# Patient Record
Sex: FEMALE | Race: WHITE | Employment: OTHER | URBAN - METROPOLITAN AREA
[De-identification: names, ages, dates, MRNs, and addresses within clinical notes are randomized per-mention and may not be internally consistent; named-entity substitution may affect disease eponyms.]

---

## 2017-07-06 ENCOUNTER — TRANSFERRED RECORDS (OUTPATIENT)
Dept: HEALTH INFORMATION MANAGEMENT | Facility: CLINIC | Age: 37
End: 2017-07-06

## 2017-07-17 ENCOUNTER — TELEPHONE (OUTPATIENT)
Dept: DERMATOLOGY | Facility: CLINIC | Age: 37
End: 2017-07-17

## 2017-07-17 NOTE — LETTER
[facility]  [address]  [facility] [address]  Phone: [phone]    July 17, 2017    Remedios Watts  [address] [address]              Dear Ms. Watts,    You are scheduled for Mohs Surgery on Monday July 24 th at 7:30 am.    Please check in at Dermatology Clinic.   (2nd Floor, last  Clinic on right up staircase or elevator -past OB/GYN clinic)    You don't need to arrive more than 5-10 minutes prior to your appointment time.     Be sure to eat a good breakfast and bathe and wash your hair prior to Surgery.    If you are taking any anti-coagulants that are prescribed by your Doctor (such as Coumadin/warfarin, Plavix, Aspirin, Ibuprofen), please continue taking them.     However, If you are taking anti-coagulants over the counter without  a Doctor's order for a Medical condition, please discontinue them 10 days prior to Surgery.      Please wear loose comfortable clothing as it could possibly be 4-6 hours until your surgery is completed depending upon how many layers of tissue need to be removed.     Wi-fi access is available.     Sincerely,      Johnny Stevens MD/ Addie Goodrich RN

## 2017-07-17 NOTE — TELEPHONE ENCOUNTER
"Spoke to patient. \"The tricep on my upper arm came back as Melanoma and I am supposed to have more taken off, but I want to come to you guys.. I went to Dr. Cruz in [facility] I couldn't get in with you sooner and they biopsied it..\"     Advised to have her records faxed to us at: Fax: [phone].    I scheduled a Mohs appt for her for 7-24-17. Mohs brochure/Pre-op letter sent via Us mail and My chart.    Patient verbalized understanding. Addie Goodrich RN       "

## 2017-07-17 NOTE — TELEPHONE ENCOUNTER
Reason for Call:  Other     Detailed comments: Pt states she has an appt with Brittanie 08/17 but has since gone to [facility] in [place] where they did surgery and biopsy came back as Melanoma on her arm. Pt wants to continue her care here but not sure what is next step, may need more surgery, please call her to discuss    Phone Number Patient can be reached at: Cell number on file:    Telephone Information:   Mobile [phone]       Best Time: today    Can we leave a detailed message on this number? YES    Call taken on 7/17/2017 at 12:50 PM by Moriah Hanson

## 2017-07-24 ENCOUNTER — OFFICE VISIT (OUTPATIENT)
Dept: DERMATOLOGY | Facility: CLINIC | Age: 37
End: 2017-07-24

## 2017-07-24 ENCOUNTER — MEDICAL CORRESPONDENCE (OUTPATIENT)
Dept: HEALTH INFORMATION MANAGEMENT | Facility: CLINIC | Age: 37
End: 2017-07-24

## 2017-07-24 VITALS — OXYGEN SATURATION: 100 % | SYSTOLIC BLOOD PRESSURE: 118 MMHG | DIASTOLIC BLOOD PRESSURE: 72 MMHG | HEART RATE: 66 BPM

## 2017-07-24 DIAGNOSIS — C43.62 MELANOMA OF LEFT UPPER ARM (H): ICD-10-CM

## 2017-07-24 DIAGNOSIS — L81.4 LENTIGO: ICD-10-CM

## 2017-07-24 DIAGNOSIS — D48.5 NEOPLASM OF UNCERTAIN BEHAVIOR OF SKIN: Primary | ICD-10-CM

## 2017-07-24 DIAGNOSIS — L82.1 SK (SEBORRHEIC KERATOSIS): ICD-10-CM

## 2017-07-24 DIAGNOSIS — D22.9 NEVUS: ICD-10-CM

## 2017-07-24 DIAGNOSIS — D22.9 NEVUS SPILUS: ICD-10-CM

## 2017-07-24 PROCEDURE — 99204 OFFICE O/P NEW MOD 45 MIN: CPT | Mod: 25 | Performed by: DERMATOLOGY

## 2017-07-24 PROCEDURE — 17313 MOHS 1 STAGE T/A/L: CPT | Performed by: DERMATOLOGY

## 2017-07-24 PROCEDURE — 13122 CMPLX RPR S/A/L ADDL 5 CM/>: CPT | Performed by: DERMATOLOGY

## 2017-07-24 PROCEDURE — 17314 MOHS ADDL STAGE T/A/L: CPT | Performed by: DERMATOLOGY

## 2017-07-24 PROCEDURE — 88305 TISSUE EXAM BY PATHOLOGIST: CPT | Mod: 59 | Performed by: DERMATOLOGY

## 2017-07-24 PROCEDURE — 11100 ZZHC BIOPSY SKIN/SUBQ/MUC MEM, SINGLE LESION: CPT | Mod: 59 | Performed by: DERMATOLOGY

## 2017-07-24 PROCEDURE — 13121 CMPLX RPR S/A/L 2.6-7.5 CM: CPT | Performed by: DERMATOLOGY

## 2017-07-24 NOTE — NURSING NOTE
"Chief Complaint   Patient presents with     Derm Problem     MOHS       Initial /72  Pulse 66  SpO2 100% Estimated body mass index is 25.31 kg/(m^2) as calculated from the following:    Height as of 9/20/15: 1.676 m (5' 6\").    Weight as of 2/9/16: 71.1 kg (156 lb 12.8 oz).  Medication Reconciliation: complete    "

## 2017-07-24 NOTE — MR AVS SNAPSHOT
After Visit Summary   2017    Remedios Watts    MRN: [id number]           Patient Information     Date Of Birth          1980        Visit Information        Provider Department      2017 7:30 AM Johnny Stevens MD [facility]        Today's Diagnoses     Neoplasm of uncertain behavior of skin    -  1    Melanoma of left upper arm (H)        Nevus spilus        Nevus        Lentigo        SK (seborrheic keratosis)          Care Instructions      Sutured Wound Care     [facility]: [phone]    [facility]: [phone]    Left arm      ? No strenuous activity for 48 hours. Resume moderate activity in 48 hours. No heavy exercising until you are seen for follow up in one week.     ? Take Tylenol as needed for discomfort.                         ? Do not drink alcoholic beverages for 48 hours.     ? Keep the pressure bandage in place for 24 hours. If the bandage becomes blood tinged or loose, reinforce it with gauze and tape.        (Refer to the reverse side of this page for management of bleeding).    ? Remove pressure bandage in 24 hours     ? Leave the flat bandage in place until your follow up appointment.    ? Keep the bandage dry. Wash around it carefully.    ? If the tape becomes soiled or starts to come off, reinforce it with additional paper tape.    ? Do not smoke for 3 weeks; smoking is detrimental to wound healing.    ? It is normal to have swelling and bruising around the surgical site. The bruising will fade in approximately 10-14 days. Elevate the area to reduce swelling.    ? Numbness, itchiness and sensitivity to temperature changes can occur after surgery and may take up to 18 months to normalize.      POSSIBLE COMPLICATIONS    BLEEDIN. Leave the bandage in place.  2. Use tightly rolled up gauze or a cloth to apply direct pressure over the bandage for 20   minutes.  3. Reapply pressure for an additional 20 minutes if  necessary  4. Call the office or go to the nearest emergency room if pressure fails to stop the bleeding.  5. Use additional gauze and tape to maintain pressure once the bleeding has stopped.        PAIN:    1. Post operative pain should slowly get better, never worse.  2. A severe increase in pain may indicate a problem. Call the office if this occurs.    In case of emergency phone:Dr Stevens [phone]            Follow-ups after your visit        Who to contact     If you have questions or need follow up information about today's clinic visit or your schedule please contact [facility] directly at [phone].  Normal or non-critical lab and imaging results will be communicated to you by Ev[employer]art, letter or phone within 4 business days after the clinic has received the results. If you do not hear from us within 7 days, please contact the clinic through [employer] or phone. If you have a critical or abnormal lab result, we will notify you by phone as soon as possible.  Submit refill requests through [employer] or call your pharmacy and they will forward the refill request to us. Please allow 3 business days for your refill to be completed.          Additional Information About Your Visit        [email or web address] Information     [employer] gives you secure access to your electronic health record. If you see a primary care provider, you can also send messages to your care team and make appointments. If you have questions, please call your primary care clinic.  If you do not have a primary care provider, please call [phone] and they will assist you.        Care EveryWhere ID     This is your Care EveryWhere ID. This could be used by other organizations to access your [place] medical records  [id number]        Your Vitals Were     Pulse Pulse Oximetry                66 100%           Blood Pressure from Last 3 Encounters:   07/24/17 118/72   05/05/16 116/67   02/09/16 111/67    Weight from Last 3 Encounters:    02/09/16 71.1 kg (156 lb 12.8 oz)   09/20/15 68 kg (150 lb)   02/06/13 68 kg (150 lb)              Today, you had the following     No orders found for display       Primary Care Provider Office Phone # Fax #    Agustín Berger [phone]       [facility] [address] [facility] [address]        Equal Access to Services     FATIMAH DIAZ : Hadii aad ku hadasho Soomaali, waaxda luqadaha, qaybta kaalmada adeegyada, waxay idiin hayaan adeeg kharajodi la'christina . So [facility] [phone].    ATENCIÓN: Si farzana james, tiene a cui disposición servicios gratuitos de asistencia lingüística. Llame al [phone].    We comply with applicable federal civil rights laws and [place] laws. We do not discriminate on the basis of race, color, national origin, age, disability sex, sexual orientation or gender identity.            Thank you!     Thank you for choosing [facility]  for your care. Our goal is always to provide you with excellent care. Hearing back from our patients is one way we can continue to improve our services. Please take a few minutes to complete the written survey that you may receive in the mail after your visit with us. Thank you!             Your Updated Medication List - Protect others around you: Learn how to safely use, store and throw away your medicines at www.disposemymeds.org.          This list is accurate as of: 7/24/17  3:51 PM.  Always use your most recent med list.                   Brand Name Dispense Instructions for use Diagnosis    clindamycin 1 % solution    CLEOCIN T    60 mL    Apply  topically 2 times daily. To lip    Folliculitis       dexamethasone 4 MG tablet    DECADRON    2.5 tablet    Take 2.5 tablets (10 mg) by mouth once for 1 dose        ibuprofen 800 MG tablet    ADVIL/MOTRIN    30 tablet    Take 1 tablet by mouth every 6 hours as needed (cramping).    Supervision of other normal pregnancy       ORTHO-NOVUM 7/7/7 (28) 0.5/0.75/1-35  MG-MCG per tablet   Generic drug:  norethindrone-ethinyl estradiol     3 Package    Take 1 tablet by mouth daily.        sertraline 50 MG tablet    ZOLOFT    90 tablet    Take 1 tablet by mouth daily.    QING (generalised anxiety disorder)       triamcinolone 0.1 % paste    KENALOG    5 g    Take by mouth 2 times daily

## 2017-07-24 NOTE — PATIENT INSTRUCTIONS
Sutured Wound Care     [facility]: [phone]    [facility]: [phone]    Left arm      ? No strenuous activity for 48 hours. Resume moderate activity in 48 hours. No heavy exercising until you are seen for follow up in one week.     ? Take Tylenol as needed for discomfort.                         ? Do not drink alcoholic beverages for 48 hours.     ? Keep the pressure bandage in place for 24 hours. If the bandage becomes blood tinged or loose, reinforce it with gauze and tape.        (Refer to the reverse side of this page for management of bleeding).    ? Remove pressure bandage in 24 hours     ? Leave the flat bandage in place until your follow up appointment.    ? Keep the bandage dry. Wash around it carefully.    ? If the tape becomes soiled or starts to come off, reinforce it with additional paper tape.    ? Do not smoke for 3 weeks; smoking is detrimental to wound healing.    ? It is normal to have swelling and bruising around the surgical site. The bruising will fade in approximately 10-14 days. Elevate the area to reduce swelling.    ? Numbness, itchiness and sensitivity to temperature changes can occur after surgery and may take up to 18 months to normalize.      POSSIBLE COMPLICATIONS    BLEEDIN. Leave the bandage in place.  2. Use tightly rolled up gauze or a cloth to apply direct pressure over the bandage for 20   minutes.  3. Reapply pressure for an additional 20 minutes if necessary  4. Call the office or go to the nearest emergency room if pressure fails to stop the bleeding.  5. Use additional gauze and tape to maintain pressure once the bleeding has stopped.        PAIN:    1. Post operative pain should slowly get better, never worse.  2. A severe increase in pain may indicate a problem. Call the office if this occurs.    In case of emergency phone:Dr Stevens [phone]

## 2017-07-24 NOTE — PROGRESS NOTES
Remedios Watts is a 36 year old year old female patient here today for evaluation and managment of 0.45mm melanoma on left arm.   .  Patient states this has been present for ?.  Patient reports the following symptoms:  Changing color.  Bx by [facility].  She has a hxof sunburns, no fmhx of skin cancer.  .  Patient reports the following previous treatments none.  Patient reports the following modifying factors none.  Associated symptoms: none.  Patient has no other skin complaints today.  Remainder of the HPI, Meds, PMH, Allergies, FH, and SH was reviewed in chart.    Pertinent Hx:   melanoma  Past Medical History:   Diagnosis Date     Chickenpox      Diarrhea      Group B streptococcus urinary tract infection complicating pregnancy 4/20/2011    Plan PCN in labor      History of postpartum depression, currently pregnant 10/21/2010     Malignant melanoma (H)      Postpartum depression 12/17/2008     Tailbone injury     fx     Urinary tract infections        Past Surgical History:   Procedure Laterality Date     C ORAL SURGERY PROCEDURE          Family History   Problem Relation Age of Onset     Arthritis Mother      Neurologic Disorder Mother      had a seizure      Alcohol/Drug Mother      Arthritis Maternal Grandmother      Breast Cancer Maternal Grandmother      HEART DISEASE Paternal Grandmother      CANCER Father      skin       Social History     Social History     Marital status:      Spouse name: N/A     Number of children: 3     Years of education: N/A     Occupational History      [employer]     Social History Main Topics     Smoking status: Never Smoker     Smokeless tobacco: Never Used     Alcohol use Yes     Drug use: No     Sexual activity: Yes     Partners: Male     Birth control/ protection: Pill     Other Topics Concern     Parent/Sibling W/ Cabg, Mi Or Angioplasty Before 65f 55m? Yes     pt had MI @ age 58     Social History Narrative       Outpatient Encounter  Prescriptions as of 7/24/2017   Medication Sig Dispense Refill     triamcinolone (KENALOG) 0.1 % paste Take by mouth 2 times daily 5 g 0     clindamycin (CLEOCIN T) 1 % external solution Apply  topically 2 times daily. To lip 60 mL 11     norethindrone-ethinyl estradiol (ORTHO-NOVUM 7/7/7, 28,) 0.5/0.75/1-35 MG-MCG per tablet Take 1 tablet by mouth daily. 3 Package 3     sertraline (ZOLOFT) 50 MG tablet Take 1 tablet by mouth daily. 90 tablet 3     ibuprofen (ADVIL,MOTRIN) 800 MG tablet Take 1 tablet by mouth every 6 hours as needed (cramping). 30 tablet 0     dexamethasone (DECADRON) 4 MG tablet Take 2.5 tablets (10 mg) by mouth once for 1 dose 2.5 tablet 0     No facility-administered encounter medications on file as of 7/24/2017.              Review Of Systems  Skin: As above  Eyes: negative  Ears/Nose/Throat: negative  Respiratory: No shortness of breath, dyspnea on exertion, cough, or hemoptysis  Cardiovascular: negative  Gastrointestinal: negative  Genitourinary: negative  Musculoskeletal: negative  Neurologic: negative  Psychiatric: negative  Hematologic/Lymphatic/Immunologic: negative  Endocrine: negative      O:   NAD, WDWN, Alert & Oriented, Mood & Affect wnl, Vitals stable   Here today with     /72  Pulse 66  SpO2 100%   General appearance normal, aliza i   Vitals stable   Alert, oriented and in no acute distress      Following lymph nodes palpated: Occipital, Cervical, Supraclavicular , axillar, inguinal no lad   R mid back irregularly pigmented macule 7mm    Pigmented macules ranging in size from 1-3mm on trun kand ext with regular broders and pigment networks    L arm with 1cm crusted scaly papule in born patch with uniform pigmented 1mm macules throughout   Stuck on papules and brown macules on trunk and ext      The remainder of the full exam was unremarkable; the following areas were examined:  conjunctiva/lids, oral mucosa, neck, peripheral vascular system, abdomen, lymph nodes,  digits/nails, eccrine and apocrine glands, scalp/hair, face, neck, chest, abdomen, buttocks, back, RUE, LUE, RLE, LLE       Eyes: Conjunctivae/lids:Normal     ENT: Lips, buccal mucosa, tongue: normal    MSK:Normal    Cardiovascular: peripheral edema none    Pulm: Breathing Normal    Lymph Nodes: No Head and Neck Lymphadenopathy     Neuro/Psych: Orientation:Normal; Mood/Affect:Normal      A/P:  1. R mid back r/o melanoma  TANGENTIAL BIOPSY SENT OUT:  After consent, anesthesia with LEC and prep, tangential excision performed and specimen sent out for permanent section histology.  No complications and routine wound care. Patient told to call our office in 1-2 weeks for result.      2. Seborrheic keratosis, lentigo, nevi, nevus spilus  3. Melanoma L arm  MELANOMA DISCUSSED WITH PATIENT:  I discussed the specifics of tumor, prognosis, metachronous melanoma, self exam, and genetics with the patient. I explained the need for monthly skin exams including and taught the patient how to do this. Patient was asked about new or changing moles and a full skin exam was performed.   CASTLE testing discussed with patient   CASTLE TESTING SENT OUT TODAY    BENIGN LESIONS DISCUSSED WITH PATIENT:  I discussed the specifics of tumor, prognosis, and genetics of benign lesions.  I explained that treatment of these lesions would be purely cosmetic and not medically neccessary.  I discussed with patient different removal options including excision, cautery and /or laser.      Nature and genetics of benign skin lesions dicussed with patient.  Signs and Symptoms of skin cancer discussed with patient.  ABCDEs of melanoma reviewed with patient.  Patient encouraged to perform monthly skin exams.  UV precautions reviewed with patient.  Skin care regimen reviewed with patient: Eliminate harsh soaps, i.e. Dial, zest, irsih spring; Mild soaps such as Cetaphil or Dove sensitive skin, avoid hot or cold showers, aggressive use of emollients including  vanicream, cetaphil or cerave discussed with patient.    Risks of non-melanoma skin cancer discussed with patient   Return to clinic 4 months      PROCEDURE NOTE  L arm melanoma  MOHS MART-1:  I discussed the specifics of tumor, prognosis, metachronous melanoma, self exam, and genetics with the patient. I explained the need for monthly skin exams including palpating lymph nodes, and taught the patient how to do this. Patient was asked about new or changing moles and a full skin exam was performed. I reviewed treatment options, including a discussion of wide excision (the gold standard) versus Mohs surgery with MART-1 immunostains.     After PGACAC discussed with patient, decision for Mohs surgery was made. Indication for Mohs was aggressive histology. Patient confirmed the site with Dr. Stevens. After anesthesia with LEC, the tumor was excised using standard Mohs technique in 5 stages(s).  MART 1 stains were performed on 14 specimens. CLEAR MARGINS OBTAINED and Final defect size was 6.5 x 3.3 cm.   REPAIR COMPLEX: Because of the tightness of the surrounding skin and Because of the size and full thickness nature of the defect, a complex closure was planned. After LEC anesthesia and prep, Burow's triangles were excised in the relaxed skin tension lines. The wound edges were widely undermined by dissection in the subcutaneous plane until adequate tissue mobility was obtained. Hemostasis was obtained. The wound edges were closed in a layered fashion using Vicryl and Fast Absorbing Plain Gut sutures. Postoperative length was 10 cm.   EBL minimal; complications none; wound care routine.  The patient was discharged in good condition and will return in one week for wound evaluation.

## 2017-07-24 NOTE — NURSING NOTE
Surgical Office Location :   [facility]  [address] [place] [address]

## 2017-07-24 NOTE — LETTER
[facility]  [address]  [facility] [address]  Phone: [phone]    July 31, 2017    Remedios Watts                                                                                                                        [address] [place] [address]            Dear Ms. Watts,    You are scheduled for Surgical excision of an Atypical Mole on Monday August 14 th at 8:00 am. Please check in at Dermatology Clinic.   (2nd Floor, last  Clinic on right up staircase or elevator -past OB/GYN clinic)    You don't need to arrive more than 5-10 minutes prior to your appointment time.     Be sure to eat a good breakfast and bathe and wash your hair prior to Surgery.    If you are taking any anti-coagulants that are prescribed by your Doctor (such as Coumadin/warfarin, Plavix, Aspirin, Ibuprofen), please continue taking them.     However, If you are taking anti-coagulants over the counter without  a Doctor's order for a Medical condition, please discontinue them 10 days prior to Surgery.      Thank you,      Johnny Stevens MD/ Addie Goodrich RN

## 2017-07-25 ENCOUNTER — ALLIED HEALTH/NURSE VISIT (OUTPATIENT)
Dept: DERMATOLOGY | Facility: CLINIC | Age: 37
End: 2017-07-25

## 2017-07-25 DIAGNOSIS — C43.62 MELANOMA OF LEFT UPPER ARM (H): Primary | ICD-10-CM

## 2017-07-25 PROCEDURE — 99207 ZZC NO CHARGE NURSE ONLY: CPT

## 2017-07-25 RX ORDER — DOXYCYCLINE 100 MG/1
100 CAPSULE ORAL 2 TIMES DAILY
Qty: 14 CAPSULE | Refills: 0 | Status: SHIPPED | OUTPATIENT
Start: 2017-07-25 | End: 2018-02-17

## 2017-07-25 NOTE — PROGRESS NOTES
L arm tender warm  No purulence  Ho draining    Doxy for one week  Tylenol #3 for pain  Signs of infection and bleeding discussed with patient

## 2017-07-25 NOTE — MR AVS SNAPSHOT
After Visit Summary   7/25/2017    Remedios Watts    MRN: [id number]           Patient Information     Date Of Birth          1980        Visit Information        Provider Department      7/25/2017 9:30 AM Nurse, Venita Cast [facility]        Today's Diagnoses     Melanoma of left upper arm (H)    -  1       Follow-ups after your visit        Your next 10 appointments already scheduled     Jul 31, 2017  2:45 PM CDT   Nurse Only with Wy Derm Nurse   [facility] ([facility]    [address] [id number] [phone]              Who to contact     If you have questions or need follow up information about today's clinic visit or your schedule please contact [facility] directly at [phone].  Normal or non-critical lab and imaging results will be communicated to you by MyChart, letter or phone within 4 business days after the clinic has received the results. If you do not hear from us within 7 days, please contact the clinic through [employer] or phone. If you have a critical or abnormal lab result, we will notify you by phone as soon as possible.  Submit refill requests through [employer] or call your pharmacy and they will forward the refill request to us. Please allow 3 business days for your refill to be completed.          Additional Information About Your Visit        MyChart Information     [employer] gives you secure access to your electronic health record. If you see a primary care provider, you can also send messages to your care team and make appointments. If you have questions, please call your primary care clinic.  If you do not have a primary care provider, please call [phone] and they will assist you.        Care EveryWhere ID     This is your Care EveryWhere ID. This could be used by other organizations to access your [place] medical records  [id number]         Blood Pressure from Last 3 Encounters:   07/24/17  118/72   05/05/16 116/67   02/09/16 111/67    Weight from Last 3 Encounters:   02/09/16 71.1 kg (156 lb 12.8 oz)   09/20/15 68 kg (150 lb)   02/06/13 68 kg (150 lb)              Today, you had the following     No orders found for display         Today's Medication Changes          These changes are accurate as of: 7/25/17 10:15 AM.  If you have any questions, ask your nurse or doctor.               Start taking these medicines.        Dose/Directions    acetaminophen-codeine 300-30 MG per tablet   Commonly known as:  TYLENOL #3   Used for:  Melanoma of left upper arm (H)        Dose:  1 tablet   Take 1 tablet by mouth every 4 hours as needed for pain maximum 15 tablet(s) per day   Quantity:  20 tablet   Refills:  0       doxycycline 100 MG capsule   Commonly known as:  VIBRAMYCIN   Used for:  Melanoma of left upper arm (H)        Dose:  100 mg   Take 1 capsule (100 mg) by mouth 2 times daily   Quantity:  14 capsule   Refills:  0            Where to get your medicines      These medications were sent to [facility] [address] [facility]  [address] [place] [address]     Phone:  [phone]     doxycycline 100 MG capsule         Some of these will need a paper prescription and others can be bought over the counter.  Ask your nurse if you have questions.     Bring a paper prescription for each of these medications     acetaminophen-codeine 300-30 MG per tablet                Primary Care Provider Office Phone # Fax #    Agustín Berger [phone]       [address] [address]        Equal Access to Services     [facility]: Hadii nori lawrence hadasho Soomaali, waaxda luqadaha, qaybta kaalmada adeegyada, deya aawd haychristina jamison . So [facility] [phone].    ATENCIÓN: Si habla español, tiene a cui disposición servicios gratuitos de asistencia lingüística. Llame al [phone].    We comply with applicable [facility] civil  rights laws and [place] laws. We do not discriminate on the basis of race, color, national origin, age, disability sex, sexual orientation or gender identity.            Thank you!     Thank you for choosing [facility]  for your care. Our goal is always to provide you with excellent care. Hearing back from our patients is one way we can continue to improve our services. Please take a few minutes to complete the written survey that you may receive in the mail after your visit with us. Thank you!             Your Updated Medication List - Protect others around you: Learn how to safely use, store and throw away your medicines at www.disposemymeds.org.          This list is accurate as of: 7/25/17 10:15 AM.  Always use your most recent med list.                   Brand Name Dispense Instructions for use Diagnosis    acetaminophen-codeine 300-30 MG per tablet    TYLENOL #3    20 tablet    Take 1 tablet by mouth every 4 hours as needed for pain maximum 15 tablet(s) per day    Melanoma of left upper arm (H)       clindamycin 1 % solution    CLEOCIN T    60 mL    Apply  topically 2 times daily. To lip    Folliculitis       dexamethasone 4 MG tablet    DECADRON    2.5 tablet    Take 2.5 tablets (10 mg) by mouth once for 1 dose        doxycycline 100 MG capsule    VIBRAMYCIN    14 capsule    Take 1 capsule (100 mg) by mouth 2 times daily    Melanoma of left upper arm (H)       ibuprofen 800 MG tablet    ADVIL/MOTRIN    30 tablet    Take 1 tablet by mouth every 6 hours as needed (cramping).    Supervision of other normal pregnancy       ORTHO-NOVUM 7/7/7 (28) 0.5/0.75/1-35 MG-MCG per tablet   Generic drug:  norethindrone-ethinyl estradiol     3 Package    Take 1 tablet by mouth daily.        sertraline 50 MG tablet    ZOLOFT    90 tablet    Take 1 tablet by mouth daily.    QING (generalised anxiety disorder)       triamcinolone 0.1 % paste    KENALOG    5 g    Take by mouth 2 times daily

## 2017-07-26 ENCOUNTER — ALLIED HEALTH/NURSE VISIT (OUTPATIENT)
Dept: DERMATOLOGY | Facility: CLINIC | Age: 37
End: 2017-07-26

## 2017-07-26 DIAGNOSIS — C43.62: Primary | ICD-10-CM

## 2017-07-26 PROCEDURE — 10180 I&D COMPLEX PO WOUND INFCTJ: CPT | Mod: 52

## 2017-07-26 RX ORDER — OXYCODONE AND ACETAMINOPHEN 7.5; 325 MG/1; MG/1
1 TABLET ORAL EVERY 4 HOURS PRN
Qty: 45 TABLET | Refills: 0 | Status: SHIPPED | OUTPATIENT
Start: 2017-07-26 | End: 2018-02-18

## 2017-07-26 NOTE — MR AVS SNAPSHOT
After Visit Summary   7/26/2017    Remedios Watts    MRN: [id number]           Patient Information     Date Of Birth          1980        Visit Information        Provider Department      7/26/2017 2:45 PM Venita Narveaz [facility]        Care Instructions          Wound Care Instructions     FOR SUPERFICIAL WOUNDS     [facility] [phone]    [facility] [phone]  (Open part of incision)                       AFTER 24 HOURS YOU SHOULD REMOVE THE BANDAGE AND BEGIN DAILY DRESSING CHANGES AS FOLLOWS:     1) Remove Dressing.     2) Clean and dry the area with tap water using a Q-tip or sterile gauze pad.     3) Apply Polysporin ointment or Bacitracin ointment over entire wound.  Do NOT use Neosporin ointment.     4) Cover the wound with a band-aid, or a sterile non-stick gauze pad and micropore paper tape      REPEAT THESE INSTRUCTIONS AT LEAST ONCE A DAY UNTIL THE WOUND HAS COMPLETELY HEALED.    It is an old wives tale that a wound heals better when it is exposed to air and allowed to dry out. The wound will heal faster with a better cosmetic result if it is kept moist with ointment and covered with a bandage.    **Do not let the wound dry out.**      Supplies Needed:      *Cotton tipped applicators (Q-tips)    *Polysporin Ointment or Bacitracin Ointment (NOT NEOSPORIN)    *Band-aids or non-stick gauze pads and micropore paper tape.      PATIENT INFORMATION:    During the healing process you will notice a number of changes. All wounds develop a small halo of redness surrounding the wound.  This means healing is occurring. Severe itching with extensive redness usually indicates sensitivity to the ointment or bandage tape used to dress the wound.  You should call our office if this develops.      Swelling  and/or discoloration around your surgical site is common, particularly when performed around the eye.    All wounds normally drain.  The larger the  wound the more drainage there will be.  After 7-10 days, you will notice the wound beginning to shrink and new skin will begin to grow.  The wound is healed when you can see skin has formed over the entire area.  A healed wound has a healthy, shiny look to the surface and is red to dark pink in color to normalize.  Wounds may take approximately 4-6 weeks to heal.  Larger wounds may take 6-8 weeks.  After the wound is healed you may discontinue dressing changes.    You may experience a sensation of tightness as your wound heals. This is normal and will gradually subside.    Your healed wound may be sensitive to temperature changes. This sensitivity improves with time, but if you re having a lot of discomfort, try to avoid temperature extremes.    Patients frequently experience itching after their wound appears to have healed because of the continue healing under the skin.  Plain Vaseline will help relieve the itching.        POSSIBLE COMPLICATIONS    BLEEDIN. Leave the bandage in place.  2. Use tightly rolled up gauze or a cloth to apply direct pressure over the bandage for 30  minutes.  3. Reapply pressure for an additional 30 minutes if necessary  4. Use additional gauze and tape to maintain pressure once the bleeding has stopped.            Follow-ups after your visit        Your next 10 appointments already scheduled     2017  2:45 PM CDT   Nurse Only with Wy Derm Nurse   [facility]    [address] [id number] [phone]            2017  2:45 PM CDT   Nurse Only with Wy Derm Nurse   [facility]    [address] [id number] [phone]              Who to contact     If you have questions or need follow up information about today's clinic visit or your schedule please contact [facility] directly at [phone].  Normal or non-critical lab and  imaging results will be communicated to you by MyChart, letter or phone within 4 business days after the clinic has received the results. If you do not hear from us within 7 days, please contact the clinic through [employer] or phone. If you have a critical or abnormal lab result, we will notify you by phone as soon as possible.  Submit refill requests through [employer] or call your pharmacy and they will forward the refill request to us. Please allow 3 business days for your refill to be completed.          Additional Information About Your Visit        [employer] Information     [employer] gives you secure access to your electronic health record. If you see a primary care provider, you can also send messages to your care team and make appointments. If you have questions, please call your primary care clinic.  If you do not have a primary care provider, please call [phone] and they will assist you.        Care EveryWhere ID     This is your Care EveryWhere ID. This could be used by other organizations to access your [place] medical records  [id number]         Blood Pressure from Last 3 Encounters:   07/24/17 118/72   05/05/16 116/67   02/09/16 111/67    Weight from Last 3 Encounters:   02/09/16 71.1 kg (156 lb 12.8 oz)   09/20/15 68 kg (150 lb)   02/06/13 68 kg (150 lb)              Today, you had the following     No orders found for display       Primary Care Provider Office Phone # Fax #    Agustín Berger [phone]       [address] [address]        Equal Access to Services     [facility] : Hadii aad ku hadasho Soomaali, waaxda luqadaha, qaybta kaalmada adeegyada, deya jamison . So [facility] [phone].    ATENCIÓN: Si habla español, tiene a cui disposición servicios gratuitos de asistencia lingüística. Llame al [phone].    We comply with applicable federal civil rights laws and [place] laws. We do not discriminate on the  basis of race, color, national origin, age, disability sex, sexual orientation or gender identity.            Thank you!     Thank you for choosing [facility]  for your care. Our goal is always to provide you with excellent care. Hearing back from our patients is one way we can continue to improve our services. Please take a few minutes to complete the written survey that you may receive in the mail after your visit with us. Thank you!             Your Updated Medication List - Protect others around you: Learn how to safely use, store and throw away your medicines at www.disposemymeds.org.          This list is accurate as of: 7/26/17 12:24 PM.  Always use your most recent med list.                   Brand Name Dispense Instructions for use Diagnosis    acetaminophen-codeine 300-30 MG per tablet    TYLENOL #3    20 tablet    Take 1 tablet by mouth every 4 hours as needed for pain maximum 15 tablet(s) per day    Melanoma of left upper arm (H)       clindamycin 1 % solution    CLEOCIN T    60 mL    Apply  topically 2 times daily. To lip    Folliculitis       dexamethasone 4 MG tablet    DECADRON    2.5 tablet    Take 2.5 tablets (10 mg) by mouth once for 1 dose        doxycycline 100 MG capsule    VIBRAMYCIN    14 capsule    Take 1 capsule (100 mg) by mouth 2 times daily    Melanoma of left upper arm (H)       ibuprofen 800 MG tablet    ADVIL/MOTRIN    30 tablet    Take 1 tablet by mouth every 6 hours as needed (cramping).    Supervision of other normal pregnancy       ORTHO-NOVUM 7/7/7 (28) 0.5/0.75/1-35 MG-MCG per tablet   Generic drug:  norethindrone-ethinyl estradiol     3 Package    Take 1 tablet by mouth daily.        sertraline 50 MG tablet    ZOLOFT    90 tablet    Take 1 tablet by mouth daily.    QING (generalised anxiety disorder)       triamcinolone 0.1 % paste    KENALOG    5 g    Take by mouth 2 times daily

## 2017-07-26 NOTE — PATIENT INSTRUCTIONS
Wound Care Instructions     FOR SUPERFICIAL WOUNDS     [facility] [phone]    [facility] [phone]  (Open part of incision)                       AFTER 24 HOURS YOU SHOULD REMOVE THE BANDAGE AND BEGIN DAILY DRESSING CHANGES AS FOLLOWS:     1) Remove Dressing.     2) Clean and dry the area with tap water using a Q-tip or sterile gauze pad.     3) Apply Polysporin ointment or Bacitracin ointment over entire wound.  Do NOT use Neosporin ointment.     4) Cover the wound with a band-aid, or a sterile non-stick gauze pad and micropore paper tape      REPEAT THESE INSTRUCTIONS AT LEAST ONCE A DAY UNTIL THE WOUND HAS COMPLETELY HEALED.    It is an old wives tale that a wound heals better when it is exposed to air and allowed to dry out. The wound will heal faster with a better cosmetic result if it is kept moist with ointment and covered with a bandage.    **Do not let the wound dry out.**      Supplies Needed:      *Cotton tipped applicators (Q-tips)    *Polysporin Ointment or Bacitracin Ointment (NOT NEOSPORIN)    *Band-aids or non-stick gauze pads and micropore paper tape.      PATIENT INFORMATION:    During the healing process you will notice a number of changes. All wounds develop a small halo of redness surrounding the wound.  This means healing is occurring. Severe itching with extensive redness usually indicates sensitivity to the ointment or bandage tape used to dress the wound.  You should call our office if this develops.      Swelling  and/or discoloration around your surgical site is common, particularly when performed around the eye.    All wounds normally drain.  The larger the wound the more drainage there will be.  After 7-10 days, you will notice the wound beginning to shrink and new skin will begin to grow.  The wound is healed when you can see skin has formed over the entire area.  A healed wound has a healthy, shiny look to the surface and is red to dark pink in  color to normalize.  Wounds may take approximately 4-6 weeks to heal.  Larger wounds may take 6-8 weeks.  After the wound is healed you may discontinue dressing changes.    You may experience a sensation of tightness as your wound heals. This is normal and will gradually subside.    Your healed wound may be sensitive to temperature changes. This sensitivity improves with time, but if you re having a lot of discomfort, try to avoid temperature extremes.    Patients frequently experience itching after their wound appears to have healed because of the continue healing under the skin.  Plain Vaseline will help relieve the itching.        POSSIBLE COMPLICATIONS    BLEEDIN. Leave the bandage in place.  2. Use tightly rolled up gauze or a cloth to apply direct pressure over the bandage for 30  minutes.  3. Reapply pressure for an additional 30 minutes if necessary  4. Use additional gauze and tape to maintain pressure once the bleeding has stopped.

## 2017-07-26 NOTE — PROGRESS NOTES
Still having pain on left arm  She complains of bleeding    I+D AND COMPLEX: After thorough discussion of PGACAC, consent obtained, anesthesia and prep, The incisions were made through the skin and down to and including the subcutaneous tissue. NO HEMATOMA OR Active bleeding seen, no purlence or signs of infection.   The wound edges were then closed in a layered fashion, being careful not to leave any dead space. EBL minimal; complications none; wound care routine. The patient was discharged in good condition and will return in one week for wound evaluation.    Return to clinic tomorrow monring for f/u

## 2017-07-27 ENCOUNTER — HOSPITAL ENCOUNTER (OUTPATIENT)
Dept: ULTRASOUND IMAGING | Facility: CLINIC | Age: 37
Discharge: HOME | End: 2017-07-27
Attending: DERMATOLOGY | Admitting: DERMATOLOGY

## 2017-07-27 ENCOUNTER — ALLIED HEALTH/NURSE VISIT (OUTPATIENT)
Dept: DERMATOLOGY | Facility: CLINIC | Age: 37
End: 2017-07-27

## 2017-07-27 VITALS — TEMPERATURE: 98.2 F

## 2017-07-27 DIAGNOSIS — Z48.01 ENCOUNTER FOR CHANGE OR REMOVAL OF SURGICAL WOUND DRESSING: Primary | ICD-10-CM

## 2017-07-27 DIAGNOSIS — M79.89 LEFT ARM SWELLING: ICD-10-CM

## 2017-07-27 LAB — COPATH REPORT: NORMAL

## 2017-07-27 PROCEDURE — 76882 US LMTD JT/FCL EVL NVASC XTR: CPT | Mod: LT

## 2017-07-27 PROCEDURE — 99207 ZZC NO CHARGE NURSE ONLY: CPT

## 2017-07-27 NOTE — PROGRESS NOTES
Came in with pain and swelling    Ultrasound negative, no signs of infection or bleeding  Return to clinic Monday    Wound care discussed with patient

## 2017-07-27 NOTE — MR AVS SNAPSHOT
After Visit Summary   7/27/2017    Remedios Watts    MRN: [id number]           Patient Information     Date Of Birth          1980        Visit Information        Provider Department      7/27/2017 9:30 AM NurseVenita [facility]        Today's Diagnoses     Encounter for change or removal of surgical wound dressing    -  1       Follow-ups after your visit        Your next 10 appointments already scheduled     Jul 31, 2017  2:45 PM CDT   Nurse Only with Venita Cast Nurse   [facility] ([facility]    [address] [facility] [id number]   [phone]              Future tests that were ordered for you today     Open Future Orders        Priority Expected Expires Ordered    US Extremity Non Vascular Left Routine  7/27/2018 7/27/2017            Who to contact     If you have questions or need follow up information about today's clinic visit or your schedule please contact [facility] directly at [phone].  Normal or non-critical lab and imaging results will be communicated to you by [email or web address], letter or phone within 4 business days after the clinic has received the results. If you do not hear from us within 7 days, please contact the clinic through [email or web address] or phone. If you have a critical or abnormal lab result, we will notify you by phone as soon as possible.  Submit refill requests through [employer] or call your pharmacy and they will forward the refill request to us. Please allow 3 business days for your refill to be completed.          Additional Information About Your Visit        MyChart Information     [employer] gives you secure access to your electronic health record. If you see a primary care provider, you can also send messages to your care team and make appointments. If you have questions, please call your primary care clinic.  If you do not have a primary care provider, please call [phone] and they will assist you.         Care EveryWhere ID     This is your Care EveryWhere ID. This could be used by other organizations to access your [place] medical records  [id number]        Your Vitals Were     Temperature                   98.2  F (36.8  C) (Tympanic)            Blood Pressure from Last 3 Encounters:   07/24/17 118/72   05/05/16 116/67   02/09/16 111/67    Weight from Last 3 Encounters:   02/09/16 71.1 kg (156 lb 12.8 oz)   09/20/15 68 kg (150 lb)   02/06/13 68 kg (150 lb)              Today, you had the following     No orders found for display       Primary Care Provider Office Phone # Fax #    Agustín Berger [phone]       [facility] [address] [address] [facility] [address]        Equal Access to Services     FATIMAH DIAZ : Hadii aad ku hadasho Soomaali, waaxda luqadaha, qaybta kaalmada adeegyada, deya awad haychristina jamison . So [facility] [phone].    ATENCIÓN: Si habla español, tiene a cui disposición servicios gratuitos de asistencia lingüística. Loretta al [phone].    We comply with applicable federal civil rights laws and [place] laws. We do not discriminate on the basis of race, color, national origin, age, disability sex, sexual orientation or gender identity.            Thank you!     Thank you for choosing [facility]  for your care. Our goal is always to provide you with excellent care. Hearing back from our patients is one way we can continue to improve our services. Please take a few minutes to complete the written survey that you may receive in the mail after your visit with us. Thank you!             Your Updated Medication List - Protect others around you: Learn how to safely use, store and throw away your medicines at www.disposemymeds.org.          This list is accurate as of: 7/27/17  9:39 AM.  Always use your most recent med list.                   Brand Name Dispense Instructions for use Diagnosis    acetaminophen-codeine 300-30 MG per tablet     TYLENOL #3    20 tablet    Take 1 tablet by mouth every 4 hours as needed for pain maximum 15 tablet(s) per day    Melanoma of left upper arm (H)       clindamycin 1 % solution    CLEOCIN T    60 mL    Apply  topically 2 times daily. To lip    Folliculitis       dexamethasone 4 MG tablet    DECADRON    2.5 tablet    Take 2.5 tablets (10 mg) by mouth once for 1 dose        doxycycline 100 MG capsule    VIBRAMYCIN    14 capsule    Take 1 capsule (100 mg) by mouth 2 times daily    Melanoma of left upper arm (H)       ibuprofen 800 MG tablet    ADVIL/MOTRIN    30 tablet    Take 1 tablet by mouth every 6 hours as needed (cramping).    Supervision of other normal pregnancy       ORTHO-NOVUM 7/7/7 (28) 0.5/0.75/1-35 MG-MCG per tablet   Generic drug:  norethindrone-ethinyl estradiol     3 Package    Take 1 tablet by mouth daily.        oxyCODONE-acetaminophen 7.5-325 MG per tablet    PERCOCET    45 tablet    Take 1 tablet by mouth every 4 hours as needed for moderate to severe pain maximum 12 tablet(s) per day    Melanoma of upper arm, left (H)       sertraline 50 MG tablet    ZOLOFT    90 tablet    Take 1 tablet by mouth daily.    QING (generalised anxiety disorder)       triamcinolone 0.1 % paste    KENALOG    5 g    Take by mouth 2 times daily

## 2017-07-27 NOTE — NURSING NOTE
The following medication was given:     MEDICATION: Ibuprofen     ROUTE: Oral  TIME: 0925   DOSE: 800mg  AMOUNT: 4 tablets  LOT #: [id number]    :  [employer]  EXPIRATION DATE:  01/2019  NDC: [id number]    Khadra Bolanos LPN    Patient is going to have an ultrasound of left upper arm today per Dr. Stevens.

## 2017-07-31 ENCOUNTER — ALLIED HEALTH/NURSE VISIT (OUTPATIENT)
Dept: DERMATOLOGY | Facility: CLINIC | Age: 37
End: 2017-07-31

## 2017-07-31 DIAGNOSIS — Z48.01 ENCOUNTER FOR CHANGE OR REMOVAL OF SURGICAL WOUND DRESSING: Primary | ICD-10-CM

## 2017-07-31 PROCEDURE — 99207 ZZC NO CHARGE NURSE ONLY: CPT

## 2017-07-31 NOTE — PATIENT INSTRUCTIONS
WOUND CARE INSTRUCTIONS  for  ONE WEEK AFTER SURGERY          1) Leave flat bandage on your skin for one week after today s bandage change.  2) In one week when you remove the bandage, you may resume your regular skin care routine, including washing with mild soap and water, applying moisturizer, make-up and sunscreen.    3) If there are any open or bleeding areas at the incision/graft site you should begin to cover the area with a bandage daily as follows:    1) Clean and dry the area with plain tap water using a Q-tip or sterile gauze pad.  2) Apply Polysporin or Bacitracin ointment to the open area.  3) Cover the wound with a band-aid or a sterile non-stick gauze pad and micropore paper tape.             *Once the bandages are removed, the scar will be red and firm (especially in the lip/chin area). This is normal and will fade in time. It might take 6-12 months for this to happen.     *Massaging the area will help the scar soften and fade quicker. Begin to massage the area one month after the bandages have been removed. To massage apply pressure directly and firmly over the scar with the fingertips and move in a circular motion. Massage the area for a few minutes several times a day. Continue to massage the site for several months.    *Approximately 6-8 weeks after surgery it is not uncommon to see the formation of  tender pimple-like  bump along the scar. This is normal. As the scar continues to mature and the stitches underneath the skin begin to dissolve, this might occur. Do not pick or squeeze, this will resolve on it s own. Should one break open producing a small amount of drainage, apply Polysporin or Bacitracin ointment a few times a day until the wound is completely healed.    *Numbness in the surgical area is expected. It might take 12-18 months for the feeling to return to normal. During this time sensations of itchiness, tingling and occasional sharp pains might be noted. These feelings are normal  and will subside once the nerves have completely healed.         IN CASE OF EMERGENCY: Dr Stevens [phone]     If you were seen in [place] call: [phone]    If you were seen in [place] call: [phone]

## 2017-07-31 NOTE — PROGRESS NOTES
Pt returned to clinic for post surgery 1 week follow up bandage change. Pt has no complaints, denies pain. Bandage removed from left upper arm, area cleansed with normal saline. Site is healing and wound edges approximating well. The middle part of the incisoin was bandaged as open wound. Reapplied new steri strips and paper tape to both ends of the incision.    Advised to watch for signs/sx of infection; spreading redness, drainage, odor, fever. Call or report promptly to clinic. Pt given written instructions and informed to rtc as needed. Patient verbalized understanding.     Khadra Bolanos LPN

## 2017-07-31 NOTE — MR AVS SNAPSHOT
After Visit Summary   7/31/2017    Remedios Watts    MRN: [id number]           Patient Information     Date Of Birth          1980        Visit Information        Provider Department      7/31/2017 2:45 PM [facility]        Today's Diagnoses     Encounter for change or removal of surgical wound dressing    -  1      Care Instructions    WOUND CARE INSTRUCTIONS  for  ONE WEEK AFTER SURGERY          1) Leave flat bandage on your skin for one week after today s bandage change.  2) In one week when you remove the bandage, you may resume your regular skin care routine, including washing with mild soap and water, applying moisturizer, make-up and sunscreen.    3) If there are any open or bleeding areas at the incision/graft site you should begin to cover the area with a bandage daily as follows:    1) Clean and dry the area with plain tap water using a Q-tip or sterile gauze pad.  2) Apply Polysporin or Bacitracin ointment to the open area.  3) Cover the wound with a band-aid or a sterile non-stick gauze pad and micropore paper tape.             *Once the bandages are removed, the scar will be red and firm (especially in the lip/chin area). This is normal and will fade in time. It might take 6-12 months for this to happen.     *Massaging the area will help the scar soften and fade quicker. Begin to massage the area one month after the bandages have been removed. To massage apply pressure directly and firmly over the scar with the fingertips and move in a circular motion. Massage the area for a few minutes several times a day. Continue to massage the site for several months.    *Approximately 6-8 weeks after surgery it is not uncommon to see the formation of  tender pimple-like  bump along the scar. This is normal. As the scar continues to mature and the stitches underneath the skin begin to dissolve, this might occur. Do not pick or squeeze, this will resolve on it s own.  Should one break open producing a small amount of drainage, apply Polysporin or Bacitracin ointment a few times a day until the wound is completely healed.    *Numbness in the surgical area is expected. It might take 12-18 months for the feeling to return to normal. During this time sensations of itchiness, tingling and occasional sharp pains might be noted. These feelings are normal and will subside once the nerves have completely healed.         IN CASE OF EMERGENCY: Dr Stevens [phone]     If you were seen in [place] call: [phone]    If you were seen in [place] call: [phone]            Follow-ups after your visit        Your next 10 appointments already scheduled     Aug 14, 2017  8:00 AM CDT   [facility] MD   [facility]    [address] [address] [phone]              Who to contact     If you have questions or need follow up information about today's clinic visit or your schedule please contact [facility] directly at [phone].  Normal or non-critical lab and imaging results will be communicated to you by LuckyCalhart, letter or phone within 4 business days after the clinic has received the results. If you do not hear from us within 7 days, please contact the clinic through LuckyCalhart or phone. If you have a critical or abnormal lab result, we will notify you by phone as soon as possible.  Submit refill requests through depict or call your pharmacy and they will forward the refill request to us. Please allow 3 business days for your refill to be completed.          Additional Information About Your Visit        LuckyCalhart Information     depict gives you secure access to your electronic health record. If you see a primary care provider, you can also send messages to your care team and make appointments. If you have questions, please call your primary care clinic.  If you do not have a primary  care provider, please call [phone] and they will assist you.        Care EveryWhere ID     This is your Care EveryWhere ID. This could be used by other organizations to access your [place] medical records  [id number]         Blood Pressure from Last 3 Encounters:   07/24/17 118/72   05/05/16 116/67   02/09/16 111/67    Weight from Last 3 Encounters:   02/09/16 71.1 kg (156 lb 12.8 oz)   09/20/15 68 kg (150 lb)   02/06/13 68 kg (150 lb)              Today, you had the following     No orders found for display       Primary Care Provider Office Phone # Fax #    Agustín Berger [phone]       [facility] [address] [address] [address]        Equal Access to Services     [facility] EMILY : Hadii aad ku hadasho Soomaali, waaxda luqadaha, qaybta kaalmada adeegyada, deya vanessain haychristina jamison . So [facility] [phone].    ATENCIÓN: Si habla español, tiene a cui disposición servicios gratuitos de asistencia lingüística. Loretta al [phone].    We comply with applicable federal civil rights laws and [place] laws. We do not discriminate on the basis of race, color, national origin, age, disability sex, sexual orientation or gender identity.            Thank you!     Thank you for choosing [facility]  for your care. Our goal is always to provide you with excellent care. Hearing back from our patients is one way we can continue to improve our services. Please take a few minutes to complete the written survey that you may receive in the mail after your visit with us. Thank you!             Your Updated Medication List - Protect others around you: Learn how to safely use, store and throw away your medicines at www.disposemymeds.org.          This list is accurate as of: 7/31/17  3:06 PM.  Always use your most recent med list.                   Brand Name Dispense Instructions for use Diagnosis    acetaminophen-codeine 300-30 MG per tablet    TYLENOL #3    20  tablet    Take 1 tablet by mouth every 4 hours as needed for pain maximum 15 tablet(s) per day    Melanoma of left upper arm (H)       clindamycin 1 % solution    CLEOCIN T    60 mL    Apply  topically 2 times daily. To lip    Folliculitis       dexamethasone 4 MG tablet    DECADRON    2.5 tablet    Take 2.5 tablets (10 mg) by mouth once for 1 dose        doxycycline 100 MG capsule    VIBRAMYCIN    14 capsule    Take 1 capsule (100 mg) by mouth 2 times daily    Melanoma of left upper arm (H)       ibuprofen 800 MG tablet    ADVIL/MOTRIN    30 tablet    Take 1 tablet by mouth every 6 hours as needed (cramping).    Supervision of other normal pregnancy       ORTHO-NOVUM 7/7/7 (28) 0.5/0.75/1-35 MG-MCG per tablet   Generic drug:  norethindrone-ethinyl estradiol     3 Package    Take 1 tablet by mouth daily.        oxyCODONE-acetaminophen 7.5-325 MG per tablet    PERCOCET    45 tablet    Take 1 tablet by mouth every 4 hours as needed for moderate to severe pain maximum 12 tablet(s) per day    Melanoma of upper arm, left (H)       sertraline 50 MG tablet    ZOLOFT    90 tablet    Take 1 tablet by mouth daily.    QING (generalised anxiety disorder)       triamcinolone 0.1 % paste    KENALOG    5 g    Take by mouth 2 times daily

## 2017-08-14 ENCOUNTER — OFFICE VISIT (OUTPATIENT)
Dept: DERMATOLOGY | Facility: CLINIC | Age: 37
End: 2017-08-14

## 2017-08-14 VITALS — DIASTOLIC BLOOD PRESSURE: 71 MMHG | HEART RATE: 60 BPM | OXYGEN SATURATION: 100 % | SYSTOLIC BLOOD PRESSURE: 118 MMHG

## 2017-08-14 DIAGNOSIS — D23.9 DERMAL NEVUS: ICD-10-CM

## 2017-08-14 DIAGNOSIS — D48.5 NEOPLASM OF UNCERTAIN BEHAVIOR OF SKIN: Primary | ICD-10-CM

## 2017-08-14 DIAGNOSIS — Z85.820 HISTORY OF MELANOMA: ICD-10-CM

## 2017-08-14 DIAGNOSIS — D23.9 DERMATOFIBROMA: ICD-10-CM

## 2017-08-14 DIAGNOSIS — L92.9 GRANULATION TISSUE: ICD-10-CM

## 2017-08-14 PROCEDURE — 13101 CMPLX RPR TRUNK 2.6-7.5 CM: CPT | Performed by: DERMATOLOGY

## 2017-08-14 PROCEDURE — 99213 OFFICE O/P EST LOW 20 MIN: CPT | Mod: 25 | Performed by: DERMATOLOGY

## 2017-08-14 PROCEDURE — 88331 PATH CONSLTJ SURG 1 BLK 1SPC: CPT | Performed by: DERMATOLOGY

## 2017-08-14 PROCEDURE — 11602 EXC TR-EXT MAL+MARG 1.1-2 CM: CPT | Performed by: DERMATOLOGY

## 2017-08-14 NOTE — PROGRESS NOTES
Remedios Watts is a 36 year old year old female patient here today for evaluation and managment of severely atypical nevus on right back, f/ollow up wound check for melanoma on left arm, she alsonotes spot on right thigh.   .  Patient states this has been present for ?.  Patient reports the following symptoms:  none .  Patient reports the following previous treatments none.  Patient reports the following modifying factors none.  Associated symptoms: none.  Patient has no other skin complaints today.  Remainder of the HPI, Meds, PMH, Allergies, FH, and SH was reviewed in chart.    Pertinent Hx:   Melanoma   Past Medical History:   Diagnosis Date     Chickenpox      Diarrhea      Group B streptococcus urinary tract infection complicating pregnancy 4/20/2011    Plan PCN in labor      History of postpartum depression, currently pregnant 10/21/2010     Malignant melanoma (H)      Postpartum depression 12/17/2008     Tailbone injury     fx     Urinary tract infections        Past Surgical History:   Procedure Laterality Date     C ORAL SURGERY PROCEDURE          Family History   Problem Relation Age of Onset     Arthritis Mother      Neurologic Disorder Mother      had a seizure      Alcohol/Drug Mother      Arthritis Maternal Grandmother      Breast Cancer Maternal Grandmother      HEART DISEASE Paternal Grandmother      CANCER Father      skin       Social History     Social History     Marital status:      Spouse name: N/A     Number of children: 3     Years of education: N/A     Occupational History      [employer]     Social History Main Topics     Smoking status: Never Smoker     Smokeless tobacco: Never Used     Alcohol use Yes     Drug use: No     Sexual activity: Yes     Partners: Male     Birth control/ protection: Pill     Other Topics Concern     Parent/Sibling W/ Cabg, Mi Or Angioplasty Before 65f 55m? Yes     pt had MI @ age 59     Social History Narrative        Outpatient Encounter Prescriptions as of 8/14/2017   Medication Sig Dispense Refill     oxyCODONE-acetaminophen (PERCOCET) 7.5-325 MG per tablet Take 1 tablet by mouth every 4 hours as needed for moderate to severe pain maximum 12 tablet(s) per day 45 tablet 0     doxycycline (VIBRAMYCIN) 100 MG capsule Take 1 capsule (100 mg) by mouth 2 times daily 14 capsule 0     acetaminophen-codeine (TYLENOL #3) 300-30 MG per tablet Take 1 tablet by mouth every 4 hours as needed for pain maximum 15 tablet(s) per day 20 tablet 0     triamcinolone (KENALOG) 0.1 % paste Take by mouth 2 times daily 5 g 0     clindamycin (CLEOCIN T) 1 % external solution Apply  topically 2 times daily. To lip 60 mL 11     norethindrone-ethinyl estradiol (ORTHO-NOVUM 7/7/7, 28,) 0.5/0.75/1-35 MG-MCG per tablet Take 1 tablet by mouth daily. 3 Package 3     sertraline (ZOLOFT) 50 MG tablet Take 1 tablet by mouth daily. 90 tablet 3     ibuprofen (ADVIL,MOTRIN) 800 MG tablet Take 1 tablet by mouth every 6 hours as needed (cramping). 30 tablet 0     dexamethasone (DECADRON) 4 MG tablet Take 2.5 tablets (10 mg) by mouth once for 1 dose 2.5 tablet 0     No facility-administered encounter medications on file as of 8/14/2017.              Review Of Systems  Skin: As above  Eyes: negative  Ears/Nose/Throat: negative  Respiratory: No shortness of breath, dyspnea on exertion, cough, or hemoptysis  Cardiovascular: negative  Gastrointestinal: negative  Genitourinary: negative  Musculoskeletal: negative  Neurologic: negative  Psychiatric: negative  Hematologic/Lymphatic/Immunologic: negative  Endocrine: negative      O:   NAD, WDWN, Alert & Oriented, Mood & Affect wnl, Vitals stable   Here today alone   /71  Pulse 60  SpO2 100%   General appearance normal   Vitals stable   Alert, oriented and in no acute distress      Following lymph nodes palpated: axilla no lad   L arm granlating wound healing well, no signs of infection   Firm papule right  thigh   Brown papule right thigh   r mid back 1cm red plaque            The remainder of expanded problem focused exam was unremarkable; the following areas were examined:  scalp/hair, conjunctiva/lids, face, neck, lips/teeth, chest, digits/nails, RUE, LUE.      Eyes: Conjunctivae/lids:Normal     ENT: Lips, buccal mucosa, tongue: normal    MSK:Normal    Cardiovascular: peripheral edema none    Pulm: Breathing Normal    Lymph Nodes: No Head and Neck Lymphadenopathy     Neuro/Psych: Orientation:Normal; Mood/Affect:Normal      MICRO:   R back:Unremarkable epidermis with parallel bundles of cellular collagen within the superficial dermis.  No concerning areas for malignancy.     A/P:  1. R back severely atypical nevus  2. Melanoma with healing wound  Doing great  Cont wound care   Return to clinic 1 month  3. Dermatofibroma, dermal nevus    BENIGN LESIONS DISCUSSED WITH PATIENT:  I discussed the specifics of tumor, prognosis, and genetics of benign lesions.  I explained that treatment of these lesions would be purely cosmetic and not medically neccessary.  I discussed with patient different removal options including excision, cautery and /or laser.      Nature and genetics of benign skin lesions dicussed with patient.  Signs and Symptoms of skin cancer discussed with patient.  ABCDEs of melanoma reviewed with patient.  Patient encouraged to perform monthly skin exams.  UV precautions reviewed with patient.  Skin care regimen reviewed with patient: Eliminate harsh soaps, i.e. Dial, zest, irsih spring; Mild soaps such as Cetaphil or Dove sensitive skin, avoid hot or cold showers, aggressive use of emollients including vanicream, cetaphil or cerave discussed with patient.    Risks of non-melanoma skin cancer discussed with patient   Return to clinic 1 month      PROCEDURE NOTE  R back severely atypical nevus  EXCISION OF Atypical nevus, Margins confirmed with FROZEN SECTIONS AND MART1, AND COMPLEX: [facility]  [facility] obtained, anesthesia and prep, the margins of the lesion were identified and an elliptical incision was made encompassing the lesion with 4mm margin. The incisions were made through the skin and down to and including the subcutaneous tissue. The lesion was removed en bloc and submitted for frozen section pathologic review. Clear margins obtained (1.6cm).  MART1 stain perfomred on one section. The wound edges were widely undermined until adequate tissue mobility was obtained. hemostasis was achieved. The wound edges were then closed in a layered fashion, being careful not to leave any dead space. Postoperative length was 4 cm.   EBL minimal; complications none; wound care routine. The patient was from the clinic in good condition and will return in one week for wound check.

## 2017-08-14 NOTE — NURSING NOTE
"Initial /71  Pulse 60  SpO2 100% Estimated body mass index is 25.31 kg/(m^2) as calculated from the following:    Height as of 9/20/15: 1.676 m (5' 6\").    Weight as of 2/9/16: 71.1 kg (156 lb 12.8 oz). .      "

## 2017-08-14 NOTE — MR AVS SNAPSHOT
After Visit Summary   2017    Remedios Watts    MRN: [id number]           Patient Information     Date Of Birth          1980        Visit Information        Provider Department      2017 8:00 AM Johnny Stevens MD [facility]        Care Instructions      Sutured Wound Care     [facility]: [phone]    [facility]: [phone]          ? No strenuous activity for 48 hours. Resume moderate activity in 48 hours. No heavy exercising until you are seen for follow up in one week.     ? Take Tylenol as needed for discomfort.                         ? Do not drink alcoholic beverages for 48 hours.     ? Keep the pressure bandage in place for 24 hours. If the bandage becomes blood tinged or loose, reinforce it with gauze and tape.        (Refer to the reverse side of this page for management of bleeding).    ? Remove pressure bandage in 24 hours     ? Leave the flat bandage in place until your follow up appointment.    ? Keep the bandage dry. Wash around it carefully.    ? If the tape becomes soiled or starts to come off, reinforce it with additional paper tape.    ? Do not smoke for 3 weeks; smoking is detrimental to wound healing.    ? It is normal to have swelling and bruising around the surgical site. The bruising will fade in approximately 10-14 days. Elevate the area to reduce swelling.    ? Numbness, itchiness and sensitivity to temperature changes can occur after surgery and may take up to 18 months to normalize.      POSSIBLE COMPLICATIONS    BLEEDIN. Leave the bandage in place.  2. Use tightly rolled up gauze or a cloth to apply direct pressure over the bandage for 20   minutes.  3. Reapply pressure for an additional 20 minutes if necessary  4. Call the office or go to the nearest emergency room if pressure fails to stop the bleeding.  5. Use additional gauze and tape to maintain pressure once the bleeding has  stopped.        PAIN:    1. Post operative pain should slowly get better, never worse.  2. A severe increase in pain may indicate a problem. Call the office if this occurs.    In case of emergency phone:Dr Stevens [phone]              Follow-ups after your visit        Who to contact     If you have questions or need follow up information about today's clinic visit or your schedule please contact [facility] directly at [phone].  Normal or non-critical lab and imaging results will be communicated to you by Customcellshart, letter or phone within 4 business days after the clinic has received the results. If you do not hear from us within 7 days, please contact the clinic through Sticher or phone. If you have a critical or abnormal lab result, we will notify you by phone as soon as possible.  Submit refill requests through Sticher or call your pharmacy and they will forward the refill request to us. Please allow 3 business days for your refill to be completed.          Additional Information About Your Visit        Sticher Information     Sticher gives you secure access to your electronic health record. If you see a primary care provider, you can also send messages to your care team and make appointments. If you have questions, please call your primary care clinic.  If you do not have a primary care provider, please call [phone] and they will assist you.        Care EveryWhere ID     This is your Care EveryWhere ID. This could be used by other organizations to access your [place] medical records  [id number]        Your Vitals Were     Pulse Pulse Oximetry                60 100%           Blood Pressure from Last 3 Encounters:   08/14/17 118/71   07/24/17 118/72   05/05/16 116/67    Weight from Last 3 Encounters:   02/09/16 71.1 kg (156 lb 12.8 oz)   09/20/15 68 kg (150 lb)   02/06/13 68 kg (150 lb)              Today, you had the following     No orders found for display       Primary Care  Provider Office Phone # Fax #    Agustín Berger [phone] [address] [address] [facility] [address]        Equal Access to Services     FATIMAH EMILY : Ashwin nori lawrence uzielo Sosavita, waaxda luqadaha, qaybta kaalmada bonnie, deya floyd laMayrachristina richard. So [facility] [phone].    ATENCIÓN: Si habla español, tiene a cui disposición servicios gratuitos de asistencia lingüística. Llame al [phone].    We comply with applicable federal civil rights laws and [place] laws. We do not discriminate on the basis of race, color, national origin, age, disability sex, sexual orientation or gender identity.            Thank you!     Thank you for choosing [facility]  for your care. Our goal is always to provide you with excellent care. Hearing back from our patients is one way we can continue to improve our services. Please take a few minutes to complete the written survey that you may receive in the mail after your visit with us. Thank you!             Your Updated Medication List - Protect others around you: Learn how to safely use, store and throw away your medicines at www.disposemymeds.org.          This list is accurate as of: 8/14/17 11:07 AM.  Always use your most recent med list.                   Brand Name Dispense Instructions for use Diagnosis    acetaminophen-codeine 300-30 MG per tablet    TYLENOL #3    20 tablet    Take 1 tablet by mouth every 4 hours as needed for pain maximum 15 tablet(s) per day    Melanoma of left upper arm (H)       clindamycin 1 % solution    CLEOCIN T    60 mL    Apply  topically 2 times daily. To lip    Folliculitis       dexamethasone 4 MG tablet    DECADRON    2.5 tablet    Take 2.5 tablets (10 mg) by mouth once for 1 dose        doxycycline 100 MG capsule    VIBRAMYCIN    14 capsule    Take 1 capsule (100 mg) by mouth 2 times daily    Melanoma of left upper arm (H)       ibuprofen 800 MG tablet    ADVIL/MOTRIN    30 tablet    Take  1 tablet by mouth every 6 hours as needed (cramping).    Supervision of other normal pregnancy       ORTHO-NOVUM 7/7/7 (28) 0.5/0.75/1-35 MG-MCG per tablet   Generic drug:  norethindrone-ethinyl estradiol     3 Package    Take 1 tablet by mouth daily.        oxyCODONE-acetaminophen 7.5-325 MG per tablet    PERCOCET    45 tablet    Take 1 tablet by mouth every 4 hours as needed for moderate to severe pain maximum 12 tablet(s) per day    Melanoma of upper arm, left (H)       sertraline 50 MG tablet    ZOLOFT    90 tablet    Take 1 tablet by mouth daily.    QING (generalised anxiety disorder)       triamcinolone 0.1 % paste    KENALOG    5 g    Take by mouth 2 times daily

## 2017-08-14 NOTE — PATIENT INSTRUCTIONS
Sutured Wound Care     [facility]: [phone]    [facility]: [phone]          ? No strenuous activity for 48 hours. Resume moderate activity in 48 hours. No heavy exercising until you are seen for follow up in one week.     ? Take Tylenol as needed for discomfort.                         ? Do not drink alcoholic beverages for 48 hours.     ? Keep the pressure bandage in place for 24 hours. If the bandage becomes blood tinged or loose, reinforce it with gauze and tape.        (Refer to the reverse side of this page for management of bleeding).    ? Remove pressure bandage in 24 hours     ? Leave the flat bandage in place until your follow up appointment.    ? Keep the bandage dry. Wash around it carefully.    ? If the tape becomes soiled or starts to come off, reinforce it with additional paper tape.    ? Do not smoke for 3 weeks; smoking is detrimental to wound healing.    ? It is normal to have swelling and bruising around the surgical site. The bruising will fade in approximately 10-14 days. Elevate the area to reduce swelling.    ? Numbness, itchiness and sensitivity to temperature changes can occur after surgery and may take up to 18 months to normalize.      POSSIBLE COMPLICATIONS    BLEEDIN. Leave the bandage in place.  2. Use tightly rolled up gauze or a cloth to apply direct pressure over the bandage for 20   minutes.  3. Reapply pressure for an additional 20 minutes if necessary  4. Call the office or go to the nearest emergency room if pressure fails to stop the bleeding.  5. Use additional gauze and tape to maintain pressure once the bleeding has stopped.        PAIN:    1. Post operative pain should slowly get better, never worse.  2. A severe increase in pain may indicate a problem. Call the office if this occurs.    In case of emergency phone:Dr Stevens [phone]

## 2018-01-21 ENCOUNTER — HOSPITAL ENCOUNTER (EMERGENCY)
Facility: CLINIC | Age: 38
Discharge: HOME | End: 2018-01-22
Attending: FAMILY MEDICINE | Admitting: FAMILY MEDICINE
Payer: MEDICAID

## 2018-01-21 ENCOUNTER — APPOINTMENT (OUTPATIENT)
Dept: CT IMAGING | Facility: CLINIC | Age: 38
End: 2018-01-21
Attending: FAMILY MEDICINE
Payer: MEDICAID

## 2018-01-21 DIAGNOSIS — R42 VERTIGO: ICD-10-CM

## 2018-01-21 DIAGNOSIS — R51.9 ACUTE NONINTRACTABLE HEADACHE, UNSPECIFIED HEADACHE TYPE: ICD-10-CM

## 2018-01-21 DIAGNOSIS — G56.03 BILATERAL CARPAL TUNNEL SYNDROME: ICD-10-CM

## 2018-01-21 LAB
ALBUMIN SERPL-MCNC: 3.7 G/DL (ref 3.4–5)
ALP SERPL-CCNC: 69 U/L (ref 40–150)
ALT SERPL W P-5'-P-CCNC: 17 U/L (ref 0–50)
ANION GAP SERPL CALCULATED.3IONS-SCNC: 8 MMOL/L (ref 3–14)
AST SERPL W P-5'-P-CCNC: 14 U/L (ref 0–45)
BASOPHILS # BLD AUTO: 0 10E9/L (ref 0–0.2)
BASOPHILS NFR BLD AUTO: 0.3 %
BILIRUB SERPL-MCNC: 0.2 MG/DL (ref 0.2–1.3)
BUN SERPL-MCNC: 14 MG/DL (ref 7–30)
CALCIUM SERPL-MCNC: 8.6 MG/DL (ref 8.5–10.1)
CHLORIDE SERPL-SCNC: 106 MMOL/L (ref 94–109)
CO2 SERPL-SCNC: 26 MMOL/L (ref 20–32)
CREAT SERPL-MCNC: 0.68 MG/DL (ref 0.52–1.04)
DIFFERENTIAL METHOD BLD: NORMAL
EOSINOPHIL # BLD AUTO: 0.1 10E9/L (ref 0–0.7)
EOSINOPHIL NFR BLD AUTO: 1.8 %
ERYTHROCYTE [DISTWIDTH] IN BLOOD BY AUTOMATED COUNT: 12.3 % (ref 10–15)
GFR SERPL CREATININE-BSD FRML MDRD: >90 ML/MIN/1.7M2
GLUCOSE SERPL-MCNC: 95 MG/DL (ref 70–99)
HCG SERPL QL: NEGATIVE
HCT VFR BLD AUTO: 43.1 % (ref 35–47)
HGB BLD-MCNC: 14.3 G/DL (ref 11.7–15.7)
IMM GRANULOCYTES # BLD: 0 10E9/L (ref 0–0.4)
IMM GRANULOCYTES NFR BLD: 0.1 %
LYMPHOCYTES # BLD AUTO: 2.5 10E9/L (ref 0.8–5.3)
LYMPHOCYTES NFR BLD AUTO: 33.2 %
MCH RBC QN AUTO: 29.9 PG (ref 26.5–33)
MCHC RBC AUTO-ENTMCNC: 33.2 G/DL (ref 31.5–36.5)
MCV RBC AUTO: 90 FL (ref 78–100)
MONOCYTES # BLD AUTO: 0.6 10E9/L (ref 0–1.3)
MONOCYTES NFR BLD AUTO: 8.1 %
NEUTROPHILS # BLD AUTO: 4.2 10E9/L (ref 1.6–8.3)
NEUTROPHILS NFR BLD AUTO: 56.5 %
PLATELET # BLD AUTO: 285 10E9/L (ref 150–450)
POTASSIUM SERPL-SCNC: 3.8 MMOL/L (ref 3.4–5.3)
PROT SERPL-MCNC: 8 G/DL (ref 6.8–8.8)
RBC # BLD AUTO: 4.79 10E12/L (ref 3.8–5.2)
SODIUM SERPL-SCNC: 140 MMOL/L (ref 133–144)
T4 FREE SERPL-MCNC: 0.81 NG/DL (ref 0.76–1.46)
TSH SERPL DL<=0.005 MIU/L-ACNC: 5.01 MU/L (ref 0.4–4)
WBC # BLD AUTO: 7.4 10E9/L (ref 4–11)

## 2018-01-21 PROCEDURE — 84703 CHORIONIC GONADOTROPIN ASSAY: CPT | Performed by: FAMILY MEDICINE

## 2018-01-21 PROCEDURE — 84443 ASSAY THYROID STIM HORMONE: CPT | Performed by: FAMILY MEDICINE

## 2018-01-21 PROCEDURE — 70450 CT HEAD/BRAIN W/O DYE: CPT

## 2018-01-21 PROCEDURE — 80053 COMPREHEN METABOLIC PANEL: CPT | Performed by: FAMILY MEDICINE

## 2018-01-21 PROCEDURE — 85025 COMPLETE CBC W/AUTO DIFF WBC: CPT | Performed by: FAMILY MEDICINE

## 2018-01-21 PROCEDURE — 99284 EMERGENCY DEPT VISIT MOD MDM: CPT | Mod: 25 | Performed by: FAMILY MEDICINE

## 2018-01-21 PROCEDURE — 84439 ASSAY OF FREE THYROXINE: CPT | Performed by: FAMILY MEDICINE

## 2018-01-21 PROCEDURE — 99284 EMERGENCY DEPT VISIT MOD MDM: CPT | Mod: Z6 | Performed by: FAMILY MEDICINE

## 2018-01-21 RX ORDER — ONDANSETRON 4 MG/1
4-8 TABLET, ORALLY DISINTEGRATING ORAL EVERY 6 HOURS PRN
Qty: 10 TABLET | Refills: 0 | Status: SHIPPED | OUTPATIENT
Start: 2018-01-21 | End: 2018-02-18

## 2018-01-21 RX ORDER — MECLIZINE HYDROCHLORIDE 25 MG/1
25 TABLET ORAL 3 TIMES DAILY PRN
Qty: 30 TABLET | Refills: 0 | Status: SHIPPED | OUTPATIENT
Start: 2018-01-21 | End: 2018-07-03

## 2018-01-21 NOTE — ED AVS SNAPSHOT
[facility] Emergency Department    [address] [address]    Phone:  [phone]    Fax:  [phone]                                       Remedios Watts   MRN: [id number]    Department:  [facility] Emergency Department   Date of Visit:  1/21/2018           After Visit Summary Signature Page     I have received my discharge instructions, and my questions have been answered. I have discussed any challenges I see with this plan with the nurse or doctor.    ..........................................................................................................................................  Patient/Patient Representative Signature      ..........................................................................................................................................  Patient Representative Print Name and Relationship to Patient    ..................................................               ................................................  Date                                            Time    ..........................................................................................................................................  Reviewed by Signature/Title    ...................................................              ..............................................  Date                                                            Time

## 2018-01-21 NOTE — ED AVS SNAPSHOT
[facility] Emergency Department    [address] [address]    Phone:  [phone]    Fax:  [phone]                                       Remedios Watts   MRN: [id number]    Department:  [facility] Emergency Department   Date of Visit:  1/21/2018           Patient Information     Date Of Birth          1980        Your diagnoses for this visit were:     Vertigo     Bilateral carpal tunnel syndrome     Acute nonintractable headache, unspecified headache type        You were seen by Kalpesh Trejo MD.        Discharge Instructions       Return to the Emergency Room if the following occurs:     Worsened pain, fever >101, vomiting/dehydration, or for any concern at anytime.    Or, follow-up with the following provider as we discussed:     Return to ENT if not improving over the next week.  See contact information for details.  Consider follow up with your primary doctor in 1-2 weeks to discuss her arm numbness.    Medications discussed:    Meclizine for vertigo, as needed.  Zofran for nausea, as needed.    Consider over-the-counter wrist splints at night while sleeping for carpal tunnel.    If you received pain-relieving or sedating medication during your time in the ER, avoid alcohol, driving automobiles, or working with machinery.  Also, a responsible adult must stay with you.        Call the Nurse Advice Line at [phone] or [phone] for any concern at anytime.      24 Hour Appointment Hotline       To make an appointment at any [place] clinic, call [phone] ([phone]). If you don't have a family doctor or clinic, we will help you find one. [place] clinics are conveniently located to serve the needs of you and your family.             Review of your medicines      START taking        Dose / Directions Last dose taken    meclizine 25 MG tablet   Commonly known as:  ANTIVERT   Dose:  25 mg   Quantity:  30 tablet        Take 1 tablet (25 mg) by mouth 3  times daily as needed for dizziness   Refills:  0        ondansetron 4 MG ODT tab   Commonly known as:  ZOFRAN ODT   Dose:  4-8 mg   Quantity:  10 tablet        Take 1-2 tablets (4-8 mg) by mouth every 6 hours as needed for nausea   Refills:  0          Our records show that you are taking the medicines listed below. If these are incorrect, please call your family doctor or clinic.        Dose / Directions Last dose taken    acetaminophen-codeine 300-30 MG per tablet   Commonly known as:  TYLENOL #3   Dose:  1 tablet   Quantity:  20 tablet        Take 1 tablet by mouth every 4 hours as needed for pain maximum 15 tablet(s) per day   Refills:  0        clindamycin 1 % solution   Commonly known as:  CLEOCIN T   Quantity:  60 mL        Apply  topically 2 times daily. To lip   Refills:  11        dexamethasone 4 MG tablet   Commonly known as:  DECADRON   Dose:  10 mg   Quantity:  2.5 tablet        Take 2.5 tablets (10 mg) by mouth once for 1 dose   Refills:  0        doxycycline 100 MG capsule   Commonly known as:  VIBRAMYCIN   Dose:  100 mg   Quantity:  14 capsule        Take 1 capsule (100 mg) by mouth 2 times daily   Refills:  0        ibuprofen 800 MG tablet   Commonly known as:  ADVIL/MOTRIN   Dose:  800 mg   Quantity:  30 tablet        Take 1 tablet by mouth every 6 hours as needed (cramping).   Refills:  0        ORTHO-NOVUM 7/7/7 (28) 0.5/0.75/1-35 MG-MCG per tablet   Dose:  1 tablet   Quantity:  3 Package   Generic drug:  norethindrone-ethinyl estradiol        Take 1 tablet by mouth daily.   Refills:  3        oxyCODONE-acetaminophen 7.5-325 MG per tablet   Commonly known as:  PERCOCET   Dose:  1 tablet   Quantity:  45 tablet        Take 1 tablet by mouth every 4 hours as needed for moderate to severe pain maximum 12 tablet(s) per day   Refills:  0        sertraline 50 MG tablet   Commonly known as:  ZOLOFT   Dose:  50 mg   Quantity:  90 tablet        Take 1 tablet by mouth daily.   Refills:  3         triamcinolone 0.1 % paste   Commonly known as:  KENALOG   Quantity:  5 g        Take by mouth 2 times daily   Refills:  0                Prescriptions were sent or printed at these locations (2 Prescriptions)                   Other Prescriptions                Printed at Department/Unit printer (2 of 2)         meclizine (ANTIVERT) 25 MG tablet               ondansetron (ZOFRAN ODT) 4 MG ODT tab                Procedures and tests performed during your visit     CBC with platelets, differential    CT Head w/o Contrast    Comprehensive metabolic panel    HCG qualitative pregnancy (blood)    T4 free    TSH with free T4 reflex      Orders Needing Specimen Collection     Ordered          01/21/18 2139  HCG qualitative urine - STAT, [place]: STAT, Needs to be Collected     Scheduled Task Status   01/21/18 2140 Collect HCG qualitative urine Open   Order Class:  PCU Collect                  Pending Results     No orders found from 1/19/2018 to 1/22/2018.            Pending Culture Results     No orders found from 1/19/2018 to 1/22/2018.            Pending Results Instructions     If you had any lab results that were not finalized at the time of your Discharge, you can call the ED Lab Result RN at [phone]. You will be contacted by this team for any positive Lab results or changes in treatment. The nurses are available 7 days a week from 10A to 6:30P.  You can leave a message 24 hours per day and they will return your call.        Test Results From Your Hospital Stay        1/21/2018 10:11 PM      Component Results     Component Value Ref Range & Units Status    WBC 7.4 4.0 - 11.0 10e9/L Final    RBC Count 4.79 3.8 - 5.2 10e12/L Final    Hemoglobin 14.3 11.7 - 15.7 g/dL Final    Hematocrit 43.1 35.0 - 47.0 % Final    MCV 90 78 - 100 fl Final    MCH 29.9 26.5 - 33.0 pg Final    MCHC 33.2 31.5 - 36.5 g/dL Final    RDW 12.3 10.0 - 15.0 % Final    Platelet Count 285 150 - 450 10e9/L Final    Diff Method Automated Method   Final    % Neutrophils 56.5 % Final    % Lymphocytes 33.2 % Final    % Monocytes 8.1 % Final    % Eosinophils 1.8 % Final    % Basophils 0.3 % Final    % Immature Granulocytes 0.1 % Final    Absolute Neutrophil 4.2 1.6 - 8.3 10e9/L Final    Absolute Lymphocytes 2.5 0.8 - 5.3 10e9/L Final    Absolute Monocytes 0.6 0.0 - 1.3 10e9/L Final    Absolute Eosinophils 0.1 0.0 - 0.7 10e9/L Final    Absolute Basophils 0.0 0.0 - 0.2 10e9/L Final    Abs Immature Granulocytes 0.0 0 - 0.4 10e9/L Final         1/21/2018 10:31 PM      Component Results     Component Value Ref Range & Units Status    TSH 5.01 (H) 0.40 - 4.00 mU/L Final         1/21/2018 10:26 PM      Component Results     Component Value Ref Range & Units Status    Sodium 140 133 - 144 mmol/L Final    Potassium 3.8 3.4 - 5.3 mmol/L Final    Chloride 106 94 - 109 mmol/L Final    Carbon Dioxide 26 20 - 32 mmol/L Final    Anion Gap 8 3 - 14 mmol/L Final    Glucose 95 70 - 99 mg/dL Final    Urea Nitrogen 14 7 - 30 mg/dL Final    Creatinine 0.68 0.52 - 1.04 mg/dL Final    GFR Estimate >90 >60 mL/min/1.7m2 Final    Non  GFR Calc    GFR Estimate If Black >90 >60 mL/min/1.7m2 Final    African American GFR Calc    Calcium 8.6 8.5 - 10.1 mg/dL Final    Bilirubin Total 0.2 0.2 - 1.3 mg/dL Final    Albumin 3.7 3.4 - 5.0 g/dL Final    Protein Total 8.0 6.8 - 8.8 g/dL Final    Alkaline Phosphatase 69 40 - 150 U/L Final    ALT 17 0 - 50 U/L Final    AST 14 0 - 45 U/L Final         1/21/2018 11:50 PM      Narrative     CT HEAD W/O CONTRAST  1/21/2018 11:31 PM     HISTORY: Vertigo, headache X7 days, constant.     TECHNIQUE: Axial images of the head and coronal reformations without  IV contrast material. Radiation dose for this scan was reduced using  automated exposure control, adjustment of the mA and/or kV according  to patient size, or iterative reconstruction technique.    COMPARISON: None.    FINDINGS: No intracranial hemorrhage, mass or mass effect. No  acute  infarct identified. No shift of midline structures. The ventricles are  symmetric. Visualized paranasal sinuses are clear.        Impression     IMPRESSION: No acute intracranial findings.    JUAN FRANCISCO SUTTON MD         1/21/2018 10:44 PM      Component Results     Component Value Ref Range & Units Status    T4 Free 0.81 0.76 - 1.46 ng/dL Final         1/21/2018 11:15 PM      Component Results     Component Value Ref Range & Units Status    HCG Qualitative Serum Negative NEG^Negative Final    This test is for screening purposes.  Results should be interpreted along with   the clinical picture.  Confirmation testing is available if warranted by   ordering [id number], HCG Quantitative Pregnancy.                  Thank you for choosing [facility]       Thank you for choosing [facility] for your care. Our goal is always to provide you with excellent care. Hearing back from our patients is one way we can continue to improve our services. Please take a few minutes to complete the written survey that you may receive in the mail after you visit with us. Thank you!        [employer] Information     [employer] gives you secure access to your electronic health record. If you see a primary care provider, you can also send messages to your care team and make appointments. If you have questions, please call your primary care clinic.  If you do not have a primary care provider, please call [phone] and they will assist you.        Care EveryWhere ID     This is your Care EveryWhere ID. This could be used by other organizations to access your [facility] medical records  [id number]        Equal Access to Services     FATIMAH DIAZ : Ashwin Slater, waaxda luqadaha, qaybta kaalmada adechristiano, deya richard. So [facility] [phone].    ATENCIÓN: Si habla español, tiene a cui disposición servicios gratuitos de asistencia lingüística. Llame al [phone].    We comply with applicable federal civil  rights laws and [place] laws. We do not discriminate on the basis of race, color, national origin, age, disability, sex, sexual orientation, or gender identity.            After Visit Summary       This is your record. Keep this with you and show to your community pharmacist(s) and doctor(s) at your next visit.

## 2018-01-22 VITALS
SYSTOLIC BLOOD PRESSURE: 110 MMHG | DIASTOLIC BLOOD PRESSURE: 81 MMHG | TEMPERATURE: 98 F | HEART RATE: 68 BPM | OXYGEN SATURATION: 99 %

## 2018-01-22 NOTE — DISCHARGE INSTRUCTIONS
Return to the Emergency Room if the following occurs:     Worsened pain, fever >101, vomiting/dehydration, or for any concern at anytime.    Or, follow-up with the following provider as we discussed:     Return to ENT if not improving over the next week.  See contact information for details.  Consider follow up with your primary doctor in 1-2 weeks to discuss her arm numbness.    Medications discussed:    Meclizine for vertigo, as needed.  Zofran for nausea, as needed.    Consider over-the-counter wrist splints at night while sleeping for carpal tunnel.    If you received pain-relieving or sedating medication during your time in the ER, avoid alcohol, driving automobiles, or working with machinery.  Also, a responsible adult must stay with you.        Call the Nurse Advice Line at [phone] or [phone] for any concern at anytime.

## 2018-01-22 NOTE — ED NOTES
Pt presents to ED complaint of dizziness and bilateral numbness in arms. Dizziness is made worse while standing and walking and admits to some nausea but no vomiting associated with the dizziness. Numbness in hands is worse at night with no weakness. Pt also complains of generalized fatigue over the past few days, but denies any flu-like sx.

## 2018-01-22 NOTE — ED PROVIDER NOTES
"HPI  Patient is a 37-year-old female presenting with vertigo and headache.  She has a known history of malignant melanoma, urinary tract infections, postpartum depression.  No medications on a regular basis.  No recent over-the-counter medications.  No recent travel.  No other known sick contacts.    The patient describes constant vertigo over the past week.  She feels \"constantly off.\"  When she gets up and she moves around her dizziness worsens.  She becomes nauseous.  There is been no vomiting.  She has had a headache in the front of her head that has been almost constant over the past week as well.  She has had tension headaches in the left side of her head previously but this current headache is unusual for her.  She feels extremely fatigued over the past week as well.  She has been taking naps during the day which is unusual for her.  She has had numbness and tingling in her arms at night.  It wakes her and has been causing her to sleep poorly.  During the daytime her arms are not severe but \"do not feel quite right either.\"  At times they feel weak.  No trauma or injury.  No fever.  No nasal congestion or sinus symptoms.  No dental pain.  No ear pain.  No changes in hearing.  No tinnitus.  No neck pain.  No chest pain.  No coughing or shortness of breath.  No back pain.  No dysuria, urgency, or frequency.  No hematuria.  She is sexually active and could potentially be pregnant.  No vaginal discharge or irritation.  No vaginal bleeding.  No skin rash.    ROS: All other review of systems are negative other than that noted above.     Past Medical History:   Diagnosis Date     Chickenpox      Diarrhea      Group B streptococcus urinary tract infection complicating pregnancy 4/20/2011    Plan PCN in labor      History of postpartum depression, currently pregnant 10/21/2010     Malignant melanoma (H)      Postpartum depression 12/17/2008     Tailbone injury     fx     Urinary tract infections      Past Surgical " History:   Procedure Laterality Date     C ORAL SURGERY PROCEDURE       Family History   Problem Relation Age of Onset     Arthritis Mother      Neurologic Disorder Mother      had a seizure      Alcohol/Drug Mother      Arthritis Maternal Grandmother      Breast Cancer Maternal Grandmother      HEART DISEASE Paternal Grandmother      CANCER Father      skin     Social History   Substance Use Topics     Smoking status: Never Smoker     Smokeless tobacco: Never Used     Alcohol use Yes         PHYSICAL  BP (!) 140/95  Pulse 68  Temp 98  F (36.7  C) (Oral)  SpO2 100%  General: Patient is alert and in mild to moderate distress.  Tired appearing.  Neurological: Alert.  No dysarthria or dysphasia.  CN II-VIII intact grossly.  Moving U/L extremities B.  Strength 5/5 U/L extremities B.  Sensation intact grossly to touch (equal).  Rapid alternating movements intact.  Negative Rhomberg.  Normal finger-to-nose movments.  Head / Neck: Atraumatic.  No evidence for meningismus.  Ears: Panic membranes are normal appearing.  External canals are unremarkable.  Eyes: Pupils are equal, round, and reactive.  Normal conjunctiva.  Nose: Midline.  No epistaxis.  Mouth / Throat: No ulcerations or lesions.  Upper pharynx is not erythematous.  Moist.  Respiratory: No respiratory distress. CTA B.  Cardiovascular: Regular rhythm.  Peripheral extremities are warm.  No edema.  No calf tenderness.  Abdomen / Pelvis: Not tender.  No distention.  Soft throughout.  Genitalia: Not done.  Musculoskeletal: No tenderness over major muscles and joints.  Skin: No evidence of rash or trauma.        ED COURSE  2144.  The patient has multiple symptoms without an obvious cause.  She has a headache and vertigo that have been nearly constant over the past week.  She has been extremely fatigued.  She has been nauseous but without vomiting.  She has been experiencing numbness and tingling of her arms at night.  Carpal tunnel with sleep deprivation?   Central source of vertigo?  Infectious process?  Low concern for meningitis as there is no fever no meningismus.  CT of her head pending.  Lab values pending.  Urine analysis and UPT pending.    Labs Ordered and Resulted from Time of ED Arrival Up to the Time of Departure from the ED   TSH WITH FREE T4 REFLEX - Abnormal; Notable for the following:        Result Value    TSH 5.01 (*)     All other components within normal limits   CBC WITH PLATELETS DIFFERENTIAL   COMPREHENSIVE METABOLIC PANEL   T4 FREE   HCG QUALITATIVE       IMAGING  Images reviewed by me.  Radiology report also reviewed.  CT Head w/o Contrast   Final Result   IMPRESSION: No acute intracranial findings.      JUAN FRANCISCO SUTTON MD        1164.  The work appears unremarkable.  Not pregnant.  No evidence for urinary tract infection.  Blood work is within normal limits.  CT scan shows no evidence of acute change.  Source of vertigo?  Most likely a peripheral cause but not obviously vestibular neuritis or labyrinthitis.  Not obviously benign positional vertigo.  Viral process?  Sleep deprivation?  Numbness and tingling of the arms which occurs almost primarily at night most likely carpal tunnel but why did this come on at about the same time have his or other symptoms?  Consider wrist splints at night for sleep.  Meclizine and Zofran prescribed.  Follow up with ENT if not improved over the next 7 days.  Follow-up with your primary doctor for further discussion regarding the arm symptoms.  Return here for worsening as discussed.  The patient agrees with plan.      IMPRESSION    ICD-10-CM    1. Vertigo R42    2. Bilateral carpal tunnel syndrome G56.03    3. Acute nonintractable headache, unspecified headache type R51            Critical Care time:  none                    Kalpesh Trejo MD  01/22/18 0000

## 2018-02-17 ENCOUNTER — OFFICE VISIT (OUTPATIENT)
Dept: URGENT CARE | Facility: URGENT CARE | Age: 38
End: 2018-02-17
Payer: MEDICAID

## 2018-02-17 VITALS
WEIGHT: 181 LBS | BODY MASS INDEX: 29.21 KG/M2 | HEART RATE: 74 BPM | OXYGEN SATURATION: 99 % | TEMPERATURE: 97.7 F | DIASTOLIC BLOOD PRESSURE: 75 MMHG | RESPIRATION RATE: 14 BRPM | SYSTOLIC BLOOD PRESSURE: 109 MMHG

## 2018-02-17 DIAGNOSIS — R07.0 THROAT PAIN: ICD-10-CM

## 2018-02-17 DIAGNOSIS — B34.9 VIRAL SYNDROME: Primary | ICD-10-CM

## 2018-02-17 LAB
DEPRECATED S PYO AG THROAT QL EIA: NORMAL
SPECIMEN SOURCE: NORMAL

## 2018-02-17 PROCEDURE — 87880 STREP A ASSAY W/OPTIC: CPT | Performed by: NURSE PRACTITIONER

## 2018-02-17 PROCEDURE — 87081 CULTURE SCREEN ONLY: CPT | Performed by: NURSE PRACTITIONER

## 2018-02-17 PROCEDURE — 99213 OFFICE O/P EST LOW 20 MIN: CPT | Performed by: NURSE PRACTITIONER

## 2018-02-17 NOTE — PATIENT INSTRUCTIONS
"Increase rest and fluids. Tylenol and/or Ibuprofen for comfort. Cool mist vaporizer. If your symptoms worsen or do not resolve follow up with your primary care provider in 1 week and sooner if needed.     Strep culture is pending will result in 24-48 hours.  If it is positive and change in treatment plan will contact you.         Mucinex 600 mg 12 hour formula for ear, head and chest congestion.  It can also thin post nasal drip which can cause a cough and sore throat.    Indications for emergent return to emergency department discussed with patient, who verbalized good understanding and agreement.  Patient understands the limitations of today's evaluation.           Viral Syndrome (Adult)  A viral illness may cause a number of symptoms. The symptoms depend on the part of the body that the virus affects. If it settles in your nose, throat, and lungs, it may cause cough, sore throat, congestion, and sometimes headache. If it settles in your stomach and intestinal tract, it may cause vomiting and diarrhea. Sometimes it causes vague symptoms like \"aching all over,\" feeling tired, loss of appetite, or fever.  A viral illness usually lasts 1 to 2 weeks, but sometimes it lasts longer. In some cases, a more serious infection can look like a viral syndrome in the first few days of the illness. You may need another exam and additional tests to know the difference. Watch for the warning signs listed below.  Home care  Follow these guidelines for taking care of yourself at home:    If symptoms are severe, rest at home for the first 2 to 3 days.    Stay away from cigarette smoke - both your smoke and the smoke from others.    You may use over-the-counter acetaminophen or ibuprofen for fever, muscle aching, and headache, unless another medicine was prescribed for this. If you have chronic liver or kidney disease or ever had a stomach ulcer or GI bleeding, talk with your doctor before using these medicines. No one who is younger " than 18 and ill with a fever should take aspirin. It may cause severe disease or death.    Your appetite may be poor, so a light diet is fine. Avoid dehydration by drinking 8 to 12 8-ounce glasses of fluids each day. This may include water; orange juice; lemonade; apple, grape, and cranberry juice; clear fruit drinks; electrolyte replacement and sports drinks; and decaffeinated teas and coffee. If you have been diagnosed with a kidney disease, ask your doctor how much and what types of fluids you should drink to prevent dehydration. If you have kidney disease, drinking too much fluid can cause it build up in the your body and be dangerous to your health.    Over-the-counter remedies won't shorten the length of the illness but may be helpful for cough, sore throat; and nasal and sinus congestion. Don't use decongestants if you have high blood pressure.  Follow-up care  Follow up with your healthcare provider if you do not improve over the next week.  Call 911  Get emergency medical care if any of the following occur:    Convulsion    Feeling weak, dizzy, or like you are going to faint    Chest pain, shortness of breath, wheezing, or difficulty breathing  When to seek medical advice  Call your healthcare provider right away if any of these occur:    Cough with lots of colored sputum (mucus) or blood in your sputum    Chest pain, shortness of breath, wheezing, or difficulty breathing    Severe headache; face, neck, or ear pain    Severe, constant pain in the lower right side of your belly (abdominal)    Continued vomiting (can t keep liquids down)    Frequent diarrhea (more than 5 times a day); blood (red or black color) or mucus in diarrhea    Feeling weak, dizzy, or like you are going to faint    Extreme thirst    Fever of 100.4 F (38 C) or higher, or as directed by your healthcare provider  Date Last Reviewed: 9/25/2015 2000-2017 [employer]. [address] [place] [address], [place] [address]. All rights  reserved. This information is not intended as a substitute for professional medical care. Always follow your healthcare professional's instructions.        Self-Care for Sore Throats    Sore throats happen for many reasons, such as colds, allergies, and infections caused by viruses or bacteria. In any case, your throat becomes red and sore. Your goal for self-care is to reduce your discomfort while giving your throat a chance to heal.  Moisten and soothe your throat  Tips include the following:    Try a sip of water first thing after waking up.    Keep your throat moist by drinking 6 or more glasses of clear liquids every day.    Run a cool-air humidifier in your room overnight.    Avoid cigarette smoke.     Suck on throat lozenges, cough drops, hard candy, ice chips, or frozen fruit-juice bars. Use the sugar-free versions if your diet or medical condition requires them.  Gargle to ease irritation  Gargling every hour or 2 can ease irritation. Try gargling with 1 of these solutions:    1/4 teaspoon of salt in 1/2 cup of warm water    An over-the-counter anesthetic gargle  Use medicine for more relief  Over-the-counter medicine can reduce sore throat symptoms. Ask your pharmacist if you have questions about which medicine to use:    Ease pain with anesthetic sprays. Aspirin or an aspirin substitute also helps. Remember, never give aspirin to anyone 18 or younger, or if you are already taking blood thinners.     For sore throats caused by allergies, try antihistamines to block the allergic reaction.    Remember: unless a sore throat is caused by a bacterial infection, antibiotics won t help you.  Prevent future sore throats  Prevention tips include the following:    Stop smoking or reduce contact with secondhand smoke. Smoke irritates the tender throat lining.    Limit contact with pets and with allergy-causing substances, such as pollen and mold.    When you re around someone with a sore throat or cold, wash your  hands often to keep viruses or bacteria from spreading.    Don t strain your vocal cords.  Call your healthcare provider  Contact your healthcare provider if you have:    A temperature over 101 F (38.3 C)    White spots on the throat    Great difficulty swallowing    Trouble breathing    A skin rash    Recent exposure to someone else with strep bacteria    Severe hoarseness and swollen glands in the neck or jaw   Date Last Reviewed: 8/1/2016 2000-2017 The [employer]. [address] [place] [address], [place] [address]. All rights reserved. This information is not intended as a substitute for professional medical care. Always follow your healthcare professional's instructions.

## 2018-02-17 NOTE — NURSING NOTE
"Chief Complaint   Patient presents with     Pharyngitis     started couple days ago.  Worse last night.  More on left side into ear.  Ibuprofen and then Tylenol this morning        Initial /75 (BP Location: Right arm, Cuff Size: Adult Large)  Pulse 74  Temp 97.7  F (36.5  C) (Tympanic)  Resp 14  Wt 181 lb (82.1 kg)  SpO2 99%  BMI 29.21 kg/m2 Estimated body mass index is 29.21 kg/(m^2) as calculated from the following:    Height as of 9/20/15: 5' 6\" (1.676 m).    Weight as of this encounter: 181 lb (82.1 kg).      Health Maintenance that is potentially due pending provider review:  NONE    n/a    Is there anyone who you would like to be able to receive your results? Not Applicable  If yes have patient fill out DA Marcum M.A.        "

## 2018-02-17 NOTE — MR AVS SNAPSHOT
"              After Visit Summary   2/17/2018    Remedios Watts    MRN: [id number]           Patient Information     Date Of Birth          1980        Visit Information        Provider Department      2/17/2018 9:00 AM Ellie Chavez APRN [facility]        Today's Diagnoses     Viral syndrome    -  1    Throat pain          Care Instructions    Increase rest and fluids. Tylenol and/or Ibuprofen for comfort. Cool mist vaporizer. If your symptoms worsen or do not resolve follow up with your primary care provider in 1 week and sooner if needed.     Strep culture is pending will result in 24-48 hours.  If it is positive and change in treatment plan will contact you.         Mucinex 600 mg 12 hour formula for ear, head and chest congestion.  It can also thin post nasal drip which can cause a cough and sore throat.    Indications for emergent return to emergency department discussed with patient, who verbalized good understanding and agreement.  Patient understands the limitations of today's evaluation.           Viral Syndrome (Adult)  A viral illness may cause a number of symptoms. The symptoms depend on the part of the body that the virus affects. If it settles in your nose, throat, and lungs, it may cause cough, sore throat, congestion, and sometimes headache. If it settles in your stomach and intestinal tract, it may cause vomiting and diarrhea. Sometimes it causes vague symptoms like \"aching all over,\" feeling tired, loss of appetite, or fever.  A viral illness usually lasts 1 to 2 weeks, but sometimes it lasts longer. In some cases, a more serious infection can look like a viral syndrome in the first few days of the illness. You may need another exam and additional tests to know the difference. Watch for the warning signs listed below.  Home care  Follow these guidelines for taking care of yourself at home:    If symptoms are severe, rest at home for the first 2 to " 3 days.    Stay away from cigarette smoke - both your smoke and the smoke from others.    You may use over-the-counter acetaminophen or ibuprofen for fever, muscle aching, and headache, unless another medicine was prescribed for this. If you have chronic liver or kidney disease or ever had a stomach ulcer or GI bleeding, talk with your doctor before using these medicines. No one who is younger than 18 and ill with a fever should take aspirin. It may cause severe disease or death.    Your appetite may be poor, so a light diet is fine. Avoid dehydration by drinking 8 to 12 8-ounce glasses of fluids each day. This may include water; orange juice; lemonade; apple, grape, and cranberry juice; clear fruit drinks; electrolyte replacement and sports drinks; and decaffeinated teas and coffee. If you have been diagnosed with a kidney disease, ask your doctor how much and what types of fluids you should drink to prevent dehydration. If you have kidney disease, drinking too much fluid can cause it build up in the your body and be dangerous to your health.    Over-the-counter remedies won't shorten the length of the illness but may be helpful for cough, sore throat; and nasal and sinus congestion. Don't use decongestants if you have high blood pressure.  Follow-up care  Follow up with your healthcare provider if you do not improve over the next week.  Call 911  Get emergency medical care if any of the following occur:    Convulsion    Feeling weak, dizzy, or like you are going to faint    Chest pain, shortness of breath, wheezing, or difficulty breathing  When to seek medical advice  Call your healthcare provider right away if any of these occur:    Cough with lots of colored sputum (mucus) or blood in your sputum    Chest pain, shortness of breath, wheezing, or difficulty breathing    Severe headache; face, neck, or ear pain    Severe, constant pain in the lower right side of your belly (abdominal)    Continued vomiting (can t  keep liquids down)    Frequent diarrhea (more than 5 times a day); blood (red or black color) or mucus in diarrhea    Feeling weak, dizzy, or like you are going to faint    Extreme thirst    Fever of 100.4 F (38 C) or higher, or as directed by your healthcare provider  Date Last Reviewed: 9/25/2015 2000-2017 [employer]. [address] [place] [address], [place] [address]. All rights reserved. This information is not intended as a substitute for professional medical care. Always follow your healthcare professional's instructions.        Self-Care for Sore Throats    Sore throats happen for many reasons, such as colds, allergies, and infections caused by viruses or bacteria. In any case, your throat becomes red and sore. Your goal for self-care is to reduce your discomfort while giving your throat a chance to heal.  Moisten and soothe your throat  Tips include the following:    Try a sip of water first thing after waking up.    Keep your throat moist by drinking 6 or more glasses of clear liquids every day.    Run a cool-air humidifier in your room overnight.    Avoid cigarette smoke.     Suck on throat lozenges, cough drops, hard candy, ice chips, or frozen fruit-juice bars. Use the sugar-free versions if your diet or medical condition requires them.  Gargle to ease irritation  Gargling every hour or 2 can ease irritation. Try gargling with 1 of these solutions:    1/4 teaspoon of salt in 1/2 cup of warm water    An over-the-counter anesthetic gargle  Use medicine for more relief  Over-the-counter medicine can reduce sore throat symptoms. Ask your pharmacist if you have questions about which medicine to use:    Ease pain with anesthetic sprays. Aspirin or an aspirin substitute also helps. Remember, never give aspirin to anyone 18 or younger, or if you are already taking blood thinners.     For sore throats caused by allergies, try antihistamines to block the allergic reaction.    Remember: unless a sore  throat is caused by a bacterial infection, antibiotics won t help you.  Prevent future sore throats  Prevention tips include the following:    Stop smoking or reduce contact with secondhand smoke. Smoke irritates the tender throat lining.    Limit contact with pets and with allergy-causing substances, such as pollen and mold.    When you re around someone with a sore throat or cold, wash your hands often to keep viruses or bacteria from spreading.    Don t strain your vocal cords.  Call your healthcare provider  Contact your healthcare provider if you have:    A temperature over 101 F (38.3 C)    White spots on the throat    Great difficulty swallowing    Trouble breathing    A skin rash    Recent exposure to someone else with strep bacteria    Severe hoarseness and swollen glands in the neck or jaw   Date Last Reviewed: 8/1/2016 2000-2017 [employer] [address] [place] [address]. All rights reserved. This information is not intended as a substitute for professional medical care. Always follow your healthcare professional's instructions.                Follow-ups after your visit        Follow-up notes from your care team     See patient instructions section of the AVS Return if symptoms worsen or fail to improve.      Your next 10 appointments already scheduled     Feb 20, 2018  9:00 AM CST   Return Visit with Johnny Stevens MD   [facility] ([facility]    [address] [place] [id number] [phone]              Who to contact     If you have questions or need follow up information about today's clinic visit or your schedule please contact [facility] directly at [phone].  Normal or non-critical lab and imaging results will be communicated to you by MyChart, letter or phone within 4 business days after the clinic has received the results. If you do not hear from us within 7 days, please contact  the clinic through [employer] or phone. If you have a critical or abnormal lab result, we will notify you by phone as soon as possible.  Submit refill requests through [employer] or call your pharmacy and they will forward the refill request to us. Please allow 3 business days for your refill to be completed.          Additional Information About Your Visit        [facility] Information     [employer] gives you secure access to your electronic health record. If you see a primary care provider, you can also send messages to your care team and make appointments. If you have questions, please call your primary care clinic.  If you do not have a primary care provider, please call [phone] and they will assist you.        Care EveryWhere ID     This is your Care EveryWhere ID. This could be used by other organizations to access your [facility] medical records  [id number]        Your Vitals Were     Pulse Temperature Respirations Pulse Oximetry BMI (Body Mass Index)       74 97.7  F (36.5  C) (Tympanic) 14 99% 29.21 kg/m2        Blood Pressure from Last 3 Encounters:   02/17/18 109/75   01/22/18 110/81   08/14/17 118/71    Weight from Last 3 Encounters:   02/17/18 181 lb (82.1 kg)   02/09/16 156 lb 12.8 oz (71.1 kg)   09/20/15 150 lb (68 kg)              We Performed the Following     Beta strep group A culture     Strep, Rapid Screen        Primary Care Provider Office Phone # Fax #    Agustín Berger [phone] [address] [facility] [address]        Equal Access to Services     [facility] : Hadii aad ku hadasho Soomaali, waaxda luqadaha, qaybta kaalmada adeegyada, deya jamison . So [facility] [phone].    ATENCIÓN: Si habla español, tiene a cui disposición servicios gratuitos de asistencia lingüística. Llame al [phone].    We comply with applicable federal civil rights laws and [place] laws. We do not discriminate on the basis of race, color, national origin,  age, disability, sex, sexual orientation, or gender identity.            Thank you!     Thank you for choosing [facility]  for your care. Our goal is always to provide you with excellent care. Hearing back from our patients is one way we can continue to improve our services. Please take a few minutes to complete the written survey that you may receive in the mail after your visit with us. Thank you!             Your Updated Medication List - Protect others around you: Learn how to safely use, store and throw away your medicines at www.disposemymeds.org.          This list is accurate as of 2/17/18  9:39 AM.  Always use your most recent med list.                   Brand Name Dispense Instructions for use Diagnosis    clindamycin 1 % solution    CLEOCIN T    60 mL    Apply  topically 2 times daily. To lip    Folliculitis       ibuprofen 800 MG tablet    ADVIL/MOTRIN    30 tablet    Take 1 tablet by mouth every 6 hours as needed (cramping).    Supervision of other normal pregnancy       meclizine 25 MG tablet    ANTIVERT    30 tablet    Take 1 tablet (25 mg) by mouth 3 times daily as needed for dizziness        ondansetron 4 MG ODT tab    ZOFRAN ODT    10 tablet    Take 1-2 tablets (4-8 mg) by mouth every 6 hours as needed for nausea        ORTHO-NOVUM 7/7/7 (28) 0.5/0.75/1-35 MG-MCG per tablet   Generic drug:  norethindrone-ethinyl estradiol     3 Package    Take 1 tablet by mouth daily.        oxyCODONE-acetaminophen 7.5-325 MG per tablet    PERCOCET    45 tablet    Take 1 tablet by mouth every 4 hours as needed for moderate to severe pain maximum 12 tablet(s) per day    Melanoma of upper arm, left (H)       sertraline 50 MG tablet    ZOLOFT    90 tablet    Take 1 tablet by mouth daily.    QING (generalised anxiety disorder)       triamcinolone 0.1 % paste    KENALOG    5 g    Take by mouth 2 times daily

## 2018-02-17 NOTE — PROGRESS NOTES
SUBJECTIVE:   Remedios Watts is a 37 year old female who presents to clinic today for the following health issues:  Chief Complaint   Patient presents with     Pharyngitis     started couple days ago.  Worse last night.  More on left side into ear.  Ibuprofen and then Tylenol this morning                  Problem list and histories reviewed & adjusted, as indicated.  Additional history: as documented    Patient Active Problem List   Diagnosis     Generalized anxiety disorder     Pelvic pain     CARDIOVASCULAR SCREENING; LDL GOAL LESS THAN 160     Folliculitis     Lentigo     Congenital nevus     Angioma     Multiple nevi     Dermal nevus     Past Surgical History:   Procedure Laterality Date     C ORAL SURGERY PROCEDURE         Social History   Substance Use Topics     Smoking status: Never Smoker     Smokeless tobacco: Never Used     Alcohol use Yes     Family History   Problem Relation Age of Onset     Arthritis Mother      Neurologic Disorder Mother      had a seizure      Alcohol/Drug Mother      Arthritis Maternal Grandmother      Breast Cancer Maternal Grandmother      HEART DISEASE Paternal Grandmother      CANCER Father      skin         Current Outpatient Prescriptions   Medication Sig Dispense Refill     norethindrone-ethinyl estradiol (ORTHO-NOVUM 7/7/7, 28,) 0.5/0.75/1-35 MG-MCG per tablet Take 1 tablet by mouth daily. 3 Package 3     meclizine (ANTIVERT) 25 MG tablet Take 1 tablet (25 mg) by mouth 3 times daily as needed for dizziness (Patient not taking: Reported on 2/17/2018) 30 tablet 0     ondansetron (ZOFRAN ODT) 4 MG ODT tab Take 1-2 tablets (4-8 mg) by mouth every 6 hours as needed for nausea (Patient not taking: Reported on 2/17/2018) 10 tablet 0     oxyCODONE-acetaminophen (PERCOCET) 7.5-325 MG per tablet Take 1 tablet by mouth every 4 hours as needed for moderate to severe pain maximum 12 tablet(s) per day (Patient not taking: Reported on 2/17/2018) 45 tablet 0     triamcinolone  (KENALOG) 0.1 % paste Take by mouth 2 times daily (Patient not taking: Reported on 2/17/2018) 5 g 0     clindamycin (CLEOCIN T) 1 % external solution Apply  topically 2 times daily. To lip (Patient not taking: Reported on 2/17/2018) 60 mL 11     sertraline (ZOLOFT) 50 MG tablet Take 1 tablet by mouth daily. (Patient not taking: Reported on 2/17/2018) 90 tablet 3     ibuprofen (ADVIL,MOTRIN) 800 MG tablet Take 1 tablet by mouth every 6 hours as needed (cramping). (Patient not taking: Reported on 2/17/2018) 30 tablet 0     Allergies   Allergen Reactions     Nka [No Known Allergies]      Labs reviewed in EPIC    Reviewed and updated as needed this visit by clinical staff  Tobacco  Allergies  Meds  Problems  Med Hx  Surg Hx  Fam Hx  Soc Hx        Reviewed and updated as needed this visit by Provider  Allergies  Meds  Problems         ROS:  Constitutional, HEENT, cardiovascular, pulmonary, GI, , musculoskeletal, neuro, skin, endocrine and psych systems are negative, except as otherwise noted.    OBJECTIVE:     /75 (BP Location: Right arm, Cuff Size: Adult Large)  Pulse 74  Temp 97.7  F (36.5  C) (Tympanic)  Resp 14  Wt 181 lb (82.1 kg)  SpO2 99%  BMI 29.21 kg/m2  Body mass index is 29.21 kg/(m^2).   GENERAL: healthy, alert and no distress, nontoxic in appearance  EYES: Eyes grossly normal to inspection, PERRL and conjunctivae and sclerae normal  HENT: ear canals and TM's normal, nose and mouth without ulcers or lesions  NECK: no adenopathy,supple with full ROM  RESP: lungs clear to auscultation - no rales, rhonchi or wheezes  CV: regular rate and rhythm, normal S1 S2, no S3 or S4, no murmur, click or rub, no peripheral edema   ABDOMEN: soft, nontender, no hepatosplenomegaly, no masses and bowel sounds normal  MS: no gross musculoskeletal defects noted, no edema  No rash    Diagnostic Test Results:  Results for orders placed or performed in visit on 02/17/18 (from the past 24 hour(s))   Strep,  "Rapid Screen   Result Value Ref Range    Specimen Description Throat     Rapid Strep A Screen       NEGATIVE: No Group A streptococcal antigen detected by immunoassay, await culture report.       ASSESSMENT/PLAN:     Problem List Items Addressed This Visit     None      Visit Diagnoses     Viral syndrome    -  Primary    Throat pain        Relevant Orders    Strep, Rapid Screen (Completed)    Beta strep group A culture (Completed)               Patient Instructions     Increase rest and fluids. Tylenol and/or Ibuprofen for comfort. Cool mist vaporizer. If your symptoms worsen or do not resolve follow up with your primary care provider in 1 week and sooner if needed.     Strep culture is pending will result in 24-48 hours.  If it is positive and change in treatment plan will contact you.         Mucinex 600 mg 12 hour formula for ear, head and chest congestion.  It can also thin post nasal drip which can cause a cough and sore throat.    Indications for emergent return to emergency department discussed with patient, who verbalized good understanding and agreement.  Patient understands the limitations of today's evaluation.           Viral Syndrome (Adult)  A viral illness may cause a number of symptoms. The symptoms depend on the part of the body that the virus affects. If it settles in your nose, throat, and lungs, it may cause cough, sore throat, congestion, and sometimes headache. If it settles in your stomach and intestinal tract, it may cause vomiting and diarrhea. Sometimes it causes vague symptoms like \"aching all over,\" feeling tired, loss of appetite, or fever.  A viral illness usually lasts 1 to 2 weeks, but sometimes it lasts longer. In some cases, a more serious infection can look like a viral syndrome in the first few days of the illness. You may need another exam and additional tests to know the difference. Watch for the warning signs listed below.  Home care  Follow these guidelines for taking care of " yourself at home:    If symptoms are severe, rest at home for the first 2 to 3 days.    Stay away from cigarette smoke - both your smoke and the smoke from others.    You may use over-the-counter acetaminophen or ibuprofen for fever, muscle aching, and headache, unless another medicine was prescribed for this. If you have chronic liver or kidney disease or ever had a stomach ulcer or GI bleeding, talk with your doctor before using these medicines. No one who is younger than 18 and ill with a fever should take aspirin. It may cause severe disease or death.    Your appetite may be poor, so a light diet is fine. Avoid dehydration by drinking 8 to 12 8-ounce glasses of fluids each day. This may include water; orange juice; lemonade; apple, grape, and cranberry juice; clear fruit drinks; electrolyte replacement and sports drinks; and decaffeinated teas and coffee. If you have been diagnosed with a kidney disease, ask your doctor how much and what types of fluids you should drink to prevent dehydration. If you have kidney disease, drinking too much fluid can cause it build up in the your body and be dangerous to your health.    Over-the-counter remedies won't shorten the length of the illness but may be helpful for cough, sore throat; and nasal and sinus congestion. Don't use decongestants if you have high blood pressure.  Follow-up care  Follow up with your healthcare provider if you do not improve over the next week.  Call 911  Get emergency medical care if any of the following occur:    Convulsion    Feeling weak, dizzy, or like you are going to faint    Chest pain, shortness of breath, wheezing, or difficulty breathing  When to seek medical advice  Call your healthcare provider right away if any of these occur:    Cough with lots of colored sputum (mucus) or blood in your sputum    Chest pain, shortness of breath, wheezing, or difficulty breathing    Severe headache; face, neck, or ear pain    Severe, constant pain in  the lower right side of your belly (abdominal)    Continued vomiting (can t keep liquids down)    Frequent diarrhea (more than 5 times a day); blood (red or black color) or mucus in diarrhea    Feeling weak, dizzy, or like you are going to faint    Extreme thirst    Fever of 100.4 F (38 C) or higher, or as directed by your healthcare provider  Date Last Reviewed: 9/25/2015 2000-2017 [employer]. [address] [place] [address], [place] [address]. All rights reserved. This information is not intended as a substitute for professional medical care. Always follow your healthcare professional's instructions.        Self-Care for Sore Throats    Sore throats happen for many reasons, such as colds, allergies, and infections caused by viruses or bacteria. In any case, your throat becomes red and sore. Your goal for self-care is to reduce your discomfort while giving your throat a chance to heal.  Moisten and soothe your throat  Tips include the following:    Try a sip of water first thing after waking up.    Keep your throat moist by drinking 6 or more glasses of clear liquids every day.    Run a cool-air humidifier in your room overnight.    Avoid cigarette smoke.     Suck on throat lozenges, cough drops, hard candy, ice chips, or frozen fruit-juice bars. Use the sugar-free versions if your diet or medical condition requires them.  Gargle to ease irritation  Gargling every hour or 2 can ease irritation. Try gargling with 1 of these solutions:    1/4 teaspoon of salt in 1/2 cup of warm water    An over-the-counter anesthetic gargle  Use medicine for more relief  Over-the-counter medicine can reduce sore throat symptoms. Ask your pharmacist if you have questions about which medicine to use:    Ease pain with anesthetic sprays. Aspirin or an aspirin substitute also helps. Remember, never give aspirin to anyone 18 or younger, or if you are already taking blood thinners.     For sore throats caused by allergies, try  antihistamines to block the allergic reaction.    Remember: unless a sore throat is caused by a bacterial infection, antibiotics won t help you.  Prevent future sore throats  Prevention tips include the following:    Stop smoking or reduce contact with secondhand smoke. Smoke irritates the tender throat lining.    Limit contact with pets and with allergy-causing substances, such as pollen and mold.    When you re around someone with a sore throat or cold, wash your hands often to keep viruses or bacteria from spreading.    Don t strain your vocal cords.  Call your healthcare provider  Contact your healthcare provider if you have:    A temperature over 101 F (38.3 C)    White spots on the throat    Great difficulty swallowing    Trouble breathing    A skin rash    Recent exposure to someone else with strep bacteria    Severe hoarseness and swollen glands in the neck or jaw   Date Last Reviewed: 8/1/2016 2000-2017 [employer]. [address] [place] [address]. All rights reserved. This information is not intended as a substitute for professional medical care. Always follow your healthcare professional's instructions.            JEFERSON Yost [facility] URGENT CARE

## 2018-02-18 ENCOUNTER — HOSPITAL ENCOUNTER (EMERGENCY)
Facility: CLINIC | Age: 38
Discharge: HOME | End: 2018-02-18
Attending: PHYSICIAN ASSISTANT | Admitting: PHYSICIAN ASSISTANT
Payer: MEDICAID

## 2018-02-18 VITALS
TEMPERATURE: 98.5 F | OXYGEN SATURATION: 100 % | SYSTOLIC BLOOD PRESSURE: 112 MMHG | RESPIRATION RATE: 16 BRPM | DIASTOLIC BLOOD PRESSURE: 77 MMHG

## 2018-02-18 DIAGNOSIS — J02.9 ACUTE PHARYNGITIS, UNSPECIFIED ETIOLOGY: ICD-10-CM

## 2018-02-18 LAB
BACTERIA SPEC CULT: NORMAL
INTERNAL QC OK POCT: YES
S PYO AG THROAT QL IA.RAPID: NEGATIVE
SPECIMEN SOURCE: NORMAL

## 2018-02-18 PROCEDURE — 99213 OFFICE O/P EST LOW 20 MIN: CPT | Performed by: PHYSICIAN ASSISTANT

## 2018-02-18 PROCEDURE — G0463 HOSPITAL OUTPT CLINIC VISIT: HCPCS

## 2018-02-18 PROCEDURE — 87077 CULTURE AEROBIC IDENTIFY: CPT | Performed by: PHYSICIAN ASSISTANT

## 2018-02-18 PROCEDURE — 87081 CULTURE SCREEN ONLY: CPT | Performed by: PHYSICIAN ASSISTANT

## 2018-02-18 PROCEDURE — 87880 STREP A ASSAY W/OPTIC: CPT | Performed by: PHYSICIAN ASSISTANT

## 2018-02-18 RX ORDER — DEXAMETHASONE 4 MG/1
10 TABLET ORAL ONCE
Qty: 3 TABLET | Refills: 0 | Status: SHIPPED | OUTPATIENT
Start: 2018-02-18 | End: 2018-03-27

## 2018-02-18 NOTE — ED AVS SNAPSHOT
[facility] Emergency Department    [address]    [facility] [address]    Phone:  [phone]    Fax:  [phone]                                       Remedios Watts   MRN: [id number]    Department:  [facility] Emergency Department   Date of Visit:  2/18/2018           Patient Information     Date Of Birth          1980        Your diagnoses for this visit were:     Acute pharyngitis, unspecified etiology        You were seen by Yudy Sotomayor PA-C.      Follow-up Information     Follow up with Agustín Berger MD In 3 days.    Specialty:  Family Practice    Why:  if no improvement or sooner if new or worsening symptoms     Contact information:    [address]  [facility] [address]  [phone]          Discharge Instructions          * PHARYNGITIS (Sore Throat),REPORT PENDING    Pharyngitis (sore throat) is often due to a virus, but can also be caused by the  strep  bacteria. This is called  strep throat . Both viral and strep infection can cause throat pain that is worse when swallowing, aching all over with headache and fever. Both types of infections are contagious. They may be spread by coughing, kissing or touching others after touching your mouth or nose, so wash your hands often.  A test has been done to determine whether or not you have strep throat. If it is positive for strep infection you will usually need to take antibiotics. If the test is negative, you probably have a viral pharyngitis, and antibiotic treatment will not help you recover.  HOME CARE:    If your symptoms are severe, rest at home for the first 2-3 days. If you are told that your test is positive for strep, you should be off work and school for the first two days of antibiotic treatment. After that, you will no longer be as contagious.    Children: Use acetaminophen (Tylenol) for fever, fussiness or discomfort. In infants over six months of age, you may use ibuprofen (Children's Motrin) instead of Tylenol.  [NOTE: If your child has chronic liver or kidney disease or ever had a stomach ulcer or GI bleeding, talk with your doctor before using these medicines.]   (Aspirin should never be used in anyone under 18 years of age who is ill with a fever. It may cause severe liver damage.)  Adults: You may use acetaminophen (Tylenol) 650-1000 mg every 6 hours or ibuprofen (Motrin, Advil) 600 mg every 6-8 hours with food to control pain, if you are able to take these medicines. [NOTE: If you have chronic liver or kidney disease or ever had a stomach ulcer or GI bleeding, talk with your doctor before using these medicines.]    Throat lozenges or sprays (Chloraseptic and others), or gargling with warm salt water will reduce throat pain. Dissolve 1/2 teaspoon of salt in 1 glass of warm water. This is especially useful just before meals.     FOLLOW UP with your doctor as advised by our staff if you are not improving over the next week.  GET PROMPT MEDICAL ATTENTION  if any of the following occur:    Fever over 101 F (38.3 C) for more than three days    New or worsening ear pain, sinus pain or headache    Unable to swallow liquids or open your mouth wide due to throat pain    Trouble breathing    Muffled voice    New rash       4447-0982 [employer]. [address] [place] [address], [place] [address]. All rights reserved. This information is not intended as a substitute for professional medical care. Always follow your healthcare professional's instructions.  This information has been modified by your health care provider with permission from the publisher.      Future Appointments        Provider Department Dept Phone Center    2/20/2018 9:00 AM Johnny Stevens MD [facility] [phone] [facility]      24 Hour Appointment Hotline       To make an appointment at any [facility], call [phone] ([phone]). If you don't have a family doctor or clinic, we will help you find one. [facility] (formerly [facility]3] are  conveniently located to serve the needs of you and your family.             Review of your medicines      START taking        Dose / Directions Last dose taken    dexamethasone 4 MG tablet   Commonly known as:  DECADRON   Dose:  10 mg   Quantity:  3 tablet        Take 2.5 tablets (10 mg) by mouth once for 1 dose   Refills:  0          Our records show that you are taking the medicines listed below. If these are incorrect, please call your family doctor or clinic.        Dose / Directions Last dose taken    ibuprofen 800 MG tablet   Commonly known as:  ADVIL/MOTRIN   Dose:  800 mg   Quantity:  30 tablet        Take 1 tablet by mouth every 6 hours as needed (cramping).   Refills:  0        meclizine 25 MG tablet   Commonly known as:  ANTIVERT   Dose:  25 mg   Quantity:  30 tablet        Take 1 tablet (25 mg) by mouth 3 times daily as needed for dizziness   Refills:  0        ORTHO-NOVUM 7/7/7 (28) 0.5/0.75/1-35 MG-MCG per tablet   Dose:  1 tablet   Quantity:  3 Package   Generic drug:  norethindrone-ethinyl estradiol        Take 1 tablet by mouth daily.   Refills:  3        sertraline 50 MG tablet   Commonly known as:  ZOLOFT   Dose:  50 mg   Quantity:  90 tablet        Take 1 tablet by mouth daily.   Refills:  3        triamcinolone 0.1 % paste   Commonly known as:  KENALOG   Quantity:  5 g        Take by mouth 2 times daily   Refills:  0                Prescriptions were sent or printed at these locations (1 Prescription)                   [facility] [address] [address]    Telephone:  [phone]   Fax:  [phone]   Hours:                  E-Prescribed (1 of 1)         dexamethasone (DECADRON) 4 MG tablet                Procedures and tests performed during your visit     Beta strep group A r/o culture    Rapid strep group A screen POCT      Orders Needing Specimen Collection     None      Pending Results     No orders found from 2/16/2018 to  2/19/2018.            Pending Culture Results     No orders found from 2/16/2018 to 2/19/2018.            Pending Results Instructions     If you had any lab results that were not finalized at the time of your Discharge, you can call the ED Lab Result RN at [phone]. You will be contacted by this team for any positive Lab results or changes in treatment. The nurses are available 7 days a week from 10A to 6:30P.  You can leave a message 24 hours per day and they will return your call.        Test Results From Your Hospital Stay        2/18/2018  3:12 PM      Component Results     Component Value Ref Range & Units Status    Rapid Strep A Screen negative neg Final    Internal QC OK Yes  Final                Thank you for choosing [facility]       Thank you for choosing [facility] for your care. Our goal is always to provide you with excellent care. Hearing back from our patients is one way we can continue to improve our services. Please take a few minutes to complete the written survey that you may receive in the mail after you visit with us. Thank you!        [employer] Information     [employer] gives you secure access to your electronic health record. If you see a primary care provider, you can also send messages to your care team and make appointments. If you have questions, please call your primary care clinic.  If you do not have a primary care provider, please call [phone] and they will assist you.        Care EveryWhere ID     This is your Care EveryWhere ID. This could be used by other organizations to access your [facility] medical records  [id number]        Equal Access to Services     FATIMAH DIAZ AH: Hadii nori Slater, wastarda rosey, qaybta kaalmada deya nicole. So [facility] [phone].    ATENCIÓN: Si habla español, tiene a cui disposición servicios gratuitos de asistencia lingüística. Llame al [phone].    We comply with applicable federal civil rights  laws and [place] laws. We do not discriminate on the basis of race, color, national origin, age, disability, sex, sexual orientation, or gender identity.            After Visit Summary       This is your record. Keep this with you and show to your community pharmacist(s) and doctor(s) at your next visit.

## 2018-02-18 NOTE — ED PROVIDER NOTES
History     Chief Complaint   Patient presents with     Pharyngitis     HPI  Remedios Watts is a 37 year old female who resents to the urgent care with concern over 3 day history of sore throats.  She just complains of subjective fever, chills, myalgias, or congestion.  She has not had any significant cough, dyspnea, wheezing or abdominal complaints.  She has attempted to treat with ibuprofen, last dose was 1 hour prior to arrival without significant improvement.  She denies any close ill contacts with strep throat.    Problem List:    Patient Active Problem List    Diagnosis Date Noted     Folliculitis 02/06/2013     Priority: Medium     Lentigo 02/06/2013     Priority: Medium     Congenital nevus 02/06/2013     Priority: Medium     Angioma 02/06/2013     Priority: Medium     Multiple nevi 02/06/2013     Priority: Medium     Dermal nevus 02/06/2013     Priority: Medium     CARDIOVASCULAR SCREENING; LDL GOAL LESS THAN 160 01/21/2013     Priority: Medium     Pelvic pain 01/18/2013     Priority: Medium     Generalized anxiety disorder 06/29/2011     Priority: Medium     Diagnosis updated by automated process. Provider to review and confirm.        Past Medical History:    Past Medical History:   Diagnosis Date     Chickenpox      Diarrhea      Group B streptococcus urinary tract infection complicating pregnancy 4/20/2011     History of postpartum depression, currently pregnant 10/21/2010     Malignant melanoma (H)      Postpartum depression 12/17/2008     Tailbone injury      Urinary tract infections      Past Surgical History:    Past Surgical History:   Procedure Laterality Date     C ORAL SURGERY PROCEDURE       Family History:    Family History   Problem Relation Age of Onset     Arthritis Mother      Neurologic Disorder Mother      had a seizure      Alcohol/Drug Mother      Arthritis Maternal Grandmother      Breast Cancer Maternal Grandmother      HEART DISEASE Paternal Grandmother      CANCER Father       skin     Social History:  Marital Status:   [2]  Social History   Substance Use Topics     Smoking status: Never Smoker     Smokeless tobacco: Never Used     Alcohol use Yes      Medications:      meclizine (ANTIVERT) 25 MG tablet   ondansetron (ZOFRAN ODT) 4 MG ODT tab   oxyCODONE-acetaminophen (PERCOCET) 7.5-325 MG per tablet   triamcinolone (KENALOG) 0.1 % paste   clindamycin (CLEOCIN T) 1 % external solution   norethindrone-ethinyl estradiol (ORTHO-NOVUM 7/7/7, 28,) 0.5/0.75/1-35 MG-MCG per tablet   sertraline (ZOLOFT) 50 MG tablet   ibuprofen (ADVIL,MOTRIN) 800 MG tablet     Review of Systems  CONSTITUTIONAL:POSITIVE for subjective fever, chills, myalgias   INTEGUMENTARY/SKIN: NEGATIVE for worrisome rashes, moles or lesions  EYES: NEGATIVE for vision changes or irritation  ENT/MOUTH: POSITIVE for sore throat, nasal congestion and NEGATIVE for ear pain   RESP:NEGATIVE for cough, SOB/dyspnea and wheezing  GI: NEGATIVE for abdominal pain, diarrhea, nausea and vomiting  Physical Exam   BP: 112/77  Heart Rate: 78  Temp: 98.5  F (36.9  C)  Resp: 16  SpO2: 100 %  Physical Exam  GENERAL APPEARANCE: healthy, alert and no distress  EYES: EOMI,  PERRL, conjunctiva clear  HENT: ear canals and TM's normal.  Posterior pharynx is erythematous, edematous.  Tonsillar exudate present.  NECK: supple, nontender, bilateral anterior and posterior cervical lymphadenopathy  RESP: lungs clear to auscultation - no rales, rhonchi or wheezes  CV: regular rates and rhythm, normal S1 S2, no murmur noted  SKIN: no suspicious lesions or rashes  ED Course     ED Course     Procedures        Critical Care time:  none            Labs Ordered and Resulted from Time of ED Arrival Up to the Time of Departure from the ED   RAPID STREP GROUP A SCREEN POCT   BETA STREP GROUP A CULTURE     Assessments & Plan (with Medical Decision Making)     I have reviewed the nursing notes.    I have reviewed the findings, diagnosis, plan and need for  follow up with the patient.       New Prescriptions    DEXAMETHASONE (DECADRON) 4 MG TABLET    Take 2.5 tablets (10 mg) by mouth once for 1 dose     Final diagnoses:   Acute pharyngitis, unspecified etiology     RST negative with culture pending.  No evidence of peritonsillar cellulitis or abscess.   I would consider possibility of mono and did discuss risk/benefits of testing which patient agreed to defer at this time,  given limited sensitivity of testing early in course of illness.  She was instructed to continue OTC symptomatic treatment.  Follow up with PCP if no improvement in 5-7 days.  Worrisome reasons to seek care sooner discussed.      2/18/2018   [facility] EMERGENCY DEPARTMENT     Yudy Sotomayor, JOSEPHINE  02/18/18 1523

## 2018-02-18 NOTE — ED AVS SNAPSHOT
[facility] Emergency Department    [address] [address]    Phone:  [phone]    Fax:  [phone]                                       Remedios Watts   MRN: [id number]    Department:  [facility] Emergency Department   Date of Visit:  2/18/2018           After Visit Summary Signature Page     I have received my discharge instructions, and my questions have been answered. I have discussed any challenges I see with this plan with the nurse or doctor.    ..........................................................................................................................................  Patient/Patient Representative Signature      ..........................................................................................................................................  Patient Representative Print Name and Relationship to Patient    ..................................................               ................................................  Date                                            Time    ..........................................................................................................................................  Reviewed by Signature/Title    ...................................................              ..............................................  Date                                                            Time

## 2018-02-18 NOTE — DISCHARGE INSTRUCTIONS

## 2018-02-20 ENCOUNTER — OFFICE VISIT (OUTPATIENT)
Dept: DERMATOLOGY | Facility: CLINIC | Age: 38
End: 2018-02-20
Payer: MEDICAID

## 2018-02-20 VITALS — OXYGEN SATURATION: 100 % | DIASTOLIC BLOOD PRESSURE: 70 MMHG | SYSTOLIC BLOOD PRESSURE: 105 MMHG | HEART RATE: 60 BPM

## 2018-02-20 DIAGNOSIS — Z87.898 HISTORY OF ATYPICAL NEVUS: ICD-10-CM

## 2018-02-20 DIAGNOSIS — D22.9 NEVUS: ICD-10-CM

## 2018-02-20 DIAGNOSIS — L81.4 LENTIGO: ICD-10-CM

## 2018-02-20 DIAGNOSIS — L82.1 SK (SEBORRHEIC KERATOSIS): ICD-10-CM

## 2018-02-20 DIAGNOSIS — Z85.820 HISTORY OF MELANOMA: Primary | ICD-10-CM

## 2018-02-20 LAB
BACTERIA SPEC CULT: ABNORMAL
SPECIMEN SOURCE: ABNORMAL

## 2018-02-20 PROCEDURE — 99213 OFFICE O/P EST LOW 20 MIN: CPT | Performed by: DERMATOLOGY

## 2018-02-20 NOTE — NURSING NOTE
Chief Complaint   Patient presents with     Skin Check       Vitals:    02/20/18 0927   BP: 105/70   Pulse: 60   SpO2: 100%     Wt Readings from Last 1 Encounters:   02/17/18 82.1 kg (181 lb)       Joceline Schneider LPN.................2/20/2018

## 2018-02-20 NOTE — PROGRESS NOTES
Remedios Watts is a 37 year old year old female patient here today for f/u hx of 0.45mm melanoma on left arm, and hx of atypical nevi.  She notes brown spot on labia, breast and arm.   .  Patient states this has been present for ?.  Patient reports the following symptoms:  none .  Patient reports the following previous treatments none.  Patient reports the following modifying factors none.  Associated symptoms: none.  Patient has no other skin complaints today.  Remainder of the HPI, Meds, PMH, Allergies, FH, and SH was reviewed in chart.    Pertinent Hx:   Melanoma, atypical nevi     Past Medical History:   Diagnosis Date     Chickenpox      Diarrhea      Group B streptococcus urinary tract infection complicating pregnancy 4/20/2011    Plan PCN in labor      History of postpartum depression, currently pregnant 10/21/2010     Malignant melanoma (H)      Postpartum depression 12/17/2008     Tailbone injury     fx     Urinary tract infections        Past Surgical History:   Procedure Laterality Date     C ORAL SURGERY PROCEDURE          Family History   Problem Relation Age of Onset     Arthritis Mother      Neurologic Disorder Mother      had a seizure      Alcohol/Drug Mother      Arthritis Maternal Grandmother      Breast Cancer Maternal Grandmother      HEART DISEASE Paternal Grandmother      CANCER Father      skin       Social History     Social History     Marital status:      Spouse name: N/A     Number of children: 3     Years of education: N/A     Occupational History      [employer]     Social History Main Topics     Smoking status: Never Smoker     Smokeless tobacco: Never Used     Alcohol use Yes     Drug use: No     Sexual activity: Yes     Partners: Male     Birth control/ protection: Pill     Other Topics Concern     Parent/Sibling W/ Cabg, Mi Or Angioplasty Before 65f 55m? Yes     pt had MI @ age 58     Social History Narrative       Outpatient Encounter  Prescriptions as of 2/20/2018   Medication Sig Dispense Refill     norethindrone-ethinyl estradiol (ORTHO-NOVUM 7/7/7, 28,) 0.5/0.75/1-35 MG-MCG per tablet Take 1 tablet by mouth daily. 3 Package 3     dexamethasone (DECADRON) 4 MG tablet Take 2.5 tablets (10 mg) by mouth once for 1 dose 3 tablet 0     meclizine (ANTIVERT) 25 MG tablet Take 1 tablet (25 mg) by mouth 3 times daily as needed for dizziness (Patient not taking: Reported on 2/17/2018) 30 tablet 0     triamcinolone (KENALOG) 0.1 % paste Take by mouth 2 times daily (Patient not taking: Reported on 2/17/2018) 5 g 0     sertraline (ZOLOFT) 50 MG tablet Take 1 tablet by mouth daily. (Patient not taking: Reported on 2/17/2018) 90 tablet 3     ibuprofen (ADVIL,MOTRIN) 800 MG tablet Take 1 tablet by mouth every 6 hours as needed (cramping). (Patient not taking: Reported on 2/17/2018) 30 tablet 0     No facility-administered encounter medications on file as of 2/20/2018.              Review Of Systems  Skin: As above  Eyes: negative  Ears/Nose/Throat: negative  Respiratory: No shortness of breath, dyspnea on exertion, cough, or hemoptysis  Cardiovascular: negative  Gastrointestinal: negative  Genitourinary: negative  Musculoskeletal: negative  Neurologic: negative  Psychiatric: negative  Hematologic/Lymphatic/Immunologic: negative  Endocrine: negative      O:   NAD, WDWN, Alert & Oriented, Mood & Affect wnl, Vitals stable   Here today alone   /70  Pulse 60  SpO2 100%   General appearance normal   Vitals stable   Alert, oriented and in no acute distress      Following lymph nodes palpated: Occipital, Cervical, Supraclavicular , axilla no lad       Pigmented macules ranging in size from 1-3mm on trun kand ext with regular broders and pigment networks   L arm well healed scar   Stuck on papules and brown macules on trunk and ext   RL breast, R labia, and light red brown papules       The remainder of the full exam was unremarkable; the following areas were  examined:  conjunctiva/lids, oral mucosa, neck, peripheral vascular system, abdomen, lymph nodes, digits/nails, eccrine and apocrine glands, scalp/hair, face, neck, chest, abdomen, buttocks, back, RUE, LUE, RLE, LLE         Eyes: Conjunctivae/lids:Normal     ENT: Lips, buccal mucosa, tongue: normal    MSK:Normal    Cardiovascular: peripheral edema none    Pulm: Breathing Normal    Lymph Nodes: No Head and Neck Lymphadenopathy     Neuro/Psych: Orientation:Normal; Mood/Affect:Normal      A/P:  1. Hx of melanoma, hx of atypical nevi, nevi, seborrheic keratosis, lentigo  MELANOMA DISCUSSED WITH PATIENT:  I discussed the specifics of tumor, prognosis, metachronous melanoma, self exam, and genetics with the patient. I explained the need for monthly skin exams including and taught the patient how to do this. Patient was asked about new or changing moles and a full skin exam was performed.   BENIGN LESIONS DISCUSSED WITH PATIENT:  I discussed the specifics of tumor, prognosis, and genetics of benign lesions.  I explained that treatment of these lesions would be purely cosmetic and not medically neccessary.  I discussed with patient different removal options including excision, cautery and /or laser.      Nature and genetics of benign skin lesions dicussed with patient.  Signs and Symptoms of skin cancer discussed with patient.  ABCDEs of melanoma reviewed with patient.  Patient encouraged to perform monthly skin exams.  UV precautions reviewed with patient.  Skin care regimen reviewed with patient: Eliminate harsh soaps, i.e. Dial, zest, irsih spring; Mild soaps such as Cetaphil or Dove sensitive skin, avoid hot or cold showers, aggressive use of emollients including vanicream, cetaphil or cerave discussed with patient.    Risks of non-melanoma skin cancer discussed with patient   Return to clinic 3 months

## 2018-02-20 NOTE — MR AVS SNAPSHOT
After Visit Summary   2/20/2018    Remedios Watts    MRN: [id number]           Patient Information     Date Of Birth          1980        Visit Information        Provider Department      2/20/2018 9:00 AM Johnny Stevens MD [facility]        Today's Diagnoses     History of melanoma    -  1    History of atypical nevus        Lentigo        SK (seborrheic keratosis)        Nevus           Follow-ups after your visit        Who to contact     If you have questions or need follow up information about today's clinic visit or your schedule please contact [facility] directly at [phone].  Normal or non-critical lab and imaging results will be communicated to you by MyChart, letter or phone within 4 business days after the clinic has received the results. If you do not hear from us within 7 days, please contact the clinic through [employer] or phone. If you have a critical or abnormal lab result, we will notify you by phone as soon as possible.  Submit refill requests through [employer] or call your pharmacy and they will forward the refill request to us. Please allow 3 business days for your refill to be completed.          Additional Information About Your Visit        MyChart Information     [employer] gives you secure access to your electronic health record. If you see a primary care provider, you can also send messages to your care team and make appointments. If you have questions, please call your primary care clinic.  If you do not have a primary care provider, please call [phone] and they will assist you.        Care EveryWhere ID     This is your Care EveryWhere ID. This could be used by other organizations to access your [place] medical records  [id number]        Your Vitals Were     Pulse Pulse Oximetry                60 100%           Blood Pressure from Last 3 Encounters:   02/20/18 105/70   02/18/18 112/77   02/17/18 109/75    Weight from Last 3  Encounters:   02/17/18 82.1 kg (181 lb)   02/09/16 71.1 kg (156 lb 12.8 oz)   09/20/15 68 kg (150 lb)              Today, you had the following     No orders found for display       Primary Care Provider Office Phone # Fax #    Agustín Berger [phone] [address] [facility] [address] [address]        Equal Access to Services     FATIMAH DIAZ : Hadii aad ku hadasho Soomaali, waaxda luqadaha, qaybta kaalmada adeegyada, waxay idiin hayaan adeeg kharash lailya . So [facility] [phone].    ATENCIÓN: Si farzana james, tiene a cui disposición servicios gratuitos de asistencia lingüística. Llame al [phone].    We comply with applicable federal civil rights laws and [place] laws. We do not discriminate on the basis of race, color, national origin, age, disability, sex, sexual orientation, or gender identity.            Thank you!     Thank you for choosing [facility]  for your care. Our goal is always to provide you with excellent care. Hearing back from our patients is one way we can continue to improve our services. Please take a few minutes to complete the written survey that you may receive in the mail after your visit with us. Thank you!             Your Updated Medication List - Protect others around you: Learn how to safely use, store and throw away your medicines at www.disposemymeds.org.          This list is accurate as of 2/20/18 12:42 PM.  Always use your most recent med list.                   Brand Name Dispense Instructions for use Diagnosis    dexamethasone 4 MG tablet    DECADRON    3 tablet    Take 2.5 tablets (10 mg) by mouth once for 1 dose        ibuprofen 800 MG tablet    ADVIL/MOTRIN    30 tablet    Take 1 tablet by mouth every 6 hours as needed (cramping).    Supervision of other normal pregnancy       meclizine 25 MG tablet    ANTIVERT    30 tablet    Take 1 tablet (25 mg) by mouth 3 times daily as needed for dizziness        ORTHO-NOVUM 7/7/7 (28)  0.5/0.75/1-35 MG-MCG per tablet   Generic drug:  norethindrone-ethinyl estradiol     3 Package    Take 1 tablet by mouth daily.        sertraline 50 MG tablet    ZOLOFT    90 tablet    Take 1 tablet by mouth daily.    QING (generalised anxiety disorder)       triamcinolone 0.1 % paste    KENALOG    5 g    Take by mouth 2 times daily

## 2018-02-20 NOTE — LETTER
2/20/2018         RE: Remedios Watts  [address] [place] [address]        Dear Colleague,    Thank you for referring your patient, Remedios Watts, to [facility]. Please see a copy of my visit note below.    Remedios Watts is a 37 year old year old female patient here today for f/u hx of 0.45mm melanoma on left arm, and hx of atypical nevi.  She notes brown spot on labia, breast and arm.   .  Patient states this has been present for ?.  Patient reports the following symptoms:  none .  Patient reports the following previous treatments none.  Patient reports the following modifying factors none.  Associated symptoms: none.  Patient has no other skin complaints today.  Remainder of the HPI, Meds, PMH, Allergies, FH, and SH was reviewed in chart.    Pertinent Hx:   Melanoma, atypical nevi     Past Medical History:   Diagnosis Date     Chickenpox      Diarrhea      Group B streptococcus urinary tract infection complicating pregnancy 4/20/2011    Plan PCN in labor      History of postpartum depression, currently pregnant 10/21/2010     Malignant melanoma (H)      Postpartum depression 12/17/2008     Tailbone injury     fx     Urinary tract infections        Past Surgical History:   Procedure Laterality Date     C ORAL SURGERY PROCEDURE          Family History   Problem Relation Age of Onset     Arthritis Mother      Neurologic Disorder Mother      had a seizure      Alcohol/Drug Mother      Arthritis Maternal Grandmother      Breast Cancer Maternal Grandmother      HEART DISEASE Paternal Grandmother      CANCER Father      skin       Social History     Social History     Marital status:      Spouse name: N/A     Number of children: 3     Years of education: N/A     Occupational History      [employer]     Social History Main Topics     Smoking status: Never Smoker     Smokeless tobacco: Never Used     Alcohol use Yes     Drug use: No     Sexual  activity: Yes     Partners: Male     Birth control/ protection: Pill     Other Topics Concern     Parent/Sibling W/ Cabg, Mi Or Angioplasty Before 65f 55m? Yes     pt had MI @ age 58     Social History Narrative       Outpatient Encounter Prescriptions as of 2/20/2018   Medication Sig Dispense Refill     norethindrone-ethinyl estradiol (ORTHO-NOVUM 7/7/7, 28,) 0.5/0.75/1-35 MG-MCG per tablet Take 1 tablet by mouth daily. 3 Package 3     dexamethasone (DECADRON) 4 MG tablet Take 2.5 tablets (10 mg) by mouth once for 1 dose 3 tablet 0     meclizine (ANTIVERT) 25 MG tablet Take 1 tablet (25 mg) by mouth 3 times daily as needed for dizziness (Patient not taking: Reported on 2/17/2018) 30 tablet 0     triamcinolone (KENALOG) 0.1 % paste Take by mouth 2 times daily (Patient not taking: Reported on 2/17/2018) 5 g 0     sertraline (ZOLOFT) 50 MG tablet Take 1 tablet by mouth daily. (Patient not taking: Reported on 2/17/2018) 90 tablet 3     ibuprofen (ADVIL,MOTRIN) 800 MG tablet Take 1 tablet by mouth every 6 hours as needed (cramping). (Patient not taking: Reported on 2/17/2018) 30 tablet 0     No facility-administered encounter medications on file as of 2/20/2018.              Review Of Systems  Skin: As above  Eyes: negative  Ears/Nose/Throat: negative  Respiratory: No shortness of breath, dyspnea on exertion, cough, or hemoptysis  Cardiovascular: negative  Gastrointestinal: negative  Genitourinary: negative  Musculoskeletal: negative  Neurologic: negative  Psychiatric: negative  Hematologic/Lymphatic/Immunologic: negative  Endocrine: negative      O:   NAD, WDWN, Alert & Oriented, Mood & Affect wnl, Vitals stable   Here today alone   /70  Pulse 60  SpO2 100%   General appearance normal   Vitals stable   Alert, oriented and in no acute distress      Following lymph nodes palpated: Occipital, Cervical, Supraclavicular , axilla no lad       Pigmented macules ranging in size from 1-3mm on trun kand ext with regular  broders and pigment networks   L arm well healed scar   Stuck on papules and brown macules on trunk and ext   RL breast, R labia, and light red brown papules       The remainder of the full exam was unremarkable; the following areas were examined:  conjunctiva/lids, oral mucosa, neck, peripheral vascular system, abdomen, lymph nodes, digits/nails, eccrine and apocrine glands, scalp/hair, face, neck, chest, abdomen, buttocks, back, RUE, LUE, RLE, LLE         Eyes: Conjunctivae/lids:Normal     ENT: Lips, buccal mucosa, tongue: normal    MSK:Normal    Cardiovascular: peripheral edema none    Pulm: Breathing Normal    Lymph Nodes: No Head and Neck Lymphadenopathy     Neuro/Psych: Orientation:Normal; Mood/Affect:Normal      A/P:  1. Hx of melanoma, hx of atypical nevi, nevi, seborrheic keratosis, lentigo  MELANOMA DISCUSSED WITH PATIENT:  I discussed the specifics of tumor, prognosis, metachronous melanoma, self exam, and genetics with the patient. I explained the need for monthly skin exams including and taught the patient how to do this. Patient was asked about new or changing moles and a full skin exam was performed.   BENIGN LESIONS DISCUSSED WITH PATIENT:  I discussed the specifics of tumor, prognosis, and genetics of benign lesions.  I explained that treatment of these lesions would be purely cosmetic and not medically neccessary.  I discussed with patient different removal options including excision, cautery and /or laser.      Nature and genetics of benign skin lesions dicussed with patient.  Signs and Symptoms of skin cancer discussed with patient.  ABCDEs of melanoma reviewed with patient.  Patient encouraged to perform monthly skin exams.  UV precautions reviewed with patient.  Skin care regimen reviewed with patient: Eliminate harsh soaps, i.e. Dial, zest, irsih spring; Mild soaps such as Cetaphil or Dove sensitive skin, avoid hot or cold showers, aggressive use of emollients including vanicream, cetaphil or  cerave discussed with patient.    Risks of non-melanoma skin cancer discussed with patient   Return to clinic 3 months      Again, thank you for allowing me to participate in the care of your patient.        Sincerely,        Johnny Stevens MD

## 2018-03-27 ENCOUNTER — OFFICE VISIT (OUTPATIENT)
Dept: FAMILY MEDICINE | Facility: CLINIC | Age: 38
End: 2018-03-27
Payer: MEDICAID

## 2018-03-27 VITALS
DIASTOLIC BLOOD PRESSURE: 71 MMHG | WEIGHT: 182 LBS | TEMPERATURE: 98.3 F | RESPIRATION RATE: 18 BRPM | HEIGHT: 66 IN | BODY MASS INDEX: 29.25 KG/M2 | SYSTOLIC BLOOD PRESSURE: 107 MMHG

## 2018-03-27 DIAGNOSIS — F32.A DEPRESSION, UNSPECIFIED DEPRESSION TYPE: ICD-10-CM

## 2018-03-27 DIAGNOSIS — N63.0 LUMP OR MASS IN BREAST: Primary | ICD-10-CM

## 2018-03-27 PROCEDURE — 99214 OFFICE O/P EST MOD 30 MIN: CPT | Performed by: FAMILY MEDICINE

## 2018-03-27 RX ORDER — CITALOPRAM HYDROBROMIDE 20 MG/1
20 TABLET ORAL DAILY
Qty: 90 TABLET | Refills: 3 | Status: SHIPPED | OUTPATIENT
Start: 2018-03-27 | End: 2018-06-04

## 2018-03-27 NOTE — PROGRESS NOTES
"  SUBJECTIVE:   Remedios Watts is a 37 year old female who presents to clinic today for the following health issues:    Chief Complaint   Patient presents with     Breast Exam     patient noticed a lump left breast about 2 weeks ago .     Depression       Abnormal Mood Symptoms  Onset: about 3 mo     Description:   Depression: YES  Anxiety: YES    Accompanying Signs & Symptoms:  Still participating in activities that you used to enjoy: YES  Fatigue: YES  Irritability: YES  Difficulty concentrating: YES  Changes in appetite: no  Problems with sleep: YES  Heart racing/beating fast : YES  Thoughts of hurting yourself or others: none    History:   Recent stress: no  Prior depression hospitalization: None  Family history of depression: YES  Family history of anxiety: no    Precipitating factors:   Alcohol/drug use: no    Alleviating factors:  none    Therapies Tried and outcome: Celexa (Citalopram) - help         Problem list and histories reviewed & adjusted, as indicated.  Additional history:         Reviewed and updated as needed this visit by clinical staff  Allergies  Meds       Reviewed and updated as needed this visit by Provider      Further history obtained, clarified or corrected by physician:  For several weeks she is noted a small lump just above the nipple on the left breast.  It is not painful.  Her last year is remarkable for melanoma surgery on the left arm.  In addition for the last 2 months she has been getting more depressed though not suicidal.  She feels like she has in the past when she took citalopram with good results.  Her  is here with her today and he agrees she is more edgy and agitated.    OBJECTIVE:  /71  Temp 98.3  F (36.8  C)  Resp 18  Ht 5' 6\" (1.676 m)  Wt 182 lb (82.6 kg)  LMP 03/27/2018  BMI 29.38 kg/m2   BREAST: Small nodular mass just above the left nipple  LUNGS: clear to auscultation, normal breath sounds  CV: RRR without murmur  ABD: BS+, soft, nontender, no " masses, no hepatosplenomegaly  EXTREMITIES: without joint tenderness, swelling or erythema.  No muscle tenderness or abnormality.  SKIN: No rashes or abnormalities  NEURO:non focal exam    ASSESSMENT:     Lump or mass in breast  Depression, unspecified depression type    PLAN:  Orders Placed This Encounter     GENERAL SURG ADULT REFERRAL     citalopram (CELEXA) 20 MG tablet

## 2018-03-27 NOTE — NURSING NOTE
"Chief Complaint   Patient presents with     Breast Exam     patient noticed a lump left breast about 2 weeks ago .     Depression       Initial /71  Temp 98.3  F (36.8  C)  Resp 18  Ht 5' 6\" (1.676 m)  Wt 182 lb (82.6 kg)  LMP 03/27/2018  BMI 29.38 kg/m2 Estimated body mass index is 29.38 kg/(m^2) as calculated from the following:    Height as of this encounter: 5' 6\" (1.676 m).    Weight as of this encounter: 182 lb (82.6 kg).  Medication Reconciliation: complete   Ana Bower CMA      "

## 2018-03-27 NOTE — PATIENT INSTRUCTIONS
Thank you for choosing [facility].  You may be receiving a survey in the mail from [facility] regarding your visit today.  Please take a few minutes to complete and return the survey to let us know how we are doing.      Our Clinic hours are:  Mondays    7:20 am - 7 pm  Tues -  Fri  7:20 am - 5 pm    Clinic Phone: [phone]    The clinic lab opens at 7:30 am Mon - Fri and appointments are required.    [facility]. [phone]  Monday-Thursday 8 am - 7pm  Tues/Wed/Fri 8 am - 5:30 pm

## 2018-03-27 NOTE — MR AVS SNAPSHOT
After Visit Summary   3/27/2018    Remedios Watts    MRN: [id number]           Patient Information     Date Of Birth          1980        Visit Information        Provider Department      3/27/2018 2:40 PM Agustín Berger MD [facility] [place]        Today's Diagnoses     Lump or mass in breast    -  1    Depression, unspecified depression type          Care Instructions          Thank you for choosing [facility].  You may be receiving a survey in the mail from [facility] regarding your visit today.  Please take a few minutes to complete and return the survey to let us know how we are doing.      Our Clinic hours are:  Mondays    7:20 am - 7 pm  Tues -  Fri  7:20 am - 5 pm    Clinic Phone: [phone]    The clinic lab opens at 7:30 am Mon - Fri and appointments are required.    [facility]. [phone]  Monday-Thursday 8 am - 7pm  Tues/Wed/Fri 8 am - 5:30 pm                 Follow-ups after your visit        Additional Services     GENERAL SURG ADULT REFERRAL       Your provider has referred you to: [facility]: [facility] [phone]   [email or web address]    Please be aware that coverage of these services is subject to the terms and limitations of your health insurance plan.  Call member services at your health plan with any benefit or coverage questions.      Please bring the following with you to your appointment:    (1) Any X-Rays, CTs or MRIs which have been performed.  Contact the facility where they were done to arrange for  prior to your scheduled appointment.   (2) List of current medications   (3) This referral request   (4) Any documents/labs given to you for this referral                  Your next 10 appointments already scheduled     Apr 13, 2018  9:00 AM CDT   [facility] Loi Real [facility]    [address]  "[id number] [phone]              Who to contact     If you have questions or need follow up information about today's clinic visit or your schedule please contact [facility] directly at [phone].  Normal or non-critical lab and imaging results will be communicated to you by [employer], letter or phone within 4 business days after the clinic has received the results. If you do not hear from us within 7 days, please contact the clinic through [employer] or phone. If you have a critical or abnormal lab result, we will notify you by phone as soon as possible.  Submit refill requests through [employer] or call your pharmacy and they will forward the refill request to us. Please allow 3 business days for your refill to be completed.          Additional Information About Your Visit        [employer]ul[employer]o Information     [employer] gives you secure access to your electronic health record. If you see a primary care provider, you can also send messages to your care team and make appointments. If you have questions, please call your primary care clinic.  If you do not have a primary care provider, please call [phone] and they will assist you.        Care EveryWhere ID     This is your Care EveryWhere ID. This could be used by other organizations to access your [facility] medical records  [id number]        Your Vitals Were     Temperature Respirations Height Last Period BMI (Body Mass Index)       98.3  F (36.8  C) 18 5' 6\" (1.676 m) 03/27/2018 29.38 kg/m2        Blood Pressure from Last 3 Encounters:   03/27/18 107/71   02/20/18 105/70   02/18/18 112/77    Weight from Last 3 Encounters:   03/27/18 182 lb (82.6 kg)   02/17/18 181 lb (82.1 kg)   02/09/16 156 lb 12.8 oz (71.1 kg)              We Performed the Following     GENERAL SURG ADULT REFERRAL          Today's Medication Changes          These changes are accurate as of 3/27/18  3:19 PM.  If you have any questions, ask your nurse or doctor.             "   Start taking these medicines.        Dose/Directions    citalopram 20 MG tablet   Commonly known as:  celeXA   Used for:  Depression, unspecified depression type        Dose:  20 mg   Take 1 tablet (20 mg) by mouth daily   Quantity:  90 tablet   Refills:  3         Stop taking these medicines if you haven't already. Please contact your care team if you have questions.     dexamethasone 4 MG tablet   Commonly known as:  DECADRON                Where to get your medicines      [facility] [place] [facility] [address]  [address], [place] [address]    Hours:  [facility] Phone:  [phone]     citalopram 20 MG tablet                Primary Care Provider Office Phone # Fax #    Agustín Berger [phone] [address] [facility] [address]        Equal Access to Services     [facility]: Hadii aad ku hadasho Soomaali, waaxda luqadaha, qaybta kaalmada adeegyada, waxay idiin hayaan adecarlee jamison . So [facility] [phone].    ATENCIÓN: Si habla español, tiene a cui disposición servicios gratuitos de asistencia lingüística. Loretta al [phone].    We comply with applicable federal civil rights laws and [place] laws. We do not discriminate on the basis of race, color, national origin, age, disability, sex, sexual orientation, or gender identity.            Thank you!     Thank you for choosing [facility]  for your care. Our goal is always to provide you with excellent care. Hearing back from our patients is one way we can continue to improve our services. Please take a few minutes to complete the written survey that you may receive in the mail after your visit with us. Thank you!             Your Updated Medication List - Protect others around you: Learn how to safely use, store and throw away your medicines at www.disposemymeds.org.          This list is accurate as of 3/27/18  3:19  PM.  Always use your most recent med list.                   Brand Name Dispense Instructions for use Diagnosis    citalopram 20 MG tablet    celeXA    90 tablet    Take 1 tablet (20 mg) by mouth daily    Depression, unspecified depression type       ibuprofen 800 MG tablet    ADVIL/MOTRIN    30 tablet    Take 1 tablet by mouth every 6 hours as needed (cramping).    Supervision of other normal pregnancy       meclizine 25 MG tablet    ANTIVERT    30 tablet    Take 1 tablet (25 mg) by mouth 3 times daily as needed for dizziness        ORTHO-NOVUM 7/7/7 (28) 0.5/0.75/1-35 MG-MCG per tablet   Generic drug:  norethindrone-ethinyl estradiol     3 Package    Take 1 tablet by mouth daily.        sertraline 50 MG tablet    ZOLOFT    90 tablet    Take 1 tablet by mouth daily.    QING (generalised anxiety disorder)       triamcinolone 0.1 % paste    KENALOG    5 g    Take by mouth 2 times daily

## 2018-03-28 ASSESSMENT — PATIENT HEALTH QUESTIONNAIRE - PHQ9: SUM OF ALL RESPONSES TO PHQ QUESTIONS 1-9: 5

## 2018-04-13 ENCOUNTER — OFFICE VISIT (OUTPATIENT)
Dept: SURGERY | Facility: CLINIC | Age: 38
End: 2018-04-13
Payer: MEDICAID

## 2018-04-13 VITALS
HEART RATE: 58 BPM | WEIGHT: 182 LBS | BODY MASS INDEX: 29.25 KG/M2 | HEIGHT: 66 IN | TEMPERATURE: 98.1 F | SYSTOLIC BLOOD PRESSURE: 113 MMHG | DIASTOLIC BLOOD PRESSURE: 70 MMHG

## 2018-04-13 DIAGNOSIS — N63.0 BREAST MASS: Primary | ICD-10-CM

## 2018-04-13 PROCEDURE — 99243 OFF/OP CNSLTJ NEW/EST LOW 30: CPT | Performed by: SURGERY

## 2018-04-13 NOTE — NURSING NOTE
"Initial /70 (BP Location: Right arm, Patient Position: Sitting, Cuff Size: Adult Regular)  Pulse 58  Temp 98.1  F (36.7  C) (Oral)  Ht 1.676 m (5' 6\")  Wt 82.6 kg (182 lb)  LMP 03/27/2018  BMI 29.38 kg/m2 Estimated body mass index is 29.38 kg/(m^2) as calculated from the following:    Height as of this encounter: 1.676 m (5' 6\").    Weight as of this encounter: 82.6 kg (182 lb). .    Cynthia Conner MA    "

## 2018-04-13 NOTE — MR AVS SNAPSHOT
After Visit Summary   4/13/2018    Remedios Watts    MRN: [id number]           Patient Information     Date Of Birth          1980        Visit Information        Provider Department      4/13/2018 9:00 AM Loi Real MD [facility]        Today's Diagnoses     Breast mass    -  1      Care Instructions    Per Dr. Real's instructions          Follow-ups after your visit        Future tests that were ordered for you today     Open Future Orders        Priority Expected Expires Ordered    US Breast Left Limited 1-3 Quadrants Routine  4/13/2019 4/13/2018            Who to contact     If you have questions or need follow up information about today's clinic visit or your schedule please contact [facility] directly at [phone].  Normal or non-critical lab and imaging results will be communicated to you by [employer], letter or phone within 4 business days after the clinic has received the results. If you do not hear from us within 7 days, please contact the clinic through [employer] or phone. If you have a critical or abnormal lab result, we will notify you by phone as soon as possible.  Submit refill requests through [employer] or call your pharmacy and they will forward the refill request to us. Please allow 3 business days for your refill to be completed.          Additional Information About Your Visit        MyChart Information     [employer] gives you secure access to your electronic health record. If you see a primary care provider, you can also send messages to your care team and make appointments. If you have questions, please call your primary care clinic.  If you do not have a primary care provider, please call [phone] and they will assist you.        Care EveryWhere ID     This is your Care EveryWhere ID. This could be used by other organizations to access your [facility] medical records  [id number]        Your Vitals Were     Pulse Temperature Height Last Period  "BMI (Body Mass Index)       58 98.1  F (36.7  C) (Oral) 1.676 m (5' 6\") 03/27/2018 29.38 kg/m2        Blood Pressure from Last 3 Encounters:   04/13/18 113/70   03/27/18 107/71   02/20/18 105/70    Weight from Last 3 Encounters:   04/13/18 82.6 kg (182 lb)   03/27/18 82.6 kg (182 lb)   02/17/18 82.1 kg (181 lb)               Primary Care Provider Office Phone # Fax #    Agutsín Berger [phone] [address] [address]        Equal Access to Services     FATIMAH DIAZ : Ashwin Slater, waaxda luqadaha, qaybta kaalmada adecarleeyada, deya jamison . So [facility] [phone].    ATENCIÓN: Si habla español, tiene a cui disposición servicios gratuitos de asistencia lingüística. [facility] [phone].    We comply with applicable federal civil rights laws and [place] laws. We do not discriminate on the basis of race, color, national origin, age, disability, sex, sexual orientation, or gender identity.            Thank you!     Thank you for choosing [facility]  for your care. Our goal is always to provide you with excellent care. Hearing back from our patients is one way we can continue to improve our services. Please take a few minutes to complete the written survey that you may receive in the mail after your visit with us. Thank you!             Your Updated Medication List - Protect others around you: Learn how to safely use, store and throw away your medicines at www.disposemymeds.org.          This list is accurate as of 4/13/18 10:00 AM.  Always use your most recent med list.                   Brand Name Dispense Instructions for use Diagnosis    citalopram 20 MG tablet    celeXA    90 tablet    Take 1 tablet (20 mg) by mouth daily    Depression, unspecified depression type       ibuprofen 800 MG tablet    ADVIL/MOTRIN    30 tablet    Take 1 tablet by mouth every 6 hours as needed (cramping).    Supervision of other normal " pregnancy       meclizine 25 MG tablet    ANTIVERT    30 tablet    Take 1 tablet (25 mg) by mouth 3 times daily as needed for dizziness        ORTHO-NOVUM 7/7/7 (28) 0.5/0.75/1-35 MG-MCG per tablet   Generic drug:  norethindrone-ethinyl estradiol     3 Package    Take 1 tablet by mouth daily.        sertraline 50 MG tablet    ZOLOFT    90 tablet    Take 1 tablet by mouth daily.    QING (generalised anxiety disorder)       triamcinolone 0.1 % paste    KENALOG    5 g    Take by mouth 2 times daily

## 2018-04-13 NOTE — PROGRESS NOTES
37-year-old female complaining of left breast lump just above the nipple as well as another one in the left axilla. Last summer, patient had a malignant melanoma excised from her left upper arm is concerned that this could be an extension of that. At that time, lymph nodes were not removed. The breast lump has shown up just in the past month and is slightly tender and feels like a hard pea under the skin. The axillary lump feels about the same. Patient's maternal grandmother was diagnosed with breast cancer at an unknown age. The rest of her family are without breast cancer.    Patient Active Problem List   Diagnosis     Generalized anxiety disorder     Pelvic pain     CARDIOVASCULAR SCREENING; LDL GOAL LESS THAN 160     Folliculitis     Lentigo     Congenital nevus     Angioma     Multiple nevi     Dermal nevus       Past Medical History:   Diagnosis Date     Chickenpox      Diarrhea      Group B streptococcus urinary tract infection complicating pregnancy 4/20/2011    Plan PCN in labor      History of postpartum depression, currently pregnant 10/21/2010     Malignant melanoma (H)      Postpartum depression 12/17/2008     Tailbone injury     fx     Urinary tract infections        Past Surgical History:   Procedure Laterality Date     C ORAL SURGERY PROCEDURE         Family History   Problem Relation Age of Onset     Arthritis Mother      Neurologic Disorder Mother      had a seizure      Alcohol/Drug Mother      Arthritis Maternal Grandmother      Breast Cancer Maternal Grandmother      HEART DISEASE Paternal Grandmother      CANCER Father      skin       Social History   Substance Use Topics     Smoking status: Never Smoker     Smokeless tobacco: Never Used     Alcohol use Yes        History   Drug Use No       Current Outpatient Prescriptions   Medication Sig Dispense Refill     citalopram (CELEXA) 20 MG tablet Take 1 tablet (20 mg) by mouth daily 90 tablet 3     meclizine (ANTIVERT) 25 MG tablet Take 1  "tablet (25 mg) by mouth 3 times daily as needed for dizziness (Patient not taking: Reported on 2/17/2018) 30 tablet 0     triamcinolone (KENALOG) 0.1 % paste Take by mouth 2 times daily (Patient not taking: Reported on 2/17/2018) 5 g 0     norethindrone-ethinyl estradiol (ORTHO-NOVUM 7/7/7, 28,) 0.5/0.75/1-35 MG-MCG per tablet Take 1 tablet by mouth daily. 3 Package 3     sertraline (ZOLOFT) 50 MG tablet Take 1 tablet by mouth daily. (Patient not taking: Reported on 2/17/2018) 90 tablet 3     ibuprofen (ADVIL,MOTRIN) 800 MG tablet Take 1 tablet by mouth every 6 hours as needed (cramping). (Patient not taking: Reported on 2/17/2018) 30 tablet 0       Allergies   Allergen Reactions     Nka [No Known Allergies]        ROS  Constitutional - Denies fevers, weight loss, malaise, lethargy  Neuro - Denies tremors or seizures  Pulmon - Denies SOB, dyspnea, hemoptysis, chronic cough or use of an inhaler  CV - Denies CP, SOB, lower extremity edema, difficulty w/ stairs, has never used NTG  GI - Denies hematemesis, BRBPR, melena, chronic diarrhea or epigastric pain   - Denies hematuria, difficulty voiding, h/o STDs  Hematology - Denies blood clotting disorders, chronic anemias  Dermatology - No melanomas or skin cancers  Rheumatology - No h/o RA  Pysch - Denies depression, bipolar d/o or schizophrenia    Exam:   Patient Vitals for the past 24 hrs:   BP Temp Temp src Pulse Height Weight   04/13/18 0909 113/70 98.1  F (36.7  C) Oral 58 1.676 m (5' 6\") 82.6 kg (182 lb)     Left breast superior margin was small firm rubbery nodule at the 12:00 position. This is almost indistinguishable from the remaining breast tissue which is also slightly lumpy. Left axilla again was small rubbery nodule, nontender    Assessment and plan: 37-year-old female with left small breast lump and left axillary lump. Advised patient that her melanoma was well less than 1 mm thick and there were no indications to go after lymph nodes. It is highly " unlikely that these breast masses are related to that. As I cannot really distinguish this from surrounding healthy breast tissue, I have ordered ultrasound of both areas to be better evaluated and possibly a whole breast mammogram if anything shows up abnormal. Patient will get the test and return to clinic when complete.    Loi Real MD

## 2018-04-13 NOTE — LETTER
4/13/2018         RE: Remedios Watts  [address] [place] [address]        Dear Colleague,    Thank you for referring your patient, Remedios Watts, to the [facility]. Please see a copy of my visit note below.    37-year-old female complaining of left breast lump just above the nipple as well as another one in the left axilla. Last summer, patient had a malignant melanoma excised from her left upper arm is concerned that this could be an extension of that. At that time, lymph nodes were not removed. The breast lump has shown up just in the past month and is slightly tender and feels like a hard pea under the skin. The axillary lump feels about the same. Patient's maternal grandmother was diagnosed with breast cancer at an unknown age. The rest of her family are without breast cancer.    Patient Active Problem List   Diagnosis     Generalized anxiety disorder     Pelvic pain     CARDIOVASCULAR SCREENING; LDL GOAL LESS THAN 160     Folliculitis     Lentigo     Congenital nevus     Angioma     Multiple nevi     Dermal nevus       Past Medical History:   Diagnosis Date     Chickenpox      Diarrhea      Group B streptococcus urinary tract infection complicating pregnancy 4/20/2011    Plan PCN in labor      History of postpartum depression, currently pregnant 10/21/2010     Malignant melanoma (H)      Postpartum depression 12/17/2008     Tailbone injury     fx     Urinary tract infections        Past Surgical History:   Procedure Laterality Date     C ORAL SURGERY PROCEDURE         Family History   Problem Relation Age of Onset     Arthritis Mother      Neurologic Disorder Mother      had a seizure      Alcohol/Drug Mother      Arthritis Maternal Grandmother      Breast Cancer Maternal Grandmother      HEART DISEASE Paternal Grandmother      CANCER Father      skin       Social History   Substance Use Topics     Smoking status: Never Smoker     Smokeless tobacco: Never Used     Alcohol  "use Yes        History   Drug Use No       Current Outpatient Prescriptions   Medication Sig Dispense Refill     citalopram (CELEXA) 20 MG tablet Take 1 tablet (20 mg) by mouth daily 90 tablet 3     meclizine (ANTIVERT) 25 MG tablet Take 1 tablet (25 mg) by mouth 3 times daily as needed for dizziness (Patient not taking: Reported on 2/17/2018) 30 tablet 0     triamcinolone (KENALOG) 0.1 % paste Take by mouth 2 times daily (Patient not taking: Reported on 2/17/2018) 5 g 0     norethindrone-ethinyl estradiol (ORTHO-NOVUM 7/7/7, 28,) 0.5/0.75/1-35 MG-MCG per tablet Take 1 tablet by mouth daily. 3 Package 3     sertraline (ZOLOFT) 50 MG tablet Take 1 tablet by mouth daily. (Patient not taking: Reported on 2/17/2018) 90 tablet 3     ibuprofen (ADVIL,MOTRIN) 800 MG tablet Take 1 tablet by mouth every 6 hours as needed (cramping). (Patient not taking: Reported on 2/17/2018) 30 tablet 0       Allergies   Allergen Reactions     Nka [No Known Allergies]        ROS  Constitutional - Denies fevers, weight loss, malaise, lethargy  Neuro - Denies tremors or seizures  Pulmon - Denies SOB, dyspnea, hemoptysis, chronic cough or use of an inhaler  CV - Denies CP, SOB, lower extremity edema, difficulty w/ stairs, has never used NTG  GI - Denies hematemesis, BRBPR, melena, chronic diarrhea or epigastric pain   - Denies hematuria, difficulty voiding, h/o STDs  Hematology - Denies blood clotting disorders, chronic anemias  Dermatology - No melanomas or skin cancers  Rheumatology - No h/o RA  Pysch - Denies depression, bipolar d/o or schizophrenia    Exam:   Patient Vitals for the past 24 hrs:   BP Temp Temp src Pulse Height Weight   04/13/18 0909 113/70 98.1  F (36.7  C) Oral 58 1.676 m (5' 6\") 82.6 kg (182 lb)     Left breast superior margin was small firm rubbery nodule at the 12:00 position. This is almost indistinguishable from the remaining breast tissue which is also slightly lumpy. Left axilla again was small rubbery nodule, " nontender    Assessment and plan: 37-year-old female with left small breast lump and left axillary lump. Advised patient that her melanoma was well less than 1 mm thick and there were no indications to go after lymph nodes. It is highly unlikely that these breast masses are related to that. As I cannot really distinguish this from surrounding healthy breast tissue, I have ordered ultrasound of both areas to be better evaluated and possibly a whole breast mammogram if anything shows up abnormal. Patient will get the test and return to clinic when complete.    Loi Real MD       Again, thank you for allowing me to participate in the care of your patient.        Sincerely,        Loi Real MD

## 2018-06-01 ENCOUNTER — TELEPHONE (OUTPATIENT)
Dept: FAMILY MEDICINE | Facility: CLINIC | Age: 38
End: 2018-06-01

## 2018-06-01 DIAGNOSIS — F32.A DEPRESSION, UNSPECIFIED DEPRESSION TYPE: ICD-10-CM

## 2018-06-01 NOTE — TELEPHONE ENCOUNTER
Reason for Call:  medication    Detailed comments: patient is calling  And stating that she was not feeling any different with the dose she was given, so she double the dose, and now she is trying to fill it and is too soon, due to doubling the dose. Can she get a new Rx to reflect this. She uses [facility] in [place].    Phone Number Patient can be reached at: Home number on file [phone] (home)    Best Time: any    Can we leave a detailed message on this number? YES   Paola Mendoza  [facility]  [facility]      Call taken on 6/1/2018 at 11:28 AM by Paola Mendoza

## 2018-06-04 RX ORDER — CITALOPRAM HYDROBROMIDE 20 MG/1
40 TABLET ORAL DAILY
Qty: 90 TABLET | Refills: 0 | Status: SHIPPED | OUTPATIENT
Start: 2018-06-04 | End: 2018-06-19

## 2018-06-04 NOTE — TELEPHONE ENCOUNTER
Patient called back when RN was unavailable. Please call patient back when free.    Michelle Torres   [facility] [place]

## 2018-06-04 NOTE — TELEPHONE ENCOUNTER
Patient would like a refill of the celexa. Patient states with 20 mg she did not feel better.  Patient reports she has been taking 2 tablets of the celexa for about one month. Patient states she does fell better now. Updated the PHq-9 for the patient.      PHQ-9 SCORE 1/18/2013 3/27/2018 6/4/2018   Total Score 7 - -   Total Score - 5 0       Thank you  Beronica WHITT RN

## 2018-06-05 ASSESSMENT — PATIENT HEALTH QUESTIONNAIRE - PHQ9: SUM OF ALL RESPONSES TO PHQ QUESTIONS 1-9: 0

## 2018-06-19 ENCOUNTER — OFFICE VISIT (OUTPATIENT)
Dept: FAMILY MEDICINE | Facility: CLINIC | Age: 38
End: 2018-06-19
Payer: COMMERCIAL

## 2018-06-19 VITALS
BODY MASS INDEX: 26.97 KG/M2 | WEIGHT: 167.8 LBS | RESPIRATION RATE: 16 BRPM | HEART RATE: 53 BPM | SYSTOLIC BLOOD PRESSURE: 107 MMHG | DIASTOLIC BLOOD PRESSURE: 68 MMHG | HEIGHT: 66 IN | TEMPERATURE: 98.1 F

## 2018-06-19 DIAGNOSIS — Z30.9 ENCOUNTER FOR CONTRACEPTIVE MANAGEMENT, UNSPECIFIED TYPE: Primary | ICD-10-CM

## 2018-06-19 DIAGNOSIS — F32.A DEPRESSION, UNSPECIFIED DEPRESSION TYPE: ICD-10-CM

## 2018-06-19 DIAGNOSIS — N92.4 EXCESSIVE BLEEDING IN PREMENOPAUSAL PERIOD: ICD-10-CM

## 2018-06-19 PROCEDURE — 99214 OFFICE O/P EST MOD 30 MIN: CPT | Performed by: FAMILY MEDICINE

## 2018-06-19 RX ORDER — CITALOPRAM HYDROBROMIDE 40 MG/1
40 TABLET ORAL DAILY
Qty: 90 TABLET | Refills: 3 | Status: SHIPPED | OUTPATIENT
Start: 2018-06-19 | End: 2019-04-15

## 2018-06-19 ASSESSMENT — ANXIETY QUESTIONNAIRES
GAD7 TOTAL SCORE: 0
6. BECOMING EASILY ANNOYED OR IRRITABLE: NOT AT ALL
3. WORRYING TOO MUCH ABOUT DIFFERENT THINGS: NOT AT ALL
7. FEELING AFRAID AS IF SOMETHING AWFUL MIGHT HAPPEN: NOT AT ALL
5. BEING SO RESTLESS THAT IT IS HARD TO SIT STILL: NOT AT ALL
1. FEELING NERVOUS, ANXIOUS, OR ON EDGE: NOT AT ALL
2. NOT BEING ABLE TO STOP OR CONTROL WORRYING: NOT AT ALL

## 2018-06-19 ASSESSMENT — PATIENT HEALTH QUESTIONNAIRE - PHQ9: 5. POOR APPETITE OR OVEREATING: NOT AT ALL

## 2018-06-19 ASSESSMENT — PAIN SCALES - GENERAL: PAINLEVEL: NO PAIN (0)

## 2018-06-19 NOTE — MR AVS SNAPSHOT
After Visit Summary   6/19/2018    Remedios Watts    MRN: [id number]           Patient Information     Date Of Birth          1980        Visit Information        Provider Department      6/19/2018 8:00 AM Agustín Berger MD [facility] [place]        Today's Diagnoses     Encounter for contraceptive management, unspecified type    -  1    Depression, unspecified depression type        Excessive bleeding in premenopausal period          Care Instructions          Thank you for choosing [facility].  You may be receiving a survey in the mail from Nydia Walden regarding your visit today.  Please take a few minutes to complete and return the survey to let us know how we are doing.      Our Clinic hours are:  Mondays    7:20 am - 7 pm  Tues - Fri  7:20 am - 5 pm    Clinic Phone: [phone]    The clinic lab opens at 7:30 am Mon - Fri and appointments are required.    [facility]. [phone]  Monday  8 am - 7pm  Tues - Fri 8 am - 5:30 pm                 Follow-ups after your visit        Additional Services     OB/GYN REFERRAL       Your provider has referred you to:  FMG: [facility] [phone]   [email or web address]    Please be aware that coverage of these services is subject to the terms and limitations of your health insurance plan.  Call member services at your health plan with any benefit or coverage questions.      Please bring the following with you to your appointment:    (1) Any X-Rays, CTs or MRIs which have been performed.  Contact the facility where they were done to arrange for  prior to your scheduled appointment.   (2) List of current medications   (3) This referral request   (4) Any documents/labs given to you for this referral                  Who to contact     If you have questions or need follow up information about today's clinic visit or your schedule please contact [facility]  "[place] directly at [phone].  Normal or non-critical lab and imaging results will be communicated to you by MyChart, letter or phone within 4 business days after the clinic has received the results. If you do not hear from us within 7 days, please contact the clinic through Oxsensist or phone. If you have a critical or abnormal lab result, we will notify you by phone as soon as possible.  Submit refill requests through FilmCrave or call your pharmacy and they will forward the refill request to us. Please allow 3 business days for your refill to be completed.          Additional Information About Your Visit        [facility] Information     FilmCrave gives you secure access to your electronic health record. If you see a primary care provider, you can also send messages to your care team and make appointments. If you have questions, please call your primary care clinic.  If you do not have a primary care provider, please call [phone] and they will assist you.        Care EveryWhere ID     This is your Care EveryWhere ID. This could be used by other organizations to access your [facility] medical records  [id number]        Your Vitals Were     Pulse Temperature Respirations Height Last Period Breastfeeding?    53 98.1  F (36.7  C) (Tympanic) 16 5' 6\" (1.676 m) 06/13/2018 No    BMI (Body Mass Index)                   27.08 kg/m2            Blood Pressure from Last 3 Encounters:   06/19/18 107/68   04/13/18 113/70   03/27/18 107/71    Weight from Last 3 Encounters:   06/19/18 167 lb 12.8 oz (76.1 kg)   04/13/18 182 lb (82.6 kg)   03/27/18 182 lb (82.6 kg)              We Performed the Following     DEPRESSION ACTION PLAN (DAP)     OB/GYN REFERRAL          Today's Medication Changes          These changes are accurate as of 6/19/18  8:15 AM.  If you have any questions, ask your nurse or doctor.               These medicines have changed or have updated prescriptions.        Dose/Directions    citalopram 40 MG tablet "   Commonly known as:  celeXA   This may have changed:  medication strength   Used for:  Depression, unspecified depression type   Changed by:  Agustín Berger MD        Dose:  40 mg   Take 1 tablet (40 mg) by mouth daily   Quantity:  90 tablet   Refills:  3            Where to get your medicines      These medications were sent to [facility] - - [facility]  [id number] [address]    Hours:  [facility] Phone:  [phone]     citalopram 40 MG tablet    norethindrone-ethinyl estradiol 0.5/0.75/1-35 MG-MCG per tablet                Primary Care Provider Office Phone # Fax #    Agustín Berger [phone] [address] [facility] [address]        Equal Access to Services     [facility]: Hadii nori lawrence hadasho Soomaali, waaxda luqadaha, qaybta kaalmada adeegyada, waxay otfin hayaan adecarlee jamison . So [facility] [phone].    ATENCIÓN: Si habla español, tiene a cui disposición servicios gratuitos de asistencia lingüística. [facility] [phone].    We comply with applicable federal civil rights laws and [place] laws. We do not discriminate on the basis of race, color, national origin, age, disability, sex, sexual orientation, or gender identity.            Thank you!     Thank you for choosing [facility]  for your care. Our goal is always to provide you with excellent care. Hearing back from our patients is one way we can continue to improve our services. Please take a few minutes to complete the written survey that you may receive in the mail after your visit with us. Thank you!             Your Updated Medication List - Protect others around you: Learn how to safely use, store and throw away your medicines at www.disposemymeds.org.          This list is accurate as of 6/19/18  8:15 AM.  Always use your most recent med list.                   Brand Name Dispense Instructions for use  Diagnosis    citalopram 40 MG tablet    celeXA    90 tablet    Take 1 tablet (40 mg) by mouth daily    Depression, unspecified depression type       ibuprofen 800 MG tablet    ADVIL/MOTRIN    30 tablet    Take 1 tablet by mouth every 6 hours as needed (cramping).    Supervision of other normal pregnancy       meclizine 25 MG tablet    ANTIVERT    30 tablet    Take 1 tablet (25 mg) by mouth 3 times daily as needed for dizziness        norethindrone-ethinyl estradiol 0.5/0.75/1-35 MG-MCG per tablet    ORTHO-NOVUM 7/7/7 (28)    28 tablet    Take 1 tablet by mouth daily    Encounter for contraceptive management, unspecified type       sertraline 50 MG tablet    ZOLOFT    90 tablet    Take 1 tablet by mouth daily.    QING (generalised anxiety disorder)       triamcinolone 0.1 % paste    KENALOG    5 g    Take by mouth 2 times daily

## 2018-06-19 NOTE — PATIENT INSTRUCTIONS
Thank you for choosing [facility].  You may be receiving a survey in the mail from [facility] regarding your visit today.  Please take a few minutes to complete and return the survey to let us know how we are doing.      Our Clinic hours are:  Mondays    7:20 am - 7 pm  Tues - Fri  7:20 am - 5 pm    Clinic Phone: [phone]    The clinic lab opens at 7:30 am Mon - Fri and appointments are required.    [facility]. [phone]  Monday  8 am - 7pm  Tues - Fri 8 am - 5:30 pm

## 2018-06-19 NOTE — PROGRESS NOTES
"  SUBJECTIVE:   Remedios Watts is a 37 year old female who presents to clinic today for the following health issues:      Concern -   Chief Complaint   Patient presents with     Contraception     med check / refill     Depression     med check/ refill               Problem list and histories reviewed & adjusted, as indicated.  Additional history:         Reviewed and updated as needed this visit by clinical staff  Tobacco  Allergies  Meds  Med Hx  Surg Hx  Fam Hx  Soc Hx      Reviewed and updated as needed this visit by Provider      Further history obtained, clarified or corrected by physician:  She increase her Celexa to 40 mg and it is working very well over the last months.  She complains of fatigue but she also has a significant complaint about very heavy menstrual periods and some irregularity despite being on contraceptives.  This is been going on for some time.  Her mother went through menopause apparently at a young age, late 30s or early 40s.    OBJECTIVE:  /68 (BP Location: Right arm, Cuff Size: Adult Regular)  Pulse 53  Temp 98.1  F (36.7  C) (Tympanic)  Resp 16  Ht 5' 6\" (1.676 m)  Wt 167 lb 12.8 oz (76.1 kg)  LMP 06/13/2018  Breastfeeding? No  BMI 27.08 kg/m2  LUNGS: clear to auscultation, normal breath sounds  CV: RRR without murmur  ABD: BS+, soft, nontender, no masses, no hepatosplenomegaly  EXTREMITIES: without joint tenderness, swelling or erythema.  No muscle tenderness or abnormality.  SKIN: No rashes or abnormalities  NEURO:non focal exam    ASSESSMENT:     Depression, unspecified depression type  Encounter for contraceptive management, unspecified type  Excessive bleeding in premenopausal period    PLAN:  GYN referral  Following the GYN workup if there is still question about causes for fatigue and I would consider sleep evaluation which we discussed to some degree.    Orders Placed This Encounter     DEPRESSION ACTION PLAN (DAP)     OB/GYN REFERRAL     citalopram (CELEXA) " 40 MG tablet     norethindrone-ethinyl estradiol (ORTHO-NOVUM 7/7/7, 28,) 0.5/0.75/1-35 MG-MCG per tablet

## 2018-06-20 ASSESSMENT — PATIENT HEALTH QUESTIONNAIRE - PHQ9: SUM OF ALL RESPONSES TO PHQ QUESTIONS 1-9: 3

## 2018-06-20 ASSESSMENT — ANXIETY QUESTIONNAIRES: GAD7 TOTAL SCORE: 0

## 2018-07-03 ENCOUNTER — OFFICE VISIT (OUTPATIENT)
Dept: FAMILY MEDICINE | Facility: CLINIC | Age: 38
End: 2018-07-03
Payer: COMMERCIAL

## 2018-07-03 VITALS
SYSTOLIC BLOOD PRESSURE: 118 MMHG | DIASTOLIC BLOOD PRESSURE: 80 MMHG | TEMPERATURE: 98.1 F | HEART RATE: 52 BPM | BODY MASS INDEX: 26.9 KG/M2 | OXYGEN SATURATION: 99 % | HEIGHT: 66 IN | WEIGHT: 167.4 LBS

## 2018-07-03 DIAGNOSIS — R07.0 THROAT PAIN: Primary | ICD-10-CM

## 2018-07-03 DIAGNOSIS — J02.0 STREPTOCOCCAL PHARYNGITIS: ICD-10-CM

## 2018-07-03 LAB
DEPRECATED S PYO AG THROAT QL EIA: NORMAL
SPECIMEN SOURCE: NORMAL

## 2018-07-03 PROCEDURE — 87081 CULTURE SCREEN ONLY: CPT | Performed by: NURSE PRACTITIONER

## 2018-07-03 PROCEDURE — 99213 OFFICE O/P EST LOW 20 MIN: CPT | Performed by: NURSE PRACTITIONER

## 2018-07-03 PROCEDURE — 87880 STREP A ASSAY W/OPTIC: CPT | Performed by: NURSE PRACTITIONER

## 2018-07-03 RX ORDER — PENICILLIN V POTASSIUM 500 MG/1
500 TABLET, FILM COATED ORAL 2 TIMES DAILY
Qty: 20 TABLET | Refills: 0 | Status: SHIPPED | OUTPATIENT
Start: 2018-07-03 | End: 2018-09-24

## 2018-07-03 NOTE — PROGRESS NOTES
SUBJECTIVE:   Remedios Watts is a 37 year old female who presents to clinic today for the following health issues:      ENT Symptoms             Symptoms: cc Present Absent Comment   Fever/Chills   x    Fatigue  x     Muscle Aches   x Neck area   Eye Irritation   x    Sneezing   x    Nasal Pritesh/Drg  x  Little bit   Sinus Pressure/Pain   x    Loss of smell   x    Dental pain   x    Sore Throat  x     Swollen Glands  x     Ear Pain/Fullness  x  Left ear   Cough   x    Wheeze   x    Chest Pain   x    Shortness of breath   x    Rash   x    Other  x  Headache, lightheadedness     Symptom duration:  2 days   Symptom severity:  moderate   Treatments tried:  tylenol   Contacts:  family - son had strep 3 weeks ago     Last couple of weeks has felt dizzy- left ear is painful at times. Right lymphadenopathy at times. No fevers.   Patient with history of of several strep infections in the past.     -------------------------------------    Problem list and histories reviewed & adjusted, as indicated.  Additional history: as documented    Patient Active Problem List   Diagnosis     Generalized anxiety disorder     Pelvic pain     CARDIOVASCULAR SCREENING; LDL GOAL LESS THAN 160     Folliculitis     Lentigo     Congenital nevus     Angioma     Multiple nevi     Dermal nevus     Past Surgical History:   Procedure Laterality Date     C ORAL SURGERY PROCEDURE         Social History   Substance Use Topics     Smoking status: Never Smoker     Smokeless tobacco: Never Used     Alcohol use Yes     Family History   Problem Relation Age of Onset     Arthritis Mother      Neurologic Disorder Mother      had a seizure      Alcohol/Drug Mother      Arthritis Maternal Grandmother      Breast Cancer Maternal Grandmother      HEART DISEASE Paternal Grandmother      Cancer Father      skin           Reviewed and updated as needed this visit by clinical staff  Tobacco  Allergies  Meds  Med Hx  Surg Hx  Fam Hx  Soc Hx      Reviewed  "and updated as needed this visit by Provider         ROS:  Constitutional, HEENT, cardiovascular, pulmonary, GI, , musculoskeletal, neuro, skin, endocrine and psych systems are negative, except as otherwise noted.    OBJECTIVE:     BP 98/64  Pulse 52  Temp 98.1  F (36.7  C) (Tympanic)  Ht 5' 6\" (1.676 m)  Wt 167 lb 6.4 oz (75.9 kg)  LMP 06/13/2018  SpO2 99%  BMI 27.02 kg/m2  Body mass index is 27.02 kg/(m^2).  GENERAL: healthy, alert and no distress  HENT: normal cephalic/atraumatic, ear canals and TM's normal, nose and mouth without ulcers or lesions, oropharynx clear, oral mucous membranes moist, tonsillar hypertrophy and tonsillar erythema  NECK: right cervical adenopathy, no asymmetry, masses, or scars and thyroid normal to palpation  RESP: lungs clear to auscultation - no rales, rhonchi or wheezes  CV: regular rate and rhythm, normal S1 S2, no S3 or S4, no murmur, click or rub, no peripheral edema and peripheral pulses strong  MS: no gross musculoskeletal defects noted, no edema    Diagnostic Test Results:  none     ASSESSMENT/PLAN:       1. Throat pain    - Strep, Rapid Screen  - Beta strep group A culture    2. Streptococcal pharyngitis  Will treat patient based on physical exam, history of frequent strep infections and recent infection with her son   - penicillin V potassium (VEETID) 500 MG tablet; Take 1 tablet (500 mg) by mouth 2 times daily  Dispense: 20 tablet; Refill: 0  - OTOLARYNGOLOGY REFERRAL        JEFERSON [facility]  "

## 2018-07-03 NOTE — MR AVS SNAPSHOT
After Visit Summary   7/3/2018    Remedios Watts    MRN: [id number]           Patient Information     Date Of Birth          1980        Visit Information        Provider Department      7/3/2018 11:40 AM Sophie Nixon APRN [facility]        Today's Diagnoses     Throat pain    -  1    Streptococcal pharyngitis          Care Instructions          Thank you for choosing [facility].  You may be receiving a survey in the mail from [facility] regarding your visit today.  Please take a few minutes to complete and return the survey to let us know how we are doing.      If you have questions or concerns, please contact us via [employer] or you can contact your care team at [phone].    Our Clinic hours are:  Monday 6:40 am  to 7:00 pm  Tuesday -Friday 6:40 am to 5:00 pm    The [place] outpatient lab hours are:  Monday - Friday 6:10 am to 4:45 pm  Saturdays 7:00 am to 11:00 am  Appointments are required, call [phone]    If you have clinical questions after hours or would like to schedule an appointment,  call the clinic at [phone].          Follow-ups after your visit        Additional Services     OTOLARYNGOLOGY REFERRAL       Your provider has referred you to: FMG: [facility] [phone]   [email or web address]    Please be aware that coverage of these services is subject to the terms and limitations of your health insurance plan.  Call member services at your health plan with any benefit or coverage questions.      Please bring the following with you to your appointment:    (1) Any X-Rays, CTs or MRIs which have been performed.  Contact the facility where they were done to arrange for  prior to your scheduled appointment.   (2) List of current medications  (3) This referral request   (4) Any documents/labs given to you for this referral                  Who to contact     If you have questions or need follow up  "information about today's clinic visit or your schedule please contact [facility] directly at [phone].  Normal or non-critical lab and imaging results will be communicated to you by VideoSurfhart, letter or phone within 4 business days after the clinic has received the results. If you do not hear from us within 7 days, please contact the clinic through VideoSurfhart or phone. If you have a critical or abnormal lab result, we will notify you by phone as soon as possible.  Submit refill requests through Modti or call your pharmacy and they will forward the refill request to us. Please allow 3 business days for your refill to be completed.          Additional Information About Your Visit        VideoSurfharTidemark Information     Modti gives you secure access to your electronic health record. If you see a primary care provider, you can also send messages to your care team and make appointments. If you have questions, please call your primary care clinic.  If you do not have a primary care provider, please call [phone] and they will assist you.        Care EveryWhere ID     This is your Care EveryWhere ID. This could be used by other organizations to access your [place] medical records  [id number]        Your Vitals Were     Pulse Temperature Height Last Period Pulse Oximetry BMI (Body Mass Index)    52 98.1  F (36.7  C) (Tympanic) 5' 6\" (1.676 m) 06/13/2018 99% 27.02 kg/m2       Blood Pressure from Last 3 Encounters:   07/03/18 118/80   06/19/18 107/68   04/13/18 113/70    Weight from Last 3 Encounters:   07/03/18 167 lb 6.4 oz (75.9 kg)   06/19/18 167 lb 12.8 oz (76.1 kg)   04/13/18 182 lb (82.6 kg)              We Performed the Following     Beta strep group A culture     OTOLARYNGOLOGY REFERRAL     Strep, Rapid Screen          Today's Medication Changes          These changes are accurate as of 7/3/18 12:10 PM.  If you have any questions, ask your nurse or doctor.               Start taking these medicines.  "       Dose/Directions    penicillin V potassium 500 MG tablet   Commonly known as:  VEETID   Used for:  Streptococcal pharyngitis   Started by:  Sophie Nixon APRN CNP        Dose:  500 mg   Take 1 tablet (500 mg) by mouth 2 times daily   Quantity:  20 tablet   Refills:  0            Where to get your medicines      These medications were sent to [facility] - - [place], [place] - [address] [facility]  [id number] [place] [address], [facility] [address]    Hours:  J[facility] Phone:  [phone]     penicillin V potassium 500 MG tablet                Primary Care Provider Office Phone # Fax #    Agustín Berger [phone] [address] [address] [facility] [address]        Equal Access to Services     FATIMAH DIAZ : Hadii aad ku hadasho Soomaali, waaxda luqadaha, qaybta kaalmada adeegyada, deya jamison . So [facility] [phone].    ATENCIÓN: Si habla español, tiene a cui disposición servicios gratuitos de asistencia lingüística. [facility] [phone].    We comply with applicable federal civil rights laws and [place] laws. We do not discriminate on the basis of race, color, national origin, age, disability, sex, sexual orientation, or gender identity.            Thank you!     Thank you for choosing [facility]  for your care. Our goal is always to provide you with excellent care. Hearing back from our patients is one way we can continue to improve our services. Please take a few minutes to complete the written survey that you may receive in the mail after your visit with us. Thank you!             Your Updated Medication List - Protect others around you: Learn how to safely use, store and throw away your medicines at www.disposemymeds.org.          This list is accurate as of 7/3/18 12:10 PM.  Always use your most recent med list.                   Brand Name Dispense Instructions for use Diagnosis    citalopram 40 MG tablet     celeXA    90 tablet    Take 1 tablet (40 mg) by mouth daily    Depression, unspecified depression type       ibuprofen 800 MG tablet    ADVIL/MOTRIN    30 tablet    Take 1 tablet by mouth every 6 hours as needed (cramping).    Supervision of other normal pregnancy       norethindrone-ethinyl estradiol 0.5/0.75/1-35 MG-MCG per tablet    ORTHO-NOVUM 7/7/7 (28)    28 tablet    Take 1 tablet by mouth daily    Encounter for contraceptive management, unspecified type       penicillin V potassium 500 MG tablet    VEETID    20 tablet    Take 1 tablet (500 mg) by mouth 2 times daily    Streptococcal pharyngitis

## 2018-07-03 NOTE — PATIENT INSTRUCTIONS
Thank you for choosing [facility].  You may be receiving a survey in the mail from Nydia Walden regarding your visit today.  Please take a few minutes to complete and return the survey to let us know how we are doing.      If you have questions or concerns, please contact us via ColdLight Solutions or you can contact your care team at [phone].    Our Clinic hours are:  Monday 6:40 am  to 7:00 pm  Tuesday -Friday 6:40 am to 5:00 pm    The [place] outpatient lab hours are:  Monday - Friday 6:10 am to 4:45 pm  Saturdays 7:00 am to 11:00 am  Appointments are required, call [phone]    If you have clinical questions after hours or would like to schedule an appointment,  call the clinic at [phone].

## 2018-07-04 LAB
BACTERIA SPEC CULT: NORMAL
SPECIMEN SOURCE: NORMAL

## 2018-08-30 ENCOUNTER — OFFICE VISIT (OUTPATIENT)
Dept: DERMATOLOGY | Facility: CLINIC | Age: 38
End: 2018-08-30
Payer: COMMERCIAL

## 2018-08-30 VITALS — OXYGEN SATURATION: 100 % | DIASTOLIC BLOOD PRESSURE: 79 MMHG | SYSTOLIC BLOOD PRESSURE: 113 MMHG | HEART RATE: 65 BPM

## 2018-08-30 DIAGNOSIS — L81.4 LENTIGO: ICD-10-CM

## 2018-08-30 DIAGNOSIS — Z85.820 HISTORY OF MELANOMA: ICD-10-CM

## 2018-08-30 DIAGNOSIS — L82.1 SEBORRHEIC KERATOSIS: ICD-10-CM

## 2018-08-30 DIAGNOSIS — D22.9 BENIGN NEVUS: ICD-10-CM

## 2018-08-30 DIAGNOSIS — Z87.898 HX OF ATYPICAL NEVUS: ICD-10-CM

## 2018-08-30 DIAGNOSIS — D48.5 NEOPLASM OF UNCERTAIN BEHAVIOR OF SKIN: Primary | ICD-10-CM

## 2018-08-30 DIAGNOSIS — D18.01 CHERRY ANGIOMA: ICD-10-CM

## 2018-08-30 DIAGNOSIS — L56.8 ACTINIC CHEILITIS: ICD-10-CM

## 2018-08-30 PROCEDURE — 17000 DESTRUCT PREMALG LESION: CPT | Mod: 51 | Performed by: PHYSICIAN ASSISTANT

## 2018-08-30 PROCEDURE — 88342 IMHCHEM/IMCYTCHM 1ST ANTB: CPT | Mod: TC | Performed by: PHYSICIAN ASSISTANT

## 2018-08-30 PROCEDURE — 11100 HC BIOPSY SKIN/SUBQ/MUC MEM, SINGLE LESION: CPT | Mod: 59 | Performed by: PHYSICIAN ASSISTANT

## 2018-08-30 PROCEDURE — 88341 IMHCHEM/IMCYTCHM EA ADD ANTB: CPT | Mod: TC | Performed by: PHYSICIAN ASSISTANT

## 2018-08-30 PROCEDURE — 88305 TISSUE EXAM BY PATHOLOGIST: CPT | Mod: TC | Performed by: PHYSICIAN ASSISTANT

## 2018-08-30 PROCEDURE — 11101 HC DESTRUCT PREMALIGNANT LESION, FIRST: CPT | Performed by: PHYSICIAN ASSISTANT

## 2018-08-30 PROCEDURE — 17003 DESTRUCT PREMALG LES 2-14: CPT | Performed by: PHYSICIAN ASSISTANT

## 2018-08-30 PROCEDURE — 99213 OFFICE O/P EST LOW 20 MIN: CPT | Mod: 25 | Performed by: PHYSICIAN ASSISTANT

## 2018-08-30 NOTE — NURSING NOTE
"Initial /79  Pulse 65  SpO2 100% Estimated body mass index is 27.02 kg/(m^2) as calculated from the following:    Height as of 7/3/18: 1.676 m (5' 6\").    Weight as of 7/3/18: 75.9 kg (167 lb 6.4 oz). .      "

## 2018-08-30 NOTE — LETTER
8/30/2018         RE: Remedios Watts  [address] [place] [address]        Dear Colleague,    Thank you for referring your patient, Remedios Watts, to the [facility]. Please see a copy of my visit note below.    Remedios Watts is a 37 year old year old female patient here today for f/u hx of 0.45mm melanoma on left arm, and hx of atypical nevi.  She notes brown spot on labia, arm and leg. Patient believes spot on mons pubis, arm and leg are changing. She noticed new brown spot with melanoma scar. Associated symptoms: none. She notes a scaly area on lower lip that has been present for years. She was given topical steroids without improvement. Patient has no other skin complaints today.  Remainder of the HPI, Meds, PMH, Allergies, FH, and SH was reviewed in chart.    Pertinent Hx:                Melanoma, atypical nevi   Past Medical History:   Diagnosis Date     Chickenpox      Diarrhea      Group B streptococcus urinary tract infection complicating pregnancy 4/20/2011    Plan PCN in labor      History of postpartum depression, currently pregnant 10/21/2010     Malignant melanoma (H)      Postpartum depression 12/17/2008     Tailbone injury     fx     Urinary tract infections        Past Surgical History:   Procedure Laterality Date     C ORAL SURGERY PROCEDURE          Family History   Problem Relation Age of Onset     Arthritis Mother      Neurologic Disorder Mother      had a seizure      Alcohol/Drug Mother      Arthritis Maternal Grandmother      Breast Cancer Maternal Grandmother      Melanoma Maternal Grandmother      HEART DISEASE Paternal Grandmother      Cancer Father      skin       Social History     Social History     Marital status:      Spouse name: N/A     Number of children: 3     Years of education: N/A     Occupational History      [employer]     Social History Main Topics     Smoking status: Never Smoker     Smokeless tobacco:  Never Used     Alcohol use Yes     Drug use: No     Sexual activity: Yes     Partners: Male     Birth control/ protection: Pill     Other Topics Concern     Parent/Sibling W/ Cabg, Mi Or Angioplasty Before 65f 55m? Yes     pt had MI @ age 58     Social History Narrative       Outpatient Encounter Prescriptions as of 8/30/2018   Medication Sig Dispense Refill     citalopram (CELEXA) 40 MG tablet Take 1 tablet (40 mg) by mouth daily 90 tablet 3     ibuprofen (ADVIL,MOTRIN) 800 MG tablet Take 1 tablet by mouth every 6 hours as needed (cramping). 30 tablet 0     norethindrone-ethinyl estradiol (ORTHO-NOVUM 7/7/7, 28,) 0.5/0.75/1-35 MG-MCG per tablet Take 1 tablet by mouth daily 28 tablet 11     penicillin V potassium (VEETID) 500 MG tablet Take 1 tablet (500 mg) by mouth 2 times daily 20 tablet 0     No facility-administered encounter medications on file as of 8/30/2018.              Review Of Systems  Skin: As above  Eyes: negative  Ears/Nose/Throat: negative  Respiratory: No shortness of breath, dyspnea on exertion, cough, or hemoptysis  Cardiovascular: negative  Gastrointestinal: negative  Genitourinary: negative  Musculoskeletal: negative  Neurologic: negative  Psychiatric: negative  Hematologic/Lymphatic/Immunologic: negative  Endocrine: negative      O:   NAD, WDWN, Alert & Oriented, Mood & Affect wnl, Vitals stable   Here today alone   /79  Pulse 65  SpO2 100%   General appearance normal   Vitals stable   Alert, oriented and in no acute distress      Brown macules on shoulders    Brown macules on torso and extremities   Brown stuck on papule on torso and extremities   Red papules on torso   Brown papules and macules with regular pigment network and borders on torso and extremities   0.7 cm brown macule within superior edge of scar (new per patient)   0.6 cm light brown macule on left ventral upper arm   0.5 cm light brown macule on right medial thigh   1.0 cm brown macule on right mons pubis   Pink gritty  papules on lower lips x 2      The remainder of the detailed exam was unremarkable; the following areas were examined:  scalp/hair, conjunctiva/lids, face, neck, lips/teeth, oral mucosa/gingiva, chest, back, abdomen, buttocks, digits/nails, RUE, LUE, RLE, LLE.      Eyes: Conjunctivae/lids:Normal     ENT: Lips: normal    MSK:Normal    Cardiovascular: peripheral edema none    Pulm: Breathing Normal    Lymph Nodes: No Head, Neck, and axillary Lymphadenopathy     Neuro/Psych: Orientation:Normal; Mood/Affect:Normal  A/P:  1. R/O keratosis ? on scar (new) on melanoma scar  TANGENTIAL BIOPSY SENT OUT:  After consent, anesthesia with LEC and prep, tangential excision performed and specimen sent out for permanent section histology.  No complications and routine wound care. Patient told to call our office in 1-2 weeks for result.      2. R/O atypical nevus on left ventral upper arm, right mons pubis and right medial thigh  TANGENTIAL BIOPSY SENT OUT:  After consent, anesthesia with LEC and prep, tangential excision performed and specimen sent out for permanent section histology.  No complications and routine wound care. Patient told to call our office in 1-2 weeks for result.      3. Actinic Cheilitis on lips x 2 lower lip  LN2:  Treated with LN2 for 5s for 1-2 cycles. Warned risks of blistering, pain, pigment change, scarring, and incomplete resolution.  Advised patient to return if lesions do not completely resolve.  Wound care sheet given.  4. History of Melanoma    I explained the need for monthly skin exams including and taught the patient how to do this. Patient was asked about new or changing moles and a full skin exam was performed.   5. Seborrheic keratosis, lentigo, cherry angioma, benign nevi   BENIGN LESIONS DISCUSSED WITH PATIENT:  I discussed the specifics of tumor, prognosis, and genetics of benign lesions.  I explained that treatment of these lesions would be purely cosmetic and not medically neccessary.  I  discussed with patient different removal options including excision, cautery and /or laser.      Nature and genetics of benign skin lesions dicussed with patient.  Signs and Symptoms of skin cancer discussed with patient.  ABCDEs of melanoma reviewed with patient.  Patient encouraged to perform monthly skin exams.  UV precautions reviewed with patient.  Risks of non-melanoma skin cancer discussed with patient   Return to clinic in 3 months.       Again, thank you for allowing me to participate in the care of your patient.        Sincerely,        Nicole Mckeon PA-C

## 2018-08-30 NOTE — PROGRESS NOTES
Remedios Watts is a 37 year old year old female patient here today for f/u hx of 0.45mm melanoma on left arm, and hx of atypical nevi.  She notes brown spot on labia, arm and leg. Patient believes spot on mons pubis, arm and leg are changing. She noticed new brown spot with melanoma scar. Associated symptoms: none. She notes a scaly area on lower lip that has been present for years. She was given topical steroids without improvement. Patient has no other skin complaints today.  Remainder of the HPI, Meds, PMH, Allergies, FH, and SH was reviewed in chart.    Pertinent Hx:                Melanoma, atypical nevi   Past Medical History:   Diagnosis Date     Chickenpox      Diarrhea      Group B streptococcus urinary tract infection complicating pregnancy 4/20/2011    Plan PCN in labor      History of postpartum depression, currently pregnant 10/21/2010     Malignant melanoma (H)      Postpartum depression 12/17/2008     Tailbone injury     fx     Urinary tract infections        Past Surgical History:   Procedure Laterality Date     C ORAL SURGERY PROCEDURE          Family History   Problem Relation Age of Onset     Arthritis Mother      Neurologic Disorder Mother      had a seizure      Alcohol/Drug Mother      Arthritis Maternal Grandmother      Breast Cancer Maternal Grandmother      Melanoma Maternal Grandmother      HEART DISEASE Paternal Grandmother      Cancer Father      skin       Social History     Social History     Marital status:      Spouse name: N/A     Number of children: 3     Years of education: N/A     Occupational History      [employer]     Social History Main Topics     Smoking status: Never Smoker     Smokeless tobacco: Never Used     Alcohol use Yes     Drug use: No     Sexual activity: Yes     Partners: Male     Birth control/ protection: Pill     Other Topics Concern     Parent/Sibling W/ Cabg, Mi Or Angioplasty Before 65f 55m? Yes     pt had MI @ age 58      Social History Narrative       Outpatient Encounter Prescriptions as of 8/30/2018   Medication Sig Dispense Refill     citalopram (CELEXA) 40 MG tablet Take 1 tablet (40 mg) by mouth daily 90 tablet 3     ibuprofen (ADVIL,MOTRIN) 800 MG tablet Take 1 tablet by mouth every 6 hours as needed (cramping). 30 tablet 0     norethindrone-ethinyl estradiol (ORTHO-NOVUM 7/7/7, 28,) 0.5/0.75/1-35 MG-MCG per tablet Take 1 tablet by mouth daily 28 tablet 11     penicillin V potassium (VEETID) 500 MG tablet Take 1 tablet (500 mg) by mouth 2 times daily 20 tablet 0     No facility-administered encounter medications on file as of 8/30/2018.              Review Of Systems  Skin: As above  Eyes: negative  Ears/Nose/Throat: negative  Respiratory: No shortness of breath, dyspnea on exertion, cough, or hemoptysis  Cardiovascular: negative  Gastrointestinal: negative  Genitourinary: negative  Musculoskeletal: negative  Neurologic: negative  Psychiatric: negative  Hematologic/Lymphatic/Immunologic: negative  Endocrine: negative      O:   NAD, WDWN, Alert & Oriented, Mood & Affect wnl, Vitals stable   Here today alone   /79  Pulse 65  SpO2 100%   General appearance normal   Vitals stable   Alert, oriented and in no acute distress      Brown macules on shoulders    Brown macules on torso and extremities   Brown stuck on papule on torso and extremities   Red papules on torso   Brown papules and macules with regular pigment network and borders on torso and extremities   0.7 cm brown macule within superior edge of scar (new per patient)   0.6 cm light brown macule on left ventral upper arm   0.5 cm light brown macule on right medial thigh   1.0 cm brown macule on right mons pubis   Pink gritty papules on lower lips x 2      The remainder of the detailed exam was unremarkable; the following areas were examined:  scalp/hair, conjunctiva/lids, face, neck, lips/teeth, oral mucosa/gingiva, chest, back, abdomen, buttocks, digits/nails,  RUE, LUE, RLE, LLE.      Eyes: Conjunctivae/lids:Normal     ENT: Lips: normal    MSK:Normal    Cardiovascular: peripheral edema none    Pulm: Breathing Normal    Lymph Nodes: No Head, Neck, and axillary Lymphadenopathy     Neuro/Psych: Orientation:Normal; Mood/Affect:Normal  A/P:  1. R/O keratosis ? on scar (new) on melanoma scar  TANGENTIAL BIOPSY SENT OUT:  After consent, anesthesia with LEC and prep, tangential excision performed and specimen sent out for permanent section histology.  No complications and routine wound care. Patient told to call our office in 1-2 weeks for result.      2. R/O atypical nevus on left ventral upper arm, right mons pubis and right medial thigh  TANGENTIAL BIOPSY SENT OUT:  After consent, anesthesia with LEC and prep, tangential excision performed and specimen sent out for permanent section histology.  No complications and routine wound care. Patient told to call our office in 1-2 weeks for result.      3. Actinic Cheilitis on lips x 2 lower lip  LN2:  Treated with LN2 for 5s for 1-2 cycles. Warned risks of blistering, pain, pigment change, scarring, and incomplete resolution.  Advised patient to return if lesions do not completely resolve.  Wound care sheet given.  4. History of Melanoma    I explained the need for monthly skin exams including and taught the patient how to do this. Patient was asked about new or changing moles and a full skin exam was performed.   5. Seborrheic keratosis, lentigo, cherry angioma, benign nevi   BENIGN LESIONS DISCUSSED WITH PATIENT:  I discussed the specifics of tumor, prognosis, and genetics of benign lesions.  I explained that treatment of these lesions would be purely cosmetic and not medically neccessary.  I discussed with patient different removal options including excision, cautery and /or laser.      Nature and genetics of benign skin lesions dicussed with patient.  Signs and Symptoms of skin cancer discussed with patient.  ABCDEs of melanoma  reviewed with patient.  Patient encouraged to perform monthly skin exams.  UV precautions reviewed with patient.  Risks of non-melanoma skin cancer discussed with patient   Return to clinic in 3 months.

## 2018-08-30 NOTE — PATIENT INSTRUCTIONS
Wound Care Instructions     FOR SUPERFICIAL WOUNDS     [facility] [phone]    [facility] [phone]                       AFTER 24 HOURS YOU SHOULD REMOVE THE BANDAGE AND BEGIN DAILY DRESSING CHANGES AS FOLLOWS:     1) Remove Dressing.     2) Clean and dry the area with tap water using a Q-tip or sterile gauze pad.     3) Apply Vaseline, Aquaphor, Polysporin ointment or Bacitracin ointment over entire wound.  Do NOT use Neosporin ointment.     4) Cover the wound with a band-aid, or a sterile non-stick gauze pad and micropore paper tape      REPEAT THESE INSTRUCTIONS AT LEAST ONCE A DAY UNTIL THE WOUND HAS COMPLETELY HEALED.    It is an old wives tale that a wound heals better when it is exposed to air and allowed to dry out. The wound will heal faster with a better cosmetic result if it is kept moist with ointment and covered with a bandage.    **Do not let the wound dry out.**      Supplies Needed:      *Cotton tipped applicators (Q-tips)    *Polysporin Ointment or Bacitracin Ointment (NOT NEOSPORIN)    *Band-aids or non-stick gauze pads and micropore paper tape.      PATIENT INFORMATION:    During the healing process you will notice a number of changes. All wounds develop a small halo of redness surrounding the wound.  This means healing is occurring. Severe itching with extensive redness usually indicates sensitivity to the ointment or bandage tape used to dress the wound.  You should call our office if this develops.      Swelling  and/or discoloration around your surgical site is common, particularly when performed around the eye.    All wounds normally drain.  The larger the wound the more drainage there will be.  After 7-10 days, you will notice the wound beginning to shrink and new skin will begin to grow.  The wound is healed when you can see skin has formed over the entire area.  A healed wound has a healthy, shiny look to the surface and is red to dark pink in color  to normalize.  Wounds may take approximately 4-6 weeks to heal.  Larger wounds may take 6-8 weeks.  After the wound is healed you may discontinue dressing changes.    You may experience a sensation of tightness as your wound heals. This is normal and will gradually subside.    Your healed wound may be sensitive to temperature changes. This sensitivity improves with time, but if you re having a lot of discomfort, try to avoid temperature extremes.    Patients frequently experience itching after their wound appears to have healed because of the continue healing under the skin.  Plain Vaseline will help relieve the itching.        POSSIBLE COMPLICATIONS    BLEEDIN. Leave the bandage in place.  2. Use tightly rolled up gauze or a cloth to apply direct pressure over the bandage for 30  minutes.  3. Reapply pressure for an additional 30 minutes if necessary  4. Use additional gauze and tape to maintain pressure once the bleeding has stopped.   We will notify you in 7-10 business days of your biopsy results    WOUND CARE INSTRUCTIONS   FOR CRYOSURGERY   LIP  This area treated with liquid nitrogen should form a blister (areas treated may or may not blister-skin may just turn dark and slough off). You do not need to bandage the area unless a blister forms and breaks (which may be a few days). When the blister breaks, begin daily dressing changes as follows:  1) Clean and dry the area with tap water using clean Q-tip or sterile gauze pad.   2) Apply Polysporin ointment or Bacitracin ointment over entire wound. Do NOT use Neosporin ointment.   3) Cover the wound with a band-aid or sterile non-stick gauze pad and micropore paper tape.   REPEAT THESE INSTRUCTIONS AT LEAST ONCE A DAY UNTIL THE WOUND HAS COMPLETELY HEALED.   It is an old wives tale that a wound heals better when it is exposed to air and allowed to dry out. The wound will heal faster with a better cosmetic result if it is kept moist with ointment and covered  with a bandage.   Do not let the wound dry out.   IMPORTANT INFORMATION ON REVERSE SIDE   Supplies Needed:   *Cotton tipped applicators (Q-tips)   *Polysporin ointment or Bacitracin ointment (NOT NEOSPORIN)   *Band-aids, or non stick gauze pads and micropore paper tape   PATIENT INFORMATION   During the healing process you will notice a number of changes. All wounds develop a small halo of redness surrounding the wound. This means healing is occurring. Severe itching with extensive redness usually indicates sensitivity to the ointment or bandage tape used to dress the wound. You should call our office if this develops.   Swelling and/or discoloration around your surgical site is common, particularly when performed around the eye.   All wounds normally drain. The larger the wound the more drainage there will be. After 7-10 days, you will notice the wound beginning to shrink and new skin will begin to grow. The wound is healed when you can see skin has formed over the entire area. A healed wound has a healthy, shiny look to the surface and is red to dark pink in color to normalize. Wounds may take approximately 4-6 weeks to heal. Larger wounds may take 6-8 weeks. After the wound is healed you may discontinue dressing changes.   You may experience a sensation of tightness as your wound heals. This is normal and will gradually subside.   Your healed wound may be sensitive to temperature changes. This sensitivity improves with time, but if you re having a lot of discomfort, try to avoid temperature extremes.   Patients frequently experience itching after their wound appears to have healed because of the continue healing under the skin. Plain Vaseline will help relieve the itching.

## 2018-08-30 NOTE — MR AVS SNAPSHOT
After Visit Summary   8/30/2018    Remedios Watts    MRN: [id number]           Patient Information     Date Of Birth          1980        Visit Information        Provider Department      8/30/2018 9:00 AM Nicole Mckeon PA-C [facility]        Care Instructions          Wound Care Instructions     FOR SUPERFICIAL WOUNDS     [facility] [phone]    [facility] [phone]                       AFTER 24 HOURS YOU SHOULD REMOVE THE BANDAGE AND BEGIN DAILY DRESSING CHANGES AS FOLLOWS:     1) Remove Dressing.     2) Clean and dry the area with tap water using a Q-tip or sterile gauze pad.     3) Apply Vaseline, Aquaphor, Polysporin ointment or Bacitracin ointment over entire wound.  Do NOT use Neosporin ointment.     4) Cover the wound with a band-aid, or a sterile non-stick gauze pad and micropore paper tape      REPEAT THESE INSTRUCTIONS AT LEAST ONCE A DAY UNTIL THE WOUND HAS COMPLETELY HEALED.    It is an old wives tale that a wound heals better when it is exposed to air and allowed to dry out. The wound will heal faster with a better cosmetic result if it is kept moist with ointment and covered with a bandage.    **Do not let the wound dry out.**      Supplies Needed:      *Cotton tipped applicators (Q-tips)    *Polysporin Ointment or Bacitracin Ointment (NOT NEOSPORIN)    *Band-aids or non-stick gauze pads and micropore paper tape.      PATIENT INFORMATION:    During the healing process you will notice a number of changes. All wounds develop a small halo of redness surrounding the wound.  This means healing is occurring. Severe itching with extensive redness usually indicates sensitivity to the ointment or bandage tape used to dress the wound.  You should call our office if this develops.      Swelling  and/or discoloration around your surgical site is common, particularly when performed around the eye.    All wounds normally drain.  The larger  the wound the more drainage there will be.  After 7-10 days, you will notice the wound beginning to shrink and new skin will begin to grow.  The wound is healed when you can see skin has formed over the entire area.  A healed wound has a healthy, shiny look to the surface and is red to dark pink in color to normalize.  Wounds may take approximately 4-6 weeks to heal.  Larger wounds may take 6-8 weeks.  After the wound is healed you may discontinue dressing changes.    You may experience a sensation of tightness as your wound heals. This is normal and will gradually subside.    Your healed wound may be sensitive to temperature changes. This sensitivity improves with time, but if you re having a lot of discomfort, try to avoid temperature extremes.    Patients frequently experience itching after their wound appears to have healed because of the continue healing under the skin.  Plain Vaseline will help relieve the itching.        POSSIBLE COMPLICATIONS    BLEEDIN. Leave the bandage in place.  2. Use tightly rolled up gauze or a cloth to apply direct pressure over the bandage for 30  minutes.  3. Reapply pressure for an additional 30 minutes if necessary  4. Use additional gauze and tape to maintain pressure once the bleeding has stopped.   We will notify you in 7-10 business days of your biopsy results    WOUND CARE INSTRUCTIONS   FOR CRYOSURGERY   LIP  This area treated with liquid nitrogen should form a blister (areas treated may or may not blister-skin may just turn dark and slough off). You do not need to bandage the area unless a blister forms and breaks (which may be a few days). When the blister breaks, begin daily dressing changes as follows:  1) Clean and dry the area with tap water using clean Q-tip or sterile gauze pad.   2) Apply Polysporin ointment or Bacitracin ointment over entire wound. Do NOT use Neosporin ointment.   3) Cover the wound with a band-aid or sterile non-stick gauze pad and  micropore paper tape.   REPEAT THESE INSTRUCTIONS AT LEAST ONCE A DAY UNTIL THE WOUND HAS COMPLETELY HEALED.   It is an old wives tale that a wound heals better when it is exposed to air and allowed to dry out. The wound will heal faster with a better cosmetic result if it is kept moist with ointment and covered with a bandage.   Do not let the wound dry out.   IMPORTANT INFORMATION ON REVERSE SIDE   Supplies Needed:   *Cotton tipped applicators (Q-tips)   *Polysporin ointment or Bacitracin ointment (NOT NEOSPORIN)   *Band-aids, or non stick gauze pads and micropore paper tape   PATIENT INFORMATION   During the healing process you will notice a number of changes. All wounds develop a small halo of redness surrounding the wound. This means healing is occurring. Severe itching with extensive redness usually indicates sensitivity to the ointment or bandage tape used to dress the wound. You should call our office if this develops.   Swelling and/or discoloration around your surgical site is common, particularly when performed around the eye.   All wounds normally drain. The larger the wound the more drainage there will be. After 7-10 days, you will notice the wound beginning to shrink and new skin will begin to grow. The wound is healed when you can see skin has formed over the entire area. A healed wound has a healthy, shiny look to the surface and is red to dark pink in color to normalize. Wounds may take approximately 4-6 weeks to heal. Larger wounds may take 6-8 weeks. After the wound is healed you may discontinue dressing changes.   You may experience a sensation of tightness as your wound heals. This is normal and will gradually subside.   Your healed wound may be sensitive to temperature changes. This sensitivity improves with time, but if you re having a lot of discomfort, try to avoid temperature extremes.   Patients frequently experience itching after their wound appears to have healed because of the continue  healing under the skin. Plain Vaseline will help relieve the itching.                 Follow-ups after your visit        Who to contact     If you have questions or need follow up information about today's clinic visit or your schedule please contact [facility] directly at [phone].  Normal or non-critical lab and imaging results will be communicated to you by MyChart, letter or phone within 4 business days after the clinic has received the results. If you do not hear from us within 7 days, please contact the clinic through MyChart or phone. If you have a critical or abnormal lab result, we will notify you by phone as soon as possible.  Submit refill requests through [employer] or call your pharmacy and they will forward the refill request to us. Please allow 3 business days for your refill to be completed.          Additional Information About Your Visit        MyChart Information     [employer] gives you secure access to your electronic health record. If you see a primary care provider, you can also send messages to your care team and make appointments. If you have questions, please call your primary care clinic.  If you do not have a primary care provider, please call [phone] and they will assist you.        Care EveryWhere ID     This is your Care EveryWhere ID. This could be used by other organizations to access your [facility] medical records  [id number]        Your Vitals Were     Pulse Pulse Oximetry                65 100%           Blood Pressure from Last 3 Encounters:   08/30/18 113/79   07/03/18 118/80   06/19/18 107/68    Weight from Last 3 Encounters:   07/03/18 75.9 kg (167 lb 6.4 oz)   06/19/18 76.1 kg (167 lb 12.8 oz)   04/13/18 82.6 kg (182 lb)              Today, you had the following     No orders found for display       Primary Care Provider Office Phone # Fax #    Agustín Berger [phone] [address] [facility] [address]        Equal Access to  Services     [facility]: Hadii aad ku hadkeiraperlita Mariiasavita, wastarda luqadaha, qaybta kaalmamike nicole, deya jamison . So [facility] [phone].    ATENCIÓN: Si miguella erika, tiene a cui disposición servicios gratuitos de asistencia lingüística. Llame al [phone].    We comply with applicable federal civil rights laws and [place] laws. We do not discriminate on the basis of race, color, national origin, age, disability, sex, sexual orientation, or gender identity.            Thank you!     Thank you for choosing [facility]  for your care. Our goal is always to provide you with excellent care. Hearing back from our patients is one way we can continue to improve our services. Please take a few minutes to complete the written survey that you may receive in the mail after your visit with us. Thank you!             Your Updated Medication List - Protect others around you: Learn how to safely use, store and throw away your medicines at www.disposemymeds.org.          This list is accurate as of 8/30/18  9:57 AM.  Always use your most recent med list.                   Brand Name Dispense Instructions for use Diagnosis    citalopram 40 MG tablet    celeXA    90 tablet    Take 1 tablet (40 mg) by mouth daily    Depression, unspecified depression type       ibuprofen 800 MG tablet    ADVIL/MOTRIN    30 tablet    Take 1 tablet by mouth every 6 hours as needed (cramping).    Supervision of other normal pregnancy       norethindrone-ethinyl estradiol 0.5/0.75/1-35 MG-MCG per tablet    ORTHO-NOVUM 7/7/7 (28)    28 tablet    Take 1 tablet by mouth daily    Encounter for contraceptive management, unspecified type       penicillin V potassium 500 MG tablet    VEETID    20 tablet    Take 1 tablet (500 mg) by mouth 2 times daily    Streptococcal pharyngitis

## 2018-09-06 ENCOUNTER — TELEPHONE (OUTPATIENT)
Dept: DERMATOLOGY | Facility: CLINIC | Age: 38
End: 2018-09-06

## 2018-09-06 NOTE — TELEPHONE ENCOUNTER
Reason for Call:  Request for results:    Name of test or procedure: Path results    Date of test of procedure: 08/30/2018    Location of the test or procedure: [facility]    OK to leave the result message on voice mail or with a family member? YES    Phone number Patient can be reached at:  Home number on file [phone] (home)    Additional comments: Pt informed test still in process - will be notified when final results are received    Call taken on 9/6/2018 at 3:04 PM by Denise Behrendt

## 2018-09-06 NOTE — TELEPHONE ENCOUNTER
Patient notified specimen is in process and is sent by  [facility] so with Labor day holiday, today is 4 th business day since specimen was sent, so expect results sometime in the next week as it is usually 7 to 10 business days to process. Patient verbalized understanding. Addie Goodrich RN

## 2018-09-07 LAB — COPATH REPORT: NORMAL

## 2018-09-17 ENCOUNTER — TELEPHONE (OUTPATIENT)
Dept: FAMILY MEDICINE | Facility: CLINIC | Age: 38
End: 2018-09-17

## 2018-09-17 DIAGNOSIS — J04.0 LARYNGITIS: Primary | ICD-10-CM

## 2018-09-17 NOTE — TELEPHONE ENCOUNTER
Reason for Call:  Sore throat, swollen gland, horseness    Detailed comments: patient is calling and stating that approx every 2 weeks this all happens to her and when she is seen, she never has anything wrong. Wondering if she can see ENT for this issue. Has not seen Dr. Berger for this as far as I can see.    Phone Number Patient can be reached at: Home number on file [phone] (home)    Best Time: any    Can we leave a detailed message on this number? YES   Paola Mendoza  Clinic Station  [facility]      Call taken on 9/17/2018 at 11:19 AM by Paola Mendoza

## 2018-09-17 NOTE — TELEPHONE ENCOUNTER
Pt was seen for sore throat,swollen gland and hoarseness on 7/3/18 which was negative for strep.  Since then, pt has had many bouts of hoarseness with no other symptoms.  Pt asking if she should be seen by ENT?  States the symptoms aren't always there when she is seen.  Appt with PCP?   Advise.  Yunier

## 2018-09-24 ENCOUNTER — OFFICE VISIT (OUTPATIENT)
Dept: OTOLARYNGOLOGY | Facility: CLINIC | Age: 38
End: 2018-09-24
Payer: COMMERCIAL

## 2018-09-24 VITALS
WEIGHT: 170 LBS | TEMPERATURE: 98.3 F | SYSTOLIC BLOOD PRESSURE: 118 MMHG | BODY MASS INDEX: 27.44 KG/M2 | HEART RATE: 70 BPM | DIASTOLIC BLOOD PRESSURE: 72 MMHG

## 2018-09-24 DIAGNOSIS — R49.0 HOARSENESS: Primary | ICD-10-CM

## 2018-09-24 DIAGNOSIS — J38.2 VOCAL FOLD NODULE: ICD-10-CM

## 2018-09-24 PROCEDURE — 99243 OFF/OP CNSLTJ NEW/EST LOW 30: CPT | Mod: 25 | Performed by: OTOLARYNGOLOGY

## 2018-09-24 PROCEDURE — 31575 DIAGNOSTIC LARYNGOSCOPY: CPT | Performed by: OTOLARYNGOLOGY

## 2018-09-24 ASSESSMENT — PAIN SCALES - GENERAL: PAINLEVEL: NO PAIN (0)

## 2018-09-24 NOTE — NURSING NOTE
"Initial /72 (BP Location: Right arm, Patient Position: Chair, Cuff Size: Adult Large)  Pulse 70  Temp 98.3  F (36.8  C) (Oral)  Wt 77.1 kg (170 lb)  BMI 27.44 kg/m2 Estimated body mass index is 27.44 kg/(m^2) as calculated from the following:    Height as of 7/3/18: 1.676 m (5' 6\").    Weight as of this encounter: 77.1 kg (170 lb). .    Myrna Camacho LPN    "

## 2018-09-24 NOTE — MR AVS SNAPSHOT
After Visit Summary   9/24/2018    Remedios Watts    MRN: [id number]           Patient Information     Date Of Birth          1980        Visit Information        Provider Department      9/24/2018 12:15 PM Adela Carrillo MD [facility]        Today's Diagnoses     Hoarseness    -  1    Vocal fold nodule          Care Instructions    Per physician's instructions            Follow-ups after your visit        Additional Services     SPEECH THERAPY REFERRAL       If you have not heard from the scheduling office within 2 business days, please call [phone] for all locations, with the exception of [place], please call [phone] and [facility] [place], please call [phone].    Please be aware that coverage of these services is subject to the terms and limitations of your health insurance plan.  Call member services at your health plan with any benefit or coverage questions.                  Your next 10 appointments already scheduled     Nov 27, 2018  3:20 PM CST   Return Visit with Nicole Mckeon PA-C   [facility]    [address] [facility] [address] [phone]              Future tests that were ordered for you today     Open Future Orders        Priority Expected Expires Ordered    SPEECH THERAPY REFERRAL Routine  9/24/2019 9/24/2018            Who to contact     If you have questions or need follow up information about today's clinic visit or your schedule please contact [facility] directly at [phone].  Normal or non-critical lab and imaging results will be communicated to you by MyChart, letter or phone within 4 business days after the clinic has received the results. If you do not hear from us within 7 days, please contact the clinic through MyChart or phone. If you have a critical or abnormal lab result, we will notify you by phone as soon as possible.  Submit refill requests through [employer] or  call your pharmacy and they will forward the refill request to us. Please allow 3 business days for your refill to be completed.          Additional Information About Your Visit        MyChart Information     [employer] gives you secure access to your electronic health record. If you see a primary care provider, you can also send messages to your care team and make appointments. If you have questions, please call your primary care clinic.  If you do not have a primary care provider, please call [phone] and they will assist you.        Care EveryWhere ID     This is your Care EveryWhere ID. This could be used by other organizations to access your [place] medical records  [id number]        Your Vitals Were     Pulse Temperature BMI (Body Mass Index)             70 98.3  F (36.8  C) (Oral) 27.44 kg/m2          Blood Pressure from Last 3 Encounters:   09/24/18 118/72   08/30/18 113/79   07/03/18 118/80    Weight from Last 3 Encounters:   09/24/18 77.1 kg (170 lb)   07/03/18 75.9 kg (167 lb 6.4 oz)   06/19/18 76.1 kg (167 lb 12.8 oz)              We Performed the Following     LARYNGOSCOPY FLEX FIBEROPTIC, DIAGNOSTIC        Primary Care Provider Office Phone # Fax #    Agustín Berger [phone] [address] [facility] [address]        Equal Access to Services     HERNAN DIAZ : Hadii aad ku hadasho Sokeonali, waaxda luqadaha, qaybta kaalmada adechristiano, deya richard. So [facility] [phone].    ATENCIÓN: Si habla español, tiene a cui disposición servicios gratuitos de asistencia lingüística. Llklaudia al [phone].    We comply with applicable federal civil rights laws and [place] laws. We do not discriminate on the basis of race, color, national origin, age, disability, sex, sexual orientation, or gender identity.            Thank you!     Thank you for choosing [facility]  for your care. Our goal is always to provide you with excellent care.  Hearing back from our patients is one way we can continue to improve our services. Please take a few minutes to complete the written survey that you may receive in the mail after your visit with us. Thank you!             Your Updated Medication List - Protect others around you: Learn how to safely use, store and throw away your medicines at www.disposemymeds.org.          This list is accurate as of 9/24/18 12:45 PM.  Always use your most recent med list.                   Brand Name Dispense Instructions for use Diagnosis    citalopram 40 MG tablet    celeXA    90 tablet    Take 1 tablet (40 mg) by mouth daily    Depression, unspecified depression type       ibuprofen 800 MG tablet    ADVIL/MOTRIN    30 tablet    Take 1 tablet by mouth every 6 hours as needed (cramping).    Supervision of other normal pregnancy       norethindrone-ethinyl estradiol 0.5/0.75/1-35 MG-MCG per tablet    ORTHO-NOVUM 7/7/7 (28)    28 tablet    Take 1 tablet by mouth daily    Encounter for contraceptive management, unspecified type

## 2018-09-24 NOTE — LETTER
9/24/2018         RE: Remedios Watts  [address] [place] [address]        Dear Colleague,    Thank you for referring your patient, Remedios Watts, to the [facility]. Please see a copy of my visit note below.        History of Present Illness - Remedios Watts is a 37 year old female who presents in consultation at the request of Dr. Berger for recurrent hoarseness. She describes that her voice became hoarse in June 2018. She denies any associated URI or voice overuse. She denies prior trouble with this issue. She does not sing much, and she denies yelling or screaming. She denies any trouble breathing or swallowing. She denies any smoke exposure.    Past Medical History -   Patient Active Problem List   Diagnosis     Generalized anxiety disorder     Pelvic pain     CARDIOVASCULAR SCREENING; LDL GOAL LESS THAN 160     Folliculitis     Lentigo     Congenital nevus     Angioma     Multiple nevi     Dermal nevus       Current Medications -   Current Outpatient Prescriptions:      norethindrone-ethinyl estradiol (ORTHO-NOVUM 7/7/7, 28,) 0.5/0.75/1-35 MG-MCG per tablet, Take 1 tablet by mouth daily, Disp: 28 tablet, Rfl: 11     citalopram (CELEXA) 40 MG tablet, Take 1 tablet (40 mg) by mouth daily, Disp: 90 tablet, Rfl: 3     ibuprofen (ADVIL,MOTRIN) 800 MG tablet, Take 1 tablet by mouth every 6 hours as needed (cramping). (Patient not taking: Reported on 9/24/2018), Disp: 30 tablet, Rfl: 0    Allergies -   Allergies   Allergen Reactions     Nka [No Known Allergies]        Social History -   Social History     Social History     Marital status:      Spouse name: N/A     Number of children: 3     Years of education: N/A     Occupational History      [employer]     Social History Main Topics     Smoking status: Never Smoker     Smokeless tobacco: Never Used     Alcohol use Yes     Drug use: No     Sexual activity: Yes     Partners: Male     Birth control/  protection: Pill     Other Topics Concern     Parent/Sibling W/ Cabg, Mi Or Angioplasty Before 65f 55m? Yes     pt had MI @ age 58     Social History Narrative       Family History -   Family History   Problem Relation Age of Onset     Arthritis Mother      Neurologic Disorder Mother      had a seizure      Alcohol/Drug Mother      Arthritis Maternal Grandmother      Breast Cancer Maternal Grandmother      Melanoma Maternal Grandmother      HEART DISEASE Paternal Grandmother      Cancer Father      skin       Review of Systems - As per HPI and PMHx, otherwise 7 system review of the head and neck negative. 10+ system review negative.    Physical Exam  /72 (BP Location: Right arm, Patient Position: Chair, Cuff Size: Adult Large)  Pulse 70  Temp 98.3  F (36.8  C) (Oral)  Wt 77.1 kg (170 lb)  BMI 27.44 kg/m2  General - The patient is well nourished and well developed, and appears to have good nutritional status.  Alert and oriented to person and place, answers questions and cooperates with examination appropriately.   Head and Face - Normocephalic and atraumatic, with no gross asymmetry noted of the contour of the facial features.  The facial nerve is intact, with strong symmetric movements.  Voice and Breathing - The patient was breathing comfortably without the use of accessory muscles. There was no wheezing, stridor, or stertor.  The patients voice was coarse, with pitch breaks when attempting high register. Singing voice is similarly affected.  Ears - Bilateral pinna and EACs with normal appearing overlying skin. Tympanic membrane intact with good mobility on pneumatic otoscopy bilaterally. Bony landmarks of the ossicular chain are normal. The tympanic membranes are normal in appearance. No retraction, perforation, or masses.  No fluid or purulence was seen in the external canal or the middle ear.   Eyes - Extraocular movements intact.  Sclera were not icteric or injected, conjunctiva were pink and  moist.  Mouth - Examination of the oral cavity showed pink, healthy oral mucosa. No lesions or ulcerations noted.  The tongue was mobile and midline, and the dentition were in good condition.    Throat - The walls of the oropharynx were smooth, pink, moist, symmetric, and had no lesions or ulcerations.  The tonsillar pillars and soft palate were symmetric.  The uvula was midline on elevation.  Neck - Normal midline excursion of the laryngotracheal complex during swallowing.  Full range of motion on passive movement.  Palpation of the occipital, submental, submandibular, internal jugular chain, and supraclavicular nodes did not demonstrate any abnormal lymph nodes or masses.  The carotid pulse was palpable bilaterally.  Palpation of the thyroid was soft and smooth, with no nodules or goiter appreciated.  The trachea was mobile and midline.  Nose - External contour is symmetric, no gross deflection or scars.  Nasal mucosa is pink and moist with no abnormal mucus.  The septum was midline and non-obstructive, turbinates of normal size and position.  No polyps, masses, or purulence noted on examination.    Procedure: Flexible Endoscopy  Indication: hoarseness    Attempts at mirror laryngoscopy were not possible due to gag reflex.  Therefore I proceeded with a fiberoptic examination.  First I sprayed both sides of the nose with a mixture of lidocaine and neosynephrine.  I then passed the scope through the nasal cavity.  The nasal cavity was unremarkable.  The nasopharynx was mucosally covered and symmetric.  The Eustachian tube openings were unobstructed.  Going further down I had a clear view of the base of tongue which had normal appearing lingual tonsillar tissue.  The base of tongue was free of lesions, and the vallecula was open.  The epiglottis was smooth and mucosally covered.  The supraglottic larynx was then clearly visualized.  The vocal cords moved smoothly and symmetrically,but they were mildly erythematous  and seem to have rather mild vocal nodules developing at the junction of anterior and middle 1/3.  The pyriform sinuses were open, and the limited view of the postcricoid region did not show any lesions.          Assessment - Remedios Watts is a 37 year old female with hoarseness likley secondary to vocal nodules. I have placed request for voice therapy. Return after therapy if there continues to be concerns.       Dr. Adela Carrillo MD  Otolaryngology  [facility]        Again, thank you for allowing me to participate in the care of your patient.        Sincerely,        Adela Carrillo MD

## 2018-09-24 NOTE — NURSING NOTE
This laryngoscopy scope was  used on this patient.    Flexible    Scope Number: Olympus Flexible #[id number] Adult

## 2018-09-30 ENCOUNTER — OFFICE VISIT (OUTPATIENT)
Dept: URGENT CARE | Facility: URGENT CARE | Age: 38
End: 2018-09-30
Payer: COMMERCIAL

## 2018-09-30 VITALS
WEIGHT: 170 LBS | RESPIRATION RATE: 18 BRPM | HEART RATE: 72 BPM | TEMPERATURE: 98.8 F | DIASTOLIC BLOOD PRESSURE: 58 MMHG | BODY MASS INDEX: 27.44 KG/M2 | SYSTOLIC BLOOD PRESSURE: 112 MMHG

## 2018-09-30 DIAGNOSIS — Z11.3 SCREEN FOR STD (SEXUALLY TRANSMITTED DISEASE): ICD-10-CM

## 2018-09-30 DIAGNOSIS — R30.0 DYSURIA: Primary | ICD-10-CM

## 2018-09-30 LAB
ALBUMIN UR-MCNC: NEGATIVE MG/DL
APPEARANCE UR: CLEAR
BILIRUB UR QL STRIP: NEGATIVE
COLOR UR AUTO: YELLOW
GLUCOSE UR STRIP-MCNC: NEGATIVE MG/DL
HGB UR QL STRIP: ABNORMAL
KETONES UR STRIP-MCNC: NEGATIVE MG/DL
LEUKOCYTE ESTERASE UR QL STRIP: NEGATIVE
NITRATE UR QL: NEGATIVE
PH UR STRIP: 7 PH (ref 5–7)
RBC #/AREA URNS AUTO: NORMAL /HPF
SOURCE: ABNORMAL
SP GR UR STRIP: 1.01 (ref 1–1.03)
UROBILINOGEN UR STRIP-ACNC: 0.2 EU/DL (ref 0.2–1)
WBC #/AREA URNS AUTO: NORMAL /HPF

## 2018-09-30 PROCEDURE — 87491 CHLMYD TRACH DNA AMP PROBE: CPT | Performed by: PHYSICIAN ASSISTANT

## 2018-09-30 PROCEDURE — 81001 URINALYSIS AUTO W/SCOPE: CPT | Performed by: PHYSICIAN ASSISTANT

## 2018-09-30 PROCEDURE — 87591 N.GONORRHOEAE DNA AMP PROB: CPT | Performed by: PHYSICIAN ASSISTANT

## 2018-09-30 PROCEDURE — 99213 OFFICE O/P EST LOW 20 MIN: CPT | Performed by: PHYSICIAN ASSISTANT

## 2018-09-30 PROCEDURE — 87086 URINE CULTURE/COLONY COUNT: CPT | Performed by: PHYSICIAN ASSISTANT

## 2018-09-30 ASSESSMENT — ENCOUNTER SYMPTOMS
CHILLS: 0
VOMITING: 0
HEMATURIA: 0
FEVER: 0
COUGH: 0
DYSURIA: 0
WHEEZING: 0
SORE THROAT: 0
SHORTNESS OF BREATH: 0
BACK PAIN: 0
CHEST TIGHTNESS: 0
RHINORRHEA: 0
TROUBLE SWALLOWING: 0
FATIGUE: 0
PALPITATIONS: 0
DIARRHEA: 0
EYE PAIN: 0
ABDOMINAL PAIN: 0
MYALGIAS: 0
FLANK PAIN: 0
FREQUENCY: 1
UNEXPECTED WEIGHT CHANGE: 0
EYE REDNESS: 0
SINUS PRESSURE: 0
ARTHRALGIAS: 0
NAUSEA: 0

## 2018-09-30 NOTE — PROGRESS NOTES
SUBJECTIVE:   Remedios Watts is a 37 year old female presenting with a chief complaint of   Chief Complaint   Patient presents with     UTI     Constant urination.  some cramps, though menstruations are irregular.  No burning, itching or discharge.        She is an established patient of [facility].    UTI    Onset of symptoms was 1week(s).  Course of illness is same  Severity mild  Current and associated symptoms frequency  Treatment and measures tried None  Predisposing factors include none  Patient denies rigors, flank pain, temperature > 101 degrees F. and vomiting    Requesting screening STD for chlamydia and gonorrhea     Review of Systems   Constitutional: Negative for chills, fatigue, fever and unexpected weight change.   HENT: Negative for congestion, ear pain, postnasal drip, rhinorrhea, sinus pressure, sore throat and trouble swallowing.    Eyes: Negative for pain, redness and visual disturbance.   Respiratory: Negative for cough, chest tightness, shortness of breath and wheezing.    Cardiovascular: Negative for chest pain and palpitations.   Gastrointestinal: Negative for abdominal pain, diarrhea, nausea and vomiting.   Genitourinary: Positive for frequency. Negative for dysuria, flank pain, hematuria, vaginal discharge and vaginal pain.   Musculoskeletal: Negative for arthralgias, back pain and myalgias.   Skin: Negative for rash.       Past Medical History:   Diagnosis Date     Chickenpox      Diarrhea      Group B streptococcus urinary tract infection complicating pregnancy 4/20/2011    Plan PCN in labor      History of postpartum depression, currently pregnant 10/21/2010     Malignant melanoma (H)      Postpartum depression 12/17/2008     Tailbone injury     fx     Urinary tract infections      Family History   Problem Relation Age of Onset     Arthritis Mother      Neurologic Disorder Mother      had a seizure      Alcohol/Drug Mother      Arthritis Maternal Grandmother      Breast Cancer  Maternal Grandmother      Melanoma Maternal Grandmother      HEART DISEASE Paternal Grandmother      Cancer Father      skin     Current Outpatient Prescriptions   Medication Sig Dispense Refill     citalopram (CELEXA) 40 MG tablet Take 1 tablet (40 mg) by mouth daily 90 tablet 3     norethindrone-ethinyl estradiol (ORTHO-NOVUM 7/7/7, 28,) 0.5/0.75/1-35 MG-MCG per tablet Take 1 tablet by mouth daily 28 tablet 11     ibuprofen (ADVIL,MOTRIN) 800 MG tablet Take 1 tablet by mouth every 6 hours as needed (cramping). (Patient not taking: Reported on 9/24/2018) 30 tablet 0     Social History   Substance Use Topics     Smoking status: Never Smoker     Smokeless tobacco: Never Used     Alcohol use Yes       OBJECTIVE  /58 (BP Location: Right arm, Cuff Size: Adult Regular)  Pulse 72  Temp 98.8  F (37.1  C) (Tympanic)  Resp 18  Wt 170 lb (77.1 kg)  BMI 27.44 kg/m2    Physical Exam   Constitutional: She appears well-developed and well-nourished. No distress.   Abdominal: There is no CVA tenderness.   Psychiatric: She has a normal mood and affect. Her behavior is normal.       Labs:  Results for orders placed or performed in visit on 09/30/18 (from the past 24 hour(s))   *UA reflex to Microscopic and Culture ([place] and [facility] (except [place] and [place])   Result Value Ref Range    Color Urine Yellow     Appearance Urine Clear     Glucose Urine Negative NEG^Negative mg/dL    Bilirubin Urine Negative NEG^Negative    Ketones Urine Negative NEG^Negative mg/dL    Specific Gravity Urine 1.010 1.003 - 1.035    Blood Urine Trace (A) NEG^Negative    pH Urine 7.0 5.0 - 7.0 pH    Protein Albumin Urine Negative NEG^Negative mg/dL    Urobilinogen Urine 0.2 0.2 - 1.0 EU/dL    Nitrite Urine Negative NEG^Negative    Leukocyte Esterase Urine Negative NEG^Negative    Source Midstream Urine    Urine Microscopic   Result Value Ref Range    WBC Urine 0 - 5 OTO5^0 - 5 /HPF    RBC Urine O - 2 OTO2^O - 2 /HPF   Urine Culture  Aerobic Bacterial   Result Value Ref Range    Specimen Description Midstream Urine     Special Requests Specimen received in preservative     Culture Micro PENDING        X-Ray was not done.    ASSESSMENT:      ICD-10-CM    1. Dysuria R30.0 *UA reflex to Microscopic and Culture ([place] and [facility] (except [place] and [place])     Urine Microscopic     Urine Culture Aerobic Bacterial   2. Screen for STD (sexually transmitted disease) Z11.3 Chlamydia trachomatis PCR     Neisseria gonorrhoeae PCR        Medical Decision Making:    Differential Diagnosis:  UTI: UTI, Dysuria and Vaginitis    Serious Comorbid Conditions:  Adult:  None    PLAN:    UTI Adult:  Will culture urine and contact her with results. Continue to push fluids.     STD screening: will contact her with results. Follow up as needed.     Followup:    If not improving or if condition worsens, follow up with your Primary Care Provider    There are no Patient Instructions on file for this visit.

## 2018-09-30 NOTE — MR AVS SNAPSHOT
After Visit Summary   9/30/2018    Remedios Watts    MRN: [id number]           Patient Information     Date Of Birth          1980        Visit Information        Provider Department      9/30/2018 11:45 AM Bridget Araujo PA-C [facility]        Today's Diagnoses     Dysuria    -  1    Screen for STD (sexually transmitted disease)           Follow-ups after your visit        Follow-up notes from your care team     Return if symptoms worsen or fail to improve.      Your next 10 appointments already scheduled     Oct 01, 2018  4:30 PM CDT   Evaluation with Brittanie Beaver, AVELINA   [facility] ([facility])    [address] [address] [phone]            Nov 27, 2018  3:20 PM CST   Return Visit with Nicole Mckeon PA-C   [facility] ([facility])    [address] [address] [phone]              Who to contact     If you have questions or need follow up information about today's clinic visit or your schedule please contact [facility] directly at [phone].  Normal or non-critical lab and imaging results will be communicated to you by MyChart, letter or phone within 4 business days after the clinic has received the results. If you do not hear from us within 7 days, please contact the clinic through MyChart or phone. If you have a critical or abnormal lab result, we will notify you by phone as soon as possible.  Submit refill requests through [employer] or call your pharmacy and they will forward the refill request to us. Please allow 3 business days for your refill to be completed.          Additional Information About Your Visit        MyChart Information     [employer] gives you secure access to your electronic health record. If you see a primary care provider, you can also send messages to your care team and make  appointments. If you have questions, please call your primary care clinic.  If you do not have a primary care provider, please call [phone] and they will assist you.        Care EveryWhere ID     This is your Care EveryWhere ID. This could be used by other organizations to access your [place] medical records  [id number]        Your Vitals Were     Pulse Temperature Respirations BMI (Body Mass Index)          72 98.8  F (37.1  C) (Tympanic) 18 27.44 kg/m2         Blood Pressure from Last 3 Encounters:   09/30/18 112/58   09/24/18 118/72   08/30/18 113/79    Weight from Last 3 Encounters:   09/30/18 170 lb (77.1 kg)   09/24/18 170 lb (77.1 kg)   07/03/18 167 lb 6.4 oz (75.9 kg)              We Performed the Following     *UA reflex to Microscopic and Culture ([place] and [facility] (except [place] and [place])     Chlamydia trachomatis PCR     Neisseria gonorrhoeae PCR     Urine Culture Aerobic Bacterial     Urine Microscopic        Primary Care Provider Office Phone # Fax #    Agustín Berger [phone] [address] [address]        Equal Access to Services     FATIMAH DIAZ AH: Hadii nori tripletto Sokeonali, waaxda luqadaha, qaybta kaalmada adeegyada, deya richard. So [facility] [phone].    ATENCIÓN: Si habla español, tiene a cui disposición servicios gratuitos de asistencia lingüística. Llame al [phone].    We comply with applicable federal civil rights laws and [place] laws. We do not discriminate on the basis of race, color, national origin, age, disability, sex, sexual orientation, or gender identity.            Thank you!     Thank you for choosing [facility]  for your care. Our goal is always to provide you with excellent care. Hearing back from our patients is one way we can continue to improve our services. Please take a few minutes to complete the written survey that you may receive in  the mail after your visit with us. Thank you!             Your Updated Medication List - Protect others around you: Learn how to safely use, store and throw away your medicines at www.disposemymeds.org.          This list is accurate as of 9/30/18 11:59 PM.  Always use your most recent med list.                   Brand Name Dispense Instructions for use Diagnosis    citalopram 40 MG tablet    celeXA    90 tablet    Take 1 tablet (40 mg) by mouth daily    Depression, unspecified depression type       ibuprofen 800 MG tablet    ADVIL/MOTRIN    30 tablet    Take 1 tablet by mouth every 6 hours as needed (cramping).    Supervision of other normal pregnancy       norethindrone-ethinyl estradiol 0.5/0.75/1-35 MG-MCG per tablet    ORTHO-NOVUM 7/7/7 (28)    28 tablet    Take 1 tablet by mouth daily    Encounter for contraceptive management, unspecified type

## 2018-09-30 NOTE — NURSING NOTE
"Chief Complaint   Patient presents with     UTI     Constant urination.  some cramps, though menstruations are irregular.  No burning, itching or discharge.        Initial /58 (BP Location: Right arm, Cuff Size: Adult Regular)  Pulse 72  Temp 98.8  F (37.1  C) (Tympanic)  Resp 18  Wt 170 lb (77.1 kg)  BMI 27.44 kg/m2 Estimated body mass index is 27.44 kg/(m^2) as calculated from the following:    Height as of 7/3/18: 5' 6\" (1.676 m).    Weight as of this encounter: 170 lb (77.1 kg).    Patient presents to the clinic using No DME    Health Maintenance that is potentially due pending provider review:  NONE    n/a    Is there anyone who you would like to be able to receive your results? Not Applicable  If yes have patient fill out DA    Margarita Marcum M.A.      "

## 2018-10-01 LAB
BACTERIA SPEC CULT: NORMAL
C TRACH DNA SPEC QL NAA+PROBE: NEGATIVE
Lab: NORMAL
N GONORRHOEA DNA SPEC QL NAA+PROBE: NEGATIVE
SPECIMEN SOURCE: NORMAL

## 2018-10-03 ENCOUNTER — OFFICE VISIT (OUTPATIENT)
Dept: FAMILY MEDICINE | Facility: CLINIC | Age: 38
End: 2018-10-03
Payer: COMMERCIAL

## 2018-10-03 VITALS
HEART RATE: 68 BPM | HEIGHT: 66 IN | WEIGHT: 170.8 LBS | BODY MASS INDEX: 27.45 KG/M2 | OXYGEN SATURATION: 98 % | SYSTOLIC BLOOD PRESSURE: 112 MMHG | DIASTOLIC BLOOD PRESSURE: 68 MMHG | TEMPERATURE: 98.9 F

## 2018-10-03 DIAGNOSIS — M54.2 CERVICALGIA: Primary | ICD-10-CM

## 2018-10-03 DIAGNOSIS — S13.4XXA WHIPLASH INJURY TO NECK, INITIAL ENCOUNTER: ICD-10-CM

## 2018-10-03 PROCEDURE — 99214 OFFICE O/P EST MOD 30 MIN: CPT | Performed by: FAMILY MEDICINE

## 2018-10-03 RX ORDER — TIZANIDINE 2 MG/1
2 TABLET ORAL 3 TIMES DAILY
Qty: 60 TABLET | Refills: 0 | Status: SHIPPED | OUTPATIENT
Start: 2018-10-03 | End: 2019-01-17

## 2018-10-03 NOTE — PATIENT INSTRUCTIONS
Tizanidine 2 mg orally 8 hours apart as needed only for muscle spasms.    Schedule physical therapy consult and treatment by calling the number in the referral below.    Warm compress to the tight muscles as needed.    May apply topical muscle pain relievers as needed for pain.    If not better after 4-5 weeks of therapy, see provider again.  Neck Sprain or Strain  A sudden force that causes turning or bending of the neck can cause sprain or strain. An example would be the force from a car accident. This can stretch or tear muscles called a strain. It can also stretch or tear ligaments called a sprain. Either of these can cause neck pain. Sometimes neck pain occurs after a simple awkward movement. In either case, muscle spasm is commonly present and contributes to the pain.   Unless you had a forceful physical injury (for example, a car accident or fall), X-rays are usually not ordered for the initial evaluation of neck pain. If pain continues and dose not respond to medical treatment, X-rays and other tests may be performed at a later time.  Home care    You may feel more soreness and spasm the first few days after the injury. Rest until symptoms begin to improve.    When lying down, use a comfortable pillow or a rolled towel that supports the head and keeps the spine in a neutral position. The position of the head should not be tilted forward or backward.    Apply an ice pack over the injured area for 15 to 20 minutes every 3 to 6 hours. You should do this for the first 24 to 48 hours. You can make an ice pack by filling a plastic bag that seals at the top with ice cubes and then wrapping it with a thin towel. After 48 hours, apply heat (warm shower or warm bath) for 15 to 20 minutes several times a day, or alternate ice and heat.    You may use over-the-counter pain medicine to control pain, unless another pain medicine was prescribed. If you have chronic liver or kidney disease or ever had a stomach ulcer or GI  bleeding, talk with your healthcare provider before using these medicines.    If a soft cervical collar was prescribed, it should be worn only for periods of increased pain. It should not be worn for more than 3 hours a day, or for a period longer than 1 to 2 weeks.  Follow-up care  Follow up with your healthcare provider as directed. Physical therapy may be needed.  Sometimes fractures don t show up on the first X-ray. Bruises and sprains can sometimes hurt as much as a fracture. These injuries can take time to heal completely. If your symptoms don t improve or they get worse, talk with your healthcare provider. You may need a repeat X-ray or other tests. If X-rays were taken, you will be told of any new findings that may affect your care.  Call 911  Call 911 if you have:    Neck swelling, difficulty or painful swallowing    Difficulty breathing    Chest pain  When to seek medical advice  Call your healthcare provider right away if any of these occur:    Pain becomes worse or spreads into your arms    Weakness or numbness in one or both arms  Date Last Reviewed: 11/19/2015 2000-2017 [employer]. [address] [place] [address], [place] [address]. All rights reserved. This information is not intended as a substitute for professional medical care. Always follow your healthcare professional's instructions.
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
## 2018-10-03 NOTE — MR AVS SNAPSHOT
After Visit Summary   10/3/2018    Remedios Watts    MRN: [id number]           Patient Information     Date Of Birth          1980        Visit Information        Provider Department      10/3/2018 3:00 PM Ke Corral MD [facility]        Today's Diagnoses     Cervicalgia    -  1    Whiplash injury to neck, initial encounter          Care Instructions    Tizanidine 2 mg orally 8 hours apart as needed only for muscle spasms.    Schedule physical therapy consult and treatment by calling the number in the referral below.    Warm compress to the tight muscles as needed.    May apply topical muscle pain relievers as needed for pain.    If not better after 4-5 weeks of therapy, see provider again.  Neck Sprain or Strain  A sudden force that causes turning or bending of the neck can cause sprain or strain. An example would be the force from a car accident. This can stretch or tear muscles called a strain. It can also stretch or tear ligaments called a sprain. Either of these can cause neck pain. Sometimes neck pain occurs after a simple awkward movement. In either case, muscle spasm is commonly present and contributes to the pain.   Unless you had a forceful physical injury (for example, a car accident or fall), X-rays are usually not ordered for the initial evaluation of neck pain. If pain continues and dose not respond to medical treatment, X-rays and other tests may be performed at a later time.  Home care    You may feel more soreness and spasm the first few days after the injury. Rest until symptoms begin to improve.    When lying down, use a comfortable pillow or a rolled towel that supports the head and keeps the spine in a neutral position. The position of the head should not be tilted forward or backward.    Apply an ice pack over the injured area for 15 to 20 minutes every 3 to 6 hours. You should do this for the first 24 to 48 hours. You can make an ice pack by  filling a plastic bag that seals at the top with ice cubes and then wrapping it with a thin towel. After 48 hours, apply heat (warm shower or warm bath) for 15 to 20 minutes several times a day, or alternate ice and heat.    You may use over-the-counter pain medicine to control pain, unless another pain medicine was prescribed. If you have chronic liver or kidney disease or ever had a stomach ulcer or GI bleeding, talk with your healthcare provider before using these medicines.    If a soft cervical collar was prescribed, it should be worn only for periods of increased pain. It should not be worn for more than 3 hours a day, or for a period longer than 1 to 2 weeks.  Follow-up care  Follow up with your healthcare provider as directed. Physical therapy may be needed.  Sometimes fractures don t show up on the first X-ray. Bruises and sprains can sometimes hurt as much as a fracture. These injuries can take time to heal completely. If your symptoms don t improve or they get worse, talk with your healthcare provider. You may need a repeat X-ray or other tests. If X-rays were taken, you will be told of any new findings that may affect your care.  Call 911  Call 911 if you have:    Neck swelling, difficulty or painful swallowing    Difficulty breathing    Chest pain  When to seek medical advice  Call your healthcare provider right away if any of these occur:    Pain becomes worse or spreads into your arms    Weakness or numbness in one or both arms  Date Last Reviewed: 11/19/2015 2000-2017 [employer]. [address] [place] [address]. All rights reserved. This information is not intended as a substitute for professional medical care. Always follow your healthcare professional's instructions.                Follow-ups after your visit        Additional Services     PHYSICAL THERAPY REFERRAL       If you have not heard from the scheduling office within 2 business days, please call [phone] for  all locations, with the exception of [place], please call [phone] and [place], please call [phone].    Please be aware that coverage of these services is subject to the terms and limitations of your health insurance plan.  Call member services at your health plan with any benefit or coverage questions.                  Follow-up notes from your care team     Return if symptoms worsen or fail to improve.      Your next 10 appointments already scheduled     Nov 27, 2018  3:20 PM CST   Return Visit [facility] Nicole Mckeon PA-C   [facility]    [address] [facility] [address]   [phone]              Future tests that were ordered for you today     Open Future Orders        Priority Expected Expires Ordered    PHYSICAL THERAPY REFERRAL Routine  10/3/2019 10/3/2018            Who to contact     If you have questions or need follow up information about today's clinic visit or your schedule please contact [facility] directly at [phone].  Normal or non-critical lab and imaging results will be communicated to you by [employer], letter or phone within 4 business days after the clinic has received the results. If you do not hear from us within 7 days, please contact the clinic through [employer] or phone. If you have a critical or abnormal lab result, we will notify you by phone as soon as possible.  Submit refill requests through [employer] or call your pharmacy and they will forward the refill request to us. Please allow 3 business days for your refill to be completed.          Additional Information About Your Visit        [employer] Information     [employer] gives you secure access to your electronic health record. If you see a primary care provider, you can also send messages to your care team and make appointments. If you have questions, please call your primary care clinic.  If you do not have a primary care provider, please call  "[phone] and they will assist you.        Care EveryWhere ID     This is your Care EveryWhere ID. This could be used by other organizations to access your [facility] medical records  [id number]        Your Vitals Were     Pulse Temperature Height Pulse Oximetry BMI (Body Mass Index)       68 98.9  F (37.2  C) (Tympanic) 5' 6\" (1.676 m) 98% 27.57 kg/m2        Blood Pressure from Last 3 Encounters:   10/03/18 112/68   09/30/18 112/58   09/24/18 118/72    Weight from Last 3 Encounters:   10/03/18 170 lb 12.8 oz (77.5 kg)   09/30/18 170 lb (77.1 kg)   09/24/18 170 lb (77.1 kg)                 Today's Medication Changes          These changes are accurate as of 10/3/18  3:43 PM.  If you have any questions, ask your nurse or doctor.               Start taking these medicines.        Dose/Directions    tiZANidine 2 MG tablet   Commonly known as:  ZANAFLEX   Used for:  Cervicalgia, Whiplash injury to neck, initial encounter   Started by:  Ke Corral MD        Dose:  2 mg   Take 1 tablet (2 mg) by mouth 3 times daily   Quantity:  60 tablet   Refills:  0            Where to get your medicines      These medications were sent to [facility] - - [place] - [id number] [place] [id number]    Hours:  [facility] Phone:  [phone]     tiZANidine 2 MG tablet                Primary Care Provider Office Phone # Fax #    Agustín Berger [phone] [address] [facility] [address]        Equal Access to Services     [facility]: Ashwin Slater, warichard lumicheal, maria m kaalmada bonnie, deya richard. So [facility] [phone].    ATENCIÓN: Si habla español, tiene a cui disposición servicios gratuitos de asistencia lingüística. Llame al [phone].    We comply with applicable federal civil rights laws and [place] laws. We do not discriminate on the basis of race, " color, national origin, age, disability, sex, sexual orientation, or gender identity.            Thank you!     Thank you for choosing [facility]  for your care. Our goal is always to provide you with excellent care. Hearing back from our patients is one way we can continue to improve our services. Please take a few minutes to complete the written survey that you may receive in the mail after your visit with us. Thank you!             Your Updated Medication List - Protect others around you: Learn how to safely use, store and throw away your medicines at www.disposemymeds.org.          This list is accurate as of 10/3/18  3:43 PM.  Always use your most recent med list.                   Brand Name Dispense Instructions for use Diagnosis    citalopram 40 MG tablet    celeXA    90 tablet    Take 1 tablet (40 mg) by mouth daily    Depression, unspecified depression type       norethindrone-ethinyl estradiol 0.5/0.75/1-35 MG-MCG per tablet    ORTHO-NOVUM 7/7/7 (28)    28 tablet    Take 1 tablet by mouth daily    Encounter for contraceptive management, unspecified type       tiZANidine 2 MG tablet    ZANAFLEX    60 tablet    Take 1 tablet (2 mg) by mouth 3 times daily    Cervicalgia, Whiplash injury to neck, initial encounter

## 2019-01-17 ENCOUNTER — VIRTUAL VISIT (OUTPATIENT)
Dept: URGENT CARE | Facility: URGENT CARE | Age: 39
End: 2019-01-17
Payer: COMMERCIAL

## 2019-01-17 VITALS
OXYGEN SATURATION: 95 % | HEART RATE: 73 BPM | SYSTOLIC BLOOD PRESSURE: 102 MMHG | HEIGHT: 66 IN | BODY MASS INDEX: 28.61 KG/M2 | WEIGHT: 178 LBS | TEMPERATURE: 98.6 F | DIASTOLIC BLOOD PRESSURE: 60 MMHG

## 2019-01-17 DIAGNOSIS — R07.0 THROAT PAIN: ICD-10-CM

## 2019-01-17 DIAGNOSIS — R05.8 PRODUCTIVE COUGH: ICD-10-CM

## 2019-01-17 DIAGNOSIS — R07.0 THROAT PAIN: Primary | ICD-10-CM

## 2019-01-17 LAB
DEPRECATED S PYO AG THROAT QL EIA: NORMAL
SPECIMEN SOURCE: NORMAL

## 2019-01-17 PROCEDURE — 99213 OFFICE O/P EST LOW 20 MIN: CPT | Mod: GT | Performed by: FAMILY MEDICINE

## 2019-01-17 PROCEDURE — 87081 CULTURE SCREEN ONLY: CPT | Performed by: FAMILY MEDICINE

## 2019-01-17 PROCEDURE — 87880 STREP A ASSAY W/OPTIC: CPT | Performed by: FAMILY MEDICINE

## 2019-01-17 RX ORDER — CEFDINIR 300 MG/1
300 CAPSULE ORAL 2 TIMES DAILY
Qty: 20 CAPSULE | Refills: 0 | Status: SHIPPED | OUTPATIENT
Start: 2019-01-17 | End: 2019-02-24

## 2019-01-17 ASSESSMENT — MIFFLIN-ST. JEOR: SCORE: 1499.15

## 2019-01-18 LAB
BACTERIA SPEC CULT: NORMAL
SPECIMEN SOURCE: NORMAL

## 2019-01-18 NOTE — PROGRESS NOTES
This Urgent Care visit is taking place through a synchronous audio and visual encounter for convenience and efficient care of the patient. The Patient's acute non-critical condition can be safely assessed via telemedicine.    The patient is located in [facility] and the provider is located in [facility].    Time/date of encounter start: 01/17/19 6:31 PM  Time /date of encounter end: 01/17/19 6:55    SUBJECTIVE:   Remedios Watts is a 38 year old female presenting with a chief complaint of   Chief Complaint   Patient presents with     Ear Problem     left ear pain today - has had a cold x 3 weeks   .    URI Adult    Onset of symptoms was 3 week(s) ago.  Course of illness is same.    Severity moderate  Current and Associated symptoms: runny nose, stuffy nose, cough - productive, ear pain left and sore throat  Treatment measures tried include OTC.  Predisposing factors include None.      Review of Systems   All other systems reviewed and are negative.        Past Medical History:   Diagnosis Date     Chickenpox      Diarrhea      Group B streptococcus urinary tract infection complicating pregnancy 4/20/2011    Plan PCN in labor      History of postpartum depression, currently pregnant 10/21/2010     Malignant melanoma (H)      Postpartum depression 12/17/2008     Tailbone injury     fx     Urinary tract infections      Current Outpatient Medications   Medication Sig Dispense Refill     citalopram (CELEXA) 40 MG tablet Take 1 tablet (40 mg) by mouth daily 90 tablet 3     norethindrone-ethinyl estradiol (ORTHO-NOVUM 7/7/7, 28,) 0.5/0.75/1-35 MG-MCG per tablet Take 1 tablet by mouth daily 28 tablet 11     Social History     Tobacco Use     Smoking status: Never Smoker     Smokeless tobacco: Never Used   Substance Use Topics     Alcohol use: Yes       OBJECTIVE  There were no vitals taken for this visit.    Physical Exam   Constitutional: No distress.   HENT:   Right Ear: Tympanic membrane  normal.   Left Ear: Tympanic membrane normal.   Mouth/Throat: Posterior oropharyngeal erythema present. No oropharyngeal exudate or tonsillar abscesses.   Pulmonary/Chest: Breath sounds normal.   Vitals reviewed.      Labs:  No results found for this or any previous visit (from the past 24 hour(s)).    X-Ray was not done.    ASSESSMENT:    ICD-10-CM    1. Throat pain R07.0 Rapid strep screen   2. Productive cough R05 cefdinir (OMNICEF) 300 MG capsule       Medical Decision Making:    Differential Diagnosis:  URI Adult/Peds:  Sinusitis and Strep pharyngitis    Serious Comorbid Conditions:  Adult:  None    PLAN:    URI Adult:  Tylenol and Ibuprofen    Followup:    If not improving or if condition worsens, follow up with your Primary Care Provider    There are no Patient Instructions on file for this visit.

## 2019-02-04 ENCOUNTER — OFFICE VISIT (OUTPATIENT)
Dept: DERMATOLOGY | Facility: CLINIC | Age: 39
End: 2019-02-04
Payer: COMMERCIAL

## 2019-02-04 VITALS — HEART RATE: 78 BPM | DIASTOLIC BLOOD PRESSURE: 82 MMHG | OXYGEN SATURATION: 100 % | SYSTOLIC BLOOD PRESSURE: 119 MMHG

## 2019-02-04 DIAGNOSIS — L81.4 LENTIGO: ICD-10-CM

## 2019-02-04 DIAGNOSIS — D18.00 ANGIOMA: ICD-10-CM

## 2019-02-04 DIAGNOSIS — D22.9 NEVUS: ICD-10-CM

## 2019-02-04 DIAGNOSIS — L57.0 AK (ACTINIC KERATOSIS): ICD-10-CM

## 2019-02-04 DIAGNOSIS — Z85.820 HISTORY OF MELANOMA: Primary | ICD-10-CM

## 2019-02-04 DIAGNOSIS — L70.0 COMEDONE: ICD-10-CM

## 2019-02-04 DIAGNOSIS — L82.1 SEBORRHEIC KERATOSIS: ICD-10-CM

## 2019-02-04 PROCEDURE — 99213 OFFICE O/P EST LOW 20 MIN: CPT | Performed by: DERMATOLOGY

## 2019-02-04 RX ORDER — TRETINOIN 0.5 MG/G
CREAM TOPICAL AT BEDTIME
Qty: 45 G | Refills: 3 | Status: SHIPPED | OUTPATIENT
Start: 2019-02-04 | End: 2020-01-07

## 2019-02-04 NOTE — PROGRESS NOTES
Remedios Watts is a 38 year old year old female patient here today for hx of 0.45mm L arm.  She notes spot on lip.   .  Patient states this has been present for a while.  Patient reports the following symptoms:  scale.  Patient reports the following previous treatments cryo.  Patient reports the following modifying factors none.  Associated symptoms: none.  Patient has no other skin complaints today.  Remainder of the HPI, Meds, PMH, Allergies, FH, and SH was reviewed in chart.      Past Medical History:   Diagnosis Date     Chickenpox      Diarrhea      Group B streptococcus urinary tract infection complicating pregnancy 4/20/2011    Plan PCN in labor      History of postpartum depression, currently pregnant 10/21/2010     Malignant melanoma (H)      Postpartum depression 12/17/2008     Tailbone injury     fx     Urinary tract infections        Past Surgical History:   Procedure Laterality Date     C ORAL SURGERY PROCEDURE          Family History   Problem Relation Age of Onset     Arthritis Mother      Neurologic Disorder Mother         had a seizure      Alcohol/Drug Mother      Arthritis Maternal Grandmother      Breast Cancer Maternal Grandmother      Melanoma Maternal Grandmother      Heart Disease Paternal Grandmother      Cancer Father         skin       Social History     Socioeconomic History     Marital status:      Spouse name: Not on file     Number of children: 3     Years of education: Not on file     Highest education level: Not on file   Social Needs     Financial resource strain: Not on file     Food insecurity - worry: Not on file     Food insecurity - inability: Not on file     Transportation needs - medical: Not on file     Transportation needs - non-medical: Not on file   Occupational History     Employer: [employer]   Tobacco Use     Smoking status: Never Smoker     Smokeless tobacco: Never Used   Substance and Sexual Activity     Alcohol use: Yes      Drug use: No     Sexual activity: Yes     Partners: Male     Birth control/protection: Pill   Other Topics Concern     Parent/sibling w/ CABG, MI or angioplasty before 65F 55M? Yes     Comment: pt had MI @ age 58   Social History Narrative     Not on file       Outpatient Encounter Medications as of 2019   Medication Sig Dispense Refill     citalopram (CELEXA) 40 MG tablet Take 1 tablet (40 mg) by mouth daily 90 tablet 3     norethindrone-ethinyl estradiol (ORTHO-NOVUM , 28,) 0.5/0.75/1-35 MG-MCG per tablet Take 1 tablet by mouth daily 28 tablet 11     [] cefdinir (OMNICEF) 300 MG capsule Take 1 capsule (300 mg) by mouth 2 times daily for 10 days 20 capsule 0     No facility-administered encounter medications on file as of 2019.              Review Of Systems  Skin: As above  Eyes: negative  Ears/Nose/Throat: negative  Respiratory: No shortness of breath, dyspnea on exertion, cough, or hemoptysis  Cardiovascular: negative  Gastrointestinal: negative  Genitourinary: negative  Musculoskeletal: negative  Neurologic: negative  Psychiatric: negative  Hematologic/Lymphatic/Immunologic: negative  Endocrine: negative      O:   NAD, WDWN, Alert & Oriented, Mood & Affect wnl, Vitals stable   Here today alone   /82   Pulse 78   SpO2 100%    General appearance normal   Vitals stable   Alert, oriented and in no acute distress      Following lymph nodes palpated: Occipital, Cervical, Supraclavicular , axilla no lad     L arm well healed   Stuck on papules and brown macules on trunk and ext   Red papules on trunk  Scaly papule on lip   Comedones on face  Pigmented macules with regular borders and pigment networks on trunka nd ext        The remainder of the full exam was unremarkable; the following areas were examined:  conjunctiva/lids, oral mucosa, neck, peripheral vascular system, abdomen, lymph nodes, digits/nails, eccrine and apocrine glands, scalp/hair, face, neck, chest, abdomen, buttocks, back,  RUE, LUE, RLE, LLE       Eyes: Conjunctivae/lids:Normal     ENT: Lips, buccal mucosa, tongue: normal    MSK:Normal    Cardiovascular: peripheral edema none    Pulm: Breathing Normal    Lymph Nodes: No Head and Neck Lymphadenopathy     Neuro/Psych: Orientation:Normal; Mood/Affect:Normal      A/P:  1. Seborrheic keratosis, lentigo, angioma, nevi , hx of melanoma   MELANOMA DISCUSSED WITH PATIENT:  I discussed the specifics of tumor, prognosis, metachronous melanoma, self exam, and genetics with the patient. I explained the need for monthly skin exams including and taught the patient how to do this. Patient was asked about new or changing moles and a full skin exam was performed. We reviewed that surveillance involves a history and physical exam every 4 months for the first 5 years, then annually, with imaging studies only indicated if symptoms arise. We reviewed the signs and symptoms that could arise in the setting of recurrent locoregional or metastatic disease. In addition, we reviewed that they will need to undergo 4 dermatologic full skin survey and evaluation given that patients with a diagnosis of melanoma are at risk of recurrence (local and distant) and of subsequent de casepr melanoma.  I also reviewed the importance of protection from sun exposure, including wearing long sleeves, hats, etc and also the use of sunscreen with SPF of at least 35, with frequent re-applications.   2. Actinic keratosis on lip and comedones  treitnoin nightly    BENIGN LESIONS DISCUSSED WITH PATIENT:  I discussed the specifics of tumor, prognosis, and genetics of benign lesions.  I explained that treatment of these lesions would be purely cosmetic and not medically neccessary.  I discussed with patient different removal options including excision, cautery and /or laser.      Nature and genetics of benign skin lesions dicussed with patient.  Signs and Symptoms of skin cancer discussed with patient.  ABCDEs of melanoma reviewed with  patient.  Patient encouraged to perform monthly skin exams.  UV precautions reviewed with patient.  Patient to follow up with Primary Care provider regarding elevated blood pressure.  Skin care regimen reviewed with patient: Eliminate harsh soaps, i.e. Dial, zest, irsih spring; Mild soaps such as Cetaphil or Dove sensitive skin, avoid hot or cold showers, aggressive use of emollients including vanicream, cetaphil or cerave discussed with patient.    Risks of non-melanoma skin cancer discussed with patient   Return to clinic 4 months

## 2019-02-04 NOTE — LETTER
2/4/2019         RE: Remedios Watts  [address] [place] [address]        Dear Colleague,    Thank you for referring your patient, Remedios Watts, to the [facility]. Please see a copy of my visit note below.    .    Remedios Watts is a 38 year old year old female patient here today for hx of 0.45mm L arm.  She notes spot on lip.   .  Patient states this has been present for a while.  Patient reports the following symptoms:  scale.  Patient reports the following previous treatments cryo.  Patient reports the following modifying factors none.  Associated symptoms: none.  Patient has no other skin complaints today.  Remainder of the HPI, Meds, PMH, Allergies, FH, and SH was reviewed in chart.      Past Medical History:   Diagnosis Date     Chickenpox      Diarrhea      Group B streptococcus urinary tract infection complicating pregnancy 4/20/2011    Plan PCN in labor      History of postpartum depression, currently pregnant 10/21/2010     Malignant melanoma (H)      Postpartum depression 12/17/2008     Tailbone injury     fx     Urinary tract infections        Past Surgical History:   Procedure Laterality Date     C ORAL SURGERY PROCEDURE          Family History   Problem Relation Age of Onset     Arthritis Mother      Neurologic Disorder Mother         had a seizure      Alcohol/Drug Mother      Arthritis Maternal Grandmother      Breast Cancer Maternal Grandmother      Melanoma Maternal Grandmother      Heart Disease Paternal Grandmother      Cancer Father         skin       Social History     Socioeconomic History     Marital status:      Spouse name: Not on file     Number of children: 3     Years of education: Not on file     Highest education level: Not on file   Social Needs     Financial resource strain: Not on file     Food insecurity - worry: Not on file     Food insecurity - inability: Not on file     Transportation needs - medical: Not on file     Transportation  needs - non-medical: Not on file   Occupational History     Employer: [employer]   Tobacco Use     Smoking status: Never Smoker     Smokeless tobacco: Never Used   Substance and Sexual Activity     Alcohol use: Yes     Drug use: No     Sexual activity: Yes     Partners: Male     Birth control/protection: Pill   Other Topics Concern     Parent/sibling w/ CABG, MI or angioplasty before 65F 55M? Yes     Comment: pt had MI @ age 58   Social History Narrative     Not on file       Outpatient Encounter Medications as of 2019   Medication Sig Dispense Refill     citalopram (CELEXA) 40 MG tablet Take 1 tablet (40 mg) by mouth daily 90 tablet 3     norethindrone-ethinyl estradiol (ORTHO-NOVUM 7/, 28,) 0.5/0.75/1-35 MG-MCG per tablet Take 1 tablet by mouth daily 28 tablet 11     [] cefdinir (OMNICEF) 300 MG capsule Take 1 capsule (300 mg) by mouth 2 times daily for 10 days 20 capsule 0     No facility-administered encounter medications on file as of 2019.              Review Of Systems  Skin: As above  Eyes: negative  Ears/Nose/Throat: negative  Respiratory: No shortness of breath, dyspnea on exertion, cough, or hemoptysis  Cardiovascular: negative  Gastrointestinal: negative  Genitourinary: negative  Musculoskeletal: negative  Neurologic: negative  Psychiatric: negative  Hematologic/Lymphatic/Immunologic: negative  Endocrine: negative      O:   NAD, WDWN, Alert & Oriented, Mood & Affect wnl, Vitals stable   Here today alone   /82   Pulse 78   SpO2 100%    General appearance normal   Vitals stable   Alert, oriented and in no acute distress      Following lymph nodes palpated: Occipital, Cervical, Supraclavicular , axilla no lad     L arm well healed   Stuck on papules and brown macules on trunk and ext   Red papules on trunk  Scaly papule on lip   Comedones on face  Pigmented macules with regular borders and pigment networks on trunka nd ext        The remainder of the full  exam was unremarkable; the following areas were examined:  conjunctiva/lids, oral mucosa, neck, peripheral vascular system, abdomen, lymph nodes, digits/nails, eccrine and apocrine glands, scalp/hair, face, neck, chest, abdomen, buttocks, back, RUE, LUE, RLE, LLE       Eyes: Conjunctivae/lids:Normal     ENT: Lips, buccal mucosa, tongue: normal    MSK:Normal    Cardiovascular: peripheral edema none    Pulm: Breathing Normal    Lymph Nodes: No Head and Neck Lymphadenopathy     Neuro/Psych: Orientation:Normal; Mood/Affect:Normal      A/P:  1. Seborrheic keratosis, lentigo, angioma, nevi , hx of melanoma   MELANOMA DISCUSSED WITH PATIENT:  I discussed the specifics of tumor, prognosis, metachronous melanoma, self exam, and genetics with the patient. I explained the need for monthly skin exams including and taught the patient how to do this. Patient was asked about new or changing moles and a full skin exam was performed. We reviewed that surveillance involves a history and physical exam every 4 months for the first 5 years, then annually, with imaging studies only indicated if symptoms arise. We reviewed the signs and symptoms that could arise in the setting of recurrent locoregional or metastatic disease. In addition, we reviewed that they will need to undergo 4 dermatologic full skin survey and evaluation given that patients with a diagnosis of melanoma are at risk of recurrence (local and distant) and of subsequent de casper melanoma.  I also reviewed the importance of protection from sun exposure, including wearing long sleeves, hats, etc and also the use of sunscreen with SPF of at least 35, with frequent re-applications.   2. Actinic keratosis on lip and comedones  treitnoin nightly    BENIGN LESIONS DISCUSSED WITH PATIENT:  I discussed the specifics of tumor, prognosis, and genetics of benign lesions.  I explained that treatment of these lesions would be purely cosmetic and not medically neccessary.  I discussed  with patient different removal options including excision, cautery and /or laser.      Nature and genetics of benign skin lesions dicussed with patient.  Signs and Symptoms of skin cancer discussed with patient.  ABCDEs of melanoma reviewed with patient.  Patient encouraged to perform monthly skin exams.  UV precautions reviewed with patient.  Patient to follow up with Primary Care provider regarding elevated blood pressure.  Skin care regimen reviewed with patient: Eliminate harsh soaps, i.e. Dial, zest, irsih spring; Mild soaps such as Cetaphil or Dove sensitive skin, avoid hot or cold showers, aggressive use of emollients including vanicream, cetaphil or cerave discussed with patient.    Risks of non-melanoma skin cancer discussed with patient   Return to clinic 4 months      Again, thank you for allowing me to participate in the care of your patient.        Sincerely,        Johnny Stevens MD

## 2019-02-04 NOTE — NURSING NOTE
"Initial /82   Pulse 78   SpO2 100%  Estimated body mass index is 28.73 kg/m  as calculated from the following:    Height as of 1/17/19: 1.676 m (5' 6\").    Weight as of 1/17/19: 80.7 kg (178 lb). .      "

## 2019-02-04 NOTE — PATIENT INSTRUCTIONS
Use the cream at bed time. Pea size amount.  You can use it on your whole face if you wish. Will help with sun damage and fine lines. This will make you sensitive to the sun, so use sunscreen.

## 2019-02-24 ENCOUNTER — APPOINTMENT (OUTPATIENT)
Dept: GENERAL RADIOLOGY | Facility: CLINIC | Age: 39
End: 2019-02-24
Attending: FAMILY MEDICINE
Payer: COMMERCIAL

## 2019-02-24 ENCOUNTER — HOSPITAL ENCOUNTER (EMERGENCY)
Facility: CLINIC | Age: 39
Discharge: HOME | End: 2019-02-24
Attending: FAMILY MEDICINE | Admitting: FAMILY MEDICINE
Payer: COMMERCIAL

## 2019-02-24 VITALS
OXYGEN SATURATION: 98 % | DIASTOLIC BLOOD PRESSURE: 86 MMHG | SYSTOLIC BLOOD PRESSURE: 125 MMHG | HEIGHT: 66 IN | BODY MASS INDEX: 26.52 KG/M2 | WEIGHT: 165 LBS | TEMPERATURE: 98.1 F | RESPIRATION RATE: 16 BRPM

## 2019-02-24 DIAGNOSIS — R10.84 ABDOMINAL PAIN, GENERALIZED: ICD-10-CM

## 2019-02-24 DIAGNOSIS — K59.01 SLOW TRANSIT CONSTIPATION: ICD-10-CM

## 2019-02-24 LAB
ALBUMIN SERPL-MCNC: 3.4 G/DL (ref 3.4–5)
ALBUMIN UR-MCNC: NEGATIVE MG/DL
ALP SERPL-CCNC: 59 U/L (ref 40–150)
ALT SERPL W P-5'-P-CCNC: 16 U/L (ref 0–50)
ANION GAP SERPL CALCULATED.3IONS-SCNC: 4 MMOL/L (ref 3–14)
APPEARANCE UR: CLEAR
AST SERPL W P-5'-P-CCNC: 12 U/L (ref 0–45)
BASOPHILS # BLD AUTO: 0 10E9/L (ref 0–0.2)
BASOPHILS NFR BLD AUTO: 0.3 %
BILIRUB SERPL-MCNC: 0.3 MG/DL (ref 0.2–1.3)
BILIRUB UR QL STRIP: NEGATIVE
BUN SERPL-MCNC: 7 MG/DL (ref 7–30)
CALCIUM SERPL-MCNC: 8.6 MG/DL (ref 8.5–10.1)
CHLORIDE SERPL-SCNC: 109 MMOL/L (ref 94–109)
CO2 SERPL-SCNC: 26 MMOL/L (ref 20–32)
COLOR UR AUTO: YELLOW
CREAT SERPL-MCNC: 0.74 MG/DL (ref 0.52–1.04)
DIFFERENTIAL METHOD BLD: ABNORMAL
EOSINOPHIL # BLD AUTO: 0.1 10E9/L (ref 0–0.7)
EOSINOPHIL NFR BLD AUTO: 1.2 %
ERYTHROCYTE [DISTWIDTH] IN BLOOD BY AUTOMATED COUNT: 12 % (ref 10–15)
GFR SERPL CREATININE-BSD FRML MDRD: >90 ML/MIN/{1.73_M2}
GLUCOSE SERPL-MCNC: 87 MG/DL (ref 70–99)
GLUCOSE UR STRIP-MCNC: NEGATIVE MG/DL
HCG UR QL: NEGATIVE
HCT VFR BLD AUTO: 35 % (ref 35–47)
HGB BLD-MCNC: 11.1 G/DL (ref 11.7–15.7)
HGB UR QL STRIP: ABNORMAL
IMM GRANULOCYTES # BLD: 0 10E9/L (ref 0–0.4)
IMM GRANULOCYTES NFR BLD: 0.2 %
KETONES UR STRIP-MCNC: NEGATIVE MG/DL
LEUKOCYTE ESTERASE UR QL STRIP: NEGATIVE
LIPASE SERPL-CCNC: 133 U/L (ref 73–393)
LYMPHOCYTES # BLD AUTO: 1.5 10E9/L (ref 0.8–5.3)
LYMPHOCYTES NFR BLD AUTO: 25.2 %
MCH RBC QN AUTO: 28.1 PG (ref 26.5–33)
MCHC RBC AUTO-ENTMCNC: 31.7 G/DL (ref 31.5–36.5)
MCV RBC AUTO: 89 FL (ref 78–100)
MONOCYTES # BLD AUTO: 0.6 10E9/L (ref 0–1.3)
MONOCYTES NFR BLD AUTO: 9.4 %
NEUTROPHILS # BLD AUTO: 3.9 10E9/L (ref 1.6–8.3)
NEUTROPHILS NFR BLD AUTO: 63.7 %
NITRATE UR QL: NEGATIVE
NRBC # BLD AUTO: 0 10*3/UL
NRBC BLD AUTO-RTO: 0 /100
PH UR STRIP: 8 PH (ref 5–7)
PLATELET # BLD AUTO: 270 10E9/L (ref 150–450)
POTASSIUM SERPL-SCNC: 3.8 MMOL/L (ref 3.4–5.3)
PROT SERPL-MCNC: 6.8 G/DL (ref 6.8–8.8)
RBC # BLD AUTO: 3.95 10E12/L (ref 3.8–5.2)
RBC #/AREA URNS AUTO: 9 /HPF (ref 0–2)
SODIUM SERPL-SCNC: 139 MMOL/L (ref 133–144)
SOURCE: ABNORMAL
SP GR UR STRIP: 1.01 (ref 1–1.03)
SQUAMOUS #/AREA URNS AUTO: <1 /HPF (ref 0–1)
UROBILINOGEN UR STRIP-MCNC: 0 MG/DL (ref 0–2)
WBC # BLD AUTO: 6 10E9/L (ref 4–11)
WBC #/AREA URNS AUTO: 1 /HPF (ref 0–5)

## 2019-02-24 PROCEDURE — 99284 EMERGENCY DEPT VISIT MOD MDM: CPT | Performed by: FAMILY MEDICINE

## 2019-02-24 PROCEDURE — 74019 RADEX ABDOMEN 2 VIEWS: CPT

## 2019-02-24 PROCEDURE — 80053 COMPREHEN METABOLIC PANEL: CPT | Performed by: FAMILY MEDICINE

## 2019-02-24 PROCEDURE — 81001 URINALYSIS AUTO W/SCOPE: CPT | Performed by: FAMILY MEDICINE

## 2019-02-24 PROCEDURE — 85025 COMPLETE CBC W/AUTO DIFF WBC: CPT | Performed by: FAMILY MEDICINE

## 2019-02-24 PROCEDURE — 99284 EMERGENCY DEPT VISIT MOD MDM: CPT | Mod: Z6 | Performed by: FAMILY MEDICINE

## 2019-02-24 PROCEDURE — 81025 URINE PREGNANCY TEST: CPT | Performed by: FAMILY MEDICINE

## 2019-02-24 PROCEDURE — 83690 ASSAY OF LIPASE: CPT | Performed by: FAMILY MEDICINE

## 2019-02-24 ASSESSMENT — ENCOUNTER SYMPTOMS
EYES NEGATIVE: 1
MUSCULOSKELETAL NEGATIVE: 1
CONSTIPATION: 0
FREQUENCY: 0
ABDOMINAL PAIN: 1
NAUSEA: 0
SHORTNESS OF BREATH: 0
COUGH: 0
VOMITING: 0
DIARRHEA: 0
CHILLS: 0
FEVER: 0
NEUROLOGICAL NEGATIVE: 1
PSYCHIATRIC NEGATIVE: 1
DYSURIA: 0
HEMATOLOGIC/LYMPHATIC NEGATIVE: 1

## 2019-02-24 ASSESSMENT — MIFFLIN-ST. JEOR: SCORE: 1440.19

## 2019-02-24 NOTE — ED NOTES
abd pain started at 0300-hurts all over when pain comes-feels like a contraction pt states-has menses-no n/v/d/constipation-last bm yesterday

## 2019-02-24 NOTE — DISCHARGE INSTRUCTIONS
Take prescribed medication as directed.    Clear liquids for now, advance if improving.    For persistent or worsening pain, return to E R for re evaluation.    You could also try a Fleet enema in addition to the above.

## 2019-02-24 NOTE — ED PROVIDER NOTES
"  History     Chief Complaint   Patient presents with     Abdominal Pain     entire abd -has menses     HPI  Remedios Watts is a 38 year old female who presents to the ED for evaluation of generalized abdominal pain. The patient states that she woke up at 3:00 this morning with sharp intermittent pains \"like contractions\" localized in her generalized abdomen. She describes that she experiences an episode about every 10 minutes and that they usually last for about 30 seconds before resolving. She rates her pain during these episodes as a 10/10. The patient states that she began her menstrual period about three days ago, and that she typically has heavy periods. The patient denies any recent fever, chills, dysuria or frequency. She also denies any nausea, vomiting, diarrhea, or constipation. The patient's last bowel movement was yesterday. She notes that her stool was somewhat harder than usual, but was not painful. She has no past history of constipation or abdominal surgeries. The patient regularly takes citalopram and oral contraception. She has four children.       Allergies:  Allergies   Allergen Reactions     Nka [No Known Allergies]        Problem List:    Patient Active Problem List    Diagnosis Date Noted     Folliculitis 02/06/2013     Priority: Medium     Lentigo 02/06/2013     Priority: Medium     Congenital nevus 02/06/2013     Priority: Medium     Angioma 02/06/2013     Priority: Medium     Multiple nevi 02/06/2013     Priority: Medium     Dermal nevus 02/06/2013     Priority: Medium     CARDIOVASCULAR SCREENING; LDL GOAL LESS THAN 160 01/21/2013     Priority: Medium     Pelvic pain 01/18/2013     Priority: Medium     Generalized anxiety disorder 06/29/2011     Priority: Medium     Diagnosis updated by automated process. Provider to review and confirm.          Past Medical History:    Past Medical History:   Diagnosis Date     Chickenpox      Diarrhea      Group B streptococcus urinary tract infection " "complicating pregnancy 4/20/2011     History of postpartum depression, currently pregnant 10/21/2010     Malignant melanoma (H)      Postpartum depression 12/17/2008     Tailbone injury      Urinary tract infections        Past Surgical History:    Past Surgical History:   Procedure Laterality Date     C ORAL SURGERY PROCEDURE         Family History:    Family History   Problem Relation Age of Onset     Arthritis Mother      Neurologic Disorder Mother         had a seizure      Alcohol/Drug Mother      Arthritis Maternal Grandmother      Breast Cancer Maternal Grandmother      Melanoma Maternal Grandmother      Heart Disease Paternal Grandmother      Cancer Father         skin       Social History:  Marital Status:   [2]  Social History     Tobacco Use     Smoking status: Never Smoker     Smokeless tobacco: Never Used   Substance Use Topics     Alcohol use: Yes     Drug use: No        Medications:      citalopram (CELEXA) 40 MG tablet   magnesium citrate solution   norethindrone-ethinyl estradiol (ORTHO-NOVUM 7/7/7, 28,) 0.5/0.75/1-35 MG-MCG per tablet   tretinoin (RETIN-A) 0.05 % external cream         Review of Systems   Constitutional: Negative for chills and fever.   HENT: Negative.    Eyes: Negative.    Respiratory: Negative for cough and shortness of breath.    Cardiovascular: Negative for chest pain.   Gastrointestinal: Positive for abdominal pain. Negative for constipation, diarrhea, nausea and vomiting.   Genitourinary: Negative for dysuria and frequency.   Musculoskeletal: Negative.    Skin: Negative.    Neurological: Negative.    Hematological: Negative.    Psychiatric/Behavioral: Negative.      Unremarkable except as noted above.       Physical Exam   BP: 125/86  Heart Rate: 69  Temp: 98.1  F (36.7  C)  Resp: 16  Height: 167.6 cm (5' 6\")  Weight: 74.8 kg (165 lb)  SpO2: 98 %      Physical Exam   Constitutional: She appears well-nourished. No distress.   HENT:   Head: Normocephalic. "   Mouth/Throat: Oropharynx is clear and moist.   Eyes: Pupils are equal, round, and reactive to light. No scleral icterus.   Neck: No thyromegaly present.   Cardiovascular: Regular rhythm and normal heart sounds.   No murmur heard.  Pulmonary/Chest: Breath sounds normal.   Abdominal: Bowel sounds are normal. There is tenderness in the suprapubic area and left lower quadrant.   Musculoskeletal: She exhibits no edema.   Neurological: Coordination normal.   Skin: Skin is warm and dry.   Psychiatric: She has a normal mood and affect.       ED Course        Procedures               Results for orders placed or performed during the hospital encounter of 02/24/19   XR Abdomen 2 Views    Narrative    ABDOMEN TWO VIEWS   2/24/2019 12:27 PM     HISTORY: Intermittent, generalized abdominal pain.    COMPARISON: None.    FINDINGS: No air-filled dilated loops of large or small bowel are  identified. No evidence of pneumatosis, free air, or portal venous  gas. No evidence of obstruction. Soft tissues and osseous structures  are unremarkable. Moderate stool burden.      Impression    IMPRESSION: Unremarkable.    MELODIE LOYD MD   CBC with platelets differential   Result Value Ref Range    WBC 6.0 4.0 - 11.0 10e9/L    RBC Count 3.95 3.8 - 5.2 10e12/L    Hemoglobin 11.1 (L) 11.7 - 15.7 g/dL    Hematocrit 35.0 35.0 - 47.0 %    MCV 89 78 - 100 fl    MCH 28.1 26.5 - 33.0 pg    MCHC 31.7 31.5 - 36.5 g/dL    RDW 12.0 10.0 - 15.0 %    Platelet Count 270 150 - 450 10e9/L    Diff Method Automated Method     % Neutrophils 63.7 %    % Lymphocytes 25.2 %    % Monocytes 9.4 %    % Eosinophils 1.2 %    % Basophils 0.3 %    % Immature Granulocytes 0.2 %    Nucleated RBCs 0 0 /100    Absolute Neutrophil 3.9 1.6 - 8.3 10e9/L    Absolute Lymphocytes 1.5 0.8 - 5.3 10e9/L    Absolute Monocytes 0.6 0.0 - 1.3 10e9/L    Absolute Eosinophils 0.1 0.0 - 0.7 10e9/L    Absolute Basophils 0.0 0.0 - 0.2 10e9/L    Abs Immature Granulocytes 0.0 0 - 0.4 10e9/L     Absolute Nucleated RBC 0.0    Comprehensive metabolic panel   Result Value Ref Range    Sodium 139 133 - 144 mmol/L    Potassium 3.8 3.4 - 5.3 mmol/L    Chloride 109 94 - 109 mmol/L    Carbon Dioxide 26 20 - 32 mmol/L    Anion Gap 4 3 - 14 mmol/L    Glucose 87 70 - 99 mg/dL    Urea Nitrogen 7 7 - 30 mg/dL    Creatinine 0.74 0.52 - 1.04 mg/dL    GFR Estimate >90 >60 mL/min/[1.73_m2]    GFR Estimate If Black >90 >60 mL/min/[1.73_m2]    Calcium 8.6 8.5 - 10.1 mg/dL    Bilirubin Total 0.3 0.2 - 1.3 mg/dL    Albumin 3.4 3.4 - 5.0 g/dL    Protein Total 6.8 6.8 - 8.8 g/dL    Alkaline Phosphatase 59 40 - 150 U/L    ALT 16 0 - 50 U/L    AST 12 0 - 45 U/L   Lipase   Result Value Ref Range    Lipase 133 73 - 393 U/L   HCG qualitative urine (UPT)   Result Value Ref Range    HCG Qual Urine Negative NEG^Negative   UA with Microscopic   Result Value Ref Range    Color Urine Yellow     Appearance Urine Clear     Glucose Urine Negative NEG^Negative mg/dL    Bilirubin Urine Negative NEG^Negative    Ketones Urine Negative NEG^Negative mg/dL    Specific Gravity Urine 1.009 1.003 - 1.035    Blood Urine Large (A) NEG^Negative    pH Urine 8.0 (H) 5.0 - 7.0 pH    Protein Albumin Urine Negative NEG^Negative mg/dL    Urobilinogen mg/dL 0.0 0.0 - 2.0 mg/dL    Nitrite Urine Negative NEG^Negative    Leukocyte Esterase Urine Negative NEG^Negative    Source Midstream Urine     WBC Urine 1 0 - 5 /HPF    RBC Urine 9 (H) 0 - 2 /HPF    Squamous Epithelial /HPF Urine <1 0 - 1 /HPF           Critical Care time:  none               Results for orders placed or performed during the hospital encounter of 02/24/19 (from the past 24 hour(s))   CBC with platelets differential   Result Value Ref Range    WBC 6.0 4.0 - 11.0 10e9/L    RBC Count 3.95 3.8 - 5.2 10e12/L    Hemoglobin 11.1 (L) 11.7 - 15.7 g/dL    Hematocrit 35.0 35.0 - 47.0 %    MCV 89 78 - 100 fl    MCH 28.1 26.5 - 33.0 pg    MCHC 31.7 31.5 - 36.5 g/dL    RDW 12.0 10.0 - 15.0 %    Platelet  Count 270 150 - 450 10e9/L    Diff Method Automated Method     % Neutrophils 63.7 %    % Lymphocytes 25.2 %    % Monocytes 9.4 %    % Eosinophils 1.2 %    % Basophils 0.3 %    % Immature Granulocytes 0.2 %    Nucleated RBCs 0 0 /100    Absolute Neutrophil 3.9 1.6 - 8.3 10e9/L    Absolute Lymphocytes 1.5 0.8 - 5.3 10e9/L    Absolute Monocytes 0.6 0.0 - 1.3 10e9/L    Absolute Eosinophils 0.1 0.0 - 0.7 10e9/L    Absolute Basophils 0.0 0.0 - 0.2 10e9/L    Abs Immature Granulocytes 0.0 0 - 0.4 10e9/L    Absolute Nucleated RBC 0.0    Comprehensive metabolic panel   Result Value Ref Range    Sodium 139 133 - 144 mmol/L    Potassium 3.8 3.4 - 5.3 mmol/L    Chloride 109 94 - 109 mmol/L    Carbon Dioxide 26 20 - 32 mmol/L    Anion Gap 4 3 - 14 mmol/L    Glucose 87 70 - 99 mg/dL    Urea Nitrogen 7 7 - 30 mg/dL    Creatinine 0.74 0.52 - 1.04 mg/dL    GFR Estimate >90 >60 mL/min/[1.73_m2]    GFR Estimate If Black >90 >60 mL/min/[1.73_m2]    Calcium 8.6 8.5 - 10.1 mg/dL    Bilirubin Total 0.3 0.2 - 1.3 mg/dL    Albumin 3.4 3.4 - 5.0 g/dL    Protein Total 6.8 6.8 - 8.8 g/dL    Alkaline Phosphatase 59 40 - 150 U/L    ALT 16 0 - 50 U/L    AST 12 0 - 45 U/L   Lipase   Result Value Ref Range    Lipase 133 73 - 393 U/L   XR Abdomen 2 Views    Narrative    ABDOMEN TWO VIEWS   2/24/2019 12:27 PM     HISTORY: Intermittent, generalized abdominal pain.    COMPARISON: None.    FINDINGS: No air-filled dilated loops of large or small bowel are  identified. No evidence of pneumatosis, free air, or portal venous  gas. No evidence of obstruction. Soft tissues and osseous structures  are unremarkable. Moderate stool burden.      Impression    IMPRESSION: Unremarkable.    MELODIE LOYD MD   HCG qualitative urine (UPT)   Result Value Ref Range    HCG Qual Urine Negative NEG^Negative   UA with Microscopic   Result Value Ref Range    Color Urine Yellow     Appearance Urine Clear     Glucose Urine Negative NEG^Negative mg/dL    Bilirubin Urine  Negative NEG^Negative    Ketones Urine Negative NEG^Negative mg/dL    Specific Gravity Urine 1.009 1.003 - 1.035    Blood Urine Large (A) NEG^Negative    pH Urine 8.0 (H) 5.0 - 7.0 pH    Protein Albumin Urine Negative NEG^Negative mg/dL    Urobilinogen mg/dL 0.0 0.0 - 2.0 mg/dL    Nitrite Urine Negative NEG^Negative    Leukocyte Esterase Urine Negative NEG^Negative    Source Midstream Urine     WBC Urine 1 0 - 5 /HPF    RBC Urine 9 (H) 0 - 2 /HPF    Squamous Epithelial /HPF Urine <1 0 - 1 /HPF       Medications - No data to display       11:43 Patient assessed.      Assessments & Plan (with Medical Decision Making)       This patient presented with recent onset of intermittent generalized abdominal pain.  Pain was of a colicky nature.  It was not well localized by the patient although she seemed a bit more tender in left lower abdomen although she was somewhat generally tender also.  Lab workup was unrevealing.  She had a few red cells in her urine but she was on her menstrual period.  Flat abdominal film does show a significant amount of stool burden in the ascending transverse and descending colon.  I felt that the clinical picture was quite suspicious for pain due to constipation and that efforts to relieve this were appropriate at this point rather than additional imaging.  Instructions were discussed and provided in writing.  She was advised to return to the emergency room for persistent or worsening symptoms.  Patient and  voiced understanding.      I have reviewed the nursing notes.    I have reviewed the findings, diagnosis, plan and need for follow up with the patient.          Medication List      Started    magnesium citrate solution  296 mLs, Oral, ONCE, . You may repeat 1 bottle in 6 hours if necessary.            Final diagnoses:   Abdominal pain, generalized   Slow transit constipation       This document serves as a record of the services and decisions personally performed and made by Price  Solomon Crawford MD. It was created on HIS/HER behalf by Cinthia Li, a trained medical scribe. The creation of this document is based the provider's statements to the medical scribe.  Cinthia Li 12:14 PM 2/24/2019    Provider:   The information in this document, created by the medical scribe for me, accurately reflects the services I personally performed and the decisions made by me. I have reviewed and approved this document for accuracy prior to leaving the patient care area.  Solomon Thrasher MD 12:14 PM 2/24/2019 2/24/2019   [facility] EMERGENCY DEPARTMENT     Solomon Thrasher MD  02/24/19 1348

## 2019-02-24 NOTE — ED AVS SNAPSHOT
[facility] Emergency Department  [address] [address]  Phone:  [phone]  Fax:  [phone]                                    Remedios Watts   MRN: [id number]    Department:  [facility] Emergency Department   Date of Visit:  2/24/2019           After Visit Summary Signature Page    I have received my discharge instructions, and my questions have been answered. I have discussed any challenges I see with this plan with the nurse or doctor.    ..........................................................................................................................................  Patient/Patient Representative Signature      ..........................................................................................................................................  Patient Representative Print Name and Relationship to Patient    ..................................................               ................................................  Date                                   Time    ..........................................................................................................................................  Reviewed by Signature/Title    ...................................................              ..............................................  Date                                               Time          22EPIC Rev 08/18

## 2019-03-07 ENCOUNTER — NURSE TRIAGE (OUTPATIENT)
Dept: NURSING | Facility: CLINIC | Age: 39
End: 2019-03-07

## 2019-03-07 ENCOUNTER — TELEPHONE (OUTPATIENT)
Dept: FAMILY MEDICINE | Facility: CLINIC | Age: 39
End: 2019-03-07

## 2019-03-07 ENCOUNTER — TELEPHONE (OUTPATIENT)
Dept: DERMATOLOGY | Facility: CLINIC | Age: 39
End: 2019-03-07

## 2019-03-07 NOTE — TELEPHONE ENCOUNTER
Reason for Disposition    [1] Caller requesting NON-URGENT health information AND [2] PCP's office is the best resource    Additional Information    Negative: [1] Caller is not with the adult (patient) AND [2] reporting urgent symptoms    Negative: Lab result questions    Negative: Medication questions    Negative: Caller cannot be reached by phone    Negative: Caller has already spoken to PCP or another triager    Negative: RN needs further essential information from caller in order to complete triage    Negative: Requesting regular office appointment    Protocols used: INFORMATION ONLY CALL-ADULT-    Patient was recently seen in the ED with complaint of abdominal pain which was as a result of constipation.  The patient reports that over the past couple of days, she has continued to have sharp abdominal pains to the upper abdomen and believes that it may be related to her gallbladder.  Patient is requesting to have an Ultrasound.  Writer explained that the patient's primary care provider may be able to set up an outpatient scan for her after speaking with him.  Writer also asked if patient would like to have her symptoms triaged, which the patient declined.  Patient with plans to call her provider's office.    Desiree Muñiz RN  [facility]

## 2019-03-07 NOTE — TELEPHONE ENCOUNTER
Patient called stating that she was seen in the ED on 02/24/2019-- was told she is constipated and to do a bowel clean out. Patient states that she did this and feels cleaned out. She states that she gets intermittent sharp pain in the upper abdominal pain.  Reports vomiting 2 days and pain woke her from sleep. Patient is having diarrhea.     Patient is requesting a US to evaluate gallbladder.       [facility]

## 2019-03-07 NOTE — TELEPHONE ENCOUNTER
Prior Authorization Retail Medication Request    Medication/Dose: Tretinoin  ICD code (if different than what is on RX):    Previously Tried and Failed:    Rationale:      Insurance Name:    Insurance ID:        Pharmacy Information (if different than what is on RX)  Name:  [facility]  Phone:  [phone]

## 2019-03-10 ENCOUNTER — HOSPITAL ENCOUNTER (INPATIENT)
Facility: CLINIC | Age: 39
LOS: 2 days | Discharge: HOME | End: 2019-03-12
Attending: INTERNAL MEDICINE | Admitting: INTERNAL MEDICINE
Payer: COMMERCIAL

## 2019-03-10 ENCOUNTER — APPOINTMENT (OUTPATIENT)
Dept: GENERAL RADIOLOGY | Facility: CLINIC | Age: 39
End: 2019-03-10
Attending: HOSPITALIST
Payer: COMMERCIAL

## 2019-03-10 ENCOUNTER — HOSPITAL ENCOUNTER (EMERGENCY)
Facility: CLINIC | Age: 39
Discharge: ANOTHER HOSPITAL | End: 2019-03-10
Attending: FAMILY MEDICINE | Admitting: FAMILY MEDICINE
Payer: COMMERCIAL

## 2019-03-10 ENCOUNTER — APPOINTMENT (OUTPATIENT)
Dept: ULTRASOUND IMAGING | Facility: CLINIC | Age: 39
End: 2019-03-10
Attending: FAMILY MEDICINE
Payer: COMMERCIAL

## 2019-03-10 ENCOUNTER — APPOINTMENT (OUTPATIENT)
Dept: CT IMAGING | Facility: CLINIC | Age: 39
End: 2019-03-10
Attending: FAMILY MEDICINE
Payer: COMMERCIAL

## 2019-03-10 VITALS
BODY MASS INDEX: 25.82 KG/M2 | HEART RATE: 75 BPM | TEMPERATURE: 98.6 F | DIASTOLIC BLOOD PRESSURE: 81 MMHG | OXYGEN SATURATION: 100 % | RESPIRATION RATE: 18 BRPM | SYSTOLIC BLOOD PRESSURE: 111 MMHG | WEIGHT: 160 LBS

## 2019-03-10 DIAGNOSIS — K63.89 MASS OF HEPATIC FLEXURE OF COLON: ICD-10-CM

## 2019-03-10 DIAGNOSIS — K63.89 COLONIC MASS: Primary | ICD-10-CM

## 2019-03-10 PROBLEM — K56.600 PARTIAL SMALL BOWEL OBSTRUCTION (H): Status: ACTIVE | Noted: 2019-03-10

## 2019-03-10 LAB
ALBUMIN SERPL-MCNC: 3.7 G/DL (ref 3.4–5)
ALP SERPL-CCNC: 92 U/L (ref 40–150)
ALT SERPL W P-5'-P-CCNC: 28 U/L (ref 0–50)
AMYLASE SERPL-CCNC: 37 U/L (ref 30–110)
ANION GAP SERPL CALCULATED.3IONS-SCNC: 9 MMOL/L (ref 3–14)
AST SERPL W P-5'-P-CCNC: 49 U/L (ref 0–45)
BASOPHILS # BLD AUTO: 0 10E9/L (ref 0–0.2)
BASOPHILS NFR BLD AUTO: 0.2 %
BILIRUB SERPL-MCNC: 0.2 MG/DL (ref 0.2–1.3)
BUN SERPL-MCNC: 7 MG/DL (ref 7–30)
CALCIUM SERPL-MCNC: 8.5 MG/DL (ref 8.5–10.1)
CHLORIDE SERPL-SCNC: 108 MMOL/L (ref 94–109)
CO2 SERPL-SCNC: 21 MMOL/L (ref 20–32)
CREAT SERPL-MCNC: 0.66 MG/DL (ref 0.52–1.04)
DIFFERENTIAL METHOD BLD: ABNORMAL
EOSINOPHIL # BLD AUTO: 0 10E9/L (ref 0–0.7)
EOSINOPHIL NFR BLD AUTO: 0.8 %
ERYTHROCYTE [DISTWIDTH] IN BLOOD BY AUTOMATED COUNT: 12.3 % (ref 10–15)
GFR SERPL CREATININE-BSD FRML MDRD: >90 ML/MIN/{1.73_M2}
GLUCOSE SERPL-MCNC: 94 MG/DL (ref 70–99)
HCT VFR BLD AUTO: 37.9 % (ref 35–47)
HGB BLD-MCNC: 11.6 G/DL (ref 11.7–15.7)
IMM GRANULOCYTES # BLD: 0 10E9/L (ref 0–0.4)
IMM GRANULOCYTES NFR BLD: 0.2 %
LIPASE SERPL-CCNC: 133 U/L (ref 73–393)
LYMPHOCYTES # BLD AUTO: 1.3 10E9/L (ref 0.8–5.3)
LYMPHOCYTES NFR BLD AUTO: 25.8 %
MCH RBC QN AUTO: 27 PG (ref 26.5–33)
MCHC RBC AUTO-ENTMCNC: 30.6 G/DL (ref 31.5–36.5)
MCV RBC AUTO: 88 FL (ref 78–100)
MONOCYTES # BLD AUTO: 0.6 10E9/L (ref 0–1.3)
MONOCYTES NFR BLD AUTO: 12.5 %
NEUTROPHILS # BLD AUTO: 3 10E9/L (ref 1.6–8.3)
NEUTROPHILS NFR BLD AUTO: 60.5 %
NRBC # BLD AUTO: 0 10*3/UL
NRBC BLD AUTO-RTO: 0 /100
PLATELET # BLD AUTO: 313 10E9/L (ref 150–450)
POTASSIUM SERPL-SCNC: 4.4 MMOL/L (ref 3.4–5.3)
PROT SERPL-MCNC: 7.4 G/DL (ref 6.8–8.8)
RBC # BLD AUTO: 4.29 10E12/L (ref 3.8–5.2)
SODIUM SERPL-SCNC: 138 MMOL/L (ref 133–144)
WBC # BLD AUTO: 5 10E9/L (ref 4–11)

## 2019-03-10 PROCEDURE — 76705 ECHO EXAM OF ABDOMEN: CPT

## 2019-03-10 PROCEDURE — 83690 ASSAY OF LIPASE: CPT | Performed by: FAMILY MEDICINE

## 2019-03-10 PROCEDURE — 25000128 H RX IP 250 OP 636: Performed by: PHYSICIAN ASSISTANT

## 2019-03-10 PROCEDURE — 85025 COMPLETE CBC W/AUTO DIFF WBC: CPT | Performed by: FAMILY MEDICINE

## 2019-03-10 PROCEDURE — 99223 1ST HOSP IP/OBS HIGH 75: CPT | Mod: AI | Performed by: INTERNAL MEDICINE

## 2019-03-10 PROCEDURE — 12000001 ZZH R&B MED SURG/OB UMMC

## 2019-03-10 PROCEDURE — 74177 CT ABD & PELVIS W/CONTRAST: CPT

## 2019-03-10 PROCEDURE — 99207 ZZC APP CREDIT; MD BILLING SHARED VISIT: CPT | Performed by: PHYSICIAN ASSISTANT

## 2019-03-10 PROCEDURE — 25000125 ZZHC RX 250: Performed by: FAMILY MEDICINE

## 2019-03-10 PROCEDURE — 25800030 ZZH RX IP 258 OP 636: Performed by: PHYSICIAN ASSISTANT

## 2019-03-10 PROCEDURE — 99285 EMERGENCY DEPT VISIT HI MDM: CPT | Mod: 25

## 2019-03-10 PROCEDURE — 25000128 H RX IP 250 OP 636: Performed by: FAMILY MEDICINE

## 2019-03-10 PROCEDURE — 25000125 ZZHC RX 250: Performed by: HOSPITALIST

## 2019-03-10 PROCEDURE — 99285 EMERGENCY DEPT VISIT HI MDM: CPT | Mod: Z6 | Performed by: FAMILY MEDICINE

## 2019-03-10 PROCEDURE — 80053 COMPREHEN METABOLIC PANEL: CPT | Performed by: FAMILY MEDICINE

## 2019-03-10 PROCEDURE — 40000986 XR ABDOMEN PORT 1 VW

## 2019-03-10 PROCEDURE — 82150 ASSAY OF AMYLASE: CPT | Performed by: FAMILY MEDICINE

## 2019-03-10 RX ORDER — ONDANSETRON 2 MG/ML
4 INJECTION INTRAMUSCULAR; INTRAVENOUS EVERY 6 HOURS PRN
Status: DISCONTINUED | OUTPATIENT
Start: 2019-03-10 | End: 2019-03-12 | Stop reason: HOSPADM

## 2019-03-10 RX ORDER — ACETAMINOPHEN 325 MG/1
650 TABLET ORAL EVERY 4 HOURS PRN
Status: CANCELLED | OUTPATIENT
Start: 2019-03-10

## 2019-03-10 RX ORDER — NALOXONE HYDROCHLORIDE 0.4 MG/ML
.1-.4 INJECTION, SOLUTION INTRAMUSCULAR; INTRAVENOUS; SUBCUTANEOUS
Status: DISCONTINUED | OUTPATIENT
Start: 2019-03-10 | End: 2019-03-10

## 2019-03-10 RX ORDER — ONDANSETRON 2 MG/ML
4 INJECTION INTRAMUSCULAR; INTRAVENOUS EVERY 6 HOURS PRN
Status: CANCELLED | OUTPATIENT
Start: 2019-03-10

## 2019-03-10 RX ORDER — PROCHLORPERAZINE 25 MG
25 SUPPOSITORY, RECTAL RECTAL EVERY 12 HOURS PRN
Status: DISCONTINUED | OUTPATIENT
Start: 2019-03-10 | End: 2019-03-12 | Stop reason: HOSPADM

## 2019-03-10 RX ORDER — ONDANSETRON 4 MG/1
4 TABLET, ORALLY DISINTEGRATING ORAL EVERY 6 HOURS PRN
Status: DISCONTINUED | OUTPATIENT
Start: 2019-03-10 | End: 2019-03-12 | Stop reason: HOSPADM

## 2019-03-10 RX ORDER — NALOXONE HYDROCHLORIDE 0.4 MG/ML
.1-.4 INJECTION, SOLUTION INTRAMUSCULAR; INTRAVENOUS; SUBCUTANEOUS
Status: DISCONTINUED | OUTPATIENT
Start: 2019-03-10 | End: 2019-03-12 | Stop reason: HOSPADM

## 2019-03-10 RX ORDER — SODIUM CHLORIDE, SODIUM LACTATE, POTASSIUM CHLORIDE, CALCIUM CHLORIDE 600; 310; 30; 20 MG/100ML; MG/100ML; MG/100ML; MG/100ML
INJECTION, SOLUTION INTRAVENOUS CONTINUOUS
Status: CANCELLED | OUTPATIENT
Start: 2019-03-10

## 2019-03-10 RX ORDER — IOPAMIDOL 755 MG/ML
79 INJECTION, SOLUTION INTRAVASCULAR ONCE
Status: COMPLETED | OUTPATIENT
Start: 2019-03-10 | End: 2019-03-10

## 2019-03-10 RX ORDER — HYDROMORPHONE HCL/0.9% NACL/PF 0.2MG/0.2
0.2 SYRINGE (ML) INTRAVENOUS
Status: DISCONTINUED | OUTPATIENT
Start: 2019-03-10 | End: 2019-03-12 | Stop reason: HOSPADM

## 2019-03-10 RX ORDER — PROCHLORPERAZINE MALEATE 5 MG
10 TABLET ORAL EVERY 6 HOURS PRN
Status: DISCONTINUED | OUTPATIENT
Start: 2019-03-10 | End: 2019-03-12 | Stop reason: HOSPADM

## 2019-03-10 RX ORDER — CITALOPRAM HYDROBROMIDE 40 MG/1
40 TABLET ORAL DAILY
Status: CANCELLED | OUTPATIENT
Start: 2019-03-11

## 2019-03-10 RX ORDER — ACETAMINOPHEN 325 MG/1
650 TABLET ORAL EVERY 4 HOURS PRN
Status: DISCONTINUED | OUTPATIENT
Start: 2019-03-10 | End: 2019-03-12 | Stop reason: HOSPADM

## 2019-03-10 RX ORDER — AMOXICILLIN 250 MG
1 CAPSULE ORAL 2 TIMES DAILY PRN
Status: CANCELLED | OUTPATIENT
Start: 2019-03-10

## 2019-03-10 RX ORDER — NALOXONE HYDROCHLORIDE 0.4 MG/ML
.1-.4 INJECTION, SOLUTION INTRAMUSCULAR; INTRAVENOUS; SUBCUTANEOUS
Status: CANCELLED | OUTPATIENT
Start: 2019-03-10

## 2019-03-10 RX ORDER — PROCHLORPERAZINE MALEATE 5 MG
10 TABLET ORAL EVERY 6 HOURS PRN
Status: CANCELLED | OUTPATIENT
Start: 2019-03-10

## 2019-03-10 RX ORDER — CITALOPRAM HYDROBROMIDE 20 MG/1
40 TABLET ORAL DAILY
Status: DISCONTINUED | OUTPATIENT
Start: 2019-03-10 | End: 2019-03-12 | Stop reason: HOSPADM

## 2019-03-10 RX ORDER — AMOXICILLIN 250 MG
2 CAPSULE ORAL 2 TIMES DAILY PRN
Status: CANCELLED | OUTPATIENT
Start: 2019-03-10

## 2019-03-10 RX ORDER — ONDANSETRON 4 MG/1
4 TABLET, ORALLY DISINTEGRATING ORAL EVERY 6 HOURS PRN
Status: CANCELLED | OUTPATIENT
Start: 2019-03-10

## 2019-03-10 RX ORDER — SODIUM CHLORIDE, SODIUM LACTATE, POTASSIUM CHLORIDE, CALCIUM CHLORIDE 600; 310; 30; 20 MG/100ML; MG/100ML; MG/100ML; MG/100ML
INJECTION, SOLUTION INTRAVENOUS CONTINUOUS
Status: DISCONTINUED | OUTPATIENT
Start: 2019-03-10 | End: 2019-03-12 | Stop reason: HOSPADM

## 2019-03-10 RX ORDER — POLYETHYLENE GLYCOL 3350 17 G/17G
17 POWDER, FOR SOLUTION ORAL DAILY PRN
Status: DISCONTINUED | OUTPATIENT
Start: 2019-03-10 | End: 2019-03-12 | Stop reason: HOSPADM

## 2019-03-10 RX ORDER — PROCHLORPERAZINE 25 MG
25 SUPPOSITORY, RECTAL RECTAL EVERY 12 HOURS PRN
Status: CANCELLED | OUTPATIENT
Start: 2019-03-10

## 2019-03-10 RX ADMIN — SODIUM CHLORIDE 60 ML: 9 INJECTION, SOLUTION INTRAVENOUS at 16:09

## 2019-03-10 RX ADMIN — SODIUM CHLORIDE, POTASSIUM CHLORIDE, SODIUM LACTATE AND CALCIUM CHLORIDE: 600; 310; 30; 20 INJECTION, SOLUTION INTRAVENOUS at 23:02

## 2019-03-10 RX ADMIN — Medication 0.2 MG: at 21:13

## 2019-03-10 RX ADMIN — BENZOCAINE 1 ML: 220 SPRAY, METERED PERIODONTAL at 23:15

## 2019-03-10 RX ADMIN — IOPAMIDOL 79 ML: 755 INJECTION, SOLUTION INTRAVENOUS at 16:09

## 2019-03-10 ASSESSMENT — ENCOUNTER SYMPTOMS
DYSURIA: 0
DIARRHEA: 0
FEVER: 0
VOMITING: 1
CONSTIPATION: 0
NAUSEA: 0
DIAPHORESIS: 0
CHILLS: 0
FLANK PAIN: 0
ABDOMINAL PAIN: 1
FREQUENCY: 0

## 2019-03-10 NOTE — ED PROVIDER NOTES
History     Chief Complaint   Patient presents with     Abdominal Pain     seen last week for same complaints.  mid abd pain       HPI  Remedios Watts is a 38 year old female who presents with abdominal pain.  She notes that she was here for the same problem last week and at that time was diagnosed with constipation.  She ended up taking a laxative and after 3 days of consecutive large bowel movements was pretty well cleaned out but unfortunately her per symptoms persisted.  She describes the pains as a sharp intermittent epigastric and right upper quadrant pain.  It is provoked by eating or drinking anything.  She is tried antacids without any relief.  She had one episode of vomiting associated with it.  She has had no fever chills or sweats.  No urinary symptoms.  She has no history of abdominal surgeries.    Allergies:  Allergies   Allergen Reactions     Nka [No Known Allergies]        Problem List:    Patient Active Problem List    Diagnosis Date Noted     Folliculitis 02/06/2013     Priority: Medium     Lentigo 02/06/2013     Priority: Medium     Congenital nevus 02/06/2013     Priority: Medium     Angioma 02/06/2013     Priority: Medium     Multiple nevi 02/06/2013     Priority: Medium     Dermal nevus 02/06/2013     Priority: Medium     CARDIOVASCULAR SCREENING; LDL GOAL LESS THAN 160 01/21/2013     Priority: Medium     Pelvic pain 01/18/2013     Priority: Medium     Generalized anxiety disorder 06/29/2011     Priority: Medium     Diagnosis updated by automated process. Provider to review and confirm.          Past Medical History:    Past Medical History:   Diagnosis Date     Chickenpox      Diarrhea      Group B streptococcus urinary tract infection complicating pregnancy 4/20/2011     History of postpartum depression, currently pregnant 10/21/2010     Malignant melanoma (H)      Postpartum depression 12/17/2008     Tailbone injury      Urinary tract infections        Past Surgical History:    Past  Surgical History:   Procedure Laterality Date     C ORAL SURGERY PROCEDURE         Family History:    Family History   Problem Relation Age of Onset     Arthritis Mother      Neurologic Disorder Mother         had a seizure      Alcohol/Drug Mother      Arthritis Maternal Grandmother      Breast Cancer Maternal Grandmother      Melanoma Maternal Grandmother      Heart Disease Paternal Grandmother      Cancer Father         skin       Social History:  Marital Status:   [2]  Social History     Tobacco Use     Smoking status: Never Smoker     Smokeless tobacco: Never Used   Substance Use Topics     Alcohol use: Yes     Drug use: No        Medications:      citalopram (CELEXA) 40 MG tablet   norethindrone-ethinyl estradiol (ORTHO-NOVUM 7/7/7, 28,) 0.5/0.75/1-35 MG-MCG per tablet   tretinoin (RETIN-A) 0.05 % external cream         Review of Systems   Constitutional: Negative for chills, diaphoresis and fever.   Gastrointestinal: Positive for abdominal pain and vomiting. Negative for constipation, diarrhea and nausea.   Genitourinary: Negative for dysuria, flank pain and frequency.   Skin: Negative for rash.       Physical Exam   BP: 128/82  Pulse: 75  Temp: 98.6  F (37  C)  Resp: 18  Weight: 72.6 kg (160 lb)  SpO2: 100 %      Physical Exam   Constitutional: She appears well-developed and well-nourished. No distress.   HENT:   Head: Normocephalic and atraumatic.   Eyes: EOM are normal.   Neck: Normal range of motion. Neck supple.   Pulmonary/Chest: Effort normal.   Abdominal: Soft. Bowel sounds are normal. She exhibits no distension and no mass. There is tenderness (RUQ tenderness). There is no guarding.   Neurological: She is alert.   Skin: Skin is warm and dry. She is not diaphoretic.   Psychiatric: She has a normal mood and affect.       ED Course        Procedures               Critical Care time:  none               Results for orders placed or performed during the hospital encounter of 03/10/19 (from  the past 24 hour(s))   Comprehensive metabolic panel   Result Value Ref Range    Sodium 138 133 - 144 mmol/L    Potassium 4.4 3.4 - 5.3 mmol/L    Chloride 108 94 - 109 mmol/L    Carbon Dioxide 21 20 - 32 mmol/L    Anion Gap 9 3 - 14 mmol/L    Glucose 94 70 - 99 mg/dL    Urea Nitrogen 7 7 - 30 mg/dL    Creatinine 0.66 0.52 - 1.04 mg/dL    GFR Estimate >90 >60 mL/min/[1.73_m2]    GFR Estimate If Black >90 >60 mL/min/[1.73_m2]    Calcium 8.5 8.5 - 10.1 mg/dL    Bilirubin Total 0.2 0.2 - 1.3 mg/dL    Albumin 3.7 3.4 - 5.0 g/dL    Protein Total 7.4 6.8 - 8.8 g/dL    Alkaline Phosphatase 92 40 - 150 U/L    ALT 28 0 - 50 U/L    AST 49 (H) 0 - 45 U/L   Amylase   Result Value Ref Range    Amylase 37 30 - 110 U/L   Lipase   Result Value Ref Range    Lipase 133 73 - 393 U/L   CBC with platelets, differential   Result Value Ref Range    WBC 5.0 4.0 - 11.0 10e9/L    RBC Count 4.29 3.8 - 5.2 10e12/L    Hemoglobin 11.6 (L) 11.7 - 15.7 g/dL    Hematocrit 37.9 35.0 - 47.0 %    MCV 88 78 - 100 fl    MCH 27.0 26.5 - 33.0 pg    MCHC 30.6 (L) 31.5 - 36.5 g/dL    RDW 12.3 10.0 - 15.0 %    Platelet Count 313 150 - 450 10e9/L    Diff Method Automated Method     % Neutrophils 60.5 %    % Lymphocytes 25.8 %    % Monocytes 12.5 %    % Eosinophils 0.8 %    % Basophils 0.2 %    % Immature Granulocytes 0.2 %    Nucleated RBCs 0 0 /100    Absolute Neutrophil 3.0 1.6 - 8.3 10e9/L    Absolute Lymphocytes 1.3 0.8 - 5.3 10e9/L    Absolute Monocytes 0.6 0.0 - 1.3 10e9/L    Absolute Eosinophils 0.0 0.0 - 0.7 10e9/L    Absolute Basophils 0.0 0.0 - 0.2 10e9/L    Abs Immature Granulocytes 0.0 0 - 0.4 10e9/L    Absolute Nucleated RBC 0.0    US Abdomen Limited (RUQ)    Narrative    US ABDOMEN LIMITED 3/10/2019 2:19 PM    HISTORY: Right upper quadrant and epigastric pain.    COMPARISON: CT abdomen and pelvis 12/19/2007.    FINDINGS:  Gallbladder: Normal.    Common bile duct: Normal.    Liver: Normal.    Pancreas: Normal.    Right kidney: Normal size and  morphology. Normal cortical thickness  and no hydronephrosis. There is a 0.8 cm hyperechoic focus at the  upper pole of the right kidney, likely a benign angiomyolipoma. No  mass lesion was seen in this region on the prior CT exam. Since this  lesion does not meet criteria for a simple benign cyst, a follow-up  ultrasound is recommended in 3-5 months to assure that this lesion is  stable.      Impression    IMPRESSION:   1. 0.8 cm hyperechoic lesion upper pole right kidney without  abnormality on CT exam done 12 years ago. 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  2. Otherwise unremarkable abdominal ultrasound.    PETER ALVARENGA MD   CT Abdomen Pelvis w Contrast    Narrative    CT ABDOMEN AND PELVIS WITH CONTRAST 3/10/2019 4:32 PM     HISTORY: Abdominal pain, unspecified.    COMPARISON: Abdominal ultrasound 3/10/2019. CT abdomen and pelvis  12/19/2007.    TECHNIQUE: Axial images from the lung bases to the symphysis are  performed with additional coronal reformatted images. 79 mL of Isovue  370 are given intravenously.  Radiation dose for this scan was reduced  using automated exposure control, adjustment of the mA and/or kV  according to patient size, or iterative reconstruction technique.    FINDINGS:     The lung bases are clear.    Abdomen: The liver, spleen, gallbladder, pancreas, adrenal glands and  kidneys are unremarkable. No hydronephrosis or renal calculi. No  enlarged lymph nodes. Colonic wall thickening is noted involving the  ascending colon possibly reflecting an infectious or inflammatory  colitis. Stool distends the right colon and there is a probable apple  core lesion in the region of the hepatic flexure on series 2, image 29  and series 3, image 29. This is better visualized on the coronal  images. Malignancy is suspected.    Pelvis: The bladder, uterus, ovaries and rectum are unremarkable.  Transverse colon, descending colon, sigmoid colon and rectum  are  relatively decompressed. Bone window examination is within normal  limits.      Impression    IMPRESSION:  1. Probable apple core lesion near the hepatic flexure of the colon  concerning for an obstructing colonic neoplasm. Follow-up colonoscopy  recommended for further assessment.  2. Probable infectious or inflammatory colitis involving the cecum and  ascending colon possibly related to the obstruction. Small bowel is  normal in caliber.  3. No other acute changes are evident in the abdomen or pelvis to  account for patient's symptoms. Abdominal and pelvic organs are within  normal limits. No enlarged lymph nodes.    NEIL OROSCO MD       Medications   iopamidol (ISOVUE-370) solution 79 mL (79 mLs Intravenous Given 3/10/19 1609)   sodium chloride 0.9 % bag 500mL for CT scan flush use (60 mLs Intravenous Given 3/10/19 1609)       Assessments & Plan (with Medical Decision Making)     I have reviewed the nursing notes.    I have reviewed the findings, diagnosis, plan and need for follow up with the patient.   The findings were discussed extensively with the patient her  and her daughter.  I talked the case over with the colorectal surgeon Dr. Mccullough at the [facility] as well as the hospitalist Dr. Aamir Gutierrez they are going to take her an admission at the [facility].  She will transfer via private car and is stable to do so.  The staff gastroenterologist also was consulted over the phone and they will start prep tonight for colonoscopy and then take care of her appropriately as needed after that.       Medication List      ASK your doctor about these medications    magnesium citrate solution  296 mLs, Oral, ONCE, . You may repeat 1 bottle in 6 hours if necessary.  Ask about: Should I take this medication?            Final diagnoses:   Mass of hepatic flexure of colon       3/10/2019   [facility] EMERGENCY DEPARTMENT     Adithya Ceballos MD  03/10/19 [id number]

## 2019-03-11 LAB
ALBUMIN SERPL-MCNC: 3.6 G/DL (ref 3.4–5)
ALP SERPL-CCNC: 92 U/L (ref 40–150)
ALT SERPL W P-5'-P-CCNC: 26 U/L (ref 0–50)
ANION GAP SERPL CALCULATED.3IONS-SCNC: 6 MMOL/L (ref 3–14)
AST SERPL W P-5'-P-CCNC: 27 U/L (ref 0–45)
BILIRUB SERPL-MCNC: 0.3 MG/DL (ref 0.2–1.3)
BUN SERPL-MCNC: 7 MG/DL (ref 7–30)
CALCIUM SERPL-MCNC: 8.7 MG/DL (ref 8.5–10.1)
CEA SERPL-MCNC: 9.2 UG/L (ref 0–2.5)
CHLORIDE SERPL-SCNC: 104 MMOL/L (ref 94–109)
CO2 SERPL-SCNC: 29 MMOL/L (ref 20–32)
COLONOSCOPY: NORMAL
CREAT SERPL-MCNC: 0.72 MG/DL (ref 0.52–1.04)
ERYTHROCYTE [DISTWIDTH] IN BLOOD BY AUTOMATED COUNT: 12.7 % (ref 10–15)
FERRITIN SERPL-MCNC: 6 NG/ML (ref 12–150)
GFR SERPL CREATININE-BSD FRML MDRD: >90 ML/MIN/{1.73_M2}
GLUCOSE SERPL-MCNC: 100 MG/DL (ref 70–99)
HCT VFR BLD AUTO: 41.1 % (ref 35–47)
HGB BLD-MCNC: 12.4 G/DL (ref 11.7–15.7)
INR PPP: 1.07 (ref 0.86–1.14)
IRON SATN MFR SERPL: 8 % (ref 15–46)
IRON SERPL-MCNC: 39 UG/DL (ref 35–180)
MCH RBC QN AUTO: 26.7 PG (ref 26.5–33)
MCHC RBC AUTO-ENTMCNC: 30.2 G/DL (ref 31.5–36.5)
MCV RBC AUTO: 89 FL (ref 78–100)
PLATELET # BLD AUTO: 310 10E9/L (ref 150–450)
POTASSIUM SERPL-SCNC: 3.5 MMOL/L (ref 3.4–5.3)
PROT SERPL-MCNC: 7.4 G/DL (ref 6.8–8.8)
RBC # BLD AUTO: 4.64 10E12/L (ref 3.8–5.2)
SODIUM SERPL-SCNC: 138 MMOL/L (ref 133–144)
TIBC SERPL-MCNC: 460 UG/DL (ref 240–430)
WBC # BLD AUTO: 5.6 10E9/L (ref 4–11)

## 2019-03-11 PROCEDURE — 36415 COLL VENOUS BLD VENIPUNCTURE: CPT | Performed by: PHYSICIAN ASSISTANT

## 2019-03-11 PROCEDURE — 83550 IRON BINDING TEST: CPT | Performed by: PHYSICIAN ASSISTANT

## 2019-03-11 PROCEDURE — G0500 MOD SEDAT ENDO SERVICE >5YRS: HCPCS | Performed by: INTERNAL MEDICINE

## 2019-03-11 PROCEDURE — 25000132 ZZH RX MED GY IP 250 OP 250 PS 637: Performed by: INTERNAL MEDICINE

## 2019-03-11 PROCEDURE — 25000128 H RX IP 250 OP 636: Performed by: INTERNAL MEDICINE

## 2019-03-11 PROCEDURE — 99233 SBSQ HOSP IP/OBS HIGH 50: CPT | Mod: GC | Performed by: INTERNAL MEDICINE

## 2019-03-11 PROCEDURE — 83540 ASSAY OF IRON: CPT | Performed by: PHYSICIAN ASSISTANT

## 2019-03-11 PROCEDURE — 85610 PROTHROMBIN TIME: CPT | Performed by: PHYSICIAN ASSISTANT

## 2019-03-11 PROCEDURE — 88342 IMHCHEM/IMCYTCHM 1ST ANTB: CPT | Performed by: INTERNAL MEDICINE

## 2019-03-11 PROCEDURE — 88305 TISSUE EXAM BY PATHOLOGIST: CPT | Performed by: INTERNAL MEDICINE

## 2019-03-11 PROCEDURE — 88341 IMHCHEM/IMCYTCHM EA ADD ANTB: CPT | Performed by: INTERNAL MEDICINE

## 2019-03-11 PROCEDURE — 12000001 ZZH R&B MED SURG/OB UMMC

## 2019-03-11 PROCEDURE — 82728 ASSAY OF FERRITIN: CPT | Performed by: PHYSICIAN ASSISTANT

## 2019-03-11 PROCEDURE — 25000128 H RX IP 250 OP 636: Performed by: PHYSICIAN ASSISTANT

## 2019-03-11 PROCEDURE — 25800030 ZZH RX IP 258 OP 636: Performed by: PHYSICIAN ASSISTANT

## 2019-03-11 PROCEDURE — 25000132 ZZH RX MED GY IP 250 OP 250 PS 637: Performed by: PHYSICIAN ASSISTANT

## 2019-03-11 PROCEDURE — 82378 CARCINOEMBRYONIC ANTIGEN: CPT | Performed by: PHYSICIAN ASSISTANT

## 2019-03-11 PROCEDURE — 80053 COMPREHEN METABOLIC PANEL: CPT | Performed by: PHYSICIAN ASSISTANT

## 2019-03-11 PROCEDURE — 40000556 ZZH STATISTIC PERIPHERAL IV START W US GUIDANCE

## 2019-03-11 PROCEDURE — 0DBL8ZX EXCISION OF TRANSVERSE COLON, VIA NATURAL OR ARTIFICIAL OPENING ENDOSCOPIC, DIAGNOSTIC: ICD-10-PCS | Performed by: INTERNAL MEDICINE

## 2019-03-11 PROCEDURE — 99153 MOD SED SAME PHYS/QHP EA: CPT | Performed by: INTERNAL MEDICINE

## 2019-03-11 PROCEDURE — 45380 COLONOSCOPY AND BIOPSY: CPT | Performed by: INTERNAL MEDICINE

## 2019-03-11 PROCEDURE — 85027 COMPLETE CBC AUTOMATED: CPT | Performed by: PHYSICIAN ASSISTANT

## 2019-03-11 RX ORDER — FENTANYL CITRATE 50 UG/ML
INJECTION, SOLUTION INTRAMUSCULAR; INTRAVENOUS PRN
Status: DISCONTINUED | OUTPATIENT
Start: 2019-03-11 | End: 2019-03-11 | Stop reason: HOSPADM

## 2019-03-11 RX ORDER — SIMETHICONE
LIQUID (ML) MISCELLANEOUS PRN
Status: DISCONTINUED | OUTPATIENT
Start: 2019-03-11 | End: 2019-03-11 | Stop reason: HOSPADM

## 2019-03-11 RX ORDER — IBUPROFEN 200 MG
800 TABLET ORAL DAILY PRN
Status: ON HOLD | COMMUNITY
End: 2019-03-12

## 2019-03-11 RX ORDER — DIPHENHYDRAMINE HYDROCHLORIDE 50 MG/ML
INJECTION INTRAMUSCULAR; INTRAVENOUS PRN
Status: DISCONTINUED | OUTPATIENT
Start: 2019-03-11 | End: 2019-03-11 | Stop reason: HOSPADM

## 2019-03-11 RX ADMIN — Medication 0.2 MG: at 22:54

## 2019-03-11 RX ADMIN — POLYETHYLENE GLYCOL 3350, SODIUM SULFATE ANHYDROUS, SODIUM BICARBONATE, SODIUM CHLORIDE, POTASSIUM CHLORIDE 4000 ML: 236; 22.74; 6.74; 5.86; 2.97 POWDER, FOR SOLUTION ORAL at 00:42

## 2019-03-11 RX ADMIN — Medication 0.2 MG: at 06:50

## 2019-03-11 RX ADMIN — Medication 0.2 MG: at 03:50

## 2019-03-11 RX ADMIN — SODIUM CHLORIDE, POTASSIUM CHLORIDE, SODIUM LACTATE AND CALCIUM CHLORIDE: 600; 310; 30; 20 INJECTION, SOLUTION INTRAVENOUS at 08:59

## 2019-03-11 RX ADMIN — CITALOPRAM HYDROBROMIDE 40 MG: 20 TABLET ORAL at 21:40

## 2019-03-11 RX ADMIN — Medication 0.2 MG: at 00:41

## 2019-03-11 RX ADMIN — BENZOCAINE 1 ML: 220 SPRAY, METERED PERIODONTAL at 06:50

## 2019-03-11 ASSESSMENT — ACTIVITIES OF DAILY LIVING (ADL)
ADLS_ACUITY_SCORE: 10
ADLS_ACUITY_SCORE: 12
ADLS_ACUITY_SCORE: 10

## 2019-03-11 NOTE — PLAN OF CARE
Pt admitted with SBO. VSS on RA. A&Ox4. Independent. Advanced to full liquid diet, only tried clears and currently tolerating. PIV infiltrated and removed. Placed Vascular consult. On IVMF infusing w/ LR @ 100 mL/hr. Currently stopped. NG removed, if not passing gas with nausea may need to place again. BM x2 this shift. Some blood noted in BM post colonoscopy, should be minimal if persists MDs should be notified. Voiding. Showered. Colonoscopy completed. Probably malignancy noted. Biopsy sent and pending. No complaints of nausea and vomiting. Possible discharge tomorrow pending tolerating diet and able to pass gas. Continue to monitor and follow the POC.

## 2019-03-11 NOTE — CONSULTS
[facility], [place]  Colorectal Surgery Consultation    Date of Admission:  3/10/2019    Assessment & Plan   Remedios Watts is a 38 year old female with a history of malignant melanoma of LUE (s/p Mohs excision in 2017) who was admitted 3/10 as a transfer from [facility] for expedited work up of partial colonic obstruction related to a hepatic flexure mass. Colonoscopy today shows fungating ulcerated mass at hepatic flexure, concerning for malignancy.    - No indications for emergent surgical intervention given antegrade bowel function.  - Recommend full liquid diet  - OK to discharge home on full liquid diet if continuing to have antegrade bowel function.   - Outpatient CT chest for staging workup  - Clinic referral with Dr. Quesada of Colorectal Surgery to discuss surgical options.    Discussed with staff, Dr. Quesada.    Jc Boyce MD  PGY-3 General Surgery    HPI compiled with scribe help of Cee Campoverde MS4.     Code Status    Full Code    Reason for Consult   Right colon hepatic flexure mass concerning for neoplasm    Primary Care Physician   Agustín Berger    Chief Complaint   Abdominal pain, nausea, vomiting    History of Present Illness   Remedios Watts is a 38 year old female with a history of malignant melanoma of LUE (s/p Mohs excision with dermatology in 2017) who presented to the [facility] ED yesterday 3/10 with a 10 day history of worsening crampy RUQ abdominal pain with associated nausea and vomiting. She had been seen for similar symptoms at the end of February and was given laxatives following an xray that was consistent with constipation. The patient reports that this helped with her constipation but did not alleviate her other symptoms. Her pain continued to progress throughout the week until it became unbearable last night, prompting her visit to [facility]. A CT abdomen pelvis was obtained that showed a apple core lesion near the hepatic flexure of the  colon  concerning for an obstructing colonic neoplasm. She was subsequently transferred here for further work up.    With regards to cancer history, the patient has both a personal and familial history of malignant melanoma and had a 0.45 mm lesion excised from her left arm in 2017. She has been following with Dr. Johnny Stevens of Dermatology every 3 months since the procedure and was last seen in his clinic on 2/4/19. She has a history of sunburns, atypical nevi, and previous tanning bed use. She is a never smoker and has no personal or familial history of colorectal cancer or IBD. She has never had a colonoscopy.     Her  notes increased fatigue and need for naps over the last 3 months, which she endorses as well upon reflection. The patient's appetite is unchanged and she denies any recent unintentional weight loss or bloody or tarry stools.    She has had 4 bowel movements since admission.    Past Medical History   Malignant melanoma of LUE, 0.45mm Breslow depth, s/p Mohs.  Anxiety    Past Surgical History   Mohs left upper arm excision of malignant melanoma    Prior to Admission Medications   Prior to Admission Medications   Prescriptions Last Dose Informant Patient Reported? Taking?   citalopram (CELEXA) 40 MG tablet 3/9/2019 at pm Self No Yes   Sig: Take 1 tablet (40 mg) by mouth daily   ibuprofen (ADVIL/MOTRIN) 200 MG tablet 3/10/2019  Yes Yes   Sig: Take 800 mg by mouth daily as needed for mild pain   norethindrone-ethinyl estradiol (ORTHO-NOVUM 7/7/7, 28,) 0.5/0.75/1-35 MG-MCG per tablet 3/9/2019 at pm Self No Yes   Sig: Take 1 tablet by mouth daily   tretinoin (RETIN-A) 0.05 % external cream 3/9/2019 at pm Self No Yes   Sig: Apply topically At Bedtime      Facility-Administered Medications: None     Allergies   Allergies   Allergen Reactions     Nka [No Known Allergies]        Social History   Patient lives at home with her  and their 4 children. She is a never smoker and has approximately  2 alcoholic beverages per month. No recreational or illicit drug use.    Family History   Patient's father has a history of multiple cutaneous malignant melanomas. She is not in regular contact with him but is unaware of any internal metastases. He was recently diagnosed with prostate cancer as well.    Review of Systems   The 10 point Review of Systems is negative other than noted in the HPI or here.     Physical Exam   Temp: 96.2  F (35.7  C) Temp src: Oral BP: 127/89 Pulse: 66   Resp: 16 SpO2: 100 % O2 Device: None (Room air)    Vital Signs with Ranges  Temp:  [96.2  F (35.7  C)-98.6  F (37  C)] 96.2  F (35.7  C)  Pulse:  [63-75] 66  Resp:  [16-18] 16  BP: (106-134)/(78-92) 127/89  SpO2:  [98 %-100 %] 100 %  0 lbs 0 oz    Constitutional: Awake, alert, cooperative, no apparent distress  Respiratory: No increased work of breathing, good air exchange, clear to auscultation bilaterally, no crackles or wheezing.  Cardiovascular: RRR, no murmurs, rubs, or gallops. Peripheral pulses are 2+ and symmetric  GI: No scars,  soft, non-distended, mildly tender to deep palpation in the RUQ  Musculoskeletal: Patient spontaneously moves all extremities equally and without limitation.  Neurologic: No focal deficits appreciated.   Neuropsychiatric: Patient is very friendly with full range of affect. Asks appropriate questions.    Data   Results for orders placed or performed during the hospital encounter of 03/10/19 (from the past 24 hour(s))   XR Abdomen Port 1 View    Narrative    Exam: XR ABDOMEN PORT 1 VW, 3/10/2019 10:43 PM    Indication: Feeding tube placement verification    Comparison: Correlations made with CT 3/10/2019.    Findings:   Single portable view the abdomen. Enteric tube projecting over the  esophagus and stomach with sidehole distal to the GE junction.  Nonobstructive bowel gas pattern. No portal venous gas. Lung bases are  unremarkable.      Impression    Impression: Enteric tube with sidehole distal to the GE  junction.    I have personally reviewed the examination and initial interpretation  and I agree with the findings.    ERICKSON YOUSSEF MD   CEA   Result Value Ref Range    CEA 9.2 (H) 0 - 2.5 ug/L   Iron and iron binding capacity   Result Value Ref Range    Iron 39 35 - 180 ug/dL    Iron Binding Cap 460 (H) 240 - 430 ug/dL    Iron Saturation Index 8 (L) 15 - 46 %   Ferritin   Result Value Ref Range    Ferritin 6 (L) 12 - 150 ng/mL   INR   Result Value Ref Range    INR 1.07 0.86 - 1.14   Comprehensive metabolic panel   Result Value Ref Range    Sodium 138 133 - 144 mmol/L    Potassium 3.5 3.4 - 5.3 mmol/L    Chloride 104 94 - 109 mmol/L    Carbon Dioxide 29 20 - 32 mmol/L    Anion Gap 6 3 - 14 mmol/L    Glucose 100 (H) 70 - 99 mg/dL    Urea Nitrogen 7 7 - 30 mg/dL    Creatinine 0.72 0.52 - 1.04 mg/dL    GFR Estimate >90 >60 mL/min/[1.73_m2]    GFR Estimate If Black >90 >60 mL/min/[1.73_m2]    Calcium 8.7 8.5 - 10.1 mg/dL    Bilirubin Total 0.3 0.2 - 1.3 mg/dL    Albumin 3.6 3.4 - 5.0 g/dL    Protein Total 7.4 6.8 - 8.8 g/dL    Alkaline Phosphatase 92 40 - 150 U/L    ALT 26 0 - 50 U/L    AST 27 0 - 45 U/L   CBC with platelets   Result Value Ref Range    WBC 5.6 4.0 - 11.0 10e9/L    RBC Count 4.64 3.8 - 5.2 10e12/L    Hemoglobin 12.4 11.7 - 15.7 g/dL    Hematocrit 41.1 35.0 - 47.0 %    MCV 89 78 - 100 fl    MCH 26.7 26.5 - 33.0 pg    MCHC 30.2 (L) 31.5 - 36.5 g/dL    RDW 12.7 10.0 - 15.0 %    Platelet Count 310 150 - 450 10e9/L         Staff Addendum:  Agree with the consultation H&P as documented by the housestaff. I was personally involved with the recommendations made by our service for this patient.  Linda Quesada MD  Colon and Rectal Surgery Staff  [facility]

## 2019-03-11 NOTE — CONSULTS
GASTROENTEROLOGY CONSULTATION      Date of Admission: 3/10/2019       Date of Consult: 3/11/19          Chief Complaint:   We were asked by Sophia Goel PA-C to evaluate this patient with colonoscopy for apple core lesion in the hepatic flexure noted on CT.          ASSESSMENT AND RECOMMENDATIONS:   Assessment:  Remedios Watts is a 38 year old female with a history of malignant melanoma in 2017, postpartum depression, and anxiety who is admitted for partial small bowel obstruction, and apple core lesion in the hepatic flexure noted on CT.    #Abdominal pain  #Nausea and vomiting   #Partial small bowel obstruction   Worsening abdominal pain >1 week with nausea/constipation, and x1 episode of vomiting. Abd/pelvic CT from 3/10/19 showed apple core lesion in the hepatic flexure and colitis involving the cecum/ascending colon. Colorectal surgery has seen the patient and given her history of melanoma, the main concern is malignancy. Less likely, but infection, inflammatory or ischemia cannot be ruled out without endoscopic evaluation and pathology exam.    CBC and CMP are unremarkable. Iron deficiency noted (ferritin 6, iron 39, binding capacity of 460 and saturation of 8). Patient's stools are clear and plan is to proceed with colonoscopy and possible biopsies.     Recommendations  --NPO but please continue with the bowel prep  --Continue with antiemetics  --If patient develops nausea and vomiting, will have to place an NG tube again    Gastroenterology follow up recommendations: TBD    Patient care plan discussed with Dr. Maldonado, GI staff physician. Thank you for involving us in this patient's care. Please do not hesitate to contact the GI service with any questions or concerns.     Dedra Mcginnis CNP  Department of Gastroenterology   -------------------------------------------------------------------------------------------------------------------   History is obtained from the patient and the medical  "record.          History of Present Illness:   Remedios Watts is a 38 year old female with a history of malignant melanoma in 2017, postpartum depression, and anxiety who is admitted for partial small bowel obstruction, and apple core lesion in the hepatic flexure noted on CT.      Patient presented to [facility] ED 3/10 with >1 week worsening abdominal pain with N/V and constipation. She was initially seen in [facility] ED with similar symptoms 2/2019 with etiology thought to be due to constipation. CT today noted apple core lesion in the right colon with possible obstruction, concerning for malignancy. She was brought to [facility] for expedited workup. Currently, patient reports epigastric pain with radiation throughout the abdomen. She described pain as \"contraction like\" as pain seems to come in waves. Some associated nausea without recent emesis. Denies weight loss, night sweats or fevers. No dyspnea or chest pain. No family h/o colon cancer. Father recently diagnosed with prostate cancer. Non-smoker and maybe 2 drinks/month.     Patient is refusing NG tube in as it is painful to have it in her nose and she is aware that she may need to have it placed if nausea and vomiting becomes a problem.         Past Medical History:   Reviewed and edited as appropriate  Past Medical History:   Diagnosis Date     Chickenpox      Diarrhea      Group B streptococcus urinary tract infection complicating pregnancy 4/20/2011    Plan PCN in labor      History of postpartum depression, currently pregnant 10/21/2010     Malignant melanoma (H)      Postpartum depression 12/17/2008     Tailbone injury     fx     Urinary tract infections             Past Surgical History:   Reviewed and edited as appropriate   Past Surgical History:   Procedure Laterality Date     C ORAL SURGERY PROCEDURE              Previous Endoscopy:   No results found for this or any previous visit.         Social History:   Reviewed and edited as appropriate  Social " History     Socioeconomic History     Marital status:      Spouse name: Not on file     Number of children: 3     Years of education: Not on file     Highest education level: Not on file   Occupational History     Employer: [employer]   Social Needs     Financial resource strain: Not on file     Food insecurity:     Worry: Not on file     Inability: Not on file     Transportation needs:     Medical: Not on file     Non-medical: Not on file   Tobacco Use     Smoking status: Never Smoker     Smokeless tobacco: Never Used   Substance and Sexual Activity     Alcohol use: Yes     Drug use: No     Sexual activity: Yes     Partners: Male     Birth control/protection: Pill   Lifestyle     Physical activity:     Days per week: Not on file     Minutes per session: Not on file     Stress: Not on file   Relationships     Social connections:     Talks on phone: Not on file     Gets together: Not on file     Attends [employer] service: Not on file     Active member of club or organization: Not on file     Attends meetings of clubs or organizations: Not on file     Relationship status: Not on file     Intimate partner violence:     Fear of current or ex partner: Not on file     Emotionally abused: Not on file     Physically abused: Not on file     Forced sexual activity: Not on file   Other Topics Concern     Parent/sibling w/ CABG, MI or angioplasty before 65F 55M? Yes     Comment: pt had MI @ age 58   Social History Narrative     Not on file            Family History:   Reviewed and edited as appropriate  Family History   Problem Relation Age of Onset     Arthritis Mother      Neurologic Disorder Mother         had a seizure      Alcohol/Drug Mother      Arthritis Maternal Grandmother      Breast Cancer Maternal Grandmother      Melanoma Maternal Grandmother      Heart Disease Paternal Grandmother      Cancer Father         skin           Allergies:   Reviewed and edited as appropriate      Allergies   Allergen Reactions     Nka [No Known Allergies]             Medications:     Medications Prior to Admission   Medication Sig Dispense Refill Last Dose     citalopram (CELEXA) 40 MG tablet Take 1 tablet (40 mg) by mouth daily 90 tablet 3 3/9/2019 at pm     ibuprofen (ADVIL/MOTRIN) 200 MG tablet Take 800 mg by mouth daily as needed for mild pain   3/10/2019     norethindrone-ethinyl estradiol (ORTHO-NOVUM 7/7/7, 28,) 0.5/0.75/1-35 MG-MCG per tablet Take 1 tablet by mouth daily 28 tablet 11 3/9/2019 at pm     tretinoin (RETIN-A) 0.05 % external cream Apply topically At Bedtime 45 g 3 3/9/2019 at pm             Review of Systems:   A complete review of systems was performed and is negative except as noted in the HPI           Physical Exam:   /89 (BP Location: Right arm)   Pulse 66   Temp 96.2  F (35.7  C) (Oral)   Resp 16   SpO2 100%   Wt:   Wt Readings from Last 2 Encounters:   03/10/19 72.6 kg (160 lb)   02/24/19 74.8 kg (165 lb)      Constitutional: cooperative, pleasant, not dyspneic/diaphoretic, no acute distress  Eyes: Sclera anicteric/injected  Ears/nose/mouth/throat: Normal oropharynx without ulcers or exudate, mucus membranes moist, hearing intact  Neck: supple, thyroid normal size  CV: RRR. No edema in LE bilaterally   Respiratory: Unlabored breathing. CTA bilaterally   Lymph: No axillary, submandibular, supraclavicular or inguinal lymphadenopathy  Abd: Nondistended, +bs, no hepatosplenomegaly, no peritoneal signs. Generalized tenderness.  Skin: warm, perfused, no jaundice  Neuro: AAO x 3, No asterixis  Psych: Normal affect  MSK: Normal gait         Data:   Labs and imaging below were independently reviewed and interpreted    BMP  Recent Labs   Lab 03/11/19  0600 03/10/19  1333    138   POTASSIUM 3.5 4.4   CHLORIDE 104 108   MANYD 8.7 8.5   CO2 29 21   BUN 7 7   CR 0.72 0.66   * 94     CBC  Recent Labs   Lab 03/11/19  0600 03/10/19  1417   WBC 5.6 5.0   RBC 4.64 4.29   HGB  12.4 11.6*   HCT 41.1 37.9   MCV 89 88   MCH 26.7 27.0   MCHC 30.2* 30.6*   RDW 12.7 12.3    313     INR  Recent Labs   Lab 03/11/19  0600   INR 1.07     LFTs  Recent Labs   Lab 03/11/19  0600 03/10/19  1333   ALKPHOS 92 92   AST 27 49*   ALT 26 28   BILITOTAL 0.3 0.2   PROTTOTAL 7.4 7.4   ALBUMIN 3.6 3.7      PANC  Recent Labs   Lab 03/10/19  1333   LIPASE 133   AMYLASE 37       Reviewed imaging:  Abdominal ultrasound 3/10/2019. CT abdomen and pelvis  12/19/2007.     TECHNIQUE: Axial images from the lung bases to the symphysis are  performed with additional coronal reformatted images. 79 mL of Isovue  370 are given intravenously.  Radiation dose for this scan was reduced  using automated exposure control, adjustment of the mA and/or kV  according to patient size, or iterative reconstruction technique.     FINDINGS:    The lung bases are clear.  Abdomen: The liver, spleen, gallbladder, pancreas, adrenal glands and  kidneys are unremarkable. No hydronephrosis or renal calculi. No  enlarged lymph nodes. Colonic wall thickening is noted involving the  ascending colon possibly reflecting an infectious or inflammatory  colitis. Stool distends the right colon and there is a probable apple  core lesion in the region of the hepatic flexure on series 2, image 29  and series 3, image 29. This is better visualized on the coronal  images. Malignancy is suspected.     Pelvis: The bladder, uterus, ovaries and rectum are unremarkable.  Transverse colon, descending colon, sigmoid colon and rectum are  relatively decompressed. Bone window examination is within normal  limits.                                                                  IMPRESSION:  1. Probable apple core lesion near the hepatic flexure of the colon  concerning for an obstructing colonic neoplasm. Follow-up colonoscopy  recommended for further assessment.  2. Probable infectious or inflammatory colitis involving the cecum and  ascending colon possibly  related to the obstruction. Small bowel is  normal in caliber.  3. No other acute changes are evident in the abdomen or pelvis to  account for patient's symptoms. Abdominal and pelvic organs are within  normal limits. No enlarged lymph nodes.

## 2019-03-11 NOTE — PROGRESS NOTES
[facility]    Medicine Progress Note - Hospitalist Service, Gold 1       Date of Admission:  3/10/2019  Assessment & Plan   Remedios Watts is a 38 year old female with history of malignant melanoma (2017) and anxiety who was transferred from [facility] ED to [facility] 3/10/2019 with partial SBO, apple coring of the right colon on CT     Partial SBO, apple core lesion of ascending colon: CT AP 3/10 probable apple core lesion near hepatic flexure concerning for obstructing neoplasm, probable infectious/inflammatory colitis of cecum and ascending colon related to obstruction.   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 Repeat renal US in 3-5 mo     Anxiety: Continue PTA Celexa     Normocytic anemia: Hgb 11.6 on presentation. No e/o acute bleed  - Trend, consider nutrition studies      H/o malignant melanoma: 0.45 mm L arm melanoma dx 7/2017 s/p excision per dermatology. Also with h/o atypical nevi. Last seen by Dr. Stevens of dermatology 2/4/2019  - Follow up with OP derm 6/2019 as planned      Diet: NPO until after procedure, then reassess  DVT Prophylaxis: Pneumatic Compression Devices  White Catheter: not present  Code Status: Full      Disposition Plan     Expected discharge: 2-4 days, recommended to prior living arrangement once workup complete, seen by all       Entered: Kristofer Akers MD 03/11/2019, 8:12 AM       The patient's care was discussed with the Bedside Nurse, Patient and Patient's Family.    Kristofer Akers MD  Hospitalist Service, Gold 1  [facility]  Pager:  [phone]  Please see sticky note for cross cover information  ______________________________________________________________________    Interval History   NO acute events.  NG tube VERY uncomfortable, and she prefers it removed as soon as possible. No fevers,     4 point ROS otherwise negative    Data reviewed today: I reviewed all medications, new labs and imaging results over the last 24 hours.    Physical Exam   Vital Signs: Temp: 96.2  F (35.7  C) Temp src: Oral BP: 127/89 Pulse: 66   Resp: 16 SpO2: 100 % O2 Device: None (Room air)    Weight: 0 lbs 0 oz  Gen: NAD  HEENT: EOMI, PERRL, MMM, NG in place  CV: extremities warm and well perfused  Resp: breathing comfortably on RA  Abd: soft  : deferred  Msk: no LE edema  Skin: no rashes  Neuro: nonfocal exam

## 2019-03-11 NOTE — H&P
[facility], [place]    History and Physical - Hospitalist Service, Gold Night        Date of Admission:  3/10/2019    Assessment & Plan   Remedios Watts is a 38 year old female with history of malignant melanoma (2017) and anxiety who was transferred from [facility] ED to [facility] 3/10/2019 with partial SBO, apple coring of the right colon on CT     Partial SBO, apple core lesion of ascending colon: PTA 10 days worsening N/V, abdominal pain, constipation. CT AP 3/10 probable apple core lesion near hepatic flexure concerning for obstructing neoplasm, probable infectious/inflammatory colitis of cecum and ascending colon related to obstruction. Case d/w CRS who suggested medicine admit for workup, then possible surgery pending workup. Mild AST elevation to 49, CMP otherwise unremarkable, WBC normal and lipase normal. 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 No lesion discussed on CT AP 3/10. May need OP re-imaging of R kidney pending workup/imaging as above     Anxiety: Continue PTA Celexa     Normocytic anemia: Hgb 11.6 on presentation. No e/o acute bleed  - Trend, consider nutrition studies      H/o malignant melanoma: 0.45 mm L arm melanoma dx 7/2017 s/p excision per dermatology. Also with h/o atypical nevi. Last seen by Dr. Stevens of dermatology 2/4/2019  - Follow up with OP derm 6/2019 as planned      Diet: CLD, NPO MN  DVT Prophylaxis: Pneumatic Compression Devices  White Catheter: not present  Code Status: Full        Disposition Plan     Expected discharge: 2-4 days, recommended to prior living arrangement once workup complete, seen  "by all medical teams, plan determined.  Entered: Sophia Goel PA-C 03/10/2019, 7:32 PM       The patient's care was discussed with the patient, , GI, and attending physician, Dr. Jeanna Goel PA-C  Hospitalist Service, [facility]  Pager: [phone]  See sticky note for cross cover information     ________________________    Chief Complaint   Abdominal Pain    History is obtained from the patient and medical record    History of Present Illness   Remedios Watts is a 38 year old female with history of malignant melanoma (2017) and anxiety who was transferred from [facility] ED to [facility] 3/10/2019 with partial SBO, apple coring of the right colon on CT     Patient presented to [facility] ED 3/10 with >1 week worsening abdominal pain with N/V and constipation. She was initially seen in [facility] ED with similar symptoms 2/2019 with etiology thought to be due to constipation. CT today noted apple core lesion in the right colon with possible obstruction, concerning for malignancy. She was brought to [facility] for expedited workup. Currently, patient reports epigastric pain with radiation throughout the abdomen. She described pain as \"contraction like\" as pain seems to come in waves. Some associated nausea without recent emesis. Denies weight loss, night sweats. No dyspnea or chest pain. Denies confusion. No family h/o colon cancer. Father recently diagnosed with prostate cancer. Non-smoker       Review of Systems    The 10 point Review of Systems is negative other than noted in the HPI or here.     Past Medical History    I have reviewed this patient's medical history and updated it with pertinent information if needed.   Past Medical History:   Diagnosis Date     Chickenpox      Diarrhea      Group B streptococcus urinary tract infection complicating pregnancy 4/20/2011    Plan PCN in labor      History of postpartum depression, currently pregnant " 10/21/2010     Malignant melanoma (H)      Postpartum depression 12/17/2008     Tailbone injury     fx     Urinary tract infections        Past Surgical History   I have reviewed this patient's surgical history and updated it with pertinent information if needed.  Past Surgical History:   Procedure Laterality Date     C ORAL SURGERY PROCEDURE         Social History   I have reviewed this patient's social history and updated it with pertinent information if needed.  Social History     Tobacco Use     Smoking status: Never Smoker     Smokeless tobacco: Never Used   Substance Use Topics     Alcohol use: Yes     Drug use: No       Family History   I have reviewed this patient's family history and updated it with pertinent information if needed.   Family History   Problem Relation Age of Onset     Arthritis Mother      Neurologic Disorder Mother         had a seizure      Alcohol/Drug Mother      Arthritis Maternal Grandmother      Breast Cancer Maternal Grandmother      Melanoma Maternal Grandmother      Heart Disease Paternal Grandmother      Cancer Father         skin       Prior to Admission Medications   Prior to Admission Medications   Prescriptions Last Dose Informant Patient Reported? Taking?   citalopram (CELEXA) 40 MG tablet  Self No No   Sig: Take 1 tablet (40 mg) by mouth daily   magnesium citrate solution   No No   Sig: Take 296 mLs by mouth once for 1 dose . You may repeat 1 bottle in 6 hours if necessary.   norethindrone-ethinyl estradiol (ORTHO-NOVUM 7/7/7, 28,) 0.5/0.75/1-35 MG-MCG per tablet  Self No No   Sig: Take 1 tablet by mouth daily   tretinoin (RETIN-A) 0.05 % external cream  Self No No   Sig: Apply topically At Bedtime      Facility-Administered Medications: None     Allergies   Allergies   Allergen Reactions     Nka [No Known Allergies]        Physical Exam   Vital Signs:                    Weight: 0 lbs 0 oz    General Appearance: Alert and oriented x3, very pleasant woman who appears  somewhat uncomfortable  HEENT: PERRLA, MMM, anicteric sclera  Respiratory: CTAB without wheezing or rales  Cardiovascular: RRR, S1, S2. No murmur noted  GI: Abdomen soft with mild diffuse tenderness with increased tenderness in the epigastrium. No guarding or rebound  Lymph/Hematologic: No peripheral edema, distal pulses palpable   Skin: No rash or jaundice   Musculoskeletal: Moves all extremities   Neurologic: No focal deficits     Data   Data reviewed today: I reviewed all medications, new labs and imaging results over the last 24 hours. I personally reviewed  ED workup, labs, CT AP, US, prior notes, etc    Recent Labs   Lab 03/10/19  1417 03/10/19  1333   WBC 5.0  --    HGB 11.6*  --    MCV 88  --      --    NA  --  138   POTASSIUM  --  4.4   CHLORIDE  --  108   CO2  --  21   BUN  --  7   CR  --  0.66   ANIONGAP  --  9   MANDY  --  8.5   GLC  --  94   ALBUMIN  --  3.7   PROTTOTAL  --  7.4   BILITOTAL  --  0.2   ALKPHOS  --  92   ALT  --  28   AST  --  49*   LIPASE  --  133     Recent Results (from the past 24 hour(s))   US Abdomen Limited (RUQ)    Narrative    US ABDOMEN LIMITED 3/10/2019 2:19 PM    HISTORY: Right upper quadrant and epigastric pain.    COMPARISON: CT abdomen and pelvis 12/19/2007.    FINDINGS:  Gallbladder: Normal.    Common bile duct: Normal.    Liver: Normal.    Pancreas: Normal.    Right kidney: Normal size and morphology. Normal cortical thickness  and no hydronephrosis. There is a 0.8 cm hyperechoic focus at the  upper pole of the right kidney, likely a benign angiomyolipoma. No  mass lesion was seen in this region on the prior CT exam. Since this  lesion does not meet criteria for a simple benign cyst, a follow-up  ultrasound is recommended in 3-5 months to assure that this lesion is  stable.      Impression    IMPRESSION:   1. 0.8 cm hyperechoic lesion upper pole right kidney without  abnormality on CT exam done 12 years ago. This is most likely a benign  angiomyolipoma, but follow-up  is recommended.  2. Otherwise unremarkable abdominal ultrasound.    PETER ALVARENGA MD   CT Abdomen Pelvis w Contrast    Narrative    CT ABDOMEN AND PELVIS WITH CONTRAST 3/10/2019 4:32 PM     HISTORY: Abdominal pain, unspecified.    COMPARISON: Abdominal ultrasound 3/10/2019. CT abdomen and pelvis  12/19/2007.    TECHNIQUE: Axial images from the lung bases to the symphysis are  performed with additional coronal reformatted images. 79 mL of Isovue  370 are given intravenously.  Radiation dose for this scan was reduced  using automated exposure control, adjustment of the mA and/or kV  according to patient size, or iterative reconstruction technique.    FINDINGS:     The lung bases are clear.    Abdomen: The liver, spleen, gallbladder, pancreas, adrenal glands and  kidneys are unremarkable. No hydronephrosis or renal calculi. No  enlarged lymph nodes. Colonic wall thickening is noted involving the  ascending colon possibly reflecting an infectious or inflammatory  colitis. Stool distends the right colon and there is a probable apple  core lesion in the region of the hepatic flexure on series 2, image 29  and series 3, image 29. This is better visualized on the coronal  images. Malignancy is suspected.    Pelvis: The bladder, uterus, ovaries and rectum are unremarkable.  Transverse colon, descending colon, sigmoid colon and rectum are  relatively decompressed. Bone window examination is within normal  limits.      Impression    IMPRESSION:  1. Probable apple core lesion near the hepatic flexure of the colon  concerning for an obstructing colonic neoplasm. Follow-up colonoscopy  recommended for further assessment.  2. Probable infectious or inflammatory colitis involving the cecum and  ascending colon possibly related to the obstruction. Small bowel is  normal in caliber.  3. No other acute changes are evident in the abdomen or pelvis to  account for patient's symptoms. Abdominal and pelvic organs are within  normal  limits. No enlarged lymph nodes.    NEIL OROSCO MD

## 2019-03-11 NOTE — PLAN OF CARE
Pt admitted from [facility] ED. Report taken from ED nurse. Pt arrived on unit via stretcher @ 2045. Pt settled into bed and oriented to care environment. MD paged regarding pt's arrival. PA at pt's bedside. Signed and held orders released. Will continue to monitor.    VSS on RA. A&Ox4. Pt complaining of sharp upper abdominal pain. PRN IV dilaudid given. Pt reporting minimal relief. Independent. PIV infusing LR @ 100 mL/hour. NG tube placed at bedside by nursing. Pt vomiting during and post-insertion. Hurricane spray given. Pt reporting relief. Plan for colonoscopy today. Golytely bowel prep administered through NG tube. Pt tolerating well when given slowly. Numerous loose stools. Will continue until stools clear. Pt NPO @ midnight. GI consulting. Will continue to monitor.

## 2019-03-11 NOTE — PLAN OF CARE
Time: 0360-7286    Reason for admission: partial small bowel obstruction   Vitals: VSS on RA  Activity: up to BR indepentently  Pain: pain in upper abdomen, Dilaudid 0.2 mg available PRN , pt declined administration  Neuro: A&O x4  Cardiac: WDL  Respiratory: WDL, Room air  GI/: Voiding normally, multiple liquid stools due to go lightly prep, BS hypoactive,   Diet: clear liquid diet  Lines: L PIV, infusing LR at 100ml/ hr  Skin: intact    New this shift: colonoscopy this am, given fentanyl, versed, IV benadryl. Biopsy removed, RN reported some small bleeding per rectum following procedure.  Will continue to monitor stools for blood.  NGT removed, no complaints of nausea or vomiting following removal.  Colorectal following.      Plan: expected discharge 2-4 days to home.  Will continue to monitor and follow POC.

## 2019-03-11 NOTE — TELEPHONE ENCOUNTER
PA Initiation    Medication: Tretinoin- INITIATED  Insurance Company: [employer] [place] - Phone [phone] Fax [phone]  Pharmacy Filling the Rx: [facility] - - [place] - [id number] [facility]  Filling Pharmacy Phone: [phone]  Filling Pharmacy Fax:    Start Date: 3/11/2019

## 2019-03-11 NOTE — PHARMACY-ADMISSION MEDICATION HISTORY
Admission medication history interview status for the 3/10/2019 admission is complete. See Epic admission navigator for allergy information, pharmacy, prior to admission medications and immunization status.     Medication history interview sources:  Patient    Changes made to PTA medication list (reason)  Added: Ibuprofen 200 mg tablets  Deleted: Magnesium citrate solution, 296 mL   Changed: none     Additional medication history information (including reliability of information, actions taken by pharmacist): Patient is reliable.     Patient reports she took magnesium citrate solution 296 mL for constipation for a one dose for constipation approximately 10-14 days ago.     Patient reports she takes ibuprofen 200 mg tablets as needed; typically she takes 4 tablets (800 mg) for each dose.     Prior to Admission medications    Medication Sig Last Dose Taking? Auth Provider   citalopram (CELEXA) 40 MG tablet Take 1 tablet (40 mg) by mouth daily 3/9/2019 at pm Yes Agustín Berger MD   ibuprofen (ADVIL/MOTRIN) 200 MG tablet Take 800 mg by mouth daily as needed for mild pain 3/10/2019 Yes Unknown, Entered By History   norethindrone-ethinyl estradiol (ORTHO-NOVUM 7/7/7, 28,) 0.5/0.75/1-35 MG-MCG per tablet Take 1 tablet by mouth daily 3/9/2019 at pm Yes Agustín Berger MD   tretinoin (RETIN-A) 0.05 % external cream Apply topically At Bedtime 3/9/2019 at pm Yes Johnny Stevens MD       Medication history completed by: Kelli Bailey, PharmD IV Student

## 2019-03-11 NOTE — OR NURSING
Pt tolerated colonoscopy with biopsies relatively well with some pain and cramping at intervals during procedure.  Pt received conscious sedation with fentanyl, versed and benadryl IV.  BP low of 91 systolic.  Oxygen at 3L during procedure.  Oxygen saturation at 98% on RA post procedure.  Spot endoscopic marker 0.6cc placed sub mucosal to 2 areas near colon mass.  VSS post procedure.  Minimal abdominal pain with palpation that was present prior to procedure.  Report called to 5A RN.

## 2019-03-12 ENCOUNTER — TELEPHONE (OUTPATIENT)
Dept: SURGERY | Facility: CLINIC | Age: 39
End: 2019-03-12

## 2019-03-12 ENCOUNTER — PATIENT OUTREACH (OUTPATIENT)
Dept: CARE COORDINATION | Facility: CLINIC | Age: 39
End: 2019-03-12

## 2019-03-12 VITALS
TEMPERATURE: 98.6 F | HEART RATE: 112 BPM | OXYGEN SATURATION: 97 % | SYSTOLIC BLOOD PRESSURE: 103 MMHG | RESPIRATION RATE: 16 BRPM | DIASTOLIC BLOOD PRESSURE: 63 MMHG

## 2019-03-12 PROBLEM — K63.89 COLONIC MASS: Status: ACTIVE | Noted: 2019-03-12

## 2019-03-12 LAB
ABO + RH BLD: NORMAL
ABO + RH BLD: NORMAL
COPATH REPORT: NORMAL
ERYTHROCYTE [DISTWIDTH] IN BLOOD BY AUTOMATED COUNT: 12.5 % (ref 10–15)
HCT VFR BLD AUTO: 34.8 % (ref 35–47)
HGB BLD-MCNC: 10.7 G/DL (ref 11.7–15.7)
MCH RBC QN AUTO: 27.2 PG (ref 26.5–33)
MCHC RBC AUTO-ENTMCNC: 30.7 G/DL (ref 31.5–36.5)
MCV RBC AUTO: 88 FL (ref 78–100)
PLATELET # BLD AUTO: 216 10E9/L (ref 150–450)
RBC # BLD AUTO: 3.94 10E12/L (ref 3.8–5.2)
SPECIMEN EXP DATE BLD: NORMAL
WBC # BLD AUTO: 4 10E9/L (ref 4–11)

## 2019-03-12 PROCEDURE — 36415 COLL VENOUS BLD VENIPUNCTURE: CPT | Performed by: INTERNAL MEDICINE

## 2019-03-12 PROCEDURE — 25800030 ZZH RX IP 258 OP 636: Performed by: PHYSICIAN ASSISTANT

## 2019-03-12 PROCEDURE — 86900 BLOOD TYPING SEROLOGIC ABO: CPT | Performed by: INTERNAL MEDICINE

## 2019-03-12 PROCEDURE — 86901 BLOOD TYPING SEROLOGIC RH(D): CPT | Performed by: INTERNAL MEDICINE

## 2019-03-12 PROCEDURE — 99239 HOSP IP/OBS DSCHRG MGMT >30: CPT | Performed by: INTERNAL MEDICINE

## 2019-03-12 PROCEDURE — 85027 COMPLETE CBC AUTOMATED: CPT | Performed by: INTERNAL MEDICINE

## 2019-03-12 PROCEDURE — 25000132 ZZH RX MED GY IP 250 OP 250 PS 637: Performed by: PHYSICIAN ASSISTANT

## 2019-03-12 RX ADMIN — SODIUM CHLORIDE, POTASSIUM CHLORIDE, SODIUM LACTATE AND CALCIUM CHLORIDE: 600; 310; 30; 20 INJECTION, SOLUTION INTRAVENOUS at 01:04

## 2019-03-12 RX ADMIN — ACETAMINOPHEN 650 MG: 325 TABLET, FILM COATED ORAL at 07:26

## 2019-03-12 ASSESSMENT — ACTIVITIES OF DAILY LIVING (ADL)
ADLS_ACUITY_SCORE: 10

## 2019-03-12 NOTE — PROGRESS NOTES
Patient has clinic visit within 24-48 hours of discharge so no post DC follow up call is needed.    Name: Remedios Watts MRN: [id number]   Date: 3/13/2019 Status: [facility]   Time: 12:30 PM Length: 15   Visit Type: UMP RETURN [[id number]] LING:     Provider: Linda Quesada MD Department:  COLON AND RECT SURG

## 2019-03-12 NOTE — PROGRESS NOTES
COLON & RECTAL SURGERY PROGRESS NOTE    S:  Afebrile. Tolerating full liquids without nausea/vomiting. Continues to have liquid stool output. Pain is overall well controlled, though she does have occasional cramping.     Vitals:  Vitals:    03/11/19 1315 03/11/19 2010 03/11/19 2213 03/12/19 0602   BP: 119/78 100/60 105/76 103/63   BP Location: Right arm Right arm Left arm Right arm   Pulse:  70 112    Resp: 16 16 16 16   Temp: 97.8  F (36.6  C) 98.4  F (36.9  C) 98.1  F (36.7  C) 98.6  F (37  C)   TempSrc: Oral Oral Oral Oral   SpO2: 99% 100% 100% 97%     I/O:  I/O last 3 completed shifts:  In: 240 [P.O.:240]  Out: -     PE:  GEN: NAD  CV: RRR, no murmurs  RESP: Non-labored breathing   ABD: Soft, minimally-tender, without guarding or rebound tenderness.  PSYCH: cooperative     Labs:  Recent Labs   Lab 03/11/19  0600 03/10/19  1417 03/10/19  1333   WBC 5.6 5.0  --    HGB 12.4 11.6*  --     313  --      --  138   POTASSIUM 3.5  --  4.4   CHLORIDE 104  --  108   CO2 29  --  21   BUN 7  --  7   CR 0.72  --  0.66   *  --  94   INR 1.07  --   --    AST 27  --  49*   ALT 26  --  28     A/P: 38 year old female with a history of malignant melanoma of LUE (s/p Mohs excision in 2017) who was admitted 3/10 as a transfer from [facility] for expedited work up of partial colonic obstruction related to a hepatic flexure mass. Colonoscopy shows fungating ulcerated mass at hepatic flexure, concerning for malignancy.    Okay to discharge to home on full liquids. Plan for Chest CT as an outpatient and close follow up with colorectal surgery clinic to discuss resection.     Discussed with staff, Dr. Quesada.     Jasmina Bennett MD   Surgery PGY-1         Attestation: (LATE ENTRY)  This patient has been evaluated by me, Linda Quesada.  Discussed with the house staff team or resident(s) and agree with the findings and plan in this note.  Linda Quesada MD  Colon and Rectal Surgery  Attending  [phone]

## 2019-03-12 NOTE — PLAN OF CARE
Pt A&O. VSS on RA. Up independently. Denies nausea. Tolerating full liquid diet. Pain in lower abd- PRN dilaudid given. LR at 100 mL/hr through R PIV. Voiding. 1 bm with red streaks per pt. Plan for possible discharge today.

## 2019-03-12 NOTE — TELEPHONE ENCOUNTER
Prior Authorization Approval    Authorization Effective Date: 3/7/2019  Authorization Expiration Date: 3/7/2020  Medication: Tretinoin- APPROVED  Approved Dose/Quantity: 45G PER 30 DAYS  Reference #: [id number]   Insurance Company: [employer] [place] - Phone [phone] Fax [phone]  Expected CoPay:       CoPay Card Available:      Foundation Assistance Needed:    Which Pharmacy is filling the prescription (Not needed for infusion/clinic administered): [facility] - - [place] - [id number] [facility]  Pharmacy Notified: Yes  Patient Notified: Yes

## 2019-03-12 NOTE — TELEPHONE ENCOUNTER
Called patient to verify appointment date, time, and location. Patient did not answer phone call. A message was left regarding details of the future appointment. A callback number was left to follow-up.   Maggi Diaz, EMT

## 2019-03-12 NOTE — PROGRESS NOTES
GASTROENTEROLOGY PROGRESS NOTE       Patient Summary   Remedios Watts is a 38 year old female with a past medical history of malignant melanoma in 2017, postpartum depression, and anxiety who is admitted for partial small bowel obstruction, and apple core lesion in the hepatic flexure noted on CT.       ASSESSMENT AND RECOMMENDATIONS:   #Abdominal pain  #Nausea and vomiting   #Partial small bowel obstruction   Worsening abdominal pain >1 week with nausea/constipation, and x1 episode of vomiting. Abd/pelvic CT from 3/10/19 showed apple core lesion in the hepatic flexure and colitis involving the cecum/ascending colon.     Colonoscopy has been completed and near-completely obstructing fungating, infiltrative, highly-friable and ulcerated large mass was found at the hepatic flexure/proximal transverse colon. Preliminary biopsy result showed primary adenocarcinoma, confirmatory stains pending. Updated primary team about the preliminary result and patient would benefit oncology for counseling/follow up in a young patient.     Recommendations  --Diet per colorectal surgery   --Follow up with colorectal and oncology   --Genetics consultation and early colorectal cancer screening for first degree relatives   --High risk for recurrent bowel obstruction, may need NG tube placement   --GI team is signing off patient to primary team    Pt care plan discussed with Dr. Maldonado, GI staff physician. Thank you for involving us in this patient's care. Please do not hesitate to contact the GI service with any questions or concerns.    JEFERSON [facility]  Department of Gastroenterology   P: [phone]  Subjective\events within the 24 hours:   Patient is sleepy but reports feeling better w/o nausea and vomiting.     4 point ROS performed and negative unless noted above.           Medications:     Current Facility-Administered Medications   Medication     acetaminophen (TYLENOL) tablet 650 mg     benzocaine 20%  (HURRICAINE/TOPEX) 20 % spray 0.5-1 mL     citalopram (celeXA) tablet 40 mg     HYDROmorphone (DILAUDID) injection 0.2 mg     lactated ringers infusion     melatonin tablet 1 mg     naloxone (NARCAN) injection 0.1-0.4 mg     ondansetron (ZOFRAN-ODT) ODT tab 4 mg    Or     ondansetron (ZOFRAN) injection 4 mg     polyethylene glycol (GoLYTELY/NuLYTELY) suspension 4,000 mL     polyethylene glycol (GoLYTELY/NuLYTELY) suspension 4,000 mL     polyethylene glycol (MIRALAX/GLYCOLAX) Packet 17 g     prochlorperazine (COMPAZINE) injection 10 mg    Or     prochlorperazine (COMPAZINE) tablet 10 mg    Or     prochlorperazine (COMPAZINE) Suppository 25 mg        Physical Exam   Blood pressure 103/63, pulse 112, temperature 98.6  F (37  C), temperature source Oral, resp. rate 16, SpO2 97 %, not currently breastfeeding.  Constitutional: cooperative, pleasant, not dyspneic/diaphoretic, no acute distress  Eyes: Sclera anicteric/injected  Ears/nose/mouth/throat: Normal oropharynx without ulcers or exudate, mucus membranes moist, hearing intact  Neck: supple, thyroid normal size  CV: RRR. No edema in LE bilaterally   Respiratory: Unlabored breathing. CTA bilaterally   Lymph: No axillary, submandibular, supraclavicular or inguinal lymphadenopathy  Abd: Nondistended, +bs, no hepatosplenomegaly, no peritoneal signs. Generalized tenderness.  Skin: warm, perfused, no jaundice  Neuro: AAO x 3, No asterixis  Psych: Normal affect  MSK: Normal gait    Data   Current Labs  CBC  Recent Labs   Lab 03/12/19  0710 03/11/19  0600 03/10/19  1417   WBC 4.0 5.6 5.0   RBC 3.94 4.64 4.29   HGB 10.7* 12.4 11.6*   HCT 34.8* 41.1 37.9   MCV 88 89 88   MCH 27.2 26.7 27.0   MCHC 30.7* 30.2* 30.6*   RDW 12.5 12.7 12.3    310 313     BMP  Recent Labs   Lab 03/11/19  0600 03/10/19  1333    138   POTASSIUM 3.5 4.4   CHLORIDE 104 108   CO2 29 21   ANIONGAP 6 9   * 94   BUN 7 7   CR 0.72 0.66   GFRESTIMATED >90 >90   GFRESTBLACK >90 >90   MANDY 8.7  "8.5      INR  Recent Labs   Lab 03/11/19  0600   INR 1.07     Liver panel  Recent Labs   Lab 03/11/19  0600 03/10/19  1333   PROTTOTAL 7.4 7.4   ALBUMIN 3.6 3.7   BILITOTAL 0.3 0.2   ALKPHOS 92 92   AST 27 49*   ALT 26 28     History of Present Illness:   Remedios Watts is a 38 year old female with a history of malignant melanoma in 2017, postpartum depression, and anxiety who is admitted for partial small bowel obstruction, and apple core lesion in the hepatic flexure noted on CT.        Patient presented to [facility] ED 3/10 with >1 week worsening abdominal pain with N/V and constipation. She was initially seen in [facility] ED with similar symptoms 2/2019 with etiology thought to be due to constipation. CT today noted apple core lesion in the right colon with possible obstruction, concerning for malignancy. She was brought to [facility] for expedited workup. Currently, patient reports epigastric pain with radiation throughout the abdomen. She described pain as \"contraction like\" as pain seems to come in waves. Some associated nausea without recent emesis. Denies weight loss, night sweats or fevers. No dyspnea or chest pain. No family h/o colon cancer. Father recently diagnosed with prostate cancer. Non-smoker and maybe 2 drinks/month.      Patient is refusing NG tube in as it is painful to have it in her nose             "

## 2019-03-12 NOTE — DISCHARGE SUMMARY
[facility] [place]  Hospitalist Discharge Summary       Date of Admission:  3/10/2019  Date of Discharge:  3/12/2019  Discharging Provider: Curtis Erazo MD  Discharge Team: Hospitalist Service, Gold 1    Discharge Diagnoses   Colonic Mass, concerning for malignancy  Small Bowel Obstruction due to colonic mass.   Right Kidney Lesion  Anxiety  Normocytic Anemia  History of malignant melanoma    Follow-ups Needed After Discharge   Follow up with Dr. Quesada of Colorectal Surgery within 2 weeks for hospital follow up and surgical planning.  Obtain Chest CT for staging.   Will need repeat Renal US in 3-5 months.     Unresulted Labs Ordered in the Past 30 Days of this Admission     Date and Time Order Name Status Description    3/11/2019 1225 Surgical pathology exam In process       These results will be followed up by Colorectal Surgery    Discharge Disposition   Discharged to home  Condition at discharge: Good    Hospital Course      Remedios Watts is a 38 year old female with history of malignant melanoma (2017) and anxiety who was transferred from [facility] to [facility] 3/10/2019 with partial SBO, apple coring of the right colon on CT scan.     Partial SBO, apple core lesion of ascending colon:  Colonic Mass, concerning for malignancy:  Ms. Watts presented with 10 day history of colicky abdominal pain and constipation. A CT scan of the abdomen and pelvis on 3/10 revealed probable apple core lesion near hepatic flexure concerning for obstructing neoplasm. GI and colorectal surgery were consulted and patient underwent a colonscopy revealing a fungating mass in the hepatic flexure, biopsies taken and pending. She was started on a liquid diet and was able to pass gas and stool. Colorectal Surgery assessed patient and deemed her stable for discharge with follow up in clinic after a staging Chest CT to determine surgical planning. Colorectal Surgery to determine need for Oncology  consultation as outpatient and referral to Oncology placed based on GI recommendations.      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 Ms. Watts will need repeat renal US in 3-5 months to ensure stability.     Anxiety: Continue PTA Celexa     Normocytic anemia: Hgb 11.6 on presentation. No evidence of acute bleed, likely secondary to fungating mass in hepatic flexure. Continue to monitor.      H/o malignant melanoma: 0.45 mm L arm melanoma dx 7/2017 s/p excision per dermatology. Also with h/o atypical nevi. Last seen by Dr. Stevens of dermatology 2/4/2019. Follow up with OP derm 6/2019 as planned     Consultations This Hospital Stay   GI LUMINAL ADULT IP CONSULT  VASCULAR ACCESS CARE ADULT IP CONSULT    Code Status   Full Code    Time Spent on this Encounter   I, Curtis Erazo, personally saw the patient today and spent greater than 30 minutes discharging this patient.       Curtis Erazo MD  [facility], [place]  ______________________________________________________________________    Physical Exam   Vital Signs: Temp: 98.6  F (37  C) Temp src: Oral BP: 103/63 Pulse: 112 Heart Rate: 67 Resp: 16 SpO2: 97 % O2 Device: None (Room air)    Weight: 0 lbs 0 oz  General Appearance: Well appearing in no acute distress  Respiratory: breathing comfortably on room air, no wheeze or rhonchi on auscultation  Cardiovascular: RRR, no murmurs  GI: soft, nondistended, minimally tender in RUQ to deep palpation  Skin: no rashes, no jaundice  Other: Extremities warm and well perfused        Primary Care Physician   Agustín Berger    Immunizations       Discharge Orders      CT Chest w contrast*     Colorectal Surgery Referral      Onc/Heme Adult Referral      Reason for your hospital stay    Dear Remedios,    Rupinder were hospitalized at [facility] with a small bowel obstruction and found  to have a mass suspicious for cancer in your colon.  Over your hospitalization you were monitored for continued bowel function and today you are ready to be discharged home.  If you develop fever, shortness of breath, light headedness, chest pain, abdominal pain, distention, or severe constipation please seek medical attention.    We are suggesting the following medication changes:  Please refrain from NSAIDs (ibuprofen, naproxen, aspirin)    Please get the following tests done:  CT Chest     Please set up an appointment with:  Dr. Quesada of Colorectal Surgery    It was a pleasure meeting with you today. Thank you for allowing me and my team the privilege of caring for you today. You are the reason we are here, and I truly hope we provided you with the excellent service you deserve. Please let us know if there is anything else we can do for you so that we can be sure you are leaving completely satisfied with your care experience.    Your hospital unit at the time of discharge is 5B so if you have any questions please call the hospital at [phone] and ask to talk to a nurse on 5B.     Sincerely,    Curtis Erazo  Internal Medicine Hospitalist  [facility]     Adult [facility] Follow-up and recommended labs and tests    Follow up with Dr. Quesada, within 2 weeks  for hospital follow- up and regarding new diagnosis. The following labs/tests are recommended: CT Chest prior to Clinic.    Appointments on [facility] and/or [facility] (with [facility] or [facility] provider or service). Call [phone] if you haven't heard regarding these appointments within 7 days of discharge.     Activity    Your activity upon discharge: activity as tolerated     When to contact your care team    Call your primary doctor if you have any of the following: temperature greater than 100.4, increased shortness of breath, increased swelling, increased pain or abdominal distention and severe constipation.     Full Code      Diet    Follow this diet upon discharge: Orders Placed This Encounter      Full Liquid Diet       Significant Results and Procedures   Most Recent 3 CBC's:  Recent Labs   Lab Test 03/12/19  0710 03/11/19  0600 03/10/19  1417   WBC 4.0 5.6 5.0   HGB 10.7* 12.4 11.6*   MCV 88 89 88    310 313   ,   Results for orders placed or performed during the hospital encounter of 03/10/19   XR Abdomen Port 1 View    Narrative    Exam: XR ABDOMEN PORT 1 VW, 3/10/2019 10:43 PM    Indication: Feeding tube placement verification    Comparison: Correlations made with CT 3/10/2019.    Findings:   Single portable view the abdomen. Enteric tube projecting over the  esophagus and stomach with sidehole distal to the GE junction.  Nonobstructive bowel gas pattern. No portal venous gas. Lung bases are  unremarkable.      Impression    Impression: Enteric tube with sidehole distal to the GE junction.    I have personally reviewed the examination and initial interpretation  and I agree with the findings.    ERICKSON YOUSSEF MD     Colonoscopy 3/11:  Impression:          - A fungating, infiltrative, highly-friable and                        ulcerated large mass was found at the hepatic                        flexure/proximal transverse colon, unable to traverse                        this lesions as this appears to be near-completely                        obstructive in nature (though allows liquid                        stool/effluent to pass under visualization). The mass                        was partially circumferential. Biopsies were taken with                        a cold forceps for histology. Two areas just immediately                        distal to the lesion were tattooed with a total                        injection of 0.6 mL of Caprice ink to aid localization.                        - The exam was otherwise normal throughout the examined                        colon, unable to traverse proximally beyond the                         identified lesion due to obstruction/angulation.                        - Non-bleeding small internal hemorrhoids.                        - Today's exam corroborates recent CT scan findings,                        concerning for primary colonic malignancy though                        histology is as yet pending (particularly in light of                        known personal history of prior LUE malignant melanoma).                        Discussed concerns for possible malignancy as well as                        next steps in care with patient and family (,                        sister) immediately following procedure today.   Recommendation:      - [facility] to [facility] for [facility] care.                        - Await pathology results (sent STAT today).                        - Recommend further consultation with Colorectal Surgery                        and Oncology as already planned.                        - Recommend CEA level now.                        - High-risk for recurrent obstruction in light of                        findings today and initial clinical presentation,                        recommend low threshold to replace NGT for acute                        decompression if obstructive symptoms recur.                        - Recommend NPO for now, can readdress ice chips for                        comfort pending Colorectal Surgery's assessment.                        - Discussed potential role for Genetics Consultation in                        ongoing care as well (could be addressed pending                        confirmation from biopsy/mass staining findings), as                        well as role for earlier CRC screening for 1st-degree                        relatives/children if applicable. This recommendation                        was specifically discussed with patient, sister, and                         today following procedure.                         - The findings and recommendations were discussed with                        the patient.                        - The findings and recommendations were discussed with                        the patient's family.     Discharge Medications   Current Discharge Medication List      CONTINUE these medications which have NOT CHANGED    Details   citalopram (CELEXA) 40 MG tablet Take 1 tablet (40 mg) by mouth daily  Qty: 90 tablet, Refills: 3    Associated Diagnoses: Depression, unspecified depression type      norethindrone-ethinyl estradiol (ORTHO-NOVUM 7/7/7, 28,) 0.5/0.75/1-35 MG-MCG per tablet Take 1 tablet by mouth daily  Qty: 28 tablet, Refills: 11    Associated Diagnoses: Encounter for contraceptive management, unspecified type      tretinoin (RETIN-A) 0.05 % external cream Apply topically At Bedtime  Qty: 45 g, Refills: 3    Associated Diagnoses: History of melanoma; Nevus; Seborrheic keratosis; Lentigo; Angioma; AK (actinic keratosis); Comedone         STOP taking these medications       ibuprofen (ADVIL/MOTRIN) 200 MG tablet Comments:   Reason for Stopping:             Allergies   Allergies   Allergen Reactions     Nka [No Known Allergies]

## 2019-03-12 NOTE — PLAN OF CARE
Pt AOX4, VSS on RA. Up ad alexander. No c/o pain or nausea. Will discharge home today via  as transport. No meds to  at pharmacy. AVS reviewed, PIV removed.

## 2019-03-13 ENCOUNTER — TELEPHONE (OUTPATIENT)
Dept: SURGERY | Facility: CLINIC | Age: 39
End: 2019-03-13

## 2019-03-13 ENCOUNTER — OFFICE VISIT (OUTPATIENT)
Dept: SURGERY | Facility: CLINIC | Age: 39
End: 2019-03-13
Payer: COMMERCIAL

## 2019-03-13 ENCOUNTER — TELEPHONE (OUTPATIENT)
Dept: GASTROENTEROLOGY | Facility: CLINIC | Age: 39
End: 2019-03-13

## 2019-03-13 ENCOUNTER — HOSPITAL ENCOUNTER (OUTPATIENT)
Dept: CT IMAGING | Facility: CLINIC | Age: 39
Discharge: HOME | End: 2019-03-13
Attending: INTERNAL MEDICINE | Admitting: INTERNAL MEDICINE
Payer: COMMERCIAL

## 2019-03-13 VITALS
HEIGHT: 66 IN | HEART RATE: 70 BPM | OXYGEN SATURATION: 100 % | WEIGHT: 170 LBS | SYSTOLIC BLOOD PRESSURE: 123 MMHG | DIASTOLIC BLOOD PRESSURE: 70 MMHG | BODY MASS INDEX: 27.32 KG/M2

## 2019-03-13 DIAGNOSIS — C18.3 MALIGNANT NEOPLASM OF HEPATIC FLEXURE (H): Primary | ICD-10-CM

## 2019-03-13 DIAGNOSIS — K63.89 COLONIC MASS: ICD-10-CM

## 2019-03-13 PROCEDURE — 25000125 ZZHC RX 250: Performed by: RADIOLOGY

## 2019-03-13 PROCEDURE — 25000128 H RX IP 250 OP 636: Performed by: RADIOLOGY

## 2019-03-13 PROCEDURE — 71260 CT THORAX DX C+: CPT

## 2019-03-13 RX ORDER — METRONIDAZOLE 500 MG/1
500 TABLET ORAL EVERY 6 HOURS
Qty: 3 TABLET | Refills: 0 | Status: ON HOLD | OUTPATIENT
Start: 2019-03-13 | End: 2019-03-25

## 2019-03-13 RX ORDER — ONDANSETRON 4 MG/1
4 TABLET, FILM COATED ORAL EVERY 6 HOURS
Qty: 3 TABLET | Refills: 0 | Status: SHIPPED | OUTPATIENT
Start: 2019-03-13 | End: 2019-04-02

## 2019-03-13 RX ORDER — IOPAMIDOL 755 MG/ML
80 INJECTION, SOLUTION INTRAVASCULAR ONCE
Status: COMPLETED | OUTPATIENT
Start: 2019-03-13 | End: 2019-03-13

## 2019-03-13 RX ORDER — POLYETHYLENE GLYCOL 3350 17 G/17G
238 POWDER, FOR SOLUTION ORAL SEE ADMIN INSTRUCTIONS
Qty: 238 G | Refills: 0 | Status: ON HOLD | OUTPATIENT
Start: 2019-03-13 | End: 2019-03-25

## 2019-03-13 RX ORDER — NEOMYCIN SULFATE 500 MG/1
1000 TABLET ORAL EVERY 6 HOURS
Qty: 6 TABLET | Refills: 0 | Status: ON HOLD | OUTPATIENT
Start: 2019-03-13 | End: 2019-03-25

## 2019-03-13 RX ADMIN — SODIUM CHLORIDE 80 ML: 9 INJECTION, SOLUTION INTRAVENOUS at 09:54

## 2019-03-13 RX ADMIN — IOPAMIDOL 80 ML: 755 INJECTION, SOLUTION INTRAVENOUS at 09:54

## 2019-03-13 ASSESSMENT — PAIN SCALES - GENERAL: PAINLEVEL: NO PAIN (0)

## 2019-03-13 ASSESSMENT — MIFFLIN-ST. JEOR: SCORE: 1462.86

## 2019-03-13 NOTE — LETTER
3/13/2019       RE: Remedios Watts  [id number] [place] [address]     Dear Colleague,    Thank you for referring your patient, Remedios Watts, to the [facility] COLON AND RECTAL SURGERY at [facility]. Please see a copy of my visit note below.    Colon and Rectal Surgery Consult Clinic Note    Referring provider:  Referred Self, MD  No address on file     RE: Remedios Watts  : 1980  DOMINGO: 3/13/2019    Remedios Watts is a very pleasant 38 year old female with history of malignant melanoma in 2017 who presents today for colon mass.    History of Present Ilness:     Kate was hospitalized 3/10-3/12/2019 for bowel obstruction due to colonic mass which resolved.    CT AP 3/10/19:   1. Probable apple core lesion near the hepatic flexure of the colon   concerning for an obstructing colonic neoplasm. Follow-up colonoscopy   recommended for further assessment.   2. Probable infectious or inflammatory colitis involving the cecum and   ascending colon possibly related to the obstruction. Small bowel is   normal in caliber.   3. No other acute changes are evident in the abdomen or pelvis to   account for patient's symptoms. Abdominal and pelvic organs are within   normal limits. No enlarged lymph nodes.    CT Chest 3/13/19:   1. No evidence of metastatic disease in the chest.   2. There is a tiny subcentimeter left lobe liver lesion which is too   small to characterize. Follow-up abdominal CT in 3 months is suggested   in reassessment.    Colonoscopy 3/11/19 by Dr. Loi Maldonado with fungating, infiltrative, highly-friable and ulcerated large mass was found at the hepatic flexure/proximal transverse colon, unable to traverse this lesions as this appears to be near-completely obstructive in nature (though allows liquid stool/effluent to pass under visualization). The mass was partially circumferential. Biopsies were taken with a cold forceps for histology. Two areas just  immediately distal to the lesion were tattooed with a total injection of 0.6 mL of Caprice ink to aid localization.    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 Plan for repeat renal US in 3-5 months to ensure stability.    Interval History: Remedios has been doing well now that she is on clear liquids. She was having pain with advanced diet previously. She is having watery stool only. She denies any nausea or vomiting. She is anxious to move forward with treatment.     FH: Father with prostate cancer and melanoma, maternal grandmother with breast cancer and stomach cancer (in her 90s).  PLEASE SEE NOTE BELOW FOR PHYSICAL EXAMINATION, REVIEW OF SYSTEMS, AND OTHER HISTORY.    Assessment/Plan: 38 year old female with history of malignant melanoma in 2017 who presents today for colon mass.  1) We spoke about operative options. The patient is a good candidate for a laparoscopic extended right hemicolectomy with primary anastomosis. We spoke about the components of an adequate oncologic resection. It is possible that we could need to convert to an open approach and the patient understands that we would do this if there was an issue with anatomy or need to do this to complete the surgery safely.  2) We discussed post-operative expectations and possible post-operative complications, including pneumonia, cardiac events, urinary tract infections, as well as surgery-specific complications such as wound infection and anastomotic leak, as well as possible need for reoperation in the postoperative setting with diversion. I explained that this is unlikely but something to be aware of.  3) We discussed discharge  requirements in the hospital including return of bowel function, pain control, and ability to take adequate oral intake.   4) I answered multiple questions of the patient and her . We also discussed the need to see oncology following surgery after her pathology is back (about 1 week after surgery).   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  Plan for repeat renal US in 3-5 months to ensure stability.    Chest CT: There is a tiny subcentimeter left lobe liver lesion which is to small to characterize. Follow-up abdominal CT in 3 months is suggested for reassessment.    Total time was 30 minutes, over 50% was spent counseling the patient.     PLEASE SEE NOTE BELOW FOR PHYSICAL EXAMINATION, REVIEW OF SYSTEMS, AND OTHER HISTORY.    Linda Quesada MD  Colon and Rectal Surgery Staff  [facility]    -------------------------------------------------------------------------------------------------------------------    Medical history:  Past Medical History:   Diagnosis Date     Chickenpox      Diarrhea      Group B streptococcus urinary tract infection complicating pregnancy 4/20/2011    Plan PCN in labor      History of postpartum depression, currently pregnant 10/21/2010     Malignant melanoma (H)      Postpartum depression 12/17/2008     Tailbone injury     fx     Urinary tract infections        Surgical history:  Past Surgical History:   Procedure Laterality Date     C ORAL SURGERY PROCEDURE       COLONOSCOPY N/A 3/11/2019    Procedure: COMBINED COLONOSCOPY, SINGLE OR MULTIPLE BIOPSY/POLYPECTOMY BY BIOPSY;  Surgeon: Loi Maldonado MD;  Location: [facility] GI     LAPAROSCOPIC ASSISTED COLECTOMY Right 3/22/2019    Procedure: Laparoscopic Extended Right Hemicolectomy and appendectomy;  Surgeon: Linda Quesada MD;  Location: [facility] OR       Problem  list:    Patient Active Problem List    Diagnosis Date Noted     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     Priority: Medium     Colonic mass 03/12/2019     Priority: Medium     Partial small bowel obstruction (H) 03/10/2019     Priority: Medium     Folliculitis 02/06/2013     Priority: Medium     Lentigo 02/06/2013     Priority: Medium     Congenital nevus 02/06/2013     Priority: Medium     Angioma 02/06/2013     Priority: Medium     Multiple nevi 02/06/2013     Priority: Medium     Dermal nevus 02/06/2013     Priority: Medium     CARDIOVASCULAR SCREENING; LDL GOAL LESS THAN 160 01/21/2013     Priority: Medium     Pelvic pain 01/18/2013     Priority: Medium     Generalized anxiety disorder 06/29/2011     Priority: Medium     Diagnosis updated by automated process. Provider to review and confirm.         Medications:  Current Outpatient Medications   Medication Sig Dispense Refill     citalopram (CELEXA) 40 MG tablet Take 1 tablet (40 mg) by mouth daily 90 tablet 3     norethindrone-ethinyl estradiol (ORTHO-NOVUM 7/7/7, 28,) 0.5/0.75/1-35 MG-MCG per tablet Take 1 tablet by mouth daily 28 tablet 11     ondansetron (ZOFRAN) 4 MG tablet Take 1 tablet (4 mg) by mouth every 6 hours At 8:00 am, 2:00 pm, 8:00 pm the day prior to your surgery with neomycin and flagyl. 3 tablet 0     tretinoin (RETIN-A) 0.05 % external cream Apply topically At Bedtime 45 g 3     acetaminophen (TYLENOL) 325 MG tablet Take 3 tablets (975 mg) by mouth every 8 hours as needed for mild pain 100 tablet 0     enoxaparin (LOVENOX) 40 MG/0.4ML syringe Inject 0.4 mLs (40 mg) Subcutaneous every 24 hours 12 mL 0     methocarbamol (ROBAXIN) 500 MG tablet Take 1 tablet (500 mg) by mouth 3 times daily for 7 days 21 tablet 0     oxyCODONE (ROXICODONE) 5 MG tablet Take 1-2 tablets (5-10 mg) by mouth every 4 hours as needed for moderate to severe pain 20 tablet 0       Allergies:  Allergies    Allergen Reactions     Nka [No Known Allergies]        Family history:  Family History   Problem Relation Age of Onset     Arthritis Mother      Neurologic Disorder Mother         had a seizure      Alcohol/Drug Mother      Arthritis Maternal Grandmother      Breast Cancer Maternal Grandmother      Melanoma Maternal Grandmother      Heart Disease Paternal Grandmother      Cancer Father         skin       Social history:  Social History     Socioeconomic History     Marital status:      Spouse name: Not on file     Number of children: 3     Years of education: Not on file     Highest education level: Not on file   Occupational History     Employer: [employer]   Social Needs     Financial resource strain: Not on file     Food insecurity:     Worry: Not on file     Inability: Not on file     Transportation needs:     Medical: Not on file     Non-medical: Not on file   Tobacco Use     Smoking status: Never Smoker     Smokeless tobacco: Never Used   Substance and Sexual Activity     Alcohol use: Yes     Drug use: No     Sexual activity: Yes     Partners: Male     Birth control/protection: Pill   Lifestyle     Physical activity:     Days per week: Not on file     Minutes per session: Not on file     Stress: Not on file   Relationships     Social connections:     Talks on phone: Not on file     Gets together: Not on file     Attends Buddhist service: Not on file     Active member of club or organization: Not on file     Attends meetings of clubs or organizations: Not on file     Relationship status: Not on file     Intimate partner violence:     Fear of current or ex partner: Not on file     Emotionally abused: Not on file     Physically abused: Not on file     Forced sexual activity: Not on file   Other Topics Concern     Parent/sibling w/ CABG, MI or angioplasty before 65F 55M? Yes     Comment: pt had MI @ age 58   Social History Narrative     Not on file         Nursing Notes:  "  Maggi Diaz, EMT  3/13/2019 12:11 PM  Signed  Chief Complaint   Patient presents with     Consult     Mass at hepatic flexure, consult for resection.       Vitals:    03/13/19 1205   BP: 123/70   BP Location: Left arm   Patient Position: Sitting   Cuff Size: Adult Regular   Pulse: 70   SpO2: 100%   Weight: 170 lb   Height: 5' 6\"       Body mass index is 27.44 kg/m .    Maggi Diaz, EMT    Physical Examination:  /70 (BP Location: Left arm, Patient Position: Sitting, Cuff Size: Adult Regular)   Pulse 70   Ht 5' 6\"   Wt 170 lb   SpO2 100%   BMI 27.44 kg/m     General: Pleasant, no acute distress   HEENT: Anicteric sclerae, mucous membranes moist, neck supple.   Abdomen: Soft, nontender, nondistended. No hepatosplenomegaly, no masses.     Again, thank you for allowing me to participate in the care of your patient.      Sincerely,    Linda Quesada MD      "

## 2019-03-13 NOTE — PROGRESS NOTES
Colon and Rectal Surgery Consult Clinic Note    Referring provider:  Referred Self, MD  No address on file     RE: Remedios Watts  : 1980  DOMINGO: 3/13/2019    Remedios Watts is a very pleasant 38 year old female with history of malignant melanoma in 2017 who presents today for colon mass.    History of Present Ilness:     Kate was hospitalized 3/10-3/12/2019 for bowel obstruction due to colonic mass which resolved.    CT AP 3/10/19:   1. Probable apple core lesion near the hepatic flexure of the colon   concerning for an obstructing colonic neoplasm. Follow-up colonoscopy   recommended for further assessment.   2. Probable infectious or inflammatory colitis involving the cecum and   ascending colon possibly related to the obstruction. Small bowel is   normal in caliber.   3. No other acute changes are evident in the abdomen or pelvis to   account for patient's symptoms. Abdominal and pelvic organs are within   normal limits. No enlarged lymph nodes.    CT Chest 3/13/19:   1. No evidence of metastatic disease in the chest.   2. There is a tiny subcentimeter left lobe liver lesion which is too   small to characterize. Follow-up abdominal CT in 3 months is suggested   in reassessment.    Colonoscopy 3/11/19 by Dr. Loi Maldonado with fungating, infiltrative, highly-friable and ulcerated large mass was found at the hepatic flexure/proximal transverse colon, unable to traverse this lesions as this appears to be near-completely obstructive in nature (though allows liquid stool/effluent to pass under visualization). The mass was partially circumferential. Biopsies were taken with a cold forceps for histology. Two areas just immediately distal to the lesion were tattooed with a total injection of 0.6 mL of Caprice ink to aid localization.    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 Plan for repeat renal US in 3-5 months to ensure stability.    Interval History: Remedios has been doing well now that she is on clear liquids. She was having pain with advanced diet previously. She is having watery stool only. She denies any nausea or vomiting. She is anxious to move forward with treatment.     FH: Father with prostate cancer and melanoma, maternal grandmother with breast cancer and stomach cancer (in her 90s).  PLEASE SEE NOTE BELOW FOR PHYSICAL EXAMINATION, REVIEW OF SYSTEMS, AND OTHER HISTORY.    Assessment/Plan: 38 year old female with history of malignant melanoma in 2017 who presents today for colon mass.  1) We spoke about operative options. The patient is a good candidate for a laparoscopic extended right hemicolectomy with primary anastomosis. We spoke about the components of an adequate oncologic resection. It is possible that we could need to convert to an open approach and the patient understands that we would do this if there was an issue with anatomy or need to do this to complete the surgery safely.  2) We discussed post-operative expectations and possible post-operative complications, including pneumonia, cardiac events, urinary tract infections, as well as surgery-specific complications such as wound infection and anastomotic leak, as well as possible need for reoperation in the postoperative setting with diversion. I explained that this is unlikely but something to be aware of.  3) We discussed discharge requirements in the hospital including return of bowel function, pain control, and ability to take adequate oral intake.   4) I answered multiple questions of the patient and her . We also discussed the need to see oncology following surgery after her pathology is back (about 1 week  after surgery).   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 Plan for repeat renal US in 3-5 months to ensure stability.    Chest CT: There is a tiny subcentimeter left lobe liver lesion which is to small to characterize. Follow-up abdominal CT in 3 months is suggested for reassessment.    Total time was 30 minutes, over 50% was spent counseling the patient.     PLEASE SEE NOTE BELOW FOR PHYSICAL EXAMINATION, REVIEW OF SYSTEMS, AND OTHER HISTORY.    Linda Quesada MD  Colon and Rectal Surgery Staff  [facility]      -------------------------------------------------------------------------------------------------------------------      Medical history:  Past Medical History:   Diagnosis Date     Chickenpox      Diarrhea      Group B streptococcus urinary tract infection complicating pregnancy 4/20/2011    Plan PCN in labor      History of postpartum depression, currently pregnant 10/21/2010     Malignant melanoma (H)      Postpartum depression 12/17/2008     Tailbone injury     fx     Urinary tract infections        Surgical history:  Past Surgical History:   Procedure Laterality Date     C ORAL SURGERY PROCEDURE       COLONOSCOPY N/A 3/11/2019    Procedure: COMBINED COLONOSCOPY, SINGLE OR MULTIPLE BIOPSY/POLYPECTOMY BY BIOPSY;  Surgeon: Loi Maldonado MD;  Location:  GI     LAPAROSCOPIC ASSISTED COLECTOMY Right 3/22/2019    Procedure: Laparoscopic Extended Right Hemicolectomy and appendectomy;  Surgeon: Linda Quesada MD;  Location:  OR       Problem list:    Patient Active Problem List    Diagnosis Date Noted     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     Priority: Medium     Colonic mass 03/12/2019     Priority: Medium     Partial small bowel obstruction (H) 03/10/2019      Priority: Medium     Folliculitis 02/06/2013     Priority: Medium     Lentigo 02/06/2013     Priority: Medium     Congenital nevus 02/06/2013     Priority: Medium     Angioma 02/06/2013     Priority: Medium     Multiple nevi 02/06/2013     Priority: Medium     Dermal nevus 02/06/2013     Priority: Medium     CARDIOVASCULAR SCREENING; LDL GOAL LESS THAN 160 01/21/2013     Priority: Medium     Pelvic pain 01/18/2013     Priority: Medium     Generalized anxiety disorder 06/29/2011     Priority: Medium     Diagnosis updated by automated process. Provider to review and confirm.         Medications:  Current Outpatient Medications   Medication Sig Dispense Refill     citalopram (CELEXA) 40 MG tablet Take 1 tablet (40 mg) by mouth daily 90 tablet 3     norethindrone-ethinyl estradiol (ORTHO-NOVUM 7/7/7, 28,) 0.5/0.75/1-35 MG-MCG per tablet Take 1 tablet by mouth daily 28 tablet 11     ondansetron (ZOFRAN) 4 MG tablet Take 1 tablet (4 mg) by mouth every 6 hours At 8:00 am, 2:00 pm, 8:00 pm the day prior to your surgery with neomycin and flagyl. 3 tablet 0     tretinoin (RETIN-A) 0.05 % external cream Apply topically At Bedtime 45 g 3     acetaminophen (TYLENOL) 325 MG tablet Take 3 tablets (975 mg) by mouth every 8 hours as needed for mild pain 100 tablet 0     enoxaparin (LOVENOX) 40 MG/0.4ML syringe Inject 0.4 mLs (40 mg) Subcutaneous every 24 hours 12 mL 0     methocarbamol (ROBAXIN) 500 MG tablet Take 1 tablet (500 mg) by mouth 3 times daily for 7 days 21 tablet 0     oxyCODONE (ROXICODONE) 5 MG tablet Take 1-2 tablets (5-10 mg) by mouth every 4 hours as needed for moderate to severe pain 20 tablet 0       Allergies:  Allergies   Allergen Reactions     Nka [No Known Allergies]        Family history:  Family History   Problem Relation Age of Onset     Arthritis Mother      Neurologic Disorder Mother         had a seizure      Alcohol/Drug Mother      Arthritis Maternal Grandmother      Breast Cancer Maternal  Grandmother      Melanoma Maternal Grandmother      Heart Disease Paternal Grandmother      Cancer Father         skin       Social history:  Social History     Socioeconomic History     Marital status:      Spouse name: Not on file     Number of children: 3     Years of education: Not on file     Highest education level: Not on file   Occupational History     Employer: [employer]   Social Needs     Financial resource strain: Not on file     Food insecurity:     Worry: Not on file     Inability: Not on file     Transportation needs:     Medical: Not on file     Non-medical: Not on file   Tobacco Use     Smoking status: Never Smoker     Smokeless tobacco: Never Used   Substance and Sexual Activity     Alcohol use: Yes     Drug use: No     Sexual activity: Yes     Partners: Male     Birth control/protection: Pill   Lifestyle     Physical activity:     Days per week: Not on file     Minutes per session: Not on file     Stress: Not on file   Relationships     Social connections:     Talks on phone: Not on file     Gets together: Not on file     Attends Cheondoism service: Not on file     Active member of club or organization: Not on file     Attends meetings of clubs or organizations: Not on file     Relationship status: Not on file     Intimate partner violence:     Fear of current or ex partner: Not on file     Emotionally abused: Not on file     Physically abused: Not on file     Forced sexual activity: Not on file   Other Topics Concern     Parent/sibling w/ CABG, MI or angioplasty before 65F 55M? Yes     Comment: pt had MI @ age 58   Social History Narrative     Not on file         Nursing Notes:   Maggi Diaz, EMT  3/13/2019 12:11 PM  Signed  Chief Complaint   Patient presents with     Consult     Mass at hepatic flexure, consult for resection.       Vitals:    03/13/19 1205   BP: 123/70   BP Location: Left arm   Patient Position: Sitting   Cuff Size: Adult Regular   Pulse: 70  "  SpO2: 100%   Weight: 170 lb   Height: 5' 6\"       Body mass index is 27.44 kg/m .      Maggi Diaz EMT                         Physical Examination:  /70 (BP Location: Left arm, Patient Position: Sitting, Cuff Size: Adult Regular)   Pulse 70   Ht 5' 6\"   Wt 170 lb   SpO2 100%   BMI 27.44 kg/m    General: Pleasant, no acute distress   HEENT: Anicteric sclerae, mucous membranes moist, neck supple.   Abdomen: Soft, nontender, nondistended. No hepatosplenomegaly, no masses.             "

## 2019-03-13 NOTE — TELEPHONE ENCOUNTER
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 All questions answered to her satisfaction at this time, has f/u appt w/ [facility] + Chest CT this afternoon.    Further recommendations/mgmt per [facility], would still advise consideration for Oncology + Genetics referrals if indicated from their standpoint.    Loi Maldonado MD   of Medicine  [facility] - Department of Medicine  Division of Gastroenterology

## 2019-03-13 NOTE — NURSING NOTE
"Chief Complaint   Patient presents with     Consult     Mass at hepatic flexure, consult for resection.       Vitals:    03/13/19 1205   BP: 123/70   BP Location: Left arm   Patient Position: Sitting   Cuff Size: Adult Regular   Pulse: 70   SpO2: 100%   Weight: 170 lb   Height: 5' 6\"       Body mass index is 27.44 kg/m .      Maggi Diaz, EMT                      "

## 2019-03-13 NOTE — TELEPHONE ENCOUNTER
Called patient to verify appointment date, time, and location. Patient did not answer phone call. A message was left regarding details of the future appointment. A callback number was left if cancellation is needed.   Maggi Diaz, EMT

## 2019-03-13 NOTE — PATIENT INSTRUCTIONS
Follow up:  1. Schedule Surgery    Please call with any questions or concerns regarding your clinic visit today.    It is a pleasure being involved in your health care.    Contacts post-consultation depending on your need:    Radiology Appointments [phone]    Schedule Clinic Appointments [phone] # 1   M-F 7:30 - 5 pm    [facility] [phone]    Clinic Fax Number [phone]    Surgery Scheduling [phone]    My Chart is available 24 hours a day and is a secure way to access your records and communicate with your care team.  I strongly recommend signing up if you haven't already done so, if you are comfortable with computers.  If you would like to inquire about this or are having problems with My Chart access, you may call [phone] or go online at [email or web address].  Please allow at least 24 hours for a response and extra time on weekends and Holidays.    BOWEL PREP: MIRALAX/GATORADE    Please go to your local pharmacy or store and purchase:    - 8.3 oz bottle of  Miralax (Powder)  - 10 oz bottle of Magnesium Citrate  - 64 oz of Gatorade (no red or purple)    FIVE DAYS BEFORE YOUR SURGERY  - Stop taking any of the following over-the-counter medications: Ibuprofen, Asprin,Iron supplements.    - If you are taking a blood thinner medication such as Coumadin, Plavix, or Warfarin, you MUST contact the prescribing physician regarding clearance for this procedure, or instructions for stopping/changing this medication before your procedure.     -Please contact the nurse line at [phone] option #3 for any questions regarding other medications.     THREE DAYS BEFORE YOUR SURGERY  - Begin restricted low-residue diet.  Suggested foods include: white bread or rolls, plain crackers, white rice, skinless potatoes, low fiber cereals, chicken, turkey, fish or seafood, and eggs.  You may also have applesauce, pear sauce, soft honeydew, cantaloupe, ripe banana, canned fruit without seeds or skins.       - Do not eat: Corn (any form), raw vegetables, foods with seeds, whole wheat or grain breads and cereals, brown or wild rice, granola, raisins and dried fruit, berries, popcorn, all varieties of nuts, peanuts, and seeds.     THE DAY BEFORE YOUR SURGERY    - Begin allowed clear liquid diet at breakfast time.    - Mix Miralax and 64 oz. of Gatorade in a pitcher, and place in the fridge.      - Begin the allowed clear liquid diet at breakfast time.  Drink at least 1 (one) gallon of water or allowed clear liquids throughout the day.      4:00 P.M.  Start drinking one 8-ounce glass of the solution every 15-30 minutes. If you start to feel nauseous, you may space out your drinking intervals.     8:00 P.M. Drink the bottle of Magnesium Citrate.    You may have clear liquids until 2 hours prior to your procedure.    ALLOWED CLEAR LIQUIDS  - Water  - Regular or decaf coffee (no cream)  - Jell-O or popsicles (no red or purple)  - Plain hard candy (no red or purple)  - Clear juices or Gatorade (no red or purple)  - Chicken broth (no vegetables or noodles)    DO NOT DRINK  - Milk or mild products such as ice cream, malts, or shakes  - Red or purple juices of any kind  - Gallo-aid, cranberry, cherry, or grape juice  - Juice with pulp (orange, grapefruit, pineapple, or tomato)  - Cream soups of any kind  - Alcoholic beverages    ANTIBIOTICS    8:00 A.M.- Take one tab of Metronidazole (flagyle) and two tabs of Neomycin with one tab of ondansetron (zofran). Please take the ondansetron with the antibiotics as they can cause nausea.    2:00 P.M.- Take one tab of Metronidazole (flagyle) and two tabs of Neomycin with one tab of ondansetron (zofran). Please take the ondansetron with the antibiotics as they can cause nausea.    8:00 P.M.- Take one tab of Metronidazole (flagyle) and two tabs of Neomycin with one tab of ondansetron (zofran). Please take the ondansetron with the antibiotics as they can cause nausea.

## 2019-03-18 NOTE — TELEPHONE ENCOUNTER
Patient is scheduled for surgery with Dr. Steven Massey      Spoke or left message with: Kate    Date of Surgery: 3/22/2019    Location: [facility]    H&P: Per Dr. Quesada, this is not needed.     Additional imaging/appointments: Post ops are scheduled with Krystin Gómez and Dr. Quesada    Surgery packet: NA

## 2019-03-21 ENCOUNTER — ANESTHESIA EVENT (OUTPATIENT)
Dept: SURGERY | Facility: CLINIC | Age: 39
End: 2019-03-21
Payer: COMMERCIAL

## 2019-03-22 ENCOUNTER — ANESTHESIA (OUTPATIENT)
Dept: SURGERY | Facility: CLINIC | Age: 39
End: 2019-03-22
Payer: COMMERCIAL

## 2019-03-22 ENCOUNTER — HOSPITAL ENCOUNTER (INPATIENT)
Facility: CLINIC | Age: 39
LOS: 4 days | Discharge: HOME | End: 2019-03-26
Attending: COLON & RECTAL SURGERY | Admitting: COLON & RECTAL SURGERY
Payer: COMMERCIAL

## 2019-03-22 DIAGNOSIS — C18.3 MALIGNANT NEOPLASM OF HEPATIC FLEXURE (H): Primary | ICD-10-CM

## 2019-03-22 LAB
ABO + RH BLD: NORMAL
ABO + RH BLD: NORMAL
BLD GP AB SCN SERPL QL: NORMAL
BLOOD BANK CMNT PATIENT-IMP: NORMAL
GLUCOSE BLDC GLUCOMTR-MCNC: 86 MG/DL (ref 70–99)
HCG UR QL: NEGATIVE
SPECIMEN EXP DATE BLD: NORMAL

## 2019-03-22 PROCEDURE — 25000128 H RX IP 250 OP 636: Performed by: NURSE ANESTHETIST, CERTIFIED REGISTERED

## 2019-03-22 PROCEDURE — C9290 INJ, BUPIVACAINE LIPOSOME: HCPCS | Performed by: STUDENT IN AN ORGANIZED HEALTH CARE EDUCATION/TRAINING PROGRAM

## 2019-03-22 PROCEDURE — 88341 IMHCHEM/IMCYTCHM EA ADD ANTB: CPT | Performed by: COLON & RECTAL SURGERY

## 2019-03-22 PROCEDURE — 86900 BLOOD TYPING SEROLOGIC ABO: CPT | Performed by: ANESTHESIOLOGY

## 2019-03-22 PROCEDURE — 37000008 ZZH ANESTHESIA TECHNICAL FEE, 1ST 30 MIN: Performed by: COLON & RECTAL SURGERY

## 2019-03-22 PROCEDURE — 25000125 ZZHC RX 250: Performed by: NURSE ANESTHETIST, CERTIFIED REGISTERED

## 2019-03-22 PROCEDURE — 12000001 ZZH R&B MED SURG/OB UMMC

## 2019-03-22 PROCEDURE — 71000014 ZZH RECOVERY PHASE 1 LEVEL 2 FIRST HR: Performed by: COLON & RECTAL SURGERY

## 2019-03-22 PROCEDURE — 25000128 H RX IP 250 OP 636: Performed by: STUDENT IN AN ORGANIZED HEALTH CARE EDUCATION/TRAINING PROGRAM

## 2019-03-22 PROCEDURE — 25000128 H RX IP 250 OP 636: Performed by: COLON & RECTAL SURGERY

## 2019-03-22 PROCEDURE — 27210794 ZZH OR GENERAL SUPPLY STERILE: Performed by: COLON & RECTAL SURGERY

## 2019-03-22 PROCEDURE — 36000064 ZZH SURGERY LEVEL 4 EA 15 ADDTL MIN - UMMC: Performed by: COLON & RECTAL SURGERY

## 2019-03-22 PROCEDURE — 25000128 H RX IP 250 OP 636: Performed by: ANESTHESIOLOGY

## 2019-03-22 PROCEDURE — 25000132 ZZH RX MED GY IP 250 OP 250 PS 637: Performed by: COLON & RECTAL SURGERY

## 2019-03-22 PROCEDURE — 88342 IMHCHEM/IMCYTCHM 1ST ANTB: CPT | Performed by: COLON & RECTAL SURGERY

## 2019-03-22 PROCEDURE — 88309 TISSUE EXAM BY PATHOLOGIST: CPT | Performed by: COLON & RECTAL SURGERY

## 2019-03-22 PROCEDURE — 25000566 ZZH SEVOFLURANE, EA 15 MIN: Performed by: COLON & RECTAL SURGERY

## 2019-03-22 PROCEDURE — 40000171 ZZH STATISTIC PRE-PROCEDURE ASSESSMENT III: Performed by: COLON & RECTAL SURGERY

## 2019-03-22 PROCEDURE — 0DTF0ZZ RESECTION OF RIGHT LARGE INTESTINE, OPEN APPROACH: ICD-10-PCS | Performed by: COLON & RECTAL SURGERY

## 2019-03-22 PROCEDURE — 86901 BLOOD TYPING SEROLOGIC RH(D): CPT | Performed by: ANESTHESIOLOGY

## 2019-03-22 PROCEDURE — 25000132 ZZH RX MED GY IP 250 OP 250 PS 637: Performed by: STUDENT IN AN ORGANIZED HEALTH CARE EDUCATION/TRAINING PROGRAM

## 2019-03-22 PROCEDURE — 25800030 ZZH RX IP 258 OP 636: Performed by: ANESTHESIOLOGY

## 2019-03-22 PROCEDURE — 37000009 ZZH ANESTHESIA TECHNICAL FEE, EACH ADDTL 15 MIN: Performed by: COLON & RECTAL SURGERY

## 2019-03-22 PROCEDURE — 00000159 ZZHCL STATISTIC H-SEND OUTS PREP: Performed by: COLON & RECTAL SURGERY

## 2019-03-22 PROCEDURE — 00000146 ZZHCL STATISTIC GLUCOSE BY METER IP

## 2019-03-22 PROCEDURE — 36000062 ZZH SURGERY LEVEL 4 1ST 30 MIN - UMMC: Performed by: COLON & RECTAL SURGERY

## 2019-03-22 PROCEDURE — 81025 URINE PREGNANCY TEST: CPT | Performed by: ANESTHESIOLOGY

## 2019-03-22 PROCEDURE — 71000015 ZZH RECOVERY PHASE 1 LEVEL 2 EA ADDTL HR: Performed by: COLON & RECTAL SURGERY

## 2019-03-22 PROCEDURE — 36415 COLL VENOUS BLD VENIPUNCTURE: CPT | Performed by: ANESTHESIOLOGY

## 2019-03-22 PROCEDURE — 86850 RBC ANTIBODY SCREEN: CPT | Performed by: ANESTHESIOLOGY

## 2019-03-22 RX ORDER — PROPOFOL 10 MG/ML
INJECTION, EMULSION INTRAVENOUS PRN
Status: DISCONTINUED | OUTPATIENT
Start: 2019-03-22 | End: 2019-03-22

## 2019-03-22 RX ORDER — FENTANYL CITRATE 50 UG/ML
25-50 INJECTION, SOLUTION INTRAMUSCULAR; INTRAVENOUS
Status: DISCONTINUED | OUTPATIENT
Start: 2019-03-22 | End: 2019-03-22 | Stop reason: HOSPADM

## 2019-03-22 RX ORDER — NALOXONE HYDROCHLORIDE 0.4 MG/ML
.1-.4 INJECTION, SOLUTION INTRAMUSCULAR; INTRAVENOUS; SUBCUTANEOUS
Status: DISCONTINUED | OUTPATIENT
Start: 2019-03-22 | End: 2019-03-22

## 2019-03-22 RX ORDER — GRANISETRON HYDROCHLORIDE 1 MG/ML
1 INJECTION INTRAVENOUS ONCE
Status: COMPLETED | OUTPATIENT
Start: 2019-03-22 | End: 2019-03-22

## 2019-03-22 RX ORDER — ERTAPENEM 1 G/1
1 INJECTION, POWDER, LYOPHILIZED, FOR SOLUTION INTRAMUSCULAR; INTRAVENOUS SEE ADMIN INSTRUCTIONS
Status: DISCONTINUED | OUTPATIENT
Start: 2019-03-22 | End: 2019-03-22 | Stop reason: HOSPADM

## 2019-03-22 RX ORDER — NALOXONE HYDROCHLORIDE 0.4 MG/ML
.1-.4 INJECTION, SOLUTION INTRAMUSCULAR; INTRAVENOUS; SUBCUTANEOUS
Status: DISCONTINUED | OUTPATIENT
Start: 2019-03-22 | End: 2019-03-22 | Stop reason: HOSPADM

## 2019-03-22 RX ORDER — HYDROXYZINE HYDROCHLORIDE 25 MG/1
50 TABLET, FILM COATED ORAL EVERY 6 HOURS PRN
Status: DISCONTINUED | OUTPATIENT
Start: 2019-03-22 | End: 2019-03-26 | Stop reason: HOSPADM

## 2019-03-22 RX ORDER — CITALOPRAM HYDROBROMIDE 20 MG/1
40 TABLET ORAL DAILY
Status: DISCONTINUED | OUTPATIENT
Start: 2019-03-23 | End: 2019-03-26 | Stop reason: HOSPADM

## 2019-03-22 RX ORDER — DEXAMETHASONE SODIUM PHOSPHATE 4 MG/ML
INJECTION, SOLUTION INTRA-ARTICULAR; INTRALESIONAL; INTRAMUSCULAR; INTRAVENOUS; SOFT TISSUE PRN
Status: DISCONTINUED | OUTPATIENT
Start: 2019-03-22 | End: 2019-03-22

## 2019-03-22 RX ORDER — SODIUM CHLORIDE, SODIUM LACTATE, POTASSIUM CHLORIDE, CALCIUM CHLORIDE 600; 310; 30; 20 MG/100ML; MG/100ML; MG/100ML; MG/100ML
INJECTION, SOLUTION INTRAVENOUS CONTINUOUS
Status: DISCONTINUED | OUTPATIENT
Start: 2019-03-22 | End: 2019-03-22 | Stop reason: HOSPADM

## 2019-03-22 RX ORDER — CELECOXIB 200 MG/1
200 CAPSULE ORAL ONCE
Status: COMPLETED | OUTPATIENT
Start: 2019-03-22 | End: 2019-03-22

## 2019-03-22 RX ORDER — FLUMAZENIL 0.1 MG/ML
0.2 INJECTION, SOLUTION INTRAVENOUS
Status: DISCONTINUED | OUTPATIENT
Start: 2019-03-22 | End: 2019-03-22 | Stop reason: HOSPADM

## 2019-03-22 RX ORDER — ERTAPENEM 1 G/1
1 INJECTION, POWDER, LYOPHILIZED, FOR SOLUTION INTRAMUSCULAR; INTRAVENOUS EVERY 24 HOURS
Status: COMPLETED | OUTPATIENT
Start: 2019-03-23 | End: 2019-03-23

## 2019-03-22 RX ORDER — LIDOCAINE 40 MG/G
CREAM TOPICAL
Status: DISCONTINUED | OUTPATIENT
Start: 2019-03-22 | End: 2019-03-26 | Stop reason: HOSPADM

## 2019-03-22 RX ORDER — BUPIVACAINE HYDROCHLORIDE 2.5 MG/ML
INJECTION, SOLUTION EPIDURAL; INFILTRATION; INTRACAUDAL PRN
Status: DISCONTINUED | OUTPATIENT
Start: 2019-03-22 | End: 2019-03-22

## 2019-03-22 RX ORDER — LIDOCAINE 40 MG/G
CREAM TOPICAL
Status: DISCONTINUED | OUTPATIENT
Start: 2019-03-22 | End: 2019-03-22 | Stop reason: HOSPADM

## 2019-03-22 RX ORDER — ONDANSETRON 2 MG/ML
INJECTION INTRAMUSCULAR; INTRAVENOUS PRN
Status: DISCONTINUED | OUTPATIENT
Start: 2019-03-22 | End: 2019-03-22

## 2019-03-22 RX ORDER — HYDROMORPHONE HYDROCHLORIDE 1 MG/ML
.3-.5 INJECTION, SOLUTION INTRAMUSCULAR; INTRAVENOUS; SUBCUTANEOUS
Status: DISCONTINUED | OUTPATIENT
Start: 2019-03-22 | End: 2019-03-22

## 2019-03-22 RX ORDER — LIDOCAINE HYDROCHLORIDE 20 MG/ML
INJECTION, SOLUTION INFILTRATION; PERINEURAL PRN
Status: DISCONTINUED | OUTPATIENT
Start: 2019-03-22 | End: 2019-03-22

## 2019-03-22 RX ORDER — HYDROXYZINE HYDROCHLORIDE 25 MG/1
25 TABLET, FILM COATED ORAL EVERY 6 HOURS PRN
Status: DISCONTINUED | OUTPATIENT
Start: 2019-03-22 | End: 2019-03-26 | Stop reason: HOSPADM

## 2019-03-22 RX ORDER — ACETAMINOPHEN 325 MG/1
975 TABLET ORAL EVERY 8 HOURS
Status: COMPLETED | OUTPATIENT
Start: 2019-03-22 | End: 2019-03-25

## 2019-03-22 RX ORDER — NALOXONE HYDROCHLORIDE 0.4 MG/ML
.1-.4 INJECTION, SOLUTION INTRAMUSCULAR; INTRAVENOUS; SUBCUTANEOUS
Status: DISCONTINUED | OUTPATIENT
Start: 2019-03-22 | End: 2019-03-26 | Stop reason: HOSPADM

## 2019-03-22 RX ORDER — ONDANSETRON 4 MG/1
4 TABLET, ORALLY DISINTEGRATING ORAL EVERY 30 MIN PRN
Status: DISCONTINUED | OUTPATIENT
Start: 2019-03-22 | End: 2019-03-22 | Stop reason: HOSPADM

## 2019-03-22 RX ORDER — ONDANSETRON 2 MG/ML
4 INJECTION INTRAMUSCULAR; INTRAVENOUS EVERY 30 MIN PRN
Status: DISCONTINUED | OUTPATIENT
Start: 2019-03-22 | End: 2019-03-22 | Stop reason: HOSPADM

## 2019-03-22 RX ORDER — ACETAMINOPHEN 325 MG/1
975 TABLET ORAL ONCE
Status: COMPLETED | OUTPATIENT
Start: 2019-03-22 | End: 2019-03-22

## 2019-03-22 RX ORDER — ERTAPENEM 1 G/1
1 INJECTION, POWDER, LYOPHILIZED, FOR SOLUTION INTRAMUSCULAR; INTRAVENOUS
Status: COMPLETED | OUTPATIENT
Start: 2019-03-22 | End: 2019-03-22

## 2019-03-22 RX ORDER — SODIUM CHLORIDE, SODIUM LACTATE, POTASSIUM CHLORIDE, CALCIUM CHLORIDE 600; 310; 30; 20 MG/100ML; MG/100ML; MG/100ML; MG/100ML
INJECTION, SOLUTION INTRAVENOUS CONTINUOUS
Status: DISCONTINUED | OUTPATIENT
Start: 2019-03-22 | End: 2019-03-24

## 2019-03-22 RX ORDER — HYDROMORPHONE HYDROCHLORIDE 1 MG/ML
.3-.5 INJECTION, SOLUTION INTRAMUSCULAR; INTRAVENOUS; SUBCUTANEOUS EVERY 5 MIN PRN
Status: DISCONTINUED | OUTPATIENT
Start: 2019-03-22 | End: 2019-03-22 | Stop reason: HOSPADM

## 2019-03-22 RX ORDER — FENTANYL CITRATE 50 UG/ML
INJECTION, SOLUTION INTRAMUSCULAR; INTRAVENOUS PRN
Status: DISCONTINUED | OUTPATIENT
Start: 2019-03-22 | End: 2019-03-22

## 2019-03-22 RX ADMIN — GRANISETRON HYDROCHLORIDE 1 MG: 1 INJECTION INTRAVENOUS at 15:06

## 2019-03-22 RX ADMIN — Medication 0.5 MG: at 20:18

## 2019-03-22 RX ADMIN — ROCURONIUM BROMIDE 50 MG: 10 INJECTION INTRAVENOUS at 16:16

## 2019-03-22 RX ADMIN — FENTANYL CITRATE 25 MCG: 50 INJECTION INTRAMUSCULAR; INTRAVENOUS at 21:42

## 2019-03-22 RX ADMIN — SODIUM CHLORIDE, POTASSIUM CHLORIDE, SODIUM LACTATE AND CALCIUM CHLORIDE: 600; 310; 30; 20 INJECTION, SOLUTION INTRAVENOUS at 14:28

## 2019-03-22 RX ADMIN — ROCURONIUM BROMIDE 20 MG: 10 INJECTION INTRAVENOUS at 17:16

## 2019-03-22 RX ADMIN — MIDAZOLAM 2 MG: 1 INJECTION INTRAMUSCULAR; INTRAVENOUS at 13:39

## 2019-03-22 RX ADMIN — Medication 0.2 MG: at 18:25

## 2019-03-22 RX ADMIN — BUPIVACAINE HYDROCHLORIDE 20 ML: 2.5 INJECTION, SOLUTION EPIDURAL; INFILTRATION; INTRACAUDAL; PERINEURAL at 15:05

## 2019-03-22 RX ADMIN — DEXAMETHASONE SODIUM PHOSPHATE 8 MG: 4 INJECTION, SOLUTION INTRA-ARTICULAR; INTRALESIONAL; INTRAMUSCULAR; INTRAVENOUS; SOFT TISSUE at 16:25

## 2019-03-22 RX ADMIN — FENTANYL CITRATE 50 MCG: 50 INJECTION, SOLUTION INTRAMUSCULAR; INTRAVENOUS at 16:42

## 2019-03-22 RX ADMIN — SUGAMMADEX 200 MG: 100 INJECTION, SOLUTION INTRAVENOUS at 17:41

## 2019-03-22 RX ADMIN — ACETAMINOPHEN 975 MG: 325 TABLET, FILM COATED ORAL at 14:03

## 2019-03-22 RX ADMIN — FENTANYL CITRATE 50 MCG: 50 INJECTION, SOLUTION INTRAMUSCULAR; INTRAVENOUS at 17:16

## 2019-03-22 RX ADMIN — FENTANYL CITRATE 100 MCG: 50 INJECTION, SOLUTION INTRAMUSCULAR; INTRAVENOUS at 15:55

## 2019-03-22 RX ADMIN — Medication 80 MG: at 16:07

## 2019-03-22 RX ADMIN — CELECOXIB 200 MG: 200 CAPSULE ORAL at 14:03

## 2019-03-22 RX ADMIN — FENTANYL CITRATE 50 MCG: 50 INJECTION INTRAMUSCULAR; INTRAVENOUS at 22:34

## 2019-03-22 RX ADMIN — ERTAPENEM SODIUM 1 G: 1 INJECTION, POWDER, LYOPHILIZED, FOR SOLUTION INTRAMUSCULAR; INTRAVENOUS at 16:15

## 2019-03-22 RX ADMIN — ONDANSETRON 4 MG: 2 INJECTION INTRAMUSCULAR; INTRAVENOUS at 17:30

## 2019-03-22 RX ADMIN — FENTANYL CITRATE 25 MCG: 50 INJECTION INTRAMUSCULAR; INTRAVENOUS at 18:13

## 2019-03-22 RX ADMIN — ACETAMINOPHEN 975 MG: 325 TABLET, FILM COATED ORAL at 21:34

## 2019-03-22 RX ADMIN — FENTANYL CITRATE 25 MCG: 50 INJECTION INTRAMUSCULAR; INTRAVENOUS at 21:34

## 2019-03-22 RX ADMIN — MIDAZOLAM 2 MG: 1 INJECTION INTRAMUSCULAR; INTRAVENOUS at 15:47

## 2019-03-22 RX ADMIN — Medication 0.3 MG: at 18:16

## 2019-03-22 RX ADMIN — Medication 0.5 MG: at 18:41

## 2019-03-22 RX ADMIN — FENTANYL CITRATE 50 MCG: 50 INJECTION INTRAMUSCULAR; INTRAVENOUS at 18:16

## 2019-03-22 RX ADMIN — FENTANYL CITRATE 25 MCG: 50 INJECTION INTRAMUSCULAR; INTRAVENOUS at 18:11

## 2019-03-22 RX ADMIN — FENTANYL CITRATE 25 MCG: 50 INJECTION INTRAMUSCULAR; INTRAVENOUS at 13:40

## 2019-03-22 RX ADMIN — FENTANYL CITRATE 50 MCG: 50 INJECTION, SOLUTION INTRAMUSCULAR; INTRAVENOUS at 18:01

## 2019-03-22 RX ADMIN — FENTANYL CITRATE 50 MCG: 50 INJECTION, SOLUTION INTRAMUSCULAR; INTRAVENOUS at 17:45

## 2019-03-22 RX ADMIN — LIDOCAINE HYDROCHLORIDE 60 MG: 20 INJECTION, SOLUTION INFILTRATION; PERINEURAL at 16:06

## 2019-03-22 RX ADMIN — PROPOFOL 200 MG: 10 INJECTION, EMULSION INTRAVENOUS at 16:06

## 2019-03-22 RX ADMIN — BUPIVACAINE 20 ML: 13.3 INJECTION, SUSPENSION, LIPOSOMAL INFILTRATION at 15:05

## 2019-03-22 RX ADMIN — Medication 0.5 MG: at 19:26

## 2019-03-22 ASSESSMENT — PAIN DESCRIPTION - DESCRIPTORS: DESCRIPTORS: ACHING;DULL

## 2019-03-22 ASSESSMENT — MIFFLIN-ST. JEOR: SCORE: 1450.75

## 2019-03-22 NOTE — ANESTHESIA PREPROCEDURE EVALUATION
Anesthesia Pre-Procedure Evaluation    Patient: Remedios Watts   MRN:     [id number] Gender:   female   Age:    38 year old :      1980        Preoperative Diagnosis: Colon Cancer   Procedure(s):  Laparoscopic Extended Right Hemicolectomy     Past Medical History:   Diagnosis Date     Chickenpox      Diarrhea      Group B streptococcus urinary tract infection complicating pregnancy 2011    Plan PCN in labor      History of postpartum depression, currently pregnant 10/21/2010     Malignant melanoma (H)      Postpartum depression 2008     Tailbone injury     fx     Urinary tract infections       Past Surgical History:   Procedure Laterality Date     C ORAL SURGERY PROCEDURE       COLONOSCOPY N/A 3/11/2019    Procedure: COMBINED COLONOSCOPY, SINGLE OR MULTIPLE BIOPSY/POLYPECTOMY BY BIOPSY;  Surgeon: Loi Maldonado MD;  Location:  GI                   PHYSICAL EXAM:   Mental Status/Neuro:    Airway: Facies: Feasible  Mallampati: I  Mouth/Opening: Full  TM distance: > 6 cm  Neck ROM: Full   Respiratory: Auscultation: CTAB     Resp. Rate: Normal     Resp. Effort: Normal      CV: Rhythm: Regular   Comments:      Dental: Normal                  Lab Results   Component Value Date    WBC 4.0 2019    HGB 10.7 (L) 2019    HCT 34.8 (L) 2019     2019     2019    POTASSIUM 3.5 2019    CHLORIDE 104 2019    CO2 29 2019    BUN 7 2019    CR 0.72 2019     (H) 2019    MANDY 8.7 2019    ALBUMIN 3.6 2019    PROTTOTAL 7.4 2019    ALT 26 2019    AST 27 2019    ALKPHOS 92 2019    BILITOTAL 0.3 2019    LIPASE 133 03/10/2019    AMYLASE 37 03/10/2019    INR 1.07 2019    TSH 5.01 (H) 2018    T4 0.81 2018    T3 149 2007    HCG Negative 2019    HCGS Negative 2018       Preop Vitals  BP Readings from Last 3 Encounters:   19 114/72   19 123/70   19  "103/63    Pulse Readings from Last 3 Encounters:   03/22/19 69   03/13/19 70   03/11/19 112      Resp Readings from Last 3 Encounters:   03/22/19 11   03/12/19 16   03/10/19 18    SpO2 Readings from Last 3 Encounters:   03/22/19 100%   03/13/19 100%   03/12/19 97%      Temp Readings from Last 1 Encounters:   03/22/19 36.5  C (97.7  F) (Oral)    Ht Readings from Last 1 Encounters:   03/22/19 1.676 m (5' 6\")      Wt Readings from Last 1 Encounters:   03/22/19 75.9 kg (167 lb 5.3 oz)    Estimated body mass index is 27.01 kg/m  as calculated from the following:    Height as of this encounter: 1.676 m (5' 6\").    Weight as of this encounter: 75.9 kg (167 lb 5.3 oz).     LDA:            Assessment:   ASA SCORE: 2       Documentation: H&P complete; Preop Testing complete; Consents complete   Proceeding: Proceed without further delay  Tobacco Use:  NO Active use of Tobacco/UNKNOWN Tobacco use status     Plan:   Anes. Type:  General   Pre-Induction: Midazolam IV; Acetaminophen PO   Induction:  IV (Standard)   Airway: Oral ETT   Access/Monitoring: PIV; 2nd PIV   Maintenance: Balanced   Emergence: Procedure Site   Logistics: Same Day Surgery     Postop Pain/Sedation Strategy:  Standard-Options: Opioids PRN     PONV Management:  Adult Risk Factors: Female, Non-Smoker, Postop Opioids  Prevention: Dexamethasone; Ondansetron     CONSENT: Direct conversation   Plan and risks discussed with: Patient   Blood Products: Consented (ALL Blood Products)                         Anesthesia plan was discussed in detail with patient. She understood and agreed to proceed as planned. All questions answered    Bandar Robbins MD  "

## 2019-03-22 NOTE — BRIEF OP NOTE
[facility], [place]    Brief Operative Note    Pre-operative diagnosis: Colon Cancer  Post-operative diagnosis Same  Procedure: Procedure(s):  Laparoscopic Extended Right Hemicolectomy and appendectomy  Surgeon: Surgeon(s) and Role:     * Linda Quesada MD - Primary     * Jc Boyce MD - Resident - Assisting     * Cinthia Jennings MD - Resident - Assisting  Anesthesia: Combined General with Block   Estimated blood loss: 20 mL  Drains: None  Specimens:   ID Type Source Tests Collected by Time Destination   A : Right and transverse colon, terminal illeum, appendix Tissue Large Intestine, Right/Ascending SURGICAL PATHOLOGY EXAM Linda Quesada MD 3/22/2019  5:18 PM      Findings:   Extended right hemicolectomy performed. Stapled side-to-side functional end-to-end ileocolic anastomosis..  Complications: None.  Implants: None.

## 2019-03-22 NOTE — ANESTHESIA CARE TRANSFER NOTE
Patient: Remedios Watts    Procedure(s):  Laparoscopic Extended Right Hemicolectomy and appendectomy    Diagnosis: Colon Cancer  Diagnosis Additional Information: No value filed.    Anesthesia Type:   No value filed.     Note:  Airway :Nasal Cannula  Patient transferred to:PACU  Handoff Report: Identifed the Patient, Identified the Reponsible Provider, Reviewed the pertinent medical history, Discussed the surgical course, Reviewed Intra-OP anesthesia mangement and issues during anesthesia, Set expectations for post-procedure period and Allowed opportunity for questions and acknowledgement of understanding      Vitals: (Last set prior to Anesthesia Care Transfer)    CRNA VITALS  3/22/2019 1726 - 3/22/2019 1801      3/22/2019             Resp Rate (observed):  6  (Abnormal)                 Electronically Signed By: Charles Patrick Schlatter, APRN CRNA  March 22, 2019  6:01 PM

## 2019-03-22 NOTE — ANESTHESIA PROCEDURE NOTES
Peripheral Nerve Block Procedure Note    Staff:     Anesthesiologist:  Loi Valentin MD    Resident/CRNA:  Vesta Dexter MD    Block performed by resident/CRNA in the presence of a teaching physician    Location: Pre-op  Procedure Start/Stop TImes:      3/22/2019 3:00 PM     3/22/2019 3:08 PM    patient identified, IV checked, site marked, risks and benefits discussed, informed consent, monitors and equipment checked, pre-op evaluation, at physician/surgeon's request and post-op pain management      Correct Patient: Yes      Correct Position: Yes      Correct Site: Yes      Correct Procedure: Yes      Correct Laterality:  Yes    Site Marked:  Yes  Procedure details:     Procedure:  TAP    ASA:  2    Laterality:  Bilateral    Position:  Supine    Sterile Prep: chloraprep, mask and sterile gloves      Needle:  Touhy needle    Ultrasound: Yes      Ultrasound used to identify targeted nerve, plexus, or vascular structure and placed a needle adjacent to it      Permanent Image entered into patiient's record      Abnormal pain on injection: No      Blood Aspirated: No      Paresthesias:  No    Bleeding at site: No      Bolus via:  Needle    Infusion Method:  Single Shot    Blood aspirated via catheter: No

## 2019-03-22 NOTE — ANESTHESIA POSTPROCEDURE EVALUATION
Anesthesia POST Procedure Evaluation    Patient: Remedios Watts   MRN:     [id number] Gender:   female   Age:    38 year old :      1980        Preoperative Diagnosis: Colon Cancer   Procedure(s):  Laparoscopic Extended Right Hemicolectomy and appendectomy   Postop Comments: No value filed.       Anesthesia Type:  General    Reportable Event: NO     PAIN: Uncomplicated   Sign Out status: Comfortable, Well controlled pain     PONV: No PONV   Sign Out status:  No Nausea or Vomiting     Neuro/Psych: Uneventful perioperative course   Sign Out Status: Preoperative baseline; Age appropriate mentation     Airway/Resp.: Uneventful perioperative course   Sign Out Status: Non labored breathing, age appropriate RR; Resp. Status within EXPECTED Parameters     CV: Uneventful perioperative course   Sign Out status: Appropriate BP and perfusion indices; Appropriate HR/Rhythm     Disposition:   Sign Out in:  PACU  Disposition:  Floor  Recovery Course: Uneventful  Follow-Up: Not required           Last Anesthesia Record Vitals:  CRNA VITALS  3/22/2019 1726 - 3/22/2019 1826      3/22/2019             Resp Rate (observed):  6  (Abnormal)           Last PACU/Preop Vitals:  Vitals:    19 1505 19 1510 19 1515   BP: 107/66 112/70 114/72   Pulse: 65 70 69   Resp: 13 12 11   Temp:      SpO2: 100% 100%          Electronically Signed By: Brittanie Kirk MD, 2019, 6:26 PM

## 2019-03-22 NOTE — DISCHARGE SUMMARY
Discharge Summary    Remedios Watts MRN# [id number]   YOB: 1980 Age: 38 year old     Date of Admission:  3/22/2019  Date of Discharge::  3/26/2019 12:20 PM  Admitting Physician:  Linda Quesada MD  Discharge Physician:  Linda Quesada MD  Primary Care Physician:        Agustín Berger MD          Admission Diagnoses:   Invasive adenocarcinoma of the colon          Discharge Diagnosis:   Same status post laparoscopic colon resection         Procedures:   3/22/19 Dr. Quesada: Laparoscopic Extended Right Hemicolectomy        Non-operative procedures:   None performed          Consultations:   WOUND OSTOMY CONTINENCE NURSE  IP CONSULT         Imaging Studies:     Results for orders placed or performed during the hospital encounter of 03/13/19   CT Chest w contrast*    Narrative    CT CHEST W CONTRAST 3/13/2019 10:06 AM    HISTORY: Colon cancer. Staging.    CONTRAST:  80 mL Isovue 370.    TECHNIQUE: Routine CT of the chest is performed with IV contrast.    Routine assessed structures include the lungs, mediastinal structures,  pleura, chest wall, and upper visualized abdomen.    Radiation dose for this scan is reduced using automated exposure  control, adjustment of the mA and/or kV according to patient size, or  iterative reconstructive technique.    COMPARISON: Abdomen CT dated 3/10/2019.    FINDINGS: The lungs show no significant focal finding. No enlarged  lymph nodes are demonstrated. The upper visualized portions of the  abdomen show 0.6 cm lesion in the lateral segment of the left lobe of  the liver on axial image 45. This is poorly characterized due to its  small size. It wasn't discretely seen in 2007.       Impression    IMPRESSION:  1. No evidence of metastatic disease in the chest.  2. There is a tiny subcentimeter left lobe liver lesion which is too  small to characterize. Follow-up abdominal CT in 3 months is suggested  in reassessment.    EVAN NGUYEN MD              " Medications Prior to Admission:     No medications prior to admission.            Discharge Medications:     Current Discharge Medication List      CONTINUE these medications which have NOT CHANGED    Details   citalopram (CELEXA) 40 MG tablet Take 1 tablet (40 mg) by mouth daily  Qty: 90 tablet, Refills: 3    Associated Diagnoses: Depression, unspecified depression type      metroNIDAZOLE (FLAGYL) 500 MG tablet Take 1 tablet (500 mg) by mouth every 6 hours At 8:00 am, 2:00 pm, 8:00 pm the day prior to your surgery with neomycin and zofran.  Qty: 3 tablet, Refills: 0    Associated Diagnoses: Colon cancer (H)      neomycin (MYCIFRADIN) 500 MG tablet Take 2 tablets (1,000 mg) by mouth every 6 hours At 8:00 am, 2:00 pm, 8:00 pm the day prior to your surgery with flagyl and zofran.  Qty: 6 tablet, Refills: 0    Associated Diagnoses: Colon cancer (H)      norethindrone-ethinyl estradiol (ORTHO-NOVUM 7/7/7, 28,) 0.5/0.75/1-35 MG-MCG per tablet Take 1 tablet by mouth daily  Qty: 28 tablet, Refills: 11    Associated Diagnoses: Encounter for contraceptive management, unspecified type      ondansetron (ZOFRAN) 4 MG tablet Take 1 tablet (4 mg) by mouth every 6 hours At 8:00 am, 2:00 pm, 8:00 pm the day prior to your surgery with neomycin and flagyl.  Qty: 3 tablet, Refills: 0    Associated Diagnoses: Colon cancer (H)      polyethylene glycol (MIRALAX) packet Take 238 g by mouth See Admin Instructions Starting at 4 pm night prior to surgery. Refer to \"Getting Ready for Surgery\" instructions.  Qty: 238 g, Refills: 0    Associated Diagnoses: Colon cancer (H)      tretinoin (RETIN-A) 0.05 % external cream Apply topically At Bedtime  Qty: 45 g, Refills: 3    Associated Diagnoses: History of melanoma; Nevus; Seborrheic keratosis; Lentigo; Angioma; AK (actinic keratosis); Comedone         STOP taking these medications       magnesium citrate solution Comments:   Reason for Stopping:                     Medications Discontinued or " Adjusted During This Hospitalization:   Lovenox for 30 days         Antibiotics Prescribed at Discharge:   None prescribed          Brief History of Illness:   Remedios Watts is a 38 year old female with a history of malignant melanoma of LUE (s/p Mohs excision with dermatology in 2017) who presents to the hospital for colon cancer resection.  She was recently admitted from 3/10-3/12 for a 10 day history of worsening crampy RUQ abdominal pain with associated nausea and vomiting, consistent with a partial bowel obstruction. A CT abdomen pelvis was obtained that showed an apple core lesion near the hepatic flexure of the colon concerning for an obstructing colonic neoplasm. A colonoscopy done during that hospitalization that showed a partially circumferential, fungating, infiltrative, highly-friable and ulcerated large mass at the hepatic flexure/proximal transverse colon, nearly completely obstructive in nature. Biopsies taken during the procedure were consistent with invasive moderately differentiated adenocarcinoma (consistent with colonic origin) with intact mismatch repair enzymes by immunohistochemistry. CEA on 3/11 was 9.2. The patient's partial obstruction resolved and she was discharged home with close clinic follow up.    Jami was seen in colorectal clinic by Dr. Quesada on 3/13/19. A staging CT chest was obtained prior to this visit that showed no evidence of metastatic disease in the chest. A small subcentimeter lesion was noted in the left liver that was too small to characterize and follow up CT in 3 months was recommended. In light of all these findings the patient was subsequently scheduled for laparoscopic extended right hemicolectomy on 3/22/19.            Hospital Course:   Jami underwent the above procedure without immediate complication. She was admitted to the floor under the cares of the colorectal surgery service and was hemodynamically stable and afebrile throughout her stay. She had  "return of bowel function on POD#3 with flatus. She was advanced to a low fiber diet on POD1.  By POD4 she was tolerating a diet, on oral pain medications, voiding and ambulating appropriately, meeting criteria for discharge. She will continue on a low residue diet for 6 weeks, tylenol and a prescription for PRN oxycodone, a total 30 day lovenox regimen, and will follow up in colorectal clinic in one week.         Day of Discharge Physical Exam:   BP 95/58 (BP Location: Right arm)   Pulse 60   Temp 98.4  F (36.9  C) (Oral)   Resp 16   Ht 1.676 m (5' 6\")   Wt 76.7 kg (169 lb)   SpO2 98%   BMI 27.28 kg/m      Gen: AAOx3, NAD  Pulm: Non-labored breathing  Abd: Soft, appropriately tender, no guarding   Incision C/D/I with dermabond in place  Ext:  Warm and well-perfused         Final Pathology Result:     FINAL DIAGNOSIS:   RIGHT COLON, MASS, BIOPSY:   - Invasive moderately differentiated adenocarcinoma compatible with   colorectal origin   - Mismatch repair enzymes intact by immunohistochemistry   - See comment and microscopic description          Discharge Instructions and Follow-Up:   Discharge diet: Low residue   Discharge activity: Lifting restricted to 10 pounds for 4 weeks   Discharge follow-up: Follow up with Dr. Steven Massey in 2-3 weeks   Tubes/Lines/Drains: None   Wound care: Keep wound clean and dry          Home Health Care:   Not needed           Discharge Disposition:   Discharged to home      Condition at discharge: Stable    Discussed with staff on day of discharge    Didier Michele  PGY-1, General Surgery  [phone]    Staff Addendum:  Agree with the discharge summary as documented. I was personally involved with the discharge planning and hospital decision-making for this patient.  Linda Quesada MD  Colon and Rectal Surgery Staff  [facility]      "

## 2019-03-23 LAB
ANION GAP SERPL CALCULATED.3IONS-SCNC: 6 MMOL/L (ref 3–14)
BUN SERPL-MCNC: 4 MG/DL (ref 7–30)
CALCIUM SERPL-MCNC: 8.3 MG/DL (ref 8.5–10.1)
CHLORIDE SERPL-SCNC: 105 MMOL/L (ref 94–109)
CO2 SERPL-SCNC: 25 MMOL/L (ref 20–32)
CREAT SERPL-MCNC: 0.61 MG/DL (ref 0.52–1.04)
GFR SERPL CREATININE-BSD FRML MDRD: >90 ML/MIN/{1.73_M2}
GLUCOSE SERPL-MCNC: 95 MG/DL (ref 70–99)
HGB BLD-MCNC: 10.3 G/DL (ref 11.7–15.7)
PLATELET # BLD AUTO: 271 10E9/L (ref 150–450)
POTASSIUM SERPL-SCNC: 4.1 MMOL/L (ref 3.4–5.3)
SODIUM SERPL-SCNC: 136 MMOL/L (ref 133–144)

## 2019-03-23 PROCEDURE — 85018 HEMOGLOBIN: CPT | Performed by: STUDENT IN AN ORGANIZED HEALTH CARE EDUCATION/TRAINING PROGRAM

## 2019-03-23 PROCEDURE — 25000132 ZZH RX MED GY IP 250 OP 250 PS 637: Performed by: STUDENT IN AN ORGANIZED HEALTH CARE EDUCATION/TRAINING PROGRAM

## 2019-03-23 PROCEDURE — 25800030 ZZH RX IP 258 OP 636: Performed by: STUDENT IN AN ORGANIZED HEALTH CARE EDUCATION/TRAINING PROGRAM

## 2019-03-23 PROCEDURE — 12000001 ZZH R&B MED SURG/OB UMMC

## 2019-03-23 PROCEDURE — 85049 AUTOMATED PLATELET COUNT: CPT | Performed by: STUDENT IN AN ORGANIZED HEALTH CARE EDUCATION/TRAINING PROGRAM

## 2019-03-23 PROCEDURE — C9113 INJ PANTOPRAZOLE SODIUM, VIA: HCPCS | Performed by: STUDENT IN AN ORGANIZED HEALTH CARE EDUCATION/TRAINING PROGRAM

## 2019-03-23 PROCEDURE — 36415 COLL VENOUS BLD VENIPUNCTURE: CPT | Performed by: STUDENT IN AN ORGANIZED HEALTH CARE EDUCATION/TRAINING PROGRAM

## 2019-03-23 PROCEDURE — 80048 BASIC METABOLIC PNL TOTAL CA: CPT | Performed by: STUDENT IN AN ORGANIZED HEALTH CARE EDUCATION/TRAINING PROGRAM

## 2019-03-23 PROCEDURE — 82565 ASSAY OF CREATININE: CPT | Performed by: STUDENT IN AN ORGANIZED HEALTH CARE EDUCATION/TRAINING PROGRAM

## 2019-03-23 PROCEDURE — 25000128 H RX IP 250 OP 636: Performed by: STUDENT IN AN ORGANIZED HEALTH CARE EDUCATION/TRAINING PROGRAM

## 2019-03-23 RX ADMIN — ACETAMINOPHEN 975 MG: 325 TABLET, FILM COATED ORAL at 21:23

## 2019-03-23 RX ADMIN — ACETAMINOPHEN 975 MG: 325 TABLET, FILM COATED ORAL at 05:57

## 2019-03-23 RX ADMIN — HYDROXYZINE HYDROCHLORIDE 25 MG: 25 TABLET ORAL at 00:30

## 2019-03-23 RX ADMIN — PANTOPRAZOLE SODIUM 40 MG: 40 INJECTION, POWDER, FOR SOLUTION INTRAVENOUS at 00:18

## 2019-03-23 RX ADMIN — ERTAPENEM SODIUM 1 G: 1 INJECTION, POWDER, LYOPHILIZED, FOR SOLUTION INTRAMUSCULAR; INTRAVENOUS at 15:48

## 2019-03-23 RX ADMIN — SODIUM CHLORIDE, POTASSIUM CHLORIDE, SODIUM LACTATE AND CALCIUM CHLORIDE: 600; 310; 30; 20 INJECTION, SOLUTION INTRAVENOUS at 08:14

## 2019-03-23 RX ADMIN — CITALOPRAM HYDROBROMIDE 40 MG: 20 TABLET ORAL at 08:12

## 2019-03-23 RX ADMIN — Medication: at 00:18

## 2019-03-23 RX ADMIN — ACETAMINOPHEN 975 MG: 325 TABLET, FILM COATED ORAL at 13:18

## 2019-03-23 RX ADMIN — SODIUM CHLORIDE, POTASSIUM CHLORIDE, SODIUM LACTATE AND CALCIUM CHLORIDE: 600; 310; 30; 20 INJECTION, SOLUTION INTRAVENOUS at 23:51

## 2019-03-23 RX ADMIN — ENOXAPARIN SODIUM 40 MG: 40 INJECTION SUBCUTANEOUS at 13:18

## 2019-03-23 ASSESSMENT — ACTIVITIES OF DAILY LIVING (ADL)
ADLS_ACUITY_SCORE: 10
ADLS_ACUITY_SCORE: 12
ADLS_ACUITY_SCORE: 10
ADLS_ACUITY_SCORE: 12

## 2019-03-23 ASSESSMENT — PAIN DESCRIPTION - DESCRIPTORS
DESCRIPTORS: ACHING;SORE
DESCRIPTORS: ACHING;DULL
DESCRIPTORS: ACHING;SORE
DESCRIPTORS: CRAMPING
DESCRIPTORS: DULL;ACHING

## 2019-03-23 ASSESSMENT — MIFFLIN-ST. JEOR: SCORE: 1458.33

## 2019-03-23 NOTE — PLAN OF CARE
A&Ox4, AVSS on 2L O2 via nasal cannula w/ sats %, on capnography, IPI 8-10. Pain managed w/ PCA dilaudid  0.2mg q10min. Pt denies nausea or dyspnea. Pt reports pain is well-managed w/ PCA, appears calm and comfortable. Lap sites and midline side w/ dermabond AYLIN, CDI. LPIV SL, RPIV infusing LR @ 100mL/hr. White in place w/ adequate UOP. BS+, no BM or flatus yet. Abdominal binder in place. Dangled, then stood at bedside/marched in place.  at bedside, supportive. Will continue to monitor closely and follow POC.  .

## 2019-03-23 NOTE — PLAN OF CARE
"/55 (BP Location: Left arm)   Pulse 72   Temp 98.7  F (37.1  C) (Oral)   Resp 12   Ht 1.676 m (5' 6\")   Wt 76.7 kg (169 lb)   SpO2 100%   BMI 27.28 kg/m      POD 1 s/p laparoscopic extended right hemicolectomy. Patient is A&Ox3, VSS on Capno. Pain is managed with PCA dilaudid 0.2mg q10min. Patient reports pain in abdomen and right shoulder. Heat applied to right shoulder. White removed at 1300, no void yet. 3 lap sites on left side and midline, covered with derma bond and AYLIN. LR infusing at 50mL/hr in right PIV, left is SL. No BM, no flatus, bowel sounds present. ABD binder in place. Tolerating small amount of low fiber diet. Patient ambulated in halls with assistance. Family present. Continue with current plan of care.  "

## 2019-03-23 NOTE — PLAN OF CARE
Pt arrived on 7C at approx 2310. Pt oriented to room, assessed, settled into bed. A&Ox4, AVSS on 2L O2 via nasal cannula. On capnography, IPI 8-10. Pt reports poor pain control, tearful and anxious. MD arrived to assess pt, pain plan discussed w/ MD and pt. PCA dilaudid started w/ loading dose of 0.2mg, followed by 0.2mg q10min. Atarax given for anxiety. Pt denies nausea or dyspnea. Incentive spirometer demonstrated, pt self-administered, poorly tolerated r/t pain, will reinforce when pain is better managed. LPIV SL, RPIV infusing LR @ 100mL/hr. Lap sites and midline side w/ dermabond AYLIN, CDI. White in place w/ adequate UOP. No BM or flatus yet.  at bedside, supportive. Will continue to monitor closely and follow POC.  .

## 2019-03-23 NOTE — PLAN OF CARE
"/55 (BP Location: Left arm)   Pulse 72   Temp 98.7  F (37.1  C) (Oral)   Resp 12   Ht 1.676 m (5' 6\")   Wt 76.7 kg (169 lb)   SpO2 100%   BMI 27.28 kg/m      POD 1 s/p laparoscopic extended right hemicolectomy. Patient is A&Ox3, VSS on Capno. Pain is managed with PCA dilaudid 0.2mg q10min. Patient reports pain in abdomen and right should. Heat applied to right shoulder. White removed at 1300. 3 lap sites on left side and midline, covered with derma bond and AYLIN. LR infusing at 50mL/hr in right PIV, left is SL. No BM, no flatus, bowel sounds present. ABD binder in place. Patient ambulated in halls with assistance. Family present. Continue with current plan of care.  "

## 2019-03-23 NOTE — PROGRESS NOTES
"POST OP CHECK     S: Pain poorly controlled with current pain regimen. Patient in tears due to pain. Denies CP/SOB. Tolerating clears, no N/V. White in place draining clear yellow urine. HDS.     O:  /77   Pulse 66   Temp 98.4  F (36.9  C)   Resp 14   Ht 1.676 m (5' 6\")   Wt 75.9 kg (167 lb 5.3 oz)   SpO2 100%   BMI 27.01 kg/m      I/O last 3 completed shifts:  In: 696.67 [P.O.:90; I.V.:606.67]  Out: 295 [Urine:275; Blood:20]      Alert and oriented  Non labored breathing on 2L NC  Soft abdomen, clean laparoscopic incision sites with overlying dermabond, appropriately tender  Warm BLE, no edema, SCDs on    A/P: 37 YO F w/ hx of colon cancer now POD#0 s/p laparoscopic extended right hemicolectomy. She is recovering appropriately.     -Exparel blocks, will start dilaudid PCA and add atarax prn for better pain control  -CLD  -LR @ 100  -PTA meds  -White in  -Ppx: Lovenox, SCDs, IS, ambulation  -Continue plan of cares per primary team    Josette Rojas, PGY-1   "

## 2019-03-23 NOTE — PROGRESS NOTES
"Colorectal Surgery Progress Note  03/23/2019    Subjective:  Pain control issues overnight, much improved after switch to dPCA. Feels well this morning though still sore. No nausea or emesis. Tolerated clears. Stood at bedside but hasn't ambulated yet.    Objective:  /60 (BP Location: Left arm)   Pulse 68   Temp 98.1  F (36.7  C) (Oral)   Resp 16   Ht 1.676 m (5' 6\")   Wt 75.9 kg (167 lb 5.3 oz)   SpO2 100%   BMI 27.01 kg/m      I/O last 3 completed shifts:  In: 1461.67 [P.O.:90; I.V.:1371.67]  Out: 1945 [Urine:1925; Blood:20]    NAD, alert, pleasant  RRR, WWP  NLB on RA  Abd soft, nondistended. Appropriately tender. No rebound or guarding. Incisions C/D/I.    Labs: Reviewed.    Assessment/Plan:  38F with colon CA of hepatic flexure now POD 1 (DOS 3/22) s/p lap extended right hemicolectomy. Recovering well postop.    - Continue pain control: dPCA. Consider switch to orals if tolerating LRD later.  - Encourage OOB, IS use  - Low residue diet  - Decrease IVF  - Remove White if able to ambulate this morning  - 24 hours postop ertapenem, finishing today  - Lovenox ppx    Seen and discussed with CRS fellow Dr. Jennings who will discuss with staff, Dr. Ribeiro.    Attestation:  This patient has been seen and evaluated by me.  Discussed with the house staff team or resident(s) and agree with the findings and plan in this note.  Antonio Ribeiro MD  Professor of Surgery  Chief, Division of Colon and Rectal Surgery  [phone]        Jc Boyce MD  PGY-3 General Surgery      "

## 2019-03-23 NOTE — PROGRESS NOTES
CLINICAL NUTRITION SERVICES  Reason for Assessment:  Nutrition education regarding low fiber/low residue diet education  Diet History:  Patient has no history of low fiber diet education.  Pt's  also present during education  Nutrition Diagnosis:  Food- and nutrition-related knowledge deficit r/t no previous knowledge of low fiber diet as evidenced by patient report  Interventions:  Provided instruction on low fiber diet. Discussed each food group and foods to eat and avoid. Answered patient's questions regarding diet.   Provided handouts : Fiber Restricted Nutrition Therapy and Low Fiber Diet Hospital Menu  Goals:   Patient will verbalize at least 5 low fiber foods acceptable on diet and 5 high fiber foods to avoid.  Follow-up:    Patient to ask any further nutrition-related questions before discharge.  In addition, pt may request outpatient RD appointment.    Ree Bonner RD, LD  Weekend/Holiday RD Pager: [phone]     Weekday  RD pager: [phone]

## 2019-03-24 PROCEDURE — 25000132 ZZH RX MED GY IP 250 OP 250 PS 637: Performed by: STUDENT IN AN ORGANIZED HEALTH CARE EDUCATION/TRAINING PROGRAM

## 2019-03-24 PROCEDURE — 25000128 H RX IP 250 OP 636: Performed by: STUDENT IN AN ORGANIZED HEALTH CARE EDUCATION/TRAINING PROGRAM

## 2019-03-24 PROCEDURE — 12000001 ZZH R&B MED SURG/OB UMMC

## 2019-03-24 PROCEDURE — C9113 INJ PANTOPRAZOLE SODIUM, VIA: HCPCS | Performed by: STUDENT IN AN ORGANIZED HEALTH CARE EDUCATION/TRAINING PROGRAM

## 2019-03-24 RX ORDER — HYDROMORPHONE HYDROCHLORIDE 1 MG/ML
.3-.5 INJECTION, SOLUTION INTRAMUSCULAR; INTRAVENOUS; SUBCUTANEOUS EVERY 4 HOURS PRN
Status: DISCONTINUED | OUTPATIENT
Start: 2019-03-24 | End: 2019-03-26 | Stop reason: HOSPADM

## 2019-03-24 RX ORDER — METHOCARBAMOL 500 MG/1
500 TABLET, FILM COATED ORAL 3 TIMES DAILY
Status: DISCONTINUED | OUTPATIENT
Start: 2019-03-24 | End: 2019-03-26 | Stop reason: HOSPADM

## 2019-03-24 RX ORDER — OXYCODONE HYDROCHLORIDE 5 MG/1
5-10 TABLET ORAL
Status: DISCONTINUED | OUTPATIENT
Start: 2019-03-24 | End: 2019-03-26 | Stop reason: HOSPADM

## 2019-03-24 RX ADMIN — ACETAMINOPHEN 975 MG: 325 TABLET, FILM COATED ORAL at 05:32

## 2019-03-24 RX ADMIN — PROCHLORPERAZINE EDISYLATE 10 MG: 5 INJECTION INTRAMUSCULAR; INTRAVENOUS at 12:43

## 2019-03-24 RX ADMIN — OXYCODONE HYDROCHLORIDE 10 MG: 5 TABLET ORAL at 20:04

## 2019-03-24 RX ADMIN — OXYCODONE HYDROCHLORIDE 10 MG: 5 TABLET ORAL at 16:22

## 2019-03-24 RX ADMIN — ACETAMINOPHEN 975 MG: 325 TABLET, FILM COATED ORAL at 13:11

## 2019-03-24 RX ADMIN — OXYCODONE HYDROCHLORIDE 10 MG: 5 TABLET ORAL at 13:11

## 2019-03-24 RX ADMIN — CITALOPRAM HYDROBROMIDE 40 MG: 20 TABLET ORAL at 08:05

## 2019-03-24 RX ADMIN — OXYCODONE HYDROCHLORIDE 10 MG: 5 TABLET ORAL at 10:02

## 2019-03-24 RX ADMIN — OXYCODONE HYDROCHLORIDE 10 MG: 5 TABLET ORAL at 23:52

## 2019-03-24 RX ADMIN — ACETAMINOPHEN 975 MG: 325 TABLET, FILM COATED ORAL at 21:33

## 2019-03-24 RX ADMIN — ENOXAPARIN SODIUM 40 MG: 40 INJECTION SUBCUTANEOUS at 12:12

## 2019-03-24 RX ADMIN — METHOCARBAMOL 500 MG: 500 TABLET, FILM COATED ORAL at 21:33

## 2019-03-24 RX ADMIN — PANTOPRAZOLE SODIUM 40 MG: 40 INJECTION, POWDER, FOR SOLUTION INTRAVENOUS at 08:07

## 2019-03-24 RX ADMIN — METHOCARBAMOL 500 MG: 500 TABLET, FILM COATED ORAL at 14:35

## 2019-03-24 RX ADMIN — METHOCARBAMOL 500 MG: 500 TABLET, FILM COATED ORAL at 08:04

## 2019-03-24 ASSESSMENT — PAIN DESCRIPTION - DESCRIPTORS
DESCRIPTORS: ACHING;SORE
DESCRIPTORS: CONSTANT
DESCRIPTORS: SORE;TENDER;ACHING
DESCRIPTORS: ACHING;SORE;CONSTANT
DESCRIPTORS: TENDER;SORE

## 2019-03-24 ASSESSMENT — ACTIVITIES OF DAILY LIVING (ADL)
ADLS_ACUITY_SCORE: 12

## 2019-03-24 NOTE — PLAN OF CARE
POD1 lap extended right hemicolectomy (per MD note) for colon Ca of hepatic flexure  VS: stable, afebrile. Capnography off at 2200H.  Neuro: Alert and oriented x 4.  Cardiac: Stable                  Respiratory: Denies SOB, O2 above 92% at RA. Using IS ind.   GI/: Not passing gas, no BM. Bowel sound audible. Voiding good amount after saez removed.   Diet/appetite: On low fiber diet, pt is eating small bites. Denies nausea.  Activity: Up with SBA of 1. Ambulating in the hallway with the family.   Pain: c/o pain in right upper abdomen. Treated with PCA dilaudid 0.2 mg every 10 minutes, scheduled po tylenol and head pad. Per pt, pain is well managed.   Skin/drains: 3 lap site derma bonded-CDI/AYLIN.   Lines: PIV with MIVF at 50 ml/hr, left hand PIV-SL.  PLAN: post op monitoring, pain management, return of bowel function.

## 2019-03-24 NOTE — PROGRESS NOTES
"Colorectal Surgery Progress Note  03/24/2019    Subjective:  No acute events overnight. Pain well controlled. Complains of back pain, feels like spasm. Feels well this morning though still sore. No nausea or emesis. Low appetite, has not eaten much LRD.    Objective:  /67 (BP Location: Left arm)   Pulse 65   Temp 98.5  F (36.9  C) (Oral)   Resp 16   Ht 1.676 m (5' 6\")   Wt 76.7 kg (169 lb)   SpO2 97%   BMI 27.28 kg/m      I/O last 3 completed shifts:  In: 2322.5 [P.O.:1000; I.V.:1322.5]  Out: 2300 [Urine:2300]    NAD, alert, pleasant  RRR, WWP  NLB on RA  Abd soft, nondistended. Appropriately tender. No rebound or guarding. Incisions C/D/I.    Labs: Reviewed.    Assessment/Plan:  38F with colon CA of hepatic flexure now POD 2 (DOS 3/22) s/p lap extended right hemicolectomy. Recovering well postop.    - DC PCA. Switch to PO oxy/IV Dilaudid  - Added Robaxin  - Encourage OOB, IS use  - Low residue diet  - 24 hours postop ertapenem, finished  - Lovenox ppx    Seen and discussed with CRS fellow Dr. Jennings who will discuss with staff, Dr. Ribeiro.      Jc Boyce MD  PGY-3 General Surgery      "

## 2019-03-24 NOTE — PLAN OF CARE
Pt A/O x4, VSS. Pain managed with oxy q3h and scheduled Tylenol. Abd lap sites, c/d/I, AYLIN. Midline c/d/I, AYLIN. Tolerating low fiber diet. Compazine given once for nausea w/ relief. Voiding adequately, saez DC'd this AM. PIV SL'd. Ambulated. Up SBA. Family at bedside, cont POC.

## 2019-03-24 NOTE — PLAN OF CARE
Pt AVSS. Pt sleeping between cares. Pt rated abd pain an 8 and cramping. Dilaudid pca at 0.2mg q 10 min working well. She stated her pain decreased to a 5 following.  Pt had 14 doses tonite. Tylenol scheduled and heat packs applied to abd used for pain control as well. Abd round, soft, hypo bs, lap sites x3 dermabond. Denied gas. Denied nausea. Abd binder off in bed per pt request. Pt UAL SBA to BR to void good amts. IVF at 50/hr via piv. Cont to maintain pain control. Enc increased activity today to promote return of bowel fxn.

## 2019-03-25 PROCEDURE — C9113 INJ PANTOPRAZOLE SODIUM, VIA: HCPCS | Performed by: STUDENT IN AN ORGANIZED HEALTH CARE EDUCATION/TRAINING PROGRAM

## 2019-03-25 PROCEDURE — 25000132 ZZH RX MED GY IP 250 OP 250 PS 637: Performed by: STUDENT IN AN ORGANIZED HEALTH CARE EDUCATION/TRAINING PROGRAM

## 2019-03-25 PROCEDURE — 25000128 H RX IP 250 OP 636: Performed by: STUDENT IN AN ORGANIZED HEALTH CARE EDUCATION/TRAINING PROGRAM

## 2019-03-25 PROCEDURE — 12000001 ZZH R&B MED SURG/OB UMMC

## 2019-03-25 RX ORDER — PANTOPRAZOLE SODIUM 40 MG/1
40 TABLET, DELAYED RELEASE ORAL
Status: DISCONTINUED | OUTPATIENT
Start: 2019-03-26 | End: 2019-03-26 | Stop reason: HOSPADM

## 2019-03-25 RX ORDER — METHOCARBAMOL 500 MG/1
500 TABLET, FILM COATED ORAL 3 TIMES DAILY
Qty: 21 TABLET | Refills: 0 | Status: SHIPPED | OUTPATIENT
Start: 2019-03-25 | End: 2019-05-22

## 2019-03-25 RX ORDER — ACETAMINOPHEN 325 MG/1
975 TABLET ORAL EVERY 8 HOURS PRN
Qty: 100 TABLET | Refills: 0 | Status: SHIPPED | OUTPATIENT
Start: 2019-03-25 | End: 2019-07-15

## 2019-03-25 RX ORDER — OXYCODONE HYDROCHLORIDE 5 MG/1
5-10 TABLET ORAL EVERY 4 HOURS PRN
Qty: 20 TABLET | Refills: 0 | Status: SHIPPED | OUTPATIENT
Start: 2019-03-25 | End: 2019-04-17

## 2019-03-25 RX ADMIN — OXYCODONE HYDROCHLORIDE 10 MG: 5 TABLET ORAL at 03:11

## 2019-03-25 RX ADMIN — OXYCODONE HYDROCHLORIDE 10 MG: 5 TABLET ORAL at 05:51

## 2019-03-25 RX ADMIN — PANTOPRAZOLE SODIUM 40 MG: 40 INJECTION, POWDER, FOR SOLUTION INTRAVENOUS at 08:28

## 2019-03-25 RX ADMIN — METHOCARBAMOL 500 MG: 500 TABLET, FILM COATED ORAL at 19:46

## 2019-03-25 RX ADMIN — CITALOPRAM HYDROBROMIDE 40 MG: 20 TABLET ORAL at 08:29

## 2019-03-25 RX ADMIN — OXYCODONE HYDROCHLORIDE 10 MG: 5 TABLET ORAL at 11:30

## 2019-03-25 RX ADMIN — METHOCARBAMOL 500 MG: 500 TABLET, FILM COATED ORAL at 08:29

## 2019-03-25 RX ADMIN — METHOCARBAMOL 500 MG: 500 TABLET, FILM COATED ORAL at 14:15

## 2019-03-25 RX ADMIN — ACETAMINOPHEN 975 MG: 325 TABLET, FILM COATED ORAL at 12:59

## 2019-03-25 RX ADMIN — ENOXAPARIN SODIUM 40 MG: 40 INJECTION SUBCUTANEOUS at 12:59

## 2019-03-25 RX ADMIN — OXYCODONE HYDROCHLORIDE 10 MG: 5 TABLET ORAL at 16:03

## 2019-03-25 RX ADMIN — ACETAMINOPHEN 975 MG: 325 TABLET, FILM COATED ORAL at 05:51

## 2019-03-25 RX ADMIN — OXYCODONE HYDROCHLORIDE 10 MG: 5 TABLET ORAL at 20:59

## 2019-03-25 ASSESSMENT — ACTIVITIES OF DAILY LIVING (ADL)
ADLS_ACUITY_SCORE: 12

## 2019-03-25 ASSESSMENT — PAIN DESCRIPTION - DESCRIPTORS: DESCRIPTORS: ACHING

## 2019-03-25 NOTE — PLAN OF CARE
Afebrile, VSS on room air.  Oxycodone given for pain x1.  Denies nausea.  Midline and 2 lap sites derma bonded. Tolerating low fat diet.  Adequate urine output.  PIV SL.  Up independently in hallway.  Continue plan of care.

## 2019-03-25 NOTE — PLAN OF CARE
Pt AVSS. Pt abd round, soft ,+bs. Transverse inc lower abd and lap sites dermabond D/I. Pt rated abd pain a 5 that decreased to a 2-3 after oxycodone. Pt med q 3hr with oxy as well as sched tylenol. Pt able to sleep in between cares. Up with SBA to BR gait steady, uv's adeq. Pt with menses. No nausea noted. Cont to maintain pain control and enc increased activity to promote return of bowel fxn.

## 2019-03-25 NOTE — PROGRESS NOTES
"Colorectal Surgery Progress Note  03/25/2019    Subjective:  No acute events overnight. Pain well controlled. Tolerating diet though hasn't taken in much PO. Ambulating with some \"soreness\".  Objective:  /71 (BP Location: Right arm)   Pulse 60   Temp 96.8  F (36  C) (Oral)   Resp 16   Ht 1.676 m (5' 6\")   Wt 76.7 kg (169 lb)   SpO2 99%   BMI 27.28 kg/m      I/O last 3 completed shifts:  In: 240 [P.O.:240]  Out: 1350 [Urine:1350]    NAD, alert, pleasant  RRR, WWP  NLB on RA  Abd soft, nondistended. Appropriately tender. No rebound or guarding. Incisions C/D/I.    Labs: Reviewed.    Assessment/Plan:  38F with colon CA of hepatic flexure now POD 3 (DOS 3/22) s/p lap extended right hemicolectomy. Recovering well postop.    - PO oxy/IV Dilaudid, Robaxin  - Encourage OOB, IS use  - Low residue diet  - 24 hours postop ertapenem, finished  - Lovenox ppx  - Anticipate discharge home tomorrow    Seen and discussed with CRS fellow Dr. Jennings who will discuss with staff, Dr. Ribeiro.    Didier Bautista  PGY-1, General Surgery  [phone]        "

## 2019-03-25 NOTE — PLAN OF CARE
1930-2330:  Patient resting in bed this shift.  Up with stand-by assist.  Reports pain well controlled with PRN oxycodone, scheduled Tylenol, and Robaxin.  Up in room with stand-by assist.  Voiding adequate amounts.  Has her menses.  Had a loose brown stool, but denies passing flatus yet.  Tolerating low fiber diet.  Lap sites intact.  PIV saline-locked.

## 2019-03-25 NOTE — PROGRESS NOTES
CLINICAL NUTRITION SERVICES - BRIEF NOTE    REASON FOR ASSESSMENT  Remedios Watts is a/an 38 year old female assessed by the dietitian for Admission Nutrition Risk Screen for unintentional loss of 10# or more in the past two months    Per chart review, weight loss does not meet the criteria for unintentional weight loss of 10 lbs or more in the last two months.      Wt Readings from Last 10 Encounters:   03/23/19 76.7 kg (169 lb)   03/13/19 77.1 kg (170 lb)   03/10/19 72.6 kg (160 lb)   02/24/19 74.8 kg (165 lb)   01/17/19 80.7 kg (178 lb)   10/03/18 77.5 kg (170 lb 12.8 oz)   09/30/18 77.1 kg (170 lb)   09/24/18 77.1 kg (170 lb)   07/03/18 75.9 kg (167 lb 6.4 oz)   06/19/18 76.1 kg (167 lb 12.8 oz)         Monitoring/Evaluation  RD will monitor and evaluate per protocol.    Krystin Rosado  Dietetic Intern    I have read and agree with the above nutrition note  Ree Bonner, RD, LD  7C RD pager: [phone]

## 2019-03-26 VITALS
RESPIRATION RATE: 16 BRPM | DIASTOLIC BLOOD PRESSURE: 58 MMHG | SYSTOLIC BLOOD PRESSURE: 95 MMHG | HEIGHT: 66 IN | TEMPERATURE: 98.4 F | BODY MASS INDEX: 27.16 KG/M2 | WEIGHT: 169 LBS | HEART RATE: 60 BPM | OXYGEN SATURATION: 98 %

## 2019-03-26 PROCEDURE — 25000132 ZZH RX MED GY IP 250 OP 250 PS 637: Performed by: COLON & RECTAL SURGERY

## 2019-03-26 PROCEDURE — 25000128 H RX IP 250 OP 636: Performed by: STUDENT IN AN ORGANIZED HEALTH CARE EDUCATION/TRAINING PROGRAM

## 2019-03-26 PROCEDURE — 25000132 ZZH RX MED GY IP 250 OP 250 PS 637: Performed by: STUDENT IN AN ORGANIZED HEALTH CARE EDUCATION/TRAINING PROGRAM

## 2019-03-26 RX ADMIN — CITALOPRAM HYDROBROMIDE 40 MG: 20 TABLET ORAL at 09:08

## 2019-03-26 RX ADMIN — METHOCARBAMOL 500 MG: 500 TABLET, FILM COATED ORAL at 09:08

## 2019-03-26 RX ADMIN — OXYCODONE HYDROCHLORIDE 10 MG: 5 TABLET ORAL at 00:08

## 2019-03-26 RX ADMIN — PANTOPRAZOLE SODIUM 40 MG: 40 TABLET, DELAYED RELEASE ORAL at 09:08

## 2019-03-26 RX ADMIN — OXYCODONE HYDROCHLORIDE 10 MG: 5 TABLET ORAL at 05:03

## 2019-03-26 RX ADMIN — ENOXAPARIN SODIUM 40 MG: 40 INJECTION SUBCUTANEOUS at 11:38

## 2019-03-26 RX ADMIN — OXYCODONE HYDROCHLORIDE 10 MG: 5 TABLET ORAL at 09:12

## 2019-03-26 ASSESSMENT — ACTIVITIES OF DAILY LIVING (ADL)
ADLS_ACUITY_SCORE: 12

## 2019-03-26 ASSESSMENT — PAIN DESCRIPTION - DESCRIPTORS: DESCRIPTORS: ACHING;SORE

## 2019-03-26 NOTE — PROGRESS NOTES
COLON & RECTAL SURGERY PROGRESS NOTE    POD# 4 lap extended right hemicolectomy    Events: none overnight    Subjective:  Passing gas, feeling better, pain in abdomen well controlled, having right shoulder pain especially when moving around.    Vitals:  Vitals:    03/25/19 0300 03/25/19 0723 03/25/19 1458 03/25/19 2354   BP: 100/63 112/71 104/58 99/66   BP Location:  Right arm Right arm Right arm   Pulse: 61 60     Resp: 16 16 16 16   Temp: 97.4  F (36.3  C) 96.8  F (36  C) 98.3  F (36.8  C) 97.6  F (36.4  C)   TempSrc: Oral Oral Oral Oral   SpO2: 96% 99% 100% 98%   Weight:       Height:         I/O:  I/O last 3 completed shifts:  In: 580 [P.O.:580]  Out: 350 [Urine:350]    Physical exam:  Awake alert no distress  Breathing comfortably on room air  Abdomen soft, non tender, non distended  Incisions clean dry intact with glue  Extremities warm well perfused    Labs:  Recent Labs   Lab 03/23/19  0810   HGB 10.3*         POTASSIUM 4.1   CHLORIDE 105   CO2 25   BUN 4*   CR 0.61   GLC 95       Assessment: Remedios Watts is a 38 year old woman with colon cancer at the hepatic flexure postoperative day 4 status post laparoscopic assisted right hemicolectomy, she has recovered well.    Plan:  - Discharge today with 30 days of lovenox  - Pathology pending    Discussed with Dr. Sangita Jennings MD  Colon and Rectal Surgery Fellow  [facility]  [facility]  Pager: [phone]

## 2019-03-26 NOTE — PLAN OF CARE
Patient alert and oriented x 4. Complained of pain in the abdomen and was given oxycodone 10 mg x 2. Up independently , voiding and not saving. Incision and lap sites intact.

## 2019-03-26 NOTE — PLAN OF CARE
Afebrile,vitals stable,abdominal incision clean and dry,open to air,no redness.Incisional pain comfortably managed with prn oxycodone x 1.Patient tolerating oral intake.Ambulating independently.Patient seen by provider,discharge order written,discharge order and instructions explained to patient,follow up appointment explained,discharge medications explained,pt  's picked up discharge medications from pharmacy.Patient verbalized understanding all information given,denied questions,teach back done by patient,copy of discharge order given to patient.Patient was discharged home at [id number] accompanied by her family and her belongings.

## 2019-03-26 NOTE — PLAN OF CARE
VSS on RA. Independent. Voiding, not saving. Abdominal ML incision and lap sites with dermabond, CDI. Tolerating low fat diet. Passing gas. Showered today. PIV SL'd. Pain in upper right shoulder since surgery, heat packs helping. Plan for likely discharge tomorrow.

## 2019-03-28 ENCOUNTER — PATIENT OUTREACH (OUTPATIENT)
Dept: SURGERY | Facility: CLINIC | Age: 39
End: 2019-03-28

## 2019-03-28 NOTE — PROGRESS NOTES
Post Op Note     Called to check on patient postoperatively after hospital discharge.     Patient is s/p Laparoscopic Extended Right Hemicolectomy and appendectomy with Dr. Linda Quesada for Colon Cancer.  Admitted 3/22 and discharged on 3/26.      Pain is well controlled with tylenol, ibuprofen, and oxycodone.     Patient is eating and drinking normally. Patient is on a low fiber diet.  Encouraged patient to drink 8-10 glasses of water a day.     Patient is passing flats, is having soft brown bowel movements.    Patient is voiding normally and urine is light in color.    Patient is not set up with home care.      Patient does not require supplies.    Patient's pathology was not reviewed with them.     Patient Denies nausea and vomiting.    Patient Denies any fevers or chills.    Patient's incision is clean, dry, and intact. Patient reminded NOT to remove any dressings over their incisions.     Patient is on a activity restriction. Lifting 10 pounds for 6 weeks.     Patient Denies needing any forms completed.     Follow up is set up with Krystin Ashton NP on 4/8 and with Dr. Linda Quesada on 5/15.   Encouraged the patient to contact the clinic in the meantime with any fevers, chills, nausea, vomiting, increased colostomy output, no colostomy output, dizziness, lightheadedness, uncontrolled pain, changes to the incisions, or with any questions or concerns.    Patient's questions were answered to their stated satisfaction and they are in agreement with this plan.    COBY Roman [phone]  [facility]  [facility]

## 2019-03-29 ENCOUNTER — TELEPHONE (OUTPATIENT)
Dept: FAMILY MEDICINE | Facility: CLINIC | Age: 39
End: 2019-03-29

## 2019-03-29 DIAGNOSIS — N63.0 LUMP OR MASS IN BREAST: Primary | ICD-10-CM

## 2019-03-29 LAB — COPATH REPORT: NORMAL

## 2019-03-29 NOTE — TELEPHONE ENCOUNTER
"  ED for acute condition Discharge Protocol    \"Hi, my name is Harika Benavides, a registered nurse, and I am calling from [facility].  I am calling to follow up and see how things are going for you after your recent emergency visit.\"    Tell me how you are doing now that you are home?\"  I am sore, doing ok      Discharge Instructions    \"Let's review your discharge instructions.  What is/are the follow-up recommendations?  Pt. Response: waiting to her pathology results and waiting for oncology    \"Has an appointment with your primary care provider been scheduled?\"  No (needed - schedule appointment and remind to bring meds)    Medications    \"Tell me what changed about your medicines when you discharged?\"    no    \"What questions do you have about your medications?\"   None        Call Summary    \"What questions or concerns do you have about your recent visit and your follow-up care?\"     none    \"If you have questions or things don't continue to improve, we encourage you contact us through the main clinic number (give number).  Even if the clinic is not open, triage nurses are available 24/7 to help you.     We would like you to know that our clinic has extended hours (provide information).  We also have urgent care (provide details on closest location and hours/contact info)\"    \"Thank you for your time and take care!\"                "

## 2019-03-29 NOTE — TELEPHONE ENCOUNTER
Dr. Berger,    Patient states she can feel a bump in her right breast that is painful near her nipple that is slightly bigger than a pea.  She recently was diagnosed with colon cancer.  She did have a chest CT done and would like to know if that would of showed a cancer in her right breast if there was one? If not she wants a diagnostic mammo done.  Diagnostic mammo order pended in case you need it. Harika CAPUTO RN

## 2019-03-29 NOTE — TELEPHONE ENCOUNTER
ED/UC/IP follow up phone call:[facility] Malignant Neoplasm of Hepatic Flexure 3/26    RN please call to follow up.    Number of ED visits in past 12 months = 2/2  Paola Mendoza  [facility] [place] [facility]

## 2019-04-01 ENCOUNTER — TELEPHONE (OUTPATIENT)
Dept: ONCOLOGY | Facility: CLINIC | Age: 39
End: 2019-04-01

## 2019-04-01 ENCOUNTER — TELEPHONE (OUTPATIENT)
Dept: SURGERY | Facility: CLINIC | Age: 39
End: 2019-04-01

## 2019-04-01 DIAGNOSIS — G47.9 SLEEP DISORDER: Primary | ICD-10-CM

## 2019-04-01 NOTE — TELEPHONE ENCOUNTER
[facility] Call Center    Phone Message    May a detailed message be left on voicemail: yes    Reason for Call: Other: Patient stated Sangita nurse was going to check with her for the patient about a medication for anxiety and sleep she has not heard back yet. Please follow up with the patient asap. Thank you.     Action Taken: Message routed to:  [facility] ([facility]): colon and rectal

## 2019-04-01 NOTE — TELEPHONE ENCOUNTER
ONCOLOGY INTAKE: Records Information      APPT INFORMATION:  Referring provider:  Tim  Referring provider s clinic:  see order  Reason for visit/diagnosis:  Colonic mass [K63.9]    RECORDS INFORMATION:  Were the records received with the referral (via [employer])? No, internal referral.  All care with us.      Has patient been seen for any external appt for this diagnosis?     If yes, where?     Has patient had any imaging or procedures outside of [place] for this condition?       If Yes, where?     ADDITIONAL INFORMATION:

## 2019-04-02 ENCOUNTER — HOSPITAL ENCOUNTER (EMERGENCY)
Facility: CLINIC | Age: 39
Discharge: HOME | End: 2019-04-02
Attending: EMERGENCY MEDICINE | Admitting: EMERGENCY MEDICINE
Payer: COMMERCIAL

## 2019-04-02 ENCOUNTER — APPOINTMENT (OUTPATIENT)
Dept: CT IMAGING | Facility: CLINIC | Age: 39
End: 2019-04-02
Attending: EMERGENCY MEDICINE
Payer: COMMERCIAL

## 2019-04-02 ENCOUNTER — APPOINTMENT (OUTPATIENT)
Dept: ULTRASOUND IMAGING | Facility: CLINIC | Age: 39
End: 2019-04-02
Attending: EMERGENCY MEDICINE
Payer: COMMERCIAL

## 2019-04-02 VITALS
OXYGEN SATURATION: 100 % | RESPIRATION RATE: 16 BRPM | SYSTOLIC BLOOD PRESSURE: 99 MMHG | TEMPERATURE: 98.5 F | DIASTOLIC BLOOD PRESSURE: 74 MMHG | HEIGHT: 66 IN | HEART RATE: 79 BPM | WEIGHT: 150 LBS | BODY MASS INDEX: 24.11 KG/M2

## 2019-04-02 DIAGNOSIS — G89.18 ACUTE POST-OPERATIVE PAIN: ICD-10-CM

## 2019-04-02 DIAGNOSIS — R10.9 RIGHT FLANK PAIN: ICD-10-CM

## 2019-04-02 DIAGNOSIS — R10.11 RUQ ABDOMINAL PAIN: ICD-10-CM

## 2019-04-02 LAB
ALBUMIN SERPL-MCNC: 3.7 G/DL (ref 3.4–5)
ALBUMIN UR-MCNC: NEGATIVE MG/DL
ALP SERPL-CCNC: 92 U/L (ref 40–150)
ALT SERPL W P-5'-P-CCNC: 36 U/L (ref 0–50)
ANION GAP SERPL CALCULATED.3IONS-SCNC: 6 MMOL/L (ref 3–14)
APPEARANCE UR: CLEAR
AST SERPL W P-5'-P-CCNC: 19 U/L (ref 0–45)
BASOPHILS # BLD AUTO: 0 10E9/L (ref 0–0.2)
BASOPHILS NFR BLD AUTO: 0.3 %
BILIRUB SERPL-MCNC: 0.1 MG/DL (ref 0.2–1.3)
BILIRUB UR QL STRIP: NEGATIVE
BUN SERPL-MCNC: 7 MG/DL (ref 7–30)
CALCIUM SERPL-MCNC: 9 MG/DL (ref 8.5–10.1)
CHLORIDE SERPL-SCNC: 106 MMOL/L (ref 94–109)
CO2 SERPL-SCNC: 29 MMOL/L (ref 20–32)
COLOR UR AUTO: YELLOW
CREAT SERPL-MCNC: 0.68 MG/DL (ref 0.52–1.04)
DIFFERENTIAL METHOD BLD: ABNORMAL
EOSINOPHIL # BLD AUTO: 0.1 10E9/L (ref 0–0.7)
EOSINOPHIL NFR BLD AUTO: 1.7 %
ERYTHROCYTE [DISTWIDTH] IN BLOOD BY AUTOMATED COUNT: 12.7 % (ref 10–15)
GFR SERPL CREATININE-BSD FRML MDRD: >90 ML/MIN/{1.73_M2}
GLUCOSE SERPL-MCNC: 98 MG/DL (ref 70–99)
GLUCOSE UR STRIP-MCNC: NEGATIVE MG/DL
HCG UR QL: NEGATIVE
HCT VFR BLD AUTO: 36.9 % (ref 35–47)
HGB BLD-MCNC: 11.3 G/DL (ref 11.7–15.7)
HGB UR QL STRIP: NEGATIVE
IMM GRANULOCYTES # BLD: 0 10E9/L (ref 0–0.4)
IMM GRANULOCYTES NFR BLD: 0.1 %
KETONES UR STRIP-MCNC: NEGATIVE MG/DL
LEUKOCYTE ESTERASE UR QL STRIP: ABNORMAL
LIPASE SERPL-CCNC: 241 U/L (ref 73–393)
LYMPHOCYTES # BLD AUTO: 1.9 10E9/L (ref 0.8–5.3)
LYMPHOCYTES NFR BLD AUTO: 27.3 %
MCH RBC QN AUTO: 26 PG (ref 26.5–33)
MCHC RBC AUTO-ENTMCNC: 30.6 G/DL (ref 31.5–36.5)
MCV RBC AUTO: 85 FL (ref 78–100)
MONOCYTES # BLD AUTO: 0.5 10E9/L (ref 0–1.3)
MONOCYTES NFR BLD AUTO: 7.8 %
MUCOUS THREADS #/AREA URNS LPF: PRESENT /LPF
NEUTROPHILS # BLD AUTO: 4.4 10E9/L (ref 1.6–8.3)
NEUTROPHILS NFR BLD AUTO: 62.8 %
NITRATE UR QL: NEGATIVE
NRBC # BLD AUTO: 0 10*3/UL
NRBC BLD AUTO-RTO: 0 /100
PH UR STRIP: 8 PH (ref 5–7)
PLATELET # BLD AUTO: 365 10E9/L (ref 150–450)
POTASSIUM SERPL-SCNC: 3.7 MMOL/L (ref 3.4–5.3)
PROT SERPL-MCNC: 7.6 G/DL (ref 6.8–8.8)
RBC # BLD AUTO: 4.34 10E12/L (ref 3.8–5.2)
RBC #/AREA URNS AUTO: <1 /HPF (ref 0–2)
SODIUM SERPL-SCNC: 141 MMOL/L (ref 133–144)
SOURCE: ABNORMAL
SP GR UR STRIP: 1.01 (ref 1–1.03)
SQUAMOUS #/AREA URNS AUTO: 1 /HPF (ref 0–1)
TRANS CELLS #/AREA URNS HPF: 1 /HPF (ref 0–1)
UROBILINOGEN UR STRIP-MCNC: 0 MG/DL (ref 0–2)
WBC # BLD AUTO: 7 10E9/L (ref 4–11)
WBC #/AREA URNS AUTO: 3 /HPF (ref 0–5)

## 2019-04-02 PROCEDURE — 80053 COMPREHEN METABOLIC PANEL: CPT | Performed by: EMERGENCY MEDICINE

## 2019-04-02 PROCEDURE — 96361 HYDRATE IV INFUSION ADD-ON: CPT | Performed by: EMERGENCY MEDICINE

## 2019-04-02 PROCEDURE — 71250 CT THORAX DX C-: CPT

## 2019-04-02 PROCEDURE — 25000125 ZZHC RX 250: Performed by: EMERGENCY MEDICINE

## 2019-04-02 PROCEDURE — 25000128 H RX IP 250 OP 636: Performed by: EMERGENCY MEDICINE

## 2019-04-02 PROCEDURE — 25000128 H RX IP 250 OP 636

## 2019-04-02 PROCEDURE — 96375 TX/PRO/DX INJ NEW DRUG ADDON: CPT | Performed by: EMERGENCY MEDICINE

## 2019-04-02 PROCEDURE — 81025 URINE PREGNANCY TEST: CPT | Performed by: EMERGENCY MEDICINE

## 2019-04-02 PROCEDURE — 93976 VASCULAR STUDY: CPT

## 2019-04-02 PROCEDURE — 76775 US EXAM ABDO BACK WALL LIM: CPT | Performed by: EMERGENCY MEDICINE

## 2019-04-02 PROCEDURE — 96376 TX/PRO/DX INJ SAME DRUG ADON: CPT | Performed by: EMERGENCY MEDICINE

## 2019-04-02 PROCEDURE — 99285 EMERGENCY DEPT VISIT HI MDM: CPT | Mod: 25 | Performed by: EMERGENCY MEDICINE

## 2019-04-02 PROCEDURE — 83690 ASSAY OF LIPASE: CPT | Performed by: EMERGENCY MEDICINE

## 2019-04-02 PROCEDURE — 81001 URINALYSIS AUTO W/SCOPE: CPT | Performed by: EMERGENCY MEDICINE

## 2019-04-02 PROCEDURE — 74177 CT ABD & PELVIS W/CONTRAST: CPT

## 2019-04-02 PROCEDURE — 76775 US EXAM ABDO BACK WALL LIM: CPT | Mod: 26 | Performed by: EMERGENCY MEDICINE

## 2019-04-02 PROCEDURE — 85025 COMPLETE CBC W/AUTO DIFF WBC: CPT | Performed by: EMERGENCY MEDICINE

## 2019-04-02 PROCEDURE — 96374 THER/PROPH/DIAG INJ IV PUSH: CPT | Mod: 59 | Performed by: EMERGENCY MEDICINE

## 2019-04-02 PROCEDURE — 25800030 ZZH RX IP 258 OP 636: Performed by: EMERGENCY MEDICINE

## 2019-04-02 RX ORDER — KETAMINE HCL IN 0.9 % NACL 50 MG/5 ML
0.3 SYRINGE (ML) INTRAVENOUS ONCE
Status: DISCONTINUED | OUTPATIENT
Start: 2019-04-02 | End: 2019-04-02 | Stop reason: CLARIF

## 2019-04-02 RX ORDER — SODIUM CHLORIDE 9 MG/ML
1000 INJECTION, SOLUTION INTRAVENOUS CONTINUOUS
Status: DISCONTINUED | OUTPATIENT
Start: 2019-04-02 | End: 2019-04-02 | Stop reason: HOSPADM

## 2019-04-02 RX ORDER — MORPHINE SULFATE 4 MG/ML
4 INJECTION, SOLUTION INTRAMUSCULAR; INTRAVENOUS ONCE
Status: COMPLETED | OUTPATIENT
Start: 2019-04-02 | End: 2019-04-02

## 2019-04-02 RX ORDER — HYDROMORPHONE HYDROCHLORIDE 1 MG/ML
0.5 INJECTION, SOLUTION INTRAMUSCULAR; INTRAVENOUS; SUBCUTANEOUS
Status: DISCONTINUED | OUTPATIENT
Start: 2019-04-02 | End: 2019-04-02 | Stop reason: HOSPADM

## 2019-04-02 RX ORDER — IOPAMIDOL 755 MG/ML
74 INJECTION, SOLUTION INTRAVASCULAR ONCE
Status: COMPLETED | OUTPATIENT
Start: 2019-04-02 | End: 2019-04-02

## 2019-04-02 RX ORDER — OXYCODONE AND ACETAMINOPHEN 5; 325 MG/1; MG/1
1-2 TABLET ORAL EVERY 4 HOURS PRN
Qty: 6 TABLET | Refills: 0 | Status: SHIPPED | OUTPATIENT
Start: 2019-04-02 | End: 2019-04-17

## 2019-04-02 RX ORDER — HYDROMORPHONE HYDROCHLORIDE 1 MG/ML
0.5 INJECTION, SOLUTION INTRAMUSCULAR; INTRAVENOUS; SUBCUTANEOUS
Status: COMPLETED | OUTPATIENT
Start: 2019-04-02 | End: 2019-04-02

## 2019-04-02 RX ORDER — LORAZEPAM 0.5 MG/1
0.5 TABLET ORAL
Qty: 10 TABLET | Refills: 0
Start: 2019-04-02 | End: 2020-01-07

## 2019-04-02 RX ORDER — MORPHINE SULFATE 2 MG/ML
INJECTION, SOLUTION INTRAMUSCULAR; INTRAVENOUS
Status: COMPLETED
Start: 2019-04-02 | End: 2019-04-02

## 2019-04-02 RX ORDER — KETOROLAC TROMETHAMINE 15 MG/ML
15 INJECTION, SOLUTION INTRAMUSCULAR; INTRAVENOUS ONCE
Status: COMPLETED | OUTPATIENT
Start: 2019-04-02 | End: 2019-04-02

## 2019-04-02 RX ADMIN — IOPAMIDOL 74 ML: 755 INJECTION, SOLUTION INTRAVENOUS at 13:06

## 2019-04-02 RX ADMIN — Medication 0.5 MG: at 11:52

## 2019-04-02 RX ADMIN — Medication 0.5 MG: at 15:55

## 2019-04-02 RX ADMIN — KETAMINE HYDROCHLORIDE 20 MG: 10 INJECTION INTRAMUSCULAR; INTRAVENOUS at 16:16

## 2019-04-02 RX ADMIN — SODIUM CHLORIDE 1000 ML: 9 INJECTION, SOLUTION INTRAVENOUS at 14:33

## 2019-04-02 RX ADMIN — KETOROLAC TROMETHAMINE 15 MG: 15 INJECTION, SOLUTION INTRAMUSCULAR; INTRAVENOUS at 18:13

## 2019-04-02 RX ADMIN — MORPHINE SULFATE 4 MG: 2 INJECTION, SOLUTION INTRAMUSCULAR; INTRAVENOUS at 16:49

## 2019-04-02 RX ADMIN — MORPHINE SULFATE 4 MG: 4 INJECTION, SOLUTION INTRAMUSCULAR; INTRAVENOUS at 16:49

## 2019-04-02 RX ADMIN — SODIUM CHLORIDE, POTASSIUM CHLORIDE, SODIUM LACTATE AND CALCIUM CHLORIDE 1000 ML: 600; 310; 30; 20 INJECTION, SOLUTION INTRAVENOUS at 11:52

## 2019-04-02 RX ADMIN — SODIUM CHLORIDE 58 ML: 9 INJECTION, SOLUTION INTRAVENOUS at 13:06

## 2019-04-02 RX ADMIN — Medication 0.5 MG: at 14:33

## 2019-04-02 RX ADMIN — SODIUM CHLORIDE 1000 ML: 9 INJECTION, SOLUTION INTRAVENOUS at 11:34

## 2019-04-02 RX ADMIN — Medication 0.5 MG: at 13:33

## 2019-04-02 ASSESSMENT — MIFFLIN-ST. JEOR: SCORE: 1372.15

## 2019-04-02 NOTE — TELEPHONE ENCOUNTER
Patient was called to infomr her that we were able to prescribe her 0.5 mg Ativan 10 (ten) tabs to help with sleep. When patient was called she got extremely teary stating she was in the ED at [facility] because she had such bad increased right sided abdominal/side pain. Patient states her CT showed inflammation by her right colon. Patient was advised this can represent a normal post-operative finding. Patient will  her prescription when she is out of the hospital. Patient is in agreement with this plan of care. Patient's questions and concerns were addressed to her stated satisfaction. Patient will callback directly with further questions or concerns.

## 2019-04-02 NOTE — ED AVS SNAPSHOT
[facility] Emergency Department  [address] [address]  Phone:  [phone]  Fax:  [phone]                                    Remedios Watts   MRN: [id number]    Department:  [facility] Emergency Department   Date of Visit:  4/2/2019           After Visit Summary Signature Page    I have received my discharge instructions, and my questions have been answered. I have discussed any challenges I see with this plan with the nurse or doctor.    ..........................................................................................................................................  Patient/Patient Representative Signature      ..........................................................................................................................................  Patient Representative Print Name and Relationship to Patient    ..................................................               ................................................  Date                                   Time    ..........................................................................................................................................  Reviewed by Signature/Title    ...................................................              ..............................................  Date                                               Time          22EPIC Rev 08/18

## 2019-04-02 NOTE — TELEPHONE ENCOUNTER
Patient was called to assess her symptoms in regard to the below message. The direct phone number was left in a voicemail with a request for the patient to call back at her earliest convenience.     COBY Roman Care Coordinator  [facility]  Phone: [phone]

## 2019-04-02 NOTE — ED NOTES
Has surgery a week ago at the [facility]  now started having rt sided flank pain yesterday than has been getting worse through the noc and now this am the pain has increased. Took tylenol 1000mg at 1024 with no help. Has been having BM following eating which are loose. She was worried she was getting constipated yesterday but has had BM today. Denies fever/chills, SOB, CP, N/V. Drove self here.

## 2019-04-02 NOTE — ED PROVIDER NOTES
History     Chief Complaint   Patient presents with     Flank Pain     right sided flank pain started last night; removed mass from colon last friday     HPI  Remedios Watts is a 38 year old female who presents for right flank pain.  Symptoms started yesterday have been getting steadily worse since then.  Pain is severe, radiates into the right abdomen.  Pain is described as aching and throbbing, hurts to move, hurts to take a deep breath.  She denies any shortness of breath, hemoptysis, or cough.  No chest pain.  She is on Lovenox currently for prophylaxis of blood clots.  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 Quesada.  She is taken acetaminophen without improvement.  She reports loose stools since the surgery, nonbloody.  No nausea or vomiting.  She denies headache, chest pain, sore throat, runny nose, cough, dysuria, urinary frequency, vaginal bleeding, or vaginal discharge.  No rash.    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     Priority: Medium     Colonic mass 03/12/2019     Priority: Medium     Partial small bowel obstruction (H) 03/10/2019     Priority: Medium     Folliculitis 02/06/2013     Priority: Medium     Lentigo 02/06/2013     Priority: Medium     Congenital nevus 02/06/2013     Priority: Medium     Angioma 02/06/2013     Priority: Medium     Multiple nevi 02/06/2013     Priority: Medium     Dermal nevus 02/06/2013     Priority: Medium     CARDIOVASCULAR SCREENING; LDL GOAL LESS THAN 160 01/21/2013     Priority: Medium     Pelvic pain 01/18/2013     Priority: Medium     Generalized anxiety disorder 06/29/2011     Priority: Medium     Diagnosis updated by automated process. Provider to review  and confirm.          Past Medical History:    Past Medical History:   Diagnosis Date     Chickenpox      Diarrhea      Group B streptococcus urinary tract infection complicating pregnancy 4/20/2011     History of postpartum depression, currently pregnant 10/21/2010     Malignant melanoma (H)      Postpartum depression 12/17/2008     Tailbone injury      Urinary tract infections        Past Surgical History:    Past Surgical History:   Procedure Laterality Date     C ORAL SURGERY PROCEDURE       COLONOSCOPY N/A 3/11/2019    Procedure: COMBINED COLONOSCOPY, SINGLE OR MULTIPLE BIOPSY/POLYPECTOMY BY BIOPSY;  Surgeon: Loi Maldonado MD;  Location: [facility]     LAPAROSCOPIC ASSISTED COLECTOMY Right 3/22/2019    Procedure: Laparoscopic Extended Right Hemicolectomy and appendectomy;  Surgeon: Linda Quesada MD;  Location: [facility] OR       Family History:    Family History   Problem Relation Age of Onset     Arthritis Mother      Neurologic Disorder Mother         had a seizure      Alcohol/Drug Mother      Arthritis Maternal Grandmother      Breast Cancer Maternal Grandmother      Melanoma Maternal Grandmother      Heart Disease Paternal Grandmother      Cancer Father         skin       Social History:  Marital Status:   [2]  Social History     Tobacco Use     Smoking status: Never Smoker     Smokeless tobacco: Never Used   Substance Use Topics     Alcohol use: Yes     Drug use: No        Medications:      acetaminophen (TYLENOL) 325 MG tablet   enoxaparin (LOVENOX) 40 MG/0.4ML syringe   methocarbamol (ROBAXIN) 500 MG tablet   norethindrone-ethinyl estradiol (ORTHO-NOVUM 7/7/7, 28,) 0.5/0.75/1-35 MG-MCG per tablet   oxyCODONE (ROXICODONE) 5 MG tablet   tretinoin (RETIN-A) 0.05 % external cream   citalopram (CELEXA) 40 MG tablet   LORazepam (ATIVAN) 0.5 MG tablet         Review of Systems  Pertinent positives and negatives listed in the HPI, all other systems reviewed and are negative.    Physical Exam  "  BP: 130/76  Pulse: 60  Heart Rate: 75  Temp: 97.9  F (36.6  C)  Resp: 16  Height: 167.6 cm (5' 6\")  Weight: 68 kg (150 lb)  SpO2: 94 %      Physical Exam   Constitutional: She is oriented to person, place, and time. She appears well-developed and well-nourished. She appears distressed.   HENT:   Head: Normocephalic and atraumatic.   Right Ear: External ear normal.   Left Ear: External ear normal.   Nose: Nose normal.   Eyes: Conjunctivae are normal. No scleral icterus.   Neck: Normal range of motion.   Cardiovascular: Normal rate and regular rhythm.   Pulmonary/Chest: Effort normal. No stridor. No respiratory distress.   Abdominal: Soft. She exhibits no distension. There is tenderness in the right upper quadrant, right lower quadrant and periumbilical area. There is guarding.   Neurological: She is alert and oriented to person, place, and time.   Skin: Skin is warm and dry. She is not diaphoretic.   Psychiatric: She has a normal mood and affect. Her behavior is normal.   Nursing note and vitals reviewed.      ED Course        Procedures    Results for orders placed during the hospital encounter of 04/02/19   POC US RETROPERITONEAL LIMITED    Impression [facility] Procedure Note      Limited Bedside ED Gallbladder  Ultrasound:    PROCEDURE: PERFORMED BY: Dr. Garland Harding  INDICATIONS:  Abdominal Pain  PROBE:  Low frequency convex probe  BODY LOCATION: Abdomen  FINDINGS:   An ultrasound of the gallbladder was performed using longitudinal and transverse views.  Gallstone(s):  Absent  Gallbladder sludge:  Absent  Sonographic Sapp's sign:  Absent  Gallbladder wall thickening (greater than 4 mm):  Absent  Pericholecystic fluid: Absent  Common bile duct (dilated if internal diameter greater than 6 mm): 3 mm   INTERPRETATION:  The gallbladder evaluation is normal with no gallstones/sludge, no sonographic Sapp's sign, no GB wall thickening, no pericholecystic fluid, and without evidence of cholelithiasis " or cholecystitis.  IMAGE DOCUMENTATION: Images were archived to PACs system.     [facility] Procedure Note    Limited Bedside ED Renal Ultrasound:    PERFORMED BY: Dr. Garland Harding  INDICATIONS:  Abdominal Pain  PROBE: Low frequency convex probe  BODY LOCATION:  Abdomen  FINDINGS:  The ultrasound was performed with longitudinal and transverse views.   Right Kidney:   Hydronephrosis:  None   Renal cyst:  None  Left Kidney:   Hydronephrosis:  None   Renal cyst:  None  INTERPRETATION:  The evaluation of the kidneys was normal without evidence of hydronephrosis or cysts.  IMAGE DOCUMENTATION: Images were archived to PACs system.              Critical Care time:  none               Results for orders placed or performed during the hospital encounter of 04/02/19 (from the past 24 hour(s))   CBC with platelets differential   Result Value Ref Range    WBC 7.0 4.0 - 11.0 10e9/L    RBC Count 4.34 3.8 - 5.2 10e12/L    Hemoglobin 11.3 (L) 11.7 - 15.7 g/dL    Hematocrit 36.9 35.0 - 47.0 %    MCV 85 78 - 100 fl    MCH 26.0 (L) 26.5 - 33.0 pg    MCHC 30.6 (L) 31.5 - 36.5 g/dL    RDW 12.7 10.0 - 15.0 %    Platelet Count 365 150 - 450 10e9/L    Diff Method Automated Method     % Neutrophils 62.8 %    % Lymphocytes 27.3 %    % Monocytes 7.8 %    % Eosinophils 1.7 %    % Basophils 0.3 %    % Immature Granulocytes 0.1 %    Nucleated RBCs 0 0 /100    Absolute Neutrophil 4.4 1.6 - 8.3 10e9/L    Absolute Lymphocytes 1.9 0.8 - 5.3 10e9/L    Absolute Monocytes 0.5 0.0 - 1.3 10e9/L    Absolute Eosinophils 0.1 0.0 - 0.7 10e9/L    Absolute Basophils 0.0 0.0 - 0.2 10e9/L    Abs Immature Granulocytes 0.0 0 - 0.4 10e9/L    Absolute Nucleated RBC 0.0    Comprehensive metabolic panel   Result Value Ref Range    Sodium 141 133 - 144 mmol/L    Potassium 3.7 3.4 - 5.3 mmol/L    Chloride 106 94 - 109 mmol/L    Carbon Dioxide 29 20 - 32 mmol/L    Anion Gap 6 3 - 14 mmol/L    Glucose 98 70 - 99 mg/dL    Urea Nitrogen 7 7 - 30 mg/dL     Creatinine 0.68 0.52 - 1.04 mg/dL    GFR Estimate >90 >60 mL/min/[1.73_m2]    GFR Estimate If Black >90 >60 mL/min/[1.73_m2]    Calcium 9.0 8.5 - 10.1 mg/dL    Bilirubin Total 0.1 (L) 0.2 - 1.3 mg/dL    Albumin 3.7 3.4 - 5.0 g/dL    Protein Total 7.6 6.8 - 8.8 g/dL    Alkaline Phosphatase 92 40 - 150 U/L    ALT 36 0 - 50 U/L    AST 19 0 - 45 U/L   POC US RETROPERITONEAL LIMITED    Impression    [facility] Procedure Note      Limited Bedside ED Gallbladder  Ultrasound:    PROCEDURE: PERFORMED BY: Dr. Garland Harding  INDICATIONS:  Abdominal Pain  PROBE:  Low frequency convex probe  BODY LOCATION: Abdomen  FINDINGS:   An ultrasound of the gallbladder was performed using longitudinal and transverse views.  Gallstone(s):  Absent  Gallbladder sludge:  Absent  Sonographic Sapp's sign:  Absent  Gallbladder wall thickening (greater than 4 mm):  Absent  Pericholecystic fluid: Absent  Common bile duct (dilated if internal diameter greater than 6 mm): 3 mm   INTERPRETATION:  The gallbladder evaluation is normal with no gallstones/sludge, no sonographic Sapp's sign, no GB wall thickening, no pericholecystic fluid, and without evidence of cholelithiasis or cholecystitis.  IMAGE DOCUMENTATION: Images were archived to PACs system.     [facility] Procedure Note    Limited Bedside ED Renal Ultrasound:    PERFORMED BY: Dr. Garland Harding  INDICATIONS:  Abdominal Pain  PROBE: Low frequency convex probe  BODY LOCATION:  Abdomen  FINDINGS:  The ultrasound was performed with longitudinal and transverse views.   Right Kidney:   Hydronephrosis:  None   Renal cyst:  None  Left Kidney:   Hydronephrosis:  None   Renal cyst:  None  INTERPRETATION:  The evaluation of the kidneys was normal without evidence of hydronephrosis or cysts.  IMAGE DOCUMENTATION: Images were archived to PACs system.    UA reflex to Microscopic   Result Value Ref Range    Color Urine Yellow     Appearance Urine Clear     Glucose Urine  Negative NEG^Negative mg/dL    Bilirubin Urine Negative NEG^Negative    Ketones Urine Negative NEG^Negative mg/dL    Specific Gravity Urine 1.009 1.003 - 1.035    Blood Urine Negative NEG^Negative    pH Urine 8.0 (H) 5.0 - 7.0 pH    Protein Albumin Urine Negative NEG^Negative mg/dL    Urobilinogen mg/dL 0.0 0.0 - 2.0 mg/dL    Nitrite Urine Negative NEG^Negative    Leukocyte Esterase Urine Trace (A) NEG^Negative    Source Midstream Urine     RBC Urine <1 0 - 2 /HPF    WBC Urine 3 0 - 5 /HPF    Squamous Epithelial /HPF Urine 1 0 - 1 /HPF    Transitional Epi 1 0 - 1 /HPF    Mucous Urine Present (A) NEG^Negative /LPF   HCG qualitative urine (UPT)   Result Value Ref Range    HCG Qual Urine Negative NEG^Negative   CT Abdomen Pelvis w Contrast    Narrative    CT ABDOMEN/PELVIS WITH CONTRAST April 2, 2019 1:18 PM    HISTORY: Two weeks status post partial colon resection for colon  cancer. Now with right-sided abdominal pain.    TECHNIQUE: Helical axial scans from dome of liver through pubic  symphysis with 74 mL Isovue 370 IV contrast. Radiation dose for this  scan was reduced using automated exposure control, adjustment of the  mA and/or kV according to patient size, or iterative reconstruction  technique.    COMPARISON: 3/10/2019.    FINDINGS: The liver is normal. Borderline size spleen without change.  The pancreas, bilateral adrenal glands and kidneys bilaterally are  unremarkable and unchanged.    Interval right colectomy with ileocolic anastomosis. There is some  nonspecific induration of the fat in the right abdomen in the area of  surgery consistent with expected postoperative changes. The remainder  of the bowel and mesentery appear normal. There is no evidence for  fluid collection to indicate abscess. No free intraperitoneal air. No  free fluid in the pelvis or elsewhere. No adenopathy. No aggressive  appearing bony lesions.      Impression    IMPRESSION:  1. Interval right colonic resection with ileocolic  anastomosis. No  evidence for abscess or other complication.  2. Stable borderline size spleen.   Pelvic Ultrasound (US) with doppler imaging (r/o ovarian torsion)    Narrative    ULTRASOUND PELVIS COMPLETE WITH TRANSVAGINAL IMAGING AND DOPPLER  LIMITED  4/2/2019 3:11 PM    HISTORY: Severe right-sided abdominal pain.     TECHNIQUE: Transabdominal images of the pelvis only.    COMPARISON: 12/19/2007.    FINDINGS: The uterus measures 9.9 x 4.5 x 6.1 cm. No uterine mass  lesions are seen, and the endometrial stripe thickness is 1.1 cm. The  right ovary is identified and appears normal. The left ovary  demonstrates two simple cysts measuring 1.5 and 1.4 cm, likely  dominant follicles. There is no free fluid. Color flow imaging and  Doppler waveform analysis show no evidence for ovarian torsion. No  free fluid.      Impression    IMPRESSION:  1. Two small left ovarian cysts.  2. Otherwise unremarkable transabdominal pelvic ultrasound.       Medications   0.9% sodium chloride BOLUS (0 mLs Intravenous Stopped 4/2/19 1433)     Followed by   sodium chloride 0.9% infusion (1,000 mLs Intravenous New Bag 4/2/19 1433)   HYDROmorphone (PF) (DILAUDID) injection 0.5 mg (0.5 mg Intravenous Given 4/2/19 1555)   ketamine (KETALAR) 20 mg in sodium chloride 0.9 % 60 mL intermittent infusion (20 mg Intravenous Given 4/2/19 1616)   HYDROmorphone (PF) (DILAUDID) injection 0.5 mg (0.5 mg Intravenous Given 4/2/19 1433)   lactated ringers BOLUS 1,000 mL (1,000 mLs Intravenous New Bag 4/2/19 1152)   iopamidol (ISOVUE-370) solution 74 mL (74 mLs Intravenous Given 4/2/19 1306)   sodium chloride 0.9 % bag 500mL for CT scan flush use (58 mLs Intravenous Given 4/2/19 1306)       Assessments & Plan (with Medical Decision Making)   38-year-old female who is 2 weeks postop from partial ileostomy who presents for right flank pain.  Temperature is 97.9 F, heart 70, SPO2 is 100% on room air.  She is given IV fluids and IV hydromorphone for the symptoms.   Urinalysis negative for signs of infection.  White blood cell count is normal.  Hemoglobin is normal.  Electrolytes are within normal limits.  Urine pregnancy test is negative, no signs of ectopic pregnancy.  LFTs normal without signs of hepatitis.  Bedside ultrasound of the gallbladder is negative for signs of Gala lithiasis or cholecystitis.  Ultrasound of the kidneys is negative for signs of hydronephrosis or cyst.  CT of the abdomen/pelvis and ultrasound of the pelvis obtained, images reviewed independently as well as radiology read reviewed, no signs of bowel obstruction, abscess, tubo-ovarian abscess, or ovarian torsion.  On recheck she is still very uncomfortable. Currently awaiting call back from colorectal surgery. Patient signed out to Dr. Fragoso at change of shift    I have reviewed the nursing notes.    I have reviewed the findings, diagnosis, plan and need for follow up with the patient.          Medication List      There are no discharge medications for this visit.         Final diagnoses:   Right flank pain       4/2/2019   [facility] EMERGENCY DEPARTMENT     Garland Harding MD  04/02/19 2713

## 2019-04-03 NOTE — TELEPHONE ENCOUNTER
RECORDS STATUS - ALL OTHER DIAGNOSIS      RECORDS RECEIVED FROM: [facility]   DATE RECEIVED: 4/3/19   NOTES STATUS DETAILS   OFFICE NOTE from referring provider  [facility]   OFFICE NOTE from medical oncologist NA    DISCHARGE SUMMARY from hospital  Epic   DISCHARGE REPORT from the ER  [facility]   OPERATIVE REPORT     MEDICATION LIST  [facility]   CLINICAL TRIAL TREATMENTS TO DATE     LABS     PATHOLOGY REPORTS 3/22/19, 3/11/19    ANYTHING RELATED TO DIAGNOSIS  Epic   GENONOMIC TESTING     TYPE:     IMAGING (NEED IMAGES & REPORT)     CT SCANS 4/2/19, 3/13/19, 3/10/19 PACS   MRI NA    MAMMO  PACS   ULTRASOUND 4/2/19, 3/22/19, 3/10/19    PET NA    X Ray: 3/10/19, 2/24/19     PACS

## 2019-04-03 NOTE — DISCHARGE INSTRUCTIONS
Return if symptoms worsen or new symptoms develop.  Follow-up with primary care physician next available.  Follow-up with your surgeon tomorrow.  Drink plenty of fluids.  Take medications as directed.  If symptoms worsen or new symptoms develop please follow-up for further evaluation and care.

## 2019-04-03 NOTE — ED PROVIDER NOTES
Emergency Department Patient Sign-out       Brief HPI:  This is a 38 year old female signed out to me by Dr. Xie.  See initial ED Provider note for details of the presentation.     Patient presents the emergency department complaining of right sided abdominal pain.  Patient recently had a laparoscopic bowel resection done for colon cancer 2 weeks ago by Dr. Quesada.  Patient began having pain yesterday and it severely worsened today.  Patient has been taking acetaminophen without improvement.  She was seen by Dr. Xie who did an ultrasound right upper quadrant which showed no obvious abnormality.  A CT scan of the abdomen pelvis revealed no significant abnormality with postoperative changes.  Patient received several doses of Dilaudid along with ketamine and still had significant abdominal pain.  Labs showed no significant acute abnormality.  Dr. Xie had contacted the colorectal surgeons at the  but had not received a call back.  He signed the patient out to me to follow-up with the colorectal surgeon and address the patient's pain.          Exam:   Patient Vitals for the past 24 hrs:   BP Temp Temp src Pulse Heart Rate Resp SpO2 Height Weight   04/02/19 2000 99/74 -- -- 79 -- -- 100 % -- --   04/02/19 1930 107/72 -- -- 79 -- -- -- -- --   04/02/19 1830 -- -- -- -- -- -- 100 % -- --   04/02/19 1800 116/76 -- -- 75 -- -- 100 % -- --   04/02/19 1730 (!) 115/102 -- -- 82 -- -- 100 % -- --   04/02/19 1715 109/77 -- -- 72 -- -- 100 % -- --   04/02/19 1700 114/78 -- -- 81 -- -- 100 % -- --   04/02/19 1650 -- -- -- -- -- -- 100 % -- --   04/02/19 1645 -- -- -- -- -- -- 100 % -- --   04/02/19 1640 -- 98.5  F (36.9  C) Oral -- -- -- -- -- --   04/02/19 1630 110/78 -- -- 68 -- -- 100 % -- --   04/02/19 1620 -- -- -- -- -- -- 100 % -- --   04/02/19 1615 -- -- -- -- -- -- 100 % -- --   04/02/19 1600 111/79 -- -- 70 -- -- 100 % -- --   04/02/19 1430 -- -- -- -- -- -- 100 % -- --   04/02/19 1345 -- -- -- --  "-- -- 98 % -- --   04/02/19 1335 122/88 -- -- 60 -- -- 100 % -- --   04/02/19 1300 -- -- -- -- -- -- 100 % -- --   04/02/19 1134 -- 97.9  F (36.6  C) Oral -- -- -- -- -- --   04/02/19 1119 130/76 -- -- -- 75 16 94 % 1.676 m (5' 6\") 68 kg (150 lb)           ED RESULTS:   Results for orders placed or performed during the hospital encounter of 04/02/19 (from the past 24 hour(s))   Pelvic Ultrasound (US) with doppler imaging (r/o ovarian torsion)     Status: None    Collection Time: 04/02/19  3:11 PM    Narrative    ULTRASOUND PELVIS COMPLETE WITH TRANSVAGINAL IMAGING AND DOPPLER  LIMITED  4/2/2019 3:11 PM    HISTORY: Severe right-sided abdominal pain.     TECHNIQUE: Transabdominal images of the pelvis only.    COMPARISON: 12/19/2007.    FINDINGS: The uterus measures 9.9 x 4.5 x 6.1 cm. No uterine mass  lesions are seen, and the endometrial stripe thickness is 1.1 cm. The  right ovary is identified and appears normal. The left ovary  demonstrates two simple cysts measuring 1.5 and 1.4 cm, likely  dominant follicles. There is no free fluid. Color flow imaging and  Doppler waveform analysis show no evidence for ovarian torsion. No  free fluid.      Impression    IMPRESSION:  1. Two small left ovarian cysts.  2. Otherwise unremarkable transabdominal pelvic ultrasound.    PETER ALVARENGA MD   Chest CT w/o contrast     Status: None    Collection Time: 04/02/19  6:42 PM    Narrative    CT CHEST WITHOUT CONTRAST   4/2/2019 6:42 PM     HISTORY: Right upper quadrant abdominal pain and rib pain.    COMPARISON: 3/13/2019.    TECHNIQUE: Without intravenous contrast, helical sections were  acquired through the lungs. Coronal reconstructions were generated.  Radiation dose for this scan was reduced using automated exposure  control, adjustment of the mA and/or kV according to the patient's  size, or iterative reconstruction technique.    FINDINGS: The lungs are clear. No pleural or pericardial effusion. No  enlarged lymph nodes in " the chest. No acute fracture or other acute  abnormality of the visualized portions of the ribs.    Scan through the upper abdomen is unremarkable.      Impression    IMPRESSION:   1. No evidence of active pulmonary disease.  2. No acute fracture or other acute abnormality of the visualized  portions of the ribs.    BRYN GIBSON MD       ED MEDICATIONS:   Medications   0.9% sodium chloride BOLUS (0 mLs Intravenous Stopped 4/2/19 1433)   HYDROmorphone (PF) (DILAUDID) injection 0.5 mg (0.5 mg Intravenous Given 4/2/19 1433)   lactated ringers BOLUS 1,000 mL (0 mLs Intravenous Stopped 4/2/19 2022)   iopamidol (ISOVUE-370) solution 74 mL (74 mLs Intravenous Given 4/2/19 1306)   sodium chloride 0.9 % bag 500mL for CT scan flush use (58 mLs Intravenous Given 4/2/19 1306)   ketamine (KETALAR) 20 mg in sodium chloride 0.9 % 60 mL intermittent infusion (20 mg Intravenous Given 4/2/19 1616)   morphine (PF) injection 4 mg (4 mg Intravenous Given 4/2/19 1649)   ketorolac (TORADOL) injection 15 mg (15 mg Intravenous Given 4/2/19 1813)   Patient was given morphine without improvement of her pain.  Dr. Patel with colorectal surgery eventually called back and I discussed case with him.  He did not feel that emergent care was necessary if normal CT with normal white count.  He recommended admission for observation if pain was not controlled.  He did not feel patient needed to be transferred at this time.  Patient's pain did not improve with the morphine and I did give her a dose of Toradol.  On my reexamination she is tender to palpation along the ribs as well as the right upper quadrant.  Due to this I decided to do a CT scan of the chest to rule out abnormality in the chest.  No obvious acute abnormality was noted in the chest.  Patient had improvement of her symptoms significantly with the Toradol.  I discussed possible admission with her versus discharge and she felt comfortable going home at this time.  Due to being on  Lovenox I do not think a course of NSAIDs will be warranted but will give her a few pain pills to go home with.  She is to follow-up with colorectal surgery tomorrow.  They are planning to contact to her and talk with her about her case.  If she has return of pain or if symptoms worsen she will return for further evaluation and care.      Impression:    ICD-10-CM    1. Right flank pain R10.9 Lipase   2. RUQ abdominal pain R10.11    3. Acute post-operative pain G89.18        Plan:    Patient will be discharged to home in stable condition..        Adair Alcala MD  04/03/19 5000

## 2019-04-04 ENCOUNTER — PRE VISIT (OUTPATIENT)
Dept: ONCOLOGY | Facility: CLINIC | Age: 39
End: 2019-04-04

## 2019-04-04 ENCOUNTER — CARE COORDINATION (OUTPATIENT)
Dept: ONCOLOGY | Facility: CLINIC | Age: 39
End: 2019-04-04

## 2019-04-04 ENCOUNTER — ONCOLOGY VISIT (OUTPATIENT)
Dept: ONCOLOGY | Facility: CLINIC | Age: 39
End: 2019-04-04
Attending: INTERNAL MEDICINE
Payer: COMMERCIAL

## 2019-04-04 ENCOUNTER — TELEPHONE (OUTPATIENT)
Dept: SURGERY | Facility: CLINIC | Age: 39
End: 2019-04-04

## 2019-04-04 VITALS
TEMPERATURE: 97.1 F | WEIGHT: 169.8 LBS | HEIGHT: 66 IN | OXYGEN SATURATION: 100 % | HEART RATE: 76 BPM | RESPIRATION RATE: 16 BRPM | SYSTOLIC BLOOD PRESSURE: 108 MMHG | BODY MASS INDEX: 27.29 KG/M2 | DIASTOLIC BLOOD PRESSURE: 74 MMHG

## 2019-04-04 DIAGNOSIS — D50.0 IRON DEFICIENCY ANEMIA DUE TO CHRONIC BLOOD LOSS: ICD-10-CM

## 2019-04-04 DIAGNOSIS — N92.4 EXCESSIVE BLEEDING IN PREMENOPAUSAL PERIOD: Primary | ICD-10-CM

## 2019-04-04 DIAGNOSIS — C18.3 MALIGNANT NEOPLASM OF HEPATIC FLEXURE (H): ICD-10-CM

## 2019-04-04 PROCEDURE — G0463 HOSPITAL OUTPT CLINIC VISIT: HCPCS | Mod: ZF

## 2019-04-04 PROCEDURE — 99205 OFFICE O/P NEW HI 60 MIN: CPT | Mod: ZP | Performed by: INTERNAL MEDICINE

## 2019-04-04 ASSESSMENT — PAIN SCALES - GENERAL: PAINLEVEL: SEVERE PAIN (6)

## 2019-04-04 ASSESSMENT — MIFFLIN-ST. JEOR: SCORE: 1461.71

## 2019-04-04 NOTE — NURSING NOTE
"Oncology Rooming Note    April 4, 2019 4:25 PM   Remedios Watts is a 38 year old female who presents for:    Chief Complaint   Patient presents with     Oncology Clinic Visit     new pt complete colonic mass      Initial Vitals: /74 (BP Location: Right arm, Patient Position: Sitting, Cuff Size: Adult Regular)   Pulse 76   Temp 97.1  F (36.2  C) (Oral)   Resp 16   Ht 1.676 m (5' 5.98\")   Wt 77 kg (169 lb 12.8 oz)   SpO2 100%   BMI 27.42 kg/m   Estimated body mass index is 27.42 kg/m  as calculated from the following:    Height as of this encounter: 1.676 m (5' 5.98\").    Weight as of this encounter: 77 kg (169 lb 12.8 oz). Body surface area is 1.89 meters squared.  Severe Pain (6) Comment: Data Unavailable   No LMP recorded. Patient is not currently having periods (Reason: Birth Control).  Allergies reviewed: Yes  Medications reviewed: Yes    Medications: Medication refills not needed today.  Pharmacy name entered into [facility]: [facility] - - [address] - [id number] [facility]    Clinical concerns: none        Rachell Krishan, CMA              "

## 2019-04-04 NOTE — PATIENT INSTRUCTIONS
Start oral iron once daily and try to increase it as tolerated to twice/thrice daily    Refer to Genetic Counselor    Refer to GYNECOLOGY    Schedule port placement    See me 4/25 with labs  Start FOLFOX 4/29

## 2019-04-04 NOTE — PROGRESS NOTES
Oncology initial visit:  Date on this visit: 4/4/2019    Remedios Watts  is referred by Dr.Benjamin Herbert Erazo for an oncology consultation. She requires evaluation for new diagnosis of colon cancer.    Primary Physician: Agustín Berger     History Of Present Illness:  Ms. Watts is a 38 year old female who was admitted to the hospital on 3/10/2019 for bowel obstruction due to hepatic flexure colon mass.  She had a CT scan done on 3/10/2019 which showed apple core lesion near the hepatic flexure of the colon concerning for an obstructing colonic neoplasm.  No enlarged lymph nodes were seen.  There is a tiny subcentimeter left lobe liver lesion which is too small to characterize.  A CT of the chest on 3/13/2019 did not show evidence of metastatic disease in the chest.    Colonoscopy on 3/11/2019 showed fungating, infiltrative, highly-friable and ulcerated large mass at the hepatic flexure/proximal transverse colon, unable to traverse this lesions as this appears to be near-completely obstructive in nature (though allows liquid stool/effluent to pass under visualization). The mass was partially circumferential. Biopsies was consistent with invasive moderately differentiated adenocarcinoma compatible with   colorectal primary and Mismatch repair enzymes were intact by immunohistochemistry.     On 3/22/2019 she had laparoscopic extended right hemicolectomy performed by Dr. Quesada.  Final pathology was consistent with a 3.7 cm hepatic flexure poorly differentiated adenocarcinoma with focal micropapillary growth extending through the muscularis propria and involving serosa (pT4).  There was lymphovascular invasion but no perineural invasion seen.  All margins were negative.  4 out of 38 lymph nodes were positive for metastatic carcinoma including 2 lymph nodes with micrometastasis.  Final staging was [id number].    She recently went to emergency room for worsening right-sided abdominal pain and had CT chest abdomen  and pelvis did not show any acute pathology or evidence of recurrence/metastasis. There were expected post operative changes. Labs including CBC/diff and CMP/lipase were unremarkable except for mild anemia. UA was negative. Pelvic US was also negative apart from 2 small left ovarian cysts.    She comes in today accompanied by her  and daughter.  She tells me that initially she noticed that she had a couple of weeks of abdominal cramping and constipation and also noticed some blood on the toilet paper upon wiping.  When she was diagnosed with the colon cancer then looking back she realized that her issues with altered bowel movements were going on for a few weeks before that.  She was also feeling tired more than usual for a few months before the diagnosis of the cancer.  Overall now she feels better.  The abdominal pain which she developed around the right side of the abdomen which prompted the recent ER visit has improved and now she is taking on average Tylenol 500 mg 6 times a day.  She only took one oxycodone yesterday.  She has not noticed any nausea or vomiting.  Over all her bowel movements now are better although she still has on average 3 loose bowel movements daily.  She has not noticed any more blood in it.  She is been able to eat and drink well and overall her energy is improving although she feels a little tired.  Denies any fevers or infections or any trouble urinating.  She denies any shortness of breath.  She tells me that over the last couple of years or so she has been having heavy menstrual bleeding.  She was on birth control pills but has not taken it for the last month or so.  She has not seen GYN doctor for this.  She denies any other significant bleeding.  She denies any neuropathy.  No new swellings or no new skin problems.      ECOG 0    ROS:  A comprehensive ROS was otherwise neg    Past Medical/Surgical History:  Past Medical History:   Diagnosis Date     Chickenpox      Diarrhea       Group B streptococcus urinary tract infection complicating pregnancy 4/20/2011    Plan PCN in labor      History of postpartum depression, currently pregnant 10/21/2010     Malignant melanoma (H)      Postpartum depression 12/17/2008     Tailbone injury     fx     Urinary tract infections    History of melanoma to the left arm and back treated with excision.  She follows closely with dermatology.    Past Surgical History:   Procedure Laterality Date     C ORAL SURGERY PROCEDURE       COLONOSCOPY N/A 3/11/2019    Procedure: COMBINED COLONOSCOPY, SINGLE OR MULTIPLE BIOPSY/POLYPECTOMY BY BIOPSY;  Surgeon: Loi Maldonado MD;  Location: [facility]     LAPAROSCOPIC ASSISTED COLECTOMY Right 3/22/2019    Procedure: Laparoscopic Extended Right Hemicolectomy and appendectomy;  Surgeon: Linda Quesada MD;  Location: [facility]     Cancer History:   As above    Allergies:  Allergies as of 04/04/2019 - Reviewed 04/04/2019   Allergen Reaction Noted     Nka [no known allergies]  03/15/2005     Current Medications:  Current Outpatient Medications   Medication Sig Dispense Refill     acetaminophen (TYLENOL) 325 MG tablet Take 3 tablets (975 mg) by mouth every 8 hours as needed for mild pain 100 tablet 0     citalopram (CELEXA) 40 MG tablet Take 1 tablet (40 mg) by mouth daily 90 tablet 3     enoxaparin (LOVENOX) 40 MG/0.4ML syringe Inject 0.4 mLs (40 mg) Subcutaneous every 24 hours 12 mL 0     LORazepam (ATIVAN) 0.5 MG tablet Take 1 tablet (0.5 mg) by mouth nightly as needed for sleep 10 tablet 0     norethindrone-ethinyl estradiol (ORTHO-NOVUM 7/7/7, 28,) 0.5/0.75/1-35 MG-MCG per tablet Take 1 tablet by mouth daily 28 tablet 11     oxyCODONE (ROXICODONE) 5 MG tablet Take 1-2 tablets (5-10 mg) by mouth every 4 hours as needed for moderate to severe pain 20 tablet 0     oxyCODONE-acetaminophen (PERCOCET) 5-325 MG tablet Take 1-2 tablets by mouth every 4 hours as needed for pain 6 tablet 0     tretinoin (RETIN-A) 0.05 %  external cream Apply topically At Bedtime 45 g 3      Family History:  Family History   Problem Relation Age of Onset     Arthritis Mother      Neurologic Disorder Mother         had a seizure      Alcohol/Drug Mother      Arthritis Maternal Grandmother      Breast Cancer Maternal Grandmother      Melanoma Maternal Grandmother      Heart Disease Paternal Grandmother      Cancer Father         skin   Maternal grandmother had breast cancer in her late 50s.  She also had a skin melanoma in her 70s.  Father had a skin melanoma in his 50s.  He had prostate cancer in his 60s.  She has 4 children and the youngest is 7 years old and all are healthy.  Social History:  Social History     Socioeconomic History     Marital status:      Spouse name: Not on file     Number of children: 3     Years of education: Not on file     Highest education level: Not on file   Occupational History     Employer: [employer]   Social Needs     Financial resource strain: Not on file     Food insecurity:     Worry: Not on file     Inability: Not on file     Transportation needs:     Medical: Not on file     Non-medical: Not on file   Tobacco Use     Smoking status: Never Smoker     Smokeless tobacco: Never Used   Substance and Sexual Activity     Alcohol use: Yes     Drug use: No     Sexual activity: Yes     Partners: Male     Birth control/protection: Pill   Lifestyle     Physical activity:     Days per week: Not on file     Minutes per session: Not on file     Stress: Not on file   Relationships     Social connections:     Talks on phone: Not on file     Gets together: Not on file     Attends Christianity service: Not on file     Active member of club or organization: Not on file     Attends meetings of clubs or organizations: Not on file     Relationship status: Not on file     Intimate partner violence:     Fear of current or ex partner: Not on file     Emotionally abused: Not on file     Physically  "abused: Not on file     Forced sexual activity: Not on file   Other Topics Concern     Parent/sibling w/ CABG, MI or angioplasty before 65F 55M? Yes     Comment: pt had MI @ age 58   Social History Narrative     Not on file   She denies any smoking.  She drinks alcohol occasionally.  She lives with her family.  She is a / by profession.  Physical Exam:  /74 (BP Location: Right arm, Patient Position: Sitting, Cuff Size: Adult Regular)   Pulse 76   Temp 97.1  F (36.2  C) (Oral)   Resp 16   Ht 1.676 m (5' 5.98\")   Wt 77 kg (169 lb 12.8 oz)   SpO2 100%   BMI 27.42 kg/m    CONSTITUTIONAL: no acute distress  EYES: PERRLA, mild pallor but no icterus.    ENT/MOUTH: no mouth lesions. Ears normal  CVS: s1s2 no m r g .   RESPIRATORY: clear to auscultation b/l  GI: soft.  She has mild tenderness in the right flank on deep palpation.  No guarding rigidity or rebound.  No hepatosplenomegaly. Surgical scar healing well  NEURO: AAOX3  Grossly non focal neuro exam  INTEGUMENT: no obvious rashes  LYMPHATIC: no palpable cervical, supraclavicular, axillary or inguinal LAD  MUSCULOSKELETAL: Unremarkable. No bony tenderness.   EXTREMITIES: no edema  PSYCH: Mentation, mood and affect are normal. Decision making capacity is intact    Laboratory/Imaging Studies    Results for MARK VÁZQUEZ (MRN [id number]) as of 4/4/2019 14:22   Ref. Range 4/2/2019 11:30   WBC Latest Ref Range: 4.0 - 11.0 10e9/L 7.0   Hemoglobin Latest Ref Range: 11.7 - 15.7 g/dL 11.3 (L)   Hematocrit Latest Ref Range: 35.0 - 47.0 % 36.9   Platelet Count Latest Ref Range: 150 - 450 10e9/L 365   RBC Count Latest Ref Range: 3.8 - 5.2 10e12/L 4.34   MCV Latest Ref Range: 78 - 100 fl 85   MCH Latest Ref Range: 26.5 - 33.0 pg 26.0 (L)   MCHC Latest Ref Range: 31.5 - 36.5 g/dL 30.6 (L)   RDW Latest Ref Range: 10.0 - 15.0 % 12.7   Diff Method Unknown Automated Method   % Neutrophils Latest Units: % 62.8   % Lymphocytes Latest Units: % 27.3   % " Monocytes Latest Units: % 7.8   % Eosinophils Latest Units: % 1.7   % Basophils Latest Units: % 0.3   % Immature Granulocytes Latest Units: % 0.1   Nucleated RBCs Latest Ref Range: 0 /100 0   Absolute Neutrophil Latest Ref Range: 1.6 - 8.3 10e9/L 4.4   Absolute Lymphocytes Latest Ref Range: 0.8 - 5.3 10e9/L 1.9   Absolute Monocytes Latest Ref Range: 0.0 - 1.3 10e9/L 0.5   Absolute Eosinophils Latest Ref Range: 0.0 - 0.7 10e9/L 0.1   Absolute Basophils Latest Ref Range: 0.0 - 0.2 10e9/L 0.0   Abs Immature Granulocytes Latest Ref Range: 0 - 0.4 10e9/L 0.0   Absolute Nucleated RBC Unknown 0.0     Results for MARK VÁZQUEZ (MRN [id number]) as of 4/4/2019 14:22   Ref. Range 4/2/2019 11:30   Sodium Latest Ref Range: 133 - 144 mmol/L 141   Potassium Latest Ref Range: 3.4 - 5.3 mmol/L 3.7   Chloride Latest Ref Range: 94 - 109 mmol/L 106   Carbon Dioxide Latest Ref Range: 20 - 32 mmol/L 29   Urea Nitrogen Latest Ref Range: 7 - 30 mg/dL 7   Creatinine Latest Ref Range: 0.52 - 1.04 mg/dL 0.68   GFR Estimate Latest Ref Range: >60 mL/min/1.73_m2 >90   GFR Estimate If Black Latest Ref Range: >60 mL/min/1.73_m2 >90   Calcium Latest Ref Range: 8.5 - 10.1 mg/dL 9.0   Anion Gap Latest Ref Range: 3 - 14 mmol/L 6   Albumin Latest Ref Range: 3.4 - 5.0 g/dL 3.7   Protein Total Latest Ref Range: 6.8 - 8.8 g/dL 7.6   Bilirubin Total Latest Ref Range: 0.2 - 1.3 mg/dL 0.1 (L)   Alkaline Phosphatase Latest Ref Range: 40 - 150 U/L 92   ALT Latest Ref Range: 0 - 50 U/L 36   AST Latest Ref Range: 0 - 45 U/L 19   Lipase Latest Ref Range: 73 - 393 U/L 241       Results for orders placed or performed during the hospital encounter of 04/02/19   CT Abdomen Pelvis w Contrast    Narrative    CT ABDOMEN/PELVIS WITH CONTRAST April 2, 2019 1:18 PM    HISTORY: Two weeks status post partial colon resection for colon  cancer. Now with right-sided abdominal pain.    TECHNIQUE: Helical axial scans from dome of liver through pubic  symphysis with 74 mL Isovue  370 IV contrast. Radiation dose for this  scan was reduced using automated exposure control, adjustment of the  mA and/or kV according to patient size, or iterative reconstruction  technique.    COMPARISON: 3/10/2019.    FINDINGS: The liver is normal. Borderline size spleen without change.  The pancreas, bilateral adrenal glands and kidneys bilaterally are  unremarkable and unchanged.    Interval right colectomy with ileocolic anastomosis. There is some  nonspecific induration of the fat in the right abdomen in the area of  surgery consistent with expected postoperative changes. The remainder  of the bowel and mesentery appear normal. There is no evidence for  fluid collection to indicate abscess. No free intraperitoneal air. No  free fluid in the pelvis or elsewhere. No adenopathy. No aggressive  appearing bony lesions.      Impression    IMPRESSION:  1. Interval right colonic resection with ileocolic anastomosis. No  evidence for abscess or other complication.  2. Stable borderline size spleen.    PETER ALVARENGA MD   POC US RETROPERITONEAL LIMITED    Impression    [facility] Procedure Note      Limited Bedside ED Gallbladder  Ultrasound:    PROCEDURE: PERFORMED BY: Dr. Garland Harding  INDICATIONS:  Abdominal Pain  PROBE:  Low frequency convex probe  BODY LOCATION: Abdomen  FINDINGS:   An ultrasound of the gallbladder was performed using longitudinal and transverse views.  Gallstone(s):  Absent  Gallbladder sludge:  Absent  Sonographic Sapp's sign:  Absent  Gallbladder wall thickening (greater than 4 mm):  Absent  Pericholecystic fluid: Absent  Common bile duct (dilated if internal diameter greater than 6 mm): 3 mm   INTERPRETATION:  The gallbladder evaluation is normal with no gallstones/sludge, no sonographic Sapp's sign, no GB wall thickening, no pericholecystic fluid, and without evidence of cholelithiasis or cholecystitis.  IMAGE DOCUMENTATION: Images were archived to PACs system.      [facility] Procedure Note    Limited Bedside ED Renal Ultrasound:    PERFORMED BY: Dr. Garland Harding  INDICATIONS:  Abdominal Pain  PROBE: Low frequency convex probe  BODY LOCATION:  Abdomen  FINDINGS:  The ultrasound was performed with longitudinal and transverse views.   Right Kidney:   Hydronephrosis:  None   Renal cyst:  None  Left Kidney:   Hydronephrosis:  None   Renal cyst:  None  INTERPRETATION:  The evaluation of the kidneys was normal without evidence of hydronephrosis or cysts.  IMAGE DOCUMENTATION: Images were archived to PACs system.    Pelvic Ultrasound (US) with doppler imaging (r/o ovarian torsion)    Narrative    ULTRASOUND PELVIS COMPLETE WITH TRANSVAGINAL IMAGING AND DOPPLER  LIMITED  4/2/2019 3:11 PM    HISTORY: Severe right-sided abdominal pain.     TECHNIQUE: Transabdominal images of the pelvis only.    COMPARISON: 12/19/2007.    FINDINGS: The uterus measures 9.9 x 4.5 x 6.1 cm. No uterine mass  lesions are seen, and the endometrial stripe thickness is 1.1 cm. The  right ovary is identified and appears normal. The left ovary  demonstrates two simple cysts measuring 1.5 and 1.4 cm, likely  dominant follicles. There is no free fluid. Color flow imaging and  Doppler waveform analysis show no evidence for ovarian torsion. No  free fluid.      Impression    IMPRESSION:  1. Two small left ovarian cysts.  2. Otherwise unremarkable transabdominal pelvic ultrasound.    PETER ALVARENGA MD   Chest CT w/o contrast    Narrative    CT CHEST WITHOUT CONTRAST   4/2/2019 6:42 PM     HISTORY: Right upper quadrant abdominal pain and rib pain.    COMPARISON: 3/13/2019.    TECHNIQUE: Without intravenous contrast, helical sections were  acquired through the lungs. Coronal reconstructions were generated.  Radiation dose for this scan was reduced using automated exposure  control, adjustment of the mA and/or kV according to the patient's  size, or iterative reconstruction technique.    FINDINGS: The  lungs are clear. No pleural or pericardial effusion. No  enlarged lymph nodes in the chest. No acute fracture or other acute  abnormality of the visualized portions of the ribs.    Scan through the upper abdomen is unremarkable.      Impression    IMPRESSION:   1. No evidence of active pulmonary disease.  2. No acute fracture or other acute abnormality of the visualized  portions of the ribs.    BRYN GIBSON MD   CBC with platelets differential   Result Value Ref Range    WBC 7.0 4.0 - 11.0 10e9/L    RBC Count 4.34 3.8 - 5.2 10e12/L    Hemoglobin 11.3 (L) 11.7 - 15.7 g/dL    Hematocrit 36.9 35.0 - 47.0 %    MCV 85 78 - 100 fl    MCH 26.0 (L) 26.5 - 33.0 pg    MCHC 30.6 (L) 31.5 - 36.5 g/dL    RDW 12.7 10.0 - 15.0 %    Platelet Count 365 150 - 450 10e9/L    Diff Method Automated Method     % Neutrophils 62.8 %    % Lymphocytes 27.3 %    % Monocytes 7.8 %    % Eosinophils 1.7 %    % Basophils 0.3 %    % Immature Granulocytes 0.1 %    Nucleated RBCs 0 0 /100    Absolute Neutrophil 4.4 1.6 - 8.3 10e9/L    Absolute Lymphocytes 1.9 0.8 - 5.3 10e9/L    Absolute Monocytes 0.5 0.0 - 1.3 10e9/L    Absolute Eosinophils 0.1 0.0 - 0.7 10e9/L    Absolute Basophils 0.0 0.0 - 0.2 10e9/L    Abs Immature Granulocytes 0.0 0 - 0.4 10e9/L    Absolute Nucleated RBC 0.0    Comprehensive metabolic panel   Result Value Ref Range    Sodium 141 133 - 144 mmol/L    Potassium 3.7 3.4 - 5.3 mmol/L    Chloride 106 94 - 109 mmol/L    Carbon Dioxide 29 20 - 32 mmol/L    Anion Gap 6 3 - 14 mmol/L    Glucose 98 70 - 99 mg/dL    Urea Nitrogen 7 7 - 30 mg/dL    Creatinine 0.68 0.52 - 1.04 mg/dL    GFR Estimate >90 >60 mL/min/[1.73_m2]    GFR Estimate If Black >90 >60 mL/min/[1.73_m2]    Calcium 9.0 8.5 - 10.1 mg/dL    Bilirubin Total 0.1 (L) 0.2 - 1.3 mg/dL    Albumin 3.7 3.4 - 5.0 g/dL    Protein Total 7.6 6.8 - 8.8 g/dL    Alkaline Phosphatase 92 40 - 150 U/L    ALT 36 0 - 50 U/L    AST 19 0 - 45 U/L   UA reflex to Microscopic   Result Value Ref  Range    Color Urine Yellow     Appearance Urine Clear     Glucose Urine Negative NEG^Negative mg/dL    Bilirubin Urine Negative NEG^Negative    Ketones Urine Negative NEG^Negative mg/dL    Specific Gravity Urine 1.009 1.003 - 1.035    Blood Urine Negative NEG^Negative    pH Urine 8.0 (H) 5.0 - 7.0 pH    Protein Albumin Urine Negative NEG^Negative mg/dL    Urobilinogen mg/dL 0.0 0.0 - 2.0 mg/dL    Nitrite Urine Negative NEG^Negative    Leukocyte Esterase Urine Trace (A) NEG^Negative    Source Midstream Urine     RBC Urine <1 0 - 2 /HPF    WBC Urine 3 0 - 5 /HPF    Squamous Epithelial /HPF Urine 1 0 - 1 /HPF    Transitional Epi 1 0 - 1 /HPF    Mucous Urine Present (A) NEG^Negative /LPF   HCG qualitative urine (UPT)   Result Value Ref Range    HCG Qual Urine Negative NEG^Negative   Lipase   Result Value Ref Range    Lipase 241 73 - 393 U/L     ASSESSMENT/PLAN:    Stage IIIC poorly differentiated [id number] adenocarcinoma of the hepatic flexure -s/p laparoscopic right hemicolectomy on 3/22/19. All margins negative. 4/38 LNs positive. MSI-Intact.  Baseline CEA 9.2 on 3/11/2019.    We discussed the situation in detail.  I recommend that chemotherapy with FOLFOX with curative we also discussed possibility of trying Xeloda with oxaliplatin but decided on giving her FOLFOX once he has recovered well from the surgery.  We discussed the rational schedule and potential side effects of it in detail.  We will begin around the end of April which would be about 5-6 weeks out from her surgery.  Tentative plan to start on 4/29/2019.  We also discussed need for port placement and we will schedule that prior to start of chemotherapy.    Discussion regarding surveillance.  She would need a colonoscopy 1 year from the last one which would be in March 2020.  We will also CT scans every 6 months after completing the chemotherapy for the first couple of years.  She will also need labs including CEA.    Discussion regarding fertility  preservation.  We discussed that there is a chance that chemotherapy can make her infertile.  I told her that I can give her resources regarding fertility preservation but she is not interested in fertility preservation.      Abdominal pain.  Recent CT scan as well as pelvic ultrasound did not show any obvious reasons for the abdominal pain.  I wonder if this is related to the recent surgery.  Overall this has improved and now she is controlling it with Tylenol.  She will continue to use Tylenol as she is doing.  She also has oxycodone at home but she is not using it regularly and she last took it yesterday.  She will have follow-up with colorectal surgery on Monday.      She had a tiny lesion in the left lobe of the liver which was seen on the CT scan done in March but it was too small to characterize.  It is not readily visible on the most recent CT scan.  We will keep a watch on this as at this time it is indeterminate.      Iron deficiency anemia.  Most likely the cause of iron deficiency bleeding as well as some contribution from the blood loss from the colon cancer.  She tells me that in 2011 she took iron pills for some time and she tolerated it well.  I told her to start taking oral iron once a day and if she tolerates it well then increase it to twice a day and if she is able to tolerate it well then increase it to 3 times a day.  If she is unable to tolerate a higher dose and go back on the dose which she is able to tolerate well.  We will repeat iron studies in the next several weeks.  I also recommended that she be evaluated by gynecology for heavy menstrual bleeding and I gave her a referral for that.    Genetic Testing. Because of young age of presentation of colon cancer and past hx of skin melanoma, I would recommend evaluation by Genetic Counselor.    Discussion regarding testing family members.  Since she developed colon cancer at the age of 38, I recommend that her immediate family members should  start screening for colorectal cancer 10 years prior to her age of development which would be at the age of 28.        She will return to clinic on 4/25/2019 to see me and she will get chemotherapy on 4/29/2019.    I answered all of her and her family's questions to their satisfaction.  They are agreeable and comfortable with the plan.    Agata Capps

## 2019-04-04 NOTE — LETTER
4/4/2019       RE: Remedios Watts  [address] [place] [address]     Dear Colleague,    Thank you for referring your patient, Remedios Watts, to the [facility]. Please see a copy of my visit note below.    Oncology initial visit:  Date on this visit: 4/4/2019    Remedios Watts  is referred by Dr.Benjamin Herbert Erazo for an oncology consultation. She requires evaluation for new diagnosis of colon cancer.    Primary Physician: Agustín Berger     History Of Present Illness:  Ms. Watts is a 38 year old female who was admitted to the hospital on 3/10/2019 for bowel obstruction due to hepatic flexure colon mass.  She had a CT scan done on 3/10/2019 which showed apple core lesion near the hepatic flexure of the colon concerning for an obstructing colonic neoplasm.  No enlarged lymph nodes were seen.  There is a tiny subcentimeter left lobe liver lesion which is too small to characterize.  A CT of the chest on 3/13/2019 did not show evidence of metastatic disease in the chest.    Colonoscopy on 3/11/2019 showed fungating, infiltrative, highly-friable and ulcerated large mass at the hepatic flexure/proximal transverse colon, unable to traverse this lesions as this appears to be near-completely obstructive in nature (though allows liquid stool/effluent to pass under visualization). The mass was partially circumferential. Biopsies was consistent with invasive moderately differentiated adenocarcinoma compatible with   colorectal primary and Mismatch repair enzymes were intact by immunohistochemistry.     On 3/22/2019 she had laparoscopic extended right hemicolectomy performed by Dr. Quesada.  Final pathology was consistent with a 3.7 cm hepatic flexure poorly differentiated adenocarcinoma with focal micropapillary growth extending through the muscularis propria and involving serosa (pT4).  There was lymphovascular invasion but no perineural invasion seen.  All margins were negative.  4 out  of 38 lymph nodes were positive for metastatic carcinoma including 2 lymph nodes with micrometastasis.  Final staging was [id number].    She recently went to emergency room for worsening right-sided abdominal pain and had CT chest abdomen and pelvis did not show any acute pathology or evidence of recurrence/metastasis. There were expected post operative changes. Labs including CBC/diff and CMP/lipase were unremarkable except for mild anemia. UA was negative. Pelvic US was also negative apart from 2 small left ovarian cysts.    She comes in today accompanied by her  and daughter.  She tells me that initially she noticed that she had a couple of weeks of abdominal cramping and constipation and also noticed some blood on the toilet paper upon wiping.  When she was diagnosed with the colon cancer then looking back she realized that her issues with altered bowel movements were going on for a few weeks before that.  She was also feeling tired more than usual for a few months before the diagnosis of the cancer.  Overall now she feels better.  The abdominal pain which she developed around the right side of the abdomen which prompted the recent ER visit has improved and now she is taking on average Tylenol 500 mg 6 times a day.  She only took one oxycodone yesterday.  She has not noticed any nausea or vomiting.  Over all her bowel movements now are better although she still has on average 3 loose bowel movements daily.  She has not noticed any more blood in it.  She is been able to eat and drink well and overall her energy is improving although she feels a little tired.  Denies any fevers or infections or any trouble urinating.  She denies any shortness of breath.  She tells me that over the last couple of years or so she has been having heavy menstrual bleeding.  She was on birth control pills but has not taken it for the last month or so.  She has not seen GYN doctor for this.  She denies any other significant  bleeding.  She denies any neuropathy.  No new swellings or no new skin problems.      ECOG 0    ROS:  A comprehensive ROS was otherwise neg    Past Medical/Surgical History:  Past Medical History:   Diagnosis Date     Chickenpox      Diarrhea      Group B streptococcus urinary tract infection complicating pregnancy 4/20/2011    Plan PCN in labor      History of postpartum depression, currently pregnant 10/21/2010     Malignant melanoma (H)      Postpartum depression 12/17/2008     Tailbone injury     fx     Urinary tract infections    History of melanoma to the left arm and back treated with excision.  She follows closely with dermatology.    Past Surgical History:   Procedure Laterality Date     C ORAL SURGERY PROCEDURE       COLONOSCOPY N/A 3/11/2019    Procedure: COMBINED COLONOSCOPY, SINGLE OR MULTIPLE BIOPSY/POLYPECTOMY BY BIOPSY;  Surgeon: Loi Maldonado MD;  Location: [facility]     LAPAROSCOPIC ASSISTED COLECTOMY Right 3/22/2019    Procedure: Laparoscopic Extended Right Hemicolectomy and appendectomy;  Surgeon: Linda Quesada MD;  Location: [facility] OR     Cancer History:   As above    Allergies:  Allergies as of 04/04/2019 - Reviewed 04/04/2019   Allergen Reaction Noted     Nka [no known allergies]  03/15/2005     Current Medications:  Current Outpatient Medications   Medication Sig Dispense Refill     acetaminophen (TYLENOL) 325 MG tablet Take 3 tablets (975 mg) by mouth every 8 hours as needed for mild pain 100 tablet 0     citalopram (CELEXA) 40 MG tablet Take 1 tablet (40 mg) by mouth daily 90 tablet 3     enoxaparin (LOVENOX) 40 MG/0.4ML syringe Inject 0.4 mLs (40 mg) Subcutaneous every 24 hours 12 mL 0     LORazepam (ATIVAN) 0.5 MG tablet Take 1 tablet (0.5 mg) by mouth nightly as needed for sleep 10 tablet 0     norethindrone-ethinyl estradiol (ORTHO-NOVUM 7/7/7, 28,) 0.5/0.75/1-35 MG-MCG per tablet Take 1 tablet by mouth daily 28 tablet 11     oxyCODONE (ROXICODONE) 5 MG tablet Take 1-2 tablets  (5-10 mg) by mouth every 4 hours as needed for moderate to severe pain 20 tablet 0     oxyCODONE-acetaminophen (PERCOCET) 5-325 MG tablet Take 1-2 tablets by mouth every 4 hours as needed for pain 6 tablet 0     tretinoin (RETIN-A) 0.05 % external cream Apply topically At Bedtime 45 g 3      Family History:  Family History   Problem Relation Age of Onset     Arthritis Mother      Neurologic Disorder Mother         had a seizure      Alcohol/Drug Mother      Arthritis Maternal Grandmother      Breast Cancer Maternal Grandmother      Melanoma Maternal Grandmother      Heart Disease Paternal Grandmother      Cancer Father         skin   Maternal grandmother had breast cancer in her late 50s.  She also had a skin melanoma in her 70s.  Father had a skin melanoma in his 50s.  He had prostate cancer in his 60s.  She has 4 children and the youngest is 7 years old and all are healthy.  Social History:  Social History     Socioeconomic History     Marital status:      Spouse name: Not on file     Number of children: 3     Years of education: Not on file     Highest education level: Not on file   Occupational History     Employer: [employer]   Social Needs     Financial resource strain: Not on file     Food insecurity:     Worry: Not on file     Inability: Not on file     Transportation needs:     Medical: Not on file     Non-medical: Not on file   Tobacco Use     Smoking status: Never Smoker     Smokeless tobacco: Never Used   Substance and Sexual Activity     Alcohol use: Yes     Drug use: No     Sexual activity: Yes     Partners: Male     Birth control/protection: Pill   Lifestyle     Physical activity:     Days per week: Not on file     Minutes per session: Not on file     Stress: Not on file   Relationships     Social connections:     Talks on phone: Not on file     Gets together: Not on file     Attends Anglican service: Not on file     Active member of club or organization: Not  "on file     Attends meetings of clubs or organizations: Not on file     Relationship status: Not on file     Intimate partner violence:     Fear of current or ex partner: Not on file     Emotionally abused: Not on file     Physically abused: Not on file     Forced sexual activity: Not on file   Other Topics Concern     Parent/sibling w/ CABG, MI or angioplasty before 65F 55M? Yes     Comment: pt had MI @ age 58   Social History Narrative     Not on file   She denies any smoking.  She drinks alcohol occasionally.  She lives with her family.  She is a / by profession.  Physical Exam:  /74 (BP Location: Right arm, Patient Position: Sitting, Cuff Size: Adult Regular)   Pulse 76   Temp 97.1  F (36.2  C) (Oral)   Resp 16   Ht 1.676 m (5' 5.98\")   Wt 77 kg (169 lb 12.8 oz)   SpO2 100%   BMI 27.42 kg/m     CONSTITUTIONAL: no acute distress  EYES: PERRLA, mild pallor but no icterus.    ENT/MOUTH: no mouth lesions. Ears normal  CVS: s1s2 no m r g .   RESPIRATORY: clear to auscultation b/l  GI: soft.  She has mild tenderness in the right flank on deep palpation.  No guarding rigidity or rebound.  No hepatosplenomegaly. Surgical scar healing well  NEURO: AAOX3  Grossly non focal neuro exam  INTEGUMENT: no obvious rashes  LYMPHATIC: no palpable cervical, supraclavicular, axillary or inguinal LAD  MUSCULOSKELETAL: Unremarkable. No bony tenderness.   EXTREMITIES: no edema  PSYCH: Mentation, mood and affect are normal. Decision making capacity is intact    Laboratory/Imaging Studies    Results for MARK VÁZQUEZ (MRN [id number]) as of 4/4/2019 14:22   Ref. Range 4/2/2019 11:30   WBC Latest Ref Range: 4.0 - 11.0 10e9/L 7.0   Hemoglobin Latest Ref Range: 11.7 - 15.7 g/dL 11.3 (L)   Hematocrit Latest Ref Range: 35.0 - 47.0 % 36.9   Platelet Count Latest Ref Range: 150 - 450 10e9/L 365   RBC Count Latest Ref Range: 3.8 - 5.2 10e12/L 4.34   MCV Latest Ref Range: 78 - 100 fl 85   MCH Latest Ref Range: 26.5 - " 33.0 pg 26.0 (L)   MCHC Latest Ref Range: 31.5 - 36.5 g/dL 30.6 (L)   RDW Latest Ref Range: 10.0 - 15.0 % 12.7   Diff Method Unknown Automated Method   % Neutrophils Latest Units: % 62.8   % Lymphocytes Latest Units: % 27.3   % Monocytes Latest Units: % 7.8   % Eosinophils Latest Units: % 1.7   % Basophils Latest Units: % 0.3   % Immature Granulocytes Latest Units: % 0.1   Nucleated RBCs Latest Ref Range: 0 /100 0   Absolute Neutrophil Latest Ref Range: 1.6 - 8.3 10e9/L 4.4   Absolute Lymphocytes Latest Ref Range: 0.8 - 5.3 10e9/L 1.9   Absolute Monocytes Latest Ref Range: 0.0 - 1.3 10e9/L 0.5   Absolute Eosinophils Latest Ref Range: 0.0 - 0.7 10e9/L 0.1   Absolute Basophils Latest Ref Range: 0.0 - 0.2 10e9/L 0.0   Abs Immature Granulocytes Latest Ref Range: 0 - 0.4 10e9/L 0.0   Absolute Nucleated RBC Unknown 0.0     Results for MARK VÁZQUEZ (MRN [id number]) as of 4/4/2019 14:22   Ref. Range 4/2/2019 11:30   Sodium Latest Ref Range: 133 - 144 mmol/L 141   Potassium Latest Ref Range: 3.4 - 5.3 mmol/L 3.7   Chloride Latest Ref Range: 94 - 109 mmol/L 106   Carbon Dioxide Latest Ref Range: 20 - 32 mmol/L 29   Urea Nitrogen Latest Ref Range: 7 - 30 mg/dL 7   Creatinine Latest Ref Range: 0.52 - 1.04 mg/dL 0.68   GFR Estimate Latest Ref Range: >60 mL/min/1.73_m2 >90   GFR Estimate If Black Latest Ref Range: >60 mL/min/1.73_m2 >90   Calcium Latest Ref Range: 8.5 - 10.1 mg/dL 9.0   Anion Gap Latest Ref Range: 3 - 14 mmol/L 6   Albumin Latest Ref Range: 3.4 - 5.0 g/dL 3.7   Protein Total Latest Ref Range: 6.8 - 8.8 g/dL 7.6   Bilirubin Total Latest Ref Range: 0.2 - 1.3 mg/dL 0.1 (L)   Alkaline Phosphatase Latest Ref Range: 40 - 150 U/L 92   ALT Latest Ref Range: 0 - 50 U/L 36   AST Latest Ref Range: 0 - 45 U/L 19   Lipase Latest Ref Range: 73 - 393 U/L 241       Results for orders placed or performed during the hospital encounter of 04/02/19   CT Abdomen Pelvis w Contrast    Narrative    CT ABDOMEN/PELVIS WITH CONTRAST  April 2, 2019 1:18 PM    HISTORY: Two weeks status post partial colon resection for colon  cancer. Now with right-sided abdominal pain.    TECHNIQUE: Helical axial scans from dome of liver through pubic  symphysis with 74 mL Isovue 370 IV contrast. Radiation dose for this  scan was reduced using automated exposure control, adjustment of the  mA and/or kV according to patient size, or iterative reconstruction  technique.    COMPARISON: 3/10/2019.    FINDINGS: The liver is normal. Borderline size spleen without change.  The pancreas, bilateral adrenal glands and kidneys bilaterally are  unremarkable and unchanged.    Interval right colectomy with ileocolic anastomosis. There is some  nonspecific induration of the fat in the right abdomen in the area of  surgery consistent with expected postoperative changes. The remainder  of the bowel and mesentery appear normal. There is no evidence for  fluid collection to indicate abscess. No free intraperitoneal air. No  free fluid in the pelvis or elsewhere. No adenopathy. No aggressive  appearing bony lesions.      Impression    IMPRESSION:  1. Interval right colonic resection with ileocolic anastomosis. No  evidence for abscess or other complication.  2. Stable borderline size spleen.    PETER ALVARENGA MD   POC US RETROPERITONEAL LIMITED    Impression    [facility] Procedure Note      Limited Bedside ED Gallbladder  Ultrasound:    PROCEDURE: PERFORMED BY: Dr. Garland Harding  INDICATIONS:  Abdominal Pain  PROBE:  Low frequency convex probe  BODY LOCATION: Abdomen  FINDINGS:   An ultrasound of the gallbladder was performed using longitudinal and transverse views.  Gallstone(s):  Absent  Gallbladder sludge:  Absent  Sonographic Sapp's sign:  Absent  Gallbladder wall thickening (greater than 4 mm):  Absent  Pericholecystic fluid: Absent  Common bile duct (dilated if internal diameter greater than 6 mm): 3 mm   INTERPRETATION:  The gallbladder evaluation is normal  with no gallstones/sludge, no sonographic Sapp's sign, no GB wall thickening, no pericholecystic fluid, and without evidence of cholelithiasis or cholecystitis.  IMAGE DOCUMENTATION: Images were archived to PACs system.     [facility] Procedure Note    Limited Bedside ED Renal Ultrasound:    PERFORMED BY: Dr. Garland Harding  INDICATIONS:  Abdominal Pain  PROBE: Low frequency convex probe  BODY LOCATION:  Abdomen  FINDINGS:  The ultrasound was performed with longitudinal and transverse views.   Right Kidney:   Hydronephrosis:  None   Renal cyst:  None  Left Kidney:   Hydronephrosis:  None   Renal cyst:  None  INTERPRETATION:  The evaluation of the kidneys was normal without evidence of hydronephrosis or cysts.  IMAGE DOCUMENTATION: Images were archived to PACs system.    Pelvic Ultrasound (US) with doppler imaging (r/o ovarian torsion)    Narrative    ULTRASOUND PELVIS COMPLETE WITH TRANSVAGINAL IMAGING AND DOPPLER  LIMITED  4/2/2019 3:11 PM    HISTORY: Severe right-sided abdominal pain.     TECHNIQUE: Transabdominal images of the pelvis only.    COMPARISON: 12/19/2007.    FINDINGS: The uterus measures 9.9 x 4.5 x 6.1 cm. No uterine mass  lesions are seen, and the endometrial stripe thickness is 1.1 cm. The  right ovary is identified and appears normal. The left ovary  demonstrates two simple cysts measuring 1.5 and 1.4 cm, likely  dominant follicles. There is no free fluid. Color flow imaging and  Doppler waveform analysis show no evidence for ovarian torsion. No  free fluid.      Impression    IMPRESSION:  1. Two small left ovarian cysts.  2. Otherwise unremarkable transabdominal pelvic ultrasound.    PETER ALVARENGA MD   Chest CT w/o contrast    Narrative    CT CHEST WITHOUT CONTRAST   4/2/2019 6:42 PM     HISTORY: Right upper quadrant abdominal pain and rib pain.    COMPARISON: 3/13/2019.    TECHNIQUE: Without intravenous contrast, helical sections were  acquired through the lungs. Coronal  reconstructions were generated.  Radiation dose for this scan was reduced using automated exposure  control, adjustment of the mA and/or kV according to the patient's  size, or iterative reconstruction technique.    FINDINGS: The lungs are clear. No pleural or pericardial effusion. No  enlarged lymph nodes in the chest. No acute fracture or other acute  abnormality of the visualized portions of the ribs.    Scan through the upper abdomen is unremarkable.      Impression    IMPRESSION:   1. No evidence of active pulmonary disease.  2. No acute fracture or other acute abnormality of the visualized  portions of the ribs.    BRYN GIBSON MD   CBC with platelets differential   Result Value Ref Range    WBC 7.0 4.0 - 11.0 10e9/L    RBC Count 4.34 3.8 - 5.2 10e12/L    Hemoglobin 11.3 (L) 11.7 - 15.7 g/dL    Hematocrit 36.9 35.0 - 47.0 %    MCV 85 78 - 100 fl    MCH 26.0 (L) 26.5 - 33.0 pg    MCHC 30.6 (L) 31.5 - 36.5 g/dL    RDW 12.7 10.0 - 15.0 %    Platelet Count 365 150 - 450 10e9/L    Diff Method Automated Method     % Neutrophils 62.8 %    % Lymphocytes 27.3 %    % Monocytes 7.8 %    % Eosinophils 1.7 %    % Basophils 0.3 %    % Immature Granulocytes 0.1 %    Nucleated RBCs 0 0 /100    Absolute Neutrophil 4.4 1.6 - 8.3 10e9/L    Absolute Lymphocytes 1.9 0.8 - 5.3 10e9/L    Absolute Monocytes 0.5 0.0 - 1.3 10e9/L    Absolute Eosinophils 0.1 0.0 - 0.7 10e9/L    Absolute Basophils 0.0 0.0 - 0.2 10e9/L    Abs Immature Granulocytes 0.0 0 - 0.4 10e9/L    Absolute Nucleated RBC 0.0    Comprehensive metabolic panel   Result Value Ref Range    Sodium 141 133 - 144 mmol/L    Potassium 3.7 3.4 - 5.3 mmol/L    Chloride 106 94 - 109 mmol/L    Carbon Dioxide 29 20 - 32 mmol/L    Anion Gap 6 3 - 14 mmol/L    Glucose 98 70 - 99 mg/dL    Urea Nitrogen 7 7 - 30 mg/dL    Creatinine 0.68 0.52 - 1.04 mg/dL    GFR Estimate >90 >60 mL/min/[1.73_m2]    GFR Estimate If Black >90 >60 mL/min/[1.73_m2]    Calcium 9.0 8.5 - 10.1 mg/dL     Bilirubin Total 0.1 (L) 0.2 - 1.3 mg/dL    Albumin 3.7 3.4 - 5.0 g/dL    Protein Total 7.6 6.8 - 8.8 g/dL    Alkaline Phosphatase 92 40 - 150 U/L    ALT 36 0 - 50 U/L    AST 19 0 - 45 U/L   UA reflex to Microscopic   Result Value Ref Range    Color Urine Yellow     Appearance Urine Clear     Glucose Urine Negative NEG^Negative mg/dL    Bilirubin Urine Negative NEG^Negative    Ketones Urine Negative NEG^Negative mg/dL    Specific Gravity Urine 1.009 1.003 - 1.035    Blood Urine Negative NEG^Negative    pH Urine 8.0 (H) 5.0 - 7.0 pH    Protein Albumin Urine Negative NEG^Negative mg/dL    Urobilinogen mg/dL 0.0 0.0 - 2.0 mg/dL    Nitrite Urine Negative NEG^Negative    Leukocyte Esterase Urine Trace (A) NEG^Negative    Source Midstream Urine     RBC Urine <1 0 - 2 /HPF    WBC Urine 3 0 - 5 /HPF    Squamous Epithelial /HPF Urine 1 0 - 1 /HPF    Transitional Epi 1 0 - 1 /HPF    Mucous Urine Present (A) NEG^Negative /LPF   HCG qualitative urine (UPT)   Result Value Ref Range    HCG Qual Urine Negative NEG^Negative   Lipase   Result Value Ref Range    Lipase 241 73 - 393 U/L     ASSESSMENT/PLAN:    Stage IIIC poorly differentiated [id number] adenocarcinoma of the hepatic flexure -s/p laparoscopic right hemicolectomy on 3/22/19. All margins negative. 4/38 LNs positive. MSI-Intact.  Baseline CEA 9.2 on 3/11/2019.    We discussed the situation in detail.  I recommend that chemotherapy with FOLFOX with curative we also discussed possibility of trying Xeloda with oxaliplatin but decided on giving her FOLFOX once he has recovered well from the surgery.  We discussed the rational schedule and potential side effects of it in detail.  We will begin around the end of April which would be about 5-6 weeks out from her surgery.  Tentative plan to start on 4/29/2019.  We also discussed need for port placement and we will schedule that prior to start of chemotherapy.    Discussion regarding surveillance.  She would need a colonoscopy 1  year from the last one which would be in March 2020.  We will also CT scans every 6 months after completing the chemotherapy for the first couple of years.  She will also need labs including CEA.    Discussion regarding fertility preservation.  We discussed that there is a chance that chemotherapy can make her infertile.  I told her that I can give her resources regarding fertility preservation but she is not interested in fertility preservation.      Abdominal pain.  Recent CT scan as well as pelvic ultrasound did not show any obvious reasons for the abdominal pain.  I wonder if this is related to the recent surgery.  Overall this has improved and now she is controlling it with Tylenol.  She will continue to use Tylenol as she is doing.  She also has oxycodone at home but she is not using it regularly and she last took it yesterday.  She will have follow-up with colorectal surgery on Monday.      She had a tiny lesion in the left lobe of the liver which was seen on the CT scan done in March but it was too small to characterize.  It is not readily visible on the most recent CT scan.  We will keep a watch on this as at this time it is indeterminate.      Iron deficiency anemia.  Most likely the cause of iron deficiency bleeding as well as some contribution from the blood loss from the colon cancer.  She tells me that in 2011 she took iron pills for some time and she tolerated it well.  I told her to start taking oral iron once a day and if she tolerates it well then increase it to twice a day and if she is able to tolerate it well then increase it to 3 times a day.  If she is unable to tolerate a higher dose and go back on the dose which she is able to tolerate well.  We will repeat iron studies in the next several weeks.  I also recommended that she be evaluated by gynecology for heavy menstrual bleeding and I gave her a referral for that.    Genetic Testing. Because of young age of presentation of colon cancer and  past hx of skin melanoma, I would recommend evaluation by Genetic Counselor.    Discussion regarding testing family members.  Since she developed colon cancer at the age of 38, I recommend that her immediate family members should start screening for colorectal cancer 10 years prior to her age of development which would be at the age of 28.        She will return to clinic on 4/25/2019 to see me and she will get chemotherapy on 4/29/2019.    I answered all of her and her family's questions to their satisfaction.  They are agreeable and comfortable with the plan.    Agata Capps

## 2019-04-05 ENCOUNTER — HOSPITAL ENCOUNTER (EMERGENCY)
Facility: CLINIC | Age: 39
Discharge: HOME | End: 2019-04-05
Attending: NURSE PRACTITIONER | Admitting: NURSE PRACTITIONER
Payer: COMMERCIAL

## 2019-04-05 VITALS
OXYGEN SATURATION: 100 % | RESPIRATION RATE: 16 BRPM | SYSTOLIC BLOOD PRESSURE: 101 MMHG | TEMPERATURE: 97.9 F | DIASTOLIC BLOOD PRESSURE: 72 MMHG

## 2019-04-05 DIAGNOSIS — R30.0 DYSURIA: ICD-10-CM

## 2019-04-05 LAB
ALBUMIN UR-MCNC: NEGATIVE MG/DL
APPEARANCE UR: CLEAR
BACTERIA #/AREA URNS HPF: ABNORMAL /HPF
BILIRUB UR QL STRIP: NEGATIVE
COLOR UR AUTO: ABNORMAL
GLUCOSE UR STRIP-MCNC: NEGATIVE MG/DL
HCG UR QL: NEGATIVE
HGB UR QL STRIP: NEGATIVE
KETONES UR STRIP-MCNC: NEGATIVE MG/DL
LEUKOCYTE ESTERASE UR QL STRIP: ABNORMAL
NITRATE UR QL: NEGATIVE
PH UR STRIP: 6 PH (ref 5–7)
RBC #/AREA URNS AUTO: 1 /HPF (ref 0–2)
SOURCE: ABNORMAL
SP GR UR STRIP: 1 (ref 1–1.03)
SQUAMOUS #/AREA URNS AUTO: 1 /HPF (ref 0–1)
UROBILINOGEN UR STRIP-MCNC: 0 MG/DL (ref 0–2)
WBC #/AREA URNS AUTO: 4 /HPF (ref 0–5)

## 2019-04-05 PROCEDURE — 99213 OFFICE O/P EST LOW 20 MIN: CPT | Mod: Z6 | Performed by: NURSE PRACTITIONER

## 2019-04-05 PROCEDURE — 87086 URINE CULTURE/COLONY COUNT: CPT | Performed by: NURSE PRACTITIONER

## 2019-04-05 PROCEDURE — G0463 HOSPITAL OUTPT CLINIC VISIT: HCPCS | Performed by: NURSE PRACTITIONER

## 2019-04-05 PROCEDURE — 81025 URINE PREGNANCY TEST: CPT | Performed by: NURSE PRACTITIONER

## 2019-04-05 PROCEDURE — 81001 URINALYSIS AUTO W/SCOPE: CPT | Performed by: STUDENT IN AN ORGANIZED HEALTH CARE EDUCATION/TRAINING PROGRAM

## 2019-04-05 RX ORDER — PHENAZOPYRIDINE HYDROCHLORIDE 200 MG/1
200 TABLET, FILM COATED ORAL 3 TIMES DAILY
Qty: 9 TABLET | Refills: 0 | Status: SHIPPED | OUTPATIENT
Start: 2019-04-05 | End: 2019-04-23

## 2019-04-05 RX ORDER — CEPHALEXIN 500 MG/1
500 CAPSULE ORAL 2 TIMES DAILY
Qty: 14 CAPSULE | Refills: 0 | Status: SHIPPED | OUTPATIENT
Start: 2019-04-05 | End: 2019-04-23

## 2019-04-05 ASSESSMENT — ENCOUNTER SYMPTOMS
APPETITE CHANGE: 0
HEMATURIA: 0
CHILLS: 0
FEVER: 0
ABDOMINAL PAIN: 0
VOMITING: 0
DYSURIA: 1
FLANK PAIN: 1
BACK PAIN: 0
COUGH: 0
SHORTNESS OF BREATH: 0
NAUSEA: 0
FATIGUE: 0
FREQUENCY: 1

## 2019-04-05 NOTE — ED AVS SNAPSHOT
[facility]  [address] [address]  Phone:  [phone]  Fax:  [phone]                                    Remedios Watts   MRN: [id number]    Department:  [facility]   Date of Visit:  4/5/2019           After Visit Summary Signature Page    I have received my discharge instructions, and my questions have been answered. I have discussed any challenges I see with this plan with the nurse or doctor.    ..........................................................................................................................................  Patient/Patient Representative Signature      ..........................................................................................................................................  Patient Representative Print Name and Relationship to Patient    ..................................................               ................................................  Date                                   Time    ..........................................................................................................................................  Reviewed by Signature/Title    ...................................................              ..............................................  Date                                               Time          22EPIC Rev 08/18

## 2019-04-05 NOTE — DISCHARGE INSTRUCTIONS
Keflex 500 mg twice daily for 1 week.  Pyridium 200 mg three times a day as needed for urinary discomfort.

## 2019-04-05 NOTE — PROGRESS NOTES
"Met with Remedios to discuss chemotherapy and resources available at the [facility]. Provided patient with \"My Cancer Guidebook\", and [facility] printouts on [employer]. Reviewed administration, side effects (including myelosuppression, nausea/vomiting, diarrhea/constipation, hair loss, mouth sores) and ongoing symptom management by APPs in clinic. Provided phone numbers for triage and after hours care. Highlighted steps to expect when getting chemotherapy (check in, labs, pre- meds, infusion), Discussed that chemo treatment may be delayed a day or two due to blood counts, infusion schedule or patient's need to modify. Included a one page resource of symptoms and when to contact the Cancer Clinic with questions or concerns.      Presented the port book with visual of what a port looks like, provided descriptive explanation of placement, access, when to use and ongoing maintenance of port. Discussed risks of infection and bloods clots. Provided [email or web address] [facility] Access Port information for Remedios to read at home.              Answered all Remedios's questions to her stated satisfaction.                                                                                                                                                "

## 2019-04-05 NOTE — ED PROVIDER NOTES
"  History     Chief Complaint   Patient presents with     UTI     frequency     HPI  Remedios Watts is a 38 year old female with history of newly diagnosed colon cancer/mass (will be starting chemotherapy soon) who presents to urgent care for evaluation of dysuria. Symptoms started 2 days ago. Increased feeling of \"pressure\" and urge to void.  No hematuria. No fevers. No abdominal pain. Reports questionable right flank pain that she had for a brief moment at the end of urination.  Patient had laparoscopic right hemicolectomy on 3/22/19 and states she did have a saez catheter for hat procedure. Patient had follow-up appt with oncology yesterday.      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     Priority: Medium     Colonic mass 03/12/2019     Priority: Medium     Partial small bowel obstruction (H) 03/10/2019     Priority: Medium     Folliculitis 02/06/2013     Priority: Medium     Lentigo 02/06/2013     Priority: Medium     Congenital nevus 02/06/2013     Priority: Medium     Angioma 02/06/2013     Priority: Medium     Multiple nevi 02/06/2013     Priority: Medium     Dermal nevus 02/06/2013     Priority: Medium     CARDIOVASCULAR SCREENING; LDL GOAL LESS THAN 160 01/21/2013     Priority: Medium     Pelvic pain 01/18/2013     Priority: Medium     Generalized anxiety disorder 06/29/2011     Priority: Medium     Diagnosis updated by automated process. Provider to review and confirm.          Past Medical History:    Past Medical History:   Diagnosis Date     Chickenpox      Diarrhea      Group B streptococcus urinary tract infection complicating pregnancy 4/20/2011     History of postpartum depression, currently pregnant 10/21/2010     Malignant melanoma (H)      Postpartum depression 12/17/2008     " Tailbone injury      Urinary tract infections        Past Surgical History:    Past Surgical History:   Procedure Laterality Date     C ORAL SURGERY PROCEDURE       COLONOSCOPY N/A 3/11/2019    Procedure: COMBINED COLONOSCOPY, SINGLE OR MULTIPLE BIOPSY/POLYPECTOMY BY BIOPSY;  Surgeon: Loi Maldonado MD;  Location: [facility]     LAPAROSCOPIC ASSISTED COLECTOMY Right 3/22/2019    Procedure: Laparoscopic Extended Right Hemicolectomy and appendectomy;  Surgeon: Linda Quesada MD;  Location: [facility] OR       Family History:    Family History   Problem Relation Age of Onset     Arthritis Mother      Neurologic Disorder Mother         had a seizure      Alcohol/Drug Mother      Arthritis Maternal Grandmother      Breast Cancer Maternal Grandmother      Melanoma Maternal Grandmother      Heart Disease Paternal Grandmother      Cancer Father         skin       Social History:  Marital Status:   [2]  Social History     Tobacco Use     Smoking status: Never Smoker     Smokeless tobacco: Never Used   Substance Use Topics     Alcohol use: Yes     Drug use: No        Medications:      cephALEXin (KEFLEX) 500 MG capsule   phenazopyridine (PYRIDIUM) 200 MG tablet   acetaminophen (TYLENOL) 325 MG tablet   citalopram (CELEXA) 40 MG tablet   enoxaparin (LOVENOX) 40 MG/0.4ML syringe   LORazepam (ATIVAN) 0.5 MG tablet   norethindrone-ethinyl estradiol (ORTHO-NOVUM 7/7/7, 28,) 0.5/0.75/1-35 MG-MCG per tablet   oxyCODONE (ROXICODONE) 5 MG tablet   oxyCODONE-acetaminophen (PERCOCET) 5-325 MG tablet   tretinoin (RETIN-A) 0.05 % external cream         Review of Systems   Constitutional: Negative for appetite change, chills, fatigue and fever.   HENT: Negative for congestion.    Respiratory: Negative for cough and shortness of breath.    Cardiovascular: Negative for chest pain.   Gastrointestinal: Negative for abdominal pain, nausea and vomiting.   Genitourinary: Positive for dysuria, flank pain, frequency and urgency.  Negative for hematuria, vaginal discharge and vaginal pain.   Musculoskeletal: Negative for back pain.   Skin: Negative for rash.       Physical Exam   BP: 101/72  Heart Rate: 82  Temp: 97.9  F (36.6  C)  Resp: 16  SpO2: 100 %      Physical Exam    GENERAL APPEARANCE: healthy, alert and no distress  RESP: lungs clear to auscultation - no rales, rhonchi or wheezes  CV: regular rates and rhythm, normal S1 S2, no murmur noted      ED Course        Procedures             Results for orders placed or performed during the hospital encounter of 04/05/19 (from the past 24 hour(s))   UA with Microscopic   Result Value Ref Range    Color Urine Straw     Appearance Urine Clear     Glucose Urine Negative NEG^Negative mg/dL    Bilirubin Urine Negative NEG^Negative    Ketones Urine Negative NEG^Negative mg/dL    Specific Gravity Urine 1.002 (L) 1.003 - 1.035    Blood Urine Negative NEG^Negative    pH Urine 6.0 5.0 - 7.0 pH    Protein Albumin Urine Negative NEG^Negative mg/dL    Urobilinogen mg/dL 0.0 0.0 - 2.0 mg/dL    Nitrite Urine Negative NEG^Negative    Leukocyte Esterase Urine Moderate (A) NEG^Negative    Source Midstream Urine     WBC Urine 4 0 - 5 /HPF    RBC Urine 1 0 - 2 /HPF    Bacteria Urine Few (A) NEG^Negative /HPF    Squamous Epithelial /HPF Urine 1 0 - 1 /HPF   HCG qualitative urine (UPT)   Result Value Ref Range    HCG Qual Urine Negative NEG^Negative       Medications - No data to display    Assessments & Plan (with Medical Decision Making)   UA reveals moderate leuk esterase, 4 WBCs, and few bacteria.  I don't have strong indicators on the UA to indicate UTI. However, given patient's symptoms she will be treated for acute cystitis pending her urine culture results. I also discussed with patient that based on her history pf a colon mass she should have a  Low threshold for returning for any new or worsening symptoms (abdominal pain, flank pain, fevers, vomiting) as this could indicate something more serious and  she should have further work-up  I have reviewed the nursing notes.    I have reviewed the findings, diagnosis, plan and need for follow up with the patient.         Medication List      Started    cephALEXin 500 MG capsule  Commonly known as:  KEFLEX  500 mg, Oral, 2 TIMES DAILY     phenazopyridine 200 MG tablet  Commonly known as:  PYRIDIUM  200 mg, Oral, 3 TIMES DAILY        ASK your doctor about these medications    methocarbamol 500 MG tablet  Commonly known as:  ROBAXIN  500 mg, Oral, 3 TIMES DAILY  Ask about: Should I take this medication?            Final diagnoses:   Dysuria       4/5/2019   [facility] EMERGENCY DEPARTMENT     Reena Sprague APRN CNP  04/05/19 [id number]

## 2019-04-06 LAB
BACTERIA SPEC CULT: NO GROWTH
Lab: NORMAL
SPECIMEN SOURCE: NORMAL

## 2019-04-06 NOTE — RESULT ENCOUNTER NOTE
Emergency Dept/Urgent Care discharge antibiotic (if prescribed): Cephalexin (Keflex) 500 mg capsule, 1 capsule (500 mg) by mouth 2 times daily for 7 days.  Date of Rx (if applicable):  4/5/19  No changes in treatment per Urine culture protocol.

## 2019-04-08 ENCOUNTER — OFFICE VISIT (OUTPATIENT)
Dept: SURGERY | Facility: CLINIC | Age: 39
End: 2019-04-08
Payer: COMMERCIAL

## 2019-04-08 VITALS
BODY MASS INDEX: 27.34 KG/M2 | HEART RATE: 60 BPM | WEIGHT: 170.1 LBS | HEIGHT: 66 IN | RESPIRATION RATE: 16 BRPM | OXYGEN SATURATION: 100 % | DIASTOLIC BLOOD PRESSURE: 64 MMHG | SYSTOLIC BLOOD PRESSURE: 111 MMHG | TEMPERATURE: 97.5 F

## 2019-04-08 DIAGNOSIS — C18.3 PRIMARY CANCER OF HEPATIC FLEXURE OF COLON (H): Primary | ICD-10-CM

## 2019-04-08 SDOH — HEALTH STABILITY: MENTAL HEALTH: HOW OFTEN DO YOU HAVE A DRINK CONTAINING ALCOHOL?: MONTHLY OR LESS

## 2019-04-08 SDOH — HEALTH STABILITY: MENTAL HEALTH: HOW OFTEN DO YOU HAVE 6 OR MORE DRINKS ON ONE OCCASION?: LESS THAN MONTHLY

## 2019-04-08 SDOH — HEALTH STABILITY: MENTAL HEALTH: HOW MANY STANDARD DRINKS CONTAINING ALCOHOL DO YOU HAVE ON A TYPICAL DAY?: 1 OR 2

## 2019-04-08 ASSESSMENT — PAIN SCALES - GENERAL: PAINLEVEL: NO PAIN (0)

## 2019-04-08 ASSESSMENT — MIFFLIN-ST. JEOR: SCORE: 1465.57

## 2019-04-08 NOTE — LETTER
"2019       RE: Remedios Watts  [address] [place] [address]     Dear Colleague,    Thank you for referring your patient, Remedios Watts, to the [facility] COLON AND RECTAL SURGERY at [facility]. Please see a copy of my visit note below.    Colon and Rectal Surgery Postoperative Clinic Note    RE: Remedios Watts  : 1980  DOMINGO: 2019    Remedios Watts is a very pleasant 38 year old female with a history of melanoma with hepatic flexure colon cancer who is now status post laparoscopic extended right hemicolectomy on 3/22/19 with Dr. Quesada. Her postoperative course was uneventful and she was discharged to home on 3/26/19.    Interval history: Remedios was having right-sided abdominal pain.  She was seen in the ER with a normal CT and ultrasound.  She was seen again in the ER last week with continued pain, thought to possibly be due to a UTI but urine culture was negative.  She reports that the pain has significantly improved.  She is now only taking Tylenol for pain.  She is having fairly frequent bowel movements, almost immediately after she eats with some urgency but no fecal incontinence.  She denies any nausea or vomiting.  She is tolerating a low residue diet.  She saw Dr. Capps of oncology last week and plans to start chemotherapy on 2019.    Physical Examination:   /64 (BP Location: Left arm, Patient Position: Sitting, Cuff Size: Adult Regular)   Pulse 60   Temp 97.5  F (36.4  C) (Oral)   Resp 16   Ht 5' 6.14\"   Wt 170 lb 1.6 oz   SpO2 100%   BMI 27.34 kg/m     General: Alert, oriented, in no acute distress, sitting comfortably  HEENT: Mucous membranes moist  Abdomen: Soft, nondistended, nontender on palpation.  Incision sites well approximated without erythema or drainage.    Assessment/Plan:  38 year old female status post laparoscopic extended right hemicolectomy on 3/22/19 with Dr. Quesada.  She is recovering well from " surgery.  She was having some abdominal pain but this seems to have resolved.  Tylenol only for pain right now.  Continue on a low residue diet for another 2 weeks and then she can slowly add fiber back into her diet.  No lifting more than 10 pounds for full 6 weeks from surgery.  Okay to start chemotherapy per oncology.  She and her  are very concerned about a tiny liver lesion is seen chest CT in March at [facility].  This is not mentioned on prior abdominal imaging.  She is very worried that she needs further imaging or biopsies of this.  I reviewed her images with Dr. Supa pack of radiology.  This was not present in 2007 but is quite small.  Could consider possible liver MR with [facility] but will defer to oncology for surveillance of this.  Message sent to Dr. Capps.  She is scheduled for follow-up on 5/15/2019 with Dr. Quesada.  Encouraged her to contact the clinic in the meantime with any questions or concerns.  Patient's questions were answered to her stated satisfaction and she is in agreement with this plan.    Medical history:  Past Medical History:   Diagnosis Date     Chickenpox      Diarrhea      Group B streptococcus urinary tract infection complicating pregnancy 4/20/2011    Plan PCN in labor      History of postpartum depression, currently pregnant 10/21/2010     Malignant melanoma (H)      Postpartum depression 12/17/2008     Tailbone injury     fx     Urinary tract infections        Surgical history:  Past Surgical History:   Procedure Laterality Date     C ORAL SURGERY PROCEDURE       COLONOSCOPY N/A 3/11/2019    Procedure: COMBINED COLONOSCOPY, SINGLE OR MULTIPLE BIOPSY/POLYPECTOMY BY BIOPSY;  Surgeon: Loi Maldonado MD;  Location: [facility]     LAPAROSCOPIC ASSISTED COLECTOMY Right 3/22/2019    Procedure: Laparoscopic Extended Right Hemicolectomy and appendectomy;  Surgeon: Linda Quesada MD;  Location: [facility] OR       Problem list:  Patient Active Problem List     Diagnosis Date Noted     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     Priority: Medium     Colonic mass 03/12/2019     Priority: Medium     Partial small bowel obstruction (H) 03/10/2019     Priority: Medium     Folliculitis 02/06/2013     Priority: Medium     Lentigo 02/06/2013     Priority: Medium     Congenital nevus 02/06/2013     Priority: Medium     Angioma 02/06/2013     Priority: Medium     Multiple nevi 02/06/2013     Priority: Medium     Dermal nevus 02/06/2013     Priority: Medium     CARDIOVASCULAR SCREENING; LDL GOAL LESS THAN 160 01/21/2013     Priority: Medium     Pelvic pain 01/18/2013     Priority: Medium     Generalized anxiety disorder 06/29/2011     Priority: Medium     Diagnosis updated by automated process. Provider to review and confirm.         Medications:  Current Outpatient Medications   Medication Sig Dispense Refill     acetaminophen (TYLENOL) 325 MG tablet Take 3 tablets (975 mg) by mouth every 8 hours as needed for mild pain 100 tablet 0     cephALEXin (KEFLEX) 500 MG capsule Take 1 capsule (500 mg) by mouth 2 times daily for 7 days 14 capsule 0     enoxaparin (LOVENOX) 40 MG/0.4ML syringe Inject 0.4 mLs (40 mg) Subcutaneous every 24 hours 12 mL 0     LORazepam (ATIVAN) 0.5 MG tablet Take 1 tablet (0.5 mg) by mouth nightly as needed for sleep 10 tablet 0     phenazopyridine (PYRIDIUM) 200 MG tablet Take 1 tablet (200 mg) by mouth 3 times daily for 3 days 9 tablet 0     tretinoin (RETIN-A) 0.05 % external cream Apply topically At Bedtime 45 g 3     citalopram (CELEXA) 40 MG tablet Take 1 tablet (40 mg) by mouth daily (Patient not taking: Reported on 4/8/2019) 90 tablet 3     norethindrone-ethinyl estradiol (ORTHO-NOVUM 7/7/7, 28,) 0.5/0.75/1-35 MG-MCG per tablet Take 1 tablet by mouth daily (Patient not taking: Reported on 4/8/2019) 28 tablet 11     oxyCODONE (ROXICODONE) 5 MG tablet Take 1-2 tablets (5-10 mg) by mouth  "every 4 hours as needed for moderate to severe pain (Patient not taking: Reported on 4/8/2019) 20 tablet 0     oxyCODONE-acetaminophen (PERCOCET) 5-325 MG tablet Take 1-2 tablets by mouth every 4 hours as needed for pain (Patient not taking: Reported on 4/8/2019) 6 tablet 0       Allergies:  Allergies   Allergen Reactions     Nka [No Known Allergies]        Family history:  Family History   Problem Relation Age of Onset     Arthritis Mother      Neurologic Disorder Mother         had a seizure      Alcohol/Drug Mother      Arthritis Maternal Grandmother      Breast Cancer Maternal Grandmother      Melanoma Maternal Grandmother      Heart Disease Paternal Grandmother      Cancer Father         skin       Social history:  Social History     Tobacco Use     Smoking status: Never Smoker     Smokeless tobacco: Never Used   Substance Use Topics     Alcohol use: Yes     Frequency: Monthly or less     Drinks per session: 1 or 2     Binge frequency: Less than monthly     Marital status: .    Nursing Notes:   Lindsay Monzon LPN  4/8/2019 12:28 PM  Signed  Chief Complaint   Patient presents with     Surgical Followup     3/22/19 R Hemicolectomy       Vitals:    04/08/19 1220   BP: 111/64   BP Location: Left arm   Patient Position: Sitting   Cuff Size: Adult Regular   Pulse: 60   Resp: 16   Temp: 97.5  F (36.4  C)   TempSrc: Oral   SpO2: 100%   Weight: 170 lb 1.6 oz   Height: 5' 6.14\"       Body mass index is 27.34 kg/m .    Lindsay Monzon LPN     Total face to face time was 30 minutes, >50% counseling.  This is a postop visit.    JEFERSON Davenport, NP-C  Colon and Rectal Surgery  [facility]    This note was created using speech recognition software and may contain unintended word substitutions.      "

## 2019-04-08 NOTE — NURSING NOTE
"Chief Complaint   Patient presents with     Surgical Followup     3/22/19 R Hemicolectomy       Vitals:    04/08/19 1220   BP: 111/64   BP Location: Left arm   Patient Position: Sitting   Cuff Size: Adult Regular   Pulse: 60   Resp: 16   Temp: 97.5  F (36.4  C)   TempSrc: Oral   SpO2: 100%   Weight: 170 lb 1.6 oz   Height: 5' 6.14\"       Body mass index is 27.34 kg/m .      Lindsay Monzon LPN                          "

## 2019-04-08 NOTE — PROGRESS NOTES
"Colon and Rectal Surgery Postoperative Clinic Note    RE: Remedios Watts  : 1980  DOMINGO: 2019    Remedios Watts is a very pleasant 38 year old female with a history of melanoma with hepatic flexure colon cancer who is now status post laparoscopic extended right hemicolectomy on 3/22/19 with Dr. Quesada. Her postoperative course was uneventful and she was discharged to home on 3/26/19.    Interval history: Remedios was having right-sided abdominal pain.  She was seen in the ER with a normal CT and ultrasound.  She was seen again in the ER last week with continued pain, thought to possibly be due to a UTI but urine culture was negative.  She reports that the pain has significantly improved.  She is now only taking Tylenol for pain.  She is having fairly frequent bowel movements, almost immediately after she eats with some urgency but no fecal incontinence.  She denies any nausea or vomiting.  She is tolerating a low residue diet.  She saw Dr. Capps of oncology last week and plans to start chemotherapy on 2019.    Physical Examination:   /64 (BP Location: Left arm, Patient Position: Sitting, Cuff Size: Adult Regular)   Pulse 60   Temp 97.5  F (36.4  C) (Oral)   Resp 16   Ht 5' 6.14\"   Wt 170 lb 1.6 oz   SpO2 100%   BMI 27.34 kg/m    General: Alert, oriented, in no acute distress, sitting comfortably  HEENT: Mucous membranes moist  Abdomen: Soft, nondistended, nontender on palpation.  Incision sites well approximated without erythema or drainage.    Assessment/Plan:  38 year old female status post laparoscopic extended right hemicolectomy on 3/22/19 with Dr. Quesada.  She is recovering well from surgery.  She was having some abdominal pain but this seems to have resolved.  Tylenol only for pain right now.  Continue on a low residue diet for another 2 weeks and then she can slowly add fiber back into her diet.  No lifting more than 10 pounds for full 6 weeks from surgery.  Okay to " start chemotherapy per oncology.  She and her  are very concerned about a tiny liver lesion is seen chest CT in March at [facility].  This is not mentioned on prior abdominal imaging.  She is very worried that she needs further imaging or biopsies of this.  I reviewed her images with Dr. Supa pack of radiology.  This was not present in 2007 but is quite small.  Could consider possible liver MR with [facility] but will defer to oncology for surveillance of this.  Message sent to Dr. Capps.  She is scheduled for follow-up on 5/15/2019 with Dr. Quesada.  Encouraged her to contact the clinic in the meantime with any questions or concerns.  Patient's questions were answered to her stated satisfaction and she is in agreement with this plan.    Medical history:  Past Medical History:   Diagnosis Date     Chickenpox      Diarrhea      Group B streptococcus urinary tract infection complicating pregnancy 4/20/2011    Plan PCN in labor      History of postpartum depression, currently pregnant 10/21/2010     Malignant melanoma (H)      Postpartum depression 12/17/2008     Tailbone injury     fx     Urinary tract infections        Surgical history:  Past Surgical History:   Procedure Laterality Date     C ORAL SURGERY PROCEDURE       COLONOSCOPY N/A 3/11/2019    Procedure: COMBINED COLONOSCOPY, SINGLE OR MULTIPLE BIOPSY/POLYPECTOMY BY BIOPSY;  Surgeon: Loi Maldonado MD;  Location: [facility]     LAPAROSCOPIC ASSISTED COLECTOMY Right 3/22/2019    Procedure: Laparoscopic Extended Right Hemicolectomy and appendectomy;  Surgeon: Linda Quesada MD;  Location: [facility]       Problem list:  Patient Active Problem List    Diagnosis Date Noted     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     Priority: Medium     Colonic mass 03/12/2019     Priority: Medium     Partial small bowel obstruction (H) 03/10/2019     Priority: Medium     Folliculitis  02/06/2013     Priority: Medium     Lentigo 02/06/2013     Priority: Medium     Congenital nevus 02/06/2013     Priority: Medium     Angioma 02/06/2013     Priority: Medium     Multiple nevi 02/06/2013     Priority: Medium     Dermal nevus 02/06/2013     Priority: Medium     CARDIOVASCULAR SCREENING; LDL GOAL LESS THAN 160 01/21/2013     Priority: Medium     Pelvic pain 01/18/2013     Priority: Medium     Generalized anxiety disorder 06/29/2011     Priority: Medium     Diagnosis updated by automated process. Provider to review and confirm.         Medications:  Current Outpatient Medications   Medication Sig Dispense Refill     acetaminophen (TYLENOL) 325 MG tablet Take 3 tablets (975 mg) by mouth every 8 hours as needed for mild pain 100 tablet 0     cephALEXin (KEFLEX) 500 MG capsule Take 1 capsule (500 mg) by mouth 2 times daily for 7 days 14 capsule 0     enoxaparin (LOVENOX) 40 MG/0.4ML syringe Inject 0.4 mLs (40 mg) Subcutaneous every 24 hours 12 mL 0     LORazepam (ATIVAN) 0.5 MG tablet Take 1 tablet (0.5 mg) by mouth nightly as needed for sleep 10 tablet 0     phenazopyridine (PYRIDIUM) 200 MG tablet Take 1 tablet (200 mg) by mouth 3 times daily for 3 days 9 tablet 0     tretinoin (RETIN-A) 0.05 % external cream Apply topically At Bedtime 45 g 3     citalopram (CELEXA) 40 MG tablet Take 1 tablet (40 mg) by mouth daily (Patient not taking: Reported on 4/8/2019) 90 tablet 3     norethindrone-ethinyl estradiol (ORTHO-NOVUM 7/7/7, 28,) 0.5/0.75/1-35 MG-MCG per tablet Take 1 tablet by mouth daily (Patient not taking: Reported on 4/8/2019) 28 tablet 11     oxyCODONE (ROXICODONE) 5 MG tablet Take 1-2 tablets (5-10 mg) by mouth every 4 hours as needed for moderate to severe pain (Patient not taking: Reported on 4/8/2019) 20 tablet 0     oxyCODONE-acetaminophen (PERCOCET) 5-325 MG tablet Take 1-2 tablets by mouth every 4 hours as needed for pain (Patient not taking: Reported on 4/8/2019) 6 tablet 0  "      Allergies:  Allergies   Allergen Reactions     Nka [No Known Allergies]        Family history:  Family History   Problem Relation Age of Onset     Arthritis Mother      Neurologic Disorder Mother         had a seizure      Alcohol/Drug Mother      Arthritis Maternal Grandmother      Breast Cancer Maternal Grandmother      Melanoma Maternal Grandmother      Heart Disease Paternal Grandmother      Cancer Father         skin       Social history:  Social History     Tobacco Use     Smoking status: Never Smoker     Smokeless tobacco: Never Used   Substance Use Topics     Alcohol use: Yes     Frequency: Monthly or less     Drinks per session: 1 or 2     Binge frequency: Less than monthly     Marital status: .    Nursing Notes:   Lindsay Monzon LPN  4/8/2019 12:28 PM  Signed  Chief Complaint   Patient presents with     Surgical Followup     3/22/19 R Hemicolectomy       Vitals:    04/08/19 1220   BP: 111/64   BP Location: Left arm   Patient Position: Sitting   Cuff Size: Adult Regular   Pulse: 60   Resp: 16   Temp: 97.5  F (36.4  C)   TempSrc: Oral   SpO2: 100%   Weight: 170 lb 1.6 oz   Height: 5' 6.14\"       Body mass index is 27.34 kg/m .      Lindsay Monzon LPN                           Total face to face time was 30 minutes, >50% counseling.  This is a postop visit.    JEFERSON Davenport, NP-C  Colon and Rectal Surgery  [facility]    This note was created using speech recognition software and may contain unintended word substitutions.    "

## 2019-04-10 ENCOUNTER — DOCUMENTATION ONLY (OUTPATIENT)
Dept: CARE COORDINATION | Facility: CLINIC | Age: 39
End: 2019-04-10

## 2019-04-10 NOTE — PROGRESS NOTES
SW received request from [employer] to verify patients' cancer diagnosis and treatment status as patient had submitted application for financial assistance program directly through [employer] online system. Verification form completed and faxed directly to [employer].      Soo Yeon Han, MSW, [facility] Center[facility]  Pager: [phone]  Phone: [phone]

## 2019-04-15 ENCOUNTER — HOSPITAL ENCOUNTER (OUTPATIENT)
Dept: ULTRASOUND IMAGING | Facility: CLINIC | Age: 39
End: 2019-04-15
Attending: FAMILY MEDICINE
Payer: COMMERCIAL

## 2019-04-15 ENCOUNTER — OFFICE VISIT (OUTPATIENT)
Dept: FAMILY MEDICINE | Facility: CLINIC | Age: 39
End: 2019-04-15
Payer: COMMERCIAL

## 2019-04-15 ENCOUNTER — HOSPITAL ENCOUNTER (OUTPATIENT)
Dept: MAMMOGRAPHY | Facility: CLINIC | Age: 39
Discharge: HOME | End: 2019-04-15
Attending: FAMILY MEDICINE | Admitting: FAMILY MEDICINE
Payer: COMMERCIAL

## 2019-04-15 VITALS
HEART RATE: 83 BPM | DIASTOLIC BLOOD PRESSURE: 60 MMHG | RESPIRATION RATE: 18 BRPM | BODY MASS INDEX: 26.84 KG/M2 | OXYGEN SATURATION: 100 % | TEMPERATURE: 98.3 F | SYSTOLIC BLOOD PRESSURE: 102 MMHG | WEIGHT: 167 LBS

## 2019-04-15 DIAGNOSIS — N63.0 LUMP OR MASS IN BREAST: ICD-10-CM

## 2019-04-15 DIAGNOSIS — F32.A DEPRESSION, UNSPECIFIED DEPRESSION TYPE: ICD-10-CM

## 2019-04-15 DIAGNOSIS — F41.1 GENERALIZED ANXIETY DISORDER: Primary | ICD-10-CM

## 2019-04-15 PROCEDURE — 99213 OFFICE O/P EST LOW 20 MIN: CPT | Performed by: FAMILY MEDICINE

## 2019-04-15 PROCEDURE — 77066 DX MAMMO INCL CAD BI: CPT

## 2019-04-15 PROCEDURE — G0279 TOMOSYNTHESIS, MAMMO: HCPCS

## 2019-04-15 PROCEDURE — 76642 ULTRASOUND BREAST LIMITED: CPT | Mod: RT

## 2019-04-15 RX ORDER — FLUOXETINE 10 MG/1
10 CAPSULE ORAL DAILY
Qty: 30 CAPSULE | Refills: 1 | Status: SHIPPED | OUTPATIENT
Start: 2019-04-15 | End: 2019-07-15

## 2019-04-15 NOTE — PROGRESS NOTES
SUBJECTIVE:   Remedios Watts is a 38 year old female who presents to clinic today for the following health issues:      HPI      Hospital Follow-up Visit:    Hospital/Nursing Home/IP Rehab Facility: [facility]  Date of Admission: 3/10/19  And 3/22/19  Date of Discharge:3/12/19  Reason(s) for Admission: colon cancer             Problems taking medications regularly:  None       Medication changes since discharge: None       Problems adhering to non-medication therapy:  None    Summary of hospitalization:  [facility] discharge summary reviewed  Diagnostic Tests/Treatments reviewed.  Follow up needed: none  Other Healthcare Providers Involved in Patient s Care:         None  Update since discharge: improved.     Post Discharge Medication Reconciliation: .  Plan of care communicated with      Coding guidelines for this visit:  Type of Medical   Decision Making Face-to-Face Visit       within 7 Days of discharge Face-to-Face Visit        within 14 days of discharge   Moderate Complexity [id number]   High Complexity [id number]            Additional history:     Reviewed and updated as needed this visit by clinical staff  Tobacco  Allergies  Meds  Med Hx  Surg Hx  Fam Hx  Soc Hx        Reviewed and updated as needed this visit by Provider           Further history obtained, clarified or corrected by physician:  She is recently had colon surgery for colon cancer.  Within a week she will be getting a port placed so that she can start chemotherapy.  She is actually feeling fairly well physically but her anxiety level is worsening.  She never did get much benefit from Celexa and that has been stopped but she wants to be on something to try to help.  When she went off the Celexa she had a lot of lightheadedness or dizzy type sensations.    OBJECTIVE:  /60   Pulse 83   Temp 98.3  F (36.8  C)   Resp 18   Wt 75.8 kg (167 lb)   SpO2 100%   BMI 26.84 kg/m    LUNGS: clear to  auscultation, normal breath sounds  CV: RRR without murmur  ABD: BS+, soft, nontender, no masses, no hepatosplenomegaly    ASSESSMENT:     Generalized anxiety disorder  Depression, unspecified depression type    PLAN:     We will start Prozac given her sensitivity to medications we will start at a low dose and she is given information about possible side effects  If she has difficulty with this at least Prozac should be somewhat easier for weaning off.      Orders Placed This Encounter     FLUoxetine (PROZAC) 10 MG capsule

## 2019-04-15 NOTE — PATIENT INSTRUCTIONS
Our Clinic hours are:  Mondays    7:20 am - 7 pm  Tues -  Fri  7:20 am - 5 pm    Clinic Phone: [phone]    The clinic lab opens at 7:30 am Mon - Fri and appointments are required.    [facility]. [phone]  Monday  8 am - 7pm  Tues - Fri 8 am - 5:30 pm

## 2019-04-19 ENCOUNTER — ANESTHESIA EVENT (OUTPATIENT)
Dept: SURGERY | Facility: AMBULATORY SURGERY CENTER | Age: 39
End: 2019-04-19

## 2019-04-22 ENCOUNTER — HOSPITAL ENCOUNTER (OUTPATIENT)
Facility: AMBULATORY SURGERY CENTER | Age: 39
End: 2019-04-22
Attending: PHYSICIAN ASSISTANT
Payer: COMMERCIAL

## 2019-04-22 ENCOUNTER — ANCILLARY PROCEDURE (OUTPATIENT)
Dept: RADIOLOGY | Facility: AMBULATORY SURGERY CENTER | Age: 39
End: 2019-04-22
Attending: INTERNAL MEDICINE
Payer: COMMERCIAL

## 2019-04-22 ENCOUNTER — ANESTHESIA (OUTPATIENT)
Dept: SURGERY | Facility: AMBULATORY SURGERY CENTER | Age: 39
End: 2019-04-22

## 2019-04-22 VITALS
OXYGEN SATURATION: 100 % | TEMPERATURE: 98.3 F | BODY MASS INDEX: 25.71 KG/M2 | WEIGHT: 160 LBS | SYSTOLIC BLOOD PRESSURE: 114 MMHG | DIASTOLIC BLOOD PRESSURE: 70 MMHG | RESPIRATION RATE: 16 BRPM | HEART RATE: 62 BPM | HEIGHT: 66 IN

## 2019-04-22 DIAGNOSIS — C18.3 MALIGNANT NEOPLASM OF HEPATIC FLEXURE (H): ICD-10-CM

## 2019-04-22 LAB
B-HCG SERPL-ACNC: <1 IU/L (ref 0–5)
INR PPP: 1.02 (ref 0.86–1.14)

## 2019-04-22 DEVICE — CATH PORT POWERPORT CLEARVUE SLIM 6FR 5616000: Type: IMPLANTABLE DEVICE | Site: CHEST  WALL | Status: FUNCTIONAL

## 2019-04-22 RX ORDER — ONDANSETRON 2 MG/ML
INJECTION INTRAMUSCULAR; INTRAVENOUS PRN
Status: DISCONTINUED | OUTPATIENT
Start: 2019-04-22 | End: 2019-04-22

## 2019-04-22 RX ORDER — SODIUM CHLORIDE, SODIUM LACTATE, POTASSIUM CHLORIDE, CALCIUM CHLORIDE 600; 310; 30; 20 MG/100ML; MG/100ML; MG/100ML; MG/100ML
INJECTION, SOLUTION INTRAVENOUS CONTINUOUS PRN
Status: DISCONTINUED | OUTPATIENT
Start: 2019-04-22 | End: 2019-04-22

## 2019-04-22 RX ORDER — DEXTROSE MONOHYDRATE 25 G/50ML
25-50 INJECTION, SOLUTION INTRAVENOUS
Status: DISCONTINUED | OUTPATIENT
Start: 2019-04-22 | End: 2019-04-23 | Stop reason: HOSPADM

## 2019-04-22 RX ORDER — MEPERIDINE HYDROCHLORIDE 25 MG/ML
12.5 INJECTION INTRAMUSCULAR; INTRAVENOUS; SUBCUTANEOUS
Status: DISCONTINUED | OUTPATIENT
Start: 2019-04-22 | End: 2019-04-23 | Stop reason: HOSPADM

## 2019-04-22 RX ORDER — NICOTINE POLACRILEX 4 MG
15-30 LOZENGE BUCCAL
Status: DISCONTINUED | OUTPATIENT
Start: 2019-04-22 | End: 2019-04-23 | Stop reason: HOSPADM

## 2019-04-22 RX ORDER — ONDANSETRON 4 MG/1
4 TABLET, ORALLY DISINTEGRATING ORAL EVERY 30 MIN PRN
Status: DISCONTINUED | OUTPATIENT
Start: 2019-04-22 | End: 2019-04-23 | Stop reason: HOSPADM

## 2019-04-22 RX ORDER — CEFAZOLIN SODIUM 2 G/50ML
2 SOLUTION INTRAVENOUS
Status: DISCONTINUED | OUTPATIENT
Start: 2019-04-22 | End: 2019-04-23 | Stop reason: HOSPADM

## 2019-04-22 RX ORDER — ACETAMINOPHEN 325 MG/1
975 TABLET ORAL ONCE
Status: COMPLETED | OUTPATIENT
Start: 2019-04-22 | End: 2019-04-22

## 2019-04-22 RX ORDER — NALOXONE HYDROCHLORIDE 0.4 MG/ML
.1-.4 INJECTION, SOLUTION INTRAMUSCULAR; INTRAVENOUS; SUBCUTANEOUS
Status: DISCONTINUED | OUTPATIENT
Start: 2019-04-22 | End: 2019-04-23 | Stop reason: HOSPADM

## 2019-04-22 RX ORDER — HEPARIN SODIUM (PORCINE) LOCK FLUSH IV SOLN 100 UNIT/ML 100 UNIT/ML
5 SOLUTION INTRAVENOUS
Status: DISCONTINUED | OUTPATIENT
Start: 2019-04-22 | End: 2019-04-23 | Stop reason: HOSPADM

## 2019-04-22 RX ORDER — CEFAZOLIN SODIUM 1 G/3ML
INJECTION, POWDER, FOR SOLUTION INTRAMUSCULAR; INTRAVENOUS PRN
Status: DISCONTINUED | OUTPATIENT
Start: 2019-04-22 | End: 2019-04-22

## 2019-04-22 RX ORDER — SODIUM CHLORIDE, SODIUM LACTATE, POTASSIUM CHLORIDE, CALCIUM CHLORIDE 600; 310; 30; 20 MG/100ML; MG/100ML; MG/100ML; MG/100ML
INJECTION, SOLUTION INTRAVENOUS CONTINUOUS
Status: DISCONTINUED | OUTPATIENT
Start: 2019-04-22 | End: 2019-04-23 | Stop reason: HOSPADM

## 2019-04-22 RX ORDER — ONDANSETRON 2 MG/ML
4 INJECTION INTRAMUSCULAR; INTRAVENOUS EVERY 30 MIN PRN
Status: DISCONTINUED | OUTPATIENT
Start: 2019-04-22 | End: 2019-04-23 | Stop reason: HOSPADM

## 2019-04-22 RX ORDER — HEPARIN SODIUM,PORCINE 10 UNIT/ML
5 VIAL (ML) INTRAVENOUS EVERY 24 HOURS
Status: DISCONTINUED | OUTPATIENT
Start: 2019-04-22 | End: 2019-04-23 | Stop reason: HOSPADM

## 2019-04-22 RX ORDER — PROPOFOL 10 MG/ML
INJECTION, EMULSION INTRAVENOUS CONTINUOUS PRN
Status: DISCONTINUED | OUTPATIENT
Start: 2019-04-22 | End: 2019-04-22

## 2019-04-22 RX ORDER — PROPOFOL 10 MG/ML
INJECTION, EMULSION INTRAVENOUS PRN
Status: DISCONTINUED | OUTPATIENT
Start: 2019-04-22 | End: 2019-04-22

## 2019-04-22 RX ORDER — LIDOCAINE HYDROCHLORIDE 20 MG/ML
INJECTION, SOLUTION INFILTRATION; PERINEURAL PRN
Status: DISCONTINUED | OUTPATIENT
Start: 2019-04-22 | End: 2019-04-22

## 2019-04-22 RX ADMIN — PROPOFOL 30 MG: 10 INJECTION, EMULSION INTRAVENOUS at 11:38

## 2019-04-22 RX ADMIN — PROPOFOL 30 MG: 10 INJECTION, EMULSION INTRAVENOUS at 11:27

## 2019-04-22 RX ADMIN — CEFAZOLIN SODIUM 2 G: 1 INJECTION, POWDER, FOR SOLUTION INTRAMUSCULAR; INTRAVENOUS at 11:05

## 2019-04-22 RX ADMIN — LIDOCAINE HYDROCHLORIDE 100 MG: 20 INJECTION, SOLUTION INFILTRATION; PERINEURAL at 12:04

## 2019-04-22 RX ADMIN — ONDANSETRON 4 MG: 2 INJECTION INTRAMUSCULAR; INTRAVENOUS at 11:04

## 2019-04-22 RX ADMIN — ACETAMINOPHEN 975 MG: 325 TABLET ORAL at 10:04

## 2019-04-22 RX ADMIN — PROPOFOL 30 MG: 10 INJECTION, EMULSION INTRAVENOUS at 11:06

## 2019-04-22 RX ADMIN — SODIUM CHLORIDE, SODIUM LACTATE, POTASSIUM CHLORIDE, CALCIUM CHLORIDE: 600; 310; 30; 20 INJECTION, SOLUTION INTRAVENOUS at 10:58

## 2019-04-22 RX ADMIN — PROPOFOL 100 MCG/KG/MIN: 10 INJECTION, EMULSION INTRAVENOUS at 11:06

## 2019-04-22 RX ADMIN — PROPOFOL 20 MG: 10 INJECTION, EMULSION INTRAVENOUS at 11:18

## 2019-04-22 ASSESSMENT — MIFFLIN-ST. JEOR: SCORE: 1417.51

## 2019-04-22 NOTE — ANESTHESIA CARE TRANSFER NOTE
Patient: Remedios Watts    Procedure(s):  Central venous Chest Port Placement - right    Diagnosis: Malignant Neoplasm Flexure  Diagnosis Additional Information: No value filed.    Anesthesia Type:   No value filed.     Note:  Airway :Room Air  Patient transferred to:Phase II  Comments: Arrive Phase II, Stable, Airway Intact  102/66, 62,14,100%,97.8  All questions answered.Handoff Report: Identifed the Patient, Identified the Reponsible Provider, Reviewed the pertinent medical history, Discussed the surgical course, Reviewed Intra-OP anesthesia mangement and issues during anesthesia, Set expectations for post-procedure period and Allowed opportunity for questions and acknowledgement of understanding      Vitals: (Last set prior to Anesthesia Care Transfer)    CRNA VITALS  4/22/2019 1144 - 4/22/2019 1218      4/22/2019             Resp Rate (set):  10                Electronically Signed By: JEFERSON Loza CRNA  April 22, 2019  12:18 PM

## 2019-04-22 NOTE — BRIEF OP NOTE
Interventional Radiology Brief Post Procedure Note    Procedure:  Central venous chest port placement.    Proceduralist: Sarah Villa PA-C    Assistant: KANDICE Fermin    Time Out: Prior to the start of the procedure and with procedural staff participation, I verbally confirmed the patient s identity using two indicators, relevant allergies, that the procedure was appropriate and matched the consent or emergent situation, and that the correct equipment/implants were available. Immediately prior to starting the procedure I conducted the Time Out with the procedural staff and re-confirmed the patient s name, procedure, and site/side. (The Joint Commission universal protocol was followed.)  Yes        Sedation: Monitored Anesthesia Care (MAC) administered and documented by Anesthesia Care Provider    Findings: Image guided placement of right internal jugular, 18.5 cm. Single lumen central venous chest port. Port is ready for use.    Estimated Blood Loss: Less than 10 ml    Fluoroscopy Time:  0.5 minute(s)    SPECIMENS: None    Complications: 1. None     Condition: Stable    Plan: Follow up per primary team. Return to IR for port removal when indicated.    Comments: See dictated procedure note for full details.    Didier Frazier PA-C

## 2019-04-22 NOTE — ANESTHESIA PREPROCEDURE EVALUATION
Anesthesia Pre-Procedure Evaluation    Patient: Remedios Watts   MRN:     [id number] Gender:   female   Age:    38 year old :      1980        Preoperative Diagnosis: Malignant Neoplasm Flexure   Procedure(s):  Chest Port Placement     Past Medical History:   Diagnosis Date     Chickenpox      Diarrhea      Group B streptococcus urinary tract infection complicating pregnancy 2011    Plan PCN in labor      History of postpartum depression, currently pregnant 10/21/2010     Malignant melanoma (H)      Postpartum depression 2008     Tailbone injury     fx     Urinary tract infections       Past Surgical History:   Procedure Laterality Date     C ORAL SURGERY PROCEDURE       COLONOSCOPY N/A 3/11/2019    Procedure: COMBINED COLONOSCOPY, SINGLE OR MULTIPLE BIOPSY/POLYPECTOMY BY BIOPSY;  Surgeon: Loi Maldonado MD;  Location: [facility]     LAPAROSCOPIC ASSISTED COLECTOMY Right 3/22/2019    Procedure: Laparoscopic Extended Right Hemicolectomy and appendectomy;  Surgeon: Linda Quesada MD;  Location: [facility] OR          Anesthesia Evaluation     .             ROS/MED HX    ENT/Pulmonary:  - neg pulmonary ROS     Neurologic:  - neg neurologic ROS     Cardiovascular:  - neg cardiovascular ROS       METS/Exercise Tolerance:     Hematologic:  - neg hematologic  ROS       Musculoskeletal:  - neg musculoskeletal ROS       GI/Hepatic: Comment: Colon cancer  S/p R hemicolectomy        Renal/Genitourinary:  - ROS Renal section negative       Endo:  - neg endo ROS       Psychiatric:     (+) psychiatric history anxiety      Infectious Disease:  - neg infectious disease ROS       Malignancy:   (+) Malignancy History of GI  GI CA Active status post,         Other:                         PHYSICAL EXAM:   Mental Status/Neuro: A/A/O   Airway: Facies: Feasible  Mallampati: I  Mouth/Opening: Full  TM distance: > 6 cm  Neck ROM: Full   Respiratory: Auscultation: CTAB     Resp. Rate: Normal     Resp. Effort: Normal     "  CV: Rhythm: Regular  Rate: Age appropriate  Heart: Normal Sounds   Comments:      Dental: Normal                  Lab Results   Component Value Date    WBC 7.0 04/02/2019    HGB 11.3 (L) 04/02/2019    HCT 36.9 04/02/2019     04/02/2019     04/02/2019    POTASSIUM 3.7 04/02/2019    CHLORIDE 106 04/02/2019    CO2 29 04/02/2019    BUN 7 04/02/2019    CR 0.68 04/02/2019    GLC 98 04/02/2019    MANDY 9.0 04/02/2019    ALBUMIN 3.7 04/02/2019    PROTTOTAL 7.6 04/02/2019    ALT 36 04/02/2019    AST 19 04/02/2019    ALKPHOS 92 04/02/2019    BILITOTAL 0.1 (L) 04/02/2019    LIPASE 241 04/02/2019    AMYLASE 37 03/10/2019    INR 1.02 04/22/2019    TSH 5.01 (H) 01/21/2018    T4 0.81 01/21/2018    T3 149 06/06/2007    HCG Negative 04/05/2019    HCGS Negative 01/21/2018       Preop Vitals  BP Readings from Last 3 Encounters:   04/22/19 113/80   04/15/19 102/60   04/08/19 111/64    Pulse Readings from Last 3 Encounters:   04/22/19 62   04/15/19 83   04/08/19 60      Resp Readings from Last 3 Encounters:   04/22/19 16   04/15/19 18   04/08/19 16    SpO2 Readings from Last 3 Encounters:   04/22/19 100%   04/15/19 100%   04/08/19 100%      Temp Readings from Last 1 Encounters:   04/22/19 36.8  C (98.2  F) (Oral)    Ht Readings from Last 1 Encounters:   04/22/19 1.676 m (5' 6\")      Wt Readings from Last 1 Encounters:   04/22/19 72.6 kg (160 lb)    Estimated body mass index is 25.82 kg/m  as calculated from the following:    Height as of this encounter: 1.676 m (5' 6\").    Weight as of this encounter: 72.6 kg (160 lb).     LDA:  Peripheral IV 04/22/19 Right Hand (Active)   Site Assessment WDL 4/22/2019 10:11 AM   Line Status Infusing 4/22/2019 10:11 AM   Phlebitis Scale 0-->no symptoms 4/22/2019 10:11 AM   Infiltration Scale 0 4/22/2019 10:11 AM   Extravasation? No 4/22/2019 10:11 AM   Dressing Intervention New dressing  4/22/2019 10:11 AM   Number of days: 0            Assessment:   ASA SCORE: 3    NPO Status: > 6 hours " since completed [employer]   Documentation: H&P complete; Preop Testing complete; Consents complete   Proceeding: Proceed without further delay  Tobacco Use:  NO Active use of Tobacco/UNKNOWN Tobacco use status     Plan:   Anes. Type:  MAC   Pre-Induction: Midazolam IV   Induction:  IV (Standard)   Airway: Native Airway (capnography)   Access/Monitoring: PIV   Maintenance: Propofol; IV   Emergence: Procedure Site   Logistics: Same Day Surgery     Postop Pain/Sedation Strategy:  Standard-Options: Opioids PRN     PONV Management:  Adult Risk Factors: Female, Non-Smoker, Postop Opioids  Prevention: Propofol Infusion; NO Volatile     CONSENT: Direct conversation   Plan and risks discussed with: Patient   Blood Products: Consent Deferred (Minimal Blood Loss)                         Bradly Singh MD  Staff Anesthesiologist  [phone]

## 2019-04-22 NOTE — DISCHARGE INSTRUCTIONS
A collaboration between [facility] and [facility]  Experts in minimally invasive, targeted treatments performed using imaging guidance    Venous Access Device,  Port Catheter Central Line Placement    Today you had a procedure today to install a venous access device; either a tunneled central vein catheter or a subcutaneous port catheter.    One of our Radiology PAs performed this procedure for you today:  ? Didier Frazier PA-C    After you go home:  - Drink plenty of fluids.  Generally 6-8 (8 ounce) glasses a day is recommended.  - Resume your regular diet unless otherwise ordered by a medical provider.  - Keep any applied tape/gauze dressings clean and dry.  Change tape/gauze dressings if they get wet or soiled.  - You may shower the following day after procedure, however cover and protect from moisture any tape/gauze dressings.  You may let water hit and run over dried skin glue, but do not scrub.  Pat the area dry after showering.  - Port placement incisions are closed with absorbable suture, meaning they do not need to be removed at a later date, and a topical skin adhesive (skin glue).  This glue will wear off in 7-14 days.  Do not remove before this time.  If 14 days have passed and residual glue is present, you may gently remove it.  - Do not apply gels, lotions, or ointments to the glue site for the first 10 days as this may cause the glue to prematurely soften and fail.  - Do not perform strenuous activities or lift greater than 10 pounds for the next three days.  - If there is bleeding or oozing from the procedure site, apply firm pressure to the area for 5-10 minutes.  If the bleeding continues seek medical advice at the numbers below.  - Mild procedure site discomfort can be treated with an ice pack and over-the-counter pain relievers.        For 24 hours after any sedation used:  - Relax and take it easy.  No strenuous activities.  - Do not drive  or operate machines at home or at work.  - No alcohol consumption.  - Do not make any important or legal decisions.    Call our Interventional Radiology (IR) service if:  - If you start bleeding from the procedure site.  If you do start to bleed from the site, lie down and hold some pressure on the site.  Our radiology provider can help you decide if you need to return to the hospital.  - If you have new or worsening pain related to the procedure.  - If you have concerning swelling at the procedure site.  - If you develop persistent nausea or vomiting.  - If you develop hives or a rash or any unexplained itching.  - If you have a fever of greater than 100.5  F and chills in the first 5 days after procedure.  - Any other concerns related to your procedure.      [facility]  Interventional Radiology (IR)  [address] [place] [address]    Contact Number:  [phone]  (IR control desk)  - Monday - Friday 8:00 am - 4:30 pm    After hours for urgent concerns:  [phone]  - After 4:30 pm Monday - Friday, Weekends and Holidays.   - Ask for Interventional Radiology on-call.  Someone is available 24 hours a day.  - [facility] toll free number:  [phone]         Alert/Cooperative/Awake

## 2019-04-22 NOTE — ANESTHESIA POSTPROCEDURE EVALUATION
Anesthesia POST Procedure Evaluation    Patient: Remedios Watts   MRN:     [id number] Gender:   female   Age:    38 year old :      1980        Preoperative Diagnosis: Malignant Neoplasm Flexure   Procedure(s):  Central venous Chest Port Placement - right   Postop Comments: No value filed.       Anesthesia Type:  MAC  No value filed.    Reportable Event: NO     PAIN: Uncomplicated   Sign Out status: Comfortable, Well controlled pain     PONV: No PONV   Sign Out status:  No Nausea or Vomiting     Neuro/Psych: Uneventful perioperative course   Sign Out Status: Preoperative baseline; Age appropriate mentation     Airway/Resp.: Uneventful perioperative course   Sign Out Status: Non labored breathing, age appropriate RR; Resp. Status within EXPECTED Parameters     CV: Uneventful perioperative course   Sign Out status: Appropriate BP and perfusion indices; Appropriate HR/Rhythm     Disposition:   Sign Out in:  PACU  Disposition:  Phase II; Home  Recovery Course: Uneventful  Follow-Up: Not required           Last Anesthesia Record Vitals:  CRNA VITALS  2019 1144 - 2019 1244      2019             Resp Rate (set):  10          Last PACU Vitals:  Vitals Value Taken Time   /66 2019 12:16 PM   Temp 36.6  C (97.8  F) 2019 12:16 PM   Pulse     Resp 16 2019 12:16 PM   SpO2 100 % 2019 12:16 PM   Temp src Available 2019 12:02 PM   NIBP 92/55 2019 12:06 PM   Pulse 77 2019 12:13 PM   SpO2 100 % 2019 12:13 PM   Resp     Temp     Ht Rate 86 2019 12:11 PM   Temp 2           Electronically Signed By: Bradly Singh MD, 2019, 1:11 PM

## 2019-04-23 ENCOUNTER — RESULT FOLLOW UP (OUTPATIENT)
Dept: OBGYN | Facility: CLINIC | Age: 39
End: 2019-04-23

## 2019-04-23 ENCOUNTER — TELEPHONE (OUTPATIENT)
Dept: OBGYN | Facility: CLINIC | Age: 39
End: 2019-04-23

## 2019-04-23 ENCOUNTER — OFFICE VISIT (OUTPATIENT)
Dept: OBGYN | Facility: CLINIC | Age: 39
End: 2019-04-23
Payer: COMMERCIAL

## 2019-04-23 VITALS
WEIGHT: 170 LBS | BODY MASS INDEX: 27.44 KG/M2 | HEART RATE: 73 BPM | TEMPERATURE: 98.3 F | SYSTOLIC BLOOD PRESSURE: 111 MMHG | DIASTOLIC BLOOD PRESSURE: 77 MMHG

## 2019-04-23 DIAGNOSIS — N83.202 LEFT OVARIAN CYST: ICD-10-CM

## 2019-04-23 DIAGNOSIS — R87.810 CERVICAL HIGH RISK HPV (HUMAN PAPILLOMAVIRUS) TEST POSITIVE: ICD-10-CM

## 2019-04-23 DIAGNOSIS — Z12.4 PAP SMEAR FOR CERVICAL CANCER SCREENING: ICD-10-CM

## 2019-04-23 DIAGNOSIS — N93.9 ABNORMAL UTERINE BLEEDING: Primary | ICD-10-CM

## 2019-04-23 PROCEDURE — 99244 OFF/OP CNSLTJ NEW/EST MOD 40: CPT | Mod: 25 | Performed by: OBSTETRICS & GYNECOLOGY

## 2019-04-23 PROCEDURE — G0145 SCR C/V CYTO,THINLAYER,RESCR: HCPCS | Performed by: OBSTETRICS & GYNECOLOGY

## 2019-04-23 PROCEDURE — 88305 TISSUE EXAM BY PATHOLOGIST: CPT | Performed by: OBSTETRICS & GYNECOLOGY

## 2019-04-23 PROCEDURE — 58100 BIOPSY OF UTERUS LINING: CPT | Performed by: OBSTETRICS & GYNECOLOGY

## 2019-04-23 PROCEDURE — G0476 HPV COMBO ASSAY CA SCREEN: HCPCS | Performed by: OBSTETRICS & GYNECOLOGY

## 2019-04-23 NOTE — TELEPHONE ENCOUNTER
Dr Payton wanted you to have some thyroid lab testing, order placed in chart will go to [facility] tomorrow 04/24/2019 to complete  Cordelia Rodgers

## 2019-04-23 NOTE — PROGRESS NOTES
Ms. Remedios Watts 38 year old P4 (SVDx4) presents for referral to address heavy menstrual bleeding.   Referred by Agata Capps MD of Hematology Oncology   She reports having a chronic history of heavy menses that is also prolonged.   She reports that duration of menses is about 14 days long; each day is heavy flow with passage of blood clots. Over the past 3-4 months, the flow of her periods have gotten much heavier. She states that she has about a week of reprieve from bleeding before the next onset of menses happens again.   She has used combined oral contraceptive pills in the past and during this ordeal of heavy bleeding without resolution or improvement of it. She actually stopped taking birth control pills recently. Her last menstrual period was 3/20.     She was also see in the ER recently for RLQ pain and imaging performed as part of her evaluation revealed two simple small ovarian cysts on her left ovary. She inquires about the likelihood of ovarian cancer.     Of note, has history of colon cancer (poorly differentiated adenocarcinoma) which was diagnosed on 3/22 s/p laparoscopic extended right hemicolectomy. She will be starting chemotherapy in the near future.     Past Medical History:   Diagnosis Date     Chickenpox      Diarrhea      Group B streptococcus urinary tract infection complicating pregnancy 4/20/2011    Plan PCN in labor      History of postpartum depression, currently pregnant 10/21/2010     Malignant melanoma (H)      Postpartum depression 12/17/2008     Tailbone injury     fx     Urinary tract infections      Past Surgical History:   Procedure Laterality Date     C ORAL SURGERY PROCEDURE       COLONOSCOPY N/A 3/11/2019    Procedure: COMBINED COLONOSCOPY, SINGLE OR MULTIPLE BIOPSY/POLYPECTOMY BY BIOPSY;  Surgeon: Loi Maldonado MD;  Location: [facility]     INSERT PORT VASCULAR ACCESS Right 4/22/2019    Procedure: Central venous Chest Port Placement - right;  Surgeon: Didier Frazier,  JOSEPHINE;  Location: [facility] OR     IR CHEST PORT PLACEMENT > 5 YRS OF AGE  4/22/2019     LAPAROSCOPIC ASSISTED COLECTOMY Right 3/22/2019    Procedure: Laparoscopic Extended Right Hemicolectomy and appendectomy;  Surgeon: Linda Quesada MD;  Location: [facility] OR     Current Outpatient Medications   Medication     acetaminophen (TYLENOL) 325 MG tablet     enoxaparin (LOVENOX) 40 MG/0.4ML syringe     FLUoxetine (PROZAC) 10 MG capsule     LORazepam (ATIVAN) 0.5 MG tablet     norethindrone-ethinyl estradiol (ORTHO-NOVUM 7/7/7, 28,) 0.5/0.75/1-35 MG-MCG per tablet     tretinoin (RETIN-A) 0.05 % external cream     No current facility-administered medications for this visit.       Allergies   Allergen Reactions     Nka [No Known Allergies]      Social History     Tobacco Use     Smoking status: Never Smoker     Smokeless tobacco: Never Used   Substance Use Topics     Alcohol use: Yes     Frequency: Monthly or less     Drinks per session: 1 or 2     Binge frequency: Less than monthly     Drug use: No     Family History   Problem Relation Age of Onset     Arthritis Mother      Neurologic Disorder Mother         had a seizure      Alcohol/Drug Mother      Arthritis Maternal Grandmother      Breast Cancer Maternal Grandmother      Melanoma Maternal Grandmother      Heart Disease Paternal Grandmother      Cancer Father         skin     C: NEGATIVE for fever, chills, change in weight  HEENT: NEGATIVE for visual changes, runny nose, epistaxis, ear pain, tinnitus, tooth ache, sore throat, difficulty with swallowing, sinus pain  R: NEGATIVE for significant cough or SOB  CV: NEGATIVE for chest pain, palpitations or peripheral edema  BREAST: NEGATIVE For soreness, pain, lumps or nipple discharge  GI: NEGATIVE for nausea, abdominal pain, heartburn, or change in bowel habits  : NEGATIVE for frequency, dysuria, hematuria, vaginal discharge  Neuro: NEGATIVE for numbness, tingling, focal weakness, or headache  INT: NEGATIVE for  rashes, lesions or pruritis   PSYCH: NEGATIVE for anxiety, depression, or halluciinations    Exam:   /77 (BP Location: Right arm, Patient Position: Chair, Cuff Size: Adult Regular)   Pulse 73   Temp 98.3  F (36.8  C) (Tympanic)   Wt 77.1 kg (170 lb)   LMP 03/20/2019 (Approximate)   Breastfeeding? No   BMI 27.44 kg/m    General Appearance: Well nourished, well developed female, NAD, AOx3  Neurological: Mental Status Normal and Station and Gait Normal   Skin: Normal skin turgor  HEENT: Atraumatic, normocephalic, EOMI  Lungs: Good respiratory effort  Abdomen: Soft, NT, ND, no masses  Pelvic: Normal external female genitalia.  No external lesions, normal hair distribution, no adenopathy. Speculum exam reveals vaginal epithelium well rugated with no abnormal discharge. Cervix appears smooth, pink, with no visible lesions. Bimanual exam reveals normal size uterus, anteverted, non-tender, and mobile. No adnexal masses or tenderness. No cervical motion tenderness.   Extremities: No clubbing, no cyanosis and no edema    Procedure: Endometrial Biopsy  After informed consent was obtained from the patient, a speculum was placed in the vagina to visualize the cervix.  The cervix was then swabbed with a betadine prep.  Tenaculum was applied to the anterior cervical lip. Endometrial biopsy pipelle was passed through the cervical os and tissue obtained.  Uterus sounded to 8  cm.  Tenaculum was removed and sites were hemostatic. There were no complications. The patient tolerated the procedure well with a minimal amount of cramping noted.  Specimen was sent to pathology.    Imaging:   Pelvic US 4/2/2019  FINDINGS: The uterus measures 9.9 x 4.5 x 6.1 cm. No uterine mass  lesions are seen, and the endometrial stripe thickness is 1.1 cm. The  right ovary is identified and appears normal. The left ovary  demonstrates two simple cysts measuring 1.5 and 1.4 cm, likely  dominant follicles. There is no free fluid. Color flow  imaging and  Doppler waveform analysis show no evidence for ovarian torsion. No  free fluid.                                                                 IMPRESSION:  1. Two small left ovarian cysts.  2. Otherwise unremarkable transabdominal pelvic ultrasound.   PETER ALVARENGA MD    A/P: Abnormal uterine bleeding  -- reviewed ultrasound findings with reassurance provided that structural lesions as a cause can be ruled out  -- TSH, free T4 ordered to rule out endocrinopathy as an etiology for her abnormal uterine bleeding (elevated TSH but normal free T4 on 1/2018)   -- reviewed treatment options including conservative methods of birth control ie Mirena IUD vs surgical options ie endometrial ablation, hysterectomy along with associated risks, benefits, bleeding precautions and expectations  -- obviously, given her impending initiation of chemotherapy, electing for surgical management is not ideal time let alone the invasiveness of procedures without first attempting conservative methods of treatment ie IUD  -- endometrial biopsy recommended given prolonged duration of heavy menses for many years in addition to slim chance of possible Saenz syndrome association; post-biopsy bleeding precautions reviewed  -- at this time, patient elects to defer IUD insertion and will await whether menses will cease upon initiation of chemotherapy    Left ovarian cyst  -- given simple characterization of cysts, reassurance provided that cysts will likely resolve spontaneously; discussed repeat pelvic US in 6-8 weeks to affirm resolution of cysts  -- reviewed poor screening modalities of pelvic ultrasound and cancer marker testing (CA-125) for ovarian cancer diagnosis; given this, emphasized that negative affects of oophorectomy as a preventative measure which would include increased in all-cause mortality, osteoporosis would greatly outweigh the low yield benefit of ovarian cancer prevention  -- patient conveys understanding of  this     [facility] Yates MD  [facility]

## 2019-04-23 NOTE — NURSING NOTE
"Initial /77 (BP Location: Right arm, Patient Position: Chair, Cuff Size: Adult Regular)   Pulse 73   Temp 98.3  F (36.8  C) (Tympanic)   Wt 77.1 kg (170 lb)   LMP 03/20/2019 (Approximate)   Breastfeeding? No   BMI 27.44 kg/m   Estimated body mass index is 27.44 kg/m  as calculated from the following:    Height as of 4/22/19: 1.676 m (5' 6\").    Weight as of this encounter: 77.1 kg (170 lb). .    Cordelia Rodgers    "

## 2019-04-25 ENCOUNTER — APPOINTMENT (OUTPATIENT)
Dept: LAB | Facility: CLINIC | Age: 39
End: 2019-04-25
Attending: INTERNAL MEDICINE
Payer: COMMERCIAL

## 2019-04-25 ENCOUNTER — ONCOLOGY VISIT (OUTPATIENT)
Dept: ONCOLOGY | Facility: CLINIC | Age: 39
End: 2019-04-25
Attending: INTERNAL MEDICINE
Payer: COMMERCIAL

## 2019-04-25 ENCOUNTER — TELEPHONE (OUTPATIENT)
Dept: OBGYN | Facility: CLINIC | Age: 39
End: 2019-04-25

## 2019-04-25 VITALS
DIASTOLIC BLOOD PRESSURE: 75 MMHG | RESPIRATION RATE: 16 BRPM | HEART RATE: 67 BPM | TEMPERATURE: 98.5 F | BODY MASS INDEX: 27.54 KG/M2 | OXYGEN SATURATION: 100 % | SYSTOLIC BLOOD PRESSURE: 108 MMHG | WEIGHT: 170.6 LBS

## 2019-04-25 DIAGNOSIS — N93.9 ABNORMAL UTERINE BLEEDING: ICD-10-CM

## 2019-04-25 DIAGNOSIS — C18.3 MALIGNANT NEOPLASM OF HEPATIC FLEXURE (H): ICD-10-CM

## 2019-04-25 DIAGNOSIS — D50.0 IRON DEFICIENCY ANEMIA DUE TO CHRONIC BLOOD LOSS: ICD-10-CM

## 2019-04-25 LAB
COPATH REPORT: NORMAL
FERRITIN SERPL-MCNC: 8 NG/ML (ref 12–150)
IRON SATN MFR SERPL: 5 % (ref 15–46)
IRON SERPL-MCNC: 22 UG/DL (ref 35–180)
T4 FREE SERPL-MCNC: 2.9 NG/DL (ref 0.76–1.46)
TIBC SERPL-MCNC: 450 UG/DL (ref 240–430)
TSH SERPL DL<=0.005 MIU/L-ACNC: 3.83 MU/L (ref 0.4–4)

## 2019-04-25 PROCEDURE — 83540 ASSAY OF IRON: CPT | Performed by: OBSTETRICS & GYNECOLOGY

## 2019-04-25 PROCEDURE — 82728 ASSAY OF FERRITIN: CPT | Performed by: OBSTETRICS & GYNECOLOGY

## 2019-04-25 PROCEDURE — 84439 ASSAY OF FREE THYROXINE: CPT | Performed by: OBSTETRICS & GYNECOLOGY

## 2019-04-25 PROCEDURE — 84443 ASSAY THYROID STIM HORMONE: CPT | Performed by: OBSTETRICS & GYNECOLOGY

## 2019-04-25 PROCEDURE — 36415 COLL VENOUS BLD VENIPUNCTURE: CPT

## 2019-04-25 PROCEDURE — 99215 OFFICE O/P EST HI 40 MIN: CPT | Mod: ZP | Performed by: INTERNAL MEDICINE

## 2019-04-25 PROCEDURE — G0463 HOSPITAL OUTPT CLINIC VISIT: HCPCS | Mod: ZF

## 2019-04-25 PROCEDURE — 83550 IRON BINDING TEST: CPT | Performed by: OBSTETRICS & GYNECOLOGY

## 2019-04-25 RX ORDER — SODIUM CHLORIDE 9 MG/ML
1000 INJECTION, SOLUTION INTRAVENOUS CONTINUOUS PRN
Status: CANCELLED
Start: 2019-04-29

## 2019-04-25 RX ORDER — METHYLPREDNISOLONE SODIUM SUCCINATE 125 MG/2ML
125 INJECTION, POWDER, LYOPHILIZED, FOR SOLUTION INTRAMUSCULAR; INTRAVENOUS
Status: CANCELLED
Start: 2019-04-29

## 2019-04-25 RX ORDER — DIPHENHYDRAMINE HYDROCHLORIDE 50 MG/ML
50 INJECTION INTRAMUSCULAR; INTRAVENOUS
Status: CANCELLED
Start: 2019-04-29

## 2019-04-25 RX ORDER — MEPERIDINE HYDROCHLORIDE 25 MG/ML
25 INJECTION INTRAMUSCULAR; INTRAVENOUS; SUBCUTANEOUS EVERY 30 MIN PRN
Status: CANCELLED | OUTPATIENT
Start: 2019-04-29

## 2019-04-25 RX ORDER — ALBUTEROL SULFATE 90 UG/1
1-2 AEROSOL, METERED RESPIRATORY (INHALATION)
Status: CANCELLED
Start: 2019-04-29

## 2019-04-25 RX ORDER — EPINEPHRINE 1 MG/ML
0.3 INJECTION, SOLUTION INTRAMUSCULAR; SUBCUTANEOUS EVERY 5 MIN PRN
Status: CANCELLED | OUTPATIENT
Start: 2019-04-29

## 2019-04-25 RX ORDER — FLUOROURACIL 50 MG/ML
400 INJECTION, SOLUTION INTRAVENOUS ONCE
Status: CANCELLED | OUTPATIENT
Start: 2019-04-29

## 2019-04-25 RX ORDER — ALBUTEROL SULFATE 0.83 MG/ML
2.5 SOLUTION RESPIRATORY (INHALATION)
Status: CANCELLED | OUTPATIENT
Start: 2019-04-29

## 2019-04-25 RX ORDER — LORAZEPAM 2 MG/ML
0.5 INJECTION INTRAMUSCULAR EVERY 4 HOURS PRN
Status: CANCELLED
Start: 2019-04-29

## 2019-04-25 RX ORDER — EPINEPHRINE 0.3 MG/.3ML
0.3 INJECTION SUBCUTANEOUS EVERY 5 MIN PRN
Status: CANCELLED | OUTPATIENT
Start: 2019-04-29

## 2019-04-25 ASSESSMENT — PAIN SCALES - GENERAL: PAINLEVEL: MILD PAIN (2)

## 2019-04-25 NOTE — NURSING NOTE
"Oncology Rooming Note    April 25, 2019 12:53 PM   Remedios Watts is a 38 year old female who presents for:    Chief Complaint   Patient presents with     Oncology Clinic Visit     [facility] RETURN- COLONIC MASS     Initial Vitals: /75 (BP Location: Right arm, Patient Position: Sitting, Cuff Size: Adult Regular)   Pulse 67   Temp 98.5  F (36.9  C) (Oral)   Resp 16   Wt 77.4 kg (170 lb 9.6 oz)   SpO2 100%   BMI 27.54 kg/m   Estimated body mass index is 27.54 kg/m  as calculated from the following:    Height as of 4/22/19: 1.676 m (5' 6\").    Weight as of this encounter: 77.4 kg (170 lb 9.6 oz). Body surface area is 1.9 meters squared.  Mild Pain (2) Comment: Data Unavailable   No LMP recorded. (Menstrual status: Birth Control).  Allergies reviewed: Yes  Medications reviewed: Yes    Medications: Medication refills not needed today.  Pharmacy name entered into [facility]: [facility] - - [place], [place] - [id number] [facility]    Clinical concerns: No new concerns. Jefry was notified.      Ritchie Quezada LPN            "

## 2019-04-25 NOTE — PROGRESS NOTES
Oncology follow up visit:  Date on this visit: 4/25/2019     Remedios Watts  is referred by Dr.Benjamin Herbert Erazo for an oncology consultation. She requires evaluation for new diagnosis of colon cancer.    Primary Physician: Agustín Berger     History Of Present Illness:    Please see my previous note for details.  I have copied and updated from prior note.  Ms. Watts is a 38 year old female who was admitted to the hospital on 3/10/2019 for bowel obstruction due to hepatic flexure colon mass.  She had a CT scan done on 3/10/2019 which showed apple core lesion near the hepatic flexure of the colon concerning for an obstructing colonic neoplasm.  No enlarged lymph nodes were seen.  There is a tiny subcentimeter left lobe liver lesion which is too small to characterize.  A CT of the chest on 3/13/2019 did not show evidence of metastatic disease in the chest.    Colonoscopy on 3/11/2019 showed fungating, infiltrative, highly-friable and ulcerated large mass at the hepatic flexure/proximal transverse colon, unable to traverse this lesions as this appears to be near-completely obstructive in nature (though allows liquid stool/effluent to pass under visualization). The mass was partially circumferential. Biopsies was consistent with invasive moderately differentiated adenocarcinoma compatible with   colorectal primary and Mismatch repair enzymes were intact by immunohistochemistry.     On 3/22/2019 she had laparoscopic extended right hemicolectomy performed by Dr. Quesada.  Final pathology was consistent with a 3.7 cm hepatic flexure poorly differentiated adenocarcinoma with focal micropapillary growth extending through the muscularis propria and involving serosa (pT4).  There was lymphovascular invasion but no perineural invasion seen.  All margins were negative.  4 out of 38 lymph nodes were positive for metastatic carcinoma including 2 lymph nodes with micrometastasis.  Final staging was [id number].    She  recently went to emergency room for worsening right-sided abdominal pain and had CT chest abdomen and pelvis did not show any acute pathology or evidence of recurrence/metastasis. There were expected post operative changes. Labs including CBC/diff and CMP/lipase were unremarkable except for mild anemia. UA was negative. Pelvic US was also negative apart from 2 small left ovarian cysts.  She recently saw Gyn on 4/23/19 and had endometrial biopsy done- results are pending.    On last visit we had decided to give her some more time to recover and then start adjuvant FOLFOX.  She had a port placed.  She now feels much better and denies any abdominal pain.  She is been able to eat and drink well.  She does have some loose bowel movements and she is trying to manage this with diet.  Denies any fevers or infections.  No trouble breathing.  No neuropathy.  She feels a little tired.  She continues to have heavy menstrual bleeding and she recently saw GYN who did an endometrial biopsy and the results are pending.  She tells me that she started taking oral iron but it upset her stomach so now she is taking multivitamin with iron and tolerating it well.        ECOG 0    ROS:  Rest of the comprehensive review of the system was essentially unremarkable.    I reviewed other history in epic as below.    Past Medical/Surgical History:  Past Medical History:   Diagnosis Date     Chickenpox      Diarrhea      Group B streptococcus urinary tract infection complicating pregnancy 4/20/2011    Plan PCN in labor      History of postpartum depression, currently pregnant 10/21/2010     Malignant melanoma (H)      Postpartum depression 12/17/2008     Tailbone injury     fx     Urinary tract infections    History of melanoma to the left arm and back treated with excision.  She follows closely with dermatology.    Past Surgical History:   Procedure Laterality Date     C ORAL SURGERY PROCEDURE       COLONOSCOPY N/A 3/11/2019    Procedure:  COMBINED COLONOSCOPY, SINGLE OR MULTIPLE BIOPSY/POLYPECTOMY BY BIOPSY;  Surgeon: Loi Maldonado MD;  Location: [facility]     INSERT PORT VASCULAR ACCESS Right 4/22/2019    Procedure: Central venous Chest Port Placement - right;  Surgeon: Didier Frazier PA-C;  [facility]     IR CHEST PORT PLACEMENT > 5 YRS OF AGE  4/22/2019     LAPAROSCOPIC ASSISTED COLECTOMY Right 3/22/2019    Procedure: Laparoscopic Extended Right Hemicolectomy and appendectomy;  Surgeon: Linda Quesada MD;  Location: [facility] OR     Cancer History:   As above    Allergies:  Allergies as of 04/25/2019 - Reviewed 04/25/2019   Allergen Reaction Noted     Nka [no known allergies]  03/15/2005     Current Medications:  Current Outpatient Medications   Medication Sig Dispense Refill     acetaminophen (TYLENOL) 325 MG tablet Take 3 tablets (975 mg) by mouth every 8 hours as needed for mild pain 100 tablet 0     FLUoxetine (PROZAC) 10 MG capsule Take 1 capsule (10 mg) by mouth daily 30 capsule 1     tretinoin (RETIN-A) 0.05 % external cream Apply topically At Bedtime 45 g 3     LORazepam (ATIVAN) 0.5 MG tablet Take 1 tablet (0.5 mg) by mouth nightly as needed for sleep (Patient not taking: Reported on 4/23/2019) 10 tablet 0      Family History:  Family History   Problem Relation Age of Onset     Arthritis Mother      Neurologic Disorder Mother         had a seizure      Alcohol/Drug Mother      Arthritis Maternal Grandmother      Breast Cancer Maternal Grandmother      Melanoma Maternal Grandmother      Heart Disease Paternal Grandmother      Cancer Father         skin   Maternal grandmother had breast cancer in her late 50s.  She also had a skin melanoma in her 70s.  Father had a skin melanoma in his 50s.  He had prostate cancer in his 60s.  Paternal Uncle had kidney cancer in 50s.  She has 4 children and the youngest is 7 years old and all are healthy.  Social History:  Social History     Socioeconomic History     Marital status:       Spouse name: Not on file     Number of children: 3     Years of education: Not on file     Highest education level: Not on file   Occupational History     Employer: [employer]   Social Needs     Financial resource strain: Not on file     Food insecurity:     Worry: Not on file     Inability: Not on file     Transportation needs:     Medical: Not on file     Non-medical: Not on file   Tobacco Use     Smoking status: Never Smoker     Smokeless tobacco: Never Used   Substance and Sexual Activity     Alcohol use: Yes     Frequency: Monthly or less     Drinks per session: 1 or 2     Binge frequency: Less than monthly     Drug use: No     Sexual activity: Yes     Partners: Male     Birth control/protection: Pill   Lifestyle     Physical activity:     Days per week: Not on file     Minutes per session: Not on file     Stress: Not on file   Relationships     Social connections:     Talks on phone: Not on file     Gets together: Not on file     Attends Protestant service: Not on file     Active member of club or organization: Not on file     Attends meetings of clubs or organizations: Not on file     Relationship status: Not on file     Intimate partner violence:     Fear of current or ex partner: Not on file     Emotionally abused: Not on file     Physically abused: Not on file     Forced sexual activity: Not on file   Other Topics Concern     Parent/sibling w/ CABG, MI or angioplasty before 65F 55M? Yes     Comment: pt had MI @ age 58   Social History Narrative     Not on file   She denies any smoking.  She drinks alcohol occasionally.  She lives with her family.  She is a / by profession.  Physical Exam:  /75 (BP Location: Right arm, Patient Position: Sitting, Cuff Size: Adult Regular)   Pulse 67   Temp 98.5  F (36.9  C) (Oral)   Resp 16   Wt 77.4 kg (170 lb 9.6 oz)   SpO2 100%   BMI 27.54 kg/m    CONSTITUTIONAL: No apparent distress  EYES: PERRLA, without  pallor or jaundice  ENT/MOUTH: Ears unremarkable. No oral lesions  CVS: s1s2 normal  RESPIRATORY: Chest is clear  GI: Abdomen is benign  NEURO: Alert and oriented ×3  INTEGUMENT: no concerning skin rashes.  Port site is clean  LYMPHATIC: no palpable lymphadenopathy  MUSCULOSKELETAL: Unremarkable. No bony tenderness.   EXTREMITIES: no pedal edema  PSYCH: Mentation, mood and affect are appropriate      Laboratory/Imaging Studies  Reviewed   Results for MARK VÁZQUEZ (MRN [id number]) as of 4/25/2019 12:40   Ref. Range 3/11/2019 06:00   CEA Latest Ref Range: 0 - 2.5 ug/L 9.2 (H)       ASSESSMENT/PLAN:    Stage IIIC poorly differentiated [id number] adenocarcinoma of the hepatic flexure -s/p laparoscopic right hemicolectomy on 3/22/19. All margins negative. 4/38 LNs positive. MSI-Intact.  Baseline CEA 9.2 on 3/11/2019.  She has recovered well from the surgery and we will start adjuvant FOLFOX on 4/29/2019.  She had her port placed.    Discussion regarding surveillance.  We again discussed that we will do a repeat CT scan after completing adjuvant FOLFOX and then every 6 months for at least a couple of years.  She will need a colonoscopy in March 2020.       left lobe of the liver lesion.  This is very tiny about 5 mm in size and indeterminate.  I reviewed the scans with the radiologist.  We discussed that the best approach would be to wait and watch because any other imaging like MRI or PET scan will not be able to definitely tell us whether this is malignant or not.  We discussed that we will repeat CT scan after completing the adjuvant chemotherapy.    Iron deficiency anemia.  This is in the setting of heavy menstrual bleeding.  We have repeated her iron studies and if she is found to have significant iron deficiency then I recommend giving her IV iron with INFeD.  We discussed the rational schedule and potential side effects of it and we discussed that we will decide about it later.  She was seen by GYN and had had  endometrial biopsy done and the results are pending.  She wants to see if with chemotherapy her menstrual bleeding gets better.    At this time she will continue the multivitamin with oral iron.              Genetic Testing.  We again discussed that she should follow-up with genetic counselor because of young age of presentation of colon cancer and past hx of skin melanoma.  She has an appointment in October.    We did not address the following today but we discussed this on the first visit.  Discussion regarding testing family members.  Since she developed colon cancer at the age of 38, I recommend that her immediate family members should start screening for colorectal cancer 10 years prior to her age of development which would be at the age of 28.    Discussion regarding fertility preservation.  We discussed that there is a chance that chemotherapy can make her infertile.  I told her that I can give her resources regarding fertility preservation but she is not interested in fertility preservation.    She will return to clinic 2 weeks after start of chemotherapy to see Melinda for cycle #2    All questions answered.  She is agreeable and comfortable with the plan.      Agata Capps

## 2019-04-25 NOTE — LETTER
4/25/2019       RE: Remedios Watts  [address] [place] [place] [address]     Dear Colleague,    Thank you for referring your patient, Remedios Watts, to the [facility]. Please see a copy of my visit note below.    Oncology follow up visit:  Date on this visit: 4/25/2019     Remedios Watts  is referred by Dr.Benjamin Herbert Erazo for an oncology consultation. She requires evaluation for new diagnosis of colon cancer.    Primary Physician: Agustín Berger     History Of Present Illness:    Please see my previous note for details.  I have copied and updated from prior note.  Ms. Watts is a 38 year old female who was admitted to the hospital on 3/10/2019 for bowel obstruction due to hepatic flexure colon mass.  She had a CT scan done on 3/10/2019 which showed apple core lesion near the hepatic flexure of the colon concerning for an obstructing colonic neoplasm.  No enlarged lymph nodes were seen.  There is a tiny subcentimeter left lobe liver lesion which is too small to characterize.  A CT of the chest on 3/13/2019 did not show evidence of metastatic disease in the chest.    Colonoscopy on 3/11/2019 showed fungating, infiltrative, highly-friable and ulcerated large mass at the hepatic flexure/proximal transverse colon, unable to traverse this lesions as this appears to be near-completely obstructive in nature (though allows liquid stool/effluent to pass under visualization). The mass was partially circumferential. Biopsies was consistent with invasive moderately differentiated adenocarcinoma compatible with   colorectal primary and Mismatch repair enzymes were intact by immunohistochemistry.     On 3/22/2019 she had laparoscopic extended right hemicolectomy performed by Dr. Quesada.  Final pathology was consistent with a 3.7 cm hepatic flexure poorly differentiated adenocarcinoma with focal micropapillary growth extending through the muscularis propria and involving serosa (pT4).  There  was lymphovascular invasion but no perineural invasion seen.  All margins were negative.  4 out of 38 lymph nodes were positive for metastatic carcinoma including 2 lymph nodes with micrometastasis.  Final staging was [id number].    She recently went to emergency room for worsening right-sided abdominal pain and had CT chest abdomen and pelvis did not show any acute pathology or evidence of recurrence/metastasis. There were expected post operative changes. Labs including CBC/diff and CMP/lipase were unremarkable except for mild anemia. UA was negative. Pelvic US was also negative apart from 2 small left ovarian cysts.  She recently saw Gyn on 4/23/19 and had endometrial biopsy done- results are pending.    On last visit we had decided to give her some more time to recover and then start adjuvant FOLFOX.  She had a port placed.  She now feels much better and denies any abdominal pain.  She is been able to eat and drink well.  She does have some loose bowel movements and she is trying to manage this with diet.  Denies any fevers or infections.  No trouble breathing.  No neuropathy.  She feels a little tired.  She continues to have heavy menstrual bleeding and she recently saw GYN who did an endometrial biopsy and the results are pending.  She tells me that she started taking oral iron but it upset her stomach so now she is taking multivitamin with iron and tolerating it well.        ECOG 0    ROS:  Rest of the comprehensive review of the system was essentially unremarkable.    I reviewed other history in epic as below.    Past Medical/Surgical History:  Past Medical History:   Diagnosis Date     Chickenpox      Diarrhea      Group B streptococcus urinary tract infection complicating pregnancy 4/20/2011    Plan PCN in labor      History of postpartum depression, currently pregnant 10/21/2010     Malignant melanoma (H)      Postpartum depression 12/17/2008     Tailbone injury     fx     Urinary tract infections    History  of melanoma to the left arm and back treated with excision.  She follows closely with dermatology.    Past Surgical History:   Procedure Laterality Date     C ORAL SURGERY PROCEDURE       COLONOSCOPY N/A 3/11/2019    Procedure: COMBINED COLONOSCOPY, SINGLE OR MULTIPLE BIOPSY/POLYPECTOMY BY BIOPSY;  Surgeon: Loi Maldonado MD;  Location: [facility]     INSERT PORT VASCULAR ACCESS Right 4/22/2019    Procedure: Central venous Chest Port Placement - right;  Surgeon: Didier Frazier PA-C;  [facility]     IR CHEST PORT PLACEMENT > 5 YRS OF AGE  4/22/2019     LAPAROSCOPIC ASSISTED COLECTOMY Right 3/22/2019    Procedure: Laparoscopic Extended Right Hemicolectomy and appendectomy;  Surgeon: Linda Quesada MD;  Location: [facility] OR     Cancer History:   As above    Allergies:  Allergies as of 04/25/2019 - Reviewed 04/25/2019   Allergen Reaction Noted     Nka [no known allergies]  03/15/2005     Current Medications:  Current Outpatient Medications   Medication Sig Dispense Refill     acetaminophen (TYLENOL) 325 MG tablet Take 3 tablets (975 mg) by mouth every 8 hours as needed for mild pain 100 tablet 0     FLUoxetine (PROZAC) 10 MG capsule Take 1 capsule (10 mg) by mouth daily 30 capsule 1     tretinoin (RETIN-A) 0.05 % external cream Apply topically At Bedtime 45 g 3     LORazepam (ATIVAN) 0.5 MG tablet Take 1 tablet (0.5 mg) by mouth nightly as needed for sleep (Patient not taking: Reported on 4/23/2019) 10 tablet 0      Family History:  Family History   Problem Relation Age of Onset     Arthritis Mother      Neurologic Disorder Mother         had a seizure      Alcohol/Drug Mother      Arthritis Maternal Grandmother      Breast Cancer Maternal Grandmother      Melanoma Maternal Grandmother      Heart Disease Paternal Grandmother      Cancer Father         skin   Maternal grandmother had breast cancer in her late 50s.  She also had a skin melanoma in her 70s.  Father had a skin melanoma in his 50s.  He  had prostate cancer in his 60s.  Paternal Uncle had kidney cancer in 50s.  She has 4 children and the youngest is 7 years old and all are healthy.  Social History:  Social History     Socioeconomic History     Marital status:      Spouse name: Not on file     Number of children: 3     Years of education: Not on file     Highest education level: Not on file   Occupational History     Employer: [employer]   Social Needs     Financial resource strain: Not on file     Food insecurity:     Worry: Not on file     Inability: Not on file     Transportation needs:     Medical: Not on file     Non-medical: Not on file   Tobacco Use     Smoking status: Never Smoker     Smokeless tobacco: Never Used   Substance and Sexual Activity     Alcohol use: Yes     Frequency: Monthly or less     Drinks per session: 1 or 2     Binge frequency: Less than monthly     Drug use: No     Sexual activity: Yes     Partners: Male     Birth control/protection: Pill   Lifestyle     Physical activity:     Days per week: Not on file     Minutes per session: Not on file     Stress: Not on file   Relationships     Social connections:     Talks on phone: Not on file     Gets together: Not on file     Attends Episcopalian service: Not on file     Active member of club or organization: Not on file     Attends meetings of clubs or organizations: Not on file     Relationship status: Not on file     Intimate partner violence:     Fear of current or ex partner: Not on file     Emotionally abused: Not on file     Physically abused: Not on file     Forced sexual activity: Not on file   Other Topics Concern     Parent/sibling w/ CABG, MI or angioplasty before 65F 55M? Yes     Comment: pt had MI @ age 58   Social History Narrative     Not on file   She denies any smoking.  She drinks alcohol occasionally.  She lives with her family.  She is a / by profession.  Physical Exam:  /75 (BP Location: Right arm, Patient  Position: Sitting, Cuff Size: Adult Regular)   Pulse 67   Temp 98.5  F (36.9  C) (Oral)   Resp 16   Wt 77.4 kg (170 lb 9.6 oz)   SpO2 100%   BMI 27.54 kg/m     CONSTITUTIONAL: No apparent distress  EYES: PERRLA, without pallor or jaundice  ENT/MOUTH: Ears unremarkable. No oral lesions  CVS: s1s2 normal  RESPIRATORY: Chest is clear  GI: Abdomen is benign  NEURO: Alert and oriented ×3  INTEGUMENT: no concerning skin rashes.  Port site is clean  LYMPHATIC: no palpable lymphadenopathy  MUSCULOSKELETAL: Unremarkable. No bony tenderness.   EXTREMITIES: no pedal edema  PSYCH: Mentation, mood and affect are appropriate      Laboratory/Imaging Studies  Reviewed   Results for MARK VÁZQUEZ (MRN [id number]) as of 4/25/2019 12:40   Ref. Range 3/11/2019 06:00   CEA Latest Ref Range: 0 - 2.5 ug/L 9.2 (H)       ASSESSMENT/PLAN:    Stage IIIC poorly differentiated [id number] adenocarcinoma of the hepatic flexure -s/p laparoscopic right hemicolectomy on 3/22/19. All margins negative. 4/38 LNs positive. MSI-Intact.  Baseline CEA 9.2 on 3/11/2019.  She has recovered well from the surgery and we will start adjuvant FOLFOX on 4/29/2019.  She had her port placed.    Discussion regarding surveillance.  We again discussed that we will do a repeat CT scan after completing adjuvant FOLFOX and then every 6 months for at least a couple of years.  She will need a colonoscopy in March 2020.       left lobe of the liver lesion.  This is very tiny about 5 mm in size and indeterminate.  I reviewed the scans with the radiologist.  We discussed that the best approach would be to wait and watch because any other imaging like MRI or PET scan will not be able to definitely tell us whether this is malignant or not.  We discussed that we will repeat CT scan after completing the adjuvant chemotherapy.    Iron deficiency anemia.  This is in the setting of heavy menstrual bleeding.  We have repeated her iron studies and if she is found to have  significant iron deficiency then I recommend giving her IV iron with INFeD.  We discussed the rational schedule and potential side effects of it and we discussed that we will decide about it later.  She was seen by GYN and had had endometrial biopsy done and the results are pending.  She wants to see if with chemotherapy her menstrual bleeding gets better.    At this time she will continue the multivitamin with oral iron.              Genetic Testing.  We again discussed that she should follow-up with genetic counselor because of young age of presentation of colon cancer and past hx of skin melanoma.  She has an appointment in October.    We did not address the following today but we discussed this on the first visit.  Discussion regarding testing family members.  Since she developed colon cancer at the age of 38, I recommend that her immediate family members should start screening for colorectal cancer 10 years prior to her age of development which would be at the age of 28.    Discussion regarding fertility preservation.  We discussed that there is a chance that chemotherapy can make her infertile.  I told her that I can give her resources regarding fertility preservation but she is not interested in fertility preservation.    She will return to clinic 2 weeks after start of chemotherapy to see Melinda for cycle #2    All questions answered.  She is agreeable and comfortable with the plan.            Again, thank you for allowing me to participate in the care of your patient.      Sincerely,    Agata Capps MD

## 2019-04-25 NOTE — NURSING NOTE
Chief Complaint   Patient presents with     Oncology Clinic Visit     [facility] RETURN- COLONIC MASS     Blood Draw     labs drawn with vpt by rn.  vs taken     Labs drawn with vpt by rn.  Pt tolerated well.  VS taken.  Pt checked in for next appt.    Angelia Pascual RN

## 2019-04-25 NOTE — TELEPHONE ENCOUNTER
Return call to patient.  Spoke with patient on the phone.    S-(situation): patient reports bleeding similar to her menstrual period that started after the biopsy. Patient reports she is not saturating more than a pad per hour or passing large clots. Patient reports she is not dizzy or lightheaded. Patient reports occasional twinges of pain like she felt during the EMB. LMP 3/20/19 - patient reports she is not regular.    B-(background): EMB/PAP on 4/23/19    A-(assessment): bleeding, awaiting EMB/PAP results    R-(recommendations): Reassurance. Continue to monitor. Reviewed bleeding precautions and when to be seen and what is worrisome. Will call with results when available.    Pt in agreement and reports understanding.    Latia Pardo   [facility]  RN

## 2019-04-25 NOTE — TELEPHONE ENCOUNTER
Reason for Call:  Other     Detailed comments: Pt had a uterine biopsy on 04/23. She states she has not stopped bleeding since then and wants to know if that is normal or if this could be her menstrual cycle. Pt has a little bit more cramping than is normal with a menstrual cycle.  - Please advise    Phone Number Patient can be reached at: Home number on file [phone] (home)    Best Time: Any    Can we leave a detailed message on this number? YES    Call taken on 4/25/2019 at 10:00 AM by Denise Behrendt

## 2019-04-26 ENCOUNTER — TELEPHONE (OUTPATIENT)
Dept: ONCOLOGY | Facility: CLINIC | Age: 39
End: 2019-04-26

## 2019-04-26 ENCOUNTER — TELEPHONE (OUTPATIENT)
Dept: OBGYN | Facility: CLINIC | Age: 39
End: 2019-04-26

## 2019-04-26 LAB
COPATH REPORT: NORMAL
PAP: NORMAL

## 2019-04-26 NOTE — TELEPHONE ENCOUNTER
Reason for Call:  Request for results:    Name of test or procedure: Uterine biopsy    Date of test of procedure: 04/23/2019    Location of the test or procedure: [facility]    OK to leave the result message on voice mail or with a family member? YES    Phone number Patient can be reached at:  Home number on file [phone] (home)    Additional comments: NA    Call taken on 4/26/2019 at 3:05 PM by Denise Behrendt

## 2019-04-26 NOTE — TELEPHONE ENCOUNTER
Irvin Val  [facility]  Oncology Pharmacy Liaison   [email or web address]  [phone] (phone  [phone] (fax

## 2019-04-26 NOTE — TELEPHONE ENCOUNTER
Dr. Payton has not reviewed results of the endometrial biopsy done on 04-23-19.  He will be back in the clinic on Monday April 29th and will address this concern on his return.    Adelita Abraham  [facility] RN

## 2019-04-26 NOTE — TELEPHONE ENCOUNTER
Per Dr. Collins pathology normal.  Pt was advised.    Adelita Abraham  [facility] RN

## 2019-04-28 NOTE — RESULT ENCOUNTER NOTE
Inform patient that her thyroid hormone released from her thyroid gland is elevated. It would be in her best interest to go to an endocrinologist for further management of this. Please order referral for her. Thanks.     Tata Yates MD  [facility]

## 2019-04-29 ENCOUNTER — APPOINTMENT (OUTPATIENT)
Dept: LAB | Facility: CLINIC | Age: 39
End: 2019-04-29
Attending: INTERNAL MEDICINE
Payer: COMMERCIAL

## 2019-04-29 ENCOUNTER — HOME INFUSION (PRE-WILLOW HOME INFUSION) (OUTPATIENT)
Dept: PHARMACY | Facility: CLINIC | Age: 39
End: 2019-04-29

## 2019-04-29 ENCOUNTER — TELEPHONE (OUTPATIENT)
Dept: PHARMACY | Facility: CLINIC | Age: 39
End: 2019-04-29

## 2019-04-29 ENCOUNTER — INFUSION THERAPY VISIT (OUTPATIENT)
Dept: ONCOLOGY | Facility: CLINIC | Age: 39
End: 2019-04-29
Attending: INTERNAL MEDICINE
Payer: COMMERCIAL

## 2019-04-29 VITALS
SYSTOLIC BLOOD PRESSURE: 119 MMHG | WEIGHT: 170.2 LBS | OXYGEN SATURATION: 100 % | RESPIRATION RATE: 16 BRPM | TEMPERATURE: 98 F | BODY MASS INDEX: 27.47 KG/M2 | DIASTOLIC BLOOD PRESSURE: 71 MMHG | HEART RATE: 64 BPM

## 2019-04-29 DIAGNOSIS — C18.3 MALIGNANT NEOPLASM OF HEPATIC FLEXURE (H): ICD-10-CM

## 2019-04-29 DIAGNOSIS — R79.89 ELEVATED SERUM FREE T4 LEVEL: Primary | ICD-10-CM

## 2019-04-29 LAB
ALBUMIN SERPL-MCNC: 3.8 G/DL (ref 3.4–5)
ALP SERPL-CCNC: 86 U/L (ref 40–150)
ALT SERPL W P-5'-P-CCNC: 43 U/L (ref 0–50)
ANION GAP SERPL CALCULATED.3IONS-SCNC: 6 MMOL/L (ref 3–14)
AST SERPL W P-5'-P-CCNC: 21 U/L (ref 0–45)
BASOPHILS # BLD AUTO: 0 10E9/L (ref 0–0.2)
BASOPHILS NFR BLD AUTO: 0.5 %
BILIRUB SERPL-MCNC: 0.3 MG/DL (ref 0.2–1.3)
BUN SERPL-MCNC: 10 MG/DL (ref 7–30)
CALCIUM SERPL-MCNC: 9.2 MG/DL (ref 8.5–10.1)
CEA SERPL-MCNC: 1.4 UG/L (ref 0–2.5)
CHLORIDE SERPL-SCNC: 106 MMOL/L (ref 94–109)
CO2 SERPL-SCNC: 25 MMOL/L (ref 20–32)
CREAT SERPL-MCNC: 0.6 MG/DL (ref 0.52–1.04)
DIFFERENTIAL METHOD BLD: ABNORMAL
EOSINOPHIL # BLD AUTO: 0.1 10E9/L (ref 0–0.7)
EOSINOPHIL NFR BLD AUTO: 2.5 %
ERYTHROCYTE [DISTWIDTH] IN BLOOD BY AUTOMATED COUNT: 14.2 % (ref 10–15)
GFR SERPL CREATININE-BSD FRML MDRD: >90 ML/MIN/{1.73_M2}
GLUCOSE SERPL-MCNC: 86 MG/DL (ref 70–99)
HCT VFR BLD AUTO: 33.9 % (ref 35–47)
HGB BLD-MCNC: 10.3 G/DL (ref 11.7–15.7)
IMM GRANULOCYTES # BLD: 0 10E9/L (ref 0–0.4)
IMM GRANULOCYTES NFR BLD: 0.2 %
LYMPHOCYTES # BLD AUTO: 1.8 10E9/L (ref 0.8–5.3)
LYMPHOCYTES NFR BLD AUTO: 33 %
MCH RBC QN AUTO: 25.1 PG (ref 26.5–33)
MCHC RBC AUTO-ENTMCNC: 30.4 G/DL (ref 31.5–36.5)
MCV RBC AUTO: 83 FL (ref 78–100)
MONOCYTES # BLD AUTO: 0.6 10E9/L (ref 0–1.3)
MONOCYTES NFR BLD AUTO: 10.8 %
NEUTROPHILS # BLD AUTO: 2.9 10E9/L (ref 1.6–8.3)
NEUTROPHILS NFR BLD AUTO: 53 %
NRBC # BLD AUTO: 0 10*3/UL
NRBC BLD AUTO-RTO: 0 /100
PLATELET # BLD AUTO: 272 10E9/L (ref 150–450)
POTASSIUM SERPL-SCNC: 4 MMOL/L (ref 3.4–5.3)
PROT SERPL-MCNC: 7.3 G/DL (ref 6.8–8.8)
RBC # BLD AUTO: 4.11 10E12/L (ref 3.8–5.2)
SODIUM SERPL-SCNC: 137 MMOL/L (ref 133–144)
WBC # BLD AUTO: 5.6 10E9/L (ref 4–11)

## 2019-04-29 PROCEDURE — 96368 THER/DIAG CONCURRENT INF: CPT

## 2019-04-29 PROCEDURE — 96411 CHEMO IV PUSH ADDL DRUG: CPT

## 2019-04-29 PROCEDURE — 96413 CHEMO IV INFUSION 1 HR: CPT

## 2019-04-29 PROCEDURE — 96367 TX/PROPH/DG ADDL SEQ IV INF: CPT

## 2019-04-29 PROCEDURE — 80053 COMPREHEN METABOLIC PANEL: CPT | Performed by: INTERNAL MEDICINE

## 2019-04-29 PROCEDURE — 25800030 ZZH RX IP 258 OP 636: Mod: ZF | Performed by: INTERNAL MEDICINE

## 2019-04-29 PROCEDURE — 82378 CARCINOEMBRYONIC ANTIGEN: CPT | Performed by: INTERNAL MEDICINE

## 2019-04-29 PROCEDURE — G0498 CHEMO EXTEND IV INFUS W/PUMP: HCPCS

## 2019-04-29 PROCEDURE — 25000128 H RX IP 250 OP 636: Mod: ZF | Performed by: INTERNAL MEDICINE

## 2019-04-29 PROCEDURE — 96415 CHEMO IV INFUSION ADDL HR: CPT

## 2019-04-29 PROCEDURE — 85025 COMPLETE CBC W/AUTO DIFF WBC: CPT | Performed by: INTERNAL MEDICINE

## 2019-04-29 RX ORDER — ONDANSETRON 8 MG/1
8 TABLET, FILM COATED ORAL EVERY 8 HOURS PRN
Qty: 10 TABLET | Refills: 2 | Status: SHIPPED | OUTPATIENT
Start: 2019-04-29 | End: 2019-05-29

## 2019-04-29 RX ORDER — PROCHLORPERAZINE MALEATE 10 MG
10 TABLET ORAL EVERY 6 HOURS PRN
Qty: 30 TABLET | Refills: 2 | Status: CANCELLED | OUTPATIENT
Start: 2019-04-29

## 2019-04-29 RX ORDER — LIDOCAINE/PRILOCAINE 2.5 %-2.5%
CREAM (GRAM) TOPICAL PRN
Qty: 30 G | Refills: 0 | Status: SHIPPED | OUTPATIENT
Start: 2019-04-29 | End: 2020-01-07

## 2019-04-29 RX ORDER — FLUOROURACIL 50 MG/ML
400 INJECTION, SOLUTION INTRAVENOUS ONCE
Status: COMPLETED | OUTPATIENT
Start: 2019-04-29 | End: 2019-04-29

## 2019-04-29 RX ORDER — LORAZEPAM 0.5 MG/1
0.5 TABLET ORAL EVERY 4 HOURS PRN
Qty: 30 TABLET | Refills: 2 | Status: SHIPPED | OUTPATIENT
Start: 2019-04-29 | End: 2020-01-07

## 2019-04-29 RX ORDER — ONDANSETRON 8 MG/1
8 TABLET, FILM COATED ORAL EVERY 8 HOURS PRN
Qty: 10 TABLET | Refills: 2 | Status: SHIPPED | OUTPATIENT
Start: 2019-04-29 | End: 2019-04-29

## 2019-04-29 RX ORDER — HEPARIN SODIUM (PORCINE) LOCK FLUSH IV SOLN 100 UNIT/ML 100 UNIT/ML
5 SOLUTION INTRAVENOUS EVERY 8 HOURS
Status: DISCONTINUED | OUTPATIENT
Start: 2019-04-29 | End: 2019-04-29 | Stop reason: HOSPADM

## 2019-04-29 RX ORDER — ONDANSETRON 8 MG/1
8 TABLET, FILM COATED ORAL EVERY 8 HOURS PRN
Qty: 10 TABLET | Refills: 2 | Status: CANCELLED | OUTPATIENT
Start: 2019-04-29

## 2019-04-29 RX ORDER — PROCHLORPERAZINE MALEATE 10 MG
10 TABLET ORAL EVERY 6 HOURS PRN
Qty: 30 TABLET | Refills: 2 | Status: SHIPPED | OUTPATIENT
Start: 2019-04-29 | End: 2019-04-29

## 2019-04-29 RX ORDER — PROCHLORPERAZINE MALEATE 10 MG
10 TABLET ORAL EVERY 6 HOURS PRN
Qty: 30 TABLET | Refills: 2 | Status: SHIPPED | OUTPATIENT
Start: 2019-04-29 | End: 2019-06-10

## 2019-04-29 RX ORDER — LORAZEPAM 0.5 MG/1
0.5 TABLET ORAL EVERY 4 HOURS PRN
Qty: 30 TABLET | Refills: 2 | Status: CANCELLED | OUTPATIENT
Start: 2019-04-29

## 2019-04-29 RX ADMIN — OXALIPLATIN 150 MG: 100 INJECTION, SOLUTION, CONCENTRATE INTRAVENOUS at 13:12

## 2019-04-29 RX ADMIN — Medication 5 ML: at 11:48

## 2019-04-29 RX ADMIN — DEXTROSE MONOHYDRATE 250 ML: 50 INJECTION, SOLUTION INTRAVENOUS at 12:28

## 2019-04-29 RX ADMIN — DEXAMETHASONE SODIUM PHOSPHATE: 10 INJECTION, SOLUTION INTRAMUSCULAR; INTRAVENOUS at 12:34

## 2019-04-29 RX ADMIN — FLUOROURACIL 755 MG: 50 INJECTION, SOLUTION INTRAVENOUS at 15:18

## 2019-04-29 RX ADMIN — LEUCOVORIN CALCIUM 650 MG: 500 INJECTION, POWDER, LYOPHILIZED, FOR SOLUTION INTRAMUSCULAR; INTRAVENOUS at 13:09

## 2019-04-29 ASSESSMENT — PAIN SCALES - GENERAL: PAINLEVEL: NO PAIN (0)

## 2019-04-29 NOTE — NURSING NOTE
Chief Complaint   Patient presents with     Port Draw     accessed with power needle, haperin locked, vitals checked     Jade Garces RN on 4/29/2019 at 11:51 AM

## 2019-04-29 NOTE — PROGRESS NOTES
Infusion Nursing Note:  Remedios Watts presents today for C1D1 Oxaliplatin/Leucovorin/Fluorouracil push and pump connect .    Patient seen by provider today: No   present during visit today: Not Applicable.    Note: Patient reported to clinic with no new complaints.    Patient new to oncology infusion room and is receiving C1D1 Oxaliplatin/Leucovorin/Fluorouracil push and pump connect for the first time. Pt oriented to infusion room and call light. Chemotherapy teaching done previously by [facility].  Reinforced chemotherapy teaching/side effects and schedule during infusion visit.     Copy of AVS reviewed with patient. Pt instructed to call care coordinator, triage (or MD on call if after hours/weekends) with chills/temp >=100.4, questions/concerns. Pt stated understanding of plan.     Intravenous Access:  Implanted Port.    Treatment Conditions:  Lab Results   Component Value Date    HGB 10.3 04/29/2019     Lab Results   Component Value Date    WBC 5.6 04/29/2019      Lab Results   Component Value Date    ANEU 2.9 04/29/2019     Lab Results   Component Value Date     04/29/2019      Lab Results   Component Value Date     04/29/2019                   Lab Results   Component Value Date    POTASSIUM 4.0 04/29/2019           No results found for: MAG         Lab Results   Component Value Date    CR 0.60 04/29/2019                   Lab Results   Component Value Date    MANDY 9.2 04/29/2019                Lab Results   Component Value Date    BILITOTAL 0.3 04/29/2019           Lab Results   Component Value Date    ALBUMIN 3.8 04/29/2019                    Lab Results   Component Value Date    ALT 43 04/29/2019           Lab Results   Component Value Date    AST 21 04/29/2019       Results reviewed, labs MET treatment parameters, ok to proceed with treatment.      Post Infusion Assessment:  Patient tolerated infusion without incident.  Blood return noted pre and post infusion.  Site patent and intact,  "free from redness, edema or discomfort.  No evidence of extravasations.  Access discontinued per protocol.       Discharge Plan:   Prescription refills given for Compazine, Zofran, Ativan and EMLA cream.  Discharge instructions reviewed with: Patient and Family.  Patient and/or family verbalized understanding of discharge instructions and all questions answered.  Copy of AVS reviewed with patient and/or family.  Patient will return 5/13/19 for next appointment.  Patient discharged in stable condition accompanied by: self and .  Departure Mode: Ambulatory.  Face to Face time: 0 minutes.    Prior to discharge: Port is secured in place with tegaderm and flushed with 10cc NS with positive blood return noted.  Continuous home infusion C-Series pump connected.    All connectors secured in place and clamps taped open, checked by Kristin Garcia RN.    Pump started, \"running\" noted on display (CADD): NA.  Patient instructed to call our clinic or [facility] with any questions or concerns at home.  Patient verbalized understanding.    Patient set up for pump disconnect with [facility] on 5/1/19 9131.     Braxton Hewitt RN                      "

## 2019-04-29 NOTE — TELEPHONE ENCOUNTER
[facility] Prior Authorization Team   Phone: [phone]  Fax: [phone]  PA Initiation    Medication: Lidocaine-Prilocaine 2.5-2.5% EX CREA-pa pending  Insurance Company: [employer] PMAP - Phone [phone] Fax [phone]  Pharmacy Filling the Rx: [facility] [place], [place] - [address] [place] [address] [id number]  Filling Pharmacy Phone: [phone]  Filling Pharmacy Fax: [phone]  Start Date: 4/29/2019

## 2019-04-29 NOTE — RESULT ENCOUNTER NOTE
Pt notified of below.  Pt reports understanding.  Referral generated.  Pt does not have further questions or concerns.    Latia Pardo   [facility]  RN

## 2019-04-29 NOTE — PATIENT INSTRUCTIONS
[facility] Main Line: [phone]    Call triage nurse with chills and/or temperature greater than or equal to 100.4, uncontrolled nausea/vomiting, diarrhea, constipation, dizziness, shortness of breath, chest pain, bleeding, unexplained bruising, or any new/concerning symptoms, questions/concerns.   If you are having any concerning symptoms or wish to speak to a provider before your next infusion visit, please call your care coordinator or triage to notify them so we can adequately serve you.   Triage Nurse Line: [phone]    If after hours, weekends, or holidays [phone]             April 2019 Sunday Monday Tuesday Wednesday Thursday Friday Saturday        1     2    Admission  11:20 AM   Garland Harding MD   [facility] Emergency Department   (Discharge: 4/2/2019)    CT ABDOMEN PELVIS W  11:40 AM   (30 min.)   WYCT2   [facility] CT    POCUS                  12:10 PM   (5 min.)   WYPOCUS1   [facility] Point of Care Ultrasound    US PEL COMP W TRANSVAG DUP LTD   1:55 PM   (40 min.)   WYUS1   [facility] Ultrasound    CT CHEST WO   6:00 PM   (15 min.)   WYCT1   [facility] CT 3     4    UMP NEW   4:15 PM   (60 min.)   Agata Capps MD   [facility] 5    Admission   1:05 PM   Reena Sprague APRN [facility] Emergency Department   (Discharge: 4/5/2019) 6       7     8    UMP POST-OP  12:15 PM   (30 min.)   Krystin Ashton APRN [facility] Colon [facility] Rectal Surgery 9     10     11     12     13       14     15    MA DIAGNOSTIC DIGITAL BILAT  12:30 PM   (20 min.)   WYMA2   [facility] Imaging    US BREAST RT LMT 1-3 QUAD   1:00 PM   (30 min.)   WYUS2   [facility] Ultrasound    SHORT   3:40 PM   (20 min.)   Agustín Berger MD   [facility] 16     17     18     19     20       21     22    Outpatient Visit   9:12 AM   [facility]    IR CHEST PORT  PLACEMENT >5 YRS   9:15 AM   (75 min.)   [email or web address]   [facility] Imaging    INSERTION, VASCULAR ACCESS PORT  10:45 AM   Didier Frazier PA-C   [facility] OR 23    LONG   1:00 PM   (30 min.)   Tata Yates MD   [facility] 24     25    UMP [facility] LAB DRAW  12:30 PM   (15 min.)   [facility] DRAW   [facility] Lab Draw    UMP RETURN  12:45 PM   (30 min.)   Agata Capps MD   [facility] 26     27       28     29    [facility] LAB DRAW  11:00 AM   (15 min.)   [facility] DRAW   [facility] Lab Draw    UMP ONC INFUSION 240  11:30 AM   (240 min.)   UC ONCOLOGY INFUSION   [facility] 30    RETURN   2:15 PM   (15 min.)   Johnny Stevens MD   [facility]                                 May 2019      John Monday Tuesday Wednesday Thursday Friday Saturday                  1     2     3     4       5     6     7     8     9     10     11       12     13    [facility] LAB DRAW  10:45 AM   (15 min.)   [facility] DRAW   [facility] Lab Draw    UMP RETURN  10:55 AM   (50 min.)   Melinda Mayo PA-C   [facility]    UMP ONC INFUSION 240  12:00 PM   (240 min.)   [facility] ONCOLOGY INFUSION   [facility] 14     15    UMP RETURN  12:15 PM   (15 min.)   Linda Quesada MD   [facility] Colon and Rectal Surgery 16     17     18       19     20     21     22     23     24     25       26     27     28     29     30     31                          Lab Results:  Recent Results (from the past 12 hour(s))   *CBC with platelets differential    Collection Time: 04/29/19 11:49 AM   Result Value Ref Range    WBC 5.6 4.0 - 11.0 10e9/L    RBC Count 4.11 3.8 - 5.2 10e12/L    Hemoglobin 10.3 (L) 11.7 - 15.7 g/dL    Hematocrit 33.9 (L) 35.0 - 47.0 %    MCV 83 78 - 100 fl    MCH 25.1 (L) 26.5 - 33.0 pg    MCHC 30.4 (L) 31.5 - 36.5 g/dL    RDW 14.2 10.0 - 15.0 %    Platelet Count 272 150 -  450 10e9/L    Diff Method Automated Method     % Neutrophils 53.0 %    % Lymphocytes 33.0 %    % Monocytes 10.8 %    % Eosinophils 2.5 %    % Basophils 0.5 %    % Immature Granulocytes 0.2 %    Nucleated RBCs 0 0 /100    Absolute Neutrophil 2.9 1.6 - 8.3 10e9/L    Absolute Lymphocytes 1.8 0.8 - 5.3 10e9/L    Absolute Monocytes 0.6 0.0 - 1.3 10e9/L    Absolute Eosinophils 0.1 0.0 - 0.7 10e9/L    Absolute Basophils 0.0 0.0 - 0.2 10e9/L    Abs Immature Granulocytes 0.0 0 - 0.4 10e9/L    Absolute Nucleated RBC 0.0    Comprehensive metabolic panel    Collection Time: 04/29/19 11:49 AM   Result Value Ref Range    Sodium 137 133 - 144 mmol/L    Potassium 4.0 3.4 - 5.3 mmol/L    Chloride 106 94 - 109 mmol/L    Carbon Dioxide 25 20 - 32 mmol/L    Anion Gap 6 3 - 14 mmol/L    Glucose 86 70 - 99 mg/dL    Urea Nitrogen 10 7 - 30 mg/dL    Creatinine 0.60 0.52 - 1.04 mg/dL    GFR Estimate >90 >60 mL/min/[1.73_m2]    GFR Estimate If Black >90 >60 mL/min/[1.73_m2]    Calcium 9.2 8.5 - 10.1 mg/dL    Bilirubin Total 0.3 0.2 - 1.3 mg/dL    Albumin 3.8 3.4 - 5.0 g/dL    Protein Total 7.3 6.8 - 8.8 g/dL    Alkaline Phosphatase 86 40 - 150 U/L    ALT 43 0 - 50 U/L    AST 21 0 - 45 U/L

## 2019-04-30 ENCOUNTER — OFFICE VISIT (OUTPATIENT)
Dept: DERMATOLOGY | Facility: CLINIC | Age: 39
End: 2019-04-30
Payer: COMMERCIAL

## 2019-04-30 VITALS — DIASTOLIC BLOOD PRESSURE: 76 MMHG | OXYGEN SATURATION: 100 % | HEART RATE: 71 BPM | SYSTOLIC BLOOD PRESSURE: 115 MMHG

## 2019-04-30 DIAGNOSIS — D18.00 ANGIOMA: ICD-10-CM

## 2019-04-30 DIAGNOSIS — D22.9 NEVUS: ICD-10-CM

## 2019-04-30 DIAGNOSIS — L81.4 LENTIGO: ICD-10-CM

## 2019-04-30 DIAGNOSIS — L82.1 SEBORRHEIC KERATOSIS: ICD-10-CM

## 2019-04-30 DIAGNOSIS — Z85.820 HISTORY OF MELANOMA: Primary | ICD-10-CM

## 2019-04-30 PROBLEM — R87.810 CERVICAL HIGH RISK HPV (HUMAN PAPILLOMAVIRUS) TEST POSITIVE: Status: ACTIVE | Noted: 2019-04-23

## 2019-04-30 LAB
FINAL DIAGNOSIS: ABNORMAL
HPV HR 12 DNA CVX QL NAA+PROBE: POSITIVE
HPV16 DNA SPEC QL NAA+PROBE: NEGATIVE
HPV18 DNA SPEC QL NAA+PROBE: NEGATIVE
SPECIMEN DESCRIPTION: ABNORMAL
SPECIMEN SOURCE CVX/VAG CYTO: ABNORMAL

## 2019-04-30 PROCEDURE — 99213 OFFICE O/P EST LOW 20 MIN: CPT | Performed by: DERMATOLOGY

## 2019-04-30 NOTE — RESULT ENCOUNTER NOTE
Will forward to [facility] pool for pt notification of normal result.    Latia Pardo   [facility]  RN

## 2019-04-30 NOTE — RESULT ENCOUNTER NOTE
Inform patient that her endometrial biopsy returned benign.     Tata Yates MD  [facility]

## 2019-04-30 NOTE — LETTER
4/30/2019         RE: Remedios Watts  [address] [place] [address]        Dear Colleague,    Thank you for referring your patient, Remedios Watts, to the [facility]. Please see a copy of my visit note below.    Remedios Watts is a 38 year old year old female patient here today for hx of 0.45mm L arm. She was recently dx with colon cancer and started chemo.  She denies any new or changing skin lesions.  Patient reports the following modifying factors none.  Associated symptoms: none.  Patient has no other skin complaints today.  Remainder of the HPI, Meds, PMH, Allergies, FH, and SH was reviewed in chart.       Past Medical History        Past Medical History:   Diagnosis Date     Chickenpox       Diarrhea       Group B streptococcus urinary tract infection complicating pregnancy 4/20/2011     Plan PCN in labor      History of postpartum depression, currently pregnant 10/21/2010     Malignant melanoma (H)       Postpartum depression 12/17/2008     Tailbone injury       fx     Urinary tract infections              Past Surgical History         Past Surgical History:   Procedure Laterality Date     C ORAL SURGERY PROCEDURE                Family History         Family History   Problem Relation Age of Onset     Arthritis Mother       Neurologic Disorder Mother           had a seizure      Alcohol/Drug Mother       Arthritis Maternal Grandmother       Breast Cancer Maternal Grandmother       Melanoma Maternal Grandmother       Heart Disease Paternal Grandmother       Cancer Father           skin            Social History   Social History            Socioeconomic History     Marital status:        Spouse name: Not on file     Number of children: 3     Years of education: Not on file     Highest education level: Not on file   Social Needs     Financial resource strain: Not on file     Food insecurity - worry: Not on file     Food insecurity - inability: Not on file      Transportation needs - medical: Not on file     Transportation needs - non-medical: Not on file   Occupational History       Employer: [employer]   Tobacco Use     Smoking status: Never Smoker     Smokeless tobacco: Never Used   Substance and Sexual Activity     Alcohol use: Yes     Drug use: No     Sexual activity: Yes       Partners: Male       Birth control/protection: Pill   Other Topics Concern     Parent/sibling w/ CABG, MI or angioplasty before 65F 55M? Yes       Comment: pt had MI @ age 58   Social History Narrative     Not on file            Encounter Medications          Outpatient Encounter Medications as of 2019   Medication Sig Dispense Refill     citalopram (CELEXA) 40 MG tablet Take 1 tablet (40 mg) by mouth daily 90 tablet 3     norethindrone-ethinyl estradiol (ORTHO-NOVUM 7//, 28,) 0.5/0.75/1-35 MG-MCG per tablet Take 1 tablet by mouth daily 28 tablet 11     [] cefdinir (OMNICEF) 300 MG capsule Take 1 capsule (300 mg) by mouth 2 times daily for 10 days 20 capsule 0      No facility-administered encounter medications on file as of 2019.                      Review Of Systems  Skin: As above  Eyes: negative  Ears/Nose/Throat: negative  Respiratory: No shortness of breath, dyspnea on exertion, cough, or hemoptysis  Cardiovascular: negative  Gastrointestinal: negative  Genitourinary: negative  Musculoskeletal: negative  Neurologic: negative  Psychiatric: negative  Hematologic/Lymphatic/Immunologic: negative  Endocrine: negative        O:                         NAD, WDWN, Alert & Oriented, Mood & Affect wnl, Vitals stable                               Here today alone                               /82   Pulse 78   SpO2 100%                                General appearance normal                               Vitals stable                               Alert, oriented and in no acute distress                                                               Following lymph nodes palpated: Occipital, Cervical, Supraclavicular , axilla no lad                                  L arm well healed                               Stuck on papules and brown macules on trunk and ext   Red papules on trunk  Scaly papule on lip  Comedones on face  Pigmented macules with regular borders and pigment networks on trunka nd ext    port in chest    The remainder of the full exam was unremarkable; the following areas were examined:  conjunctiva/lids, oral mucosa, neck, peripheral vascular system, abdomen, lymph nodes, digits/nails, eccrine and apocrine glands, scalp/hair, face, neck, chest, abdomen, buttocks, back, RUE, LUE, RLE, LLE                                    Eyes: Conjunctivae/lids:Normal                                 ENT: Lips, buccal mucosa, tongue: normal                                MSK:Normal                                Cardiovascular: peripheral edema none                                Pulm: Breathing Normal                                Lymph Nodes: No Head and Neck Lymphadenopathy                                 Neuro/Psych: Orientation:Normal; Mood/Affect:Normal        A/P:  1. Seborrheic keratosis, lentigo, angioma, nevi , hx of melanoma   MELANOMA DISCUSSED WITH PATIENT:  I discussed the specifics of tumor, prognosis, metachronous melanoma, self exam, and genetics with the patient. I explained the need for monthly skin exams including and taught the patient how to do this. Patient was asked about new or changing moles . I discussed with patient signs and symptoms that could arise in the setting of recurrent locoregional or metastatic disease. In addition, the need to undergo every 4 month dermatologic full skin survey and evaluation given that patients with a diagnosis of melanoma are at risk of recurrence (local and distant) and of subsequent de casper melanoma.       BENIGN LESIONS DISCUSSED WITH PATIENT:  I discussed the specifics of tumor, prognosis, and  genetics of benign lesions.  I explained that treatment of these lesions would be purely cosmetic and not medically neccessary.  I discussed with patient different removal options including excision, cautery and /or laser.       Nature and genetics of benign skin lesions dicussed with patient.  Signs and Symptoms of skin cancer discussed with patient.  ABCDEs of melanoma reviewed with patient.  Patient encouraged to perform monthly skin exams.  UV precautions reviewed with patient.  Patient to follow up with Primary Care provider regarding elevated blood pressure.  Skin care regimen reviewed with patient: Eliminate harsh soaps, i.e. Dial, zest, irsih spring; Mild soaps such as Cetaphil or Dove sensitive skin, avoid hot or cold showers, aggressive use of emollients including vanicream, cetaphil or cerave discussed with patient.    Risks of non-melanoma skin cancer discussed with patient   Return to clinic 4 months                      Again, thank you for allowing me to participate in the care of your patient.        Sincerely,        Johnny Stevens MD

## 2019-04-30 NOTE — PROGRESS NOTES
Remedios Watts is a 38 year old year old female patient here today for hx of 0.45mm L arm. She was recently dx with colon cancer and started chemo.  She denies any new or changing skin lesions.  Patient reports the following modifying factors none.  Associated symptoms: none.  Patient has no other skin complaints today.  Remainder of the HPI, Meds, PMH, Allergies, FH, and SH was reviewed in chart.       Past Medical History        Past Medical History:   Diagnosis Date     Chickenpox       Diarrhea       Group B streptococcus urinary tract infection complicating pregnancy 4/20/2011     Plan PCN in labor      History of postpartum depression, currently pregnant 10/21/2010     Malignant melanoma (H)       Postpartum depression 12/17/2008     Tailbone injury       fx     Urinary tract infections              Past Surgical History         Past Surgical History:   Procedure Laterality Date     C ORAL SURGERY PROCEDURE                Family History         Family History   Problem Relation Age of Onset     Arthritis Mother       Neurologic Disorder Mother           had a seizure      Alcohol/Drug Mother       Arthritis Maternal Grandmother       Breast Cancer Maternal Grandmother       Melanoma Maternal Grandmother       Heart Disease Paternal Grandmother       Cancer Father           skin            Social History   Social History      Socioeconomic History     Marital status:        Spouse name: Not on file     Number of children: 3     Years of education: Not on file     Highest education level: Not on file   Social Needs     Financial resource strain: Not on file     Food insecurity - worry: Not on file     Food insecurity - inability: Not on file     Transportation needs - medical: Not on file     Transportation needs - non-medical: Not on file   Occupational History       Employer: [employer]   Tobacco Use     Smoking status: Never Smoker     Smokeless tobacco: Never Used    Substance and Sexual Activity     Alcohol use: Yes     Drug use: No     Sexual activity: Yes       Partners: Male       Birth control/protection: Pill   Other Topics Concern     Parent/sibling w/ CABG, MI or angioplasty before 65F 55M? Yes       Comment: pt had MI @ age 58   Social History Narrative     Not on file            Encounter Medications          Outpatient Encounter Medications as of 2019   Medication Sig Dispense Refill     citalopram (CELEXA) 40 MG tablet Take 1 tablet (40 mg) by mouth daily 90 tablet 3     norethindrone-ethinyl estradiol (ORTHO-NOVUM , 28,) 0.5/0.75/1-35 MG-MCG per tablet Take 1 tablet by mouth daily 28 tablet 11     [] cefdinir (OMNICEF) 300 MG capsule Take 1 capsule (300 mg) by mouth 2 times daily for 10 days 20 capsule 0      No facility-administered encounter medications on file as of 2019.                      Review Of Systems  Skin: As above  Eyes: negative  Ears/Nose/Throat: negative  Respiratory: No shortness of breath, dyspnea on exertion, cough, or hemoptysis  Cardiovascular: negative  Gastrointestinal: negative  Genitourinary: negative  Musculoskeletal: negative  Neurologic: negative  Psychiatric: negative  Hematologic/Lymphatic/Immunologic: negative  Endocrine: negative        O:                         NAD, WDWN, Alert & Oriented, Mood & Affect wnl, Vitals stable                               Here today alone                               /82   Pulse 78   SpO2 100%                                General appearance normal                               Vitals stable                               Alert, oriented and in no acute distress                                                              Following lymph nodes palpated: Occipital, Cervical, Supraclavicular , axilla no lad                                  L arm well healed                               Stuck on papules and brown macules on trunk and ext   Red papules on trunk  Scaly  papule on lip  Comedones on face  Pigmented macules with regular borders and pigment networks on trunka nd ext    port in chest    The remainder of the full exam was unremarkable; the following areas were examined:  conjunctiva/lids, oral mucosa, neck, peripheral vascular system, abdomen, lymph nodes, digits/nails, eccrine and apocrine glands, scalp/hair, face, neck, chest, abdomen, buttocks, back, RUE, LUE, RLE, LLE                                    Eyes: Conjunctivae/lids:Normal                                 ENT: Lips, buccal mucosa, tongue: normal                                MSK:Normal                                Cardiovascular: peripheral edema none                                Pulm: Breathing Normal                                Lymph Nodes: No Head and Neck Lymphadenopathy                                 Neuro/Psych: Orientation:Normal; Mood/Affect:Normal        A/P:  1. Seborrheic keratosis, lentigo, angioma, nevi , hx of melanoma   MELANOMA DISCUSSED WITH PATIENT:  I discussed the specifics of tumor, prognosis, metachronous melanoma, self exam, and genetics with the patient. I explained the need for monthly skin exams including and taught the patient how to do this. Patient was asked about new or changing moles . I discussed with patient signs and symptoms that could arise in the setting of recurrent locoregional or metastatic disease. In addition, the need to undergo every 4 month dermatologic full skin survey and evaluation given that patients with a diagnosis of melanoma are at risk of recurrence (local and distant) and of subsequent de casper melanoma.       BENIGN LESIONS DISCUSSED WITH PATIENT:  I discussed the specifics of tumor, prognosis, and genetics of benign lesions.  I explained that treatment of these lesions would be purely cosmetic and not medically neccessary.  I discussed with patient different removal options including excision, cautery and /or laser.       Nature and genetics of  benign skin lesions dicussed with patient.  Signs and Symptoms of skin cancer discussed with patient.  ABCDEs of melanoma reviewed with patient.  Patient encouraged to perform monthly skin exams.  UV precautions reviewed with patient.  Patient to follow up with Primary Care provider regarding elevated blood pressure.  Skin care regimen reviewed with patient: Eliminate harsh soaps, i.e. Dial, zest, irsih spring; Mild soaps such as Cetaphil or Dove sensitive skin, avoid hot or cold showers, aggressive use of emollients including vanicream, cetaphil or cerave discussed with patient.    Risks of non-melanoma skin cancer discussed with patient   Return to clinic 4 months

## 2019-04-30 NOTE — NURSING NOTE
"Initial /76   Pulse 71   SpO2 100%  Estimated body mass index is 27.47 kg/m  as calculated from the following:    Height as of 4/22/19: 1.676 m (5' 6\").    Weight as of 4/29/19: 77.2 kg (170 lb 3.2 oz). .      "

## 2019-04-30 NOTE — PROGRESS NOTES
4/23/19 NIL Pap, + HR HPV (Neg 16/18). Plan cotest in 1 year.   4/30/19 Pt notified.    Statement Selected

## 2019-04-30 NOTE — PROGRESS NOTES
This is a recent snapshot of the patient's [facility] medical record.  For current drug dose and complete information and questions, call [phone] or In Basket pool, [facility]v home infusion ([id number])  CSN Number:  [id number]

## 2019-04-30 NOTE — TELEPHONE ENCOUNTER
[facility] Prior Authorization Team   Phone: [phone]  Fax: [phone]  Prior Authorization Approval    Authorization Effective Date: 1/29/2019  Authorization Expiration Date: 4/29/2020  Medication: Lidocaine-Prilocaine 2.5-2.5% EX CREA-pa approved  Approved Dose/Quantity: up to to grams per day  Reference #: Key: [id number]   Insurance Company: [employer] - Phone [phone] Fax [phone]  Expected CoPay:       CoPay Card Available:      Foundation Assistance Needed:    Which Pharmacy is filling the prescription (Not needed for infusion/clinic administered): [facility] [place], [place] - [id number]  Pharmacy Notified: Yes  Patient Notified: Yes

## 2019-05-01 ENCOUNTER — TELEPHONE (OUTPATIENT)
Dept: ONCOLOGY | Facility: CLINIC | Age: 39
End: 2019-05-01

## 2019-05-01 ENCOUNTER — TELEPHONE (OUTPATIENT)
Dept: FAMILY MEDICINE | Facility: CLINIC | Age: 39
End: 2019-05-01

## 2019-05-01 DIAGNOSIS — E61.1 IRON DEFICIENCY: ICD-10-CM

## 2019-05-01 RX ORDER — ACETAMINOPHEN 325 MG/1
650 TABLET ORAL ONCE
Status: CANCELLED
Start: 2019-05-01

## 2019-05-01 RX ORDER — DIPHENHYDRAMINE HCL 25 MG
25 CAPSULE ORAL ONCE
Status: CANCELLED
Start: 2019-05-01

## 2019-05-01 NOTE — TELEPHONE ENCOUNTER
Reason for Call:  Pain medication    Detailed comments: patients  is calling and stating that his wife is in a lot of pain after her chemo, and they have contacted the Oncologist, but it has been days, and no one has gotten back to them. Last visit with Dr. Berger, he said if she needed anything for pain, he could help them. Please advise. In looking in her chart, I do not see any calls in Epic to her Oncologist.    Phone Number Patient can be reached at: Cell number on file:    Telephone Information:   Mobile [phone]       Best Time: any    Can we leave a detailed message on this number? YES   Paola Mendoza  [facility] [place] [facility]      Call taken on 5/1/2019 at 9:00 AM by Paola Mendoza

## 2019-05-01 NOTE — TELEPHONE ENCOUNTER
"Triage received call from pt's PCP clinic reporting patient contacted them as she has been unable to reach oncology department for \"multiple days\" regarding pain after chemo.     No notes in chart that patient has attempted to contact oncology clinic.     Writer attempted to contact patient on Mobile #, left detailed message with how to contact triage department directly. Requested patient call back to discuss symptoms.     Addendum 5/1/19 1442: Writer attempted to contact patient again. Left another  requesting pt call triage department to discuss symptoms.     Addendum 5/1/19 1508: Writer received call from  RN Keri #[phone] who reports she is just leaving patient's home. Writer confirmed pt's contact info with Keri. RN reports pt is having \"severe\" jaw pain and joint pain. Wondering if Dr. Capps would prescribe Tramadol to [facility]? Also wondering if Dr. Capps wants to re-check Iron/labs prior to next clinic visit? Writer will continue to wait for pt to return call. Discussed symptoms noted with Dr. Capps. Per Dr Capps, \"Please just assess that its not cardiac as 5FU can cause coronary vasospasm. If that's the case then she needs to go to ED right away. Oxaliplatin can also cause jaw discomfort. We can decide about management once we have better information.\"     Addendum 5/1/19 1629: Attempted to contact patient again. Left VM stating that if pt is having any cardiac symptoms she should proceed to ED right away. If not, requested pt contact triage during normal business hours to further discuss symptoms.  Routed to triage pool as writer will be out of clinic tomorrow.       Chiquis Suárez, RN   [facility]     "

## 2019-05-01 NOTE — TELEPHONE ENCOUNTER
Spoke with [facility] Oncology Triage RN that pt is having pain and needing to speak with them.  Also gave this # [phone] to the pt if they haven't called her back.  Isabel

## 2019-05-02 NOTE — TELEPHONE ENCOUNTER
"Patient called to report joint pain everywhere, sore, throbbing pain all over rated 6/10.  Also notes pain in jaw, in the joint when she eats (opening/closing her mouth) even if food is soft.  States she is taking Tylenol for pain 1000mg Q6H.  Denies any cardiac related symptoms, no chest pain, no pain radiating down her arms, etc.      Also, notes that she is having a facial spasm more-so on left side of her face (eye and mouth), states it will last for approximately 10 seconds each time.  Episodes can happen once an hour this morning or a couple times a day yesterday.      States she has been drinking approximately 8oz \"a couple times a day\" unable to state how many times a day.  Encouraged patient to drink more fluids, as this may be a side effect of being dehydrated.     Brittanie Cross RN BSN   [facility]  Nurse Coordinator     "

## 2019-05-02 NOTE — TELEPHONE ENCOUNTER
See oncology note.   calling to help wife with her pain.  Spoke with pt, given ,Oncologist msg, encouraged pt to call Oncology for plan of care.  KPavelRCECILIA

## 2019-05-03 ENCOUNTER — PATIENT OUTREACH (OUTPATIENT)
Dept: ONCOLOGY | Facility: CLINIC | Age: 39
End: 2019-05-03

## 2019-05-03 ENCOUNTER — ONCOLOGY VISIT (OUTPATIENT)
Dept: ONCOLOGY | Facility: CLINIC | Age: 39
End: 2019-05-03
Attending: PHYSICIAN ASSISTANT
Payer: COMMERCIAL

## 2019-05-03 VITALS
HEART RATE: 66 BPM | BODY MASS INDEX: 27.43 KG/M2 | SYSTOLIC BLOOD PRESSURE: 115 MMHG | TEMPERATURE: 98.2 F | RESPIRATION RATE: 14 BRPM | DIASTOLIC BLOOD PRESSURE: 79 MMHG | WEIGHT: 169.97 LBS | OXYGEN SATURATION: 100 %

## 2019-05-03 DIAGNOSIS — R11.0 NAUSEA: ICD-10-CM

## 2019-05-03 DIAGNOSIS — C18.3 MALIGNANT NEOPLASM OF HEPATIC FLEXURE (H): ICD-10-CM

## 2019-05-03 DIAGNOSIS — M62.838 MUSCLE SPASM: Primary | ICD-10-CM

## 2019-05-03 PROCEDURE — 99214 OFFICE O/P EST MOD 30 MIN: CPT | Mod: ZP | Performed by: PHYSICIAN ASSISTANT

## 2019-05-03 PROCEDURE — G0463 HOSPITAL OUTPT CLINIC VISIT: HCPCS

## 2019-05-03 RX ORDER — CYCLOBENZAPRINE HCL 5 MG
5-10 TABLET ORAL 3 TIMES DAILY PRN
Qty: 30 TABLET | Refills: 3 | Status: SHIPPED | OUTPATIENT
Start: 2019-05-03 | End: 2019-07-15

## 2019-05-03 RX ORDER — GRANISETRON HYDROCHLORIDE 1 MG/1
1 TABLET, FILM COATED ORAL EVERY 12 HOURS PRN
Qty: 60 TABLET | Refills: 3 | Status: SHIPPED | OUTPATIENT
Start: 2019-05-03 | End: 2020-01-07

## 2019-05-03 ASSESSMENT — PAIN SCALES - GENERAL: PAINLEVEL: SEVERE PAIN (6)

## 2019-05-03 NOTE — PROGRESS NOTES
"Received a transfer call from Christianne in scheduling who reports patient's  is on the phone and has been swearing at her, specifically using the \"F\" word.    Accepted transfer call that started out with Jere, pt's  yelling at writer. He demanded to know if writer had spoken to his wife and what was said. He continued yelling that he has \"been waiting for 3 days for her pain medication that the nurse in house said she needed\".  Explained that given her symptoms she needs to be assessed in clinic before medication can be ordered.  He began yelling at writer \"she has pain and you expect us to drive one hour just so I can tell you all the same information and leave 5 minutes later\". \"Anyone with a F--- brain in their head knows...\" at this point writer stopped Jere and informed him his language and behavior was inappropriate , and asked that he stop.  He continued to yell at writer with escalating language. Writer continued to explain that the Remedios needed to be seen and medication will not be prescribed until that time. Offered the option of having her seen locally at the [facility].   Writer frequently redirecting Jere ultimately saying, \"this is not negotiable, id she needs medication then  she needs to be seen by someone, either here in clinic, by PCP or at the ED. Will she be coming to clinic?\"  Jere responded with yelling and swearing at writer then stated he would be bringing her to clinic. Writer asked that he compose himself before coming to clinic and explained that his sweating and yelling at staff would not be tolerated in clinic, he responded with, \" Don't tell me how to act, I will so whatever the F--- I want in clinic and I\"m looking forward to seeing you\"  Writer notified supervisor and JEANNETTE  of phone conversation. Given the concern for continued inappropriate behavior in clinic, security has been called and will be available one floor at time of the appointment.  "

## 2019-05-03 NOTE — Clinical Note
5/3/2019       RE: Remedios Watts  [address] [place] [address]     Dear Colleague,    Thank you for referring your patient, Remedios Watts, to the [facility]. Please see a copy of my visit note below.    No notes on file    Again, thank you for allowing me to participate in the care of your patient.      Sincerely,    Melinda Mayo PA-C

## 2019-05-03 NOTE — TELEPHONE ENCOUNTER
Pt's  called triage department stating he is very upset that his wife has not recieved Tramadol Rx as requested and ordered by  RN. Writer explained to him that clinc attempted to contact pt multiple times Wednesday to discuss symptoms without a return call. Pt's  continued to express frustration, and hung up on writer.     [employer] message to Dr. Capps requesting Tramadol for pain. Writer discussed with EDMAR Conner who will discuss with Dr. Capps and contact patient directly.      Chiquis Suárez RN   [facility]

## 2019-05-03 NOTE — TELEPHONE ENCOUNTER
"Placed call to Remedios to discuss her symptoms and request for Tramadol. Discussed that Dr Capps requests that she be see for evaluation. Offered appointment with Melinda Mayo today.   Initially she did not want to come to clinic because she \"feel to bad to come in\". Discussed that some of her symptoms are concerning and with it being Friday it would be best to be seen today to determine what she needs.  She agreed to appointment at 11:10.  "

## 2019-05-03 NOTE — LETTER
5/3/2019      RE: Remedios Watts  [address] [place] [address]       Oncology follow up visit:  Date on this visit: May 3, 2019    Remedios Watts  is referred by Dr.Benjamin Herbert Erazo for an oncology consultation. She requires evaluation for colon cancer.    Primary Physician: Agustín Berger     History Of Present Illness:    Please see my previous note for details.  I have copied and updated from prior note.  Ms. Watts is a 38 year old female who was admitted to the hospital on 3/10/2019 for bowel obstruction due to hepatic flexure colon mass.  She had a CT scan done on 3/10/2019 which showed apple core lesion near the hepatic flexure of the colon concerning for an obstructing colonic neoplasm.  No enlarged lymph nodes were seen.  There is a tiny subcentimeter left lobe liver lesion which is too small to characterize.  A CT of the chest on 3/13/2019 did not show evidence of metastatic disease in the chest.    Colonoscopy on 3/11/2019 showed fungating, infiltrative, highly-friable and ulcerated large mass at the hepatic flexure/proximal transverse colon, unable to traverse this lesions as this appears to be near-completely obstructive in nature (though allows liquid stool/effluent to pass under visualization). The mass was partially circumferential. Biopsies was consistent with invasive moderately differentiated adenocarcinoma compatible with   colorectal primary and Mismatch repair enzymes were intact by immunohistochemistry.     On 3/22/2019 she had laparoscopic extended right hemicolectomy performed by Dr. Quesada.  Final pathology was consistent with a 3.7 cm hepatic flexure poorly differentiated adenocarcinoma with focal micropapillary growth extending through the muscularis propria and involving serosa (pT4).  There was lymphovascular invasion but no perineural invasion seen.  All margins were negative.  4 out of 38 lymph nodes were positive for metastatic carcinoma including 2 lymph  nodes with micrometastasis.  Final staging was [id number].    She recently went to emergency room for worsening right-sided abdominal pain and had CT chest abdomen and pelvis did not show any acute pathology or evidence of recurrence/metastasis. There were expected post operative changes. Labs including CBC/diff and CMP/lipase were unremarkable except for mild anemia. UA was negative. Pelvic US was also negative apart from 2 small left ovarian cysts.  She recently saw Gyn on 4/23/19 and had endometrial biopsy done which showed no diagnostic abnormalities identified, benign late secretory endometrium without atypia.    She started adjuvant FOLFOX on 4/29/19. She comes in today for assessment of jaw, neck, and back pain.      Interval History:  Patient reports that she developed bilateral jaw throbbing that particularly bothers her when she opens her mouth to eat.  She has also developed some neck and back pain.  She has been spending a lot of time in bed sleeping a lot due to fatigue following chemotherapy.  She has had some nausea which she has been managing with Zofran but has developed headaches.  She felt too tired after taking Compazine and Ativan so is not taking those.  She has been managing her pain with 1000 mg of Tylenol every 4 hours.  She reports that she has cold sensitivity, but denies any numbness or tingling separate from cold.  She did develop some diarrhea that started yesterday.  She has not taken anything for this.  She reports doing okay getting in fluids.  She denies other concerns.    Past Medical/Surgical History:  Past Medical History:   Diagnosis Date     Cervical high risk HPV (human papillomavirus) test positive 04/23/2019    see problem list     Chickenpox      Diarrhea      Group B streptococcus urinary tract infection complicating pregnancy 4/20/2011    Plan PCN in labor      History of postpartum depression, currently pregnant 10/21/2010     Malignant melanoma (H)      Postpartum  depression 12/17/2008     Tailbone injury     fx     Urinary tract infections    History of melanoma to the left arm and back treated with excision.  She follows closely with dermatology.    Past Surgical History:   Procedure Laterality Date     C ORAL SURGERY PROCEDURE       COLONOSCOPY N/A 3/11/2019    Procedure: COMBINED COLONOSCOPY, SINGLE OR MULTIPLE BIOPSY/POLYPECTOMY BY BIOPSY;  Surgeon: Loi Maldonado MD;  Location: [facility]     INSERT PORT VASCULAR ACCESS Right 4/22/2019    Procedure: Central venous Chest Port Placement - right;  Surgeon: Didier Frazier PA-C;  Location: [facility] OR     IR CHEST PORT PLACEMENT > 5 YRS OF AGE  4/22/2019     LAPAROSCOPIC ASSISTED COLECTOMY Right 3/22/2019    Procedure: Laparoscopic Extended Right Hemicolectomy and appendectomy;  Surgeon: iLnda Quesada MD;  Location: [facility] OR     Cancer History:   As above    Allergies:  Allergies as of 05/03/2019 - Reviewed 05/03/2019   Allergen Reaction Noted     Nka [no known allergies]  03/15/2005     Current Medications:  Current Outpatient Medications   Medication Sig Dispense Refill     acetaminophen (TYLENOL) 325 MG tablet Take 3 tablets (975 mg) by mouth every 8 hours as needed for mild pain 100 tablet 0     cyclobenzaprine (FLEXERIL) 5 MG tablet Take 1-2 tablets (5-10 mg) by mouth 3 times daily as needed for muscle spasms 30 tablet 3     FLUoxetine (PROZAC) 10 MG capsule Take 1 capsule (10 mg) by mouth daily 30 capsule 1     granisetron (KYTRIL) 1 MG tablet Take 1 tablet (1 mg) by mouth every 12 hours as needed for nausea 60 tablet 3     lidocaine-prilocaine (EMLA) 2.5-2.5 % external cream Apply topically as needed for moderate pain 30 g 0     LORazepam (ATIVAN) 0.5 MG tablet Take 1 tablet (0.5 mg) by mouth every 4 hours as needed (Anxiety, Nausea/Vomiting or Sleep) 30 tablet 2     LORazepam (ATIVAN) 0.5 MG tablet Take 1 tablet (0.5 mg) by mouth nightly as needed for sleep 10 tablet 0     ondansetron (ZOFRAN) 8 MG tablet  Take 1 tablet (8 mg) by mouth every 8 hours as needed (Nausea/Vomiting) 10 tablet 2     prochlorperazine (COMPAZINE) 10 MG tablet Take 1 tablet (10 mg) by mouth every 6 hours as needed (Nausea/Vomiting) 30 tablet 2     tretinoin (RETIN-A) 0.05 % external cream Apply topically At Bedtime 45 g 3      Family History:  Family History   Problem Relation Age of Onset     Arthritis Mother      Neurologic Disorder Mother         had a seizure      Alcohol/Drug Mother      Arthritis Maternal Grandmother      Breast Cancer Maternal Grandmother      Melanoma Maternal Grandmother      Heart Disease Paternal Grandmother      Cancer Father         skin   Maternal grandmother had breast cancer in her late 50s.  She also had a skin melanoma in her 70s.  Father had a skin melanoma in his 50s.  He had prostate cancer in his 60s.  Paternal Uncle had kidney cancer in 50s.  She has 4 children and the youngest is 7 years old and all are healthy.  Social History:  Social History     Socioeconomic History     Marital status:      Spouse name: Not on file     Number of children: 3     Years of education: Not on file     Highest education level: Not on file   Occupational History     Employer: [employer]   Social Needs     Financial resource strain: Not on file     Food insecurity:     Worry: Not on file     Inability: Not on file     Transportation needs:     Medical: Not on file     Non-medical: Not on file   Tobacco Use     Smoking status: Never Smoker     Smokeless tobacco: Never Used   Substance and Sexual Activity     Alcohol use: Yes     Frequency: Monthly or less     Drinks per session: 1 or 2     Binge frequency: Less than monthly     Drug use: No     Sexual activity: Yes     Partners: Male     Birth control/protection: Pill   Lifestyle     Physical activity:     Days per week: Not on file     Minutes per session: Not on file     Stress: Not on file   Relationships     Social connections:      Talks on phone: Not on file     Gets together: Not on file     Attends Faith service: Not on file     Active member of club or organization: Not on file     Attends meetings of clubs or organizations: Not on file     Relationship status: Not on file     Intimate partner violence:     Fear of current or ex partner: Not on file     Emotionally abused: Not on file     Physically abused: Not on file     Forced sexual activity: Not on file   Other Topics Concern     Parent/sibling w/ CABG, MI or angioplasty before 65F 55M? Yes     Comment: pt had MI @ age 58   Social History Narrative     Not on file   She denies any smoking.  She drinks alcohol occasionally.  She lives with her family.  She is a / by profession.    Physical Exam:  General: The patient is a pleasant female in no acute distress.  /79   Pulse 66   Temp 98.2  F (36.8  C) (Oral)   Resp 14   Wt 77.1 kg (169 lb 15.6 oz)   SpO2 100%   BMI 27.43 kg/m     Wt Readings from Last 10 Encounters:   05/03/19 77.1 kg (169 lb 15.6 oz)   04/29/19 77.2 kg (170 lb 3.2 oz)   04/25/19 77.4 kg (170 lb 9.6 oz)   04/23/19 77.1 kg (170 lb)   04/22/19 72.6 kg (160 lb)   04/15/19 75.8 kg (167 lb)   04/08/19 77.2 kg (170 lb 1.6 oz)   04/04/19 77 kg (169 lb 12.8 oz)   04/02/19 68 kg (150 lb)   03/23/19 76.7 kg (169 lb)   HEENT: EOMI, PERRL. Sclerae are anicteric. Oral mucosa is pink and moist with no lesions or thrush.   Lymph: Neck is supple with no lymphadenopathy in the cervical or supraclavicular areas.   Heart: Regular rate and rhythm.   Lungs: Clear to auscultation bilaterally.   Abdomen: Bowel sounds present, soft, nontender with no palpable hepatosplenomegaly or masses.   Extremities: No lower extremity edema noted bilaterally.   Neuro: Cranial nerves II through XII are grossly intact.  Skin: No rashes, petechiae, or bruising noted on exposed skin.  Musculoskeletal: Mild tenderness of neck muscles.    Laboratory/Imaging Studies  Reviewed     4/29/2019 11:49   Sodium 137   Potassium 4.0   Chloride 106   Carbon Dioxide 25   Urea Nitrogen 10   Creatinine 0.60   GFR Estimate >90   GFR Estimate If Black >90   Calcium 9.2   Anion Gap 6   Albumin 3.8   Protein Total 7.3   Bilirubin Total 0.3   Alkaline Phosphatase 86   ALT 43   AST 21   Glucose 86   WBC 5.6   Hemoglobin 10.3 (L)   Hematocrit 33.9 (L)   Platelet Count 272   RBC Count 4.11   MCV 83   MCH 25.1 (L)   MCHC 30.4 (L)   RDW 14.2   Diff Method Automated Method   % Neutrophils 53.0   % Lymphocytes 33.0   % Monocytes 10.8   % Eosinophils 2.5   % Basophils 0.5   % Immature Granulocytes 0.2   Nucleated RBCs 0   Absolute Neutrophil 2.9   Absolute Lymphocytes 1.8   Absolute Monocytes 0.6   Absolute Eosinophils 0.1   Absolute Basophils 0.0   Abs Immature Granulocytes 0.0   Absolute Nucleated RBC 0.0   CEA 1.4     ASSESSMENT/PLAN:    Jaw pain. Seems secondary to oxaliplatin. She is also have some neck and back spasms. She will try Flexeril for her symptoms.     Nausea. Secondary to chemotherapy. She developed headaches with Zofran and felt too tired from Compazine and Ativan. She will try Kytril in place of Zofran.     Diarrhea. Secondary to chemotherapy. She will try Imodium to take up to 8/day.     Stage IIIC poorly differentiated [id number] adenocarcinoma of the hepatic flexure -s/p laparoscopic right hemicolectomy on 3/22/19. All margins negative. 4/38 LNs positive. MSI-Intact.  Baseline CEA 9.2 on 3/11/2019. She started adjuvant FOLFOX on 4/29/2019. She has had some difficulty with muscle spasm, nausea, cold sensitivity, and diarrhea. She will return to see me prior to cycle 2. We will plan to repeat a CT CAP after completing chemotherapy and then every 6 months for 2 years. She will be due for a colonoscopy in March 2020.     Liver lesion. Indeterminate. Will f/u on this on her next imaging.     Iron deficiency anemia. History of heavy menses. Will receive IV Infed today. Recent endometrial biopsy on  4/23/19 was benign.     Genetic Testing.  Due to young age of presentation of colon cancer and past hx of skin melanoma, she will meet with our genetic counselor in October.    Melinda Mayo PA-C  [facility]  [address] [place] [id number] [phone]

## 2019-05-03 NOTE — NURSING NOTE
"Oncology Rooming Note    May 3, 2019 11:32 AM   Remedios Watts is a 38 year old female who presents for:    Chief Complaint   Patient presents with     Oncology Clinic Visit     Colon Ca , pain      Initial Vitals: /79   Pulse 66   Temp 98.2  F (36.8  C) (Oral)   Resp 14   Wt 77.1 kg (169 lb 15.6 oz)   SpO2 100%   BMI 27.43 kg/m   Estimated body mass index is 27.43 kg/m  as calculated from the following:    Height as of 4/22/19: 1.676 m (5' 6\").    Weight as of this encounter: 77.1 kg (169 lb 15.6 oz). Body surface area is 1.89 meters squared.  Severe Pain (6) Comment: Data Unavailable   No LMP recorded. (Menstrual status: Birth Control).  Allergies reviewed: Yes  Medications reviewed: Yes    Medications: Medication refills not needed today.  Pharmacy name entered into [facility]: [facility] - - [facility] [id number] [facility]    Clinical concerns: pain all over Maria Esther  was notified.      Leda Andino MA              "

## 2019-05-06 ENCOUNTER — PATIENT OUTREACH (OUTPATIENT)
Dept: ONCOLOGY | Facility: CLINIC | Age: 39
End: 2019-05-06

## 2019-05-06 ENCOUNTER — TELEPHONE (OUTPATIENT)
Dept: PHARMACY | Facility: CLINIC | Age: 39
End: 2019-05-06

## 2019-05-06 NOTE — PROGRESS NOTES
"Spoke to Remedios to see how she is feeling this week. She reports that she is feeling better than last week with the exception of having a \"knot in my back that moves sides\".  Discussed iron study results and that Dr Capps would like her to start IV infed. She is in agreement and would like to have this on the day of her next treatment.   Encouraged her to call with additional questions or concerns.  "

## 2019-05-06 NOTE — TELEPHONE ENCOUNTER
[facility] Prior Authorization Team   Phone: [phone]  Fax: [phone]  Prior Authorization Approval    Authorization Effective Date: 1/27/2019  Authorization Expiration Date: 4/27/2020  Medication: Lorazepam-pa approval  Approved Dose/Quantity: 30/5ds  Reference #:     Insurance Company: [employer] [place] - Phone [phone] Fax [phone]  Expected CoPay:       CoPay Card Available:      Foundation Assistance Needed:    Which Pharmacy is filling the prescription (Not needed for infusion/clinic administered):    Pharmacy Notified:    Patient Notified:

## 2019-05-08 ENCOUNTER — TELEPHONE (OUTPATIENT)
Dept: PHARMACY | Facility: CLINIC | Age: 39
End: 2019-05-08

## 2019-05-08 NOTE — TELEPHONE ENCOUNTER
[facility] Prior Authorization Team   Phone: [phone]  Fax: [phone]  PA Initiation    Medication: Granisetron HCl 1MG OR TABS-pa pending  Insurance Company: [employer] [place] - Phone [phone] Fax [phone]  Pharmacy Filling the Rx:    Filling Pharmacy Phone:    Filling Pharmacy Fax:    Start Date: 5/8/2019

## 2019-05-09 NOTE — PROGRESS NOTES
Oncology follow up visit:  Date on this visit: May 3, 2019    Remedios Watts  is referred by Dr.Benjamin Herbert Erazo for an oncology consultation. She requires evaluation for colon cancer.    Primary Physician: Agustín Berger     History Of Present Illness:    Please see my previous note for details.  I have copied and updated from prior note.  Ms. Watts is a 38 year old female who was admitted to the hospital on 3/10/2019 for bowel obstruction due to hepatic flexure colon mass.  She had a CT scan done on 3/10/2019 which showed apple core lesion near the hepatic flexure of the colon concerning for an obstructing colonic neoplasm.  No enlarged lymph nodes were seen.  There is a tiny subcentimeter left lobe liver lesion which is too small to characterize.  A CT of the chest on 3/13/2019 did not show evidence of metastatic disease in the chest.    Colonoscopy on 3/11/2019 showed fungating, infiltrative, highly-friable and ulcerated large mass at the hepatic flexure/proximal transverse colon, unable to traverse this lesions as this appears to be near-completely obstructive in nature (though allows liquid stool/effluent to pass under visualization). The mass was partially circumferential. Biopsies was consistent with invasive moderately differentiated adenocarcinoma compatible with   colorectal primary and Mismatch repair enzymes were intact by immunohistochemistry.     On 3/22/2019 she had laparoscopic extended right hemicolectomy performed by Dr. Quesada.  Final pathology was consistent with a 3.7 cm hepatic flexure poorly differentiated adenocarcinoma with focal micropapillary growth extending through the muscularis propria and involving serosa (pT4).  There was lymphovascular invasion but no perineural invasion seen.  All margins were negative.  4 out of 38 lymph nodes were positive for metastatic carcinoma including 2 lymph nodes with micrometastasis.  Final staging was [id number].    She recently went to  emergency room for worsening right-sided abdominal pain and had CT chest abdomen and pelvis did not show any acute pathology or evidence of recurrence/metastasis. There were expected post operative changes. Labs including CBC/diff and CMP/lipase were unremarkable except for mild anemia. UA was negative. Pelvic US was also negative apart from 2 small left ovarian cysts.  She recently saw Gyn on 4/23/19 and had endometrial biopsy done which showed no diagnostic abnormalities identified, benign late secretory endometrium without atypia.    She started adjuvant FOLFOX on 4/29/19. She comes in today for assessment of jaw, neck, and back pain.      Interval History:  Patient reports that she developed bilateral jaw throbbing that particularly bothers her when she opens her mouth to eat.  She has also developed some neck and back pain.  She has been spending a lot of time in bed sleeping a lot due to fatigue following chemotherapy.  She has had some nausea which she has been managing with Zofran but has developed headaches.  She felt too tired after taking Compazine and Ativan so is not taking those.  She has been managing her pain with 1000 mg of Tylenol every 4 hours.  She reports that she has cold sensitivity, but denies any numbness or tingling separate from cold.  She did develop some diarrhea that started yesterday.  She has not taken anything for this.  She reports doing okay getting in fluids.  She denies other concerns.    Past Medical/Surgical History:  Past Medical History:   Diagnosis Date     Cervical high risk HPV (human papillomavirus) test positive 04/23/2019    see problem list     Chickenpox      Diarrhea      Group B streptococcus urinary tract infection complicating pregnancy 4/20/2011    Plan PCN in labor      History of postpartum depression, currently pregnant 10/21/2010     Malignant melanoma (H)      Postpartum depression 12/17/2008     Tailbone injury     fx     Urinary tract infections    History  of melanoma to the left arm and back treated with excision.  She follows closely with dermatology.    Past Surgical History:   Procedure Laterality Date     C ORAL SURGERY PROCEDURE       COLONOSCOPY N/A 3/11/2019    Procedure: COMBINED COLONOSCOPY, SINGLE OR MULTIPLE BIOPSY/POLYPECTOMY BY BIOPSY;  Surgeon: Loi Maldonado MD;  Location: [facility]     INSERT PORT VASCULAR ACCESS Right 4/22/2019    Procedure: Central venous Chest Port Placement - right;  Surgeon: Didier Frazier PA-C;  Location: [facility]     IR CHEST PORT PLACEMENT > 5 YRS OF AGE  4/22/2019     LAPAROSCOPIC ASSISTED COLECTOMY Right 3/22/2019    Procedure: Laparoscopic Extended Right Hemicolectomy and appendectomy;  Surgeon: Linda Quesada MD;  Location: [facility] OR     Cancer History:   As above    Allergies:  Allergies as of 05/03/2019 - Reviewed 05/03/2019   Allergen Reaction Noted     Nka [no known allergies]  03/15/2005     Current Medications:  Current Outpatient Medications   Medication Sig Dispense Refill     acetaminophen (TYLENOL) 325 MG tablet Take 3 tablets (975 mg) by mouth every 8 hours as needed for mild pain 100 tablet 0     cyclobenzaprine (FLEXERIL) 5 MG tablet Take 1-2 tablets (5-10 mg) by mouth 3 times daily as needed for muscle spasms 30 tablet 3     FLUoxetine (PROZAC) 10 MG capsule Take 1 capsule (10 mg) by mouth daily 30 capsule 1     granisetron (KYTRIL) 1 MG tablet Take 1 tablet (1 mg) by mouth every 12 hours as needed for nausea 60 tablet 3     lidocaine-prilocaine (EMLA) 2.5-2.5 % external cream Apply topically as needed for moderate pain 30 g 0     LORazepam (ATIVAN) 0.5 MG tablet Take 1 tablet (0.5 mg) by mouth every 4 hours as needed (Anxiety, Nausea/Vomiting or Sleep) 30 tablet 2     LORazepam (ATIVAN) 0.5 MG tablet Take 1 tablet (0.5 mg) by mouth nightly as needed for sleep 10 tablet 0     ondansetron (ZOFRAN) 8 MG tablet Take 1 tablet (8 mg) by mouth every 8 hours as needed (Nausea/Vomiting) 10 tablet 2      prochlorperazine (COMPAZINE) 10 MG tablet Take 1 tablet (10 mg) by mouth every 6 hours as needed (Nausea/Vomiting) 30 tablet 2     tretinoin (RETIN-A) 0.05 % external cream Apply topically At Bedtime 45 g 3      Family History:  Family History   Problem Relation Age of Onset     Arthritis Mother      Neurologic Disorder Mother         had a seizure      Alcohol/Drug Mother      Arthritis Maternal Grandmother      Breast Cancer Maternal Grandmother      Melanoma Maternal Grandmother      Heart Disease Paternal Grandmother      Cancer Father         skin   Maternal grandmother had breast cancer in her late 50s.  She also had a skin melanoma in her 70s.  Father had a skin melanoma in his 50s.  He had prostate cancer in his 60s.  Paternal Uncle had kidney cancer in 50s.  She has 4 children and the youngest is 7 years old and all are healthy.  Social History:  Social History     Socioeconomic History     Marital status:      Spouse name: Not on file     Number of children: 3     Years of education: Not on file     Highest education level: Not on file   Occupational History     Employer: [employer]   Social Needs     Financial resource strain: Not on file     Food insecurity:     Worry: Not on file     Inability: Not on file     Transportation needs:     Medical: Not on file     Non-medical: Not on file   Tobacco Use     Smoking status: Never Smoker     Smokeless tobacco: Never Used   Substance and Sexual Activity     Alcohol use: Yes     Frequency: Monthly or less     Drinks per session: 1 or 2     Binge frequency: Less than monthly     Drug use: No     Sexual activity: Yes     Partners: Male     Birth control/protection: Pill   Lifestyle     Physical activity:     Days per week: Not on file     Minutes per session: Not on file     Stress: Not on file   Relationships     Social connections:     Talks on phone: Not on file     Gets together: Not on file     Attends Church  service: Not on file     Active member of club or organization: Not on file     Attends meetings of clubs or organizations: Not on file     Relationship status: Not on file     Intimate partner violence:     Fear of current or ex partner: Not on file     Emotionally abused: Not on file     Physically abused: Not on file     Forced sexual activity: Not on file   Other Topics Concern     Parent/sibling w/ CABG, MI or angioplasty before 65F 55M? Yes     Comment: pt had MI @ age 58   Social History Narrative     Not on file   She denies any smoking.  She drinks alcohol occasionally.  She lives with her family.  She is a / by profession.    Physical Exam:  General: The patient is a pleasant female in no acute distress.  /79   Pulse 66   Temp 98.2  F (36.8  C) (Oral)   Resp 14   Wt 77.1 kg (169 lb 15.6 oz)   SpO2 100%   BMI 27.43 kg/m    Wt Readings from Last 10 Encounters:   05/03/19 77.1 kg (169 lb 15.6 oz)   04/29/19 77.2 kg (170 lb 3.2 oz)   04/25/19 77.4 kg (170 lb 9.6 oz)   04/23/19 77.1 kg (170 lb)   04/22/19 72.6 kg (160 lb)   04/15/19 75.8 kg (167 lb)   04/08/19 77.2 kg (170 lb 1.6 oz)   04/04/19 77 kg (169 lb 12.8 oz)   04/02/19 68 kg (150 lb)   03/23/19 76.7 kg (169 lb)   HEENT: EOMI, PERRL. Sclerae are anicteric. Oral mucosa is pink and moist with no lesions or thrush.   Lymph: Neck is supple with no lymphadenopathy in the cervical or supraclavicular areas.   Heart: Regular rate and rhythm.   Lungs: Clear to auscultation bilaterally.   Abdomen: Bowel sounds present, soft, nontender with no palpable hepatosplenomegaly or masses.   Extremities: No lower extremity edema noted bilaterally.   Neuro: Cranial nerves II through XII are grossly intact.  Skin: No rashes, petechiae, or bruising noted on exposed skin.  Musculoskeletal: Mild tenderness of neck muscles.    Laboratory/Imaging Studies  Reviewed    4/29/2019 11:49   Sodium 137   Potassium 4.0   Chloride 106   Carbon Dioxide 25   Urea  Nitrogen 10   Creatinine 0.60   GFR Estimate >90   GFR Estimate If Black >90   Calcium 9.2   Anion Gap 6   Albumin 3.8   Protein Total 7.3   Bilirubin Total 0.3   Alkaline Phosphatase 86   ALT 43   AST 21   Glucose 86   WBC 5.6   Hemoglobin 10.3 (L)   Hematocrit 33.9 (L)   Platelet Count 272   RBC Count 4.11   MCV 83   MCH 25.1 (L)   MCHC 30.4 (L)   RDW 14.2   Diff Method Automated Method   % Neutrophils 53.0   % Lymphocytes 33.0   % Monocytes 10.8   % Eosinophils 2.5   % Basophils 0.5   % Immature Granulocytes 0.2   Nucleated RBCs 0   Absolute Neutrophil 2.9   Absolute Lymphocytes 1.8   Absolute Monocytes 0.6   Absolute Eosinophils 0.1   Absolute Basophils 0.0   Abs Immature Granulocytes 0.0   Absolute Nucleated RBC 0.0   CEA 1.4     ASSESSMENT/PLAN:    Jaw pain. Seems secondary to oxaliplatin. She is also have some neck and back spasms. She will try Flexeril for her symptoms.     Nausea. Secondary to chemotherapy. She developed headaches with Zofran and felt too tired from Compazine and Ativan. She will try Kytril in place of Zofran.     Diarrhea. Secondary to chemotherapy. She will try Imodium to take up to 8/day.     Stage IIIC poorly differentiated [id number] adenocarcinoma of the hepatic flexure -s/p laparoscopic right hemicolectomy on 3/22/19. All margins negative. 4/38 LNs positive. MSI-Intact.  Baseline CEA 9.2 on 3/11/2019. She started adjuvant FOLFOX on 4/29/2019. She has had some difficulty with muscle spasm, nausea, cold sensitivity, and diarrhea. She will return to see me prior to cycle 2. We will plan to repeat a CT CAP after completing chemotherapy and then every 6 months for 2 years. She will be due for a colonoscopy in March 2020.     Liver lesion. Indeterminate. Will f/u on this on her next imaging.     Iron deficiency anemia. History of heavy menses. Will receive IV Infed today. Recent endometrial biopsy on 4/23/19 was benign.     Genetic Testing.  Due to young age of presentation of colon  cancer and past hx of skin melanoma, she will meet with our genetic counselor in October.    Melinda Mayo PA-C  [facility]  [address] [place] [address] [phone]

## 2019-05-09 NOTE — TELEPHONE ENCOUNTER
[facility] Prior Authorization Team   Phone: [phone]  Fax: [phone]  Prior Authorization Approval    Authorization Effective Date: 2/7/2019  Authorization Expiration Date: 5/7/2020  Medication: Granisetron HCl 1MG OR TABS-pa approved  Approved Dose/Quantity: 60/30ds  Reference #: Key: [id number]   Insurance Company: [facility] - Phone [phone] Fax [phone]  Expected CoPay:       CoPay Card Available:      Foundation Assistance Needed:    Which Pharmacy is filling the prescription (Not needed for infusion/clinic administered):    Pharmacy Notified:    Patient Notified:

## 2019-05-10 DIAGNOSIS — D50.0 IRON DEFICIENCY ANEMIA DUE TO CHRONIC BLOOD LOSS: ICD-10-CM

## 2019-05-13 ENCOUNTER — ONCOLOGY VISIT (OUTPATIENT)
Dept: ONCOLOGY | Facility: CLINIC | Age: 39
End: 2019-05-13
Attending: PHYSICIAN ASSISTANT
Payer: COMMERCIAL

## 2019-05-13 ENCOUNTER — HOME INFUSION (PRE-WILLOW HOME INFUSION) (OUTPATIENT)
Dept: PHARMACY | Facility: CLINIC | Age: 39
End: 2019-05-13

## 2019-05-13 ENCOUNTER — APPOINTMENT (OUTPATIENT)
Dept: LAB | Facility: CLINIC | Age: 39
End: 2019-05-13
Attending: INTERNAL MEDICINE
Payer: COMMERCIAL

## 2019-05-13 ENCOUNTER — INFUSION THERAPY VISIT (OUTPATIENT)
Dept: ONCOLOGY | Facility: CLINIC | Age: 39
End: 2019-05-13
Attending: INTERNAL MEDICINE
Payer: COMMERCIAL

## 2019-05-13 VITALS
DIASTOLIC BLOOD PRESSURE: 66 MMHG | OXYGEN SATURATION: 100 % | HEART RATE: 75 BPM | WEIGHT: 167.9 LBS | TEMPERATURE: 98.5 F | BODY MASS INDEX: 27.1 KG/M2 | SYSTOLIC BLOOD PRESSURE: 106 MMHG | RESPIRATION RATE: 16 BRPM

## 2019-05-13 DIAGNOSIS — R79.89 ELEVATED SERUM FREE T4 LEVEL: ICD-10-CM

## 2019-05-13 DIAGNOSIS — C18.3 MALIGNANT NEOPLASM OF HEPATIC FLEXURE (H): Primary | ICD-10-CM

## 2019-05-13 DIAGNOSIS — E61.1 IRON DEFICIENCY: ICD-10-CM

## 2019-05-13 DIAGNOSIS — D50.0 IRON DEFICIENCY ANEMIA DUE TO CHRONIC BLOOD LOSS: ICD-10-CM

## 2019-05-13 LAB
ALBUMIN SERPL-MCNC: 3.6 G/DL (ref 3.4–5)
ALP SERPL-CCNC: 84 U/L (ref 40–150)
ALT SERPL W P-5'-P-CCNC: 30 U/L (ref 0–50)
ANION GAP SERPL CALCULATED.3IONS-SCNC: 7 MMOL/L (ref 3–14)
AST SERPL W P-5'-P-CCNC: 24 U/L (ref 0–45)
BASOPHILS # BLD AUTO: 0 10E9/L (ref 0–0.2)
BASOPHILS NFR BLD AUTO: 0.2 %
BILIRUB SERPL-MCNC: 0.4 MG/DL (ref 0.2–1.3)
BUN SERPL-MCNC: 8 MG/DL (ref 7–30)
CALCIUM SERPL-MCNC: 9 MG/DL (ref 8.5–10.1)
CHLORIDE SERPL-SCNC: 107 MMOL/L (ref 94–109)
CO2 SERPL-SCNC: 27 MMOL/L (ref 20–32)
CREAT SERPL-MCNC: 0.71 MG/DL (ref 0.52–1.04)
DIFFERENTIAL METHOD BLD: ABNORMAL
EOSINOPHIL # BLD AUTO: 0.1 10E9/L (ref 0–0.7)
EOSINOPHIL NFR BLD AUTO: 1.6 %
ERYTHROCYTE [DISTWIDTH] IN BLOOD BY AUTOMATED COUNT: 14.6 % (ref 10–15)
GFR SERPL CREATININE-BSD FRML MDRD: >90 ML/MIN/{1.73_M2}
GLUCOSE SERPL-MCNC: 139 MG/DL (ref 70–99)
HCT VFR BLD AUTO: 32.6 % (ref 35–47)
HGB BLD-MCNC: 10.1 G/DL (ref 11.7–15.7)
IMM GRANULOCYTES # BLD: 0 10E9/L (ref 0–0.4)
IMM GRANULOCYTES NFR BLD: 0.2 %
LYMPHOCYTES # BLD AUTO: 1.6 10E9/L (ref 0.8–5.3)
LYMPHOCYTES NFR BLD AUTO: 31.2 %
MCH RBC QN AUTO: 25.3 PG (ref 26.5–33)
MCHC RBC AUTO-ENTMCNC: 31 G/DL (ref 31.5–36.5)
MCV RBC AUTO: 82 FL (ref 78–100)
MONOCYTES # BLD AUTO: 0.5 10E9/L (ref 0–1.3)
MONOCYTES NFR BLD AUTO: 10.1 %
NEUTROPHILS # BLD AUTO: 2.9 10E9/L (ref 1.6–8.3)
NEUTROPHILS NFR BLD AUTO: 56.7 %
NRBC # BLD AUTO: 0 10*3/UL
NRBC BLD AUTO-RTO: 0 /100
PLATELET # BLD AUTO: 242 10E9/L (ref 150–450)
POTASSIUM SERPL-SCNC: 3.6 MMOL/L (ref 3.4–5.3)
PROT SERPL-MCNC: 6.9 G/DL (ref 6.8–8.8)
RBC # BLD AUTO: 3.99 10E12/L (ref 3.8–5.2)
SODIUM SERPL-SCNC: 140 MMOL/L (ref 133–144)
T3 SERPL-MCNC: 141 NG/DL (ref 60–181)
T4 SERPL-MCNC: 9.7 UG/DL (ref 4.5–13.9)
WBC # BLD AUTO: 5.1 10E9/L (ref 4–11)

## 2019-05-13 PROCEDURE — 80053 COMPREHEN METABOLIC PANEL: CPT | Performed by: INTERNAL MEDICINE

## 2019-05-13 PROCEDURE — 96367 TX/PROPH/DG ADDL SEQ IV INF: CPT

## 2019-05-13 PROCEDURE — 25000128 H RX IP 250 OP 636: Mod: ZF | Performed by: PHYSICIAN ASSISTANT

## 2019-05-13 PROCEDURE — 96368 THER/DIAG CONCURRENT INF: CPT

## 2019-05-13 PROCEDURE — 85025 COMPLETE CBC W/AUTO DIFF WBC: CPT | Performed by: INTERNAL MEDICINE

## 2019-05-13 PROCEDURE — 25800030 ZZH RX IP 258 OP 636: Mod: ZF | Performed by: PHYSICIAN ASSISTANT

## 2019-05-13 PROCEDURE — G0498 CHEMO EXTEND IV INFUS W/PUMP: HCPCS

## 2019-05-13 PROCEDURE — G0463 HOSPITAL OUTPT CLINIC VISIT: HCPCS | Mod: ZF

## 2019-05-13 PROCEDURE — 84436 ASSAY OF TOTAL THYROXINE: CPT | Performed by: OBSTETRICS & GYNECOLOGY

## 2019-05-13 PROCEDURE — 96375 TX/PRO/DX INJ NEW DRUG ADDON: CPT

## 2019-05-13 PROCEDURE — 25000128 H RX IP 250 OP 636: Mod: ZF | Performed by: INTERNAL MEDICINE

## 2019-05-13 PROCEDURE — 96413 CHEMO IV INFUSION 1 HR: CPT

## 2019-05-13 PROCEDURE — 96411 CHEMO IV PUSH ADDL DRUG: CPT

## 2019-05-13 PROCEDURE — 96415 CHEMO IV INFUSION ADDL HR: CPT

## 2019-05-13 PROCEDURE — 99214 OFFICE O/P EST MOD 30 MIN: CPT | Mod: ZP | Performed by: PHYSICIAN ASSISTANT

## 2019-05-13 PROCEDURE — 84480 ASSAY TRIIODOTHYRONINE (T3): CPT | Performed by: OBSTETRICS & GYNECOLOGY

## 2019-05-13 RX ORDER — ALBUTEROL SULFATE 90 UG/1
1-2 AEROSOL, METERED RESPIRATORY (INHALATION)
Status: CANCELLED
Start: 2019-05-13

## 2019-05-13 RX ORDER — ACETAMINOPHEN 325 MG/1
650 TABLET ORAL ONCE
Status: DISCONTINUED | OUTPATIENT
Start: 2019-05-13 | End: 2019-05-13 | Stop reason: HOSPADM

## 2019-05-13 RX ORDER — PALONOSETRON 0.05 MG/ML
0.25 INJECTION, SOLUTION INTRAVENOUS ONCE
Status: COMPLETED | OUTPATIENT
Start: 2019-05-13 | End: 2019-05-13

## 2019-05-13 RX ORDER — DIPHENHYDRAMINE HYDROCHLORIDE 50 MG/ML
50 INJECTION INTRAMUSCULAR; INTRAVENOUS
Status: CANCELLED
Start: 2019-05-13

## 2019-05-13 RX ORDER — FLUOROURACIL 50 MG/ML
400 INJECTION, SOLUTION INTRAVENOUS ONCE
Status: COMPLETED | OUTPATIENT
Start: 2019-05-13 | End: 2019-05-13

## 2019-05-13 RX ORDER — ALBUTEROL SULFATE 0.83 MG/ML
2.5 SOLUTION RESPIRATORY (INHALATION)
Status: CANCELLED | OUTPATIENT
Start: 2019-05-13

## 2019-05-13 RX ORDER — EPINEPHRINE 1 MG/ML
0.3 INJECTION, SOLUTION INTRAMUSCULAR; SUBCUTANEOUS EVERY 5 MIN PRN
Status: CANCELLED | OUTPATIENT
Start: 2019-05-13

## 2019-05-13 RX ORDER — HEPARIN SODIUM (PORCINE) LOCK FLUSH IV SOLN 100 UNIT/ML 100 UNIT/ML
5 SOLUTION INTRAVENOUS ONCE
Status: COMPLETED | OUTPATIENT
Start: 2019-05-13 | End: 2019-05-13

## 2019-05-13 RX ORDER — DIPHENHYDRAMINE HCL 25 MG
25 CAPSULE ORAL ONCE
Status: DISCONTINUED | OUTPATIENT
Start: 2019-05-13 | End: 2019-05-13 | Stop reason: HOSPADM

## 2019-05-13 RX ORDER — FLUOROURACIL 50 MG/ML
400 INJECTION, SOLUTION INTRAVENOUS ONCE
Status: CANCELLED | OUTPATIENT
Start: 2019-05-13

## 2019-05-13 RX ORDER — EPINEPHRINE 0.3 MG/.3ML
0.3 INJECTION SUBCUTANEOUS EVERY 5 MIN PRN
Status: CANCELLED | OUTPATIENT
Start: 2019-05-13

## 2019-05-13 RX ORDER — PALONOSETRON 0.05 MG/ML
0.25 INJECTION, SOLUTION INTRAVENOUS ONCE
Status: CANCELLED
Start: 2019-05-13

## 2019-05-13 RX ORDER — LORAZEPAM 2 MG/ML
0.5 INJECTION INTRAMUSCULAR EVERY 4 HOURS PRN
Status: CANCELLED
Start: 2019-05-13

## 2019-05-13 RX ORDER — METHYLPREDNISOLONE SODIUM SUCCINATE 125 MG/2ML
125 INJECTION, POWDER, LYOPHILIZED, FOR SOLUTION INTRAMUSCULAR; INTRAVENOUS
Status: CANCELLED
Start: 2019-05-13

## 2019-05-13 RX ORDER — DIPHENHYDRAMINE HCL 25 MG
25 CAPSULE ORAL ONCE
Status: CANCELLED
Start: 2019-05-13

## 2019-05-13 RX ORDER — ACETAMINOPHEN 325 MG/1
650 TABLET ORAL ONCE
Status: CANCELLED
Start: 2019-05-13

## 2019-05-13 RX ORDER — MEPERIDINE HYDROCHLORIDE 25 MG/ML
25 INJECTION INTRAMUSCULAR; INTRAVENOUS; SUBCUTANEOUS EVERY 30 MIN PRN
Status: CANCELLED | OUTPATIENT
Start: 2019-05-13

## 2019-05-13 RX ORDER — SODIUM CHLORIDE 9 MG/ML
1000 INJECTION, SOLUTION INTRAVENOUS CONTINUOUS PRN
Status: CANCELLED
Start: 2019-05-13

## 2019-05-13 RX ADMIN — OXALIPLATIN 150 MG: 5 INJECTION, SOLUTION, CONCENTRATE INTRAVENOUS at 13:26

## 2019-05-13 RX ADMIN — HEPARIN 5 ML: 100 SYRINGE at 11:16

## 2019-05-13 RX ADMIN — DEXTROSE MONOHYDRATE 50 ML: 50 INJECTION, SOLUTION INTRAVENOUS at 13:21

## 2019-05-13 RX ADMIN — LEUCOVORIN CALCIUM 650 MG: 500 INJECTION, POWDER, LYOPHILIZED, FOR SOLUTION INTRAMUSCULAR; INTRAVENOUS at 13:21

## 2019-05-13 RX ADMIN — SODIUM CHLORIDE 250 ML: 9 INJECTION, SOLUTION INTRAVENOUS at 12:31

## 2019-05-13 RX ADMIN — PALONOSETRON HYDROCHLORIDE 0.25 MG: 0.25 INJECTION, SOLUTION INTRAVENOUS at 12:31

## 2019-05-13 RX ADMIN — FLUOROURACIL 755 MG: 50 INJECTION, SOLUTION INTRAVENOUS at 15:26

## 2019-05-13 RX ADMIN — DEXAMETHASONE SODIUM PHOSPHATE: 10 INJECTION, SOLUTION INTRAMUSCULAR; INTRAVENOUS at 12:57

## 2019-05-13 ASSESSMENT — PAIN SCALES - GENERAL: PAINLEVEL: NO PAIN (0)

## 2019-05-13 NOTE — NURSING NOTE
"Chief Complaint   Patient presents with     Oncology Clinic Visit     Return: Colon cancer      Port Draw     labs drawn via port by rn. vs taken      Port accessed by RN with 20 G 3/4\" power needle, labs drawn from port in lab. Flushed with saline and heparin. VS taken. Pt checked into next appointment.   Lzibet Roberts, RN     "

## 2019-05-13 NOTE — PROGRESS NOTES
Oncology follow up visit:  Date on this visit: May 13, 2019    Remedios Watts  is referred by Dr.Benjamin Herbert Erazo for an oncology consultation. She requires evaluation for colon cancer.    Primary Physician: Agustín Berger     History Of Present Illness:    Please see my previous note for details.  I have copied and updated from prior note.  Ms. Watts is a 38 year old female who was admitted to the hospital on 3/10/2019 for bowel obstruction due to hepatic flexure colon mass.  She had a CT scan done on 3/10/2019 which showed apple core lesion near the hepatic flexure of the colon concerning for an obstructing colonic neoplasm.  No enlarged lymph nodes were seen.  There is a tiny subcentimeter left lobe liver lesion which is too small to characterize.  A CT of the chest on 3/13/2019 did not show evidence of metastatic disease in the chest.    Colonoscopy on 3/11/2019 showed fungating, infiltrative, highly-friable and ulcerated large mass at the hepatic flexure/proximal transverse colon, unable to traverse this lesions as this appears to be near-completely obstructive in nature (though allows liquid stool/effluent to pass under visualization). The mass was partially circumferential. Biopsies was consistent with invasive moderately differentiated adenocarcinoma compatible with   colorectal primary and Mismatch repair enzymes were intact by immunohistochemistry.     On 3/22/2019 she had laparoscopic extended right hemicolectomy performed by Dr. Quesada.  Final pathology was consistent with a 3.7 cm hepatic flexure poorly differentiated adenocarcinoma with focal micropapillary growth extending through the muscularis propria and involving serosa (pT4).  There was lymphovascular invasion but no perineural invasion seen.  All margins were negative.  4 out of 38 lymph nodes were positive for metastatic carcinoma including 2 lymph nodes with micrometastasis.  Final staging was [id number].    She recently went to  emergency room for worsening right-sided abdominal pain and had CT chest abdomen and pelvis did not show any acute pathology or evidence of recurrence/metastasis. There were expected post operative changes. Labs including CBC/diff and CMP/lipase were unremarkable except for mild anemia. UA was negative. Pelvic US was also negative apart from 2 small left ovarian cysts.  She recently saw Gyn on 4/23/19 and had endometrial biopsy done which showed no diagnostic abnormalities identified, benign late secretory endometrium without atypia.    She started adjuvant FOLFOX on 4/29/19. She was seen for jaw, neck, and back pain and was given Flexeril following cycle 1.   She is here today for routine follow up prior to cycle 2 FOLFOX.     Interval History:  Patient reports that she has been feeling much better over the last 4 days.  The muscle tightness in her neck and back has improved.  She found relief with the Flexeril.  She also reports that her stools are now back to her post surgery baseline.  She has about 4 loose stools per day.  She does continue to have stool urgency associated with this.  She is not currently taking any Imodium.  She denies feeling dehydrated and feels she is doing well getting in fluids.  Her cold sensitivity in her throat lasted for about a week and a half.  She does still have some cold sensitivity in her fingertips.  She denies any numbness or tingling separate from cold.  She denies any skin changes on her hands or feet.  She did develop some sores on her lips over the last 2 days and has been applying Aquaphor.  She has also noticed some sores in her mouth.  She has not yet started on any salt and soda swishes.  She denies other concerns.    Past Medical/Surgical History:  Past Medical History:   Diagnosis Date     Cervical high risk HPV (human papillomavirus) test positive 04/23/2019    see problem list     Chickenpox      Diarrhea      Group B streptococcus urinary tract infection  complicating pregnancy 4/20/2011    Plan PCN in labor      History of postpartum depression, currently pregnant 10/21/2010     Malignant melanoma (H)      Postpartum depression 12/17/2008     Tailbone injury     fx     Urinary tract infections    History of melanoma to the left arm and back treated with excision.  She follows closely with dermatology.    Past Surgical History:   Procedure Laterality Date     C ORAL SURGERY PROCEDURE       COLONOSCOPY N/A 3/11/2019    Procedure: COMBINED COLONOSCOPY, SINGLE OR MULTIPLE BIOPSY/POLYPECTOMY BY BIOPSY;  Surgeon: Loi Maldonado MD;  Location: [facility]     INSERT PORT VASCULAR ACCESS Right 4/22/2019    Procedure: Central venous Chest Port Placement - right;  Surgeon: Didier Frazier PA-C;  Location: [facility]     IR CHEST PORT PLACEMENT > 5 YRS OF AGE  4/22/2019     LAPAROSCOPIC ASSISTED COLECTOMY Right 3/22/2019    Procedure: Laparoscopic Extended Right Hemicolectomy and appendectomy;  Surgeon: Linda Quesada MD;  Location: [facility] OR     Cancer History:   As above    Allergies:  Allergies as of 05/13/2019 - Reviewed 05/03/2019   Allergen Reaction Noted     Nka [no known allergies]  03/15/2005     Current Medications:  Current Outpatient Medications   Medication Sig Dispense Refill     acetaminophen (TYLENOL) 325 MG tablet Take 3 tablets (975 mg) by mouth every 8 hours as needed for mild pain 100 tablet 0     cyclobenzaprine (FLEXERIL) 5 MG tablet Take 1-2 tablets (5-10 mg) by mouth 3 times daily as needed for muscle spasms 30 tablet 3     FLUoxetine (PROZAC) 10 MG capsule Take 1 capsule (10 mg) by mouth daily 30 capsule 1     granisetron (KYTRIL) 1 MG tablet Take 1 tablet (1 mg) by mouth every 12 hours as needed for nausea 60 tablet 3     lidocaine-prilocaine (EMLA) 2.5-2.5 % external cream Apply topically as needed for moderate pain 30 g 0     LORazepam (ATIVAN) 0.5 MG tablet Take 1 tablet (0.5 mg) by mouth every 4 hours as needed (Anxiety, Nausea/Vomiting or  Sleep) 30 tablet 2     LORazepam (ATIVAN) 0.5 MG tablet Take 1 tablet (0.5 mg) by mouth nightly as needed for sleep 10 tablet 0     ondansetron (ZOFRAN) 8 MG tablet Take 1 tablet (8 mg) by mouth every 8 hours as needed (Nausea/Vomiting) 10 tablet 2     prochlorperazine (COMPAZINE) 10 MG tablet Take 1 tablet (10 mg) by mouth every 6 hours as needed (Nausea/Vomiting) 30 tablet 2     tretinoin (RETIN-A) 0.05 % external cream Apply topically At Bedtime 45 g 3      Family History:  Family History   Problem Relation Age of Onset     Arthritis Mother      Neurologic Disorder Mother         had a seizure      Alcohol/Drug Mother      Arthritis Maternal Grandmother      Breast Cancer Maternal Grandmother      Melanoma Maternal Grandmother      Heart Disease Paternal Grandmother      Cancer Father         skin   Maternal grandmother had breast cancer in her late 50s.  She also had a skin melanoma in her 70s.  Father had a skin melanoma in his 50s.  He had prostate cancer in his 60s.  Paternal Uncle had kidney cancer in 50s.  She has 4 children and the youngest is 7 years old and all are healthy.  Social History:  Social History     Socioeconomic History     Marital status:      Spouse name: Not on file     Number of children: 3     Years of education: Not on file     Highest education level: Not on file   Occupational History     Employer: [employer]   Social Needs     Financial resource strain: Not on file     Food insecurity:     Worry: Not on file     Inability: Not on file     Transportation needs:     Medical: Not on file     Non-medical: Not on file   Tobacco Use     Smoking status: Never Smoker     Smokeless tobacco: Never Used   Substance and Sexual Activity     Alcohol use: Yes     Frequency: Monthly or less     Drinks per session: 1 or 2     Binge frequency: Less than monthly     Drug use: No     Sexual activity: Yes     Partners: Male     Birth control/protection: Pill    Lifestyle     Physical activity:     Days per week: Not on file     Minutes per session: Not on file     Stress: Not on file   Relationships     Social connections:     Talks on phone: Not on file     Gets together: Not on file     Attends [employer] service: Not on file     Active member of club or organization: Not on file     Attends meetings of clubs or organizations: Not on file     Relationship status: Not on file     Intimate partner violence:     Fear of current or ex partner: Not on file     Emotionally abused: Not on file     Physically abused: Not on file     Forced sexual activity: Not on file   Other Topics Concern     Parent/sibling w/ CABG, MI or angioplasty before 65F 55M? Yes     Comment: pt had MI @ age 58   Social History Narrative     Not on file   She denies any smoking.  She drinks alcohol occasionally.  She lives with her family.  She is a / by profession.    Physical Exam:  General: The patient is a pleasant female in no acute distress.  /66 (BP Location: Right arm, Patient Position: Sitting, Cuff Size: Adult Regular)   Pulse 75   Temp 98.5  F (36.9  C) (Oral)   Resp 16   Wt 76.2 kg (167 lb 14.4 oz)   SpO2 100%   BMI 27.10 kg/m    Wt Readings from Last 10 Encounters:   05/13/19 76.2 kg (167 lb 14.4 oz)   05/03/19 77.1 kg (169 lb 15.6 oz)   04/29/19 77.2 kg (170 lb 3.2 oz)   04/25/19 77.4 kg (170 lb 9.6 oz)   04/23/19 77.1 kg (170 lb)   04/22/19 72.6 kg (160 lb)   04/15/19 75.8 kg (167 lb)   04/08/19 77.2 kg (170 lb 1.6 oz)   04/04/19 77 kg (169 lb 12.8 oz)   04/02/19 68 kg (150 lb)   HEENT: EOMI, PERRL. Sclerae are anicteric. Oral mucosa is pink and moist with mild mucositis on lips and cheeks, no thrush.   Lymph: Neck is supple with no lymphadenopathy in the cervical or supraclavicular areas.   Heart: Regular rate and rhythm.   Lungs: Clear to auscultation bilaterally.   Abdomen: Bowel sounds present, soft, nontender with no palpable hepatosplenomegaly or masses.    Extremities: No lower extremity edema noted bilaterally.   Neuro: Cranial nerves II through XII are grossly intact.  Skin: No rashes, petechiae, or bruising noted on exposed skin.    Laboratory/Imaging Studies  Reviewed    5/13/2019 11:21   Sodium 140   Potassium 3.6   Chloride 107   Carbon Dioxide 27   Urea Nitrogen 8   Creatinine 0.71   GFR Estimate >90   GFR Estimate If Black >90   Calcium 9.0   Anion Gap 7   Albumin 3.6   Protein Total 6.9   Bilirubin Total 0.4   Alkaline Phosphatase 84   ALT 30   AST 24   Glucose 139 (H)   WBC 5.1   Hemoglobin 10.1 (L)   Hematocrit 32.6 (L)   Platelet Count 242   RBC Count 3.99   MCV 82   MCH 25.3 (L)   MCHC 31.0 (L)   RDW 14.6   Diff Method Automated Method   % Neutrophils 56.7   % Lymphocytes 31.2   % Monocytes 10.1   % Eosinophils 1.6   % Basophils 0.2   % Immature Granulocytes 0.2   Nucleated RBCs 0   Absolute Neutrophil 2.9   Absolute Lymphocytes 1.6   Absolute Monocytes 0.5   Absolute Eosinophils 0.1   Absolute Basophils 0.0   Abs Immature Granulocytes 0.0   Absolute Nucleated RBC 0.0     ASSESSMENT/PLAN:  Stage IIIC poorly differentiated [id number] adenocarcinoma of the hepatic flexure -s/p laparoscopic right hemicolectomy on 3/22/19. All margins negative. 4/38 LNs positive. MSI-Intact.  Baseline CEA 9.2 on 3/11/2019. She started adjuvant FOLFOX on 4/29/2019. She has had some difficulty with muscle spasm, nausea, cold sensitivity, and diarrhea. She is doing well today and will continue with cycle 2 FOLFOX. I will see her back every 2 weeks with chemotherapy. We will plan to repeat a CT CAP after completing chemotherapy and then every 6 months for 2 years. She will be due for a colonoscopy in March 2020.     Jaw pain. Resolved. Seemed secondary to oxaliplatin. She is also had some neck and back spasms. She has Flexeril available to take if symptoms return.    Nausea. Resolved. Secondary to chemotherapy. She developed headaches with Zofran and felt too tired from  Compazine and Ativan. She will try Kytril in place of Zofran. I will add Emend and switch IV Zofran to Aloxi for cycle 2 onward. She was instructed to use Compazine or Ativan on days 1 and 2 if needed for nausea and Kytril from day 3 onward as needed.     Diarrhea. Secondary to chemotherapy, now back to her post-surgery baseline. We reviewed that she should take Imodium up to 8/day, if stools worse than her baseline with chemotherapy.     Liver lesion. Indeterminate. Will f/u on this on her next imaging.     Iron deficiency anemia. History of heavy menses. Will receive IV Infed today. Recent endometrial biopsy on 4/23/19 was benign.     Genetic Testing.  Due to young age of presentation of colon cancer and past hx of skin melanoma, she will meet with our genetic counselor in October.    Melinda Mayo PA-C  [facility]  [address], [place] [id number] [phone]

## 2019-05-13 NOTE — LETTER
5/13/2019      RE: Remedios Watts  [address] [place] [address]       Oncology follow up visit:  Date on this visit: May 13, 2019    Remedios Watts  is referred by Dr.Benjamin Herbert Erazo for an oncology consultation. She requires evaluation for colon cancer.    Primary Physician: Agustín Berger     History Of Present Illness:    Please see my previous note for details.  I have copied and updated from prior note.  Ms. Watts is a 38 year old female who was admitted to the hospital on 3/10/2019 for bowel obstruction due to hepatic flexure colon mass.  She had a CT scan done on 3/10/2019 which showed apple core lesion near the hepatic flexure of the colon concerning for an obstructing colonic neoplasm.  No enlarged lymph nodes were seen.  There is a tiny subcentimeter left lobe liver lesion which is too small to characterize.  A CT of the chest on 3/13/2019 did not show evidence of metastatic disease in the chest.    Colonoscopy on 3/11/2019 showed fungating, infiltrative, highly-friable and ulcerated large mass at the hepatic flexure/proximal transverse colon, unable to traverse this lesions as this appears to be near-completely obstructive in nature (though allows liquid stool/effluent to pass under visualization). The mass was partially circumferential. Biopsies was consistent with invasive moderately differentiated adenocarcinoma compatible with   colorectal primary and Mismatch repair enzymes were intact by immunohistochemistry.     On 3/22/2019 she had laparoscopic extended right hemicolectomy performed by Dr. Quesada.  Final pathology was consistent with a 3.7 cm hepatic flexure poorly differentiated adenocarcinoma with focal micropapillary growth extending through the muscularis propria and involving serosa (pT4).  There was lymphovascular invasion but no perineural invasion seen.  All margins were negative.  4 out of 38 lymph nodes were positive for metastatic carcinoma including 2 lymph  nodes with micrometastasis.  Final staging was [id number].    She recently went to emergency room for worsening right-sided abdominal pain and had CT chest abdomen and pelvis did not show any acute pathology or evidence of recurrence/metastasis. There were expected post operative changes. Labs including CBC/diff and CMP/lipase were unremarkable except for mild anemia. UA was negative. Pelvic US was also negative apart from 2 small left ovarian cysts.  She recently saw Gyn on 4/23/19 and had endometrial biopsy done which showed no diagnostic abnormalities identified, benign late secretory endometrium without atypia.    She started adjuvant FOLFOX on 4/29/19. She was seen for jaw, neck, and back pain and was given Flexeril following cycle 1.   She is here today for routine follow up prior to cycle 2 FOLFOX.     Interval History:  Patient reports that she has been feeling much better over the last 4 days.  The muscle tightness in her neck and back has improved.  She found relief with the Flexeril.  She also reports that her stools are now back to her post surgery baseline.  She has about 4 loose stools per day.  She does continue to have stool urgency associated with this.  She is not currently taking any Imodium.  She denies feeling dehydrated and feels she is doing well getting in fluids.  Her cold sensitivity in her throat lasted for about a week and a half.  She does still have some cold sensitivity in her fingertips.  She denies any numbness or tingling separate from cold.  She denies any skin changes on her hands or feet.  She did develop some sores on her lips over the last 2 days and has been applying Aquaphor.  She has also noticed some sores in her mouth.  She has not yet started on any salt and soda swishes.  She denies other concerns.    Past Medical/Surgical History:  Past Medical History:   Diagnosis Date     Cervical high risk HPV (human papillomavirus) test positive 04/23/2019    see problem list      Chickenpox      Diarrhea      Group B streptococcus urinary tract infection complicating pregnancy 4/20/2011    Plan PCN in labor      History of postpartum depression, currently pregnant 10/21/2010     Malignant melanoma (H)      Postpartum depression 12/17/2008     Tailbone injury     fx     Urinary tract infections    History of melanoma to the left arm and back treated with excision.  She follows closely with dermatology.    Past Surgical History:   Procedure Laterality Date     C ORAL SURGERY PROCEDURE       COLONOSCOPY N/A 3/11/2019    Procedure: COMBINED COLONOSCOPY, SINGLE OR MULTIPLE BIOPSY/POLYPECTOMY BY BIOPSY;  Surgeon: Loi Maldonado MD;  Location: [facility]     INSERT PORT VASCULAR ACCESS Right 4/22/2019    Procedure: Central venous Chest Port Placement - right;  Surgeon: Didier Frazier PA-C;  [facility]: [facility]     IR CHEST PORT PLACEMENT > 5 YRS OF AGE  4/22/2019     LAPAROSCOPIC ASSISTED COLECTOMY Right 3/22/2019    Procedure: Laparoscopic Extended Right Hemicolectomy and appendectomy;  Surgeon: Linda Quesada MD;  Location: [facility] OR     Cancer History:   As above    Allergies:  Allergies as of 05/13/2019 - Reviewed 05/03/2019   Allergen Reaction Noted     Nka [no known allergies]  03/15/2005     Current Medications:  Current Outpatient Medications   Medication Sig Dispense Refill     acetaminophen (TYLENOL) 325 MG tablet Take 3 tablets (975 mg) by mouth every 8 hours as needed for mild pain 100 tablet 0     cyclobenzaprine (FLEXERIL) 5 MG tablet Take 1-2 tablets (5-10 mg) by mouth 3 times daily as needed for muscle spasms 30 tablet 3     FLUoxetine (PROZAC) 10 MG capsule Take 1 capsule (10 mg) by mouth daily 30 capsule 1     granisetron (KYTRIL) 1 MG tablet Take 1 tablet (1 mg) by mouth every 12 hours as needed for nausea 60 tablet 3     lidocaine-prilocaine (EMLA) 2.5-2.5 % external cream Apply topically as needed for moderate pain 30 g 0     LORazepam (ATIVAN) 0.5 MG tablet Take 1  tablet (0.5 mg) by mouth every 4 hours as needed (Anxiety, Nausea/Vomiting or Sleep) 30 tablet 2     LORazepam (ATIVAN) 0.5 MG tablet Take 1 tablet (0.5 mg) by mouth nightly as needed for sleep 10 tablet 0     ondansetron (ZOFRAN) 8 MG tablet Take 1 tablet (8 mg) by mouth every 8 hours as needed (Nausea/Vomiting) 10 tablet 2     prochlorperazine (COMPAZINE) 10 MG tablet Take 1 tablet (10 mg) by mouth every 6 hours as needed (Nausea/Vomiting) 30 tablet 2     tretinoin (RETIN-A) 0.05 % external cream Apply topically At Bedtime 45 g 3      Family History:  Family History   Problem Relation Age of Onset     Arthritis Mother      Neurologic Disorder Mother         had a seizure      Alcohol/Drug Mother      Arthritis Maternal Grandmother      Breast Cancer Maternal Grandmother      Melanoma Maternal Grandmother      Heart Disease Paternal Grandmother      Cancer Father         skin   Maternal grandmother had breast cancer in her late 50s.  She also had a skin melanoma in her 70s.  Father had a skin melanoma in his 50s.  He had prostate cancer in his 60s.  Paternal Uncle had kidney cancer in 50s.  She has 4 children and the youngest is 7 years old and all are healthy.  Social History:  Social History     Socioeconomic History     Marital status:      Spouse name: Not on file     Number of children: 3     Years of education: Not on file     Highest education level: Not on file   Occupational History     Employer: [employer]   Social Needs     Financial resource strain: Not on file     Food insecurity:     Worry: Not on file     Inability: Not on file     Transportation needs:     Medical: Not on file     Non-medical: Not on file   Tobacco Use     Smoking status: Never Smoker     Smokeless tobacco: Never Used   Substance and Sexual Activity     Alcohol use: Yes     Frequency: Monthly or less     Drinks per session: 1 or 2     Binge frequency: Less than monthly     Drug use: No      Sexual activity: Yes     Partners: Male     Birth control/protection: Pill   Lifestyle     Physical activity:     Days per week: Not on file     Minutes per session: Not on file     Stress: Not on file   Relationships     Social connections:     Talks on phone: Not on file     Gets together: Not on file     Attends Restorationism service: Not on file     Active member of club or organization: Not on file     Attends meetings of clubs or organizations: Not on file     Relationship status: Not on file     Intimate partner violence:     Fear of current or ex partner: Not on file     Emotionally abused: Not on file     Physically abused: Not on file     Forced sexual activity: Not on file   Other Topics Concern     Parent/sibling w/ CABG, MI or angioplasty before 65F 55M? Yes     Comment: pt had MI @ age 58   Social History Narrative     Not on file   She denies any smoking.  She drinks alcohol occasionally.  She lives with her family.  She is a / by profession.    Physical Exam:  General: The patient is a pleasant female in no acute distress.  /66 (BP Location: Right arm, Patient Position: Sitting, Cuff Size: Adult Regular)   Pulse 75   Temp 98.5  F (36.9  C) (Oral)   Resp 16   Wt 76.2 kg (167 lb 14.4 oz)   SpO2 100%   BMI 27.10 kg/m     Wt Readings from Last 10 Encounters:   05/13/19 76.2 kg (167 lb 14.4 oz)   05/03/19 77.1 kg (169 lb 15.6 oz)   04/29/19 77.2 kg (170 lb 3.2 oz)   04/25/19 77.4 kg (170 lb 9.6 oz)   04/23/19 77.1 kg (170 lb)   04/22/19 72.6 kg (160 lb)   04/15/19 75.8 kg (167 lb)   04/08/19 77.2 kg (170 lb 1.6 oz)   04/04/19 77 kg (169 lb 12.8 oz)   04/02/19 68 kg (150 lb)   HEENT: EOMI, PERRL. Sclerae are anicteric. Oral mucosa is pink and moist with mild mucositis on lips and cheeks, no thrush.   Lymph: Neck is supple with no lymphadenopathy in the cervical or supraclavicular areas.   Heart: Regular rate and rhythm.   Lungs: Clear to auscultation bilaterally.   Abdomen: Bowel  sounds present, soft, nontender with no palpable hepatosplenomegaly or masses.   Extremities: No lower extremity edema noted bilaterally.   Neuro: Cranial nerves II through XII are grossly intact.  Skin: No rashes, petechiae, or bruising noted on exposed skin.    Laboratory/Imaging Studies  Reviewed    5/13/2019 11:21   Sodium 140   Potassium 3.6   Chloride 107   Carbon Dioxide 27   Urea Nitrogen 8   Creatinine 0.71   GFR Estimate >90   GFR Estimate If Black >90   Calcium 9.0   Anion Gap 7   Albumin 3.6   Protein Total 6.9   Bilirubin Total 0.4   Alkaline Phosphatase 84   ALT 30   AST 24   Glucose 139 (H)   WBC 5.1   Hemoglobin 10.1 (L)   Hematocrit 32.6 (L)   Platelet Count 242   RBC Count 3.99   MCV 82   MCH 25.3 (L)   MCHC 31.0 (L)   RDW 14.6   Diff Method Automated Method   % Neutrophils 56.7   % Lymphocytes 31.2   % Monocytes 10.1   % Eosinophils 1.6   % Basophils 0.2   % Immature Granulocytes 0.2   Nucleated RBCs 0   Absolute Neutrophil 2.9   Absolute Lymphocytes 1.6   Absolute Monocytes 0.5   Absolute Eosinophils 0.1   Absolute Basophils 0.0   Abs Immature Granulocytes 0.0   Absolute Nucleated RBC 0.0     ASSESSMENT/PLAN:  Stage IIIC poorly differentiated [id number] adenocarcinoma of the hepatic flexure -s/p laparoscopic right hemicolectomy on 3/22/19. All margins negative. 4/38 LNs positive. MSI-Intact.  Baseline CEA 9.2 on 3/11/2019. She started adjuvant FOLFOX on 4/29/2019. She has had some difficulty with muscle spasm, nausea, cold sensitivity, and diarrhea. She is doing well today and will continue with cycle 2 FOLFOX. I will see her back every 2 weeks with chemotherapy. We will plan to repeat a CT CAP after completing chemotherapy and then every 6 months for 2 years. She will be due for a colonoscopy in March 2020.     Jaw pain. Resolved. Seemed secondary to oxaliplatin. She is also had some neck and back spasms. She has Flexeril available to take if symptoms return.    Nausea. Resolved. Secondary to  chemotherapy. She developed headaches with Zofran and felt too tired from Compazine and Ativan. She will try Kytril in place of Zofran. I will add Emend and switch IV Zofran to Aloxi for cycle 2 onward. She was instructed to use Compazine or Ativan on days 1 and 2 if needed for nausea and Kytril from day 3 onward as needed.     Diarrhea. Secondary to chemotherapy, now back to her post-surgery baseline. We reviewed that she should take Imodium up to 8/day, if stools worse than her baseline with chemotherapy.     Liver lesion. Indeterminate. Will f/u on this on her next imaging.     Iron deficiency anemia. History of heavy menses. Will receive IV Infed today. Recent endometrial biopsy on 4/23/19 was benign.     Genetic Testing.  Due to young age of presentation of colon cancer and past hx of skin melanoma, she will meet with our genetic counselor in October.    Melinda Mayo PA-C  [facility]  [address] [place] [id number] [phone]            Melinda Mayo PA-C

## 2019-05-13 NOTE — NURSING NOTE
"Oncology Rooming Note    May 13, 2019 11:32 AM   Remedios Watts is a 38 year old female who presents for:    Chief Complaint   Patient presents with     Oncology Clinic Visit     Return: Colon cancer      Port Draw     labs drawn via port by rn. vs taken      Initial Vitals: /66 (BP Location: Right arm, Patient Position: Sitting, Cuff Size: Adult Regular)   Pulse 75   Temp 98.5  F (36.9  C) (Oral)   Resp 16   Wt 76.2 kg (167 lb 14.4 oz)   SpO2 100%   BMI 27.10 kg/m   Estimated body mass index is 27.1 kg/m  as calculated from the following:    Height as of 4/22/19: 1.676 m (5' 6\").    Weight as of this encounter: 76.2 kg (167 lb 14.4 oz). Body surface area is 1.88 meters squared.  No Pain (0) Comment: Data Unavailable   No LMP recorded. (Menstrual status: Birth Control).  Allergies reviewed: Yes  Medications reviewed: Yes    Medications: MEDICATION REFILLS NEEDED TODAY. Provider was notified.  Pharmacy name entered into [facility]: [facility] [place] [facility] [id number] [facility]    Clinical concerns: Pt requesting refill on the Zofran.  Melinda Mayo was notified.      Dominique Franco CMA              "

## 2019-05-13 NOTE — PROGRESS NOTES
Infusion Nursing Note:  Remedios Watts presents today for Cycle 2, Day 1 Oxaliplatin, Leucovorin, Fluorouracil push and pump.    Patient seen by provider today: Yes: LIDIA Atkinson   present during visit today: Not Applicable.    Note: Pt assessed prior to infusion appointment. Okay to proceed with treatment today. 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 Connected to C-series Fluorouracil pump. Settings and tape connections double checked by Braxton Hewitt RN. Pt will have pump disconnected on Wednesday 5/15 at 1100. [facility] and patient notified of time.     Pt returned to unit after going downstairs to her car and experiencing throat discomfort with the cooler air and tingling in her fingers. Pt given a mask, warm blanket and warm tea. Symptoms improved and pt aware to avoid cool/cold triggers and environments.     Intravenous Access:  Implanted Port.    Treatment Conditions:  Lab Results   Component Value Date    HGB 10.1 05/13/2019     Lab Results   Component Value Date    WBC 5.1 05/13/2019      Lab Results   Component Value Date    ANEU 2.9 05/13/2019     Lab Results   Component Value Date     05/13/2019      Lab Results   Component Value Date     05/13/2019                   Lab Results   Component Value Date    POTASSIUM 3.6 05/13/2019           No results found for: MAG         Lab Results   Component Value Date    CR 0.71 05/13/2019                   Lab Results   Component Value Date    MANDY 9.0 05/13/2019                Lab Results   Component Value Date    BILITOTAL 0.4 05/13/2019           Lab Results   Component Value Date    ALBUMIN 3.6 05/13/2019                    Lab Results   Component Value Date    ALT 30 05/13/2019           Lab Results   Component Value Date    AST 24 05/13/2019     Results reviewed, labs MET treatment parameters, ok to proceed with treatment.    Post Infusion Assessment:  Patient tolerated  infusion without incident.  Blood return noted pre and post infusion.  Blood return noted during Fluorouracil administration every 2-3 cc.  Site patent and intact, free from redness, edema or discomfort.  No evidence of extravasations.     Discharge Plan:   Patient declined prescription refills.  AVS to patient via [employer].  Patient will return 5/29/19 for next appointment.   Patient discharged in stable condition accompanied by: .  Departure Mode: Ambulatory.    Cinthia Nayak RN

## 2019-05-13 NOTE — PATIENT INSTRUCTIONS
Contact Numbers    [facility] Main Line: [phone]  [facility] Triage and after hours / weekends / holidays:  [phone]      Please call the triage or after hours line if you experience a temperature greater than or equal to 100.5, shaking chills, have uncontrolled nausea, vomiting and/or diarrhea, dizziness, shortness of breath, chest pain, bleeding, unexplained bruising, or if you have any other new/concerning symptoms, questions or concerns.      If you are having any concerning symptoms or wish to speak to a provider before your next infusion visit, please call your care coordinator or triage to notify them so we can adequately serve you.     If you need a refill on a narcotic prescription or other medication, please call before your infusion appointment.               May 2019      John Monday Tuesday Wednesday Thursday Friday Saturday                  1     2     3    [facility] RETURN  10:55 AM   (50 min.)   Melinda Mayo PA-C   [facility] 4       5     6     7     8     9     10     11       12     13    [facility] DRAW  10:45 AM   (15 min.)    [facility] DRAW   [facility] Lab Draw    [facility] RETURN  10:55 AM   (50 min.)   Melinda Mayo PA-C   [facility]  12:00 PM   (240 min.)   [facility]   [facility] 14     15    [facility] RETURN  12:15 PM   (15 min.)   Linda Quesada MD   [facility] Colon and Rectal Surgery 16     17     18       19     20     21     22     23     24     25       26     27     28     29    P [facility] DRAW   6:30 AM   (15 min.)   [facility]   [facility]    [facility] RETURN   6:45 AM   (50 min.)   Sapna Schilling PA-C   [facility]    [facility] ONC INFUSION 240   8:00 AM   (240 min.)   [facility]   [facility] 30 31 June 2019 Sunday Monday Tuesday Wednesday Thursday Friday Saturday                                  1       2     3     4     5     6     7     8       9     10    [facility] DRAW  12:00 PM   (15 min.)    [facility] LAB DRAW   [facility] Lab Draw    [facility] RETURN  12:15 PM   (50 min.)   Melinda Mayo PA-C   [facility]    [facility] ONC INFUSION 240   1:30 PM   (240 min.)   [facility] INF[facility]ION   [facility] 11     12     13     14     15       16     17     18     19    [facility] NEW ENDOCRINE   8:45 AM   (60 min.)   Wilfredo Guzman MD   [facility] 20     21     22       23     24    [facility] DRAW  12:00 PM   (15 min.)    [facility] LAB DRAW   [facility] Lab Draw    [facility] RETURN  12:25 PM   (50 min.)   Melinda Mayo PA-C   [facility] ONC INFUSION 240   1:30 PM   (240 min.)    ONCOLOGY INFUSION   [facility] 25     26     27     28     29       30                                                   Lab Results:  Recent Results (from the past 12 hour(s))   CBC with platelets differential    Collection Time: 05/13/19 11:21 AM   Result Value Ref Range    WBC 5.1 4.0 - 11.0 10e9/L    RBC Count 3.99 3.8 - 5.2 10e12/L    Hemoglobin 10.1 (L) 11.7 - 15.7 g/dL    Hematocrit 32.6 (L) 35.0 - 47.0 %    MCV 82 78 - 100 fl    MCH 25.3 (L) 26.5 - 33.0 pg    MCHC 31.0 (L) 31.5 - 36.5 g/dL    RDW 14.6 10.0 - 15.0 %    Platelet Count 242 150 - 450 10e9/L    Diff Method Automated Method     % Neutrophils 56.7 %    % Lymphocytes 31.2 %    % Monocytes 10.1 %    % Eosinophils 1.6 %    % Basophils 0.2 %    % Immature Granulocytes 0.2 %    Nucleated RBCs 0 0 /100    Absolute Neutrophil 2.9 1.6 - 8.3 10e9/L    Absolute Lymphocytes 1.6 0.8 - 5.3 10e9/L    Absolute Monocytes 0.5 0.0 - 1.3 10e9/L    Absolute Eosinophils 0.1 0.0 - 0.7 10e9/L    Absolute Basophils 0.0 0.0 - 0.2 10e9/L    Abs Immature Granulocytes 0.0 0 - 0.4 10e9/L    Absolute Nucleated RBC 0.0    Comprehensive metabolic panel     Collection Time: 05/13/19 11:21 AM   Result Value Ref Range    Sodium 140 133 - 144 mmol/L    Potassium 3.6 3.4 - 5.3 mmol/L    Chloride 107 94 - 109 mmol/L    Carbon Dioxide 27 20 - 32 mmol/L    Anion Gap 7 3 - 14 mmol/L    Glucose 139 (H) 70 - 99 mg/dL    Urea Nitrogen 8 7 - 30 mg/dL    Creatinine 0.71 0.52 - 1.04 mg/dL    GFR Estimate >90 >60 mL/min/[1.73_m2]    GFR Estimate If Black >90 >60 mL/min/[1.73_m2]    Calcium 9.0 8.5 - 10.1 mg/dL    Bilirubin Total 0.4 0.2 - 1.3 mg/dL    Albumin 3.6 3.4 - 5.0 g/dL    Protein Total 6.9 6.8 - 8.8 g/dL    Alkaline Phosphatase 84 40 - 150 U/L    ALT 30 0 - 50 U/L    AST 24 0 - 45 U/L

## 2019-05-14 NOTE — RESULT ENCOUNTER NOTE
Thyroid function testing is normal.     Tata Yates MD  [facility]

## 2019-05-15 NOTE — PROGRESS NOTES
This is a recent snapshot of the patient's [facility] medical record.  For current drug dose and complete information and questions, call [phone] or In Basket pool, [facility] home infusion ([id number])  CSN Number:  [id number]

## 2019-05-22 ENCOUNTER — INFUSION THERAPY VISIT (OUTPATIENT)
Dept: ONCOLOGY | Facility: CLINIC | Age: 39
End: 2019-05-22
Attending: INTERNAL MEDICINE
Payer: COMMERCIAL

## 2019-05-22 VITALS
SYSTOLIC BLOOD PRESSURE: 120 MMHG | HEART RATE: 63 BPM | TEMPERATURE: 97.9 F | OXYGEN SATURATION: 100 % | RESPIRATION RATE: 18 BRPM | DIASTOLIC BLOOD PRESSURE: 64 MMHG

## 2019-05-22 DIAGNOSIS — D50.0 IRON DEFICIENCY ANEMIA DUE TO CHRONIC BLOOD LOSS: Primary | ICD-10-CM

## 2019-05-22 DIAGNOSIS — E61.1 IRON DEFICIENCY: ICD-10-CM

## 2019-05-22 PROCEDURE — 25000128 H RX IP 250 OP 636: Mod: ZF | Performed by: INTERNAL MEDICINE

## 2019-05-22 PROCEDURE — 25000128 H RX IP 250 OP 636: Mod: ZF | Performed by: PHYSICIAN ASSISTANT

## 2019-05-22 PROCEDURE — 25800030 ZZH RX IP 258 OP 636: Mod: ZF | Performed by: INTERNAL MEDICINE

## 2019-05-22 PROCEDURE — 25000132 ZZH RX MED GY IP 250 OP 250 PS 637: Mod: ZF | Performed by: INTERNAL MEDICINE

## 2019-05-22 PROCEDURE — 96365 THER/PROPH/DIAG IV INF INIT: CPT

## 2019-05-22 PROCEDURE — 96366 THER/PROPH/DIAG IV INF ADDON: CPT

## 2019-05-22 PROCEDURE — 25800030 ZZH RX IP 258 OP 636: Mod: ZF | Performed by: PHYSICIAN ASSISTANT

## 2019-05-22 RX ORDER — DIPHENHYDRAMINE HCL 25 MG
25 CAPSULE ORAL ONCE
Status: COMPLETED | OUTPATIENT
Start: 2019-05-22 | End: 2019-05-22

## 2019-05-22 RX ORDER — ACETAMINOPHEN 325 MG/1
650 TABLET ORAL ONCE
Status: CANCELLED
Start: 2019-05-22

## 2019-05-22 RX ORDER — HEPARIN SODIUM (PORCINE) LOCK FLUSH IV SOLN 100 UNIT/ML 100 UNIT/ML
5 SOLUTION INTRAVENOUS EVERY 8 HOURS
Status: DISCONTINUED | OUTPATIENT
Start: 2019-05-22 | End: 2019-05-22 | Stop reason: HOSPADM

## 2019-05-22 RX ORDER — ACETAMINOPHEN 325 MG/1
650 TABLET ORAL ONCE
Status: COMPLETED | OUTPATIENT
Start: 2019-05-22 | End: 2019-05-22

## 2019-05-22 RX ORDER — DIPHENHYDRAMINE HCL 25 MG
25 CAPSULE ORAL ONCE
Status: CANCELLED
Start: 2019-05-22

## 2019-05-22 RX ADMIN — ACETAMINOPHEN 650 MG: 325 TABLET ORAL at 09:08

## 2019-05-22 RX ADMIN — DIPHENHYDRAMINE HYDROCHLORIDE 25 MG: 25 CAPSULE ORAL at 09:08

## 2019-05-22 RX ADMIN — IRON DEXTRAN 25 MG: 50 INJECTION INTRAMUSCULAR; INTRAVENOUS at 09:41

## 2019-05-22 RX ADMIN — SODIUM CHLORIDE 250 ML: 9 INJECTION, SOLUTION INTRAVENOUS at 09:40

## 2019-05-22 RX ADMIN — HEPARIN 5 ML: 100 SYRINGE at 14:32

## 2019-05-22 RX ADMIN — IRON DEXTRAN 975 MG: 50 INJECTION INTRAMUSCULAR; INTRAVENOUS at 10:33

## 2019-05-22 NOTE — PROGRESS NOTES
Infusion Nursing Note:  Remedios Watts presents today for infed.    Patient seen by provider today: No   present during visit today: Not Applicable.    Note: Infed is new for patient today. Patient given written information regarding the new therapy.    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  She felt very tired and exhausted for a day last week after her pump was disconnected.  She will discuss her neuropathy with JEANNETTE before infusion next week    Intravenous Access:  Implanted Port.    Treatment Conditions:  Not Applicable.      Post Infusion Assessment:  Patient tolerated infusion without incident.  Blood return noted pre and post infusion.  Site patent and intact, free from redness, edema or discomfort.  No evidence of extravasations.  Access discontinued per protocol.       Discharge Plan:   Patient declined prescription refills.  Discharge instructions reviewed with: Patient.  Patient and/or family verbalized understanding of discharge instructions and all questions answered.  AVS to patient via [email or web address].  Patient will return 5/29 for next appointment.   Patient discharged in stable condition accompanied by: self.  Departure Mode: Ambulatory.  Face to Face time: 0.    Cynthia Vuong RN

## 2019-05-22 NOTE — PATIENT INSTRUCTIONS
Contact Numbers    [facility] Main Line: [phone]  [facility] Triage and after hours / weekends / holidays:  [phone]      Please call the triage or after hours line if you experience a temperature greater than or equal to 100.5, shaking chills, have uncontrolled nausea, vomiting and/or diarrhea, dizziness, shortness of breath, chest pain, bleeding, unexplained bruising, or if you have any other new/concerning symptoms, questions or concerns.      If you are having any concerning symptoms or wish to speak to a provider before your next infusion visit, please call your care coordinator or triage to notify them so we can adequately serve you.     If you need a refill on a narcotic prescription or other medication, please call before your infusion appointment.           May 2019      John Monday Tuesday Wednesday Thursday Friday Saturday                  1     2     3    [facility] RETURN  10:55 AM   (50 min.)   Melinad Mayo PA-C M [facility] 4       5     6     7     8     9     10     11       12     13    [facility] DRAW  10:45 AM   (15 min.)    [facility] DRAW   [facility] Lab Draw    [facility] RETURN  10:55 AM   (50 min.)   Melinda Mayo PA-C M [facility]    [facility] ONC INFUSION 240  12:00 PM   (240 min.)   [facility] ONCOLOGY INFUSION   [facility] 14     15    [facility] RETURN  12:15 PM   (15 min.)   Linda Quesada MD   [facility] Colon and Rectal Surgery 16     17     18       19     20     21     22    [facility] ONC INFUSION 360   8:30 AM   (360 min.)   [facility] ONCOLOGY INFUSION   [facility] 23     24     25       26     27     28     29    [facility] DRAW   6:30 AM   (15 min.)   [facility] DRAW   [facility] Lab Draw    [facility] RETURN   6:45 AM   (50 min.)   Sapna Schilling PA-C   [facility]    [facility] ONC INFUSION 240   8:00 AM   (240 min.)    ONCOLOGY INFUSION   [facility] 30      31 June 2019 Sunday Monday Tuesday Wednesday Thursday Friday Saturday                                 1       2     3     4     5     6     7     8       9     10    [facility] DRAW  12:00 PM   (15 min.)    [facility]   [facility]    UMP RETURN  12:15 PM   (50 min.)   Melinda Mayo PA-C   [facility]    [facility] ONC INFUSION 240   1:30 PM   (240 min.)   [facility] ONCOLOGY INFUSION   [facility] 11     12     13     14     15       16     17     18     19    [facility] NEW ENDOCRINE   8:45 AM   (60 min.)   Wilfredo Guzman MD   [facility] Endocrinology 20     21     22       23     24    [facility] DRAW  12:00 PM   (15 min.)    [facility] LAB DRAW   [facility] Lab Draw    P RETURN  12:25 PM   (50 min.)   Melinda Mayo PA-C   [facility]    [facility] ONC INFUSION 240   1:30 PM   (240 min.)   [facility] ONCOLOGY INFUSION   [facility] 25     26     27     28     29       30                                                No results found for this or any previous visit (from the past 24 hour(s)).

## 2019-05-24 ENCOUNTER — TELEPHONE (OUTPATIENT)
Dept: PHARMACY | Facility: CLINIC | Age: 39
End: 2019-05-24

## 2019-05-28 DIAGNOSIS — Z53.9 DIAGNOSIS NOT YET DEFINED: Primary | ICD-10-CM

## 2019-05-28 PROCEDURE — G0180 MD CERTIFICATION HHA PATIENT: HCPCS | Mod: ZP | Performed by: INTERNAL MEDICINE

## 2019-05-28 PROCEDURE — G0180 MD CERTIFICATION HHA PATIENT: HCPCS | Performed by: INTERNAL MEDICINE

## 2019-05-28 NOTE — TELEPHONE ENCOUNTER
Prior Authorization Not Needed per Insurance    Medication: lorazepam 0.5mg tabs- pa already on file through 4/27/2020  Insurance Company: [employer] Clinical Review - Phone [phone] Fax [phone]  Expected CoPay:    $1  Pharmacy Filling the Rx: [facility] [place], [place] -  [facility] [id number]  Pharmacy Notified:  Yes  Patient Notified:  Yes    PA not needed at this time because there is already another PA on file effective 1/27/2019 through 4/27/2020.

## 2019-05-29 ENCOUNTER — ONCOLOGY VISIT (OUTPATIENT)
Dept: ONCOLOGY | Facility: CLINIC | Age: 39
End: 2019-05-29
Attending: INTERNAL MEDICINE
Payer: COMMERCIAL

## 2019-05-29 ENCOUNTER — HOME INFUSION (PRE-WILLOW HOME INFUSION) (OUTPATIENT)
Dept: PHARMACY | Facility: CLINIC | Age: 39
End: 2019-05-29

## 2019-05-29 VITALS
OXYGEN SATURATION: 100 % | HEART RATE: 63 BPM | SYSTOLIC BLOOD PRESSURE: 106 MMHG | WEIGHT: 170.4 LBS | RESPIRATION RATE: 16 BRPM | TEMPERATURE: 98.4 F | DIASTOLIC BLOOD PRESSURE: 65 MMHG | BODY MASS INDEX: 27.5 KG/M2

## 2019-05-29 DIAGNOSIS — C18.3 MALIGNANT NEOPLASM OF HEPATIC FLEXURE (H): Primary | ICD-10-CM

## 2019-05-29 DIAGNOSIS — G62.9 PERIPHERAL POLYNEUROPATHY: Primary | ICD-10-CM

## 2019-05-29 DIAGNOSIS — C18.3 MALIGNANT NEOPLASM OF HEPATIC FLEXURE (H): ICD-10-CM

## 2019-05-29 LAB
ALBUMIN SERPL-MCNC: 3.5 G/DL (ref 3.4–5)
ALP SERPL-CCNC: 82 U/L (ref 40–150)
ALT SERPL W P-5'-P-CCNC: 29 U/L (ref 0–50)
ANION GAP SERPL CALCULATED.3IONS-SCNC: 8 MMOL/L (ref 3–14)
AST SERPL W P-5'-P-CCNC: 24 U/L (ref 0–45)
BASOPHILS # BLD AUTO: 0 10E9/L (ref 0–0.2)
BASOPHILS NFR BLD AUTO: 0.5 %
BILIRUB SERPL-MCNC: 0.3 MG/DL (ref 0.2–1.3)
BUN SERPL-MCNC: 6 MG/DL (ref 7–30)
CALCIUM SERPL-MCNC: 8.5 MG/DL (ref 8.5–10.1)
CHLORIDE SERPL-SCNC: 107 MMOL/L (ref 94–109)
CO2 SERPL-SCNC: 25 MMOL/L (ref 20–32)
CREAT SERPL-MCNC: 0.56 MG/DL (ref 0.52–1.04)
DIFFERENTIAL METHOD BLD: ABNORMAL
EOSINOPHIL # BLD AUTO: 0.1 10E9/L (ref 0–0.7)
EOSINOPHIL NFR BLD AUTO: 1.7 %
ERYTHROCYTE [DISTWIDTH] IN BLOOD BY AUTOMATED COUNT: 18.8 % (ref 10–15)
GFR SERPL CREATININE-BSD FRML MDRD: >90 ML/MIN/{1.73_M2}
GLUCOSE SERPL-MCNC: 94 MG/DL (ref 70–99)
HCT VFR BLD AUTO: 33.8 % (ref 35–47)
HGB BLD-MCNC: 10.5 G/DL (ref 11.7–15.7)
IMM GRANULOCYTES # BLD: 0 10E9/L (ref 0–0.4)
IMM GRANULOCYTES NFR BLD: 0.5 %
LYMPHOCYTES # BLD AUTO: 1.2 10E9/L (ref 0.8–5.3)
LYMPHOCYTES NFR BLD AUTO: 30.4 %
MCH RBC QN AUTO: 26.2 PG (ref 26.5–33)
MCHC RBC AUTO-ENTMCNC: 31.1 G/DL (ref 31.5–36.5)
MCV RBC AUTO: 84 FL (ref 78–100)
MONOCYTES # BLD AUTO: 0.6 10E9/L (ref 0–1.3)
MONOCYTES NFR BLD AUTO: 15 %
NEUTROPHILS # BLD AUTO: 2.1 10E9/L (ref 1.6–8.3)
NEUTROPHILS NFR BLD AUTO: 51.9 %
NRBC # BLD AUTO: 0 10*3/UL
NRBC BLD AUTO-RTO: 0 /100
PLATELET # BLD AUTO: 181 10E9/L (ref 150–450)
POTASSIUM SERPL-SCNC: 4 MMOL/L (ref 3.4–5.3)
PROT SERPL-MCNC: 6.7 G/DL (ref 6.8–8.8)
RBC # BLD AUTO: 4.01 10E12/L (ref 3.8–5.2)
SODIUM SERPL-SCNC: 140 MMOL/L (ref 133–144)
WBC # BLD AUTO: 4.1 10E9/L (ref 4–11)

## 2019-05-29 PROCEDURE — 99214 OFFICE O/P EST MOD 30 MIN: CPT | Mod: ZP | Performed by: PHYSICIAN ASSISTANT

## 2019-05-29 PROCEDURE — 96375 TX/PRO/DX INJ NEW DRUG ADDON: CPT

## 2019-05-29 PROCEDURE — 25800030 ZZH RX IP 258 OP 636: Mod: ZF | Performed by: PHYSICIAN ASSISTANT

## 2019-05-29 PROCEDURE — 80053 COMPREHEN METABOLIC PANEL: CPT | Performed by: PHYSICIAN ASSISTANT

## 2019-05-29 PROCEDURE — 96411 CHEMO IV PUSH ADDL DRUG: CPT

## 2019-05-29 PROCEDURE — 96413 CHEMO IV INFUSION 1 HR: CPT

## 2019-05-29 PROCEDURE — 25000128 H RX IP 250 OP 636: Mod: ZF | Performed by: PHYSICIAN ASSISTANT

## 2019-05-29 PROCEDURE — G0498 CHEMO EXTEND IV INFUS W/PUMP: HCPCS

## 2019-05-29 PROCEDURE — 96367 TX/PROPH/DG ADDL SEQ IV INF: CPT

## 2019-05-29 PROCEDURE — 96368 THER/DIAG CONCURRENT INF: CPT

## 2019-05-29 PROCEDURE — 85025 COMPLETE CBC W/AUTO DIFF WBC: CPT | Performed by: PHYSICIAN ASSISTANT

## 2019-05-29 PROCEDURE — 96415 CHEMO IV INFUSION ADDL HR: CPT

## 2019-05-29 RX ORDER — ALBUTEROL SULFATE 0.83 MG/ML
2.5 SOLUTION RESPIRATORY (INHALATION)
Status: CANCELLED | OUTPATIENT
Start: 2019-05-29

## 2019-05-29 RX ORDER — ALBUTEROL SULFATE 90 UG/1
1-2 AEROSOL, METERED RESPIRATORY (INHALATION)
Status: CANCELLED
Start: 2019-05-29

## 2019-05-29 RX ORDER — EPINEPHRINE 0.3 MG/.3ML
0.3 INJECTION SUBCUTANEOUS EVERY 5 MIN PRN
Status: CANCELLED | OUTPATIENT
Start: 2019-05-29

## 2019-05-29 RX ORDER — GABAPENTIN 300 MG/1
CAPSULE ORAL
Qty: 30 CAPSULE | Refills: 3 | Status: SHIPPED | OUTPATIENT
Start: 2019-05-29 | End: 2019-07-02

## 2019-05-29 RX ORDER — METHYLPREDNISOLONE SODIUM SUCCINATE 125 MG/2ML
125 INJECTION, POWDER, LYOPHILIZED, FOR SOLUTION INTRAMUSCULAR; INTRAVENOUS
Status: CANCELLED
Start: 2019-05-29

## 2019-05-29 RX ORDER — PALONOSETRON 0.05 MG/ML
0.25 INJECTION, SOLUTION INTRAVENOUS ONCE
Status: CANCELLED
Start: 2019-05-29

## 2019-05-29 RX ORDER — FLUOROURACIL 50 MG/ML
400 INJECTION, SOLUTION INTRAVENOUS ONCE
Status: CANCELLED | OUTPATIENT
Start: 2019-05-29

## 2019-05-29 RX ORDER — FLUOROURACIL 50 MG/ML
400 INJECTION, SOLUTION INTRAVENOUS ONCE
Status: COMPLETED | OUTPATIENT
Start: 2019-05-29 | End: 2019-05-29

## 2019-05-29 RX ORDER — EPINEPHRINE 1 MG/ML
0.3 INJECTION, SOLUTION INTRAMUSCULAR; SUBCUTANEOUS EVERY 5 MIN PRN
Status: CANCELLED | OUTPATIENT
Start: 2019-05-29

## 2019-05-29 RX ORDER — LORAZEPAM 2 MG/ML
0.5 INJECTION INTRAMUSCULAR EVERY 4 HOURS PRN
Status: CANCELLED
Start: 2019-05-29

## 2019-05-29 RX ORDER — MEPERIDINE HYDROCHLORIDE 25 MG/ML
25 INJECTION INTRAMUSCULAR; INTRAVENOUS; SUBCUTANEOUS EVERY 30 MIN PRN
Status: CANCELLED | OUTPATIENT
Start: 2019-05-29

## 2019-05-29 RX ORDER — HEPARIN SODIUM (PORCINE) LOCK FLUSH IV SOLN 100 UNIT/ML 100 UNIT/ML
5 SOLUTION INTRAVENOUS
Status: COMPLETED | OUTPATIENT
Start: 2019-05-29 | End: 2019-05-29

## 2019-05-29 RX ORDER — DIPHENHYDRAMINE HYDROCHLORIDE 50 MG/ML
50 INJECTION INTRAMUSCULAR; INTRAVENOUS
Status: CANCELLED
Start: 2019-05-29

## 2019-05-29 RX ORDER — SODIUM CHLORIDE 9 MG/ML
1000 INJECTION, SOLUTION INTRAVENOUS CONTINUOUS PRN
Status: CANCELLED
Start: 2019-05-29

## 2019-05-29 RX ORDER — PALONOSETRON 0.05 MG/ML
0.25 INJECTION, SOLUTION INTRAVENOUS ONCE
Status: COMPLETED | OUTPATIENT
Start: 2019-05-29 | End: 2019-05-29

## 2019-05-29 RX ADMIN — OXALIPLATIN 150 MG: 5 INJECTION, SOLUTION, CONCENTRATE INTRAVENOUS at 09:38

## 2019-05-29 RX ADMIN — PALONOSETRON HYDROCHLORIDE 0.25 MG: 0.25 INJECTION, SOLUTION INTRAVENOUS at 08:42

## 2019-05-29 RX ADMIN — FLUOROURACIL 755 MG: 50 INJECTION, SOLUTION INTRAVENOUS at 11:48

## 2019-05-29 RX ADMIN — DEXAMETHASONE SODIUM PHOSPHATE: 10 INJECTION, SOLUTION INTRAMUSCULAR; INTRAVENOUS at 08:57

## 2019-05-29 RX ADMIN — LEUCOVORIN CALCIUM 650 MG: 350 INJECTION, POWDER, LYOPHILIZED, FOR SOLUTION INTRAMUSCULAR; INTRAVENOUS at 09:35

## 2019-05-29 RX ADMIN — DEXTROSE MONOHYDRATE 250 ML: 50 INJECTION, SOLUTION INTRAVENOUS at 08:19

## 2019-05-29 RX ADMIN — HEPARIN SODIUM (PORCINE) LOCK FLUSH IV SOLN 100 UNIT/ML 5 ML: 100 SOLUTION at 06:44

## 2019-05-29 ASSESSMENT — PAIN SCALES - GENERAL: PAINLEVEL: NO PAIN (0)

## 2019-05-29 NOTE — PROGRESS NOTES
Infusion Nursing Note:  Remedios Watts presents today for Cycle 3 Day 1 Oxaliplatin, Leucovorin, Fluorouracil bolus and pump connect.    Patient seen by provider today: Yes: LIDIA Hernandez   present during visit today: Not Applicable.    Note: Patient presents to infusion today following her provider appointment. Patient denies any questions or concerns at this time.     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 Patient discussed these with provider today. Patient was encouraged to use hot packs, warm blankets, and drink warm liquids during infusion. During Oxaliplatin infusion today and following the infusion, patient reported cramping/stiffness in her hands and feet. Heat packs were applied. Patient was discharged with hot packs on her feet and warm blankets wrapped around her face.     Intravenous Access:  Implanted Port.    Treatment Conditions:  Lab Results   Component Value Date    HGB 10.5 05/29/2019     Lab Results   Component Value Date    WBC 4.1 05/29/2019      Lab Results   Component Value Date    ANEU 2.1 05/29/2019     Lab Results   Component Value Date     05/29/2019      Lab Results   Component Value Date     05/29/2019                   Lab Results   Component Value Date    POTASSIUM 4.0 05/29/2019           No results found for: MAG         Lab Results   Component Value Date    CR 0.56 05/29/2019                   Lab Results   Component Value Date    MANDY 8.5 05/29/2019                Lab Results   Component Value Date    BILITOTAL 0.3 05/29/2019           Lab Results   Component Value Date    ALBUMIN 3.5 05/29/2019                    Lab Results   Component Value Date    ALT 29 05/29/2019           Lab Results   Component Value Date    AST 24 05/29/2019       Results reviewed, labs MET treatment parameters, ok to proceed with treatment.      Post Infusion Assessment:  Patient tolerated infusion without incident.  Blood  "return noted pre and post infusion.  Site patent and intact, free from redness, edema or discomfort.  No evidence of extravasations.     Prior to discharge: Port is secured in place with tegaderm and flushed with 10cc NS with positive blood return noted.  Continuous home infusion Dosi-Fuser pump connected.    All connectors secured in place and clamps taped open.  Clamps and connections double-checked by Nohemi Rodgers RN.  Pump started, \"running\" noted on display (CADD): Not Applicable.  Patient instructed to call our clinic or [facility] with any questions or concerns at home.  Patient verbalized understanding.    Patient set up for pump disconnect at home with [facility] on Friday May 31, 2019 at 0900. Writer confirmed disconnect time with COBY Jones at [facility]. Patient aware of disconnect time.         Discharge Plan:   Prescription refills given for Gabapentin.  Discharge instructions reviewed with: Patient and .  Patient and/or family verbalized understanding of discharge instructions and all questions answered.  AVS to patient via [employer].  Patient will return 6/10/19 for next appointment.   Patient discharged in stable condition accompanied by: .  Departure Mode: Ambulatory.    Kristin aGrcia RN                        "

## 2019-05-29 NOTE — NURSING NOTE
"Oncology Rooming Note    May 29, 2019 7:13 AM   Remedios Watts is a 38 year old female who presents for:    Chief Complaint   Patient presents with     Port Draw     labs drawn from port by rn.  vs taken     Oncology Clinic Visit     Patient with Colon Ca here for provider visit and lab reveiw      Initial Vitals: /65 (BP Location: Right arm, Patient Position: Sitting, Cuff Size: Adult Regular)   Pulse 63   Temp 98.4  F (36.9  C) (Oral)   Resp 16   Wt 77.3 kg (170 lb 6.4 oz)   SpO2 100%   BMI 27.50 kg/m   Estimated body mass index is 27.5 kg/m  as calculated from the following:    Height as of 4/22/19: 1.676 m (5' 6\").    Weight as of this encounter: 77.3 kg (170 lb 6.4 oz). Body surface area is 1.9 meters squared.  No Pain (0) Comment: Data Unavailable   No LMP recorded. (Menstrual status: Birth Control).  Allergies reviewed: Yes  Medications reviewed: Yes    Medications: Medication refills not needed today.  Pharmacy name entered into [facility]: [facility] - - [place] - [id number] [facility]    Clinical concerns:       Zoe Tong CMA              "

## 2019-05-29 NOTE — PATIENT INSTRUCTIONS
Contact Numbers    [facility] Main Line: [phone]  [facility] Triage and after hours / weekends / holidays:  [phone]      Please call the triage or after hours line if you experience a temperature greater than or equal to 100.5, shaking chills, have uncontrolled nausea, vomiting and/or diarrhea, dizziness, shortness of breath, chest pain, bleeding, unexplained bruising, or if you have any other new/concerning symptoms, questions or concerns.      If you are having any concerning symptoms or wish to speak to a provider before your next infusion visit, please call your care coordinator or triage to notify them so we can adequately serve you.     If you need a refill on a narcotic prescription or other medication, please call before your infusion appointment.         May 2019      John Monday Tuesday Wednesday Thursday Friday Saturday                  1     2     3    [facility] RETURN  10:55 AM   (50 min.)   Melinda Mayo PA-C M [facility] 4       5     6     7     8     9     10     11       12     13    [facility] DRAW  10:45 AM   (15 min.)    [facility] DRAW   [facility] Lab Draw    UMP RETURN  10:55 AM   (50 min.)   Melinda Mayo PA-C M [facility]    [facility] ONC INFUSION 240  12:00 PM   (240 min.)   [facility] ONCOLOGY INFUSION   [facility] 14     15    [facility] RETURN  12:15 PM   (15 min.)   Linda Quesada MD   [facility] Colon and Rectal Surgery 16     17     18       19     20     21     22    [facility] ONC INFUSION 360   8:30 AM   (360 min.)   [facility] ONCOLOGY INFUSION   [facility] 23     24     25       26     27     28     29    [facility] DRAW   6:30 AM   (15 min.)   [facility] DRAW   [facility] Lab Draw    UMP RETURN   6:45 AM   (50 min.)   Sapna Schilling PA-C   [facility]    [facility] ONC INFUSION 240   8:00 AM   (240 min.)    ONCOLOGY INFUSION   [facility] 30     31                       June 2019 Sunday Monday Tuesday Wednesday Thursday Friday Saturday                                 1       2     3     4     5     6     7     8       9     10    [facility] DRAW  12:00 PM   (15 min.)    [facility]   [facility]    P RETURN  12:15 PM   (50 min.)   Melinda Mayo PA-C   [facility] ONC INFUSION 240   1:30 PM   (240 min.)   [facility] ONCOLOGY INFUSION   [facility] 11     12     13     14     15       16     17     18     19    [facility] NEW ENDOCRINE   8:45 AM   (60 min.)   Wilfredo Guzman MD   [facility] Endocrinology 20     21     22       23     24    [facility] DRAW  12:00 PM   (15 min.)    [facility] LAB DRAW   [facility] Lab Draw    P RETURN  12:25 PM   (50 min.)   Melinda Mayo PA-C   [facility] ONC INFUSION 240   1:30 PM   (240 min.)   [facility] ONCOLOGY INFUSION   [facility] 25     26     27     28     29       30                                                   Lab Results:  Recent Results (from the past 12 hour(s))   CBC with platelets differential    Collection Time: 05/29/19  6:51 AM   Result Value Ref Range    WBC 4.1 4.0 - 11.0 10e9/L    RBC Count 4.01 3.8 - 5.2 10e12/L    Hemoglobin 10.5 (L) 11.7 - 15.7 g/dL    Hematocrit 33.8 (L) 35.0 - 47.0 %    MCV 84 78 - 100 fl    MCH 26.2 (L) 26.5 - 33.0 pg    MCHC 31.1 (L) 31.5 - 36.5 g/dL    RDW 18.8 (H) 10.0 - 15.0 %    Platelet Count 181 150 - 450 10e9/L    Diff Method Automated Method     % Neutrophils 51.9 %    % Lymphocytes 30.4 %    % Monocytes 15.0 %    % Eosinophils 1.7 %    % Basophils 0.5 %    % Immature Granulocytes 0.5 %    Nucleated RBCs 0 0 /100    Absolute Neutrophil 2.1 1.6 - 8.3 10e9/L    Absolute Lymphocytes 1.2 0.8 - 5.3 10e9/L    Absolute Monocytes 0.6 0.0 - 1.3 10e9/L    Absolute Eosinophils 0.1 0.0 - 0.7 10e9/L    Absolute Basophils 0.0 0.0 - 0.2 10e9/L    Abs Immature  Granulocytes 0.0 0 - 0.4 10e9/L    Absolute Nucleated RBC 0.0    Comprehensive metabolic panel    Collection Time: 05/29/19  6:51 AM   Result Value Ref Range    Sodium 140 133 - 144 mmol/L    Potassium 4.0 3.4 - 5.3 mmol/L    Chloride 107 94 - 109 mmol/L    Carbon Dioxide 25 20 - 32 mmol/L    Anion Gap 8 3 - 14 mmol/L    Glucose 94 70 - 99 mg/dL    Urea Nitrogen 6 (L) 7 - 30 mg/dL    Creatinine 0.56 0.52 - 1.04 mg/dL    GFR Estimate >90 >60 mL/min/[1.73_m2]    GFR Estimate If Black >90 >60 mL/min/[1.73_m2]    Calcium 8.5 8.5 - 10.1 mg/dL    Bilirubin Total 0.3 0.2 - 1.3 mg/dL    Albumin 3.5 3.4 - 5.0 g/dL    Protein Total 6.7 (L) 6.8 - 8.8 g/dL    Alkaline Phosphatase 82 40 - 150 U/L    ALT 29 0 - 50 U/L    AST 24 0 - 45 U/L

## 2019-05-29 NOTE — NURSING NOTE
"Chief Complaint   Patient presents with     Port Draw     labs drawn from port by rn.  vs taken     Port accessed with 20 gauge 3/4\" Power needle and labs drawn by rn.  Port flushed with NS and heparin.  Pt tolerated well.  VS taken.  Pt checked in for next appt.    Angelia Pascual RN      "

## 2019-05-29 NOTE — PROGRESS NOTES
Oncology follow up visit:  Date on this visit: May 29, 2019    REASON FOR VISIT: stage III colon cancer, here for assessment prior to FOLFOX C3    History Of Present Illness:    Please see my previous note for details.  I have copied and updated from prior note.  Ms. Watts is a 38 year old female who was admitted to the hospital on 3/10/2019 for bowel obstruction due to hepatic flexure colon mass.  She had a CT scan done on 3/10/2019 which showed apple core lesion near the hepatic flexure of the colon concerning for an obstructing colonic neoplasm.  No enlarged lymph nodes were seen.  There is a tiny subcentimeter left lobe liver lesion which is too small to characterize.  A CT of the chest on 3/13/2019 did not show evidence of metastatic disease in the chest.      Colonoscopy on 3/11/2019 showed fungating, infiltrative, highly-friable and ulcerated large mass at the hepatic flexure/proximal transverse colon, unable to traverse this lesions as this appears to be near-completely obstructive in nature (though allows liquid stool/effluent to pass under visualization). The mass was partially circumferential. Biopsies was consistent with invasive moderately differentiated adenocarcinoma compatible with colorectal primary and Mismatch repair enzymes were intact by immunohistochemistry.     On 3/22/2019 she had laparoscopic extended right hemicolectomy performed by Dr. Queasda.  Final pathology was consistent with a 3.7 cm hepatic flexure poorly differentiated adenocarcinoma with focal micropapillary growth extending through the muscularis propria and involving serosa (pT4).  There was lymphovascular invasion but no perineural invasion seen.  All margins were negative.  4 out of 38 lymph nodes were positive for metastatic carcinoma including 2 lymph nodes with micrometastasis.  Final staging was [id number].    She recently went to emergency room for worsening right-sided abdominal pain and had CT chest abdomen and  pelvis did not show any acute pathology or evidence of recurrence/metastasis. There were expected post operative changes. Labs including CBC/diff and CMP/lipase were unremarkable except for mild anemia. UA was negative. Pelvic US was also negative apart from 2 small left ovarian cysts.  She recently saw Gyn on 4/23/19 and had endometrial biopsy done which showed no diagnostic abnormalities identified, benign late secretory endometrium without atypia.    She started adjuvant FOLFOX on 4/29/19. She was seen for jaw, neck, and back pain and was given Flexeril following cycle 1.       She is here today for routine follow up prior to cycle 3 FOLFOX.     Interval History:  Remedios is here for follow up.  Adding the Aloxi and Emend with C2 helped with the nausea.  She took Kytril BID on day 3 and once in the AM on day 4.  She felt profoundly tired on day 3-4 getting up only for the bathroom.  She worried it was from the Kytril, so she stopped this med after the AM dose on day 4 and switched to a couple doses of compazine.  She also stopped her Prozac as it was a low dose and she worried this had something to do with it.  The fatigue improved and she was back to normal activity by the second week.  She also had more of the acute neurotoxicity with the second cycle- jaw/facial contractions, cold paraesthesias of the throat and then random intermittent fasciculations periodically in the ongoing week.  She continues to have some sensory numbness/tingling with some discomfort in her fingertips only. She developed sores on her lips again that improved with Aquaphor. She developed a rash on her chest shortly after the Infed infusion that itched and resolved in about a week.  She feels her mood has been fine since stopping the Prozac, in the past she had more anxiety.     Past Medical/Surgical History:  Past Medical History:   Diagnosis Date     Cervical high risk HPV (human papillomavirus) test positive 04/23/2019    see problem  list     Chickenpox      Diarrhea      Group B streptococcus urinary tract infection complicating pregnancy 4/20/2011    Plan PCN in labor      History of postpartum depression, currently pregnant 10/21/2010     Malignant melanoma (H)      Postpartum depression 12/17/2008     Tailbone injury     fx     Urinary tract infections    History of melanoma to the left arm and back treated with excision.  She follows closely with dermatology.    Past Surgical History:   Procedure Laterality Date     C ORAL SURGERY PROCEDURE       COLONOSCOPY N/A 3/11/2019    Procedure: COMBINED COLONOSCOPY, SINGLE OR MULTIPLE BIOPSY/POLYPECTOMY BY BIOPSY;  Surgeon: Loi Maldonado MD;  Location: [facility]     INSERT PORT VASCULAR ACCESS Right 4/22/2019    Procedure: Central venous Chest Port Placement - right;  Surgeon: Didier Frazier PA-C;  Location: [facility]     IR CHEST PORT PLACEMENT > 5 YRS OF AGE  4/22/2019     LAPAROSCOPIC ASSISTED COLECTOMY Right 3/22/2019    Procedure: Laparoscopic Extended Right Hemicolectomy and appendectomy;  Surgeon: Linda Quesada MD;  Location: [facility] OR     Current Medications:  Current Outpatient Medications   Medication Sig Dispense Refill     acetaminophen (TYLENOL) 325 MG tablet Take 3 tablets (975 mg) by mouth every 8 hours as needed for mild pain 100 tablet 0     cyclobenzaprine (FLEXERIL) 5 MG tablet Take 1-2 tablets (5-10 mg) by mouth 3 times daily as needed for muscle spasms 30 tablet 3     granisetron (KYTRIL) 1 MG tablet Take 1 tablet (1 mg) by mouth every 12 hours as needed for nausea 60 tablet 3     lidocaine-prilocaine (EMLA) 2.5-2.5 % external cream Apply topically as needed for moderate pain 30 g 0     LORazepam (ATIVAN) 0.5 MG tablet Take 1 tablet (0.5 mg) by mouth nightly as needed for sleep 10 tablet 0     prochlorperazine (COMPAZINE) 10 MG tablet Take 1 tablet (10 mg) by mouth every 6 hours as needed (Nausea/Vomiting) 30 tablet 2     tretinoin (RETIN-A) 0.05 % external cream  Apply topically At Bedtime 45 g 3     FLUoxetine (PROZAC) 10 MG capsule Take 1 capsule (10 mg) by mouth daily (Patient not taking: Reported on 5/22/2019) 30 capsule 1     LORazepam (ATIVAN) 0.5 MG tablet Take 1 tablet (0.5 mg) by mouth every 4 hours as needed (Anxiety, Nausea/Vomiting or Sleep) 30 tablet 2      Family History:  Family History   Problem Relation Age of Onset     Arthritis Mother      Neurologic Disorder Mother         had a seizure      Alcohol/Drug Mother      Arthritis Maternal Grandmother      Breast Cancer Maternal Grandmother      Melanoma Maternal Grandmother      Heart Disease Paternal Grandmother      Cancer Father         skin   Maternal grandmother had breast cancer in her late 50s.  She also had a skin melanoma in her 70s.  Father had a skin melanoma in his 50s.  He had prostate cancer in his 60s.  Paternal Uncle had kidney cancer in 50s.  She has 4 children and the youngest is 7 years old and all are healthy.    Social History:  She denies any smoking.  She drinks alcohol occasionally.  She lives with her family.  She is a / by profession.    Physical Exam:  General: The patient is a pleasant female in no acute distress.  /65 (BP Location: Right arm, Patient Position: Sitting, Cuff Size: Adult Regular)   Pulse 63   Temp 98.4  F (36.9  C) (Oral)   Resp 16   Wt 77.3 kg (170 lb 6.4 oz)   SpO2 100%   BMI 27.50 kg/m    Wt Readings from Last 10 Encounters:   05/29/19 77.3 kg (170 lb 6.4 oz)   05/13/19 76.2 kg (167 lb 14.4 oz)   05/03/19 77.1 kg (169 lb 15.6 oz)   04/29/19 77.2 kg (170 lb 3.2 oz)   04/25/19 77.4 kg (170 lb 9.6 oz)   04/23/19 77.1 kg (170 lb)   04/22/19 72.6 kg (160 lb)   04/15/19 75.8 kg (167 lb)   04/08/19 77.2 kg (170 lb 1.6 oz)   04/04/19 77 kg (169 lb 12.8 oz)   HEENT: EOMI, PERRL. Sclerae are anicteric. Oral mucosa is pink and moist with mild mucositis on lips and cheeks, no thrush.   Lymph: Neck is supple with no lymphadenopathy in the  cervical or supraclavicular areas.   Heart: Regular rate and rhythm.   Lungs: Clear to auscultation bilaterally.   Abdomen: Bowel sounds present, soft, nontender with no palpable hepatosplenomegaly or masses.   Extremities: No lower extremity edema noted bilaterally.   Neuro: Cranial nerves II through XII are grossly intact.  Skin: No rashes, petechiae, or bruising noted on exposed skin.    Laboratory/Imaging Studies     4/29/2019 11:49 5/13/2019 11:21 5/29/2019 06:51   Sodium 137 140 140   Potassium 4.0 3.6 4.0   Chloride 106 107 107   Carbon Dioxide 25 27 25   Urea Nitrogen 10 8 6 (L)   Creatinine 0.60 0.71 0.56   GFR Estimate >90 >90 >90   GFR Estimate If Black >90 >90 >90   Calcium 9.2 9.0 8.5   Anion Gap 6 7 8   Albumin 3.8 3.6 3.5   Protein Total 7.3 6.9 6.7 (L)   Bilirubin Total 0.3 0.4 0.3   Alkaline Phosphatase 86 84 82   ALT 43 30 29   AST 21 24 24   Triiodothyronine (T3)  141    Glucose 86 139 (H) 94   WBC 5.6 5.1 4.1   Hemoglobin 10.3 (L) 10.1 (L) 10.5 (L)   Hematocrit 33.9 (L) 32.6 (L) 33.8 (L)   Platelet Count 272 242 181   RBC Count 4.11 3.99 4.01   MCV 83 82 84   MCH 25.1 (L) 25.3 (L) 26.2 (L)   MCHC 30.4 (L) 31.0 (L) 31.1 (L)   RDW 14.2 14.6 18.8 (H)   Diff Method Automated Method Automated Method Automated Method   % Neutrophils 53.0 56.7 51.9   % Lymphocytes 33.0 31.2 30.4   % Monocytes 10.8 10.1 15.0   % Eosinophils 2.5 1.6 1.7   % Basophils 0.5 0.2 0.5   % Immature Granulocytes 0.2 0.2 0.5   Nucleated RBCs 0 0 0   Absolute Neutrophil 2.9 2.9 2.1     ASSESSMENT/PLAN:  Stage IIIC poorly differentiated [id number] adenocarcinoma of the hepatic flexure -s/p laparoscopic right hemicolectomy on 3/22/19. All margins negative. 4/38 LNs positive. MSI-Intact.  Baseline CEA 9.2 on 3/11/2019. She started adjuvant FOLFOX on 4/29/2019. She has had some difficulty with muscle spasm, nausea, cold sensitivity, and diarrhea.     She is doing well today and will continue with cycle 3 FOLFOX. She will continue to  be seen q2w to manage toxicity.  We will plan to repeat a CT CAP after completing chemotherapy and then every 6 months for 2 years. She will be due for a colonoscopy in March 2020.     Acute Neurotoxicity: cycle two had fairly rapid periocular paresthesias and muscle cramping, in addition to pharyngolaryngeal dysesthesia as she was leaving the building.  We discussed using her muscle relaxer prn muscle cramps and pains and drinking warm tea/utilizing warm blanket when leaving today.    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  I worry she will likely need a dose reduction of her oxaliplatin as she already has grade 1 in her fingertips.  It seems to be uncomfortable, so we'll try gabapentin 300 mg at night to start.       Nausea. Improved with IV Emend and Aloxi.  Kytril was tolerated better. She didn't have a headache, but was very tired on day 3.  I think this was likely chemo induced but she will try kytril once a day this cycle instead of BID and see if this contributed.     Diarrhea. Has about 3-6 on days 3-5.  Secondary to chemotherapy, now back to her post-surgery baseline. We reviewed that she should take Imodium up to 8/day, if stools worse than her baseline with chemotherapy. She didn't take Imodium last cycle for fear of becoming constipated, encouraged small doses for trial.    Liver lesion. Indeterminate. Will f/u on this on her next imaging.     Iron deficiency anemia.   History of heavy menses. Recent endometrial biopsy on 4/23/19 was benign. S/p IV Infed x 1 (5/22/19).  Will need to check her iron levels again in the next 6-8 weeks.     Genetic Testing.  Due to young age of presentation of colon cancer and past hx of skin melanoma, she will meet with our genetic counselor in October.    Coping: she stopped her prozac which was low dose.  Concerned it was possibly interacting with her medications and she wanted to be off.  Discussed likely her fatigue on day 3 was  unrelated, but she would like to stay off for now.  We'll monitor for mood changes, specifically anxiety.      Angela Johnson PA-C  [facility]  [address], [place] [address] [phone]

## 2019-05-30 NOTE — PROGRESS NOTES
This is a recent snapshot of the patient's [facility] medical record.  For current drug dose and complete information and questions, call [phone] or In [facility] pool, fv home infusion ([id number])  CSN Number:  [id number]

## 2019-06-10 ENCOUNTER — INFUSION THERAPY VISIT (OUTPATIENT)
Dept: ONCOLOGY | Facility: CLINIC | Age: 39
End: 2019-06-10
Attending: INTERNAL MEDICINE
Payer: COMMERCIAL

## 2019-06-10 ENCOUNTER — ONCOLOGY VISIT (OUTPATIENT)
Dept: ONCOLOGY | Facility: CLINIC | Age: 39
End: 2019-06-10
Attending: PHYSICIAN ASSISTANT
Payer: COMMERCIAL

## 2019-06-10 ENCOUNTER — HOME INFUSION (PRE-WILLOW HOME INFUSION) (OUTPATIENT)
Dept: PHARMACY | Facility: CLINIC | Age: 39
End: 2019-06-10

## 2019-06-10 VITALS
WEIGHT: 170.4 LBS | HEART RATE: 67 BPM | OXYGEN SATURATION: 100 % | TEMPERATURE: 97.8 F | SYSTOLIC BLOOD PRESSURE: 112 MMHG | DIASTOLIC BLOOD PRESSURE: 70 MMHG | HEIGHT: 66 IN | RESPIRATION RATE: 18 BRPM | BODY MASS INDEX: 27.38 KG/M2

## 2019-06-10 DIAGNOSIS — C18.3 MALIGNANT NEOPLASM OF HEPATIC FLEXURE (H): Primary | ICD-10-CM

## 2019-06-10 LAB
ALBUMIN SERPL-MCNC: 3.6 G/DL (ref 3.4–5)
ALP SERPL-CCNC: 96 U/L (ref 40–150)
ALT SERPL W P-5'-P-CCNC: 27 U/L (ref 0–50)
ANION GAP SERPL CALCULATED.3IONS-SCNC: 5 MMOL/L (ref 3–14)
AST SERPL W P-5'-P-CCNC: 23 U/L (ref 0–45)
BASOPHILS # BLD AUTO: 0 10E9/L (ref 0–0.2)
BASOPHILS NFR BLD AUTO: 0.4 %
BILIRUB SERPL-MCNC: 0.2 MG/DL (ref 0.2–1.3)
BUN SERPL-MCNC: 6 MG/DL (ref 7–30)
CALCIUM SERPL-MCNC: 8.8 MG/DL (ref 8.5–10.1)
CHLORIDE SERPL-SCNC: 107 MMOL/L (ref 94–109)
CO2 SERPL-SCNC: 26 MMOL/L (ref 20–32)
CREAT SERPL-MCNC: 0.59 MG/DL (ref 0.52–1.04)
DIFFERENTIAL METHOD BLD: ABNORMAL
EOSINOPHIL # BLD AUTO: 0.1 10E9/L (ref 0–0.7)
EOSINOPHIL NFR BLD AUTO: 1.2 %
ERYTHROCYTE [DISTWIDTH] IN BLOOD BY AUTOMATED COUNT: 20.6 % (ref 10–15)
GFR SERPL CREATININE-BSD FRML MDRD: >90 ML/MIN/{1.73_M2}
GLUCOSE SERPL-MCNC: 105 MG/DL (ref 70–99)
HCT VFR BLD AUTO: 33.7 % (ref 35–47)
HGB BLD-MCNC: 10.7 G/DL (ref 11.7–15.7)
IMM GRANULOCYTES # BLD: 0 10E9/L (ref 0–0.4)
IMM GRANULOCYTES NFR BLD: 0.4 %
LYMPHOCYTES # BLD AUTO: 1.3 10E9/L (ref 0.8–5.3)
LYMPHOCYTES NFR BLD AUTO: 25.7 %
MCH RBC QN AUTO: 27.2 PG (ref 26.5–33)
MCHC RBC AUTO-ENTMCNC: 31.8 G/DL (ref 31.5–36.5)
MCV RBC AUTO: 86 FL (ref 78–100)
MONOCYTES # BLD AUTO: 0.7 10E9/L (ref 0–1.3)
MONOCYTES NFR BLD AUTO: 12.8 %
NEUTROPHILS # BLD AUTO: 3 10E9/L (ref 1.6–8.3)
NEUTROPHILS NFR BLD AUTO: 59.5 %
NRBC # BLD AUTO: 0 10*3/UL
NRBC BLD AUTO-RTO: 0 /100
PLATELET # BLD AUTO: 184 10E9/L (ref 150–450)
POTASSIUM SERPL-SCNC: 3.9 MMOL/L (ref 3.4–5.3)
PROT SERPL-MCNC: 6.8 G/DL (ref 6.8–8.8)
RBC # BLD AUTO: 3.94 10E12/L (ref 3.8–5.2)
SODIUM SERPL-SCNC: 139 MMOL/L (ref 133–144)
WBC # BLD AUTO: 5.1 10E9/L (ref 4–11)

## 2019-06-10 PROCEDURE — 25000128 H RX IP 250 OP 636: Mod: ZF | Performed by: PHYSICIAN ASSISTANT

## 2019-06-10 PROCEDURE — 96413 CHEMO IV INFUSION 1 HR: CPT

## 2019-06-10 PROCEDURE — 96368 THER/DIAG CONCURRENT INF: CPT

## 2019-06-10 PROCEDURE — 96415 CHEMO IV INFUSION ADDL HR: CPT

## 2019-06-10 PROCEDURE — 80053 COMPREHEN METABOLIC PANEL: CPT | Performed by: PHYSICIAN ASSISTANT

## 2019-06-10 PROCEDURE — G0463 HOSPITAL OUTPT CLINIC VISIT: HCPCS | Mod: ZF

## 2019-06-10 PROCEDURE — 96367 TX/PROPH/DG ADDL SEQ IV INF: CPT

## 2019-06-10 PROCEDURE — 85025 COMPLETE CBC W/AUTO DIFF WBC: CPT | Performed by: PHYSICIAN ASSISTANT

## 2019-06-10 PROCEDURE — 96375 TX/PRO/DX INJ NEW DRUG ADDON: CPT

## 2019-06-10 PROCEDURE — G0498 CHEMO EXTEND IV INFUS W/PUMP: HCPCS

## 2019-06-10 PROCEDURE — 99215 OFFICE O/P EST HI 40 MIN: CPT | Mod: ZP | Performed by: PHYSICIAN ASSISTANT

## 2019-06-10 PROCEDURE — 25800030 ZZH RX IP 258 OP 636: Mod: ZF | Performed by: PHYSICIAN ASSISTANT

## 2019-06-10 PROCEDURE — 96411 CHEMO IV PUSH ADDL DRUG: CPT

## 2019-06-10 RX ORDER — DIPHENHYDRAMINE HYDROCHLORIDE 50 MG/ML
50 INJECTION INTRAMUSCULAR; INTRAVENOUS
Status: CANCELLED
Start: 2019-06-10

## 2019-06-10 RX ORDER — METHYLPREDNISOLONE SODIUM SUCCINATE 125 MG/2ML
125 INJECTION, POWDER, LYOPHILIZED, FOR SOLUTION INTRAMUSCULAR; INTRAVENOUS
Status: CANCELLED
Start: 2019-06-10

## 2019-06-10 RX ORDER — PALONOSETRON 0.05 MG/ML
0.25 INJECTION, SOLUTION INTRAVENOUS ONCE
Status: COMPLETED | OUTPATIENT
Start: 2019-06-10 | End: 2019-06-10

## 2019-06-10 RX ORDER — PROCHLORPERAZINE MALEATE 10 MG
10 TABLET ORAL EVERY 6 HOURS PRN
Qty: 60 TABLET | Refills: 3 | Status: SHIPPED | OUTPATIENT
Start: 2019-06-10 | End: 2020-01-07

## 2019-06-10 RX ORDER — EPINEPHRINE 0.3 MG/.3ML
0.3 INJECTION SUBCUTANEOUS EVERY 5 MIN PRN
Status: CANCELLED | OUTPATIENT
Start: 2019-06-10

## 2019-06-10 RX ORDER — ALBUTEROL SULFATE 0.83 MG/ML
2.5 SOLUTION RESPIRATORY (INHALATION)
Status: CANCELLED | OUTPATIENT
Start: 2019-06-10

## 2019-06-10 RX ORDER — HEPARIN SODIUM (PORCINE) LOCK FLUSH IV SOLN 100 UNIT/ML 100 UNIT/ML
5 SOLUTION INTRAVENOUS EVERY 8 HOURS PRN
Status: DISCONTINUED | OUTPATIENT
Start: 2019-06-10 | End: 2019-06-10 | Stop reason: HOSPADM

## 2019-06-10 RX ORDER — FLUOROURACIL 50 MG/ML
400 INJECTION, SOLUTION INTRAVENOUS ONCE
Status: CANCELLED | OUTPATIENT
Start: 2019-06-10

## 2019-06-10 RX ORDER — LORAZEPAM 2 MG/ML
0.5 INJECTION INTRAMUSCULAR EVERY 4 HOURS PRN
Status: CANCELLED
Start: 2019-06-10

## 2019-06-10 RX ORDER — EPINEPHRINE 1 MG/ML
0.3 INJECTION, SOLUTION INTRAMUSCULAR; SUBCUTANEOUS EVERY 5 MIN PRN
Status: CANCELLED | OUTPATIENT
Start: 2019-06-10

## 2019-06-10 RX ORDER — SODIUM CHLORIDE 9 MG/ML
1000 INJECTION, SOLUTION INTRAVENOUS CONTINUOUS PRN
Status: CANCELLED
Start: 2019-06-10

## 2019-06-10 RX ORDER — PALONOSETRON 0.05 MG/ML
0.25 INJECTION, SOLUTION INTRAVENOUS ONCE
Status: CANCELLED
Start: 2019-06-10

## 2019-06-10 RX ORDER — FLUOROURACIL 50 MG/ML
400 INJECTION, SOLUTION INTRAVENOUS ONCE
Status: COMPLETED | OUTPATIENT
Start: 2019-06-10 | End: 2019-06-10

## 2019-06-10 RX ORDER — MEPERIDINE HYDROCHLORIDE 25 MG/ML
25 INJECTION INTRAMUSCULAR; INTRAVENOUS; SUBCUTANEOUS EVERY 30 MIN PRN
Status: CANCELLED | OUTPATIENT
Start: 2019-06-10

## 2019-06-10 RX ORDER — ALBUTEROL SULFATE 90 UG/1
1-2 AEROSOL, METERED RESPIRATORY (INHALATION)
Status: CANCELLED
Start: 2019-06-10

## 2019-06-10 RX ADMIN — PALONOSETRON HYDROCHLORIDE 0.25 MG: 0.25 INJECTION, SOLUTION INTRAVENOUS at 14:26

## 2019-06-10 RX ADMIN — LEUCOVORIN CALCIUM 650 MG: 350 INJECTION, POWDER, LYOPHILIZED, FOR SOLUTION INTRAMUSCULAR; INTRAVENOUS at 14:55

## 2019-06-10 RX ADMIN — DEXTROSE MONOHYDRATE 250 ML: 50 INJECTION, SOLUTION INTRAVENOUS at 14:26

## 2019-06-10 RX ADMIN — DEXAMETHASONE SODIUM PHOSPHATE: 10 INJECTION, SOLUTION INTRAMUSCULAR; INTRAVENOUS at 14:26

## 2019-06-10 RX ADMIN — FLUOROURACIL 755 MG: 50 INJECTION, SOLUTION INTRAVENOUS at 17:00

## 2019-06-10 RX ADMIN — HEPARIN 5 ML: 100 SYRINGE at 12:12

## 2019-06-10 RX ADMIN — OXALIPLATIN 129 MG: 5 INJECTION, SOLUTION, CONCENTRATE INTRAVENOUS at 14:57

## 2019-06-10 ASSESSMENT — PAIN SCALES - GENERAL: PAINLEVEL: NO PAIN (0)

## 2019-06-10 ASSESSMENT — MIFFLIN-ST. JEOR: SCORE: 1464.43

## 2019-06-10 NOTE — NURSING NOTE
Chief Complaint   Patient presents with     Port Draw     Labs drawn via port by RN in lab. Line flushed and hep locked. VS taken.     Adelina Vega RN

## 2019-06-10 NOTE — PATIENT INSTRUCTIONS
Contact Numbers    [facility] Main Line: [phone]  [facility] Triage and after hours / weekends / holidays:  [phone]      Please call the triage or after hours line if you experience a temperature greater than or equal to 100.5, shaking chills, have uncontrolled nausea, vomiting and/or diarrhea, dizziness, shortness of breath, chest pain, bleeding, unexplained bruising, or if you have any other new/concerning symptoms, questions or concerns.      If you are having any concerning symptoms or wish to speak to a provider before your next infusion visit, please call your care coordinator or triage to notify them so we can adequately serve you.     If you need a refill on a narcotic prescription or other medication, please call before your infusion appointment.         June 2019 Sunday Monday Tuesday Wednesday Thursday Friday Saturday                                 1       2     3     4     5     6     7     8       9     10    [facility] LAB DRAW  12:00 PM   (15 min.)   [facility] LAB DRAW   [facility] Lab Draw    [facility] RETURN  12:15 PM   (50 min.)   Melinda Mayo PA-C   [facility] Cancer [facility] ONC INFUSION 240   1:30 PM   (240 min.)    ONCOLOGY INFUSION   [facility] Cancer [facility] 11     12     13     14     15       16     17     18     19    [facility] NEW ENDOCRINE   8:45 AM   (60 min.)   Wilfredo Guzman MD   [facility] Endocrinology 20     21     22       23     24     25     26     27     28     29       30                                               July 2019 Sunday Monday Tuesday Wednesday Thursday Friday Saturday        1     2     3     4     5     6       7     8     9     10     11     12     13       14     15     16     17     18     19     20       21     22     23     24     25     26     27       28     29     30     31                                    Lab Results:  Recent Results (from the past 12 hour(s))   CBC with platelets differential    Collection Time:  06/10/19 12:19 PM   Result Value Ref Range    WBC 5.1 4.0 - 11.0 10e9/L    RBC Count 3.94 3.8 - 5.2 10e12/L    Hemoglobin 10.7 (L) 11.7 - 15.7 g/dL    Hematocrit 33.7 (L) 35.0 - 47.0 %    MCV 86 78 - 100 fl    MCH 27.2 26.5 - 33.0 pg    MCHC 31.8 31.5 - 36.5 g/dL    RDW 20.6 (H) 10.0 - 15.0 %    Platelet Count 184 150 - 450 10e9/L    Diff Method Automated Method     % Neutrophils 59.5 %    % Lymphocytes 25.7 %    % Monocytes 12.8 %    % Eosinophils 1.2 %    % Basophils 0.4 %    % Immature Granulocytes 0.4 %    Nucleated RBCs 0 0 /100    Absolute Neutrophil 3.0 1.6 - 8.3 10e9/L    Absolute Lymphocytes 1.3 0.8 - 5.3 10e9/L    Absolute Monocytes 0.7 0.0 - 1.3 10e9/L    Absolute Eosinophils 0.1 0.0 - 0.7 10e9/L    Absolute Basophils 0.0 0.0 - 0.2 10e9/L    Abs Immature Granulocytes 0.0 0 - 0.4 10e9/L    Absolute Nucleated RBC 0.0    Comprehensive metabolic panel    Collection Time: 06/10/19 12:19 PM   Result Value Ref Range    Sodium 139 133 - 144 mmol/L    Potassium 3.9 3.4 - 5.3 mmol/L    Chloride 107 94 - 109 mmol/L    Carbon Dioxide 26 20 - 32 mmol/L    Anion Gap 5 3 - 14 mmol/L    Glucose 105 (H) 70 - 99 mg/dL    Urea Nitrogen 6 (L) 7 - 30 mg/dL    Creatinine 0.59 0.52 - 1.04 mg/dL    GFR Estimate >90 >60 mL/min/[1.73_m2]    GFR Estimate If Black >90 >60 mL/min/[1.73_m2]    Calcium 8.8 8.5 - 10.1 mg/dL    Bilirubin Total 0.2 0.2 - 1.3 mg/dL    Albumin 3.6 3.4 - 5.0 g/dL    Protein Total 6.8 6.8 - 8.8 g/dL    Alkaline Phosphatase 96 40 - 150 U/L    ALT 27 0 - 50 U/L    AST 23 0 - 45 U/L

## 2019-06-10 NOTE — NURSING NOTE
"Oncology Rooming Note    Leslee 10, 2019 12:41 PM   Remedios Watts is a 38 year old female who presents for:    Chief Complaint   Patient presents with     Port Draw     Labs drawn via port by RN in lab. Line flushed and hep locked. VS taken.     Oncology Clinic Visit     Return visit related to Colon Cancer     Initial Vitals: /70 (BP Location: Right arm, Patient Position: Sitting, Cuff Size: Adult Regular)   Pulse 67   Temp 97.8  F (36.6  C) (Oral)   Resp 18   Ht 1.676 m (5' 5.98\")   Wt 77.3 kg (170 lb 6.4 oz)   SpO2 100%   BMI 27.52 kg/m   Estimated body mass index is 27.52 kg/m  as calculated from the following:    Height as of this encounter: 1.676 m (5' 5.98\").    Weight as of this encounter: 77.3 kg (170 lb 6.4 oz). Body surface area is 1.9 meters squared.  No Pain (0) Comment: Data Unavailable   No LMP recorded. (Menstrual status: Birth Control).  Allergies reviewed: Yes  Medications reviewed: Yes    Medications: Medication refills not needed today.  Pharmacy name entered into [facility]: [facility] - - [place], [place] - [id number] [facility]    Clinical concerns: No new concerns. Provider was notified.      Anisa Jacobs LPN            "

## 2019-06-10 NOTE — PROGRESS NOTES
Oncology follow up visit:  Date on this visit: Basil 10, 2019    REASON FOR VISIT: stage III colon cancer, here for assessment prior to FOLFOX C3    History Of Present Illness:    Please see my previous note for details.  I have copied and updated from prior note.  Ms. Watts is a 38 year old female who was admitted to the hospital on 3/10/2019 for bowel obstruction due to hepatic flexure colon mass.  She had a CT scan done on 3/10/2019 which showed apple core lesion near the hepatic flexure of the colon concerning for an obstructing colonic neoplasm.  No enlarged lymph nodes were seen.  There is a tiny subcentimeter left lobe liver lesion which is too small to characterize.  A CT of the chest on 3/13/2019 did not show evidence of metastatic disease in the chest.      Colonoscopy on 3/11/2019 showed fungating, infiltrative, highly-friable and ulcerated large mass at the hepatic flexure/proximal transverse colon, unable to traverse this lesions as this appears to be near-completely obstructive in nature (though allows liquid stool/effluent to pass under visualization). The mass was partially circumferential. Biopsies was consistent with invasive moderately differentiated adenocarcinoma compatible with colorectal primary and Mismatch repair enzymes were intact by immunohistochemistry.     On 3/22/2019 she had laparoscopic extended right hemicolectomy performed by Dr. Quesada.  Final pathology was consistent with a 3.7 cm hepatic flexure poorly differentiated adenocarcinoma with focal micropapillary growth extending through the muscularis propria and involving serosa (pT4).  There was lymphovascular invasion but no perineural invasion seen.  All margins were negative.  4 out of 38 lymph nodes were positive for metastatic carcinoma including 2 lymph nodes with micrometastasis.  Final staging was [id number].    She went to emergency room on 4/2/19 for worsening right-sided abdominal pain and had CT chest abdomen and  pelvis did not show any acute pathology or evidence of recurrence/metastasis. There were expected post operative changes. Labs including CBC/diff and CMP/lipase were unremarkable except for mild anemia. UA was negative. Pelvic US was also negative apart from 2 small left ovarian cysts.  She recently saw Gyn on 4/23/19 and had endometrial biopsy done which showed no diagnostic abnormalities identified, benign late secretory endometrium without atypia.    She started adjuvant FOLFOX on 4/29/19. She was seen for jaw, neck, and back pain and was given Flexeril following cycle 1.       She is here today for routine follow up prior to cycle 4 FOLFOX.     Interval History:  Remedios is here for follow up.  Patient reports that her neuropathy was particularly bad with this cycle of chemotherapy.  She reports that immediately after her infusion she had trouble with her feet and hands clenching and she was unable to walk so had to be wheeled out in a wheelchair.  She does still have cold sensitivity present now.  She reports that it was hard to use her hands and feet due to the clenching as well as numbness and tingling for about a week.  She did get sores on her lips that have gradually gotten better with Aquaphor.  She did have nausea again around days 1 through 4 with no vomiting that was managed with Compazine.  She again had diarrhea for about 4 days following her infusion.  She did take some Imodium with some relief.  She is now back to her post surgery baseline of 4-5 loose stools per day.  She did have an increase in her stooling today, but she questions if it is related to some anxiety about coming back in for her next infusion.  She did have a some stomach discomfort as well this morning.  She reports that her last menstrual period started on June 11 and was particularly heavy and lasted 7 days.  Around that time, she did also have lightheadedness with position changes.  She feels she has been doing well getting in  fluids.  She denies other concerns.    Past Medical/Surgical History:  Past Medical History:   Diagnosis Date     Cervical high risk HPV (human papillomavirus) test positive 04/23/2019    see problem list     Chickenpox      Diarrhea      Group B streptococcus urinary tract infection complicating pregnancy 4/20/2011    Plan PCN in labor      History of postpartum depression, currently pregnant 10/21/2010     Malignant melanoma (H)      Postpartum depression 12/17/2008     Tailbone injury     fx     Urinary tract infections    History of melanoma to the left arm and back treated with excision.  She follows closely with dermatology.    Past Surgical History:   Procedure Laterality Date     C ORAL SURGERY PROCEDURE       COLONOSCOPY N/A 3/11/2019    Procedure: COMBINED COLONOSCOPY, SINGLE OR MULTIPLE BIOPSY/POLYPECTOMY BY BIOPSY;  Surgeon: Loi Maldonado MD;  Location: [facility]     INSERT PORT VASCULAR ACCESS Right 4/22/2019    Procedure: Central venous Chest Port Placement - right;  Surgeon: Didier Frazier PA-C;  Location: [facility]     IR CHEST PORT PLACEMENT > 5 YRS OF AGE  4/22/2019     LAPAROSCOPIC ASSISTED COLECTOMY Right 3/22/2019    Procedure: Laparoscopic Extended Right Hemicolectomy and appendectomy;  Surgeon: Linda Quesada MD;  Location: [facility] OR     Current Medications:  Current Outpatient Medications   Medication Sig Dispense Refill     acetaminophen (TYLENOL) 325 MG tablet Take 3 tablets (975 mg) by mouth every 8 hours as needed for mild pain 100 tablet 0     cyclobenzaprine (FLEXERIL) 5 MG tablet Take 1-2 tablets (5-10 mg) by mouth 3 times daily as needed for muscle spasms 30 tablet 3     gabapentin (NEURONTIN) 300 MG capsule Take 1 pill po at bedtime for neuropathic pain 30 capsule 3     granisetron (KYTRIL) 1 MG tablet Take 1 tablet (1 mg) by mouth every 12 hours as needed for nausea 60 tablet 3     lidocaine-prilocaine (EMLA) 2.5-2.5 % external cream Apply topically as needed for moderate  "pain 30 g 0     LORazepam (ATIVAN) 0.5 MG tablet Take 1 tablet (0.5 mg) by mouth every 4 hours as needed (Anxiety, Nausea/Vomiting or Sleep) 30 tablet 2     LORazepam (ATIVAN) 0.5 MG tablet Take 1 tablet (0.5 mg) by mouth nightly as needed for sleep 10 tablet 0     prochlorperazine (COMPAZINE) 10 MG tablet Take 1 tablet (10 mg) by mouth every 6 hours as needed (Nausea/Vomiting) 30 tablet 2     tretinoin (RETIN-A) 0.05 % external cream Apply topically At Bedtime 45 g 3     FLUoxetine (PROZAC) 10 MG capsule Take 1 capsule (10 mg) by mouth daily (Patient not taking: Reported on 6/10/2019) 30 capsule 1      Family History:  Family History   Problem Relation Age of Onset     Arthritis Mother      Neurologic Disorder Mother         had a seizure      Alcohol/Drug Mother      Arthritis Maternal Grandmother      Breast Cancer Maternal Grandmother      Melanoma Maternal Grandmother      Heart Disease Paternal Grandmother      Cancer Father         skin   Maternal grandmother had breast cancer in her late 50s.  She also had a skin melanoma in her 70s.  Father had a skin melanoma in his 50s.  He had prostate cancer in his 60s.  Paternal Uncle had kidney cancer in 50s.  She has 4 children and the youngest is 7 years old and all are healthy.    Social History:  She denies any smoking.  She drinks alcohol occasionally.  She lives with her family.  She is a / by profession.    Physical Exam:  General: The patient is a pleasant female in no acute distress.  /70 (BP Location: Right arm, Patient Position: Sitting, Cuff Size: Adult Regular)   Pulse 67   Temp 97.8  F (36.6  C) (Oral)   Resp 18   Ht 1.676 m (5' 5.98\")   Wt 77.3 kg (170 lb 6.4 oz)   SpO2 100%   BMI 27.52 kg/m    Wt Readings from Last 10 Encounters:   06/10/19 77.3 kg (170 lb 6.4 oz)   05/29/19 77.3 kg (170 lb 6.4 oz)   05/13/19 76.2 kg (167 lb 14.4 oz)   05/03/19 77.1 kg (169 lb 15.6 oz)   04/29/19 77.2 kg (170 lb 3.2 oz)   04/25/19 " 77.4 kg (170 lb 9.6 oz)   04/23/19 77.1 kg (170 lb)   04/22/19 72.6 kg (160 lb)   04/15/19 75.8 kg (167 lb)   04/08/19 77.2 kg (170 lb 1.6 oz)   HEENT: EOMI, PERRL. Sclerae are anicteric. Oral mucosa is pink and moist with no mucositis on lips and cheeks, no thrush.   Lymph: Neck is supple with no lymphadenopathy in the cervical or supraclavicular areas.   Heart: Regular rate and rhythm.   Lungs: Clear to auscultation bilaterally.   Abdomen: Bowel sounds present, soft, nontender with no palpable hepatosplenomegaly or masses.   Extremities: No lower extremity edema noted bilaterally.   Neuro: Cranial nerves II through XII are grossly intact.  Skin: No rashes, petechiae, or bruising noted on exposed skin.    Laboratory/Imaging Studies   6/10/2019 12:19   Sodium 139   Potassium 3.9   Chloride 107   Carbon Dioxide 26   Urea Nitrogen 6 (L)   Creatinine 0.59   GFR Estimate >90   GFR Estimate If Black >90   Calcium 8.8   Anion Gap 5   Albumin 3.6   Protein Total 6.8   Bilirubin Total 0.2   Alkaline Phosphatase 96   ALT 27   AST 23   Glucose 105 (H)   WBC 5.1   Hemoglobin 10.7 (L)   Hematocrit 33.7 (L)   Platelet Count 184   RBC Count 3.94   MCV 86   MCH 27.2   MCHC 31.8   RDW 20.6 (H)   Diff Method Automated Method   % Neutrophils 59.5   % Lymphocytes 25.7   % Monocytes 12.8   % Eosinophils 1.2   % Basophils 0.4   % Immature Granulocytes 0.4   Nucleated RBCs 0   Absolute Neutrophil 3.0   Absolute Lymphocytes 1.3   Absolute Monocytes 0.7   Absolute Eosinophils 0.1   Absolute Basophils 0.0   Abs Immature Granulocytes 0.0   Absolute Nucleated RBC 0.0     ASSESSMENT/PLAN:  Stage IIIC poorly differentiated [id number] adenocarcinoma of the hepatic flexure -s/p laparoscopic right hemicolectomy on 3/22/19. All margins negative. 4/38 LNs positive. MSI-Intact.  Baseline CEA 9.2 on 3/11/2019. She started adjuvant FOLFOX on 4/29/2019. She has had some difficulty with muscle spasm, nausea, cold sensitivity, and diarrhea. She has had  progressive difficulty with neurotoxicity from oxaliplatin. I will decrease the oxaliplatin by 20%. She will continue with cycle 4 FOLFOX today. She will continue to be seen q2w to manage toxicity.  Her next infusion will be in 3 weeks due to her upcoming vacation from 6/24-6/28. We will plan to repeat a CT CAP after completing chemotherapy and then every 6 months for 2 years. She will be due for a colonoscopy in March 2020.     Acute Neurotoxicity. Cycle two had fairly rapid paresthesias and muscle cramping, worse with cycle 3. Will decrease the dose of oxaliplatin by 20% today.  She will use her muscle relaxant and uses warm blankets and drinks again today.    Peripheral neuropathy. Lasted for 1 week in her hands and feet, now resolved. She has gabapentin to take at night.     Nausea. Improved with IV Emend and Aloxi. She will continue to use Compazine after her chemotherapy. She also has Kytril available.     Diarrhea. Worse for the first 4 days after chemotherapy. Managed with Imodium. Was back to her post-surgery baseline until this morning. Question if increased stooling today related to anxiety about today's appointment, which patient acknowledges.     Liver lesion. Indeterminate. Will f/u on this on her next imaging.     Iron deficiency anemia.   History of heavy menses. Recent endometrial biopsy on 4/23/19 was benign. S/p IV Infed x 1 (5/22/19).  Will recheck iron studies in mid-August. MCV and hemoglobin are mildly trended up.     Genetic Testing.  Due to young age of presentation of colon cancer and past hx of skin melanoma, she will meet with our genetic counselor in October.    Coping: Doing okay. She had some anxiety about today's appointment. She plans to remain off of Prozac for now.    Melinda Mayo PA-C  [facility]  [address] [place] [address] [phone]

## 2019-06-11 NOTE — PROGRESS NOTES
This is a recent snapshot of the patient's [facility] medical record.  For current drug dose and complete information and questions, call [phone] or In Basket pool, [facility] home infusion ([id number])  CSN Number:  [id number]

## 2019-06-28 ENCOUNTER — TELEPHONE (OUTPATIENT)
Dept: CARE COORDINATION | Facility: CLINIC | Age: 39
End: 2019-06-28

## 2019-06-28 NOTE — TELEPHONE ENCOUNTER
[facility] now requests orders and shares plan of care/discharge summaries for some patients through [facility].  Please REPLY TO THIS MESSAGE OR ROUTE BACK TO THE AUTHOR in order to give authorization for orders when needed.  This is considered a verbal order, you will still receive a faxed copy of orders for signature.  Thank you for your assistance in improving collaboration for our patients.    ORDER  Requesting new recertification orders for home care. Requesting we see the client 3 times a month for 1 month and then 2 times a month for 1 month for disconnecting of chemo/deaccessing port. 3 prn visits for change in patient status, supervisory visits per agency protocol, & recertification assessment.     Thank you!  Cynthia Leyva, RN  [phone]

## 2019-07-01 NOTE — PROGRESS NOTES
"Oncology/Hematology Visit Note  Jul 2, 2019    Reason for Visit: Follow up of stage III colon cancer    History of Present Illness: Remedios Watts is a 38 year old female with PMH melanoma to left arm and back s/p excision with stage III colon cancer. Her history is as follows, copied forward from prior notes:    \"Ms. Watts is a 38 year old female who was admitted to the hospital on 3/10/2019 for bowel obstruction due to hepatic flexure colon mass. She had a CT scan done on 3/10/2019 which showed apple core lesion near the hepatic flexure of the colon concerning for an obstructing colonic neoplasm.  No enlarged lymph nodes were seen. There is a tiny subcentimeter left lobe liver lesion which is too small to characterize.  A CT of the chest on 3/13/2019 did not show evidence of metastatic disease in the chest.       Colonoscopy on 3/11/2019 showed fungating, infiltrative, highly-friable and ulcerated large mass at the hepatic flexure/proximal transverse colon, unable to traverse this lesions as this appears to be near-completely obstructive in nature (though allows liquid stool/effluent to pass under visualization). The mass was partially circumferential. Biopsies was consistent with invasive moderately differentiated adenocarcinoma compatible with colorectal primary and Mismatch repair enzymes were intact by immunohistochemistry.      On 3/22/2019 she had laparoscopic extended right hemicolectomy performed by Dr. Quesada. Final pathology was consistent with a 3.7 cm hepatic flexure poorly differentiated adenocarcinoma with focal micropapillary growth extending through the muscularis propria and involving serosa (pT4). There was lymphovascular invasion but no perineural invasion seen. All margins were negative. 4 out of 38 lymph nodes were positive for metastatic carcinoma including 2 lymph nodes with micrometastasis. Final staging was [id number].    She met with Dr. Capps 4/4/19 who recommended adjuvant FOLFOX. " "Port placed 4/22/19.       She started adjuvant FOLFOX on 4/29/19. She did require dose reduction of Oxaliplatin by 20% for neuropathy with cycle 4. She returns today for ongoing oncology follow-up prior to cycle 5 FOLFOX.    Interval History:  Remedios returns to clinic today unaccompanied. She is feeling well today and notes that the extra week off of chemo was helpful. She continues to have poor tolerance of the FOLFOX, though notes this last cycle wasn't any worse than previously. She still has almost instant neuro toxicity with muscle cramping in her feet and cold sensitivity to the point she hasn't been able to walk out of her infusion. She is doing Flexeril PRN which helps some, but she doesn't like how tired it makes her. She is doing gabapentin 300mg at night but doesn't note any benefit. The muscle cramping in her feet/calves/hands is worse for the first three days and then improves. She continues to have cold sensitivity with \"pins and needles\" in her hands and feet. She denies neuropathy without the cold and denies any interference with function.    She continues to have diarrhea. This has been present since her surgery, though she note that it is worse days 3-5. She takes Imodium which helps with this. She denies any abdominal pain. Nausea well controlled with PRN Ativan and Kytril starting day 3. No vomiting. Eating and drinking well though admits she has a poor appetite but forces herself to eat.     She denies fevers, chills, headaches, dizziness. Does have lip dryness/cracking that improves with Aquaphor and no significant mouth sores. No chest pain, SOB, or cough. No edema. Does get rash on her chest with pump disconnect that resolves on its own. Admits to heavy menstrual periods since starting treatment and is wondering if anything can be done for that.     She does mention her pump tends to end early on Thursday morning.     Current Outpatient Medications   Medication Sig Dispense Refill     " "acetaminophen (TYLENOL) 325 MG tablet Take 3 tablets (975 mg) by mouth every 8 hours as needed for mild pain 100 tablet 0     cyclobenzaprine (FLEXERIL) 5 MG tablet Take 1-2 tablets (5-10 mg) by mouth 3 times daily as needed for muscle spasms 30 tablet 3     FLUoxetine (PROZAC) 10 MG capsule Take 1 capsule (10 mg) by mouth daily (Patient not taking: Reported on 6/10/2019) 30 capsule 1     gabapentin (NEURONTIN) 300 MG capsule Take 1 pill po at bedtime for neuropathic pain 30 capsule 3     granisetron (KYTRIL) 1 MG tablet Take 1 tablet (1 mg) by mouth every 12 hours as needed for nausea 60 tablet 3     lidocaine-prilocaine (EMLA) 2.5-2.5 % external cream Apply topically as needed for moderate pain 30 g 0     LORazepam (ATIVAN) 0.5 MG tablet Take 1 tablet (0.5 mg) by mouth every 4 hours as needed (Anxiety, Nausea/Vomiting or Sleep) 30 tablet 2     LORazepam (ATIVAN) 0.5 MG tablet Take 1 tablet (0.5 mg) by mouth nightly as needed for sleep 10 tablet 0     prochlorperazine (COMPAZINE) 10 MG tablet Take 1 tablet (10 mg) by mouth every 6 hours as needed (Nausea/Vomiting) 60 tablet 3     tretinoin (RETIN-A) 0.05 % external cream Apply topically At Bedtime 45 g 3       Physical Examination:  /73 (BP Location: Right arm, Patient Position: Sitting, Cuff Size: Adult Regular)   Pulse 68   Temp 97.9  F (36.6  C) (Oral)   Resp 18   Ht 1.676 m (5' 5.98\")   Wt 77.1 kg (170 lb)   SpO2 98%   BMI 27.45 kg/m    Wt Readings from Last 10 Encounters:   06/10/19 77.3 kg (170 lb 6.4 oz)   05/29/19 77.3 kg (170 lb 6.4 oz)   05/13/19 76.2 kg (167 lb 14.4 oz)   05/03/19 77.1 kg (169 lb 15.6 oz)   04/29/19 77.2 kg (170 lb 3.2 oz)   04/25/19 77.4 kg (170 lb 9.6 oz)   04/23/19 77.1 kg (170 lb)   04/22/19 72.6 kg (160 lb)   04/15/19 75.8 kg (167 lb)   04/08/19 77.2 kg (170 lb 1.6 oz)     Constitutional: Well-appearing female in no acute distress.  Eyes: EOMI, PERRL. No scleral icterus.  ENT: Oral mucosa is moist without lesions or " thrush.   Lymphatic: Neck is supple without cervical or supraclavicular lymphadenopathy.   Cardiovascular: Regular rate and rhythm. No murmurs, gallops, or rubs. No peripheral edema.  Respiratory: Clear to auscultation bilaterally. No wheezes or crackles.  Gastrointestinal: Bowel sounds present. Abdomen soft, non-tender. No palpable hepatosplenomegaly or masses.   Neurologic: Cranial nerves II through XII are grossly intact.  Skin: No rashes, petechiae, or bruising noted on exposed skin.    Laboratory Data:  Results for MARK VÁZQUEZ (MRN [id number]) as of 7/2/2019 17:10   7/2/2019 12:35   Sodium 139   Potassium 3.8   Chloride 106   Carbon Dioxide 27   Urea Nitrogen 6 (L)   Creatinine 0.56   GFR Estimate >90   GFR Estimate If Black >90   Calcium 8.9   Anion Gap 5   Albumin 3.7   Protein Total 6.9   Bilirubin Total 0.4   Alkaline Phosphatase 102   ALT 36   AST 31   Glucose 82   WBC 5.0   Hemoglobin 12.2   Hematocrit 37.6   Platelet Count 180   RBC Count 4.22   MCV 89   MCH 28.9   MCHC 32.4   RDW 21.2 (H)   Diff Method Automated Method   % Neutrophils 52.7   % Lymphocytes 31.9   % Monocytes 13.8   % Eosinophils 1.2   % Basophils 0.2   % Immature Granulocytes 0.2   Nucleated RBCs 0   Absolute Neutrophil 2.6   Absolute Lymphocytes 1.6   Absolute Monocytes 0.7   Absolute Eosinophils 0.1   Absolute Basophils 0.0   Abs Immature Granulocytes 0.0   Absolute Nucleated RBC 0.0       Assessment and Plan:  1. Onc  Stage IIIC poorly differentiated [id number] adenocarcinoma of the hepatic flexure -s/p laparoscopic right hemicolectomy on 3/22/19. All margins negative. 4/38 LNs positive. MSI-Intact.  Baseline CEA 9.2 on 3/11/2019. She started adjuvant FOLFOX on 4/29/2019.     She has had difficulties with muscle spasm, nausea, cold sensitivity, and diarrhea. Oxaliplatin dose was reduced by 20% with cycle 4 for neurotoxicity. Continued to struggle this cycle but symptoms not worse so will continue with current dose and do ongoing  supportive care as below. Will continue to see her every 2 weeks with chemo. Overall plan for 6 months followed by CT CAP. Will need colonoscopy March 2020.    Did discuss pump concerns with pharmacy and they will reach out to [facility].    2. Neuro  Acute neurotoxicity manifested as muscle spasms and cold sensitivity with pain starting with infusion x 3 days. Doesn't tolerate Flexeril well so will try Zanaflex 2-4mg TID PRN muscle spasms. Will try giving her a dose in infusion to help with acute foot cramps. Continue heat/warm blankets/warm drinks in infusion today.    Cold sensitivity/neuropathy worse first week, then improves. Does have neuropathic pain at night. Discussed increasing Gabapentin to 600mg at bedtime to help with pain if Zanaflex not helpful, though monitor for drowsiness.    3. GI  Nausea stable with IV Emend and Aloxi along with Dex 8mg with infusion, then PRN Ativan/Compazine and Kytril starting day 3. No changes made today.    Diarrhea continues to persist since surgery. She is now far enough out from surgery we discussed slowly adding back fiber to her diet (OK per colorectal surgery note in April) to help with bulk. Continue Imodium PRN worsening diarrhea with treatment, manageable at this point.    No mucositis but does have lip irritation, now resolved but flares with treatment. Continue Aquaphor PRN.    Liver lesion on prior imaging indeterminate and will evaluate on next imaging.    4. Heme/Gyn  Iron deficiency anemia 2/2 heaving menses. Endometrial biopsy 4/23/19 was benign. S/p IV Infed 5/22/19. Hgb and iron studies have been improving and plan to repeat iron studies in August.    Does admit to ongoing heavy bleeding that is bothersome. Will reach out to gyn for follow-up and also they wanted her to see endocrine which has not yet been done.    5. Genetics  Will be seeing genetics in October for young presentation of colon cancer and history of skin melanoma. Also sees derm for routine  melanoma follow-up.    6. Psych  Not discussed in detail today. Off Prozac. Continue to monitor.    Nitesh Acevedo PA-C  [facility]  [address] [place] [address] [phone]

## 2019-07-02 ENCOUNTER — ONCOLOGY VISIT (OUTPATIENT)
Dept: ONCOLOGY | Facility: CLINIC | Age: 39
End: 2019-07-02
Attending: PHYSICIAN ASSISTANT
Payer: COMMERCIAL

## 2019-07-02 ENCOUNTER — APPOINTMENT (OUTPATIENT)
Dept: LAB | Facility: CLINIC | Age: 39
End: 2019-07-02
Attending: INTERNAL MEDICINE
Payer: COMMERCIAL

## 2019-07-02 ENCOUNTER — INFUSION THERAPY VISIT (OUTPATIENT)
Dept: ONCOLOGY | Facility: CLINIC | Age: 39
End: 2019-07-02
Attending: INTERNAL MEDICINE
Payer: COMMERCIAL

## 2019-07-02 VITALS
RESPIRATION RATE: 18 BRPM | HEART RATE: 68 BPM | BODY MASS INDEX: 27.32 KG/M2 | SYSTOLIC BLOOD PRESSURE: 110 MMHG | TEMPERATURE: 97.9 F | OXYGEN SATURATION: 98 % | HEIGHT: 66 IN | DIASTOLIC BLOOD PRESSURE: 73 MMHG | WEIGHT: 170 LBS

## 2019-07-02 DIAGNOSIS — C18.3 MALIGNANT NEOPLASM OF HEPATIC FLEXURE (H): Primary | ICD-10-CM

## 2019-07-02 DIAGNOSIS — G62.9 PERIPHERAL POLYNEUROPATHY: ICD-10-CM

## 2019-07-02 LAB
ALBUMIN SERPL-MCNC: 3.7 G/DL (ref 3.4–5)
ALP SERPL-CCNC: 102 U/L (ref 40–150)
ALT SERPL W P-5'-P-CCNC: 36 U/L (ref 0–50)
ANION GAP SERPL CALCULATED.3IONS-SCNC: 5 MMOL/L (ref 3–14)
AST SERPL W P-5'-P-CCNC: 31 U/L (ref 0–45)
BASOPHILS # BLD AUTO: 0 10E9/L (ref 0–0.2)
BASOPHILS NFR BLD AUTO: 0.2 %
BILIRUB SERPL-MCNC: 0.4 MG/DL (ref 0.2–1.3)
BUN SERPL-MCNC: 6 MG/DL (ref 7–30)
CALCIUM SERPL-MCNC: 8.9 MG/DL (ref 8.5–10.1)
CHLORIDE SERPL-SCNC: 106 MMOL/L (ref 94–109)
CO2 SERPL-SCNC: 27 MMOL/L (ref 20–32)
CREAT SERPL-MCNC: 0.56 MG/DL (ref 0.52–1.04)
DIFFERENTIAL METHOD BLD: ABNORMAL
EOSINOPHIL # BLD AUTO: 0.1 10E9/L (ref 0–0.7)
EOSINOPHIL NFR BLD AUTO: 1.2 %
ERYTHROCYTE [DISTWIDTH] IN BLOOD BY AUTOMATED COUNT: 21.2 % (ref 10–15)
GFR SERPL CREATININE-BSD FRML MDRD: >90 ML/MIN/{1.73_M2}
GLUCOSE SERPL-MCNC: 82 MG/DL (ref 70–99)
HCT VFR BLD AUTO: 37.6 % (ref 35–47)
HGB BLD-MCNC: 12.2 G/DL (ref 11.7–15.7)
IMM GRANULOCYTES # BLD: 0 10E9/L (ref 0–0.4)
IMM GRANULOCYTES NFR BLD: 0.2 %
LYMPHOCYTES # BLD AUTO: 1.6 10E9/L (ref 0.8–5.3)
LYMPHOCYTES NFR BLD AUTO: 31.9 %
MCH RBC QN AUTO: 28.9 PG (ref 26.5–33)
MCHC RBC AUTO-ENTMCNC: 32.4 G/DL (ref 31.5–36.5)
MCV RBC AUTO: 89 FL (ref 78–100)
MONOCYTES # BLD AUTO: 0.7 10E9/L (ref 0–1.3)
MONOCYTES NFR BLD AUTO: 13.8 %
NEUTROPHILS # BLD AUTO: 2.6 10E9/L (ref 1.6–8.3)
NEUTROPHILS NFR BLD AUTO: 52.7 %
NRBC # BLD AUTO: 0 10*3/UL
NRBC BLD AUTO-RTO: 0 /100
PLATELET # BLD AUTO: 180 10E9/L (ref 150–450)
POTASSIUM SERPL-SCNC: 3.8 MMOL/L (ref 3.4–5.3)
PROT SERPL-MCNC: 6.9 G/DL (ref 6.8–8.8)
RBC # BLD AUTO: 4.22 10E12/L (ref 3.8–5.2)
SODIUM SERPL-SCNC: 139 MMOL/L (ref 133–144)
WBC # BLD AUTO: 5 10E9/L (ref 4–11)

## 2019-07-02 PROCEDURE — 96368 THER/DIAG CONCURRENT INF: CPT

## 2019-07-02 PROCEDURE — 96375 TX/PRO/DX INJ NEW DRUG ADDON: CPT

## 2019-07-02 PROCEDURE — 80053 COMPREHEN METABOLIC PANEL: CPT | Performed by: PHYSICIAN ASSISTANT

## 2019-07-02 PROCEDURE — 25000128 H RX IP 250 OP 636: Mod: ZF | Performed by: PHYSICIAN ASSISTANT

## 2019-07-02 PROCEDURE — 85025 COMPLETE CBC W/AUTO DIFF WBC: CPT | Performed by: PHYSICIAN ASSISTANT

## 2019-07-02 PROCEDURE — 96413 CHEMO IV INFUSION 1 HR: CPT

## 2019-07-02 PROCEDURE — 99214 OFFICE O/P EST MOD 30 MIN: CPT | Mod: ZP | Performed by: PHYSICIAN ASSISTANT

## 2019-07-02 PROCEDURE — 96367 TX/PROPH/DG ADDL SEQ IV INF: CPT

## 2019-07-02 PROCEDURE — G0498 CHEMO EXTEND IV INFUS W/PUMP: HCPCS

## 2019-07-02 PROCEDURE — 96415 CHEMO IV INFUSION ADDL HR: CPT

## 2019-07-02 PROCEDURE — 96411 CHEMO IV PUSH ADDL DRUG: CPT

## 2019-07-02 PROCEDURE — 25800030 ZZH RX IP 258 OP 636: Mod: ZF | Performed by: PHYSICIAN ASSISTANT

## 2019-07-02 PROCEDURE — G0463 HOSPITAL OUTPT CLINIC VISIT: HCPCS | Mod: ZF

## 2019-07-02 RX ORDER — DIPHENHYDRAMINE HYDROCHLORIDE 50 MG/ML
50 INJECTION INTRAMUSCULAR; INTRAVENOUS
Status: CANCELLED
Start: 2019-07-02

## 2019-07-02 RX ORDER — GABAPENTIN 300 MG/1
300-600 CAPSULE ORAL
Qty: 30 CAPSULE | Refills: 3 | Status: SHIPPED | OUTPATIENT
Start: 2019-07-02 | End: 2020-01-07

## 2019-07-02 RX ORDER — EPINEPHRINE 1 MG/ML
0.3 INJECTION, SOLUTION INTRAMUSCULAR; SUBCUTANEOUS EVERY 5 MIN PRN
Status: CANCELLED | OUTPATIENT
Start: 2019-07-02

## 2019-07-02 RX ORDER — ALBUTEROL SULFATE 90 UG/1
1-2 AEROSOL, METERED RESPIRATORY (INHALATION)
Status: CANCELLED
Start: 2019-07-02

## 2019-07-02 RX ORDER — FLUOROURACIL 50 MG/ML
400 INJECTION, SOLUTION INTRAVENOUS ONCE
Status: COMPLETED | OUTPATIENT
Start: 2019-07-02 | End: 2019-07-02

## 2019-07-02 RX ORDER — TIZANIDINE 2 MG/1
2-4 TABLET ORAL 3 TIMES DAILY PRN
Qty: 30 TABLET | Refills: 1 | Status: SHIPPED | OUTPATIENT
Start: 2019-07-02 | End: 2019-07-15

## 2019-07-02 RX ORDER — HEPARIN SODIUM (PORCINE) LOCK FLUSH IV SOLN 100 UNIT/ML 100 UNIT/ML
5 SOLUTION INTRAVENOUS EVERY 8 HOURS
Status: DISCONTINUED | OUTPATIENT
Start: 2019-07-02 | End: 2019-07-02 | Stop reason: HOSPADM

## 2019-07-02 RX ORDER — LORAZEPAM 2 MG/ML
0.5 INJECTION INTRAMUSCULAR EVERY 4 HOURS PRN
Status: CANCELLED
Start: 2019-07-02

## 2019-07-02 RX ORDER — METHYLPREDNISOLONE SODIUM SUCCINATE 125 MG/2ML
125 INJECTION, POWDER, LYOPHILIZED, FOR SOLUTION INTRAMUSCULAR; INTRAVENOUS
Status: CANCELLED
Start: 2019-07-02

## 2019-07-02 RX ORDER — MEPERIDINE HYDROCHLORIDE 25 MG/ML
25 INJECTION INTRAMUSCULAR; INTRAVENOUS; SUBCUTANEOUS EVERY 30 MIN PRN
Status: CANCELLED | OUTPATIENT
Start: 2019-07-02

## 2019-07-02 RX ORDER — PALONOSETRON 0.05 MG/ML
0.25 INJECTION, SOLUTION INTRAVENOUS ONCE
Status: CANCELLED
Start: 2019-07-02

## 2019-07-02 RX ORDER — SODIUM CHLORIDE 9 MG/ML
1000 INJECTION, SOLUTION INTRAVENOUS CONTINUOUS PRN
Status: CANCELLED
Start: 2019-07-02

## 2019-07-02 RX ORDER — ALBUTEROL SULFATE 0.83 MG/ML
2.5 SOLUTION RESPIRATORY (INHALATION)
Status: CANCELLED | OUTPATIENT
Start: 2019-07-02

## 2019-07-02 RX ORDER — EPINEPHRINE 0.3 MG/.3ML
0.3 INJECTION SUBCUTANEOUS EVERY 5 MIN PRN
Status: CANCELLED | OUTPATIENT
Start: 2019-07-02

## 2019-07-02 RX ORDER — PALONOSETRON 0.05 MG/ML
0.25 INJECTION, SOLUTION INTRAVENOUS ONCE
Status: COMPLETED | OUTPATIENT
Start: 2019-07-02 | End: 2019-07-02

## 2019-07-02 RX ORDER — FLUOROURACIL 50 MG/ML
400 INJECTION, SOLUTION INTRAVENOUS ONCE
Status: CANCELLED | OUTPATIENT
Start: 2019-07-02

## 2019-07-02 RX ADMIN — PALONOSETRON HYDROCHLORIDE 0.25 MG: 0.25 INJECTION, SOLUTION INTRAVENOUS at 14:07

## 2019-07-02 RX ADMIN — DEXTROSE MONOHYDRATE 250 ML: 50 INJECTION, SOLUTION INTRAVENOUS at 14:07

## 2019-07-02 RX ADMIN — OXALIPLATIN 129 MG: 5 INJECTION, SOLUTION, CONCENTRATE INTRAVENOUS at 14:49

## 2019-07-02 RX ADMIN — FLUOROURACIL 755 MG: 50 INJECTION, SOLUTION INTRAVENOUS at 16:54

## 2019-07-02 RX ADMIN — LEUCOVORIN CALCIUM 650 MG: 500 INJECTION, POWDER, LYOPHILIZED, FOR SOLUTION INTRAMUSCULAR; INTRAVENOUS at 14:54

## 2019-07-02 RX ADMIN — HEPARIN 5 ML: 100 SYRINGE at 12:31

## 2019-07-02 RX ADMIN — DEXAMETHASONE SODIUM PHOSPHATE: 10 INJECTION, SOLUTION INTRAMUSCULAR; INTRAVENOUS at 14:13

## 2019-07-02 ASSESSMENT — MIFFLIN-ST. JEOR: SCORE: 1462.61

## 2019-07-02 ASSESSMENT — PAIN SCALES - GENERAL: PAINLEVEL: NO PAIN (0)

## 2019-07-02 NOTE — LETTER
"7/2/2019      RE: Remedios Watts  [address] [place] [address]       Oncology/Hematology Visit Note  Jul 2, 2019    Reason for Visit: Follow up of stage III colon cancer    History of Present Illness: Remedios Watts is a 38 year old female with PMH melanoma to left arm and back s/p excision with stage III colon cancer. Her history is as follows, copied forward from prior notes:    \"Ms. Watts is a 38 year old female who was admitted to the hospital on 3/10/2019 for bowel obstruction due to hepatic flexure colon mass. She had a CT scan done on 3/10/2019 which showed apple core lesion near the hepatic flexure of the colon concerning for an obstructing colonic neoplasm.  No enlarged lymph nodes were seen. There is a tiny subcentimeter left lobe liver lesion which is too small to characterize.  A CT of the chest on 3/13/2019 did not show evidence of metastatic disease in the chest.       Colonoscopy on 3/11/2019 showed fungating, infiltrative, highly-friable and ulcerated large mass at the hepatic flexure/proximal transverse colon, unable to traverse this lesions as this appears to be near-completely obstructive in nature (though allows liquid stool/effluent to pass under visualization). The mass was partially circumferential. Biopsies was consistent with invasive moderately differentiated adenocarcinoma compatible with colorectal primary and Mismatch repair enzymes were intact by immunohistochemistry.      On 3/22/2019 she had laparoscopic extended right hemicolectomy performed by Dr. Quesada. Final pathology was consistent with a 3.7 cm hepatic flexure poorly differentiated adenocarcinoma with focal micropapillary growth extending through the muscularis propria and involving serosa (pT4). There was lymphovascular invasion but no perineural invasion seen. All margins were negative. 4 out of 38 lymph nodes were positive for metastatic carcinoma including 2 lymph nodes with micrometastasis. Final " "staging was [id number].    She met with Dr. Capps 4/4/19 who recommended adjuvant FOLFOX. Port placed 4/22/19.       She started adjuvant FOLFOX on 4/29/19. She did require dose reduction of Oxaliplatin by 20% for neuropathy with cycle 4. She returns today for ongoing oncology follow-up prior to cycle 5 FOLFOX.    Interval History:  Remedios returns to clinic today unaccompanied. She is feeling well today and notes that the extra week off of chemo was helpful. She continues to have poor tolerance of the FOLFOX, though notes this last cycle wasn't any worse than previously. She still has almost instant neuro toxicity with muscle cramping in her feet and cold sensitivity to the point she hasn't been able to walk out of her infusion. She is doing Flexeril PRN which helps some, but she doesn't like how tired it makes her. She is doing gabapentin 300mg at night but doesn't note any benefit. The muscle cramping in her feet/calves/hands is worse for the first three days and then improves. She continues to have cold sensitivity with \"pins and needles\" in her hands and feet. She denies neuropathy without the cold and denies any interference with function.    She continues to have diarrhea. This has been present since her surgery, though she note that it is worse days 3-5. She takes Imodium which helps with this. She denies any abdominal pain. Nausea well controlled with PRN Ativan and Kytril starting day 3. No vomiting. Eating and drinking well though admits she has a poor appetite but forces herself to eat.     She denies fevers, chills, headaches, dizziness. Does have lip dryness/cracking that improves with Aquaphor and no significant mouth sores. No chest pain, SOB, or cough. No edema. Does get rash on her chest with pump disconnect that resolves on its own. Admits to heavy menstrual periods since starting treatment and is wondering if anything can be done for that.     She does mention her pump tends to end early on Thursday " "morning.     Current Outpatient Medications   Medication Sig Dispense Refill     acetaminophen (TYLENOL) 325 MG tablet Take 3 tablets (975 mg) by mouth every 8 hours as needed for mild pain 100 tablet 0     cyclobenzaprine (FLEXERIL) 5 MG tablet Take 1-2 tablets (5-10 mg) by mouth 3 times daily as needed for muscle spasms 30 tablet 3     FLUoxetine (PROZAC) 10 MG capsule Take 1 capsule (10 mg) by mouth daily (Patient not taking: Reported on 6/10/2019) 30 capsule 1     gabapentin (NEURONTIN) 300 MG capsule Take 1 pill po at bedtime for neuropathic pain 30 capsule 3     granisetron (KYTRIL) 1 MG tablet Take 1 tablet (1 mg) by mouth every 12 hours as needed for nausea 60 tablet 3     lidocaine-prilocaine (EMLA) 2.5-2.5 % external cream Apply topically as needed for moderate pain 30 g 0     LORazepam (ATIVAN) 0.5 MG tablet Take 1 tablet (0.5 mg) by mouth every 4 hours as needed (Anxiety, Nausea/Vomiting or Sleep) 30 tablet 2     LORazepam (ATIVAN) 0.5 MG tablet Take 1 tablet (0.5 mg) by mouth nightly as needed for sleep 10 tablet 0     prochlorperazine (COMPAZINE) 10 MG tablet Take 1 tablet (10 mg) by mouth every 6 hours as needed (Nausea/Vomiting) 60 tablet 3     tretinoin (RETIN-A) 0.05 % external cream Apply topically At Bedtime 45 g 3       Physical Examination:  /73 (BP Location: Right arm, Patient Position: Sitting, Cuff Size: Adult Regular)   Pulse 68   Temp 97.9  F (36.6  C) (Oral)   Resp 18   Ht 1.676 m (5' 5.98\")   Wt 77.1 kg (170 lb)   SpO2 98%   BMI 27.45 kg/m     Wt Readings from Last 10 Encounters:   06/10/19 77.3 kg (170 lb 6.4 oz)   05/29/19 77.3 kg (170 lb 6.4 oz)   05/13/19 76.2 kg (167 lb 14.4 oz)   05/03/19 77.1 kg (169 lb 15.6 oz)   04/29/19 77.2 kg (170 lb 3.2 oz)   04/25/19 77.4 kg (170 lb 9.6 oz)   04/23/19 77.1 kg (170 lb)   04/22/19 72.6 kg (160 lb)   04/15/19 75.8 kg (167 lb)   04/08/19 77.2 kg (170 lb 1.6 oz)     Constitutional: Well-appearing female in no acute distress.  Eyes: " EOMI, PERRL. No scleral icterus.  ENT: Oral mucosa is moist without lesions or thrush.   Lymphatic: Neck is supple without cervical or supraclavicular lymphadenopathy.   Cardiovascular: Regular rate and rhythm. No murmurs, gallops, or rubs. No peripheral edema.  Respiratory: Clear to auscultation bilaterally. No wheezes or crackles.  Gastrointestinal: Bowel sounds present. Abdomen soft, non-tender. No palpable hepatosplenomegaly or masses.   Neurologic: Cranial nerves II through XII are grossly intact.  Skin: No rashes, petechiae, or bruising noted on exposed skin.    Laboratory Data:  Results for MARK VÁZQUEZ (MRN [id number]) as of 7/2/2019 17:10   7/2/2019 12:35   Sodium 139   Potassium 3.8   Chloride 106   Carbon Dioxide 27   Urea Nitrogen 6 (L)   Creatinine 0.56   GFR Estimate >90   GFR Estimate If Black >90   Calcium 8.9   Anion Gap 5   Albumin 3.7   Protein Total 6.9   Bilirubin Total 0.4   Alkaline Phosphatase 102   ALT 36   AST 31   Glucose 82   WBC 5.0   Hemoglobin 12.2   Hematocrit 37.6   Platelet Count 180   RBC Count 4.22   MCV 89   MCH 28.9   MCHC 32.4   RDW 21.2 (H)   Diff Method Automated Method   % Neutrophils 52.7   % Lymphocytes 31.9   % Monocytes 13.8   % Eosinophils 1.2   % Basophils 0.2   % Immature Granulocytes 0.2   Nucleated RBCs 0   Absolute Neutrophil 2.6   Absolute Lymphocytes 1.6   Absolute Monocytes 0.7   Absolute Eosinophils 0.1   Absolute Basophils 0.0   Abs Immature Granulocytes 0.0   Absolute Nucleated RBC 0.0       Assessment and Plan:  1. Onc  Stage IIIC poorly differentiated [id number] adenocarcinoma of the hepatic flexure -s/p laparoscopic right hemicolectomy on 3/22/19. All margins negative. 4/38 LNs positive. MSI-Intact.  Baseline CEA 9.2 on 3/11/2019. She started adjuvant FOLFOX on 4/29/2019.     She has had difficulties with muscle spasm, nausea, cold sensitivity, and diarrhea. Oxaliplatin dose was reduced by 20% with cycle 4 for neurotoxicity. Continued to struggle this  cycle but symptoms not worse so will continue with current dose and do ongoing supportive care as below. Will continue to see her every 2 weeks with chemo. Overall plan for 6 months followed by CT CAP. Will need colonoscopy March 2020.    Did discuss pump concerns with pharmacy and they will reach out to [facility].    2. Neuro  Acute neurotoxicity manifested as muscle spasms and cold sensitivity with pain starting with infusion x 3 days. Doesn't tolerate Flexeril well so will try Zanaflex 2-4mg TID PRN muscle spasms. Will try giving her a dose in infusion to help with acute foot cramps. Continue heat/warm blankets/warm drinks in infusion today.    Cold sensitivity/neuropathy worse first week, then improves. Does have neuropathic pain at night. Discussed increasing Gabapentin to 600mg at bedtime to help with pain if Zanaflex not helpful, though monitor for drowsiness.    3. GI  Nausea stable with IV Emend and Aloxi along with Dex 8mg with infusion, then PRN Ativan/Compazine and Kytril starting day 3. No changes made today.    Diarrhea continues to persist since surgery. She is now far enough out from surgery we discussed slowly adding back fiber to her diet (OK per colorectal surgery note in April) to help with bulk. Continue Imodium PRN worsening diarrhea with treatment, manageable at this point.    No mucositis but does have lip irritation, now resolved but flares with treatment. Continue Aquaphor PRN.    Liver lesion on prior imaging indeterminate and will evaluate on next imaging.    4. Heme/Gyn  Iron deficiency anemia 2/2 heaving menses. Endometrial biopsy 4/23/19 was benign. S/p IV Infed 5/22/19. Hgb and iron studies have been improving and plan to repeat iron studies in August.    Does admit to ongoing heavy bleeding that is bothersome. Will reach out to gyn for follow-up and also they wanted her to see endocrine which has not yet been done.    5. Genetics  Will be seeing genetics in October for young presentation  of colon cancer and history of skin melanoma. Also sees derm for routine melanoma follow-up.    6. Psych  Not discussed in detail today. Off Prozac. Continue to monitor.    Nitesh Acevedo PA-C  [facility]  [address], [place] [id number] [phone]

## 2019-07-02 NOTE — PATIENT INSTRUCTIONS
Contact Numbers    [facility] Main Line: [phone]  [facility] Triage and after hours / weekends / holidays:  [phone]      Please call the triage or after hours line if you experience a temperature greater than or equal to 100.5, shaking chills, have uncontrolled nausea, vomiting and/or diarrhea, dizziness, shortness of breath, chest pain, bleeding, unexplained bruising, or if you have any other new/concerning symptoms, questions or concerns.      If you are having any concerning symptoms or wish to speak to a provider before your next infusion visit, please call your care coordinator or triage to notify them so we can adequately serve you.     If you need a refill on a narcotic prescription or other medication, please call before your infusion appointment.         July 2019 Sunday Monday Tuesday Wednesday Thursday Friday Saturday        1     2    [facility] D[facility]  12:00 PM   (15 min.)    [facility]    [facility] RETURN  12:15 PM   (50 min.)   Nitesh Acevedo PA   [facility] ONC INFUSION 240   1:30 PM   (240 min.)    ONCOLOGY INFUSION   [facility] 3     4     5     6       7     8     9     10     11     12     13       14     15    [facility] DRAW  10:45 AM   (15 min.)    [facility] DRAW   [facility] Lab Draw    [facility] RETURN  10:55 AM   (50 min.)   Melinda Mayo PA-C   [facility] ONC INFUSION 240   1:30 PM   (240 min.)    ONCOLOGY INFUSION   [facility] 16     17     18     19     20       21     22     23     24     25     26     27       28     29     30     31 August 2019 Sunday Monday Tuesday Wednesday Thursday Friday Saturday                       1     2     3       4     5     6     7     8     9     10       11     12     13    RETURN  10:00 AM   (15 min.)   Johnny Stevens MD   [place] [id number]      15     16     17       18     19     20     21     22     23     24       25     26     27     28     29     30     31                     Lab Results:  Recent Results (from the past 12 hour(s))   CBC with platelets differential    Collection Time: 07/02/19 12:35 PM   Result Value Ref Range    WBC 5.0 4.0 - 11.0 10e9/L    RBC Count 4.22 3.8 - 5.2 10e12/L    Hemoglobin 12.2 11.7 - 15.7 g/dL    Hematocrit 37.6 35.0 - 47.0 %    MCV 89 78 - 100 fl    MCH 28.9 26.5 - 33.0 pg    MCHC 32.4 31.5 - 36.5 g/dL    RDW 21.2 (H) 10.0 - 15.0 %    Platelet Count 180 150 - 450 10e9/L    Diff Method Automated Method     % Neutrophils 52.7 %    % Lymphocytes 31.9 %    % Monocytes 13.8 %    % Eosinophils 1.2 %    % Basophils 0.2 %    % Immature Granulocytes 0.2 %    Nucleated RBCs 0 0 /100    Absolute Neutrophil 2.6 1.6 - 8.3 10e9/L    Absolute Lymphocytes 1.6 0.8 - 5.3 10e9/L    Absolute Monocytes 0.7 0.0 - 1.3 10e9/L    Absolute Eosinophils 0.1 0.0 - 0.7 10e9/L    Absolute Basophils 0.0 0.0 - 0.2 10e9/L    Abs Immature Granulocytes 0.0 0 - 0.4 10e9/L    Absolute Nucleated RBC 0.0    Comprehensive metabolic panel    Collection Time: 07/02/19 12:35 PM   Result Value Ref Range    Sodium 139 133 - 144 mmol/L    Potassium 3.8 3.4 - 5.3 mmol/L    Chloride 106 94 - 109 mmol/L    Carbon Dioxide 27 20 - 32 mmol/L    Anion Gap 5 3 - 14 mmol/L    Glucose 82 70 - 99 mg/dL    Urea Nitrogen 6 (L) 7 - 30 mg/dL    Creatinine 0.56 0.52 - 1.04 mg/dL    GFR Estimate >90 >60 mL/min/[1.73_m2]    GFR Estimate If Black >90 >60 mL/min/[1.73_m2]    Calcium 8.9 8.5 - 10.1 mg/dL    Bilirubin Total 0.4 0.2 - 1.3 mg/dL    Albumin 3.7 3.4 - 5.0 g/dL    Protein Total 6.9 6.8 - 8.8 g/dL    Alkaline Phosphatase 102 40 - 150 U/L    ALT 36 0 - 50 U/L    AST 31 0 - 45 U/L

## 2019-07-02 NOTE — NURSING NOTE
"Oncology Rooming Note    July 2, 2019 12:49 PM   Remedios Watts is a 38 year old female who presents for:    Chief Complaint   Patient presents with     Blood Draw     Lab sdrawn via PORT by RN in lab. Line flushed with saline and heparin. VS taken.      Oncology Clinic Visit     F/U Colon Ca     Initial Vitals: /73 (BP Location: Right arm, Patient Position: Sitting, Cuff Size: Adult Regular)   Pulse 68   Temp 97.9  F (36.6  C) (Oral)   Resp 18   Ht 1.676 m (5' 5.98\")   Wt 77.1 kg (170 lb)   SpO2 98%   BMI 27.45 kg/m   Estimated body mass index is 27.45 kg/m  as calculated from the following:    Height as of this encounter: 1.676 m (5' 5.98\").    Weight as of this encounter: 77.1 kg (170 lb). Body surface area is 1.89 meters squared.  No Pain (0) Comment: Data Unavailable   No LMP recorded. (Menstrual status: Birth Control).  Allergies reviewed: Yes  Medications reviewed: Yes    Medications: Medication refills not needed today.  Pharmacy name entered into [facility]: [facility] - - [place] - [id number] [facility]    Clinical concerns: Yes, Patient states she is having a very heavy period this week., Nitesh was informed.      MARCUS MONTAGUE LPN            "

## 2019-07-02 NOTE — NURSING NOTE
Chief Complaint   Patient presents with     Blood Draw     Lab sdrawn via PORT by RN in lab. Line flushed with saline and heparin. VS taken.      Steffanie Chandra RN

## 2019-07-02 NOTE — PROGRESS NOTES
Infusion Nursing Note:  Remedios Watts presents today for Cycle 5 Day 1 Oxaliplatin, Leucovorin, Fluorouracil bolus and pump connect.    Patient seen by provider today: Yes: LIDIA Choe   present during visit today: Not Applicable.    Note: Patient presents to infusion today following her provider appointment. Patient denies any questions or concerns at this time.     7/2/19 1335 TORB LIDIA Choe/Kristin Garcia RN  -Ok to proceed with chemotherapy today. Oxaliplatin dose was reduced for last cycle, staying at the same reduced dose for today's dose.   -New prescription sent to [facility] pharmacy for new muscle relaxer. Have patient take one tablet 30 minutes prior to completion of Oxaliplatin infusion to try and alleviate some of the muscle cramping patient experiences with Oxaliplatin infusions.      Patient took a dose of oral Zanaflex at 1615, with approximately 30 minutes left of the Oxaliplatin infusion. Patient denied any cramping or numbness/tingling in her extremities throughout the remainder of infusion.     Intravenous Access:  Implanted Port.    Treatment Conditions:  Lab Results   Component Value Date    HGB 12.2 07/02/2019     Lab Results   Component Value Date    WBC 5.0 07/02/2019      Lab Results   Component Value Date    ANEU 2.6 07/02/2019     Lab Results   Component Value Date     07/02/2019      Lab Results   Component Value Date     07/02/2019                   Lab Results   Component Value Date    POTASSIUM 3.8 07/02/2019           No results found for: MAG         Lab Results   Component Value Date    CR 0.56 07/02/2019                   Lab Results   Component Value Date    MANDY 8.9 07/02/2019                Lab Results   Component Value Date    BILITOTAL 0.4 07/02/2019           Lab Results   Component Value Date    ALBUMIN 3.7 07/02/2019                    Lab Results   Component Value Date    ALT 36 07/02/2019           Lab Results   Component  "Value Date    AST 31 07/02/2019       Results reviewed, labs MET treatment parameters, ok to proceed with treatment.      Post Infusion Assessment:  Patient tolerated infusion without incident.  Blood return noted pre and post infusion.  Site patent and intact, free from redness, edema or discomfort.  No evidence of extravasations.  Access discontinued per protocol.     Prior to discharge: Port is secured in place with tegaderm and flushed with 10cc NS with positive blood return noted.  Continuous home infusion Dosi-Fuser pump connected.    All connectors secured in place and clamps taped open.  Tape, clamps, and connections double-checked by Zulay Evans RN.   Pump started, \"running\" noted on display (CADD): Not Applicable.  Patient instructed to call our clinic or [facility] with any questions or concerns at home.  Patient verbalized understanding.    Patient set up for pump disconnect at home with [facility] on Thursday 7/4/19 at 1000. Disconnect time scheduled early per patient's request. Writer confirmed disconnect time with COBY Ocasio at [facility]. Patient aware of disconnect time.       Discharge Plan:   Prescription refills given for Zanaflex, Gabapentin.  Discharge instructions reviewed with: Patient.  Patient and/or family verbalized understanding of discharge instructions and all questions answered.  AVS to patient via [employer].  Patient will return 7/15/19 for next appointment.   Patient discharged in stable condition accompanied by: .  Departure Mode: Ambulatory.    Kristin Garcia RN                        "

## 2019-07-03 DIAGNOSIS — Z53.9 DIAGNOSIS NOT YET DEFINED: Primary | ICD-10-CM

## 2019-07-03 PROCEDURE — G0179 MD RECERTIFICATION HHA PT: HCPCS | Performed by: INTERNAL MEDICINE

## 2019-07-08 ENCOUNTER — TELEPHONE (OUTPATIENT)
Dept: ONCOLOGY | Facility: CLINIC | Age: 39
End: 2019-07-08

## 2019-07-08 NOTE — TELEPHONE ENCOUNTER
----- Message from LIDIA Choe sent at 7/8/2019  7:21 AM CDT -----  Regarding: RE: [place] Patient: Ongoing bleeding concerns  Thank you for your quick reply.    Nadya can you reach out to the patient and let her know she should call to schedule gyn follow-up as below?    Thank you!  Nitesh Acevedo PA-C    ----- Message -----  From: Tata Yates MD  Sent: 7/7/2019  10:36 PM  To: LIDIA Choe  Subject: RE: [place] Patient: Ongoing bleeding concerns    Hi ,    Thank you for messaging me regarding this concern of the patient.   At her consultation with me this past April, we had discussed progesterone only methods of birth control primarily the IUD, but she elected to defer at that time as she wanted to see if the chemotherapy she was starting would temporarily take her periods away. If she would like to return to clinic to get the IUD, then she can make an appointment for this.   Let me know if you have any further questions or concerns.     Tata    ----- Message -----  From: Nitesh Acevedo PA  Sent: 7/7/2019   1:21 PM  To: Tata Yates MD, #  Subject: [place] Patient: Ongoing bleeding concerns        Hi Dr. Yates,    I am one of the PAs in oncology caring for Ms. Watts. She mentioned at her visit with me last week that she is still having extremely heavy periods and is wondering if there is anything that can be done to help that. I told her I would reach out to your team to see if she can be seen for follow-up given her ongoing issues.    Thank you for your assistance!  Nitesh Acevedo PA-C  Hematology-Oncology

## 2019-07-08 NOTE — TELEPHONE ENCOUNTER
LVM danita Whittaker that she should call the gyn office to make a return  appointment with Dr Yates.    Encouraged her to call with any additional questions or concerns.

## 2019-07-15 ENCOUNTER — INFUSION THERAPY VISIT (OUTPATIENT)
Dept: ONCOLOGY | Facility: CLINIC | Age: 39
End: 2019-07-15
Attending: INTERNAL MEDICINE
Payer: COMMERCIAL

## 2019-07-15 ENCOUNTER — HOME INFUSION (PRE-WILLOW HOME INFUSION) (OUTPATIENT)
Dept: PHARMACY | Facility: CLINIC | Age: 39
End: 2019-07-15

## 2019-07-15 ENCOUNTER — ONCOLOGY VISIT (OUTPATIENT)
Dept: ONCOLOGY | Facility: CLINIC | Age: 39
End: 2019-07-15
Attending: PHYSICIAN ASSISTANT
Payer: COMMERCIAL

## 2019-07-15 ENCOUNTER — APPOINTMENT (OUTPATIENT)
Dept: LAB | Facility: CLINIC | Age: 39
End: 2019-07-15
Attending: INTERNAL MEDICINE
Payer: COMMERCIAL

## 2019-07-15 VITALS
HEART RATE: 64 BPM | RESPIRATION RATE: 16 BRPM | WEIGHT: 172.9 LBS | HEIGHT: 66 IN | OXYGEN SATURATION: 99 % | DIASTOLIC BLOOD PRESSURE: 73 MMHG | SYSTOLIC BLOOD PRESSURE: 109 MMHG | TEMPERATURE: 98.3 F | BODY MASS INDEX: 27.79 KG/M2

## 2019-07-15 DIAGNOSIS — C18.3 MALIGNANT NEOPLASM OF HEPATIC FLEXURE (H): Primary | ICD-10-CM

## 2019-07-15 DIAGNOSIS — G62.9 PERIPHERAL POLYNEUROPATHY: ICD-10-CM

## 2019-07-15 LAB
ALBUMIN SERPL-MCNC: 3.6 G/DL (ref 3.4–5)
ALP SERPL-CCNC: 100 U/L (ref 40–150)
ALT SERPL W P-5'-P-CCNC: 22 U/L (ref 0–50)
ANION GAP SERPL CALCULATED.3IONS-SCNC: 6 MMOL/L (ref 3–14)
AST SERPL W P-5'-P-CCNC: 20 U/L (ref 0–45)
BASOPHILS # BLD AUTO: 0 10E9/L (ref 0–0.2)
BASOPHILS NFR BLD AUTO: 0.2 %
BILIRUB SERPL-MCNC: 0.3 MG/DL (ref 0.2–1.3)
BUN SERPL-MCNC: 7 MG/DL (ref 7–30)
CALCIUM SERPL-MCNC: 8.6 MG/DL (ref 8.5–10.1)
CHLORIDE SERPL-SCNC: 106 MMOL/L (ref 94–109)
CO2 SERPL-SCNC: 26 MMOL/L (ref 20–32)
CREAT SERPL-MCNC: 0.62 MG/DL (ref 0.52–1.04)
DIFFERENTIAL METHOD BLD: ABNORMAL
EOSINOPHIL # BLD AUTO: 0.1 10E9/L (ref 0–0.7)
EOSINOPHIL NFR BLD AUTO: 1.2 %
ERYTHROCYTE [DISTWIDTH] IN BLOOD BY AUTOMATED COUNT: 20.5 % (ref 10–15)
GFR SERPL CREATININE-BSD FRML MDRD: >90 ML/MIN/{1.73_M2}
GLUCOSE SERPL-MCNC: 99 MG/DL (ref 70–99)
HCT VFR BLD AUTO: 36.5 % (ref 35–47)
HGB BLD-MCNC: 12 G/DL (ref 11.7–15.7)
IMM GRANULOCYTES # BLD: 0 10E9/L (ref 0–0.4)
IMM GRANULOCYTES NFR BLD: 0.3 %
LYMPHOCYTES # BLD AUTO: 1.3 10E9/L (ref 0.8–5.3)
LYMPHOCYTES NFR BLD AUTO: 21.9 %
MCH RBC QN AUTO: 29.7 PG (ref 26.5–33)
MCHC RBC AUTO-ENTMCNC: 32.9 G/DL (ref 31.5–36.5)
MCV RBC AUTO: 90 FL (ref 78–100)
MONOCYTES # BLD AUTO: 0.9 10E9/L (ref 0–1.3)
MONOCYTES NFR BLD AUTO: 14.7 %
NEUTROPHILS # BLD AUTO: 3.7 10E9/L (ref 1.6–8.3)
NEUTROPHILS NFR BLD AUTO: 61.7 %
NRBC # BLD AUTO: 0 10*3/UL
NRBC BLD AUTO-RTO: 0 /100
PLATELET # BLD AUTO: 178 10E9/L (ref 150–450)
POTASSIUM SERPL-SCNC: 4.1 MMOL/L (ref 3.4–5.3)
PROT SERPL-MCNC: 6.8 G/DL (ref 6.8–8.8)
RBC # BLD AUTO: 4.04 10E12/L (ref 3.8–5.2)
SODIUM SERPL-SCNC: 138 MMOL/L (ref 133–144)
WBC # BLD AUTO: 6 10E9/L (ref 4–11)

## 2019-07-15 PROCEDURE — 96413 CHEMO IV INFUSION 1 HR: CPT

## 2019-07-15 PROCEDURE — 99214 OFFICE O/P EST MOD 30 MIN: CPT | Mod: ZP | Performed by: PHYSICIAN ASSISTANT

## 2019-07-15 PROCEDURE — G0498 CHEMO EXTEND IV INFUS W/PUMP: HCPCS

## 2019-07-15 PROCEDURE — 96411 CHEMO IV PUSH ADDL DRUG: CPT

## 2019-07-15 PROCEDURE — 85025 COMPLETE CBC W/AUTO DIFF WBC: CPT | Performed by: PHYSICIAN ASSISTANT

## 2019-07-15 PROCEDURE — 25000128 H RX IP 250 OP 636: Mod: ZF | Performed by: PHYSICIAN ASSISTANT

## 2019-07-15 PROCEDURE — 96375 TX/PRO/DX INJ NEW DRUG ADDON: CPT

## 2019-07-15 PROCEDURE — 25800030 ZZH RX IP 258 OP 636: Mod: ZF | Performed by: PHYSICIAN ASSISTANT

## 2019-07-15 PROCEDURE — 96368 THER/DIAG CONCURRENT INF: CPT

## 2019-07-15 PROCEDURE — 96367 TX/PROPH/DG ADDL SEQ IV INF: CPT

## 2019-07-15 PROCEDURE — 80053 COMPREHEN METABOLIC PANEL: CPT | Performed by: PHYSICIAN ASSISTANT

## 2019-07-15 PROCEDURE — G0463 HOSPITAL OUTPT CLINIC VISIT: HCPCS | Mod: ZF

## 2019-07-15 PROCEDURE — 96415 CHEMO IV INFUSION ADDL HR: CPT

## 2019-07-15 RX ORDER — SODIUM CHLORIDE 9 MG/ML
1000 INJECTION, SOLUTION INTRAVENOUS CONTINUOUS PRN
Status: CANCELLED
Start: 2019-07-15

## 2019-07-15 RX ORDER — HEPARIN SODIUM (PORCINE) LOCK FLUSH IV SOLN 100 UNIT/ML 100 UNIT/ML
5 SOLUTION INTRAVENOUS ONCE
Status: DISCONTINUED | OUTPATIENT
Start: 2019-07-15 | End: 2019-07-15 | Stop reason: CLARIF

## 2019-07-15 RX ORDER — EPINEPHRINE 0.3 MG/.3ML
0.3 INJECTION SUBCUTANEOUS EVERY 5 MIN PRN
Status: CANCELLED | OUTPATIENT
Start: 2019-07-15

## 2019-07-15 RX ORDER — FLUOROURACIL 50 MG/ML
400 INJECTION, SOLUTION INTRAVENOUS ONCE
Status: COMPLETED | OUTPATIENT
Start: 2019-07-15 | End: 2019-07-15

## 2019-07-15 RX ORDER — TIZANIDINE 2 MG/1
2 TABLET ORAL ONCE
Qty: 1 TABLET | Refills: 0 | Status: SHIPPED | OUTPATIENT
Start: 2019-07-15 | End: 2019-07-29

## 2019-07-15 RX ORDER — ALBUTEROL SULFATE 90 UG/1
1-2 AEROSOL, METERED RESPIRATORY (INHALATION)
Status: CANCELLED
Start: 2019-07-15

## 2019-07-15 RX ORDER — PALONOSETRON 0.05 MG/ML
0.25 INJECTION, SOLUTION INTRAVENOUS ONCE
Status: CANCELLED
Start: 2019-07-15

## 2019-07-15 RX ORDER — FLUOROURACIL 50 MG/ML
400 INJECTION, SOLUTION INTRAVENOUS ONCE
Status: CANCELLED | OUTPATIENT
Start: 2019-07-15

## 2019-07-15 RX ORDER — PALONOSETRON 0.05 MG/ML
0.25 INJECTION, SOLUTION INTRAVENOUS ONCE
Status: COMPLETED | OUTPATIENT
Start: 2019-07-15 | End: 2019-07-15

## 2019-07-15 RX ORDER — ALBUTEROL SULFATE 0.83 MG/ML
2.5 SOLUTION RESPIRATORY (INHALATION)
Status: CANCELLED | OUTPATIENT
Start: 2019-07-15

## 2019-07-15 RX ORDER — LORAZEPAM 2 MG/ML
0.5 INJECTION INTRAMUSCULAR EVERY 4 HOURS PRN
Status: CANCELLED
Start: 2019-07-15

## 2019-07-15 RX ORDER — MEPERIDINE HYDROCHLORIDE 25 MG/ML
25 INJECTION INTRAMUSCULAR; INTRAVENOUS; SUBCUTANEOUS EVERY 30 MIN PRN
Status: CANCELLED | OUTPATIENT
Start: 2019-07-15

## 2019-07-15 RX ORDER — DIPHENHYDRAMINE HYDROCHLORIDE 50 MG/ML
50 INJECTION INTRAMUSCULAR; INTRAVENOUS
Status: CANCELLED
Start: 2019-07-15

## 2019-07-15 RX ORDER — EPINEPHRINE 1 MG/ML
0.3 INJECTION, SOLUTION INTRAMUSCULAR; SUBCUTANEOUS EVERY 5 MIN PRN
Status: CANCELLED | OUTPATIENT
Start: 2019-07-15

## 2019-07-15 RX ORDER — METHYLPREDNISOLONE SODIUM SUCCINATE 125 MG/2ML
125 INJECTION, POWDER, LYOPHILIZED, FOR SOLUTION INTRAMUSCULAR; INTRAVENOUS
Status: CANCELLED
Start: 2019-07-15

## 2019-07-15 RX ORDER — HEPARIN SODIUM (PORCINE) LOCK FLUSH IV SOLN 100 UNIT/ML 100 UNIT/ML
5 SOLUTION INTRAVENOUS ONCE
Status: COMPLETED | OUTPATIENT
Start: 2019-07-15 | End: 2019-07-15

## 2019-07-15 RX ADMIN — PALONOSETRON HYDROCHLORIDE 0.25 MG: 0.25 INJECTION, SOLUTION INTRAVENOUS at 13:07

## 2019-07-15 RX ADMIN — DEXTROSE MONOHYDRATE 50 ML: 50 INJECTION, SOLUTION INTRAVENOUS at 13:07

## 2019-07-15 RX ADMIN — OXALIPLATIN 129 MG: 5 INJECTION, SOLUTION, CONCENTRATE INTRAVENOUS at 13:46

## 2019-07-15 RX ADMIN — DEXAMETHASONE SODIUM PHOSPHATE: 10 INJECTION, SOLUTION INTRAMUSCULAR; INTRAVENOUS at 13:07

## 2019-07-15 RX ADMIN — FLUOROURACIL 755 MG: 50 INJECTION, SOLUTION INTRAVENOUS at 15:55

## 2019-07-15 RX ADMIN — LEUCOVORIN CALCIUM 650 MG: 200 INJECTION, POWDER, LYOPHILIZED, FOR SOLUTION INTRAMUSCULAR; INTRAVENOUS at 13:42

## 2019-07-15 RX ADMIN — HEPARIN 5 ML: 100 SYRINGE at 11:30

## 2019-07-15 ASSESSMENT — PAIN SCALES - GENERAL: PAINLEVEL: NO PAIN (0)

## 2019-07-15 ASSESSMENT — MIFFLIN-ST. JEOR: SCORE: 1475.77

## 2019-07-15 NOTE — NURSING NOTE
"Chief Complaint   Patient presents with     Oncology Clinic Visit     Return visit related to Colon Cancer     Port Draw     port accessed and labs drawn by rn.  vital signs taken.     Port accessed by RN in lab with 20g 3/4\" power needle, labs drawn, port flushed with saline and heparin, vitals checked, pt checked in for next appointment.  Paola Orlando RN    "

## 2019-07-15 NOTE — PROGRESS NOTES
"Infusion Nursing Note:  Remedios Watts presents today for Cycle 6 Oxaliplatin and Fluorouracil Bolus and Pump.    Patient seen and examined by Melinda Mayo in clinic prior to infusion    Note: Pt takes a zanaflex pill 40 minutes prior completing oxaliplatin to counteract muscle cramping and cold sensitivity.    Fluorouacil continuous infuser connected at 1556.  Positive blood return from port.  Fluorouaracil to infuse over 46 hours at 5.2 cc/hour.  Fluorouracil will be disconnected on 7/17/19 at 1000 by [facility]. ( Pt requested this disconnect time because pump \"always finishes early\"  A message was sent to Melinda Mayo to see if pt should be getting a CADD pump for her following infusions.\"   A voice mail was left [facility]  With the  date and time of disconnect.  Prior to discharge. Demi Vasquez RN verified that connections were secured with tape, clamps were taped open, and pump temperature regulator was secured to skin.       Intravenous Access:  Implanted Port.  Blood return noted pre and post oxaliplatin  Fluorouracil Bolus infused into free flowing NS.  Positive blood return q2cc.  Port site without redness or swelling.           Treatment Conditions:  Lab Results   Component Value Date    HGB 12.0 07/15/2019     Lab Results   Component Value Date    WBC 6.0 07/15/2019      Lab Results   Component Value Date    ANEU 3.7 07/15/2019     Lab Results   Component Value Date     07/15/2019      Lab Results   Component Value Date     07/15/2019                   Lab Results   Component Value Date    POTASSIUM 4.1 07/15/2019           No results found for: MAG         Lab Results   Component Value Date    CR 0.62 07/15/2019                   Lab Results   Component Value Date    MANDY 8.6 07/15/2019                Lab Results   Component Value Date    BILITOTAL 0.3 07/15/2019           Lab Results   Component Value Date    ALBUMIN 3.6 07/15/2019                    Lab Results "   Component Value Date    ALT 22 07/15/2019           Lab Results   Component Value Date    AST 20 07/15/2019       Results reviewed, labs MET treatment parameters, ok to proceed with treatment.      Post Infusion Assessment:  Patient tolerated infusion without incident.        Discharge Plan:     Patient declined prescription refills.  Patient will return 7/29/119 for next appointment.  AVS to patient via [employer].    *-Face to Face time: 0.    Kyung Spear RN

## 2019-07-15 NOTE — PROGRESS NOTES
"Oncology/Hematology Visit Note  Jul 15, 2019    Reason for Visit: Follow up of stage III colon cancer    History of Present Illness: Remedios Watts is a 38 year old female with PMH melanoma to left arm and back s/p excision with stage III colon cancer. Her history is as follows, copied forward from prior notes:    \"Ms. Watts is a 38 year old female who was admitted to the hospital on 3/10/2019 for bowel obstruction due to hepatic flexure colon mass. She had a CT scan done on 3/10/2019 which showed apple core lesion near the hepatic flexure of the colon concerning for an obstructing colonic neoplasm.  No enlarged lymph nodes were seen. There is a tiny subcentimeter left lobe liver lesion which is too small to characterize.  A CT of the chest on 3/13/2019 did not show evidence of metastatic disease in the chest.       Colonoscopy on 3/11/2019 showed fungating, infiltrative, highly-friable and ulcerated large mass at the hepatic flexure/proximal transverse colon, unable to traverse this lesions as this appears to be near-completely obstructive in nature (though allows liquid stool/effluent to pass under visualization). The mass was partially circumferential. Biopsies was consistent with invasive moderately differentiated adenocarcinoma compatible with colorectal primary and Mismatch repair enzymes were intact by immunohistochemistry.      On 3/22/2019 she had laparoscopic extended right hemicolectomy performed by Dr. Quesada. Final pathology was consistent with a 3.7 cm hepatic flexure poorly differentiated adenocarcinoma with focal micropapillary growth extending through the muscularis propria and involving serosa (pT4). There was lymphovascular invasion but no perineural invasion seen. All margins were negative. 4 out of 38 lymph nodes were positive for metastatic carcinoma including 2 lymph nodes with micrometastasis. Final staging was [id number].    She met with Dr. Capps 4/4/19 who recommended adjuvant FOLFOX. " Port placed 4/22/19.       She started adjuvant FOLFOX on 4/29/19. She did require dose reduction of Oxaliplatin by 20% for neuropathy with cycle 4. She returns today for ongoing oncology follow-up prior to cycle 6 FOLFOX.    Interval History:  Patient reports that she found the Zanaflex particularly helpful that she took during her last infusion.  She did still get some the spasms in her legs, but did not feel them as much in her feet.  She did take the Zanaflex at home as well.  She still felt fatigued as she did with the Flexeril.  She is unsure if the gabapentin is helping, but does not wish to stop it for fear that it may make her neuropathy symptoms worse.  She feels that overall they are stable.  She has some anticipatory Pittore nausea with getting her port accessed as she does not like the taste of the saline.  She also has some mild nausea after chemotherapy that she manages with her antiemetics.  She again had the lip sores which she managed with Aquaphor.  She reports eating and drinking okay.  She reports that her bowels have been similar to prior cycles.  She does continue to get heavy periods and has not yet scheduled an appointment with the gynecologist.  She denies other concerns.    Current Outpatient Medications   Medication Sig Dispense Refill     gabapentin (NEURONTIN) 300 MG capsule Take 1-2 capsules (300-600 mg) by mouth nightly as needed (neuropathic pain) 30 capsule 3     granisetron (KYTRIL) 1 MG tablet Take 1 tablet (1 mg) by mouth every 12 hours as needed for nausea 60 tablet 3     lidocaine-prilocaine (EMLA) 2.5-2.5 % external cream Apply topically as needed for moderate pain 30 g 0     LORazepam (ATIVAN) 0.5 MG tablet Take 1 tablet (0.5 mg) by mouth every 4 hours as needed (Anxiety, Nausea/Vomiting or Sleep) 30 tablet 2     LORazepam (ATIVAN) 0.5 MG tablet Take 1 tablet (0.5 mg) by mouth nightly as needed for sleep 10 tablet 0     tiZANidine (ZANAFLEX) 2 MG tablet Take 1 tablet (2 mg)  "by mouth once for 1 dose 1 tablet 0     tretinoin (RETIN-A) 0.05 % external cream Apply topically At Bedtime 45 g 3     prochlorperazine (COMPAZINE) 10 MG tablet Take 1 tablet (10 mg) by mouth every 6 hours as needed (Nausea/Vomiting) (Patient not taking: Reported on 7/2/2019) 60 tablet 3     Physical Examination:  /73 (BP Location: Right arm, Patient Position: Sitting, Cuff Size: Adult Regular)   Pulse 64   Temp 98.3  F (36.8  C) (Oral)   Resp 16   Ht 1.676 m (5' 5.98\")   Wt 78.4 kg (172 lb 14.4 oz)   SpO2 99%   BMI 27.92 kg/m    Wt Readings from Last 10 Encounters:   07/15/19 78.4 kg (172 lb 14.4 oz)   07/02/19 77.1 kg (170 lb)   06/10/19 77.3 kg (170 lb 6.4 oz)   05/29/19 77.3 kg (170 lb 6.4 oz)   05/13/19 76.2 kg (167 lb 14.4 oz)   05/03/19 77.1 kg (169 lb 15.6 oz)   04/29/19 77.2 kg (170 lb 3.2 oz)   04/25/19 77.4 kg (170 lb 9.6 oz)   04/23/19 77.1 kg (170 lb)   04/22/19 72.6 kg (160 lb)   Constitutional: Well-appearing female in no acute distress.  Eyes: EOMI, PERRL. No scleral icterus.  ENT: Oral mucosa is moist without lesions or thrush.   Lymphatic: Neck is supple without cervical or supraclavicular lymphadenopathy.   Cardiovascular: Regular rate and rhythm. No peripheral edema.  Respiratory: Clear to auscultation bilaterally. No wheezes or crackles.  Gastrointestinal: Bowel sounds present. Abdomen soft, non-tender. No palpable hepatosplenomegaly or masses.   Neurologic: Cranial nerves II through XII are grossly intact.  Skin: Mild peeling in palm of right hand. Callus noted at base of left fourth digit. No erythema of palms noted. No rashes, petechiae, or bruising noted on exposed skin.    Laboratory Data:   7/15/2019 11:41   Sodium 138   Potassium 4.1   Chloride 106   Carbon Dioxide 26   Urea Nitrogen 7   Creatinine 0.62   GFR Estimate >90   GFR Estimate If Black >90   Calcium 8.6   Anion Gap 6   Albumin 3.6   Protein Total 6.8   Bilirubin Total 0.3   Alkaline Phosphatase 100   ALT 22   AST " 20   Glucose 99   WBC 6.0   Hemoglobin 12.0   Hematocrit 36.5   Platelet Count 178   RBC Count 4.04   MCV 90   MCH 29.7   MCHC 32.9   RDW 20.5 (H)   Diff Method Automated Method   % Neutrophils 61.7   % Lymphocytes 21.9   % Monocytes 14.7   % Eosinophils 1.2   % Basophils 0.2   % Immature Granulocytes 0.3   Nucleated RBCs 0   Absolute Neutrophil 3.7   Absolute Lymphocytes 1.3   Absolute Monocytes 0.9   Absolute Eosinophils 0.1   Absolute Basophils 0.0   Abs Immature Granulocytes 0.0   Absolute Nucleated RBC 0.0     Assessment and Plan:  1. Onc  Stage IIIC poorly differentiated [id number] adenocarcinoma of the hepatic flexure -s/p laparoscopic right hemicolectomy on 3/22/19. All margins negative. 4/38 LNs positive. MSI-Intact.  Baseline CEA 9.2 on 3/11/2019. She started adjuvant FOLFOX on 4/29/2019.     She has had difficulties with muscle spasm, nausea, cold sensitivity, and diarrhea. Oxaliplatin dose was reduced by 20% with cycle 4 for neurotoxicity. Continued to struggle this cycle but symptoms not worse so will continue with current dose and do ongoing supportive care as below. She will continue with cycle 6 FOLFOX today. Will continue to see her every 2 weeks with chemo. Overall plan for 6 months followed by CT CAP. Will need colonoscopy March 2020.    2. Neuro  Acute neurotoxicity manifested as muscle spasms and cold sensitivity with pain starting with infusion x 3 days. Doesn't tolerate Flexeril well. She tolerates Zanaflex fairly well, so will continue to use this instead for muscle spasms. She will take a dose again in infusion today.     Cold sensitivity/neuropathy worse first week, then improves. Does have neuropathic pain at night. Stable. She will continue on gabapentin at 600mg at bedtime.    3. GI  Nausea stable with IV Emend and Aloxi along with Dex 8mg with infusion, then PRN Ativan/Compazine and Kytril starting day 3. No changes made today.    Diarrhea continues to persist since surgery. She will  continue to use fiber and Imodium.    No mucositis but does have lip irritation, now resolved but flares with treatment. Continue Aquaphor PRN.    Liver lesion on prior imaging indeterminate and will evaluate on next imaging at the end of her adjuvant chemotherapy. Reviewed this plan with the patient today.     4. Heme/Gyn  Iron deficiency anemia 2/2 heaving menses. Endometrial biopsy 4/23/19 was benign. S/p IV Infed 5/22/19. Hgb and iron studies have been improving and plan to repeat iron studies in mid-August.    Heavy menses. Recommend f/u with gynecology to discuss getting a Mirena IUD.    5. Genetics  Will be seeing genetics in October for young presentation of colon cancer and history of skin melanoma. Also sees derm for routine melanoma follow-up.    Melinda Mayo PA-C  [facility]  [address], [place] [id number] [phone]

## 2019-07-15 NOTE — LETTER
"7/15/2019      RE: Remedios Watts  [address] [place] [address]       Oncology/Hematology Visit Note  Jul 15, 2019    Reason for Visit: Follow up of stage III colon cancer    History of Present Illness: Remedios Watts is a 38 year old female with PMH melanoma to left arm and back s/p excision with stage III colon cancer. Her history is as follows, copied forward from prior notes:    \"Ms. Watts is a 38 year old female who was admitted to the hospital on 3/10/2019 for bowel obstruction due to hepatic flexure colon mass. She had a CT scan done on 3/10/2019 which showed apple core lesion near the hepatic flexure of the colon concerning for an obstructing colonic neoplasm.  No enlarged lymph nodes were seen. There is a tiny subcentimeter left lobe liver lesion which is too small to characterize.  A CT of the chest on 3/13/2019 did not show evidence of metastatic disease in the chest.       Colonoscopy on 3/11/2019 showed fungating, infiltrative, highly-friable and ulcerated large mass at the hepatic flexure/proximal transverse colon, unable to traverse this lesions as this appears to be near-completely obstructive in nature (though allows liquid stool/effluent to pass under visualization). The mass was partially circumferential. Biopsies was consistent with invasive moderately differentiated adenocarcinoma compatible with colorectal primary and Mismatch repair enzymes were intact by immunohistochemistry.      On 3/22/2019 she had laparoscopic extended right hemicolectomy performed by Dr. Quesada. Final pathology was consistent with a 3.7 cm hepatic flexure poorly differentiated adenocarcinoma with focal micropapillary growth extending through the muscularis propria and involving serosa (pT4). There was lymphovascular invasion but no perineural invasion seen. All margins were negative. 4 out of 38 lymph nodes were positive for metastatic carcinoma including 2 lymph nodes with micrometastasis. Final " staging was [id number].    She met with Dr. Capps 4/4/19 who recommended adjuvant FOLFOX. Port placed 4/22/19.       She started adjuvant FOLFOX on 4/29/19. She did require dose reduction of Oxaliplatin by 20% for neuropathy with cycle 4. She returns today for ongoing oncology follow-up prior to cycle 6 FOLFOX.    Interval History:  Patient reports that she found the Zanaflex particularly helpful that she took during her last infusion.  She did still get some the spasms in her legs, but did not feel them as much in her feet.  She did take the Zanaflex at home as well.  She still felt fatigued as she did with the Flexeril.  She is unsure if the gabapentin is helping, but does not wish to stop it for fear that it may make her neuropathy symptoms worse.  She feels that overall they are stable.  She has some anticipatory Pittore nausea with getting her port accessed as she does not like the taste of the saline.  She also has some mild nausea after chemotherapy that she manages with her antiemetics.  She again had the lip sores which she managed with Aquaphor.  She reports eating and drinking okay.  She reports that her bowels have been similar to prior cycles.  She does continue to get heavy periods and has not yet scheduled an appointment with the gynecologist.  She denies other concerns.    Current Outpatient Medications   Medication Sig Dispense Refill     gabapentin (NEURONTIN) 300 MG capsule Take 1-2 capsules (300-600 mg) by mouth nightly as needed (neuropathic pain) 30 capsule 3     granisetron (KYTRIL) 1 MG tablet Take 1 tablet (1 mg) by mouth every 12 hours as needed for nausea 60 tablet 3     lidocaine-prilocaine (EMLA) 2.5-2.5 % external cream Apply topically as needed for moderate pain 30 g 0     LORazepam (ATIVAN) 0.5 MG tablet Take 1 tablet (0.5 mg) by mouth every 4 hours as needed (Anxiety, Nausea/Vomiting or Sleep) 30 tablet 2     LORazepam (ATIVAN) 0.5 MG tablet Take 1 tablet (0.5 mg) by mouth nightly as  "needed for sleep 10 tablet 0     tiZANidine (ZANAFLEX) 2 MG tablet Take 1 tablet (2 mg) by mouth once for 1 dose 1 tablet 0     tretinoin (RETIN-A) 0.05 % external cream Apply topically At Bedtime 45 g 3     prochlorperazine (COMPAZINE) 10 MG tablet Take 1 tablet (10 mg) by mouth every 6 hours as needed (Nausea/Vomiting) (Patient not taking: Reported on 7/2/2019) 60 tablet 3     Physical Examination:  /73 (BP Location: Right arm, Patient Position: Sitting, Cuff Size: Adult Regular)   Pulse 64   Temp 98.3  F (36.8  C) (Oral)   Resp 16   Ht 1.676 m (5' 5.98\")   Wt 78.4 kg (172 lb 14.4 oz)   SpO2 99%   BMI 27.92 kg/m     Wt Readings from Last 10 Encounters:   07/15/19 78.4 kg (172 lb 14.4 oz)   07/02/19 77.1 kg (170 lb)   06/10/19 77.3 kg (170 lb 6.4 oz)   05/29/19 77.3 kg (170 lb 6.4 oz)   05/13/19 76.2 kg (167 lb 14.4 oz)   05/03/19 77.1 kg (169 lb 15.6 oz)   04/29/19 77.2 kg (170 lb 3.2 oz)   04/25/19 77.4 kg (170 lb 9.6 oz)   04/23/19 77.1 kg (170 lb)   04/22/19 72.6 kg (160 lb)   Constitutional: Well-appearing female in no acute distress.  Eyes: EOMI, PERRL. No scleral icterus.  ENT: Oral mucosa is moist without lesions or thrush.   Lymphatic: Neck is supple without cervical or supraclavicular lymphadenopathy.   Cardiovascular: Regular rate and rhythm. No peripheral edema.  Respiratory: Clear to auscultation bilaterally. No wheezes or crackles.  Gastrointestinal: Bowel sounds present. Abdomen soft, non-tender. No palpable hepatosplenomegaly or masses.   Neurologic: Cranial nerves II through XII are grossly intact.  Skin: Mild peeling in palm of right hand. Callus noted at base of left fourth digit. No erythema of palms noted. No rashes, petechiae, or bruising noted on exposed skin.    Laboratory Data:   7/15/2019 11:41   Sodium 138   Potassium 4.1   Chloride 106   Carbon Dioxide 26   Urea Nitrogen 7   Creatinine 0.62   GFR Estimate >90   GFR Estimate If Black >90   Calcium 8.6   Anion Gap 6   Albumin " 3.6   Protein Total 6.8   Bilirubin Total 0.3   Alkaline Phosphatase 100   ALT 22   AST 20   Glucose 99   WBC 6.0   Hemoglobin 12.0   Hematocrit 36.5   Platelet Count 178   RBC Count 4.04   MCV 90   MCH 29.7   MCHC 32.9   RDW 20.5 (H)   Diff Method Automated Method   % Neutrophils 61.7   % Lymphocytes 21.9   % Monocytes 14.7   % Eosinophils 1.2   % Basophils 0.2   % Immature Granulocytes 0.3   Nucleated RBCs 0   Absolute Neutrophil 3.7   Absolute Lymphocytes 1.3   Absolute Monocytes 0.9   Absolute Eosinophils 0.1   Absolute Basophils 0.0   Abs Immature Granulocytes 0.0   Absolute Nucleated RBC 0.0     Assessment and Plan:  1. Onc  Stage IIIC poorly differentiated [id number] adenocarcinoma of the hepatic flexure -s/p laparoscopic right hemicolectomy on 3/22/19. All margins negative. 4/38 LNs positive. MSI-Intact.  Baseline CEA 9.2 on 3/11/2019. She started adjuvant FOLFOX on 4/29/2019.     She has had difficulties with muscle spasm, nausea, cold sensitivity, and diarrhea. Oxaliplatin dose was reduced by 20% with cycle 4 for neurotoxicity. Continued to struggle this cycle but symptoms not worse so will continue with current dose and do ongoing supportive care as below. She will continue with cycle 6 FOLFOX today. Will continue to see her every 2 weeks with chemo. Overall plan for 6 months followed by CT CAP. Will need colonoscopy March 2020.    2. Neuro  Acute neurotoxicity manifested as muscle spasms and cold sensitivity with pain starting with infusion x 3 days. Doesn't tolerate Flexeril well. She tolerates Zanaflex fairly well, so will continue to use this instead for muscle spasms. She will take a dose again in infusion today.     Cold sensitivity/neuropathy worse first week, then improves. Does have neuropathic pain at night. Stable. She will continue on gabapentin at 600mg at bedtime.    3. GI  Nausea stable with IV Emend and Aloxi along with Dex 8mg with infusion, then PRN Ativan/Compazine and Kytril starting  day 3. No changes made today.    Diarrhea continues to persist since surgery. She will continue to use fiber and Imodium.    No mucositis but does have lip irritation, now resolved but flares with treatment. Continue Aquaphor PRN.    Liver lesion on prior imaging indeterminate and will evaluate on next imaging at the end of her adjuvant chemotherapy. Reviewed this plan with the patient today.     4. Heme/Gyn  Iron deficiency anemia 2/2 heaving menses. Endometrial biopsy 4/23/19 was benign. S/p IV Infed 5/22/19. Hgb and iron studies have been improving and plan to repeat iron studies in mid-August.    Heavy menses. Recommend f/u with gynecology to discuss getting a Mirena IUD.    5. Genetics  Will be seeing genetics in October for young presentation of colon cancer and history of skin melanoma. Also sees derm for routine melanoma follow-up.    Melinda Mayo PA-C  [facility]  [address] [place] [id number] [phone]

## 2019-07-15 NOTE — NURSING NOTE
"Oncology Rooming Note    July 15, 2019 11:51 AM   Remedios Watts is a 38 year old female who presents for:    Chief Complaint   Patient presents with     Oncology Clinic Visit     Return visit related to Colon Cancer     Port Draw     port accessed and labs drawn by rn.  vital signs taken.     Initial Vitals: /73 (BP Location: Right arm, Patient Position: Sitting, Cuff Size: Adult Regular)   Pulse 64   Temp 98.3  F (36.8  C) (Oral)   Resp 16   Ht 1.676 m (5' 5.98\")   Wt 78.4 kg (172 lb 14.4 oz)   SpO2 99%   BMI 27.92 kg/m   Estimated body mass index is 27.92 kg/m  as calculated from the following:    Height as of this encounter: 1.676 m (5' 5.98\").    Weight as of this encounter: 78.4 kg (172 lb 14.4 oz). Body surface area is 1.91 meters squared.  No Pain (0) Comment: Data Unavailable   No LMP recorded. (Menstrual status: Birth Control).  Allergies reviewed: Yes  Medications reviewed: Yes    Medications: Medication refills not needed today.  Pharmacy name entered into [facility]: [facility] - - [place], [place] - [id number] [facility]    Clinical concerns: No new  concerns.Provider was notified.      Anisa Jacobs LPN            "

## 2019-07-15 NOTE — PATIENT INSTRUCTIONS
Contact Numbers    [facility] Main Line: [phone]  [facility] Triage and after hours / weekends / holidays:  [phone]      Please call the triage or after hours line if you experience a temperature greater than or equal to 100.5, shaking chills, have uncontrolled nausea, vomiting and/or diarrhea, dizziness, shortness of breath, chest pain, bleeding, unexplained bruising, or if you have any other new/concerning symptoms, questions or concerns.      If you are having any concerning symptoms or wish to speak to a provider before your next infusion visit, please call your care coordinator or triage to notify them so we can adequately serve you.     If you need a refill on a narcotic prescription or other medication, please call before your infusion appointment.

## 2019-07-16 ENCOUNTER — HOME INFUSION (PRE-WILLOW HOME INFUSION) (OUTPATIENT)
Dept: PHARMACY | Facility: CLINIC | Age: 39
End: 2019-07-16

## 2019-07-16 NOTE — PROGRESS NOTES
This is a recent snapshot of the patient's [facility] medical record.  For current drug dose and complete information and questions, call [phone] or In Basket pool, fv home infusion ([id number])  CSN Number:  [id number]

## 2019-07-17 NOTE — PROGRESS NOTES
This is a recent snapshot of the patient's [facility] medical record.  For current drug dose and complete information and questions, call [phone] or In Basket pool, fv home infusion ([id number])  CSN Number:  [id number]

## 2019-07-29 ENCOUNTER — ONCOLOGY VISIT (OUTPATIENT)
Dept: ONCOLOGY | Facility: CLINIC | Age: 39
End: 2019-07-29
Attending: PHYSICIAN ASSISTANT
Payer: COMMERCIAL

## 2019-07-29 ENCOUNTER — HOME INFUSION (PRE-WILLOW HOME INFUSION) (OUTPATIENT)
Dept: PHARMACY | Facility: CLINIC | Age: 39
End: 2019-07-29

## 2019-07-29 ENCOUNTER — APPOINTMENT (OUTPATIENT)
Dept: LAB | Facility: CLINIC | Age: 39
End: 2019-07-29
Attending: INTERNAL MEDICINE
Payer: COMMERCIAL

## 2019-07-29 VITALS
BODY MASS INDEX: 28.11 KG/M2 | DIASTOLIC BLOOD PRESSURE: 73 MMHG | SYSTOLIC BLOOD PRESSURE: 108 MMHG | TEMPERATURE: 98 F | WEIGHT: 174.1 LBS | OXYGEN SATURATION: 100 % | HEART RATE: 65 BPM | RESPIRATION RATE: 16 BRPM

## 2019-07-29 DIAGNOSIS — C18.3 MALIGNANT NEOPLASM OF HEPATIC FLEXURE (H): Primary | ICD-10-CM

## 2019-07-29 DIAGNOSIS — G62.9 PERIPHERAL POLYNEUROPATHY: ICD-10-CM

## 2019-07-29 LAB
ALBUMIN SERPL-MCNC: 3.6 G/DL (ref 3.4–5)
ALP SERPL-CCNC: 108 U/L (ref 40–150)
ALT SERPL W P-5'-P-CCNC: 28 U/L (ref 0–50)
ANION GAP SERPL CALCULATED.3IONS-SCNC: 4 MMOL/L (ref 3–14)
AST SERPL W P-5'-P-CCNC: 32 U/L (ref 0–45)
BASOPHILS # BLD AUTO: 0 10E9/L (ref 0–0.2)
BASOPHILS NFR BLD AUTO: 0.5 %
BILIRUB SERPL-MCNC: 0.3 MG/DL (ref 0.2–1.3)
BUN SERPL-MCNC: 6 MG/DL (ref 7–30)
CALCIUM SERPL-MCNC: 8.4 MG/DL (ref 8.5–10.1)
CHLORIDE SERPL-SCNC: 108 MMOL/L (ref 94–109)
CO2 SERPL-SCNC: 28 MMOL/L (ref 20–32)
CREAT SERPL-MCNC: 0.67 MG/DL (ref 0.52–1.04)
DIFFERENTIAL METHOD BLD: ABNORMAL
EOSINOPHIL # BLD AUTO: 0.1 10E9/L (ref 0–0.7)
EOSINOPHIL NFR BLD AUTO: 1.6 %
ERYTHROCYTE [DISTWIDTH] IN BLOOD BY AUTOMATED COUNT: 19.6 % (ref 10–15)
GFR SERPL CREATININE-BSD FRML MDRD: >90 ML/MIN/{1.73_M2}
GLUCOSE SERPL-MCNC: 95 MG/DL (ref 70–99)
HCT VFR BLD AUTO: 38 % (ref 35–47)
HGB BLD-MCNC: 12.3 G/DL (ref 11.7–15.7)
IMM GRANULOCYTES # BLD: 0 10E9/L (ref 0–0.4)
IMM GRANULOCYTES NFR BLD: 0.5 %
LYMPHOCYTES # BLD AUTO: 1.2 10E9/L (ref 0.8–5.3)
LYMPHOCYTES NFR BLD AUTO: 27 %
MCH RBC QN AUTO: 30.3 PG (ref 26.5–33)
MCHC RBC AUTO-ENTMCNC: 32.4 G/DL (ref 31.5–36.5)
MCV RBC AUTO: 94 FL (ref 78–100)
MONOCYTES # BLD AUTO: 0.6 10E9/L (ref 0–1.3)
MONOCYTES NFR BLD AUTO: 14.8 %
NEUTROPHILS # BLD AUTO: 2.4 10E9/L (ref 1.6–8.3)
NEUTROPHILS NFR BLD AUTO: 55.6 %
NRBC # BLD AUTO: 0 10*3/UL
NRBC BLD AUTO-RTO: 0 /100
PLATELET # BLD AUTO: 170 10E9/L (ref 150–450)
POTASSIUM SERPL-SCNC: 3.8 MMOL/L (ref 3.4–5.3)
PROT SERPL-MCNC: 6.8 G/DL (ref 6.8–8.8)
RBC # BLD AUTO: 4.06 10E12/L (ref 3.8–5.2)
SODIUM SERPL-SCNC: 139 MMOL/L (ref 133–144)
WBC # BLD AUTO: 4.3 10E9/L (ref 4–11)

## 2019-07-29 PROCEDURE — 96413 CHEMO IV INFUSION 1 HR: CPT

## 2019-07-29 PROCEDURE — 96415 CHEMO IV INFUSION ADDL HR: CPT

## 2019-07-29 PROCEDURE — 25000125 ZZHC RX 250: Mod: ZF | Performed by: PHYSICIAN ASSISTANT

## 2019-07-29 PROCEDURE — 25800030 ZZH RX IP 258 OP 636: Mod: ZF | Performed by: PHYSICIAN ASSISTANT

## 2019-07-29 PROCEDURE — 36415 COLL VENOUS BLD VENIPUNCTURE: CPT

## 2019-07-29 PROCEDURE — 25000128 H RX IP 250 OP 636: Mod: ZF | Performed by: PHYSICIAN ASSISTANT

## 2019-07-29 PROCEDURE — 85025 COMPLETE CBC W/AUTO DIFF WBC: CPT | Performed by: PHYSICIAN ASSISTANT

## 2019-07-29 PROCEDURE — 36593 DECLOT VASCULAR DEVICE: CPT

## 2019-07-29 PROCEDURE — G0498 CHEMO EXTEND IV INFUS W/PUMP: HCPCS

## 2019-07-29 PROCEDURE — G0463 HOSPITAL OUTPT CLINIC VISIT: HCPCS | Mod: 25

## 2019-07-29 PROCEDURE — 96375 TX/PRO/DX INJ NEW DRUG ADDON: CPT

## 2019-07-29 PROCEDURE — 96368 THER/DIAG CONCURRENT INF: CPT

## 2019-07-29 PROCEDURE — 99214 OFFICE O/P EST MOD 30 MIN: CPT | Mod: ZP | Performed by: PHYSICIAN ASSISTANT

## 2019-07-29 PROCEDURE — 96411 CHEMO IV PUSH ADDL DRUG: CPT

## 2019-07-29 PROCEDURE — 96361 HYDRATE IV INFUSION ADD-ON: CPT

## 2019-07-29 PROCEDURE — 80053 COMPREHEN METABOLIC PANEL: CPT | Performed by: PHYSICIAN ASSISTANT

## 2019-07-29 PROCEDURE — 96367 TX/PROPH/DG ADDL SEQ IV INF: CPT

## 2019-07-29 RX ORDER — PALONOSETRON 0.05 MG/ML
0.25 INJECTION, SOLUTION INTRAVENOUS ONCE
Status: CANCELLED
Start: 2019-07-29

## 2019-07-29 RX ORDER — SODIUM CHLORIDE 9 MG/ML
1000 INJECTION, SOLUTION INTRAVENOUS CONTINUOUS PRN
Status: CANCELLED
Start: 2019-07-29

## 2019-07-29 RX ORDER — ALBUTEROL SULFATE 0.83 MG/ML
2.5 SOLUTION RESPIRATORY (INHALATION)
Status: CANCELLED | OUTPATIENT
Start: 2019-07-29

## 2019-07-29 RX ORDER — DIPHENHYDRAMINE HYDROCHLORIDE 50 MG/ML
50 INJECTION INTRAMUSCULAR; INTRAVENOUS
Status: CANCELLED
Start: 2019-07-29

## 2019-07-29 RX ORDER — LORAZEPAM 2 MG/ML
0.5 INJECTION INTRAMUSCULAR EVERY 4 HOURS PRN
Status: CANCELLED
Start: 2019-07-29

## 2019-07-29 RX ORDER — ALBUTEROL SULFATE 90 UG/1
1-2 AEROSOL, METERED RESPIRATORY (INHALATION)
Status: CANCELLED
Start: 2019-07-29

## 2019-07-29 RX ORDER — MEPERIDINE HYDROCHLORIDE 25 MG/ML
25 INJECTION INTRAMUSCULAR; INTRAVENOUS; SUBCUTANEOUS EVERY 30 MIN PRN
Status: CANCELLED | OUTPATIENT
Start: 2019-07-29

## 2019-07-29 RX ORDER — TIZANIDINE 2 MG/1
2 TABLET ORAL ONCE
Qty: 1 TABLET | Refills: 0 | Status: SHIPPED | OUTPATIENT
Start: 2019-07-29 | End: 2019-08-19

## 2019-07-29 RX ORDER — METHYLPREDNISOLONE SODIUM SUCCINATE 125 MG/2ML
125 INJECTION, POWDER, LYOPHILIZED, FOR SOLUTION INTRAMUSCULAR; INTRAVENOUS
Status: CANCELLED
Start: 2019-07-29

## 2019-07-29 RX ORDER — PALONOSETRON 0.05 MG/ML
0.25 INJECTION, SOLUTION INTRAVENOUS ONCE
Status: COMPLETED | OUTPATIENT
Start: 2019-07-29 | End: 2019-07-29

## 2019-07-29 RX ORDER — HEPARIN SODIUM (PORCINE) LOCK FLUSH IV SOLN 100 UNIT/ML 100 UNIT/ML
5 SOLUTION INTRAVENOUS ONCE
Status: COMPLETED | OUTPATIENT
Start: 2019-07-29 | End: 2019-07-29

## 2019-07-29 RX ORDER — FLUOROURACIL 50 MG/ML
400 INJECTION, SOLUTION INTRAVENOUS ONCE
Status: COMPLETED | OUTPATIENT
Start: 2019-07-29 | End: 2019-07-29

## 2019-07-29 RX ORDER — EPINEPHRINE 1 MG/ML
0.3 INJECTION, SOLUTION INTRAMUSCULAR; SUBCUTANEOUS EVERY 5 MIN PRN
Status: CANCELLED | OUTPATIENT
Start: 2019-07-29

## 2019-07-29 RX ORDER — FLUOROURACIL 50 MG/ML
400 INJECTION, SOLUTION INTRAVENOUS ONCE
Status: CANCELLED | OUTPATIENT
Start: 2019-07-29

## 2019-07-29 RX ORDER — EPINEPHRINE 0.3 MG/.3ML
0.3 INJECTION SUBCUTANEOUS EVERY 5 MIN PRN
Status: CANCELLED | OUTPATIENT
Start: 2019-07-29

## 2019-07-29 RX ADMIN — FLUOROURACIL 755 MG: 50 INJECTION, SOLUTION INTRAVENOUS at 14:43

## 2019-07-29 RX ADMIN — LEUCOVORIN CALCIUM 650 MG: 350 INJECTION, POWDER, LYOPHILIZED, FOR SOLUTION INTRAMUSCULAR; INTRAVENOUS at 12:33

## 2019-07-29 RX ADMIN — HEPARIN 5 ML: 100 SYRINGE at 08:30

## 2019-07-29 RX ADMIN — DEXTROSE MONOHYDRATE 250 ML: 50 INJECTION, SOLUTION INTRAVENOUS at 12:33

## 2019-07-29 RX ADMIN — DEXAMETHASONE SODIUM PHOSPHATE: 10 INJECTION, SOLUTION INTRAMUSCULAR; INTRAVENOUS at 11:18

## 2019-07-29 RX ADMIN — SODIUM CHLORIDE 1000 ML: 9 INJECTION, SOLUTION INTRAVENOUS at 11:00

## 2019-07-29 RX ADMIN — OXALIPLATIN 129 MG: 5 INJECTION, SOLUTION, CONCENTRATE INTRAVENOUS at 12:33

## 2019-07-29 RX ADMIN — FAMOTIDINE 20 MG: 10 INJECTION INTRAVENOUS at 14:17

## 2019-07-29 RX ADMIN — PALONOSETRON HYDROCHLORIDE 0.25 MG: 0.25 INJECTION, SOLUTION INTRAVENOUS at 11:18

## 2019-07-29 RX ADMIN — ALTEPLASE 2 MG: 2.2 INJECTION, POWDER, LYOPHILIZED, FOR SOLUTION INTRAVENOUS at 10:02

## 2019-07-29 ASSESSMENT — PAIN SCALES - GENERAL: PAINLEVEL: NO PAIN (0)

## 2019-07-29 NOTE — NURSING NOTE
Chief Complaint   Patient presents with     Port Draw     Labs drawn via VPT. No blood return from port. VS taken. Pt checked in for next appt     Port accessed with 20g flat needle by RN, labs collected, line flushed with saline and heparin.  Vitals taken. Pt checked in for appointment(s).    Gisell OLMSTEAD RN PHN BSN  BMT/Oncology Lab

## 2019-07-29 NOTE — PATIENT INSTRUCTIONS
"Below you will find information on how to request a copy of your medical record and/or copies of imaging on CD format for yourself or to be sent to another provider's office.   There are several options:     1. Via [employer]: The turnaround time for this route of request is usually 24-48 hours.   In [employer] desktop version (this is not an option in the [employer] meredith): Click on \"Health\" icon, then choose \"Document Center\" and then choose the option that best fits your need, for example \"Requested Records\" will take you to \"Click Here to Request a Copy of your medical records.\" You will be taken to a page with fields to indicate which records you need, where they need to go and a comments section for additional instructions. PLEASE NOTE: You do not need to use the authorization form if you want to use [employer] to read your records online. You have access to [employer] through [employer].     2.   By Fax: Fax an Authorization to Release Protected Health Information (PDF) to fax # [phone]     This form can be located online at  [email or web address]     3.   By Email: Email your Authorization to Release Protected Health Information (PDF) request to:  [email or web address].   This form can be located online at  [email or web address]     4.   By Phone: Call [place]'s Health Information ([facility]) Department at phone number [phone]     5. \"Records Walk-Up Window\" on the [facility]. This is located at [address] [place] [address] ([facility], [address]), where you may make your request in person and wait for records to be printed.     To follow up on a request for any of the entities listed below, you can call the [facility] department:   [facility], [facility], [facility], [facility] and [place] " [facility] Release of Information   [address]   [place] [address]   Phone: [phone]   Fax: [phone]       [facility] Triage and after hours / weekends / holidays:  [phone]    Please call the triage or after hours line if you experience a temperature greater than or equal to 100.5, shaking chills, have uncontrolled nausea, vomiting and/or diarrhea, dizziness, shortness of breath, chest pain, bleeding, unexplained bruising, or if you have any other new/concerning symptoms, questions or concerns.      If you are having any concerning symptoms or wish to speak to a provider before your next infusion visit, please call your care coordinator or triage to notify them so we can adequately serve you.     If you need a refill on a narcotic prescription or other medication, please call before your infusion appointment.                 July 2019 Sunday Monday Tuesday Wednesday Thursday Friday Saturday        1     2    [facility] DRAW  12:00 PM   (15 min.)    [facility] DRAW   [facility] Lab Draw    [facility] RETURN  12:15 PM   (50 min.)   Nitesh Acevedo PA   [facility] ONC INFUSION 240   1:30 PM   (240 min.)    ONCOLOGY INFUSION   [facility] 3     4     5     6       7     8     9     10     11     12     13       14     15    [facility] DRAW  10:45 AM   (15 min.)    [facility] DRAW   [facility] Lab Draw    [facility] RETURN  10:55 AM   (50 min.)   Melinda Mayo PA-C   [facility] ONC INFUSION 240   1:30 PM   (240 min.)    ONCOLOGY INFUSION   [facility] 16     17     18     19     20       21     22     23     24     25     26     27       28     29    P [facility] DRAW   8:00 AM   (15 min.)    [facility] LAB DRAW   [facility] Lab Draw    [facility] RETURN   8:25 AM   (50 min.)   Melinda Mayo PA-C   [facility] ONC INFUSION 240    9:30 AM   (240 min.)   [facility] ONCOLOGY INFUSION   [facility] 30 31 August 2019 Sunday Monday Tuesday Wednesday Thursday Friday Saturday                       1     2     3       4     5     6     7     8     9     10       11     12     13    RETURN  10:00 AM   (15 min.)   Johnny Stevens MD   [facility] 14     15     16     17       18     19    [facility] DRAW   9:30 AM   (15 min.)   [facility] DRAW   [facility] Lab Draw    P RETURN   9:55 AM   (50 min.)   Sapna Schilling PA-C   [facility] ONC INFUSION 240  11:30 AM   (240 min.)    ONCOLOGY INFUSION   [facility] 20     21     22     23     24       25     26     27     28     29     30     31                    Recent Results (from the past 24 hour(s))   CBC with platelets differential    Collection Time: 07/29/19  8:44 AM   Result Value Ref Range    WBC 4.3 4.0 - 11.0 10e9/L    RBC Count 4.06 3.8 - 5.2 10e12/L    Hemoglobin 12.3 11.7 - 15.7 g/dL    Hematocrit 38.0 35.0 - 47.0 %    MCV 94 78 - 100 fl    MCH 30.3 26.5 - 33.0 pg    MCHC 32.4 31.5 - 36.5 g/dL    RDW 19.6 (H) 10.0 - 15.0 %    Platelet Count 170 150 - 450 10e9/L    Diff Method Automated Method     % Neutrophils 55.6 %    % Lymphocytes 27.0 %    % Monocytes 14.8 %    % Eosinophils 1.6 %    % Basophils 0.5 %    % Immature Granulocytes 0.5 %    Nucleated RBCs 0 0 /100    Absolute Neutrophil 2.4 1.6 - 8.3 10e9/L    Absolute Lymphocytes 1.2 0.8 - 5.3 10e9/L    Absolute Monocytes 0.6 0.0 - 1.3 10e9/L    Absolute Eosinophils 0.1 0.0 - 0.7 10e9/L    Absolute Basophils 0.0 0.0 - 0.2 10e9/L    Abs Immature Granulocytes 0.0 0 - 0.4 10e9/L    Absolute Nucleated RBC 0.0    Comprehensive metabolic panel    Collection Time: 07/29/19  8:44 AM   Result Value Ref Range    Sodium 139 133 - 144 mmol/L    Potassium 3.8 3.4 - 5.3 mmol/L    Chloride 108 94 - 109 mmol/L    Carbon  Dioxide 28 20 - 32 mmol/L    Anion Gap 4 3 - 14 mmol/L    Glucose 95 70 - 99 mg/dL    Urea Nitrogen 6 (L) 7 - 30 mg/dL    Creatinine 0.67 0.52 - 1.04 mg/dL    GFR Estimate >90 >60 mL/min/[1.73_m2]    GFR Estimate If Black >90 >60 mL/min/[1.73_m2]    Calcium 8.4 (L) 8.5 - 10.1 mg/dL    Bilirubin Total 0.3 0.2 - 1.3 mg/dL    Albumin 3.6 3.4 - 5.0 g/dL    Protein Total 6.8 6.8 - 8.8 g/dL    Alkaline Phosphatase 108 40 - 150 U/L    ALT 28 0 - 50 U/L    AST 32 0 - 45 U/L

## 2019-07-29 NOTE — NURSING NOTE
"Oncology Rooming Note    July 29, 2019 8:52 AM   Remedios Watts is a 38 year old female who presents for:    Chief Complaint   Patient presents with     Port Draw     Labs drawn via VPT. No blood return from port. VS taken. Pt checked in for next appt     Oncology Clinic Visit     Pt is here for labs and to see provider     Initial Vitals: Blood Pressure 108/73 (BP Location: Right arm, Patient Position: Sitting, Cuff Size: Adult Regular)   Pulse 65   Temperature 98  F (36.7  C) (Oral)   Respiration 16   Weight 79 kg (174 lb 1.6 oz)   Oxygen Saturation 100%   Body Mass Index 28.11 kg/m   Estimated body mass index is 28.11 kg/m  as calculated from the following:    Height as of 7/15/19: 1.676 m (5' 5.98\").    Weight as of this encounter: 79 kg (174 lb 1.6 oz). Body surface area is 1.92 meters squared.  No Pain (0) Comment: Data Unavailable   No LMP recorded. (Menstrual status: Birth Control).  Allergies reviewed: Yes  Medications reviewed: Yes    Medications: Medication refills not needed today.  Pharmacy name entered into [facility]: [facility] - [place] - [id number] [facility]    Clinical concerns: none       Petrona Clark MA              "

## 2019-07-29 NOTE — PROGRESS NOTES
"Oncology/Hematology Visit Note  Jul 29, 2019    Reason for Visit: Follow up of stage III colon cancer    History of Present Illness: Remedios Watts is a 38 year old female with PMH melanoma to left arm and back s/p excision with stage III colon cancer. Her history is as follows, copied forward from prior notes:    \"Ms. Watts is a 38 year old female who was admitted to the hospital on 3/10/2019 for bowel obstruction due to hepatic flexure colon mass. She had a CT scan done on 3/10/2019 which showed apple core lesion near the hepatic flexure of the colon concerning for an obstructing colonic neoplasm.  No enlarged lymph nodes were seen. There is a tiny subcentimeter left lobe liver lesion which is too small to characterize.  A CT of the chest on 3/13/2019 did not show evidence of metastatic disease in the chest.       Colonoscopy on 3/11/2019 showed fungating, infiltrative, highly-friable and ulcerated large mass at the hepatic flexure/proximal transverse colon, unable to traverse this lesions as this appears to be near-completely obstructive in nature (though allows liquid stool/effluent to pass under visualization). The mass was partially circumferential. Biopsies was consistent with invasive moderately differentiated adenocarcinoma compatible with colorectal primary and Mismatch repair enzymes were intact by immunohistochemistry.      On 3/22/2019 she had laparoscopic extended right hemicolectomy performed by Dr. Quesada. Final pathology was consistent with a 3.7 cm hepatic flexure poorly differentiated adenocarcinoma with focal micropapillary growth extending through the muscularis propria and involving serosa (pT4). There was lymphovascular invasion but no perineural invasion seen. All margins were negative. 4 out of 38 lymph nodes were positive for metastatic carcinoma including 2 lymph nodes with micrometastasis. Final staging was [id number].    She met with Dr. Capps 4/4/19 who recommended adjuvant FOLFOX. " Port placed 4/22/19.       She started adjuvant FOLFOX on 4/29/19. She did require dose reduction of Oxaliplatin by 20% for neuropathy with cycle 4. She returns today for ongoing oncology follow-up prior to cycle 7 FOLFOX.    Interval History:  Patient reports that she had more nausea with this last cycle of chemotherapy but no vomiting.  She was able to manage the nausea with her antiemetics, but then did develop constipation and feels that she may have had a tear with an episode of bright red blood with her stool.  Her constipation has since resolved and she is now back to her baseline diarrhea.  She has been applying Desitin to her rectal area and feels things are improving.  She reports less muscle spasms with the use of Zanaflex for the first 2 days following her infusions.  She still has cold sensitivity present, but denies any numbness or tingling separate from cold.  She continues to get lip irritation with chemotherapy and applies Aquaphor for this.  She has noticed that her eyes have appeared bloodshot in the mornings and wonders if this may be related to her treatment.  Her last period was over a month ago and she has not yet set up an appointment with gynecology.  She has been working on her house as they are planning to list it to sell with plans to listed in mid August.  She does have a horse show that she plans to attend in 2 weeks and so would like to defer her chemotherapy by week.  She notes that at her last pump unhooked her blood pressure was lower than normal for her with systolic in the 90s and associated lightheadedness.  She does note that it is harder for her to drink fluids that first week after chemotherapy.  She denies other concerns.    Current Outpatient Medications   Medication Sig Dispense Refill     gabapentin (NEURONTIN) 300 MG capsule Take 1-2 capsules (300-600 mg) by mouth nightly as needed (neuropathic pain) 30 capsule 3     granisetron (KYTRIL) 1 MG tablet Take 1 tablet (1 mg)  by mouth every 12 hours as needed for nausea 60 tablet 3     lidocaine-prilocaine (EMLA) 2.5-2.5 % external cream Apply topically as needed for moderate pain 30 g 0     LORazepam (ATIVAN) 0.5 MG tablet Take 1 tablet (0.5 mg) by mouth every 4 hours as needed (Anxiety, Nausea/Vomiting or Sleep) 30 tablet 2     LORazepam (ATIVAN) 0.5 MG tablet Take 1 tablet (0.5 mg) by mouth nightly as needed for sleep 10 tablet 0     prochlorperazine (COMPAZINE) 10 MG tablet Take 1 tablet (10 mg) by mouth every 6 hours as needed (Nausea/Vomiting) (Patient not taking: Reported on 7/2/2019) 60 tablet 3     tretinoin (RETIN-A) 0.05 % external cream Apply topically At Bedtime 45 g 3     Physical Examination:  /73 (BP Location: Right arm, Patient Position: Sitting, Cuff Size: Adult Regular)   Pulse 65   Temp 98  F (36.7  C) (Oral)   Resp 16   Wt 79 kg (174 lb 1.6 oz)   SpO2 100%   BMI 28.11 kg/m    Wt Readings from Last 10 Encounters:   07/29/19 79 kg (174 lb 1.6 oz)   07/15/19 78.4 kg (172 lb 14.4 oz)   07/02/19 77.1 kg (170 lb)   06/10/19 77.3 kg (170 lb 6.4 oz)   05/29/19 77.3 kg (170 lb 6.4 oz)   05/13/19 76.2 kg (167 lb 14.4 oz)   05/03/19 77.1 kg (169 lb 15.6 oz)   04/29/19 77.2 kg (170 lb 3.2 oz)   04/25/19 77.4 kg (170 lb 9.6 oz)   04/23/19 77.1 kg (170 lb)   Constitutional: Well-appearing female in no acute distress.  Eyes: EOMI, PERRL. No scleral icterus.  ENT: Oral mucosa is moist without lesions or thrush.   Lymphatic: Neck is supple without cervical or supraclavicular lymphadenopathy.   Cardiovascular: Regular rate and rhythm. No peripheral edema.  Respiratory: Clear to auscultation bilaterally. No wheezes or crackles.  Gastrointestinal: Bowel sounds present. Abdomen soft, non-tender. No palpable hepatosplenomegaly or masses.   Neurologic: Cranial nerves II through XII are grossly intact.  Skin: No erythema of palms noted. No rashes, petechiae, or bruising noted on exposed skin.    Laboratory Data:   7/29/2019  08:44   Sodium 139   Potassium 3.8   Chloride 108   Carbon Dioxide 28   Urea Nitrogen 6 (L)   Creatinine 0.67   GFR Estimate >90   GFR Estimate If Black >90   Calcium 8.4 (L)   Anion Gap 4   Albumin 3.6   Protein Total 6.8   Bilirubin Total 0.3   Alkaline Phosphatase 108   ALT 28   AST 32   Glucose 95   WBC 4.3   Hemoglobin 12.3   Hematocrit 38.0   Platelet Count 170   RBC Count 4.06   MCV 94   MCH 30.3   MCHC 32.4   RDW 19.6 (H)   Diff Method Automated Method   % Neutrophils 55.6   % Lymphocytes 27.0   % Monocytes 14.8   % Eosinophils 1.6   % Basophils 0.5   % Immature Granulocytes 0.5   Nucleated RBCs 0   Absolute Neutrophil 2.4   Absolute Lymphocytes 1.2   Absolute Monocytes 0.6   Absolute Eosinophils 0.1   Absolute Basophils 0.0   Abs Immature Granulocytes 0.0   Absolute Nucleated RBC 0.0     Assessment and Plan:  1. Onc  Stage IIIC poorly differentiated [id number] adenocarcinoma of the hepatic flexure -s/p laparoscopic right hemicolectomy on 3/22/19. All margins negative. 4/38 LNs positive. MSI-Intact.  Baseline CEA 9.2 on 3/11/2019. She started adjuvant FOLFOX on 4/29/2019.     She has had difficulties with muscle spasm, nausea, cold sensitivity, and diarrhea. Oxaliplatin dose was reduced by 20% with cycle 4 for neurotoxicity. Continued to struggle this cycle but symptoms not worse so will continue with current dose and do ongoing supportive care as below. She will continue with cycle 7 FOLFOX today. Will continue to see her every 2 weeks with chemo. Overall plan for 6 months followed by CT CAP. Will need colonoscopy March 2020. Her next cycle will be delayed by 1 week to attend a horse show.     2. Neuro  Acute neurotoxicity manifested as muscle spasms and cold sensitivity with pain starting with infusion x 3 days. Doesn't tolerate Flexeril well. She tolerates Zanaflex fairly well, so will continue to use this instead for muscle spasms. She will take a dose again in infusion today.     Cold sensitivity worse  first week, then improves, but not yet resolved. Does have neuropathic pain at night. Stable. She will continue on gabapentin at 600mg at bedtime. Will continue to monitor.     3. GI  Nausea stable with IV Emend and Aloxi along with Dex 8mg with infusion, then PRN Ativan/Compazine and Kytril starting day 3. No changes made today.    Diarrhea continues to persist since surgery. She will continue to use fiber and Imodium. For constipation associated with antiemetics, she will take a stool softener.     No mucositis but does have lip irritation, now resolved but flares with treatment. Continue Aquaphor PRN.    Liver lesion on prior imaging indeterminate and will evaluate on next imaging at the end of her adjuvant chemotherapy.     4. Heme/Gyn  Iron deficiency anemia 2/2 heaving menses. Endometrial biopsy 4/23/19 was benign. S/p IV Infed 5/22/19. Hgb and iron studies have been improving and plan to repeat iron studies in mid-August.     Heavy menses. Previously, recommended f/u with gynecology to discuss getting a Mirena IUD. Now without a menstrual period for more than 1 month. Will continue to monitor.     5. Genetics  Will be seeing genetics in October for young presentation of colon cancer and history of skin melanoma. Also sees derm for routine melanoma follow-up.    6. Opthalmology  Dry eyes. Recommend lubricating eye gel at night and lubricating eye drops during the day. Discussed the use of Refresh, Systane, or Blink.     Melinda Mayo PA-C  [facility]  [address] [place] [address] [id number] [phone]

## 2019-07-29 NOTE — PROGRESS NOTES
Infusion Nursing Note:  Remedios Watts presents today for Cycle 7 Day 1 Oxaliplatin, Leucovorin, Fluorouracil IV push and pump connect.    Patient seen by provider today: Yes: LIDIA Atkinson   present during visit today: Not Applicable.    Note: Patient is feeling OK today, she denies any new complaints. 1 L NS bolus added to days 1 and 3 in tx plan by PA due to hypotension on disconnect last cycle. Pt arrived to infusion with no blood return noted from her port. TPA administered per protocol. After 60 minutes of dwell time port had positive blood return and flushed easily. Proceeded with infusion.     Pt typically takes zanaflex 30 minutes prior to completion of Oxaliplatin to help alleviate muscle cramping associated with the Oxaliplatin infusions. Pt forgot her zanaflex rx today.     TORB LIDIA Atkinson/Sruthi Luajn RN- OK to renew zanaflex 2mg 1 tablet to clinic pharmacy for pt to take during infusion visit today.     Pt took her zanaflex 1.5 hours into the oxaliplatin infusion. At that time she also noted some reflux which she says she gets after each infusion. Administered prn pepcid per tx plan orders.    Intravenous Access:  Implanted Port.    Treatment Conditions:  Lab Results   Component Value Date    HGB 12.3 07/29/2019     Lab Results   Component Value Date    WBC 4.3 07/29/2019      Lab Results   Component Value Date    ANEU 2.4 07/29/2019     Lab Results   Component Value Date     07/29/2019      Lab Results   Component Value Date     07/29/2019                   Lab Results   Component Value Date    POTASSIUM 3.8 07/29/2019             Lab Results   Component Value Date    CR 0.67 07/29/2019                   Lab Results   Component Value Date    MANDY 8.4 07/29/2019                Lab Results   Component Value Date    BILITOTAL 0.3 07/29/2019           Lab Results   Component Value Date    ALBUMIN 3.6 07/29/2019                    Lab Results   Component Value Date    ALT  28 07/29/2019           Lab Results   Component Value Date    AST 32 07/29/2019       Results reviewed, labs MET treatment parameters, ok to proceed with treatment.      Post Infusion Assessment:  Patient tolerated infusion without incident.  Blood return noted pre and post infusion.  Site patent and intact, free from redness, edema or discomfort.  Access left in tact for home infusion.      Prior to discharge: Port is secured in place with tegaderm and flushed with 10cc NS with positive blood return noted.  Continuous home infusion Dosi-Fuser pump connected.    All connectors secured in place and clamps taped open, double checked with Bridget Benton RN  Patient instructed to call our clinic or [facility] with any questions or concerns at home.  Patient verbalized understanding.    Patient set up for pump disconnect and IV fluids at home with [facility] on 7/31/2019 at 9:00 per patient request, pump always empties early, time confirmed with COBY Moreira.      Discharge Plan:   Patient declined prescription refills.  Discharge instructions reviewed with: Patient.  Patient and/or family verbalized understanding of discharge instructions and all questions answered.  AVS to patient via [employer].  Patient will return 8/19/2019 for next infusion appointment.   Patient discharged in stable condition accompanied by: self.  Departure Mode: Ambulatory.    Sruthi Lujan RN

## 2019-07-29 NOTE — LETTER
"7/29/2019      RE: Remedios Watts  [address] [place] [address]       Oncology/Hematology Visit Note  Jul 29, 2019    Reason for Visit: Follow up of stage III colon cancer    History of Present Illness: Remedios Watts is a 38 year old female with PMH melanoma to left arm and back s/p excision with stage III colon cancer. Her history is as follows, copied forward from prior notes:    \"Ms. Watts is a 38 year old female who was admitted to the hospital on 3/10/2019 for bowel obstruction due to hepatic flexure colon mass. She had a CT scan done on 3/10/2019 which showed apple core lesion near the hepatic flexure of the colon concerning for an obstructing colonic neoplasm.  No enlarged lymph nodes were seen. There is a tiny subcentimeter left lobe liver lesion which is too small to characterize.  A CT of the chest on 3/13/2019 did not show evidence of metastatic disease in the chest.       Colonoscopy on 3/11/2019 showed fungating, infiltrative, highly-friable and ulcerated large mass at the hepatic flexure/proximal transverse colon, unable to traverse this lesions as this appears to be near-completely obstructive in nature (though allows liquid stool/effluent to pass under visualization). The mass was partially circumferential. Biopsies was consistent with invasive moderately differentiated adenocarcinoma compatible with colorectal primary and Mismatch repair enzymes were intact by immunohistochemistry.      On 3/22/2019 she had laparoscopic extended right hemicolectomy performed by Dr. Quesada. Final pathology was consistent with a 3.7 cm hepatic flexure poorly differentiated adenocarcinoma with focal micropapillary growth extending through the muscularis propria and involving serosa (pT4). There was lymphovascular invasion but no perineural invasion seen. All margins were negative. 4 out of 38 lymph nodes were positive for metastatic carcinoma including 2 lymph nodes with micrometastasis. Final " staging was [id number].    She met with Dr. Capps 4/4/19 who recommended adjuvant FOLFOX. Port placed 4/22/19.       She started adjuvant FOLFOX on 4/29/19. She did require dose reduction of Oxaliplatin by 20% for neuropathy with cycle 4. She returns today for ongoing oncology follow-up prior to cycle 7 FOLFOX.    Interval History:  Patient reports that she had more nausea with this last cycle of chemotherapy but no vomiting.  She was able to manage the nausea with her antiemetics, but then did develop constipation and feels that she may have had a tear with an episode of bright red blood with her stool.  Her constipation has since resolved and she is now back to her baseline diarrhea.  She has been applying Desitin to her rectal area and feels things are improving.  She reports less muscle spasms with the use of Zanaflex for the first 2 days following her infusions.  She still has cold sensitivity present, but denies any numbness or tingling separate from cold.  She continues to get lip irritation with chemotherapy and applies Aquaphor for this.  She has noticed that her eyes have appeared bloodshot in the mornings and wonders if this may be related to her treatment.  Her last period was over a month ago and she has not yet set up an appointment with gynecology.  She has been working on her house as they are planning to list it to sell with plans to listed in mid August.  She does have a horse show that she plans to attend in 2 weeks and so would like to defer her chemotherapy by week.  She notes that at her last pump unhooked her blood pressure was lower than normal for her with systolic in the 90s and associated lightheadedness.  She does note that it is harder for her to drink fluids that first week after chemotherapy.  She denies other concerns.    Current Outpatient Medications   Medication Sig Dispense Refill     gabapentin (NEURONTIN) 300 MG capsule Take 1-2 capsules (300-600 mg) by mouth nightly as needed  (neuropathic pain) 30 capsule 3     granisetron (KYTRIL) 1 MG tablet Take 1 tablet (1 mg) by mouth every 12 hours as needed for nausea 60 tablet 3     lidocaine-prilocaine (EMLA) 2.5-2.5 % external cream Apply topically as needed for moderate pain 30 g 0     LORazepam (ATIVAN) 0.5 MG tablet Take 1 tablet (0.5 mg) by mouth every 4 hours as needed (Anxiety, Nausea/Vomiting or Sleep) 30 tablet 2     LORazepam (ATIVAN) 0.5 MG tablet Take 1 tablet (0.5 mg) by mouth nightly as needed for sleep 10 tablet 0     prochlorperazine (COMPAZINE) 10 MG tablet Take 1 tablet (10 mg) by mouth every 6 hours as needed (Nausea/Vomiting) (Patient not taking: Reported on 7/2/2019) 60 tablet 3     tretinoin (RETIN-A) 0.05 % external cream Apply topically At Bedtime 45 g 3     Physical Examination:  /73 (BP Location: Right arm, Patient Position: Sitting, Cuff Size: Adult Regular)   Pulse 65   Temp 98  F (36.7  C) (Oral)   Resp 16   Wt 79 kg (174 lb 1.6 oz)   SpO2 100%   BMI 28.11 kg/m     Wt Readings from Last 10 Encounters:   07/29/19 79 kg (174 lb 1.6 oz)   07/15/19 78.4 kg (172 lb 14.4 oz)   07/02/19 77.1 kg (170 lb)   06/10/19 77.3 kg (170 lb 6.4 oz)   05/29/19 77.3 kg (170 lb 6.4 oz)   05/13/19 76.2 kg (167 lb 14.4 oz)   05/03/19 77.1 kg (169 lb 15.6 oz)   04/29/19 77.2 kg (170 lb 3.2 oz)   04/25/19 77.4 kg (170 lb 9.6 oz)   04/23/19 77.1 kg (170 lb)   Constitutional: Well-appearing female in no acute distress.  Eyes: EOMI, PERRL. No scleral icterus.  ENT: Oral mucosa is moist without lesions or thrush.   Lymphatic: Neck is supple without cervical or supraclavicular lymphadenopathy.   Cardiovascular: Regular rate and rhythm. No peripheral edema.  Respiratory: Clear to auscultation bilaterally. No wheezes or crackles.  Gastrointestinal: Bowel sounds present. Abdomen soft, non-tender. No palpable hepatosplenomegaly or masses.   Neurologic: Cranial nerves II through XII are grossly intact.  Skin: No erythema of palms noted. No  rashes, petechiae, or bruising noted on exposed skin.    Laboratory Data:   7/29/2019 08:44   Sodium 139   Potassium 3.8   Chloride 108   Carbon Dioxide 28   Urea Nitrogen 6 (L)   Creatinine 0.67   GFR Estimate >90   GFR Estimate If Black >90   Calcium 8.4 (L)   Anion Gap 4   Albumin 3.6   Protein Total 6.8   Bilirubin Total 0.3   Alkaline Phosphatase 108   ALT 28   AST 32   Glucose 95   WBC 4.3   Hemoglobin 12.3   Hematocrit 38.0   Platelet Count 170   RBC Count 4.06   MCV 94   MCH 30.3   MCHC 32.4   RDW 19.6 (H)   Diff Method Automated Method   % Neutrophils 55.6   % Lymphocytes 27.0   % Monocytes 14.8   % Eosinophils 1.6   % Basophils 0.5   % Immature Granulocytes 0.5   Nucleated RBCs 0   Absolute Neutrophil 2.4   Absolute Lymphocytes 1.2   Absolute Monocytes 0.6   Absolute Eosinophils 0.1   Absolute Basophils 0.0   Abs Immature Granulocytes 0.0   Absolute Nucleated RBC 0.0     Assessment and Plan:  1. Onc  Stage IIIC poorly differentiated [id number] adenocarcinoma of the hepatic flexure -s/p laparoscopic right hemicolectomy on 3/22/19. All margins negative. 4/38 LNs positive. MSI-Intact.  Baseline CEA 9.2 on 3/11/2019. She started adjuvant FOLFOX on 4/29/2019.     She has had difficulties with muscle spasm, nausea, cold sensitivity, and diarrhea. Oxaliplatin dose was reduced by 20% with cycle 4 for neurotoxicity. Continued to struggle this cycle but symptoms not worse so will continue with current dose and do ongoing supportive care as below. She will continue with cycle 7 FOLFOX today. Will continue to see her every 2 weeks with chemo. Overall plan for 6 months followed by CT CAP. Will need colonoscopy March 2020. Her next cycle will be delayed by 1 week to attend a horse show.     2. Neuro  Acute neurotoxicity manifested as muscle spasms and cold sensitivity with pain starting with infusion x 3 days. Doesn't tolerate Flexeril well. She tolerates Zanaflex fairly well, so will continue to use this instead for  muscle spasms. She will take a dose again in infusion today.     Cold sensitivity worse first week, then improves, but not yet resolved. Does have neuropathic pain at night. Stable. She will continue on gabapentin at 600mg at bedtime. Will continue to monitor.     3. GI  Nausea stable with IV Emend and Aloxi along with Dex 8mg with infusion, then PRN Ativan/Compazine and Kytril starting day 3. No changes made today.    Diarrhea continues to persist since surgery. She will continue to use fiber and Imodium. For constipation associated with antiemetics, she will take a stool softener.     No mucositis but does have lip irritation, now resolved but flares with treatment. Continue Aquaphor PRN.    Liver lesion on prior imaging indeterminate and will evaluate on next imaging at the end of her adjuvant chemotherapy.     4. Heme/Gyn  Iron deficiency anemia 2/2 heaving menses. Endometrial biopsy 4/23/19 was benign. S/p IV Infed 5/22/19. Hgb and iron studies have been improving and plan to repeat iron studies in mid-August.     Heavy menses. Previously, recommended f/u with gynecology to discuss getting a Mirena IUD. Now without a menstrual period for more than 1 month. Will continue to monitor.     5. Genetics  Will be seeing genetics in October for young presentation of colon cancer and history of skin melanoma. Also sees derm for routine melanoma follow-up.    6. Opthalmology  Dry eyes. Recommend lubricating eye gel at night and lubricating eye drops during the day. Discussed the use of Refresh, Systane, or Blink.     Melinda Mayo PA-C  [facility]  [address] [place] [address] [id number] [phone]

## 2019-07-30 NOTE — PROGRESS NOTES
This is a recent snapshot of the patient's [facility] medical record.  For current drug dose and complete information and questions, call [phone] or In Basket pool, fv home infusion ([id number])  CSN Number:  [id number]

## 2019-08-19 ENCOUNTER — INFUSION THERAPY VISIT (OUTPATIENT)
Dept: ONCOLOGY | Facility: CLINIC | Age: 39
End: 2019-08-19
Attending: PHYSICIAN ASSISTANT
Payer: COMMERCIAL

## 2019-08-19 ENCOUNTER — HOME INFUSION (PRE-WILLOW HOME INFUSION) (OUTPATIENT)
Dept: PHARMACY | Facility: CLINIC | Age: 39
End: 2019-08-19

## 2019-08-19 ENCOUNTER — APPOINTMENT (OUTPATIENT)
Dept: LAB | Facility: CLINIC | Age: 39
End: 2019-08-19
Attending: PHYSICIAN ASSISTANT
Payer: COMMERCIAL

## 2019-08-19 VITALS
OXYGEN SATURATION: 100 % | DIASTOLIC BLOOD PRESSURE: 77 MMHG | WEIGHT: 171.9 LBS | BODY MASS INDEX: 27.76 KG/M2 | SYSTOLIC BLOOD PRESSURE: 114 MMHG | TEMPERATURE: 98.7 F | RESPIRATION RATE: 16 BRPM | HEART RATE: 55 BPM

## 2019-08-19 DIAGNOSIS — C18.3 MALIGNANT NEOPLASM OF HEPATIC FLEXURE (H): Primary | ICD-10-CM

## 2019-08-19 DIAGNOSIS — G62.9 PERIPHERAL POLYNEUROPATHY: ICD-10-CM

## 2019-08-19 LAB
ALBUMIN SERPL-MCNC: 3.8 G/DL (ref 3.4–5)
ALP SERPL-CCNC: 101 U/L (ref 40–150)
ALT SERPL W P-5'-P-CCNC: 27 U/L (ref 0–50)
ANION GAP SERPL CALCULATED.3IONS-SCNC: 4 MMOL/L (ref 3–14)
AST SERPL W P-5'-P-CCNC: 21 U/L (ref 0–45)
BASOPHILS # BLD AUTO: 0 10E9/L (ref 0–0.2)
BASOPHILS NFR BLD AUTO: 0.4 %
BILIRUB SERPL-MCNC: 0.3 MG/DL (ref 0.2–1.3)
BUN SERPL-MCNC: 7 MG/DL (ref 7–30)
CALCIUM SERPL-MCNC: 8.8 MG/DL (ref 8.5–10.1)
CHLORIDE SERPL-SCNC: 108 MMOL/L (ref 94–109)
CO2 SERPL-SCNC: 25 MMOL/L (ref 20–32)
CREAT SERPL-MCNC: 0.58 MG/DL (ref 0.52–1.04)
DIFFERENTIAL METHOD BLD: ABNORMAL
EOSINOPHIL # BLD AUTO: 0.1 10E9/L (ref 0–0.7)
EOSINOPHIL NFR BLD AUTO: 1.8 %
ERYTHROCYTE [DISTWIDTH] IN BLOOD BY AUTOMATED COUNT: 15.9 % (ref 10–15)
FERRITIN SERPL-MCNC: 64 NG/ML (ref 12–150)
GFR SERPL CREATININE-BSD FRML MDRD: >90 ML/MIN/{1.73_M2}
GLUCOSE SERPL-MCNC: 80 MG/DL (ref 70–99)
HCT VFR BLD AUTO: 40.2 % (ref 35–47)
HGB BLD-MCNC: 13.5 G/DL (ref 11.7–15.7)
IMM GRANULOCYTES # BLD: 0 10E9/L (ref 0–0.4)
IMM GRANULOCYTES NFR BLD: 0.8 %
IRON SATN MFR SERPL: 15 % (ref 15–46)
IRON SERPL-MCNC: 59 UG/DL (ref 35–180)
LYMPHOCYTES # BLD AUTO: 1.5 10E9/L (ref 0.8–5.3)
LYMPHOCYTES NFR BLD AUTO: 31.1 %
MCH RBC QN AUTO: 32.1 PG (ref 26.5–33)
MCHC RBC AUTO-ENTMCNC: 33.6 G/DL (ref 31.5–36.5)
MCV RBC AUTO: 96 FL (ref 78–100)
MONOCYTES # BLD AUTO: 0.7 10E9/L (ref 0–1.3)
MONOCYTES NFR BLD AUTO: 14.1 %
NEUTROPHILS # BLD AUTO: 2.5 10E9/L (ref 1.6–8.3)
NEUTROPHILS NFR BLD AUTO: 51.8 %
NRBC # BLD AUTO: 0 10*3/UL
NRBC BLD AUTO-RTO: 0 /100
PLATELET # BLD AUTO: 208 10E9/L (ref 150–450)
POTASSIUM SERPL-SCNC: 4.1 MMOL/L (ref 3.4–5.3)
PROT SERPL-MCNC: 7.2 G/DL (ref 6.8–8.8)
RBC # BLD AUTO: 4.2 10E12/L (ref 3.8–5.2)
SODIUM SERPL-SCNC: 138 MMOL/L (ref 133–144)
TIBC SERPL-MCNC: 401 UG/DL (ref 240–430)
WBC # BLD AUTO: 4.9 10E9/L (ref 4–11)

## 2019-08-19 PROCEDURE — 25800030 ZZH RX IP 258 OP 636: Mod: ZF | Performed by: PHYSICIAN ASSISTANT

## 2019-08-19 PROCEDURE — 25000128 H RX IP 250 OP 636: Mod: ZF | Performed by: PHYSICIAN ASSISTANT

## 2019-08-19 PROCEDURE — 83540 ASSAY OF IRON: CPT | Performed by: PHYSICIAN ASSISTANT

## 2019-08-19 PROCEDURE — 96375 TX/PRO/DX INJ NEW DRUG ADDON: CPT

## 2019-08-19 PROCEDURE — 36593 DECLOT VASCULAR DEVICE: CPT

## 2019-08-19 PROCEDURE — 96415 CHEMO IV INFUSION ADDL HR: CPT

## 2019-08-19 PROCEDURE — 83550 IRON BINDING TEST: CPT | Performed by: PHYSICIAN ASSISTANT

## 2019-08-19 PROCEDURE — 99215 OFFICE O/P EST HI 40 MIN: CPT | Mod: ZP | Performed by: PHYSICIAN ASSISTANT

## 2019-08-19 PROCEDURE — 96367 TX/PROPH/DG ADDL SEQ IV INF: CPT

## 2019-08-19 PROCEDURE — 96368 THER/DIAG CONCURRENT INF: CPT

## 2019-08-19 PROCEDURE — 85025 COMPLETE CBC W/AUTO DIFF WBC: CPT | Performed by: PHYSICIAN ASSISTANT

## 2019-08-19 PROCEDURE — 96413 CHEMO IV INFUSION 1 HR: CPT

## 2019-08-19 PROCEDURE — G0463 HOSPITAL OUTPT CLINIC VISIT: HCPCS | Mod: ZF

## 2019-08-19 PROCEDURE — 96411 CHEMO IV PUSH ADDL DRUG: CPT

## 2019-08-19 PROCEDURE — 80053 COMPREHEN METABOLIC PANEL: CPT | Performed by: PHYSICIAN ASSISTANT

## 2019-08-19 PROCEDURE — 82728 ASSAY OF FERRITIN: CPT | Performed by: PHYSICIAN ASSISTANT

## 2019-08-19 PROCEDURE — 96361 HYDRATE IV INFUSION ADD-ON: CPT

## 2019-08-19 PROCEDURE — G0498 CHEMO EXTEND IV INFUS W/PUMP: HCPCS

## 2019-08-19 RX ORDER — EPINEPHRINE 0.3 MG/.3ML
0.3 INJECTION SUBCUTANEOUS EVERY 5 MIN PRN
Status: CANCELLED | OUTPATIENT
Start: 2019-08-19

## 2019-08-19 RX ORDER — PALONOSETRON 0.05 MG/ML
0.25 INJECTION, SOLUTION INTRAVENOUS ONCE
Status: COMPLETED | OUTPATIENT
Start: 2019-08-19 | End: 2019-08-19

## 2019-08-19 RX ORDER — ALBUTEROL SULFATE 90 UG/1
1-2 AEROSOL, METERED RESPIRATORY (INHALATION)
Status: CANCELLED
Start: 2019-08-19

## 2019-08-19 RX ORDER — HEPARIN SODIUM (PORCINE) LOCK FLUSH IV SOLN 100 UNIT/ML 100 UNIT/ML
5 SOLUTION INTRAVENOUS
Status: COMPLETED | OUTPATIENT
Start: 2019-08-19 | End: 2019-08-19

## 2019-08-19 RX ORDER — EPINEPHRINE 1 MG/ML
0.3 INJECTION, SOLUTION INTRAMUSCULAR; SUBCUTANEOUS EVERY 5 MIN PRN
Status: CANCELLED | OUTPATIENT
Start: 2019-08-19

## 2019-08-19 RX ORDER — TIZANIDINE 2 MG/1
TABLET ORAL
Qty: 20 TABLET | Refills: 0 | Status: SHIPPED | OUTPATIENT
Start: 2019-08-19 | End: 2019-09-03

## 2019-08-19 RX ORDER — SODIUM CHLORIDE 9 MG/ML
1000 INJECTION, SOLUTION INTRAVENOUS CONTINUOUS PRN
Status: CANCELLED
Start: 2019-08-19

## 2019-08-19 RX ORDER — DIPHENHYDRAMINE HYDROCHLORIDE 50 MG/ML
50 INJECTION INTRAMUSCULAR; INTRAVENOUS
Status: CANCELLED
Start: 2019-08-19

## 2019-08-19 RX ORDER — FLUOROURACIL 50 MG/ML
400 INJECTION, SOLUTION INTRAVENOUS ONCE
Status: CANCELLED | OUTPATIENT
Start: 2019-08-19

## 2019-08-19 RX ORDER — LORAZEPAM 2 MG/ML
0.5 INJECTION INTRAMUSCULAR EVERY 4 HOURS PRN
Status: CANCELLED
Start: 2019-08-19

## 2019-08-19 RX ORDER — PALONOSETRON 0.05 MG/ML
0.25 INJECTION, SOLUTION INTRAVENOUS ONCE
Status: CANCELLED
Start: 2019-08-19

## 2019-08-19 RX ORDER — METHYLPREDNISOLONE SODIUM SUCCINATE 125 MG/2ML
125 INJECTION, POWDER, LYOPHILIZED, FOR SOLUTION INTRAMUSCULAR; INTRAVENOUS
Status: CANCELLED
Start: 2019-08-19

## 2019-08-19 RX ORDER — ALBUTEROL SULFATE 0.83 MG/ML
2.5 SOLUTION RESPIRATORY (INHALATION)
Status: CANCELLED | OUTPATIENT
Start: 2019-08-19

## 2019-08-19 RX ORDER — MEPERIDINE HYDROCHLORIDE 25 MG/ML
25 INJECTION INTRAMUSCULAR; INTRAVENOUS; SUBCUTANEOUS EVERY 30 MIN PRN
Status: CANCELLED | OUTPATIENT
Start: 2019-08-19

## 2019-08-19 RX ORDER — FLUOROURACIL 50 MG/ML
400 INJECTION, SOLUTION INTRAVENOUS ONCE
Status: COMPLETED | OUTPATIENT
Start: 2019-08-19 | End: 2019-08-19

## 2019-08-19 RX ADMIN — LEUCOVORIN CALCIUM 650 MG: 350 INJECTION, POWDER, LYOPHILIZED, FOR SUSPENSION INTRAMUSCULAR; INTRAVENOUS at 13:41

## 2019-08-19 RX ADMIN — FLUOROURACIL 755 MG: 50 INJECTION, SOLUTION INTRAVENOUS at 15:43

## 2019-08-19 RX ADMIN — ALTEPLASE 2 MG: 2.2 INJECTION, POWDER, LYOPHILIZED, FOR SOLUTION INTRAVENOUS at 11:31

## 2019-08-19 RX ADMIN — DEXTROSE MONOHYDRATE 250 ML: 50 INJECTION, SOLUTION INTRAVENOUS at 13:36

## 2019-08-19 RX ADMIN — OXALIPLATIN 129 MG: 5 INJECTION, SOLUTION, CONCENTRATE INTRAVENOUS at 13:43

## 2019-08-19 RX ADMIN — DEXAMETHASONE SODIUM PHOSPHATE: 10 INJECTION, SOLUTION INTRAMUSCULAR; INTRAVENOUS at 12:07

## 2019-08-19 RX ADMIN — SODIUM CHLORIDE 1000 ML: 9 INJECTION, SOLUTION INTRAVENOUS at 12:03

## 2019-08-19 RX ADMIN — HEPARIN SODIUM (PORCINE) LOCK FLUSH IV SOLN 100 UNIT/ML 5 ML: 100 SOLUTION at 10:09

## 2019-08-19 RX ADMIN — PALONOSETRON HYDROCHLORIDE 0.25 MG: 0.25 INJECTION, SOLUTION INTRAVENOUS at 13:35

## 2019-08-19 ASSESSMENT — PAIN SCALES - GENERAL: PAINLEVEL: NO PAIN (0)

## 2019-08-19 NOTE — PROGRESS NOTES
Infusion Nursing Note:  Remedios Watts presents today for C8D1 Oxaliplatin/Leucovorin/Fluorouracil inj/pump/IVF.    Patient seen by provider today: Yes: Sapna MURILLO   present during visit today: Not Applicable.    Note:Pt arrived to infusion with no blood return noted from her port. TPA administered per protocol. After 30 minutes of dwell time port had positive blood return and flushed easily. Proceeded with infusion    No new concerns made. Otherwise well.      Intravenous Access:  Implanted Port.    Treatment Conditions:  Lab Results   Component Value Date    HGB 13.5 08/19/2019     Lab Results   Component Value Date    WBC 4.9 08/19/2019      Lab Results   Component Value Date    ANEU 2.5 08/19/2019     Lab Results   Component Value Date     08/19/2019      Lab Results   Component Value Date     08/19/2019                   Lab Results   Component Value Date    POTASSIUM 4.1 08/19/2019           No results found for: MAG         Lab Results   Component Value Date    CR 0.58 08/19/2019                   Lab Results   Component Value Date    MANDY 8.8 08/19/2019                Lab Results   Component Value Date    BILITOTAL 0.3 08/19/2019           Lab Results   Component Value Date    ALBUMIN 3.8 08/19/2019                    Lab Results   Component Value Date    ALT 27 08/19/2019           Lab Results   Component Value Date    AST 21 08/19/2019       Results reviewed, labs MET treatment parameters, ok to proceed with treatment.    Infusion:  Continous Infusion of Fluorouracil at 5.2 ml/hour for 46 hours, double checked with Cynthia Vuong RN. Portion of tube tubing is flat with estimated measurement of 1-2 cm from the sensor, seen by [facility] Pharmacist, may proceed.     Instructed patient if pump is unchanged on its volume in couple of hours, to call [facility]. Verbalized understanding.    Post Infusion Assessment:    Results reviewed, copy given to patient.  Proceed with treatment.    Prior  to discharge: Port is secured in place with tegaderm and flushed with 10cc NS with positive blood return noted.  Continuous home infusion Cseries pump connected.    All connectors secured in place and clamps taped open.    Patient instructed to call our clinic or [facility] with any questions or concerns at home.  Patient verbalized understanding.    Patient set up for pump disconnect at our [facility] on 8/21/19@0900H per patient request. Pump finishes couple of hours early.     Post Infusion Assessment:  Patient tolerated infusion without incident.  Patient tolerated injection without incident.  Blood return noted pre and post infusion.  Blood return noted during administration every 2 cc.  Site patent and intact, free from redness, edema or discomfort.  No evidence of extravasations.  Access kept as per protocol.       Discharge Plan:   Prescription refills given for Zanaflex.  Discharge instructions reviewed with: Patient.  Patient and/or family verbalized understanding of discharge instructions and all questions answered.  AVS to patient via [id number].  Patient will return 9/4/19 for next appointment.   Patient discharged in stable condition accompanied by: self.  Departure Mode: Ambulatory.    VIBHA KNIGHT RN

## 2019-08-19 NOTE — NURSING NOTE
"Chief Complaint   Patient presents with     Port Draw     No blood return from port; labs drawn with vpt by rn.  vs taken     Port accessed with 20 gauge 3/4\" Power needle by rn but no blood return.  Port flushed with NS and heparin. Alteplase ordered per standing order and clinic and infusion staff notified through [facility].  Labs drawn with vpt by rn.   Pt tolerated well.  VS taken.  Pt checked in for next appt.    Angelia Pascual RN      "

## 2019-08-19 NOTE — PATIENT INSTRUCTIONS
Contact Numbers  [facility]: [phone]    After Hours:  [phone]  Triage: [phone]    Please call the [facility] Triage line if you experience a temperature greater than or equal to 100.5, shaking chills, have uncontrolled nausea, vomiting and/or diarrhea, dizziness, shortness of breath, chest pain, bleeding, unexplained bruising, or if you have any other new/concerning symptoms, questions or concerns.     If it is after hours, weekends, or holidays, please call the main hospital  at  [phone] and ask to speak to the Oncology doctor on call.     If you are having any concerning symptoms or wish to speak to a provider before your next infusion visit, please call your care coordinator or triage to notify them so we can adequately serve you.     If you need a refill on a narcotic prescription or other medication, please call triage before your infusion appointment.         August 2019 Sunday Monday Tuesday Wednesday Thursday Friday Saturday                       1     2     3       4     5     6     7     8     9     10       11     12     13    RETURN  10:00 AM   (15 min.)   Johnny Stevens MD   [facility] 14     15     16     17       18     19    [facility] DRAW   9:30 AM   (15 min.)    [facility] DRAW   [facility] Lab Draw    [facility] RETURN   9:55 AM   (50 min.)   Sapna Schilling PA-C   [facility]    [facility] ON[facility] INFUS[facility] 240  11:30 AM   (240 min.)   [facility]   [facility] 20     21     22     23     24       25     26     27     28     29     30     31                 September 2019 Sunday Monday Tuesday Wednesday Thursday Friday Saturday   1     2     3    [facility] DRAW  11:00 AM   (15 min.)   [facility] DRAW   [facility] Lab Draw    [facility] RETURN  11:25 AM   (50 min.)   Melinda Mayo PA-C   [facility] 4    [facility] ONC INFUSION 240  12:30 PM   (240  min.)   [facility] INF[facility]ION   [facility] 5     6     7       8     9     10     11     12     13     14       15     16     17    [facility] LAB DRAW   9:30 AM   (15 min.)   [facility] LAB DRAW   [facility] Lab Draw    [facility] RETURN  10:05 AM   (50 min.)   Melinda Mayo PA-C   [facility] ONC INFUSION 240  11:30 AM   (240 min.)    ONCOLOGY INFUSION   [facility] 18     19     20     21       22     23     24     25     26     27     28       29     30                                              Lab Results:  Recent Results (from the past 12 hour(s))   CBC with platelets differential    Collection Time: 08/19/19 10:21 AM   Result Value Ref Range    WBC 4.9 4.0 - 11.0 10e9/L    RBC Count 4.20 3.8 - 5.2 10e12/L    Hemoglobin 13.5 11.7 - 15.7 g/dL    Hematocrit 40.2 35.0 - 47.0 %    MCV 96 78 - 100 fl    MCH 32.1 26.5 - 33.0 pg    MCHC 33.6 31.5 - 36.5 g/dL    RDW 15.9 (H) 10.0 - 15.0 %    Platelet Count 208 150 - 450 10e9/L    Diff Method Automated Method     % Neutrophils 51.8 %    % Lymphocytes 31.1 %    % Monocytes 14.1 %    % Eosinophils 1.8 %    % Basophils 0.4 %    % Immature Granulocytes 0.8 %    Nucleated RBCs 0 0 /100    Absolute Neutrophil 2.5 1.6 - 8.3 10e9/L    Absolute Lymphocytes 1.5 0.8 - 5.3 10e9/L    Absolute Monocytes 0.7 0.0 - 1.3 10e9/L    Absolute Eosinophils 0.1 0.0 - 0.7 10e9/L    Absolute Basophils 0.0 0.0 - 0.2 10e9/L    Abs Immature Granulocytes 0.0 0 - 0.4 10e9/L    Absolute Nucleated RBC 0.0    Comprehensive metabolic panel    Collection Time: 08/19/19 10:21 AM   Result Value Ref Range    Sodium 138 133 - 144 mmol/L    Potassium 4.1 3.4 - 5.3 mmol/L    Chloride 108 94 - 109 mmol/L    Carbon Dioxide 25 20 - 32 mmol/L    Anion Gap 4 3 - 14 mmol/L    Glucose 80 70 - 99 mg/dL    Urea Nitrogen 7 7 - 30 mg/dL    Creatinine 0.58 0.52 - 1.04 mg/dL    GFR Estimate >90 >60 mL/min/[1.73_m2]    GFR Estimate If Black >90 >60  mL/min/[1.73_m2]    Calcium 8.8 8.5 - 10.1 mg/dL    Bilirubin Total 0.3 0.2 - 1.3 mg/dL    Albumin 3.8 3.4 - 5.0 g/dL    Protein Total 7.2 6.8 - 8.8 g/dL    Alkaline Phosphatase 101 40 - 150 U/L    ALT 27 0 - 50 U/L    AST 21 0 - 45 U/L

## 2019-08-19 NOTE — NURSING NOTE
"Oncology Rooming Note    August 19, 2019 10:29 AM   Remedios Watts is a 38 year old female who presents for:    Chief Complaint   Patient presents with     Port Draw     No blood return from port; labs drawn with vpt by rn.  vs taken     Oncology Clinic Visit     Colon Ca; Active Tx     Initial Vitals: /77 (BP Location: Right arm, Patient Position: Sitting, Cuff Size: Adult Large)   Pulse 55   Temp 98.7  F (37.1  C) (Oral)   Resp 16   Wt 78 kg (171 lb 14.4 oz)   LMP 08/17/2019   SpO2 100%   BMI 27.76 kg/m   Estimated body mass index is 27.76 kg/m  as calculated from the following:    Height as of 7/15/19: 1.676 m (5' 5.98\").    Weight as of this encounter: 78 kg (171 lb 14.4 oz). Body surface area is 1.91 meters squared.  No Pain (0) Comment: Data Unavailable   Patient's last menstrual period was 08/17/2019.  Allergies reviewed: Yes  Medications reviewed: Yes    Medications: Medication refills not needed today.  Pharmacy name entered into [facility]: [facility] - [place], [place] - [id number] [facility]    Clinical concerns: Patient states there are no new concerns to discuss with provider.  Angela was not notified.         Karen Patel CMA              "

## 2019-08-19 NOTE — PROGRESS NOTES
"Oncology/Hematology Visit Note  Aug 19, 2019    Reason for Visit: Follow up of stage III colon cancer    History of Present Illness: Remedios Watts is a 38 year old female with PMH melanoma to left arm and back s/p excision with stage III colon cancer. Her history is as follows, copied forward from prior notes:    \"Ms. Watts is a 38 year old female who was admitted to the hospital on 3/10/2019 for bowel obstruction due to hepatic flexure colon mass. She had a CT scan done on 3/10/2019 which showed apple core lesion near the hepatic flexure of the colon concerning for an obstructing colonic neoplasm.  No enlarged lymph nodes were seen. There is a tiny subcentimeter left lobe liver lesion which is too small to characterize.  A CT of the chest on 3/13/2019 did not show evidence of metastatic disease in the chest.       Colonoscopy on 3/11/2019 showed fungating, infiltrative, highly-friable and ulcerated large mass at the hepatic flexure/proximal transverse colon, unable to traverse this lesions as this appears to be near-completely obstructive in nature (though allows liquid stool/effluent to pass under visualization). The mass was partially circumferential. Biopsies was consistent with invasive moderately differentiated adenocarcinoma compatible with colorectal primary and Mismatch repair enzymes were intact by immunohistochemistry.      On 3/22/2019 she had laparoscopic extended right hemicolectomy performed by Dr. Quesada. Final pathology was consistent with a 3.7 cm hepatic flexure poorly differentiated adenocarcinoma with focal micropapillary growth extending through the muscularis propria and involving serosa (pT4). There was lymphovascular invasion but no perineural invasion seen. All margins were negative. 4 out of 38 lymph nodes were positive for metastatic carcinoma including 2 lymph nodes with micrometastasis. Final staging was [id number].    She met with Dr. Capps 4/4/19 who recommended adjuvant FOLFOX. " "Port placed 4/22/19.       She started adjuvant FOLFOX on 4/29/19. She did require dose reduction of Oxaliplatin by 20% for neuropathy with cycle 4. She returns today for ongoing oncology follow-up prior to cycle 8 FOLFOX.    Interval History:  Patient reports that she had more nausea with this last cycle of chemotherapy but no vomiting.  She was able to manage the nausea with her antiemetics (compazine first and then switched to zofran after day 3).  Her BM have been frustrating for her.  She gets constipated right after chemo for about 3-4 days. She stopped taking softeners as she reflexively will get diarrhea (her baseline).  She feels she \"tore\" something last cycle and continues to have rectal bleeding with BMs.  The second week after chemo it burns terribly which did improve this extra week off(had a week 3 due to going to a horse show).   She has been applying Desitin to her rectal area and this helps, but then the cycle starts over and the bleeding worsens.  She had her period this last week so she feels she has a lot of bleeding going on.   She reports less muscle spasms with the use of Zanaflex for the first 2 days following her infusions.  She still has cold sensitivity present, but denies any numbness or tingling separate from cold.  She continues to get lip irritation with chemotherapy and applies Aquaphor for this. She has ongoing fatigue, but it was helpful to have this extra week off, she felt really well.     Current Outpatient Medications   Medication Sig Dispense Refill     gabapentin (NEURONTIN) 300 MG capsule Take 1-2 capsules (300-600 mg) by mouth nightly as needed (neuropathic pain) 30 capsule 3     granisetron (KYTRIL) 1 MG tablet Take 1 tablet (1 mg) by mouth every 12 hours as needed for nausea 60 tablet 3     lidocaine-prilocaine (EMLA) 2.5-2.5 % external cream Apply topically as needed for moderate pain 30 g 0     LORazepam (ATIVAN) 0.5 MG tablet Take 1 tablet (0.5 mg) by mouth every 4 " hours as needed (Anxiety, Nausea/Vomiting or Sleep) 30 tablet 2     LORazepam (ATIVAN) 0.5 MG tablet Take 1 tablet (0.5 mg) by mouth nightly as needed for sleep 10 tablet 0     prochlorperazine (COMPAZINE) 10 MG tablet Take 1 tablet (10 mg) by mouth every 6 hours as needed (Nausea/Vomiting) (Patient not taking: Reported on 7/2/2019) 60 tablet 3     tretinoin (RETIN-A) 0.05 % external cream Apply topically At Bedtime 45 g 3     Physical Examination:  /77 (BP Location: Right arm, Patient Position: Sitting, Cuff Size: Adult Large)   Pulse 55   Temp 98.7  F (37.1  C) (Oral)   Resp 16   Wt 78 kg (171 lb 14.4 oz)   SpO2 100%   BMI 27.76 kg/m    Wt Readings from Last 10 Encounters:   08/19/19 78 kg (171 lb 14.4 oz)   07/29/19 79 kg (174 lb 1.6 oz)   07/15/19 78.4 kg (172 lb 14.4 oz)   07/02/19 77.1 kg (170 lb)   06/10/19 77.3 kg (170 lb 6.4 oz)   05/29/19 77.3 kg (170 lb 6.4 oz)   05/13/19 76.2 kg (167 lb 14.4 oz)   05/03/19 77.1 kg (169 lb 15.6 oz)   04/29/19 77.2 kg (170 lb 3.2 oz)   04/25/19 77.4 kg (170 lb 9.6 oz)   Constitutional: Well-appearing female in no acute distress.  Eyes: EOMI, PERRL. No scleral icterus.  ENT: Oral mucosa is moist without lesions or thrush.   Lymphatic: Neck is supple without cervical or supraclavicular lymphadenopathy.   Cardiovascular: Regular rate and rhythm. No peripheral edema.  Respiratory: Clear to auscultation bilaterally. No wheezes or crackles.  Gastrointestinal: Bowel sounds present. Abdomen soft, non-tender. No palpable hepatosplenomegaly or masses.   Neurologic: Cranial nerves II through XII are grossly intact.  Skin: No erythema of palms noted. No rashes, petechiae, or bruising noted on exposed skin.    Laboratory Data:     8/19/2019 10:21   Sodium 138   Potassium 4.1   Chloride 108   Carbon Dioxide 25   Urea Nitrogen 7   Creatinine 0.58   GFR Estimate >90   GFR Estimate If Black >90   Calcium 8.8   Anion Gap 4   Albumin 3.8   Protein Total 7.2   Bilirubin Total 0.3    Alkaline Phosphatase 101   ALT 27   AST 21   Glucose 80   WBC 4.9   Hemoglobin 13.5   Hematocrit 40.2   Platelet Count 208   RBC Count 4.20   MCV 96     Assessment and Plan:  1. Onc  Stage IIIC poorly differentiated [id number] adenocarcinoma of the hepatic flexure -s/p laparoscopic right hemicolectomy on 3/22/19. All margins negative. 4/38 LNs positive. MSI-Intact.  Baseline CEA 9.2 on 3/11/2019. She started adjuvant FOLFOX on 4/29/2019.     She has had difficulties with muscle spasm, nausea, cold sensitivity, and diarrhea. Oxaliplatin dose was reduced by 20% with cycle 4 for neurotoxicity. Continued to struggle this cycle but symptoms not worse so will continue with current dose and do ongoing supportive care as below. She will continue with cycle 8 FOLFOX today. Will continue to see her every 2 weeks with chemo. Overall plan for 6 months followed by CT CAP. Will need colonoscopy March 2020.     2. Neuro  Acute neurotoxicity manifested as muscle spasms and cold sensitivity with pain starting with infusion x 3 days. The Zanaflex during infusion and then at bedtime helps the cramping significantly- she'll continue with this.    Cold sensitivity worse first week, then improves, but not yet resolved. Does have neuropathic pain at night. Stable. She will continue on gabapentin at 600mg at bedtime. Will continue to monitor- no worsening and did let up this last week that she had off.     3. GI  Nausea stable with IV Emend and Aloxi along with Dex 8mg with infusion, then PRN Ativan/Compazine and Kytril starting day 3. No changes made today.    Diarrhea continues to persist since surgery. She will continue to use fiber and Imodium. For constipation associated with antiemetics, she will take a stool softener.     She is having rectal bleeding- now going on the last few weeks.  Exacerbated by having a hard still during the constipation time after chemo, but still persists once her stools soften.  She has nothing external  that she can see/feel- unsure if this is a fissure or an internal hemorrhoid but will have her see GI to help.     No mucositis but does have lip irritation, now resolved but flares with treatment. Continue Aquaphor PRN.    Liver lesion on prior imaging indeterminate and will evaluate on next imaging at the end of her adjuvant chemotherapy.     4. Heme/Gyn  Iron deficiency anemia 2/2 heaving menses. Endometrial biopsy 4/23/19 was benign. S/p IV Infed 5/22/19. Hgb has been improving (despite rectal bleeding and menses).  Iron pending today.     Heavy menses. Previously, recommended f/u with gynecology to discuss getting a Mirena IUD. She did recently have her menses with her week off of chemo. She would prefer to hold off on getting IUD placed until after chemo.     5. Genetics  Will be seeing genetics in October for young presentation of colon cancer and history of skin melanoma. Also sees derm for routine melanoma follow-up.    Angela Schilling PA-C  [facility]  [address] [place] [id number] [phone]

## 2019-08-26 ENCOUNTER — TELEPHONE (OUTPATIENT)
Dept: SURGERY | Facility: CLINIC | Age: 39
End: 2019-08-26

## 2019-08-26 NOTE — TELEPHONE ENCOUNTER
Called patient, schedule appt with Dr. Quesada on 9/11/19 @ 5:45pm for rectal bleeding. Confirmed date/time/location with patient.    Aislinn Hargrove LPN

## 2019-08-30 DIAGNOSIS — C18.3 MALIGNANT NEOPLASM OF HEPATIC FLEXURE (H): Primary | ICD-10-CM

## 2019-09-03 ENCOUNTER — INFUSION THERAPY VISIT (OUTPATIENT)
Dept: ONCOLOGY | Facility: CLINIC | Age: 39
End: 2019-09-03
Attending: INTERNAL MEDICINE
Payer: COMMERCIAL

## 2019-09-03 ENCOUNTER — APPOINTMENT (OUTPATIENT)
Dept: LAB | Facility: CLINIC | Age: 39
End: 2019-09-03
Attending: PHYSICIAN ASSISTANT
Payer: COMMERCIAL

## 2019-09-03 ENCOUNTER — ONCOLOGY VISIT (OUTPATIENT)
Dept: ONCOLOGY | Facility: CLINIC | Age: 39
End: 2019-09-03
Attending: PHYSICIAN ASSISTANT
Payer: COMMERCIAL

## 2019-09-03 ENCOUNTER — HOME INFUSION (PRE-WILLOW HOME INFUSION) (OUTPATIENT)
Dept: PHARMACY | Facility: CLINIC | Age: 39
End: 2019-09-03

## 2019-09-03 VITALS
RESPIRATION RATE: 16 BRPM | WEIGHT: 170.1 LBS | OXYGEN SATURATION: 97 % | TEMPERATURE: 98.1 F | HEART RATE: 72 BPM | BODY MASS INDEX: 27.47 KG/M2 | SYSTOLIC BLOOD PRESSURE: 116 MMHG | DIASTOLIC BLOOD PRESSURE: 76 MMHG

## 2019-09-03 DIAGNOSIS — G62.9 PERIPHERAL POLYNEUROPATHY: ICD-10-CM

## 2019-09-03 DIAGNOSIS — F32.89 OTHER DEPRESSION: ICD-10-CM

## 2019-09-03 DIAGNOSIS — C18.3 MALIGNANT NEOPLASM OF HEPATIC FLEXURE (H): Primary | ICD-10-CM

## 2019-09-03 LAB
ALBUMIN SERPL-MCNC: 3.9 G/DL (ref 3.4–5)
ALP SERPL-CCNC: 99 U/L (ref 40–150)
ALT SERPL W P-5'-P-CCNC: 26 U/L (ref 0–50)
ANION GAP SERPL CALCULATED.3IONS-SCNC: 4 MMOL/L (ref 3–14)
AST SERPL W P-5'-P-CCNC: 25 U/L (ref 0–45)
BASOPHILS # BLD AUTO: 0 10E9/L (ref 0–0.2)
BASOPHILS NFR BLD AUTO: 0.3 %
BILIRUB SERPL-MCNC: 0.4 MG/DL (ref 0.2–1.3)
BUN SERPL-MCNC: 6 MG/DL (ref 7–30)
CALCIUM SERPL-MCNC: 9 MG/DL (ref 8.5–10.1)
CHLORIDE SERPL-SCNC: 107 MMOL/L (ref 94–109)
CO2 SERPL-SCNC: 26 MMOL/L (ref 20–32)
CREAT SERPL-MCNC: 0.58 MG/DL (ref 0.52–1.04)
DIFFERENTIAL METHOD BLD: NORMAL
EOSINOPHIL # BLD AUTO: 0.1 10E9/L (ref 0–0.7)
EOSINOPHIL NFR BLD AUTO: 1 %
ERYTHROCYTE [DISTWIDTH] IN BLOOD BY AUTOMATED COUNT: 14.3 % (ref 10–15)
GFR SERPL CREATININE-BSD FRML MDRD: >90 ML/MIN/{1.73_M2}
GLUCOSE SERPL-MCNC: 84 MG/DL (ref 70–99)
HCT VFR BLD AUTO: 38.1 % (ref 35–47)
HGB BLD-MCNC: 12.7 G/DL (ref 11.7–15.7)
IMM GRANULOCYTES # BLD: 0 10E9/L (ref 0–0.4)
IMM GRANULOCYTES NFR BLD: 0.5 %
LYMPHOCYTES # BLD AUTO: 1.3 10E9/L (ref 0.8–5.3)
LYMPHOCYTES NFR BLD AUTO: 21.6 %
MCH RBC QN AUTO: 32.5 PG (ref 26.5–33)
MCHC RBC AUTO-ENTMCNC: 33.3 G/DL (ref 31.5–36.5)
MCV RBC AUTO: 97 FL (ref 78–100)
MONOCYTES # BLD AUTO: 0.8 10E9/L (ref 0–1.3)
MONOCYTES NFR BLD AUTO: 13.2 %
NEUTROPHILS # BLD AUTO: 3.9 10E9/L (ref 1.6–8.3)
NEUTROPHILS NFR BLD AUTO: 63.4 %
NRBC # BLD AUTO: 0 10*3/UL
NRBC BLD AUTO-RTO: 0 /100
PLATELET # BLD AUTO: 176 10E9/L (ref 150–450)
POTASSIUM SERPL-SCNC: 4 MMOL/L (ref 3.4–5.3)
PROT SERPL-MCNC: 7.1 G/DL (ref 6.8–8.8)
RBC # BLD AUTO: 3.91 10E12/L (ref 3.8–5.2)
SODIUM SERPL-SCNC: 137 MMOL/L (ref 133–144)
WBC # BLD AUTO: 6.2 10E9/L (ref 4–11)

## 2019-09-03 PROCEDURE — 96411 CHEMO IV PUSH ADDL DRUG: CPT

## 2019-09-03 PROCEDURE — 25000128 H RX IP 250 OP 636: Mod: ZF | Performed by: PHYSICIAN ASSISTANT

## 2019-09-03 PROCEDURE — 85025 COMPLETE CBC W/AUTO DIFF WBC: CPT | Performed by: PHYSICIAN ASSISTANT

## 2019-09-03 PROCEDURE — G0463 HOSPITAL OUTPT CLINIC VISIT: HCPCS | Mod: ZF

## 2019-09-03 PROCEDURE — 80053 COMPREHEN METABOLIC PANEL: CPT | Performed by: PHYSICIAN ASSISTANT

## 2019-09-03 PROCEDURE — 25800030 ZZH RX IP 258 OP 636: Mod: ZF | Performed by: PHYSICIAN ASSISTANT

## 2019-09-03 PROCEDURE — 96415 CHEMO IV INFUSION ADDL HR: CPT

## 2019-09-03 PROCEDURE — 96361 HYDRATE IV INFUSION ADD-ON: CPT

## 2019-09-03 PROCEDURE — 96375 TX/PRO/DX INJ NEW DRUG ADDON: CPT

## 2019-09-03 PROCEDURE — G0498 CHEMO EXTEND IV INFUS W/PUMP: HCPCS

## 2019-09-03 PROCEDURE — 96368 THER/DIAG CONCURRENT INF: CPT

## 2019-09-03 PROCEDURE — 96367 TX/PROPH/DG ADDL SEQ IV INF: CPT

## 2019-09-03 PROCEDURE — 40000114 ZZH STATISTIC NO CHARGE CLINIC VISIT

## 2019-09-03 PROCEDURE — 99215 OFFICE O/P EST HI 40 MIN: CPT | Mod: ZP | Performed by: PHYSICIAN ASSISTANT

## 2019-09-03 PROCEDURE — 96413 CHEMO IV INFUSION 1 HR: CPT

## 2019-09-03 RX ORDER — TIZANIDINE 2 MG/1
2 TABLET ORAL ONCE
Status: DISCONTINUED | OUTPATIENT
Start: 2019-09-03 | End: 2019-09-03 | Stop reason: CLARIF

## 2019-09-03 RX ORDER — ALBUTEROL SULFATE 90 UG/1
1-2 AEROSOL, METERED RESPIRATORY (INHALATION)
Status: CANCELLED
Start: 2019-09-03

## 2019-09-03 RX ORDER — DIPHENHYDRAMINE HYDROCHLORIDE 50 MG/ML
50 INJECTION INTRAMUSCULAR; INTRAVENOUS
Status: CANCELLED
Start: 2019-09-03

## 2019-09-03 RX ORDER — PALONOSETRON 0.05 MG/ML
0.25 INJECTION, SOLUTION INTRAVENOUS ONCE
Status: CANCELLED
Start: 2019-09-03

## 2019-09-03 RX ORDER — SODIUM CHLORIDE 9 MG/ML
1000 INJECTION, SOLUTION INTRAVENOUS CONTINUOUS PRN
Status: CANCELLED
Start: 2019-09-03

## 2019-09-03 RX ORDER — PALONOSETRON 0.05 MG/ML
0.25 INJECTION, SOLUTION INTRAVENOUS ONCE
Status: COMPLETED | OUTPATIENT
Start: 2019-09-03 | End: 2019-09-03

## 2019-09-03 RX ORDER — EPINEPHRINE 1 MG/ML
0.3 INJECTION, SOLUTION INTRAMUSCULAR; SUBCUTANEOUS EVERY 5 MIN PRN
Status: CANCELLED | OUTPATIENT
Start: 2019-09-03

## 2019-09-03 RX ORDER — LORAZEPAM 2 MG/ML
0.5 INJECTION INTRAMUSCULAR EVERY 4 HOURS PRN
Status: CANCELLED
Start: 2019-09-03

## 2019-09-03 RX ORDER — FLUOROURACIL 50 MG/ML
400 INJECTION, SOLUTION INTRAVENOUS ONCE
Status: COMPLETED | OUTPATIENT
Start: 2019-09-03 | End: 2019-09-03

## 2019-09-03 RX ORDER — METHYLPREDNISOLONE SODIUM SUCCINATE 125 MG/2ML
125 INJECTION, POWDER, LYOPHILIZED, FOR SOLUTION INTRAMUSCULAR; INTRAVENOUS
Status: CANCELLED
Start: 2019-09-03

## 2019-09-03 RX ORDER — EPINEPHRINE 0.3 MG/.3ML
0.3 INJECTION SUBCUTANEOUS EVERY 5 MIN PRN
Status: CANCELLED | OUTPATIENT
Start: 2019-09-03

## 2019-09-03 RX ORDER — MEPERIDINE HYDROCHLORIDE 25 MG/ML
25 INJECTION INTRAMUSCULAR; INTRAVENOUS; SUBCUTANEOUS EVERY 30 MIN PRN
Status: CANCELLED | OUTPATIENT
Start: 2019-09-03

## 2019-09-03 RX ORDER — FLUOROURACIL 50 MG/ML
400 INJECTION, SOLUTION INTRAVENOUS ONCE
Status: CANCELLED | OUTPATIENT
Start: 2019-09-03

## 2019-09-03 RX ORDER — HEPARIN SODIUM (PORCINE) LOCK FLUSH IV SOLN 100 UNIT/ML 100 UNIT/ML
5 SOLUTION INTRAVENOUS
Status: COMPLETED | OUTPATIENT
Start: 2019-09-03 | End: 2019-09-03

## 2019-09-03 RX ORDER — ALBUTEROL SULFATE 0.83 MG/ML
2.5 SOLUTION RESPIRATORY (INHALATION)
Status: CANCELLED | OUTPATIENT
Start: 2019-09-03

## 2019-09-03 RX ORDER — TIZANIDINE 2 MG/1
TABLET ORAL
Qty: 1 TABLET | Refills: 0 | Status: SHIPPED | OUTPATIENT
Start: 2019-09-03 | End: 2020-01-07

## 2019-09-03 RX ADMIN — OXALIPLATIN 129 MG: 5 INJECTION, SOLUTION, CONCENTRATE INTRAVENOUS at 14:28

## 2019-09-03 RX ADMIN — HEPARIN 5 ML: 100 SYRINGE at 11:38

## 2019-09-03 RX ADMIN — PALONOSETRON HYDROCHLORIDE 0.25 MG: 0.25 INJECTION, SOLUTION INTRAVENOUS at 13:07

## 2019-09-03 RX ADMIN — FLUOROURACIL 755 MG: 50 INJECTION, SOLUTION INTRAVENOUS at 16:27

## 2019-09-03 RX ADMIN — DEXAMETHASONE SODIUM PHOSPHATE: 10 INJECTION, SOLUTION INTRAMUSCULAR; INTRAVENOUS at 13:11

## 2019-09-03 RX ADMIN — DEXTROSE MONOHYDRATE 250 ML: 50 INJECTION, SOLUTION INTRAVENOUS at 14:27

## 2019-09-03 RX ADMIN — LEUCOVORIN CALCIUM 650 MG: 350 INJECTION, POWDER, LYOPHILIZED, FOR SUSPENSION INTRAMUSCULAR; INTRAVENOUS at 14:27

## 2019-09-03 RX ADMIN — SODIUM CHLORIDE 1000 ML: 9 INJECTION, SOLUTION INTRAVENOUS at 12:43

## 2019-09-03 ASSESSMENT — PAIN SCALES - GENERAL: PAINLEVEL: NO PAIN (0)

## 2019-09-03 NOTE — PROGRESS NOTES
Infusion Nursing Note:  Remedios Watts presents today for Cycle 9 Day 1 of Oxaliplatin, Leucovorin, Fluorouracil push and pump connect.    Patient seen by provider today: Yes: LIDIA Atkinson   present during visit today: Not Applicable.    Note: TORB: Mony Camilo RN/ LIDIA Atkinson on 9/3/19. Patient forgot her Zanaflex to take during infusion. Per PA, [email or web address] so patient can take while here today.    Intravenous Access:  Implanted Port.    Treatment Conditions:  Lab Results   Component Value Date    HGB 12.7 09/03/2019     Lab Results   Component Value Date    WBC 6.2 09/03/2019      Lab Results   Component Value Date    ANEU 3.9 09/03/2019     Lab Results   Component Value Date     09/03/2019      Lab Results   Component Value Date     09/03/2019                   Lab Results   Component Value Date    POTASSIUM 4.0 09/03/2019           No results found for: MAG         Lab Results   Component Value Date    CR 0.58 09/03/2019                   Lab Results   Component Value Date    MANDY 9.0 09/03/2019                Lab Results   Component Value Date    BILITOTAL 0.4 09/03/2019           Lab Results   Component Value Date    ALBUMIN 3.9 09/03/2019                    Lab Results   Component Value Date    ALT 26 09/03/2019           Lab Results   Component Value Date    AST 25 09/03/2019       Results reviewed, labs MET treatment parameters, ok to proceed with treatment.      Post Infusion Assessment:  Patient tolerated infusion without incident.  Blood return noted pre and post infusion.  Blood return noted during Fluorouracil administration every 2 cc.  Site patent and intact, free from redness, edema or discomfort.  No evidence of extravasations.  Access discontinued per protocol.     Prior to discharge: Port is secured in place with tegaderm and flushed with 10cc NS with positive blood return noted.  Continuous home infusion C-series connected by  Kristin Garcia RN.    All connectors secured in place and clamps taped open.  Double checked by Sruthi Lujan RN.   Patient instructed to call our clinic or [facility] with any questions or concerns at home.  Patient verbalized understanding.    Patient set up for pump disconnect and 1L of IVF at home with [facility] on 9/5/19 at 9AM per patient preference.  Verified with COBY Moreira from [facility].       Discharge Plan:   Prescription refills given for Zoloft.  Discharge instructions reviewed with: Patient.  Patient and/or family verbalized understanding of discharge instructions and all questions answered.  Copy of AVS reviewed with patient and/or family.  Patient will return 9/17/19 for next appointment.  AVS to patient via [facility].  Patient will return 9/17/19 for next appointment.   Patient discharged in stable condition accompanied by: self.  Departure Mode: Ambulatory.    Mony Camilo, COBY, RN

## 2019-09-03 NOTE — PROGRESS NOTES
"Oncology/Hematology Visit Note  Sep 3, 2019    Reason for Visit: Follow up of stage III colon cancer    History of Present Illness: Remedios Watts is a 38 year old female with PMH melanoma to left arm and back s/p excision with stage III colon cancer. Her history is as follows, copied forward from prior notes:    \"Ms. Watts is a 38 year old female who was admitted to the hospital on 3/10/2019 for bowel obstruction due to hepatic flexure colon mass. She had a CT scan done on 3/10/2019 which showed apple core lesion near the hepatic flexure of the colon concerning for an obstructing colonic neoplasm.  No enlarged lymph nodes were seen. There is a tiny subcentimeter left lobe liver lesion which is too small to characterize.  A CT of the chest on 3/13/2019 did not show evidence of metastatic disease in the chest.       Colonoscopy on 3/11/2019 showed fungating, infiltrative, highly-friable and ulcerated large mass at the hepatic flexure/proximal transverse colon, unable to traverse this lesions as this appears to be near-completely obstructive in nature (though allows liquid stool/effluent to pass under visualization). The mass was partially circumferential. Biopsies was consistent with invasive moderately differentiated adenocarcinoma compatible with colorectal primary and Mismatch repair enzymes were intact by immunohistochemistry.      On 3/22/2019 she had laparoscopic extended right hemicolectomy performed by Dr. Quesada. Final pathology was consistent with a 3.7 cm hepatic flexure poorly differentiated adenocarcinoma with focal micropapillary growth extending through the muscularis propria and involving serosa (pT4). There was lymphovascular invasion but no perineural invasion seen. All margins were negative. 4 out of 38 lymph nodes were positive for metastatic carcinoma including 2 lymph nodes with micrometastasis. Final staging was [id number].    She met with Dr. Capps 4/4/19 who recommended adjuvant FOLFOX. " Port placed 4/22/19.       She started adjuvant FOLFOX on 4/29/19. She did require dose reduction of Oxaliplatin by 20% for neuropathy with cycle 4. She returns today for ongoing oncology follow-up prior to cycle 9 FOLFOX.    Interval History:  Patient reports that she does get nausea after chemotherapy and then gets constipation usually for about 3 days.  She has been taking Dulcolax to twice a day on those days.  She does continue to get rectal bleeding but her stools are hard.  She does get IV fluids with her pump unhooked's, but does find it hard to drink fluids those first few days and spends a lot of time in bed.  She did get blistering on her hands this time and applied Cetaphil.  She resumed taking sertraline for her mood about 4 weeks ago and feels this is been helpful and requests a refill.  She has been applying Desitin to her rectal area.  She does get muscle cramping following chemotherapy that is alleviated with Zanaflex.  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  She denies any numbness or tingling separate from cold.  She denies other concerns.    Current Outpatient Medications   Medication Sig Dispense Refill     sertraline (ZOLOFT) 50 MG tablet Take 1 tablet (50 mg) by mouth daily 90 tablet 1     gabapentin (NEURONTIN) 300 MG capsule Take 1-2 capsules (300-600 mg) by mouth nightly as needed (neuropathic pain) 30 capsule 3     granisetron (KYTRIL) 1 MG tablet Take 1 tablet (1 mg) by mouth every 12 hours as needed for nausea 60 tablet 3     lidocaine-prilocaine (EMLA) 2.5-2.5 % external cream Apply topically as needed for moderate pain 30 g 0     LORazepam (ATIVAN) 0.5 MG tablet Take 1 tablet (0.5 mg) by mouth every 4 hours as needed (Anxiety, Nausea/Vomiting or Sleep) 30 tablet 2     LORazepam (ATIVAN) 0.5 MG tablet Take 1 tablet (0.5 mg) by mouth nightly as needed for sleep 10 tablet 0     prochlorperazine (COMPAZINE) 10 MG tablet  Take 1 tablet (10 mg) by mouth every 6 hours as needed (Nausea/Vomiting) 60 tablet 3     tiZANidine (ZANAFLEX) 2 MG tablet Take during chemo and the night of chemo for infusion related muscle spasms 20 tablet 0     tretinoin (RETIN-A) 0.05 % external cream Apply topically At Bedtime 45 g 3     Physical Examination:  /76 (BP Location: Right arm, Patient Position: Sitting, Cuff Size: Adult Regular)   Pulse 72   Temp 98.1  F (36.7  C) (Oral)   Resp 16   Wt 77.2 kg (170 lb 1.6 oz)   LMP 08/17/2019   SpO2 97%   BMI 27.47 kg/m    Wt Readings from Last 10 Encounters:   09/03/19 77.2 kg (170 lb 1.6 oz)   08/19/19 78 kg (171 lb 14.4 oz)   07/29/19 79 kg (174 lb 1.6 oz)   07/15/19 78.4 kg (172 lb 14.4 oz)   07/02/19 77.1 kg (170 lb)   06/10/19 77.3 kg (170 lb 6.4 oz)   05/29/19 77.3 kg (170 lb 6.4 oz)   05/13/19 76.2 kg (167 lb 14.4 oz)   05/03/19 77.1 kg (169 lb 15.6 oz)   04/29/19 77.2 kg (170 lb 3.2 oz)   Constitutional: Well-appearing female in no acute distress.  Eyes: EOMI, PERRL. No scleral icterus.  ENT: Oral mucosa is moist without lesions or thrush.   Lymphatic: Neck is supple without cervical or supraclavicular lymphadenopathy.   Cardiovascular: Regular rate and rhythm. No peripheral edema.  Respiratory: Clear to auscultation bilaterally. No wheezes or crackles.  Gastrointestinal: Bowel sounds present. Abdomen soft, non-tender. No palpable hepatosplenomegaly or masses.   Neurologic: Cranial nerves II through XII are grossly intact.  Skin: No erythema of palms noted. Healing blisters noted on palms at base of fingers. No rashes, petechiae, or bruising noted on exposed skin.    Laboratory Data:   9/3/2019 11:44   Sodium 137   Potassium 4.0   Chloride 107   Carbon Dioxide 26   Urea Nitrogen 6 (L)   Creatinine 0.58   GFR Estimate >90   GFR Estimate If Black >90   Calcium 9.0   Anion Gap 4   Albumin 3.9   Protein Total 7.1   Bilirubin Total 0.4   Alkaline Phosphatase 99   ALT 26   AST 25   Glucose 84   WBC  6.2   Hemoglobin 12.7   Hematocrit 38.1   Platelet Count 176   RBC Count 3.91   MCV 97   MCH 32.5   MCHC 33.3   RDW 14.3   Diff Method Automated Method   % Neutrophils 63.4   % Lymphocytes 21.6   % Monocytes 13.2   % Eosinophils 1.0   % Basophils 0.3   % Immature Granulocytes 0.5   Nucleated RBCs 0   Absolute Neutrophil 3.9   Absolute Lymphocytes 1.3   Absolute Monocytes 0.8   Absolute Eosinophils 0.1   Absolute Basophils 0.0   Abs Immature Granulocytes 0.0   Absolute Nucleated RBC 0.0       Assessment and Plan:  1. Onc  Stage IIIC poorly differentiated [id number] adenocarcinoma of the hepatic flexure -s/p laparoscopic right hemicolectomy on 3/22/19. All margins negative. 4/38 LNs positive. MSI-Intact.  Baseline CEA 9.2 on 3/11/2019. She started adjuvant FOLFOX on 4/29/2019.     She has had difficulties with muscle spasm, nausea, cold sensitivity, and diarrhea. Oxaliplatin dose was reduced by 20% with cycle 4 for neurotoxicity. Continued to struggle this cycle but symptoms not worse so will continue with current dose and do ongoing supportive care as below. She will continue with cycle 9 FOLFOX today. Will continue to see her every 2 weeks with chemo. Overall plan for 6 months followed by CT CAP. She will see Dr. Capps in mid-November to review that imaging. Will need colonoscopy March 2020.     2. Neuro  Acute neurotoxicity manifested as muscle spasms and cold sensitivity with pain starting with infusion x 3 days. The Zanaflex during infusion and then at bedtime helps the cramping significantly- she'll continue with this.    Cold sensitivity worse first week, then improves, but not yet resolved. Does have neuropathic pain at night. Stable. She will continue on gabapentin at 600 mg at bedtime. Will continue to monitor- no worsening and did let up this last week that she had off. With each cycle, symptoms last a littler longer.     3. GI  Nausea stable with IV Emend and Aloxi along with Dex 8mg with infusion, then  PRN Ativan/Compazine and Kytril starting day 3. No changes made today.    Diarrhea continues to persist since surgery. She will continue to use Imodium. For constipation associated with antiemetics, she will increase Dulcolax to 3 bid and will start on a daily fiber supplement..     She is having rectal bleeding.  Exacerbated by having a hard still during the constipation time after chemo, but still persists once her stools soften.  She will see Dr. Steven Massey next week for further evaluation.    No mucositis but does have lip irritation, now resolved but flares with treatment. Continue Aquaphor PRN.    Liver lesion on prior imaging indeterminate and will evaluate on next imaging at the end of her adjuvant chemotherapy.     4. Heme/Gyn  Iron deficiency anemia 2/2 heaving menses. Endometrial biopsy 4/23/19 was benign. S/p IV Infed 5/22/19. Hgb has improved (despite rectal bleeding and menses).  Iron studies back to normal on 8/19/19.     Heavy menses. Previously, recommended f/u with gynecology to discuss getting a Mirena IUD. She would prefer to hold off on getting IUD placed until after chemo.     5. Genetics  Will be seeing genetics in October for young presentation of colon cancer and history of skin melanoma. Also sees derm for routine melanoma follow-up.    6. Derm  Recommend Eucerin cream to her palms for hand foot syndrome.     7. Psych  Patient has resumed sertraline 50 mg daily in early August 2019 with improvement in her mood. Rx was refilled today.     Melinda Mayo PA-C  [facility]  [address] [place] [address]  [phone]

## 2019-09-03 NOTE — PATIENT INSTRUCTIONS
Contact Numbers  [facility]: [phone]        Please call the [facility] Triage line if you experience a temperature greater than or equal to 100.5, shaking chills, have uncontrolled nausea, vomiting and/or diarrhea, dizziness, shortness of breath, chest pain, bleeding, unexplained bruising, or if you have any other new/concerning symptoms, questions or concerns.     If it is after hours, weekends, or holidays, please call the main hospital  at  [phone] and ask to speak to the Oncology doctor on call.     If you are having any concerning symptoms or wish to speak to a provider before your next infusion visit, please call your care coordinator or triage to notify them so we can adequately serve you.     If you need a refill on a narcotic prescription or other medication, please call triage before your infusion appointment.       September 2019 Sunday Monday Tuesday Wednesday Thursday Friday Saturday   1     2     3    [facility] D[facility]  11:00 AM   (15 min.)    [facility] DRAW   [facility] Lab Draw    [facility] RETURN  11:25 AM   (50 min.)   Melinda Mayo PA-C   [facility]  12:30 PM   (240 min.)   [facility]   [facility] 4     5     6     7       8     9     10     11    [facility] RETURN   5:30 PM   (15 min.)   Linda Quesada MD   [facility] Colon and Rectal Surgery 12     13     14       15     16     17    [facility] DRAW   9:30 AM   (15 min.)    [facility] DRAW   [facility] Lab Draw    [facility] RETURN  10:05 AM   (50 min.)   Melinda Mayo [facility]-C   [facility]  11:30 AM   (240 min.)   [facility]   [facility] 18     19     20     21       22     23     24     25     26     27     28       29     30 October 2019 Sunday Monday Tuesday Wednesday Thursday Friday Saturday              1     2    NEW  11:15 AM   (75 min.)   Ivette Delcid,    [facility] 3     4     5       6     7     8     9     10     11     12       13     14     15     16     17     18     19       20     21     22     23     24     25     26       27     28     29     30     31                               Lab Results:  Recent Results (from the past 12 hour(s))   CBC with platelets differential    Collection Time: 09/03/19 11:44 AM   Result Value Ref Range    WBC 6.2 4.0 - 11.0 10e9/L    RBC Count 3.91 3.8 - 5.2 10e12/L    Hemoglobin 12.7 11.7 - 15.7 g/dL    Hematocrit 38.1 35.0 - 47.0 %    MCV 97 78 - 100 fl    MCH 32.5 26.5 - 33.0 pg    MCHC 33.3 31.5 - 36.5 g/dL    RDW 14.3 10.0 - 15.0 %    Platelet Count 176 150 - 450 10e9/L    Diff Method Automated Method     % Neutrophils 63.4 %    % Lymphocytes 21.6 %    % Monocytes 13.2 %    % Eosinophils 1.0 %    % Basophils 0.3 %    % Immature Granulocytes 0.5 %    Nucleated RBCs 0 0 /100    Absolute Neutrophil 3.9 1.6 - 8.3 10e9/L    Absolute Lymphocytes 1.3 0.8 - 5.3 10e9/L    Absolute Monocytes 0.8 0.0 - 1.3 10e9/L    Absolute Eosinophils 0.1 0.0 - 0.7 10e9/L    Absolute Basophils 0.0 0.0 - 0.2 10e9/L    Abs Immature Granulocytes 0.0 0 - 0.4 10e9/L    Absolute Nucleated RBC 0.0    Comprehensive metabolic panel    Collection Time: 09/03/19 11:44 AM   Result Value Ref Range    Sodium 137 133 - 144 mmol/L    Potassium 4.0 3.4 - 5.3 mmol/L    Chloride 107 94 - 109 mmol/L    Carbon Dioxide 26 20 - 32 mmol/L    Anion Gap 4 3 - 14 mmol/L    Glucose 84 70 - 99 mg/dL    Urea Nitrogen 6 (L) 7 - 30 mg/dL    Creatinine 0.58 0.52 - 1.04 mg/dL    GFR Estimate >90 >60 mL/min/[1.73_m2]    GFR Estimate If Black >90 >60 mL/min/[1.73_m2]    Calcium 9.0 8.5 - 10.1 mg/dL    Bilirubin Total 0.4 0.2 - 1.3 mg/dL    Albumin 3.9 3.4 - 5.0 g/dL    Protein Total 7.1 6.8 - 8.8 g/dL    Alkaline Phosphatase 99 40 - 150 U/L    ALT 26 0 - 50 U/L    AST 25 0 - 45 U/L

## 2019-09-03 NOTE — NURSING NOTE
"Oncology Rooming Note    September 3, 2019 11:58 AM   Remedios Watts is a 38 year old female who presents for:    Chief Complaint   Patient presents with     Port Draw     labs drawn from port by rn.  vs taken     Oncology Clinic Visit     Return for Colon Ca , Labs, Tx     Initial Vitals: /76 (BP Location: Right arm, Patient Position: Sitting, Cuff Size: Adult Regular)   Pulse 72   Temp 98.1  F (36.7  C) (Oral)   Resp 16   Wt 77.2 kg (170 lb 1.6 oz)   LMP 08/17/2019   SpO2 97%   BMI 27.47 kg/m   Estimated body mass index is 27.47 kg/m  as calculated from the following:    Height as of 7/15/19: 1.676 m (5' 5.98\").    Weight as of this encounter: 77.2 kg (170 lb 1.6 oz). Body surface area is 1.9 meters squared.  No Pain (0) Comment: Data Unavailable   Patient's last menstrual period was 08/17/2019.  Allergies reviewed: Yes  Medications reviewed: Yes    Medications: Medication refills not needed today.  Pharmacy name entered into [facility]: [place] [facility] - - [place], [place] - [id number] [facility]    Clinical concerns: no  Maria Esther  was notified.      Leda Andino MA              "

## 2019-09-03 NOTE — LETTER
"9/3/2019      RE: Remedios Watts  [address] [place] [address]       Oncology/Hematology Visit Note  Sep 3, 2019    Reason for Visit: Follow up of stage III colon cancer    History of Present Illness: Remedios Watts is a 38 year old female with PMH melanoma to left arm and back s/p excision with stage III colon cancer. Her history is as follows, copied forward from prior notes:    \"Ms. Watts is a 38 year old female who was admitted to the hospital on 3/10/2019 for bowel obstruction due to hepatic flexure colon mass. She had a CT scan done on 3/10/2019 which showed apple core lesion near the hepatic flexure of the colon concerning for an obstructing colonic neoplasm.  No enlarged lymph nodes were seen. There is a tiny subcentimeter left lobe liver lesion which is too small to characterize.  A CT of the chest on 3/13/2019 did not show evidence of metastatic disease in the chest.       Colonoscopy on 3/11/2019 showed fungating, infiltrative, highly-friable and ulcerated large mass at the hepatic flexure/proximal transverse colon, unable to traverse this lesions as this appears to be near-completely obstructive in nature (though allows liquid stool/effluent to pass under visualization). The mass was partially circumferential. Biopsies was consistent with invasive moderately differentiated adenocarcinoma compatible with colorectal primary and Mismatch repair enzymes were intact by immunohistochemistry.      On 3/22/2019 she had laparoscopic extended right hemicolectomy performed by Dr. Quesada. Final pathology was consistent with a 3.7 cm hepatic flexure poorly differentiated adenocarcinoma with focal micropapillary growth extending through the muscularis propria and involving serosa (pT4). There was lymphovascular invasion but no perineural invasion seen. All margins were negative. 4 out of 38 lymph nodes were positive for metastatic carcinoma including 2 lymph nodes with micrometastasis. Final " staging was [id number].    She met with Dr. Capps 4/4/19 who recommended adjuvant FOLFOX. Port placed 4/22/19.       She started adjuvant FOLFOX on 4/29/19. She did require dose reduction of Oxaliplatin by 20% for neuropathy with cycle 4. She returns today for ongoing oncology follow-up prior to cycle 9 FOLFOX.    Interval History:  Patient reports that she does get nausea after chemotherapy and then gets constipation usually for about 3 days.  She has been taking Dulcolax to twice a day on those days.  She does continue to get rectal bleeding but her stools are hard.  She does get IV fluids with her pump unhooked's, but does find it hard to drink fluids those first few days and spends a lot of time in bed.  She did get blistering on her hands this time and applied Cetaphil.  She resumed taking sertraline for her mood about 4 weeks ago and feels this is been helpful and requests a refill.  She has been applying Desitin to her rectal area.  She does get muscle cramping following chemotherapy that is alleviated with Zanaflex.  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  She denies any numbness or tingling separate from cold.  She denies other concerns.    Current Outpatient Medications   Medication Sig Dispense Refill     sertraline (ZOLOFT) 50 MG tablet Take 1 tablet (50 mg) by mouth daily 90 tablet 1     gabapentin (NEURONTIN) 300 MG capsule Take 1-2 capsules (300-600 mg) by mouth nightly as needed (neuropathic pain) 30 capsule 3     granisetron (KYTRIL) 1 MG tablet Take 1 tablet (1 mg) by mouth every 12 hours as needed for nausea 60 tablet 3     lidocaine-prilocaine (EMLA) 2.5-2.5 % external cream Apply topically as needed for moderate pain 30 g 0     LORazepam (ATIVAN) 0.5 MG tablet Take 1 tablet (0.5 mg) by mouth every 4 hours as needed (Anxiety, Nausea/Vomiting or Sleep) 30 tablet 2     LORazepam (ATIVAN) 0.5 MG tablet Take 1 tablet (0.5 mg) by mouth  nightly as needed for sleep 10 tablet 0     prochlorperazine (COMPAZINE) 10 MG tablet Take 1 tablet (10 mg) by mouth every 6 hours as needed (Nausea/Vomiting) 60 tablet 3     tiZANidine (ZANAFLEX) 2 MG tablet Take during chemo and the night of chemo for infusion related muscle spasms 20 tablet 0     tretinoin (RETIN-A) 0.05 % external cream Apply topically At Bedtime 45 g 3     Physical Examination:  /76 (BP Location: Right arm, Patient Position: Sitting, Cuff Size: Adult Regular)   Pulse 72   Temp 98.1  F (36.7  C) (Oral)   Resp 16   Wt 77.2 kg (170 lb 1.6 oz)   LMP 08/17/2019   SpO2 97%   BMI 27.47 kg/m     Wt Readings from Last 10 Encounters:   09/03/19 77.2 kg (170 lb 1.6 oz)   08/19/19 78 kg (171 lb 14.4 oz)   07/29/19 79 kg (174 lb 1.6 oz)   07/15/19 78.4 kg (172 lb 14.4 oz)   07/02/19 77.1 kg (170 lb)   06/10/19 77.3 kg (170 lb 6.4 oz)   05/29/19 77.3 kg (170 lb 6.4 oz)   05/13/19 76.2 kg (167 lb 14.4 oz)   05/03/19 77.1 kg (169 lb 15.6 oz)   04/29/19 77.2 kg (170 lb 3.2 oz)   Constitutional: Well-appearing female in no acute distress.  Eyes: EOMI, PERRL. No scleral icterus.  ENT: Oral mucosa is moist without lesions or thrush.   Lymphatic: Neck is supple without cervical or supraclavicular lymphadenopathy.   Cardiovascular: Regular rate and rhythm. No peripheral edema.  Respiratory: Clear to auscultation bilaterally. No wheezes or crackles.  Gastrointestinal: Bowel sounds present. Abdomen soft, non-tender. No palpable hepatosplenomegaly or masses.   Neurologic: Cranial nerves II through XII are grossly intact.  Skin: No erythema of palms noted. Healing blisters noted on palms at base of fingers. No rashes, petechiae, or bruising noted on exposed skin.    Laboratory Data:   9/3/2019 11:44   Sodium 137   Potassium 4.0   Chloride 107   Carbon Dioxide 26   Urea Nitrogen 6 (L)   Creatinine 0.58   GFR Estimate >90   GFR Estimate If Black >90   Calcium 9.0   Anion Gap 4   Albumin 3.9   Protein Total  7.1   Bilirubin Total 0.4   Alkaline Phosphatase 99   ALT 26   AST 25   Glucose 84   WBC 6.2   Hemoglobin 12.7   Hematocrit 38.1   Platelet Count 176   RBC Count 3.91   MCV 97   MCH 32.5   MCHC 33.3   RDW 14.3   Diff Method Automated Method   % Neutrophils 63.4   % Lymphocytes 21.6   % Monocytes 13.2   % Eosinophils 1.0   % Basophils 0.3   % Immature Granulocytes 0.5   Nucleated RBCs 0   Absolute Neutrophil 3.9   Absolute Lymphocytes 1.3   Absolute Monocytes 0.8   Absolute Eosinophils 0.1   Absolute Basophils 0.0   Abs Immature Granulocytes 0.0   Absolute Nucleated RBC 0.0       Assessment and Plan:  1. Onc  Stage IIIC poorly differentiated [id number] adenocarcinoma of the hepatic flexure -s/p laparoscopic right hemicolectomy on 3/22/19. All margins negative. 4/38 LNs positive. MSI-Intact.  Baseline CEA 9.2 on 3/11/2019. She started adjuvant FOLFOX on 4/29/2019.     She has had difficulties with muscle spasm, nausea, cold sensitivity, and diarrhea. Oxaliplatin dose was reduced by 20% with cycle 4 for neurotoxicity. Continued to struggle this cycle but symptoms not worse so will continue with current dose and do ongoing supportive care as below. She will continue with cycle 9 FOLFOX today. Will continue to see her every 2 weeks with chemo. Overall plan for 6 months followed by CT CAP. She will see Dr. Capps in mid-November to review that imaging. Will need colonoscopy March 2020.     2. Neuro  Acute neurotoxicity manifested as muscle spasms and cold sensitivity with pain starting with infusion x 3 days. The Zanaflex during infusion and then at bedtime helps the cramping significantly- she'll continue with this.    Cold sensitivity worse first week, then improves, but not yet resolved. Does have neuropathic pain at night. Stable. She will continue on gabapentin at 600 mg at bedtime. Will continue to monitor- no worsening and did let up this last week that she had off. With each cycle, symptoms last a littler longer.      3. GI  Nausea stable with IV Emend and Aloxi along with Dex 8mg with infusion, then PRN Ativan/Compazine and Kytril starting day 3. No changes made today.    Diarrhea continues to persist since surgery. She will continue to use Imodium. For constipation associated with antiemetics, she will increase Dulcolax to 3 bid and will start on a daily fiber supplement..     She is having rectal bleeding.  Exacerbated by having a hard still during the constipation time after chemo, but still persists once her stools soften.  She will see Dr. Steven Massey next week for further evaluation.    No mucositis but does have lip irritation, now resolved but flares with treatment. Continue Aquaphor PRN.    Liver lesion on prior imaging indeterminate and will evaluate on next imaging at the end of her adjuvant chemotherapy.     4. Heme/Gyn  Iron deficiency anemia 2/2 heaving menses. Endometrial biopsy 4/23/19 was benign. S/p IV Infed 5/22/19. Hgb has improved (despite rectal bleeding and menses).  Iron studies back to normal on 8/19/19.     Heavy menses. Previously, recommended f/u with gynecology to discuss getting a Mirena IUD. She would prefer to hold off on getting IUD placed until after chemo.     5. Genetics  Will be seeing genetics in October for young presentation of colon cancer and history of skin melanoma. Also sees derm for routine melanoma follow-up.    6. Derm  Recommend Eucerin cream to her palms for hand foot syndrome.     7. Psych  Patient has resumed sertraline 50 mg daily in early August 2019 with improvement in her mood. Rx was refilled today.     Melinda Mayo PA-C  [facility]  [address] [place] [address]  [phone]

## 2019-09-04 NOTE — PROGRESS NOTES
This is a recent snapshot of the patient's [facility] medical record.  For current drug dose and complete information and questions, call [phone] or In Basket pool, [facility] home infusion ([id number])  CSN Number:  [id number]

## 2019-09-10 NOTE — PROGRESS NOTES
Colon and Rectal Surgery Follow-Up Clinic Note    Referring provider:  Sapna Schilling PA-C  [address] [address]       RE: Remedios Watts  : 1980  DOMINGO: 2019      Remedios Watts is a very pleasant 38 year old female who presents today for rectal bleeding.    HPI:    Admitted to the hospital on 3/10/2019 for bowel obstruction due to hepatic flexure colon mass.  She had a CT scan done on 3/10/2019 which showed apple core lesion near the hepatic flexure of the colon concerning for an obstructing colonic neoplasm.  No enlarged lymph nodes were seen.  There is a tiny subcentimeter left lobe liver lesion which is too small to characterize.  A CT of the chest on 3/13/2019 did not show evidence of metastatic disease in the chest.      Colonoscopy on 3/11/2019 showed fungating, infiltrative, highly-friable and ulcerated large mass at the hepatic flexure/proximal transverse colon, unable to traverse this lesions as this appears to be near-completely obstructive in nature (though allows liquid stool/effluent to pass under visualization). The mass was partially circumferential. Biopsies was consistent with invasive moderately differentiated adenocarcinoma compatible with colorectal primary and Mismatch repair enzymes were intact by immunohistochemistry.     On 3/22/2019 she had laparoscopic extended right hemicolectomy with me.    Final pathology was consistent with a 3.7 cm hepatic flexure poorly differentiated adenocarcinoma with focal micropapillary growth extending through the muscularis propria and involving serosa (pT4).  There was lymphovascular invasion but no perineural invasion seen.  All margins were negative.  4 out of 38 lymph nodes were positive for metastatic carcinoma including 2 lymph nodes with micrometastasis.  Final staging was [id number].    She follows with Dr. Capps of oncology and started adjuvant FOLFOX on 19    She is scheduled to see a genetic counselor in  October given young presentation of colon cancer and history of skin melanoma    Interval history: Remedios has had some diarrhea since surgery, about 2-3 bowel movements a day.  This got much worse with chemotherapy and she was having explosive diarrhea.  She has since developed severe pain with bowel movements and bright red blood with bowel movements.  The pain is very severe with bowel movements but last most of the day.  Pain is worse if her bowel movements are at all formed.  She is not on any bowel medications currently and stopped the Imodium when the pain started.    PLEASE SEE NOTE BELOW FOR PHYSICAL EXAMINATION, REVIEW OF SYSTEMS, AND OTHER HISTORY.    Assessment/Plan: 38 year old female with anal fissures.  On exam she has posterior midline and anterior midline anal fissures. I discussed that this is likely the source of her pain and bleeding. Recommended starting on a daily fiber supplement to bulk up stools some.  Will treat with topical diltiazem. Discussed that it may take several weeks for symptoms to improve and several months to heal. If no improvement is noted after 4 weeks, return to clinic and discuss possible Botox injections.  Advised the use of Tylenol, ibuprofen, and warm tub baths for any pain. Patient's questions were answered to her stated satisfaction and she is in agreement with this plan.    PLEASE SEE NOTE BELOW FOR PHYSICAL EXAMINATION, REVIEW OF SYSTEMS, AND OTHER HISTORY.    Total face to face time was 10 minutes, >50% counseling.    Linda Quesada MD  Colon and Rectal Surgery Staff  [facility]      -------------------------------------------------------------------------------------------------------------------      Medical history:  Past Medical History:   Diagnosis Date     Cervical high risk HPV (human papillomavirus) test positive 04/23/2019    see problem list     Chickenpox      Diarrhea      Group B streptococcus urinary tract infection  complicating pregnancy 4/20/2011    Plan PCN in labor      History of postpartum depression, currently pregnant 10/21/2010     Malignant melanoma (H)      Postpartum depression 12/17/2008     Tailbone injury     fx     Urinary tract infections        Surgical history:  Past Surgical History:   Procedure Laterality Date     C ORAL SURGERY PROCEDURE       COLONOSCOPY N/A 3/11/2019    Procedure: COMBINED COLONOSCOPY, SINGLE OR MULTIPLE BIOPSY/POLYPECTOMY BY BIOPSY;  Surgeon: Loi Maldonado MD;  Location: [facility]     INSERT PORT VASCULAR ACCESS Right 4/22/2019    Procedure: Central venous Chest Port Placement - right;  Surgeon: Didier Frazier PA-C;  [facility]: [facility]     IR CHEST PORT PLACEMENT > 5 YRS OF AGE  4/22/2019     LAPAROSCOPIC ASSISTED COLECTOMY Right 3/22/2019    Procedure: Laparoscopic Extended Right Hemicolectomy and appendectomy;  Surgeon: Linda Quesada MD;  Location: [facility] OR       Problem list:    Patient Active Problem List    Diagnosis Date Noted     Iron deficiency anemia due to chronic blood loss 05/10/2019     Priority: Medium     Iron deficiency 05/01/2019     Priority: Medium     Cervical high risk HPV (human papillomavirus) test positive 04/23/2019     Priority: Medium     4/23/19 NIL Pap, + HR HPV (Neg 16/18). Plan cotest in 1 year.        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     Priority: Medium     Colonic mass 03/12/2019     Priority: Medium     Partial small bowel obstruction (H) 03/10/2019     Priority: Medium     Folliculitis 02/06/2013     Priority: Medium     Lentigo 02/06/2013     Priority: Medium     Congenital nevus 02/06/2013     Priority: Medium     Angioma 02/06/2013     Priority: Medium     Multiple nevi 02/06/2013     Priority: Medium     Dermal nevus 02/06/2013     Priority: Medium     CARDIOVASCULAR SCREENING; LDL GOAL LESS THAN 160 01/21/2013     Priority: Medium     Pelvic pain 01/18/2013      Priority: Medium     Generalized anxiety disorder 06/29/2011     Priority: Medium     Diagnosis updated by automated process. Provider to review and confirm.         Medications:  Current Outpatient Medications   Medication Sig Dispense Refill     diltiazem 2% in PLO cream, FV COMPOUNDED, 2% GEL Apply small amount to anal opening three times daily for 8 weeks. 60 g 0     gabapentin (NEURONTIN) 300 MG capsule Take 1-2 capsules (300-600 mg) by mouth nightly as needed (neuropathic pain) 30 capsule 3     granisetron (KYTRIL) 1 MG tablet Take 1 tablet (1 mg) by mouth every 12 hours as needed for nausea 60 tablet 3     lidocaine-prilocaine (EMLA) 2.5-2.5 % external cream Apply topically as needed for moderate pain 30 g 0     LORazepam (ATIVAN) 0.5 MG tablet Take 1 tablet (0.5 mg) by mouth every 4 hours as needed (Anxiety, Nausea/Vomiting or Sleep) 30 tablet 2     LORazepam (ATIVAN) 0.5 MG tablet Take 1 tablet (0.5 mg) by mouth nightly as needed for sleep 10 tablet 0     prochlorperazine (COMPAZINE) 10 MG tablet Take 1 tablet (10 mg) by mouth every 6 hours as needed (Nausea/Vomiting) 60 tablet 3     sertraline (ZOLOFT) 50 MG tablet Take 1 tablet (50 mg) by mouth daily 90 tablet 1     tiZANidine (ZANAFLEX) 2 MG tablet Take during chemo and the night of chemo for infusion related muscle spasms 1 tablet 0     tretinoin (RETIN-A) 0.05 % external cream Apply topically At Bedtime 45 g 3       Allergies:  Allergies   Allergen Reactions     Nka [No Known Allergies]        Family history:  Family History   Problem Relation Age of Onset     Arthritis Mother      Neurologic Disorder Mother         had a seizure      Alcohol/Drug Mother      Arthritis Maternal Grandmother      Breast Cancer Maternal Grandmother      Melanoma Maternal Grandmother      Heart Disease Paternal Grandmother      Cancer Father         skin       Social history:  Social History     Socioeconomic History     Marital status:      Spouse name: Not  on file     Number of children: 3     Years of education: Not on file     Highest education level: Not on file   Occupational History     Employer: [employer]   Social Needs     Financial resource strain: Not on file     Food insecurity:     Worry: Not on file     Inability: Not on file     Transportation needs:     Medical: Not on file     Non-medical: Not on file   Tobacco Use     Smoking status: Never Smoker     Smokeless tobacco: Never Used   Substance and Sexual Activity     Alcohol use: Yes     Frequency: Monthly or less     Drinks per session: 1 or 2     Binge frequency: Less than monthly     Drug use: No     Sexual activity: Yes     Partners: Male     Birth control/protection: Pill   Lifestyle     Physical activity:     Days per week: Not on file     Minutes per session: Not on file     Stress: Not on file   Relationships     Social connections:     Talks on phone: Not on file     Gets together: Not on file     Attends Hinduism service: Not on file     Active member of club or organization: Not on file     Attends meetings of clubs or organizations: Not on file     Relationship status: Not on file     Intimate partner violence:     Fear of current or ex partner: Not on file     Emotionally abused: Not on file     Physically abused: Not on file     Forced sexual activity: Not on file   Other Topics Concern     Parent/sibling w/ CABG, MI or angioplasty before 65F 55M? Yes     Comment: pt had MI @ age 58   Social History Narrative     Not on file         Nursing Notes:   Maggi Diaz EMT  9/11/2019  6:09 PM  Signed  Chief Complaint   Patient presents with     Rectal Problem     Rectal bleeding.       Vitals:    09/11/19 1756   BP: 138/64   BP Location: Left arm   Patient Position: Sitting   Cuff Size: Adult Regular   Pulse: 65   Weight: 170 lb 11.2 oz       Body mass index is 27.57 kg/m .      RUTHY Gomes                        Physical Examination:  /64 (BP  Location: Left arm, Patient Position: Sitting, Cuff Size: Adult Regular)   Pulse 65   Wt 170 lb 11.2 oz   LMP 08/17/2019   BMI 27.57 kg/m    General: alert, oriented, in no acute distress, sitting comfortably  HEENT: mucous membranes moist  Perianal external examination:  Perianal skin: Intact with no excoriation or lichenification.  Lesions: No evidence of an external lesion, nodularity, or induration in the perianal region.  Eversion of buttocks: There was evidence of an anal fissure. Details: anterior and posterior midline anal fissures.  Skin tags or external hemorrhoids: None.    Digital rectal examination: Was deferred.    Anoscopy: Was deferred.

## 2019-09-11 ENCOUNTER — OFFICE VISIT (OUTPATIENT)
Dept: SURGERY | Facility: CLINIC | Age: 39
End: 2019-09-11
Payer: COMMERCIAL

## 2019-09-11 VITALS
DIASTOLIC BLOOD PRESSURE: 64 MMHG | BODY MASS INDEX: 27.57 KG/M2 | SYSTOLIC BLOOD PRESSURE: 138 MMHG | HEART RATE: 65 BPM | WEIGHT: 170.7 LBS

## 2019-09-11 DIAGNOSIS — K60.2 ANAL FISSURE: Primary | ICD-10-CM

## 2019-09-11 ASSESSMENT — PAIN SCALES - GENERAL: PAINLEVEL: WORST PAIN (10)

## 2019-09-11 NOTE — LETTER
2019       RE: Remedios Watts  [address] [place] [address]     Dear Colleague,    Thank you for referring your patient, Remedios Watts, to the [facility] COLON AND RECTAL SURGERY at [facility]. Please see a copy of my visit note below.    Colon and Rectal Surgery Follow-Up Clinic Note    Referring provider:  Sapna Schilling PA-C  [address] [facility] [address]  [place] [address]     RE: Remedios Watts  : 1980  DOMINGO: 2019    Remedios Watts is a very pleasant 38 year old female who presents today for rectal bleeding.    HPI:    Admitted to the hospital on 3/10/2019 for bowel obstruction due to hepatic flexure colon mass.  She had a CT scan done on 3/10/2019 which showed apple core lesion near the hepatic flexure of the colon concerning for an obstructing colonic neoplasm.  No enlarged lymph nodes were seen.  There is a tiny subcentimeter left lobe liver lesion which is too small to characterize.  A CT of the chest on 3/13/2019 did not show evidence of metastatic disease in the chest.      Colonoscopy on 3/11/2019 showed fungating, infiltrative, highly-friable and ulcerated large mass at the hepatic flexure/proximal transverse colon, unable to traverse this lesions as this appears to be near-completely obstructive in nature (though allows liquid stool/effluent to pass under visualization). The mass was partially circumferential. Biopsies was consistent with invasive moderately differentiated adenocarcinoma compatible with colorectal primary and Mismatch repair enzymes were intact by immunohistochemistry.     On 3/22/2019 she had laparoscopic extended right hemicolectomy with me.    Final pathology was consistent with a 3.7 cm hepatic flexure poorly differentiated adenocarcinoma with focal micropapillary growth extending through the muscularis propria and involving serosa (pT4).  There was lymphovascular invasion but no perineural invasion seen.  All  margins were negative.  4 out of 38 lymph nodes were positive for metastatic carcinoma including 2 lymph nodes with micrometastasis.  Final staging was [id number].    She follows with Dr. Capps of oncology and started adjuvant FOLFOX on 4/29/19    She is scheduled to see a genetic counselor in October given young presentation of colon cancer and history of skin melanoma    Interval history: Remedios has had some diarrhea since surgery, about 2-3 bowel movements a day.  This got much worse with chemotherapy and she was having explosive diarrhea.  She has since developed severe pain with bowel movements and bright red blood with bowel movements.  The pain is very severe with bowel movements but last most of the day.  Pain is worse if her bowel movements are at all formed.  She is not on any bowel medications currently and stopped the Imodium when the pain started.    PLEASE SEE NOTE BELOW FOR PHYSICAL EXAMINATION, REVIEW OF SYSTEMS, AND OTHER HISTORY.    Assessment/Plan: 38 year old female with anal fissures.  On exam she has posterior midline and anterior midline anal fissures. I discussed that this is likely the source of her pain and bleeding. Recommended starting on a daily fiber supplement to bulk up stools some.  Will treat with topical diltiazem. Discussed that it may take several weeks for symptoms to improve and several months to heal. If no improvement is noted after 4 weeks, return to clinic and discuss possible Botox injections.  Advised the use of Tylenol, ibuprofen, and warm tub baths for any pain. Patient's questions were answered to her stated satisfaction and she is in agreement with this plan.    PLEASE SEE NOTE BELOW FOR PHYSICAL EXAMINATION, REVIEW OF SYSTEMS, AND OTHER HISTORY.    Total face to face time was 10 minutes, >50% counseling.    Linda Quesada MD  Colon and Rectal Surgery Staff  [facility]      -------------------------------------------------------------------------------------------------------------------      Medical history:  Past Medical History:   Diagnosis Date     Cervical high risk HPV (human papillomavirus) test positive 04/23/2019    see problem list     Chickenpox      Diarrhea      Group B streptococcus urinary tract infection complicating pregnancy 4/20/2011    Plan PCN in labor      History of postpartum depression, currently pregnant 10/21/2010     Malignant melanoma (H)      Postpartum depression 12/17/2008     Tailbone injury     fx     Urinary tract infections        Surgical history:  Past Surgical History:   Procedure Laterality Date     C ORAL SURGERY PROCEDURE       COLONOSCOPY N/A 3/11/2019    Procedure: COMBINED COLONOSCOPY, SINGLE OR MULTIPLE BIOPSY/POLYPECTOMY BY BIOPSY;  Surgeon: Loi Maldonado MD;  [facility]     INSERT PORT VASCULAR ACCESS Right 4/22/2019    Procedure: Central venous Chest Port Placement - right;  Surgeon: Didier Frazier PA-C;  [facility]     IR CHEST PORT PLACEMENT > 5 YRS OF AGE  4/22/2019     LAPAROSCOPIC ASSISTED COLECTOMY Right 3/22/2019    Procedure: Laparoscopic Extended Right Hemicolectomy and appendectomy;  Surgeon: Linda Quesada MD;  Location:  OR       Problem list:    Patient Active Problem List    Diagnosis Date Noted     Iron deficiency anemia due to chronic blood loss 05/10/2019     Priority: Medium     Iron deficiency 05/01/2019     Priority: Medium     Cervical high risk HPV (human papillomavirus) test positive 04/23/2019     Priority: Medium     4/23/19 NIL Pap, + HR HPV (Neg 16/18). Plan cotest in 1 year.        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     Priority: Medium     Colonic mass 03/12/2019     Priority: Medium     Partial small bowel obstruction (H) 03/10/2019     Priority: Medium     Folliculitis 02/06/2013      Priority: Medium     Lentigo 02/06/2013     Priority: Medium     Congenital nevus 02/06/2013     Priority: Medium     Angioma 02/06/2013     Priority: Medium     Multiple nevi 02/06/2013     Priority: Medium     Dermal nevus 02/06/2013     Priority: Medium     CARDIOVASCULAR SCREENING; LDL GOAL LESS THAN 160 01/21/2013     Priority: Medium     Pelvic pain 01/18/2013     Priority: Medium     Generalized anxiety disorder 06/29/2011     Priority: Medium     Diagnosis updated by automated process. Provider to review and confirm.         Medications:  Current Outpatient Medications   Medication Sig Dispense Refill     diltiazem 2% in PLO cream, FV COMPOUNDED, 2% GEL Apply small amount to anal opening three times daily for 8 weeks. 60 g 0     gabapentin (NEURONTIN) 300 MG capsule Take 1-2 capsules (300-600 mg) by mouth nightly as needed (neuropathic pain) 30 capsule 3     granisetron (KYTRIL) 1 MG tablet Take 1 tablet (1 mg) by mouth every 12 hours as needed for nausea 60 tablet 3     lidocaine-prilocaine (EMLA) 2.5-2.5 % external cream Apply topically as needed for moderate pain 30 g 0     LORazepam (ATIVAN) 0.5 MG tablet Take 1 tablet (0.5 mg) by mouth every 4 hours as needed (Anxiety, Nausea/Vomiting or Sleep) 30 tablet 2     LORazepam (ATIVAN) 0.5 MG tablet Take 1 tablet (0.5 mg) by mouth nightly as needed for sleep 10 tablet 0     prochlorperazine (COMPAZINE) 10 MG tablet Take 1 tablet (10 mg) by mouth every 6 hours as needed (Nausea/Vomiting) 60 tablet 3     sertraline (ZOLOFT) 50 MG tablet Take 1 tablet (50 mg) by mouth daily 90 tablet 1     tiZANidine (ZANAFLEX) 2 MG tablet Take during chemo and the night of chemo for infusion related muscle spasms 1 tablet 0     tretinoin (RETIN-A) 0.05 % external cream Apply topically At Bedtime 45 g 3       Allergies:  Allergies   Allergen Reactions     Nka [No Known Allergies]        Family history:  Family History   Problem Relation Age of Onset     Arthritis Mother       Neurologic Disorder Mother         had a seizure      Alcohol/Drug Mother      Arthritis Maternal Grandmother      Breast Cancer Maternal Grandmother      Melanoma Maternal Grandmother      Heart Disease Paternal Grandmother      Cancer Father         skin       Social history:  Social History     Socioeconomic History     Marital status:      Spouse name: Not on file     Number of children: 3     Years of education: Not on file     Highest education level: Not on file   Occupational History     Employer: [employer]   Social Needs     Financial resource strain: Not on file     Food insecurity:     Worry: Not on file     Inability: Not on file     Transportation needs:     Medical: Not on file     Non-medical: Not on file   Tobacco Use     Smoking status: Never Smoker     Smokeless tobacco: Never Used   Substance and Sexual Activity     Alcohol use: Yes     Frequency: Monthly or less     Drinks per session: 1 or 2     Binge frequency: Less than monthly     Drug use: No     Sexual activity: Yes     Partners: Male     Birth control/protection: Pill   Lifestyle     Physical activity:     Days per week: Not on file     Minutes per session: Not on file     Stress: Not on file   Relationships     Social connections:     Talks on phone: Not on file     Gets together: Not on file     Attends Judaism service: Not on file     Active member of club or organization: Not on file     Attends meetings of clubs or organizations: Not on file     Relationship status: Not on file     Intimate partner violence:     Fear of current or ex partner: Not on file     Emotionally abused: Not on file     Physically abused: Not on file     Forced sexual activity: Not on file   Other Topics Concern     Parent/sibling w/ CABG, MI or angioplasty before 65F 55M? Yes     Comment: pt had MI @ age 58   Social History Narrative     Not on file         Nursing Notes:   Maggi Diaz, EMT  9/11/2019  6:09 PM   Signed  Chief Complaint   Patient presents with     Rectal Problem     Rectal bleeding.       Vitals:    09/11/19 1756   BP: 138/64   BP Location: Left arm   Patient Position: Sitting   Cuff Size: Adult Regular   Pulse: 65   Weight: 170 lb 11.2 oz       Body mass index is 27.57 kg/m .      RUTHY Gomes        Physical Examination:  /64 (BP Location: Left arm, Patient Position: Sitting, Cuff Size: Adult Regular)   Pulse 65   Wt 170 lb 11.2 oz   LMP 08/17/2019   BMI 27.57 kg/m     General: alert, oriented, in no acute distress, sitting comfortably  HEENT: mucous membranes moist  Perianal external examination:  Perianal skin: Intact with no excoriation or lichenification.  Lesions: No evidence of an external lesion, nodularity, or induration in the perianal region.  Eversion of buttocks: There was evidence of an anal fissure. Details: anterior and posterior midline anal fissures.  Skin tags or external hemorrhoids: None.    Digital rectal examination: Was deferred.    Anoscopy: Was deferred.    Again, thank you for allowing me to participate in the care of your patient.      Sincerely,    Linda Quesada MD

## 2019-09-11 NOTE — PATIENT INSTRUCTIONS
1. Diltiazem ointment 2% to be applied 3 times daily for 8 weeks  2. Fiber supplement such as Metamcuil, Citrucel, or Benefiber once to twice a day  3. Tylenol, ibuprofen, and warm tub baths/sitz baths for pain  4. Follow up in 8 weeks. Follow up sooner if symptoms do not improve after 4 weeks to consider Botox injections.

## 2019-09-11 NOTE — NURSING NOTE
Chief Complaint   Patient presents with     Rectal Problem     Rectal bleeding.       Vitals:    09/11/19 1756   BP: 138/64   BP Location: Left arm   Patient Position: Sitting   Cuff Size: Adult Regular   Pulse: 65   Weight: 170 lb 11.2 oz       Body mass index is 27.57 kg/m .      Maggi Diaz, EMT

## 2019-09-16 NOTE — PROGRESS NOTES
"Oncology/Hematology Visit Note  Sep 17, 2019    Reason for Visit: Follow up of stage III colon cancer    History of Present Illness: Remedios Watts is a 38 year old female with PMH melanoma to left arm and back s/p excision with stage III colon cancer. Her history is as follows, copied forward from prior notes:    \"Ms. Watts is a 38 year old female who was admitted to the hospital on 3/10/2019 for bowel obstruction due to hepatic flexure colon mass. She had a CT scan done on 3/10/2019 which showed apple core lesion near the hepatic flexure of the colon concerning for an obstructing colonic neoplasm.  No enlarged lymph nodes were seen. There is a tiny subcentimeter left lobe liver lesion which is too small to characterize.  A CT of the chest on 3/13/2019 did not show evidence of metastatic disease in the chest.       Colonoscopy on 3/11/2019 showed fungating, infiltrative, highly-friable and ulcerated large mass at the hepatic flexure/proximal transverse colon, unable to traverse this lesions as this appears to be near-completely obstructive in nature (though allows liquid stool/effluent to pass under visualization). The mass was partially circumferential. Biopsies was consistent with invasive moderately differentiated adenocarcinoma compatible with colorectal primary and Mismatch repair enzymes were intact by immunohistochemistry.      On 3/22/2019 she had laparoscopic extended right hemicolectomy performed by Dr. Quesada. Final pathology was consistent with a 3.7 cm hepatic flexure poorly differentiated adenocarcinoma with focal micropapillary growth extending through the muscularis propria and involving serosa (pT4). There was lymphovascular invasion but no perineural invasion seen. All margins were negative. 4 out of 38 lymph nodes were positive for metastatic carcinoma including 2 lymph nodes with micrometastasis. Final staging was [id number].    She met with Dr. Capps 4/4/19 who recommended adjuvant FOLFOX. " Port placed 4/22/19.       She started adjuvant FOLFOX on 4/29/19. She did require dose reduction of Oxaliplatin by 20% for neuropathy with cycle 4. She returns today for ongoing oncology follow-up prior to cycle 10 FOLFOX.    Interval History:  Patient reports that overall her symptoms related to chemotherapy are stable and unchanged with the nausea, cold sensitivity, muscle cramping, and fatigue.  She reports that she has noticed left breast tenderness over the last 4 days despite no injury.  She has not had a period in about 2 months.  She did have a mammogram a few months ago that was unremarkable.  She continues to have alternating stools between diarrhea and semisolid.  She has started to apply the diltiazem cream and has not yet noticed any change to her anal fissures.  She continues to use Eucerin cream for her hands and feet.  She reports her mood is okay and has been taking the sertraline.  Of note, her maternal grandmother had breast cancer.  She denies other concerns.    Current Outpatient Medications   Medication Sig Dispense Refill     diltiazem 2% in PLO cream, FV COMPOUNDED, 2% GEL Apply small amount to anal opening three times daily for 8 weeks. 60 g 0     gabapentin (NEURONTIN) 300 MG capsule Take 1-2 capsules (300-600 mg) by mouth nightly as needed (neuropathic pain) 30 capsule 3     granisetron (KYTRIL) 1 MG tablet Take 1 tablet (1 mg) by mouth every 12 hours as needed for nausea 60 tablet 3     lidocaine-prilocaine (EMLA) 2.5-2.5 % external cream Apply topically as needed for moderate pain 30 g 0     LORazepam (ATIVAN) 0.5 MG tablet Take 1 tablet (0.5 mg) by mouth every 4 hours as needed (Anxiety, Nausea/Vomiting or Sleep) 30 tablet 2     LORazepam (ATIVAN) 0.5 MG tablet Take 1 tablet (0.5 mg) by mouth nightly as needed for sleep 10 tablet 0     prochlorperazine (COMPAZINE) 10 MG tablet Take 1 tablet (10 mg) by mouth every 6 hours as needed (Nausea/Vomiting) 60 tablet 3     sertraline (ZOLOFT)  50 MG tablet Take 1 tablet (50 mg) by mouth daily 90 tablet 1     tiZANidine (ZANAFLEX) 2 MG tablet Take during chemo and the night of chemo for infusion related muscle spasms 1 tablet 0     tretinoin (RETIN-A) 0.05 % external cream Apply topically At Bedtime 45 g 3     Physical Examination:  /84   Pulse 66   Temp 98.1  F (36.7  C) (Oral)   Wt 77.7 kg (171 lb 6.4 oz)   SpO2 100%   BMI 27.68 kg/m    Wt Readings from Last 10 Encounters:   09/17/19 77.7 kg (171 lb 6.4 oz)   09/11/19 77.4 kg (170 lb 11.2 oz)   09/03/19 77.2 kg (170 lb 1.6 oz)   08/19/19 78 kg (171 lb 14.4 oz)   07/29/19 79 kg (174 lb 1.6 oz)   07/15/19 78.4 kg (172 lb 14.4 oz)   07/02/19 77.1 kg (170 lb)   06/10/19 77.3 kg (170 lb 6.4 oz)   05/29/19 77.3 kg (170 lb 6.4 oz)   05/13/19 76.2 kg (167 lb 14.4 oz)   Constitutional: Well-appearing female in no acute distress.  Eyes: EOMI, PERRL. No scleral icterus.  ENT: Oral mucosa is moist without lesions or thrush.   Lymphatic: Neck is supple without cervical or supraclavicular lymphadenopathy.   Breasts: Left breast with fibroglandular tissue throughout. Question if breast mass at the 1 o'clock position just lateral to the areola that is moderately tender. No other masses palpated in the left breast.   Cardiovascular: Regular rate and rhythm. No peripheral edema.  Respiratory: Clear to auscultation bilaterally. No wheezes or crackles.  Gastrointestinal: Bowel sounds present. Abdomen soft, non-tender. No palpable hepatosplenomegaly or masses.   Neurologic: Cranial nerves II through XII are grossly intact.  Skin: No rashes, petechiae, or bruising noted on exposed skin.    Laboratory Data:   9/17/2019 09:39   Sodium 138   Potassium 3.8   Chloride 110 (H)   Carbon Dioxide 23   Urea Nitrogen 5 (L)   Creatinine 0.54   GFR Estimate >90   GFR Estimate If Black >90   Calcium 8.6   Anion Gap 5   Albumin 3.9   Protein Total 7.3   Bilirubin Total 0.3   Alkaline Phosphatase 94   ALT 30   AST 26   Glucose 104  (H)   WBC 4.9   Hemoglobin 13.0   Hematocrit 38.7   Platelet Count 160   RBC Count 3.91   MCV 99   MCH 33.2 (H)   MCHC 33.6   RDW 14.1   Diff Method Automated Method   % Neutrophils 53.1   % Lymphocytes 27.0   % Monocytes 17.7   % Eosinophils 1.4   % Basophils 0.4   % Immature Granulocytes 0.4   Nucleated RBCs 0   Absolute Neutrophil 2.6   Absolute Lymphocytes 1.3   Absolute Monocytes 0.9   Absolute Eosinophils 0.1   Absolute Basophils 0.0   Abs Immature Granulocytes 0.0   Absolute Nucleated RBC 0.0     Assessment and Plan:  1. Onc  Stage IIIC poorly differentiated [id number] adenocarcinoma of the hepatic flexure -s/p laparoscopic right hemicolectomy on 3/22/19. All margins negative. 4/38 LNs positive. MSI-Intact.  Baseline CEA 9.2 on 3/11/2019. She started adjuvant FOLFOX on 4/29/2019.     She has had difficulties with muscle spasm, nausea, cold sensitivity, and diarrhea. Oxaliplatin dose was reduced by 20% with cycle 4 for neurotoxicity. Continued to struggle this cycle but symptoms not worse so will continue with current dose and do ongoing supportive care as below. She will continue with cycle 10 FOLFOX today. Will continue to see her every 2 weeks with chemo. Overall plan for 6 months followed by CT CAP. Will need colonoscopy March 2020.     2. Neuro  Acute neurotoxicity manifested as muscle spasms and cold sensitivity with pain starting with infusion x 3 days. The Zanaflex during infusion and then at bedtime helps the cramping significantly- she'll continue with this.    Cold sensitivity worse first week, then improves, but not yet resolved. Does have neuropathic pain at night. Stable. She will continue on gabapentin at 600 mg at bedtime. Will continue to monitor- no worsening and did let up this last week that she had off.     3. GI  Nausea stable with IV Emend and Aloxi along with Dex 8mg with infusion, then PRN Ativan/Compazine and Kytril starting day 3. No changes made today. Compazine and Ativan were  refilled today.     Diarrhea continues to persist since surgery. Not currently taking any medication for this. She has developed anal fissures, for which she started on topical diltiazem on 9/11/19.     Lip irritation, now resolved but flares with treatment. Continue Aquaphor PRN.    Liver lesion on prior imaging indeterminate and will evaluate on next imaging at the end of her adjuvant chemotherapy.     4. Heme/Gyn  Iron deficiency anemia 2/2 heaving menses. Endometrial biopsy 4/23/19 was benign. S/p IV Infed 5/22/19. Hgb has improved (despite rectal bleeding and menses).  Iron studies back to normal on 8/19/19. Last LMP was about 2 months ago.     Heavy menses. Previously, recommended f/u with gynecology to discuss getting a Mirena IUD. She would prefer to hold off on getting IUD placed until after chemo. As above, no menses in the last 2 months.     5. Genetics  Will be seeing genetics in October for young presentation of colon cancer and history of skin melanoma. Also sees derm for routine melanoma follow-up.    6. Derm  Continue Eucerin cream to her palms for hand foot syndrome.     7. Psych  Patient has resumed sertraline 50 mg daily in early August 2019 with improvement in her mood.    8. Left breast tenderness and possible mass  Will get a left breast ultrasound and mammogram to further evaluate later this week.     Melinda Mayo PA-C  [facility]  [address] [place] [address] [phone]

## 2019-09-17 ENCOUNTER — INFUSION THERAPY VISIT (OUTPATIENT)
Dept: ONCOLOGY | Facility: CLINIC | Age: 39
End: 2019-09-17
Attending: PHYSICIAN ASSISTANT
Payer: COMMERCIAL

## 2019-09-17 ENCOUNTER — APPOINTMENT (OUTPATIENT)
Dept: LAB | Facility: CLINIC | Age: 39
End: 2019-09-17
Attending: PHYSICIAN ASSISTANT
Payer: COMMERCIAL

## 2019-09-17 ENCOUNTER — HOME INFUSION (PRE-WILLOW HOME INFUSION) (OUTPATIENT)
Dept: PHARMACY | Facility: CLINIC | Age: 39
End: 2019-09-17

## 2019-09-17 VITALS
WEIGHT: 171.4 LBS | BODY MASS INDEX: 27.68 KG/M2 | HEART RATE: 66 BPM | DIASTOLIC BLOOD PRESSURE: 84 MMHG | SYSTOLIC BLOOD PRESSURE: 138 MMHG | TEMPERATURE: 98.1 F | OXYGEN SATURATION: 100 %

## 2019-09-17 DIAGNOSIS — N64.4 BREAST TENDERNESS IN FEMALE: ICD-10-CM

## 2019-09-17 DIAGNOSIS — N63.20 LEFT BREAST LUMP: ICD-10-CM

## 2019-09-17 DIAGNOSIS — C18.3 MALIGNANT NEOPLASM OF HEPATIC FLEXURE (H): Primary | ICD-10-CM

## 2019-09-17 LAB
ALBUMIN SERPL-MCNC: 3.9 G/DL (ref 3.4–5)
ALP SERPL-CCNC: 94 U/L (ref 40–150)
ALT SERPL W P-5'-P-CCNC: 30 U/L (ref 0–50)
ANION GAP SERPL CALCULATED.3IONS-SCNC: 5 MMOL/L (ref 3–14)
AST SERPL W P-5'-P-CCNC: 26 U/L (ref 0–45)
BASOPHILS # BLD AUTO: 0 10E9/L (ref 0–0.2)
BASOPHILS NFR BLD AUTO: 0.4 %
BILIRUB SERPL-MCNC: 0.3 MG/DL (ref 0.2–1.3)
BUN SERPL-MCNC: 5 MG/DL (ref 7–30)
CALCIUM SERPL-MCNC: 8.6 MG/DL (ref 8.5–10.1)
CHLORIDE SERPL-SCNC: 110 MMOL/L (ref 94–109)
CO2 SERPL-SCNC: 23 MMOL/L (ref 20–32)
CREAT SERPL-MCNC: 0.54 MG/DL (ref 0.52–1.04)
DIFFERENTIAL METHOD BLD: ABNORMAL
EOSINOPHIL # BLD AUTO: 0.1 10E9/L (ref 0–0.7)
EOSINOPHIL NFR BLD AUTO: 1.4 %
ERYTHROCYTE [DISTWIDTH] IN BLOOD BY AUTOMATED COUNT: 14.1 % (ref 10–15)
GFR SERPL CREATININE-BSD FRML MDRD: >90 ML/MIN/{1.73_M2}
GLUCOSE SERPL-MCNC: 104 MG/DL (ref 70–99)
HCT VFR BLD AUTO: 38.7 % (ref 35–47)
HGB BLD-MCNC: 13 G/DL (ref 11.7–15.7)
IMM GRANULOCYTES # BLD: 0 10E9/L (ref 0–0.4)
IMM GRANULOCYTES NFR BLD: 0.4 %
LYMPHOCYTES # BLD AUTO: 1.3 10E9/L (ref 0.8–5.3)
LYMPHOCYTES NFR BLD AUTO: 27 %
MCH RBC QN AUTO: 33.2 PG (ref 26.5–33)
MCHC RBC AUTO-ENTMCNC: 33.6 G/DL (ref 31.5–36.5)
MCV RBC AUTO: 99 FL (ref 78–100)
MONOCYTES # BLD AUTO: 0.9 10E9/L (ref 0–1.3)
MONOCYTES NFR BLD AUTO: 17.7 %
NEUTROPHILS # BLD AUTO: 2.6 10E9/L (ref 1.6–8.3)
NEUTROPHILS NFR BLD AUTO: 53.1 %
NRBC # BLD AUTO: 0 10*3/UL
NRBC BLD AUTO-RTO: 0 /100
PLATELET # BLD AUTO: 160 10E9/L (ref 150–450)
POTASSIUM SERPL-SCNC: 3.8 MMOL/L (ref 3.4–5.3)
PROT SERPL-MCNC: 7.3 G/DL (ref 6.8–8.8)
RBC # BLD AUTO: 3.91 10E12/L (ref 3.8–5.2)
SODIUM SERPL-SCNC: 138 MMOL/L (ref 133–144)
WBC # BLD AUTO: 4.9 10E9/L (ref 4–11)

## 2019-09-17 PROCEDURE — 99214 OFFICE O/P EST MOD 30 MIN: CPT | Mod: ZP | Performed by: PHYSICIAN ASSISTANT

## 2019-09-17 PROCEDURE — 85025 COMPLETE CBC W/AUTO DIFF WBC: CPT | Performed by: PHYSICIAN ASSISTANT

## 2019-09-17 PROCEDURE — 96361 HYDRATE IV INFUSION ADD-ON: CPT

## 2019-09-17 PROCEDURE — 96367 TX/PROPH/DG ADDL SEQ IV INF: CPT

## 2019-09-17 PROCEDURE — 25800030 ZZH RX IP 258 OP 636: Mod: ZF | Performed by: PHYSICIAN ASSISTANT

## 2019-09-17 PROCEDURE — 96413 CHEMO IV INFUSION 1 HR: CPT

## 2019-09-17 PROCEDURE — 80053 COMPREHEN METABOLIC PANEL: CPT | Performed by: PHYSICIAN ASSISTANT

## 2019-09-17 PROCEDURE — G0463 HOSPITAL OUTPT CLINIC VISIT: HCPCS | Mod: ZF

## 2019-09-17 PROCEDURE — 96411 CHEMO IV PUSH ADDL DRUG: CPT

## 2019-09-17 PROCEDURE — G0498 CHEMO EXTEND IV INFUS W/PUMP: HCPCS

## 2019-09-17 PROCEDURE — 25000128 H RX IP 250 OP 636: Mod: ZF | Performed by: PHYSICIAN ASSISTANT

## 2019-09-17 PROCEDURE — 96415 CHEMO IV INFUSION ADDL HR: CPT

## 2019-09-17 PROCEDURE — 96368 THER/DIAG CONCURRENT INF: CPT

## 2019-09-17 PROCEDURE — 96375 TX/PRO/DX INJ NEW DRUG ADDON: CPT

## 2019-09-17 RX ORDER — DIPHENHYDRAMINE HYDROCHLORIDE 50 MG/ML
50 INJECTION INTRAMUSCULAR; INTRAVENOUS
Status: CANCELLED
Start: 2019-09-17

## 2019-09-17 RX ORDER — ALBUTEROL SULFATE 0.83 MG/ML
2.5 SOLUTION RESPIRATORY (INHALATION)
Status: CANCELLED | OUTPATIENT
Start: 2019-09-17

## 2019-09-17 RX ORDER — SODIUM CHLORIDE 9 MG/ML
1000 INJECTION, SOLUTION INTRAVENOUS CONTINUOUS PRN
Status: CANCELLED
Start: 2019-09-17

## 2019-09-17 RX ORDER — FLUOROURACIL 50 MG/ML
400 INJECTION, SOLUTION INTRAVENOUS ONCE
Status: COMPLETED | OUTPATIENT
Start: 2019-09-17 | End: 2019-09-17

## 2019-09-17 RX ORDER — PALONOSETRON 0.05 MG/ML
0.25 INJECTION, SOLUTION INTRAVENOUS ONCE
Status: COMPLETED | OUTPATIENT
Start: 2019-09-17 | End: 2019-09-17

## 2019-09-17 RX ORDER — FLUOROURACIL 50 MG/ML
400 INJECTION, SOLUTION INTRAVENOUS ONCE
Status: CANCELLED | OUTPATIENT
Start: 2019-09-17

## 2019-09-17 RX ORDER — LORAZEPAM 2 MG/ML
0.5 INJECTION INTRAMUSCULAR EVERY 4 HOURS PRN
Status: CANCELLED
Start: 2019-09-17

## 2019-09-17 RX ORDER — EPINEPHRINE 1 MG/ML
0.3 INJECTION, SOLUTION INTRAMUSCULAR; SUBCUTANEOUS EVERY 5 MIN PRN
Status: CANCELLED | OUTPATIENT
Start: 2019-09-17

## 2019-09-17 RX ORDER — PALONOSETRON 0.05 MG/ML
0.25 INJECTION, SOLUTION INTRAVENOUS ONCE
Status: CANCELLED
Start: 2019-09-17

## 2019-09-17 RX ORDER — HEPARIN SODIUM (PORCINE) LOCK FLUSH IV SOLN 100 UNIT/ML 100 UNIT/ML
5 SOLUTION INTRAVENOUS EVERY 8 HOURS
Status: DISCONTINUED | OUTPATIENT
Start: 2019-09-17 | End: 2019-09-17 | Stop reason: HOSPADM

## 2019-09-17 RX ORDER — MEPERIDINE HYDROCHLORIDE 25 MG/ML
25 INJECTION INTRAMUSCULAR; INTRAVENOUS; SUBCUTANEOUS EVERY 30 MIN PRN
Status: CANCELLED | OUTPATIENT
Start: 2019-09-17

## 2019-09-17 RX ORDER — EPINEPHRINE 0.3 MG/.3ML
0.3 INJECTION SUBCUTANEOUS EVERY 5 MIN PRN
Status: CANCELLED | OUTPATIENT
Start: 2019-09-17

## 2019-09-17 RX ORDER — METHYLPREDNISOLONE SODIUM SUCCINATE 125 MG/2ML
125 INJECTION, POWDER, LYOPHILIZED, FOR SOLUTION INTRAMUSCULAR; INTRAVENOUS
Status: CANCELLED
Start: 2019-09-17

## 2019-09-17 RX ORDER — ALBUTEROL SULFATE 90 UG/1
1-2 AEROSOL, METERED RESPIRATORY (INHALATION)
Status: CANCELLED
Start: 2019-09-17

## 2019-09-17 RX ADMIN — PALONOSETRON HYDROCHLORIDE 0.25 MG: 0.25 INJECTION, SOLUTION INTRAVENOUS at 12:23

## 2019-09-17 RX ADMIN — HEPARIN 5 ML: 100 SYRINGE at 09:33

## 2019-09-17 RX ADMIN — OXALIPLATIN 129 MG: 5 INJECTION, SOLUTION, CONCENTRATE INTRAVENOUS at 13:07

## 2019-09-17 RX ADMIN — DEXTROSE MONOHYDRATE 250 ML: 50 INJECTION, SOLUTION INTRAVENOUS at 12:23

## 2019-09-17 RX ADMIN — LEUCOVORIN CALCIUM 650 MG: 500 INJECTION, POWDER, LYOPHILIZED, FOR SOLUTION INTRAMUSCULAR; INTRAVENOUS at 13:10

## 2019-09-17 RX ADMIN — DEXAMETHASONE SODIUM PHOSPHATE: 10 INJECTION, SOLUTION INTRAMUSCULAR; INTRAVENOUS at 12:23

## 2019-09-17 RX ADMIN — SODIUM CHLORIDE 1000 ML: 9 INJECTION, SOLUTION INTRAVENOUS at 11:05

## 2019-09-17 RX ADMIN — FLUOROURACIL 755 MG: 50 INJECTION, SOLUTION INTRAVENOUS at 15:24

## 2019-09-17 ASSESSMENT — PAIN SCALES - GENERAL: PAINLEVEL: NO PAIN (0)

## 2019-09-17 NOTE — LETTER
"  RE: Remedios Watts  [address] [place] [address]     Oncology/Hematology Visit Note  Sep 17, 2019    Reason for Visit: Follow up of stage III colon cancer    History of Present Illness: Remedios Watts is a 38 year old female with PMH melanoma to left arm and back s/p excision with stage III colon cancer. Her history is as follows, copied forward from prior notes:    \"Ms. Watts is a 38 year old female who was admitted to the hospital on 3/10/2019 for bowel obstruction due to hepatic flexure colon mass. She had a CT scan done on 3/10/2019 which showed apple core lesion near the hepatic flexure of the colon concerning for an obstructing colonic neoplasm.  No enlarged lymph nodes were seen. There is a tiny subcentimeter left lobe liver lesion which is too small to characterize.  A CT of the chest on 3/13/2019 did not show evidence of metastatic disease in the chest.       Colonoscopy on 3/11/2019 showed fungating, infiltrative, highly-friable and ulcerated large mass at the hepatic flexure/proximal transverse colon, unable to traverse this lesions as this appears to be near-completely obstructive in nature (though allows liquid stool/effluent to pass under visualization). The mass was partially circumferential. Biopsies was consistent with invasive moderately differentiated adenocarcinoma compatible with colorectal primary and Mismatch repair enzymes were intact by immunohistochemistry.      On 3/22/2019 she had laparoscopic extended right hemicolectomy performed by Dr. Quesada. Final pathology was consistent with a 3.7 cm hepatic flexure poorly differentiated adenocarcinoma with focal micropapillary growth extending through the muscularis propria and involving serosa (pT4). There was lymphovascular invasion but no perineural invasion seen. All margins were negative. 4 out of 38 lymph nodes were positive for metastatic carcinoma including 2 lymph nodes with micrometastasis. Final staging was " [id number].    She met with Dr. Capps 4/4/19 who recommended adjuvant FOLFOX. Port placed 4/22/19.       She started adjuvant FOLFOX on 4/29/19. She did require dose reduction of Oxaliplatin by 20% for neuropathy with cycle 4. She returns today for ongoing oncology follow-up prior to cycle 10 FOLFOX.    Interval History:  Patient reports that overall her symptoms related to chemotherapy are stable and unchanged with the nausea, cold sensitivity, muscle cramping, and fatigue.  She reports that she has noticed left breast tenderness over the last 4 days despite no injury.  She has not had a period in about 2 months.  She did have a mammogram a few months ago that was unremarkable.  She continues to have alternating stools between diarrhea and semisolid.  She has started to apply the diltiazem cream and has not yet noticed any change to her anal fissures.  She continues to use Eucerin cream for her hands and feet.  She reports her mood is okay and has been taking the sertraline.  Of note, her maternal grandmother had breast cancer.  She denies other concerns.    Current Outpatient Medications   Medication Sig Dispense Refill     diltiazem 2% in PLO cream, FV COMPOUNDED, 2% GEL Apply small amount to anal opening three times daily for 8 weeks. 60 g 0     gabapentin (NEURONTIN) 300 MG capsule Take 1-2 capsules (300-600 mg) by mouth nightly as needed (neuropathic pain) 30 capsule 3     granisetron (KYTRIL) 1 MG tablet Take 1 tablet (1 mg) by mouth every 12 hours as needed for nausea 60 tablet 3     lidocaine-prilocaine (EMLA) 2.5-2.5 % external cream Apply topically as needed for moderate pain 30 g 0     LORazepam (ATIVAN) 0.5 MG tablet Take 1 tablet (0.5 mg) by mouth every 4 hours as needed (Anxiety, Nausea/Vomiting or Sleep) 30 tablet 2     LORazepam (ATIVAN) 0.5 MG tablet Take 1 tablet (0.5 mg) by mouth nightly as needed for sleep 10 tablet 0     prochlorperazine (COMPAZINE) 10 MG tablet Take 1 tablet (10 mg) by mouth  every 6 hours as needed (Nausea/Vomiting) 60 tablet 3     sertraline (ZOLOFT) 50 MG tablet Take 1 tablet (50 mg) by mouth daily 90 tablet 1     tiZANidine (ZANAFLEX) 2 MG tablet Take during chemo and the night of chemo for infusion related muscle spasms 1 tablet 0     tretinoin (RETIN-A) 0.05 % external cream Apply topically At Bedtime 45 g 3     Physical Examination:  /84   Pulse 66   Temp 98.1  F (36.7  C) (Oral)   Wt 77.7 kg (171 lb 6.4 oz)   SpO2 100%   BMI 27.68 kg/m     Wt Readings from Last 10 Encounters:   09/17/19 77.7 kg (171 lb 6.4 oz)   09/11/19 77.4 kg (170 lb 11.2 oz)   09/03/19 77.2 kg (170 lb 1.6 oz)   08/19/19 78 kg (171 lb 14.4 oz)   07/29/19 79 kg (174 lb 1.6 oz)   07/15/19 78.4 kg (172 lb 14.4 oz)   07/02/19 77.1 kg (170 lb)   06/10/19 77.3 kg (170 lb 6.4 oz)   05/29/19 77.3 kg (170 lb 6.4 oz)   05/13/19 76.2 kg (167 lb 14.4 oz)   Constitutional: Well-appearing female in no acute distress.  Eyes: EOMI, PERRL. No scleral icterus.  ENT: Oral mucosa is moist without lesions or thrush.   Lymphatic: Neck is supple without cervical or supraclavicular lymphadenopathy.   Breasts: Left breast with fibroglandular tissue throughout. Question if breast mass at the 1 o'clock position just lateral to the areola that is moderately tender. No other masses palpated in the left breast.   Cardiovascular: Regular rate and rhythm. No peripheral edema.  Respiratory: Clear to auscultation bilaterally. No wheezes or crackles.  Gastrointestinal: Bowel sounds present. Abdomen soft, non-tender. No palpable hepatosplenomegaly or masses.   Neurologic: Cranial nerves II through XII are grossly intact.  Skin: No rashes, petechiae, or bruising noted on exposed skin.    L aboratory Data:   9/17/2019 09:39   Sodium 138   Potassium 3.8   Chloride 110 (H)   Carbon Dioxide 23   Urea Nitrogen 5 (L)   Creatinine 0.54   GFR Estimate >90   GFR Estimate If Black >90   Calcium 8.6   Anion Gap 5   Albumin 3.9   Protein Total 7.3    Bilirubin Total 0.3   Alkaline Phosphatase 94   ALT 30   AST 26   Glucose 104 (H)   WBC 4.9   Hemoglobin 13.0   Hematocrit 38.7   Platelet Count 160   RBC Count 3.91   MCV 99   MCH 33.2 (H)   MCHC 33.6   RDW 14.1   Diff Method Automated Method   % Neutrophils 53.1   % Lymphocytes 27.0   % Monocytes 17.7   % Eosinophils 1.4   % Basophils 0.4   % Immature Granulocytes 0.4   Nucleated RBCs 0   Absolute Neutrophil 2.6   Absolute Lymphocytes 1.3   Absolute Monocytes 0.9   Absolute Eosinophils 0.1   Absolute Basophils 0.0   Abs Immature Granulocytes 0.0   Absolute Nucleated RBC 0.0     Assessment and Plan:  1. Onc  Stage IIIC poorly differentiated [id number] adenocarcinoma of the hepatic flexure -s/p laparoscopic right hemicolectomy on 3/22/19. All margins negative. 4/38 LNs positive. MSI-Intact.  Baseline CEA 9.2 on 3/11/2019. She started adjuvant FOLFOX on 4/29/2019.     She has had difficulties with muscle spasm, nausea, cold sensitivity, and diarrhea. Oxaliplatin dose was reduced by 20% with cycle 4 for neurotoxicity. Continued to struggle this cycle but symptoms not worse so will continue with current dose and do ongoing supportive care as below. She will continue with cycle 10 FOLFOX today. Will continue to see her every 2 weeks with chemo. Overall plan for 6 months followed by CT CAP. Will need colonoscopy March 2020.     2. Neuro  Acute neurotoxicity manifested as muscle spasms and cold sensitivity with pain starting with infusion x 3 days. The Zanaflex during infusion and then at bedtime helps the cramping significantly- she'll continue with this.    Cold sensitivity worse first week, then improves, but not yet resolved. Does have neuropathic pain at night. Stable. She will continue on gabapentin at 600 mg at bedtime. Will continue to monitor- no worsening and did let up this last week that she had off.     3. GI  Nausea stable with IV Emend and Aloxi along with Dex 8mg with infusion, then PRN Ativan/Compazine  and Kytril starting day 3. No changes made today. Compazine and Ativan were refilled today.     Diarrhea continues to persist since surgery. Not currently taking any medication for this. She has developed anal fissures, for which she started on topical diltiazem on 9/11/19.     Lip irritation, now resolved but flares with treatment. Continue Aquaphor PRN.    Liver lesion on prior imaging indeterminate and will evaluate on next imaging at the end of her adjuvant chemotherapy.     4. Heme/Gyn  Iron deficiency anemia 2/2 heaving menses. Endometrial biopsy 4/23/19 was benign. S/p IV Infed 5/22/19. Hgb has improved (despite rectal bleeding and menses).  Iron studies back to normal on 8/19/19. Last LMP was about 2 months ago.     Heavy menses. Previously, recommended f/u with gynecology to discuss getting a Mirena IUD. She would prefer to hold off on getting IUD placed until after chemo. As above, no menses in the last 2 months.     5. Genetics  Will be seeing genetics in October for young presentation of colon cancer and history of skin melanoma. Also sees derm for routine melanoma follow-up.    6. Derm  Continue Eucerin cream to her palms for hand foot syndrome.     7. Psych  Patient has resumed sertraline 50 mg daily in early August 2019 with improvement in her mood.    8. Left breast tenderness and possible mass  Will get a left breast ultrasound and mammogram to further evaluate later this week.     Melinda Mayo PA-C  [facility]  [address] [place] [id number] [phone]

## 2019-09-17 NOTE — PATIENT INSTRUCTIONS
[facility] Main Line: [phone]    Call triage nurse with chills and/or temperature greater than or equal to 100.4, uncontrolled nausea/vomiting, diarrhea, constipation, dizziness, shortness of breath, chest pain, bleeding, unexplained bruising, or any new/concerning symptoms, questions/concerns.   If you are having any concerning symptoms or wish to speak to a provider before your next infusion visit, please call your care coordinator or triage to notify them so we can adequately serve you.   Nurse Triage line:  [phone]    If after hours, weekends, or holidays, call main hospital  and ask for Oncology doctor on call @ [phone]      September 2019 Sunday Monday Tuesday Wednesday Thursday Friday Saturday   1     2     3    [facility] DRAW  11:00 AM   (15 min.)    [facility] DRAW   [facility] Lab Draw    [facility] RETURN  11:25 AM   (50 min.)   Melinda Mayo PA-C   [facility] ONC INFUSION 240  12:30 PM   (240 min.)   [facility]FUSION   [facility] 4     5     6     7       8     9     10     11    [facility] RETURN   5:30 PM   (15 min.)   Linda Quesada MD   [facility] 12     13     14       15     16     17    [facility] DRAW   9:30 AM   (15 min.)    [facility] DRAW   [facility] Lab Draw    [facility] RETURN  10:05 AM   (50 min.)   Melinda Mayo PA-C   [facility] ONC INFUSION 240  11:30 AM   (240 min.)   [facility] ONCOL[facility] INFUSION   [facility] 18     19     20    MA DIAGNOSTIC LT W/ MIGUEL ANGEL  10:30 AM   (20 min.)   [id number]   [facility]    US BREAST LT LMT 1-3 QUAD  11:00 AM   (30 min.)   [email or web address]   [facility] 21       22     23     24     25     26     27     28       29     30 October 2019 Sunday Monday Tuesday Wednesday Thursday Friday Saturday              1    [facility] DRAW   8:00 AM   (15 min.)    [facility] DRAW   [facility] Lab Draw    [facility] RETURN   8:25 AM   (50 min.)   Melinda Mayo PA-C   [facility]   9:30 AM   (240 min.)   [facility] ONCOL[facility] INFUSION   [facility] 2    NEW  11:15 AM   (75 min.)   Ivette Delicd GC   [facility] 3     4     5       6     7     8     9     10     11     12       13     14     15     16     17    [facility] DRAW  11:00 AM   (15 min.)    [facility] DRAW   [facility] Lab Draw    P RETURN  12:15 PM   (30 min.)   Agata Capps MD   [facility] ONC INFUSION 240   1:00 PM   (240 min.)    ONCOLOGY INFUSION   [facility] 18     19       20     21     22     23     24     25     26       27     28     29     30     31                               Lab Results:  Recent Results (from the past 12 hour(s))   CBC with platelets differential    Collection Time: 09/17/19  9:39 AM   Result Value Ref Range    WBC 4.9 4.0 - 11.0 10e9/L    RBC Count 3.91 3.8 - 5.2 10e12/L    Hemoglobin 13.0 11.7 - 15.7 g/dL    Hematocrit 38.7 35.0 - 47.0 %    MCV 99 78 - 100 fl    MCH 33.2 (H) 26.5 - 33.0 pg    MCHC 33.6 31.5 - 36.5 g/dL    RDW 14.1 10.0 - 15.0 %    Platelet Count 160 150 - 450 10e9/L    Diff Method Automated Method     % Neutrophils 53.1 %    % Lymphocytes 27.0 %    % Monocytes 17.7 %    % Eosinophils 1.4 %    % Basophils 0.4 %    % Immature Granulocytes 0.4 %    Nucleated RBCs 0 0 /100    Absolute Neutrophil 2.6 1.6 - 8.3 10e9/L    Absolute Lymphocytes 1.3 0.8 - 5.3 10e9/L    Absolute Monocytes 0.9 0.0 - 1.3 10e9/L    Absolute Eosinophils 0.1 0.0 - 0.7 10e9/L    Absolute Basophils 0.0 0.0 - 0.2 10e9/L    Abs Immature Granulocytes 0.0 0 - 0.4 10e9/L    Absolute Nucleated RBC 0.0    Comprehensive metabolic panel    Collection Time: 09/17/19  9:39 AM   Result Value Ref Range     Sodium 138 133 - 144 mmol/L    Potassium 3.8 3.4 - 5.3 mmol/L    Chloride 110 (H) 94 - 109 mmol/L    Carbon Dioxide 23 20 - 32 mmol/L    Anion Gap 5 3 - 14 mmol/L    Glucose 104 (H) 70 - 99 mg/dL    Urea Nitrogen 5 (L) 7 - 30 mg/dL    Creatinine 0.54 0.52 - 1.04 mg/dL    GFR Estimate >90 >60 mL/min/[1.73_m2]    GFR Estimate If Black >90 >60 mL/min/[1.73_m2]    Calcium 8.6 8.5 - 10.1 mg/dL    Bilirubin Total 0.3 0.2 - 1.3 mg/dL    Albumin 3.9 3.4 - 5.0 g/dL    Protein Total 7.3 6.8 - 8.8 g/dL    Alkaline Phosphatase 94 40 - 150 U/L    ALT 30 0 - 50 U/L    AST 26 0 - 45 U/L

## 2019-09-17 NOTE — NURSING NOTE
"Oncology Rooming Note    September 17, 2019 10:14 AM   Remedios Watts is a 38 year old female who presents for:    Chief Complaint   Patient presents with     Lab Only     labs drawn via port by RN in lab, saline and heparin locked, vitals completed     Oncology Clinic Visit     Return; Colon Ca     Initial Vitals: /84   Pulse 66   Temp 98.1  F (36.7  C) (Oral)   Wt 77.7 kg (171 lb 6.4 oz)   SpO2 100%   BMI 27.68 kg/m   Estimated body mass index is 27.68 kg/m  as calculated from the following:    Height as of 7/15/19: 1.676 m (5' 5.98\").    Weight as of this encounter: 77.7 kg (171 lb 6.4 oz). Body surface area is 1.9 meters squared.  No Pain (0) Comment: Data Unavailable   No LMP recorded. (Menstrual status: Irregular Periods).  Allergies reviewed: Yes  Medications reviewed: Yes    Medications: Medication refills not needed today.  Pharmacy name entered into [facility]:    [facility] - - [place], [place] - [id number] [facility] - [place], [place] - [address]    Clinical concerns: Refill on Ativan; Compazine. No new concerns.        Poly Brooks CMA              "

## 2019-09-17 NOTE — PROGRESS NOTES
Infusion Nursing Note:  Remedios Watts presents today for C10D1 IVF/Oxaliplatin/leucovorin/Fluorouracil push and pump connect.   Patient seen by provider today: Yes: Melinda Mayo PA-C   present during visit today: Not Applicable.    Note: Patient reported to clinic today with no new complaints after seeing provider.    Intravenous Access:  Implanted Port.    Treatment Conditions:  Lab Results   Component Value Date    HGB 13.0 09/17/2019     Lab Results   Component Value Date    WBC 4.9 09/17/2019      Lab Results   Component Value Date    ANEU 2.6 09/17/2019     Lab Results   Component Value Date     09/17/2019      Lab Results   Component Value Date     09/17/2019                   Lab Results   Component Value Date    POTASSIUM 3.8 09/17/2019           No results found for: MAG         Lab Results   Component Value Date    CR 0.54 09/17/2019                   Lab Results   Component Value Date    MANDY 8.6 09/17/2019                Lab Results   Component Value Date    BILITOTAL 0.3 09/17/2019           Lab Results   Component Value Date    ALBUMIN 3.9 09/17/2019                    Lab Results   Component Value Date    ALT 30 09/17/2019           Lab Results   Component Value Date    AST 26 09/17/2019       Results reviewed, labs MET treatment parameters, ok to proceed with treatment.      Post Infusion Assessment:  Patient tolerated infusion without incident.  Blood return noted pre and post infusion.  Blood return noted during administration every 2 cc. Fluorouracil push  Site patent and intact, free from redness, edema or discomfort.  No evidence of extravasations.       Discharge Plan:   Prescription refills given for Compazine and Ativan.  Discharge instructions reviewed with: Patient and Family.  Patient and/or family verbalized understanding of discharge instructions and all questions answered.  Copy of AVS reviewed with patient and/or family.  Patient will return 10/1/19 for next  "appointment.  Patient discharged in stable condition accompanied by:  and sister.  Departure Mode: Ambulatory.  Face to Face time: 0 minutes.    Prior to discharge: Port is secured in place with tegaderm and flushed with 10cc NS with positive blood return noted.  Continuous home infusion C-Series pump connected.    All connectors secured in place and clamps taped open, checked by iTna Rodríguez RN.    Pump started, \"running\" noted on display (CADD): NA.  Patient instructed to call our clinic or [facility] with any questions or concerns at home.  Patient verbalized understanding.    Patient set up for pump disconnect with [facility] on 9/19/19 at 0900 (patient states she finishes very early, should be done around 1345).     Braxton Hewitt, RN, RN                      "

## 2019-09-18 NOTE — PROGRESS NOTES
This is a recent snapshot of the patient's [facility] medical record.  For current drug dose and complete information and questions, call [phone] or In Basket pool, [facility] home infusion ([id number])  CSN Number:  [id number]

## 2019-09-18 NOTE — PROGRESS NOTES
This is a recent snapshot of the patient's [facility] medical record.  For current drug dose and complete information and questions, call [phone] or In Basket pool, [facility]v home infusion ([id number])  CSN Number:  [id number]

## 2019-09-20 ENCOUNTER — PATIENT OUTREACH (OUTPATIENT)
Dept: ONCOLOGY | Facility: CLINIC | Age: 39
End: 2019-09-20

## 2019-09-20 NOTE — PROGRESS NOTES
Writer LVM for pt that ISHAN MURILLO notes she missed her breast imaging today. Melinda advises if breast tenderness resolved, okay to cancel. If persists, will need to reschedule. Provided CB number for pt to reschedule and/or speak with me about this.  Aaliyah Eldridge, RN, BSN, OCN  Care Coordinator  [facility]

## 2019-09-27 ENCOUNTER — HOSPITAL ENCOUNTER (EMERGENCY)
Facility: CLINIC | Age: 39
Discharge: HOME | End: 2019-09-27
Attending: NURSE PRACTITIONER | Admitting: NURSE PRACTITIONER
Payer: COMMERCIAL

## 2019-09-27 VITALS
BODY MASS INDEX: 25.71 KG/M2 | RESPIRATION RATE: 16 BRPM | DIASTOLIC BLOOD PRESSURE: 86 MMHG | SYSTOLIC BLOOD PRESSURE: 122 MMHG | TEMPERATURE: 97.7 F | HEIGHT: 66 IN | WEIGHT: 160 LBS | OXYGEN SATURATION: 100 %

## 2019-09-27 DIAGNOSIS — L28.2 PRURITIC RASH: ICD-10-CM

## 2019-09-27 DIAGNOSIS — J02.9 VIRAL PHARYNGITIS: ICD-10-CM

## 2019-09-27 LAB
ALBUMIN SERPL-MCNC: 3.9 G/DL (ref 3.4–5)
ALBUMIN UR-MCNC: NEGATIVE MG/DL
ALP SERPL-CCNC: 103 U/L (ref 40–150)
ALT SERPL W P-5'-P-CCNC: 69 U/L (ref 0–50)
ANION GAP SERPL CALCULATED.3IONS-SCNC: 5 MMOL/L (ref 3–14)
APPEARANCE UR: CLEAR
AST SERPL W P-5'-P-CCNC: 54 U/L (ref 0–45)
BASOPHILS # BLD AUTO: 0 10E9/L (ref 0–0.2)
BASOPHILS NFR BLD AUTO: 0.4 %
BILIRUB SERPL-MCNC: 0.3 MG/DL (ref 0.2–1.3)
BILIRUB UR QL STRIP: NEGATIVE
BUN SERPL-MCNC: 5 MG/DL (ref 7–30)
CALCIUM SERPL-MCNC: 8.6 MG/DL (ref 8.5–10.1)
CHLORIDE SERPL-SCNC: 106 MMOL/L (ref 94–109)
CO2 SERPL-SCNC: 28 MMOL/L (ref 20–32)
COLOR UR AUTO: YELLOW
CREAT SERPL-MCNC: 0.57 MG/DL (ref 0.52–1.04)
DEPRECATED S PYO AG THROAT QL EIA: NORMAL
DIFFERENTIAL METHOD BLD: ABNORMAL
EOSINOPHIL # BLD AUTO: 0.1 10E9/L (ref 0–0.7)
EOSINOPHIL NFR BLD AUTO: 1.4 %
ERYTHROCYTE [DISTWIDTH] IN BLOOD BY AUTOMATED COUNT: 13.3 % (ref 10–15)
GFR SERPL CREATININE-BSD FRML MDRD: >90 ML/MIN/{1.73_M2}
GLUCOSE SERPL-MCNC: 99 MG/DL (ref 70–99)
GLUCOSE UR STRIP-MCNC: NEGATIVE MG/DL
HCT VFR BLD AUTO: 39.4 % (ref 35–47)
HGB BLD-MCNC: 13.2 G/DL (ref 11.7–15.7)
HGB UR QL STRIP: NEGATIVE
IMM GRANULOCYTES # BLD: 0 10E9/L (ref 0–0.4)
IMM GRANULOCYTES NFR BLD: 0.6 %
KETONES UR STRIP-MCNC: NEGATIVE MG/DL
LEUKOCYTE ESTERASE UR QL STRIP: NEGATIVE
LYMPHOCYTES # BLD AUTO: 1.4 10E9/L (ref 0.8–5.3)
LYMPHOCYTES NFR BLD AUTO: 27.1 %
MCH RBC QN AUTO: 33.1 PG (ref 26.5–33)
MCHC RBC AUTO-ENTMCNC: 33.5 G/DL (ref 31.5–36.5)
MCV RBC AUTO: 99 FL (ref 78–100)
MONOCYTES # BLD AUTO: 0.8 10E9/L (ref 0–1.3)
MONOCYTES NFR BLD AUTO: 15.8 %
NEUTROPHILS # BLD AUTO: 2.8 10E9/L (ref 1.6–8.3)
NEUTROPHILS NFR BLD AUTO: 54.7 %
NITRATE UR QL: NEGATIVE
NRBC # BLD AUTO: 0 10*3/UL
NRBC BLD AUTO-RTO: 0 /100
PH UR STRIP: 7 PH (ref 5–7)
PLATELET # BLD AUTO: 217 10E9/L (ref 150–450)
POTASSIUM SERPL-SCNC: 4.1 MMOL/L (ref 3.4–5.3)
PROT SERPL-MCNC: 7.4 G/DL (ref 6.8–8.8)
RBC # BLD AUTO: 3.99 10E12/L (ref 3.8–5.2)
SODIUM SERPL-SCNC: 139 MMOL/L (ref 133–144)
SOURCE: NORMAL
SP GR UR STRIP: 1 (ref 1–1.03)
SPECIMEN SOURCE: NORMAL
UROBILINOGEN UR STRIP-MCNC: 0 MG/DL (ref 0–2)
WBC # BLD AUTO: 5.1 10E9/L (ref 4–11)

## 2019-09-27 PROCEDURE — 85025 COMPLETE CBC W/AUTO DIFF WBC: CPT | Performed by: NURSE PRACTITIONER

## 2019-09-27 PROCEDURE — 81003 URINALYSIS AUTO W/O SCOPE: CPT | Performed by: NURSE PRACTITIONER

## 2019-09-27 PROCEDURE — 99284 EMERGENCY DEPT VISIT MOD MDM: CPT | Mod: Z6 | Performed by: NURSE PRACTITIONER

## 2019-09-27 PROCEDURE — 87880 STREP A ASSAY W/OPTIC: CPT | Performed by: NURSE PRACTITIONER

## 2019-09-27 PROCEDURE — 99284 EMERGENCY DEPT VISIT MOD MDM: CPT

## 2019-09-27 PROCEDURE — 87081 CULTURE SCREEN ONLY: CPT | Performed by: NURSE PRACTITIONER

## 2019-09-27 PROCEDURE — 80053 COMPREHEN METABOLIC PANEL: CPT | Performed by: NURSE PRACTITIONER

## 2019-09-27 ASSESSMENT — MIFFLIN-ST. JEOR: SCORE: 1417.51

## 2019-09-27 NOTE — ED AVS SNAPSHOT
[facility] Emergency Department  [address] [address]  Phone:  [phone]  Fax:  [phone]                                    Remedios Watts   MRN: [id number]    Department:  [facility] Emergency Department   Date of Visit:  9/27/2019           After Visit Summary Signature Page    I have received my discharge instructions, and my questions have been answered. I have discussed any challenges I see with this plan with the nurse or doctor.    ..........................................................................................................................................  Patient/Patient Representative Signature      ..........................................................................................................................................  Patient Representative Print Name and Relationship to Patient    ..................................................               ................................................  Date                                   Time    ..........................................................................................................................................  Reviewed by Signature/Title    ...................................................              ..............................................  Date                                               Time          22EPIC Rev 08/18

## 2019-09-27 NOTE — ED NOTES
On chemo, next round on Monday. Sore throat in the past few days. Last night developed soreness in neck and rash covering back side.

## 2019-09-27 NOTE — ED PROVIDER NOTES
History     Chief Complaint   Patient presents with     Rash     rash started yesterday, sore throat started last night; is on chemo for colon cancer.      Pharyngitis     HPI  Remedios Watts is a 38 year old female with a past medical history of colon cancer currently on chemotherapy who presents to the emergency department with a rash noted on her back and pharyngitis with associative symptoms of nasal congestion and fatigue noted over the past 2 to 3 days.  Patient denies any exposure with children or no known recent contacts.  Patient denies chest pain, shortness of air, ear pain, eye pain, difficulty breathing, abdominal pain, nausea, vomiting, diarrhea, dysuria, mental status changes, tachycardia, or speech difficulty.    Allergies:  Allergies   Allergen Reactions     Nka [No Known Allergies]        Problem List:    Patient Active Problem List    Diagnosis Date Noted     Iron deficiency anemia due to chronic blood loss 05/10/2019     Priority: Medium     Iron deficiency 05/01/2019     Priority: Medium     Cervical high risk HPV (human papillomavirus) test positive 04/23/2019     Priority: Medium     4/23/19 NIL Pap, + HR HPV (Neg 16/18). Plan cotest in 1 year.        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     Priority: Medium     Colonic mass 03/12/2019     Priority: Medium     Partial small bowel obstruction (H) 03/10/2019     Priority: Medium     Folliculitis 02/06/2013     Priority: Medium     Lentigo 02/06/2013     Priority: Medium     Congenital nevus 02/06/2013     Priority: Medium     Angioma 02/06/2013     Priority: Medium     Multiple nevi 02/06/2013     Priority: Medium     Dermal nevus 02/06/2013     Priority: Medium     CARDIOVASCULAR SCREENING; LDL GOAL LESS THAN 160 01/21/2013     Priority: Medium     Pelvic pain 01/18/2013     Priority: Medium     Generalized anxiety disorder 06/29/2011     Priority: Medium     Diagnosis  updated by automated process. Provider to review and confirm.          Past Medical History:    Past Medical History:   Diagnosis Date     Cervical high risk HPV (human papillomavirus) test positive 04/23/2019     Chickenpox      Diarrhea      Group B streptococcus urinary tract infection complicating pregnancy 4/20/2011     History of postpartum depression, currently pregnant 10/21/2010     Malignant melanoma (H)      Postpartum depression 12/17/2008     Tailbone injury      Urinary tract infections        Past Surgical History:    Past Surgical History:   Procedure Laterality Date     C ORAL SURGERY PROCEDURE       COLONOSCOPY N/A 3/11/2019    Procedure: COMBINED COLONOSCOPY, SINGLE OR MULTIPLE BIOPSY/POLYPECTOMY BY BIOPSY;  Surgeon: Loi aMldonado MD;  Location: [facility]     INSERT PORT VASCULAR ACCESS Right 4/22/2019    Procedure: Central venous Chest Port Placement - right;  Surgeon: Didier Frazier PA-C;  Location: [facility] OR     IR CHEST PORT PLACEMENT > 5 YRS OF AGE  4/22/2019     LAPAROSCOPIC ASSISTED COLECTOMY Right 3/22/2019    Procedure: Laparoscopic Extended Right Hemicolectomy and appendectomy;  Surgeon: Linda Quesada MD;  Location:  OR       Family History:    Family History   Problem Relation Age of Onset     Arthritis Mother      Neurologic Disorder Mother         had a seizure      Alcohol/Drug Mother      Arthritis Maternal Grandmother      Breast Cancer Maternal Grandmother      Melanoma Maternal Grandmother      Heart Disease Paternal Grandmother      Cancer Father         skin       Social History:  Marital Status:   [2]  Social History     Tobacco Use     Smoking status: Never Smoker     Smokeless tobacco: Never Used   Substance Use Topics     Alcohol use: Yes     Frequency: Monthly or less     Drinks per session: 1 or 2     Binge frequency: Less than monthly     Drug use: No        Medications:    diltiazem 2% in PLO cream, FV COMPOUNDED, 2% GEL  gabapentin  "(NEURONTIN) 300 MG capsule  granisetron (KYTRIL) 1 MG tablet  lidocaine-prilocaine (EMLA) 2.5-2.5 % external cream  LORazepam (ATIVAN) 0.5 MG tablet  LORazepam (ATIVAN) 0.5 MG tablet  prochlorperazine (COMPAZINE) 10 MG tablet  sertraline (ZOLOFT) 50 MG tablet  tiZANidine (ZANAFLEX) 2 MG tablet  tretinoin (RETIN-A) 0.05 % external cream      Review of Systems  As mentioned above in the history present illness. All other systems were reviewed and are negative.    Physical Exam   BP: 122/86  Heart Rate: 66  Temp: 97.7  F (36.5  C)  Resp: 16  Height: 167.6 cm (5' 6\")  Weight: 72.6 kg (160 lb)  SpO2: 100 %      Physical Exam  Vitals signs and nursing note reviewed.   Constitutional:       General: She is not in acute distress.     Appearance: She is well-developed. She is not diaphoretic.   HENT:      Head: Normocephalic and atraumatic.      Jaw: No trismus.      Right Ear: Hearing, tympanic membrane, ear canal and external ear normal.      Left Ear: Hearing, tympanic membrane, ear canal and external ear normal.      Nose: Nose normal. No mucosal edema or rhinorrhea.      Right Sinus: No maxillary sinus tenderness or frontal sinus tenderness.      Left Sinus: No maxillary sinus tenderness or frontal sinus tenderness.      Mouth/Throat:      Pharynx: Uvula midline. No oropharyngeal exudate, posterior oropharyngeal erythema or uvula swelling.      Tonsils: No tonsillar exudate or tonsillar abscesses. Swellin on the right. 0 on the left.   Eyes:      General: No scleral icterus.        Right eye: No discharge.         Left eye: No discharge.      Conjunctiva/sclera: Conjunctivae normal.   Neck:      Musculoskeletal: Normal range of motion and neck supple.   Cardiovascular:      Rate and Rhythm: Normal rate and regular rhythm.      Heart sounds: Normal heart sounds.   Pulmonary:      Effort: Pulmonary effort is normal. No respiratory distress.      Breath sounds: Normal breath sounds. No stridor. No wheezing or rales. "   Musculoskeletal:         General: No tenderness.   Lymphadenopathy:      Cervical: Cervical adenopathy (shotty lymph nodes) present.   Skin:     General: Skin is warm.      Findings: Rash present. No erythema. Rash is papular (scattered papular rash noted on back without pustules or vesicles or underlying or overlying erythema).   Neurological:      Mental Status: She is alert and oriented to person, place, and time.         ED Course     ED Course as of Sep 27 1820   Fri Sep 27, 2019   1333 Phone call to oncology department where patient receives her infusions and spoke with LIDIA Atkinson and discussed patient's current symptoms her labs and generalized pruritic papular rash noted on back and neck.  Melinda states that this is not a common reaction and questioned whether there was contacts with similar rashes.  Will investigate this further.  Melinda also recommends patient follow-up on Monday regardless of whether she is feeling better or not for reevaluation.  Agreed with this will update patient.        Procedures    Results for orders placed or performed during the hospital encounter of 09/27/19 (from the past 24 hour(s))   CBC with platelets differential   Result Value Ref Range    WBC 5.1 4.0 - 11.0 10e9/L    RBC Count 3.99 3.8 - 5.2 10e12/L    Hemoglobin 13.2 11.7 - 15.7 g/dL    Hematocrit 39.4 35.0 - 47.0 %    MCV 99 78 - 100 fl    MCH 33.1 (H) 26.5 - 33.0 pg    MCHC 33.5 31.5 - 36.5 g/dL    RDW 13.3 10.0 - 15.0 %    Platelet Count 217 150 - 450 10e9/L    Diff Method Automated Method     % Neutrophils 54.7 %    % Lymphocytes 27.1 %    % Monocytes 15.8 %    % Eosinophils 1.4 %    % Basophils 0.4 %    % Immature Granulocytes 0.6 %    Nucleated RBCs 0 0 /100    Absolute Neutrophil 2.8 1.6 - 8.3 10e9/L    Absolute Lymphocytes 1.4 0.8 - 5.3 10e9/L    Absolute Monocytes 0.8 0.0 - 1.3 10e9/L    Absolute Eosinophils 0.1 0.0 - 0.7 10e9/L    Absolute Basophils 0.0 0.0 - 0.2 10e9/L    Abs Immature Granulocytes 0.0 0  - 0.4 10e9/L    Absolute Nucleated RBC 0.0    Comprehensive metabolic panel   Result Value Ref Range    Sodium 139 133 - 144 mmol/L    Potassium 4.1 3.4 - 5.3 mmol/L    Chloride 106 94 - 109 mmol/L    Carbon Dioxide 28 20 - 32 mmol/L    Anion Gap 5 3 - 14 mmol/L    Glucose 99 70 - 99 mg/dL    Urea Nitrogen 5 (L) 7 - 30 mg/dL    Creatinine 0.57 0.52 - 1.04 mg/dL    GFR Estimate >90 >60 mL/min/[1.73_m2]    GFR Estimate If Black >90 >60 mL/min/[1.73_m2]    Calcium 8.6 8.5 - 10.1 mg/dL    Bilirubin Total 0.3 0.2 - 1.3 mg/dL    Albumin 3.9 3.4 - 5.0 g/dL    Protein Total 7.4 6.8 - 8.8 g/dL    Alkaline Phosphatase 103 40 - 150 U/L    ALT 69 (H) 0 - 50 U/L    AST 54 (H) 0 - 45 U/L   UA reflex to Microscopic   Result Value Ref Range    Color Urine Yellow     Appearance Urine Clear     Glucose Urine Negative NEG^Negative mg/dL    Bilirubin Urine Negative NEG^Negative    Ketones Urine Negative NEG^Negative mg/dL    Specific Gravity Urine 1.005 1.003 - 1.035    Blood Urine Negative NEG^Negative    pH Urine 7.0 5.0 - 7.0 pH    Protein Albumin Urine Negative NEG^Negative mg/dL    Urobilinogen mg/dL 0.0 0.0 - 2.0 mg/dL    Nitrite Urine Negative NEG^Negative    Leukocyte Esterase Urine Negative NEG^Negative    Source Midstream Urine    Beta strep group A r/o culture   Result Value Ref Range    Specimen Description Throat     Special Requests Specimen collected in eSwab transport (white cap)     Culture Micro PENDING    Rapid Strep Screen   Result Value Ref Range    Specimen Description Throat     Rapid Strep A Screen       NEGATIVE: No Group A streptococcal antigen detected by immunoassay, await culture report.       Medications - No data to display    Assessments & Plan (with Medical Decision Making)     I have reviewed the nursing notes.    I have reviewed the findings, diagnosis, plan and need for follow up with the patient.  Remedios Watts is a 38 year old female with a past medical history of colon cancer currently on  chemotherapy who presents to the emergency department with a rash noted on her back and pharyngitis with associative symptoms of nasal congestion and fatigue noted over the past 2 to 3 days.  Patient denies any illness with her children or any known exposures.  On examination patient is generally well-appearing but does sound nasally congested and examination of the throat the uvula is midline and no peritonsillar exudates.  There is a scattered papular rash noted on the back that is consistent with a viral exanthem that you would see in a very young child and not as common in an adult however could be part of the differential.  Quick strep obtained and was negative.  Due to history of colon cancer and chemotherapy CBC and conference of metabolic obtained and the ALT and AST are slightly elevated.  Phone call placed to Melinda MURILLO at [facility] oncology and discussed case with her discussed with her the presentation and symptoms.  Will advise patient of likelihood of viral illness and precautions regarding the rash if it should worsen or change or evolve to return for reevaluation.  Patient discharged in stable condition.    Discharge Medication List as of 9/27/2019  1:48 PM          Final diagnoses:   Viral pharyngitis   Pruritic rash       9/27/2019   [facility] EMERGENCY DEPARTMENT     Sophie Guadalupe, JEFERSON CNP  09/27/19 [id number]

## 2019-09-27 NOTE — DISCHARGE INSTRUCTIONS
Consider Benadryl orally or topically for the rash and also consider hydrocortisone cream.  Return if the rash worsens or becomes pustular or concern of infection  Follow-up with Melinda Mayo on Monday for reevaluation.

## 2019-09-28 NOTE — PROGRESS NOTES
Colon and Rectal Surgery Follow-Up Clinic Note       Referring provider:  Sapna Schilling PA-C  [address] [facility] [address]  [place] [address]         RE:      Remedios Watts  :   1980  DOMINGO:   2019        Remedios Watts is a very pleasant 38 year old female who presents today for rectal bleeding.     HISTORY OF PRESENT ILNESS:    Admitted to the hospital on 3/10/2019 for bowel obstruction due to hepatic flexure colon mass.  She had a CT scan done on 3/10/2019 which showed apple core lesion near the hepatic flexure of the colon concerning for an obstructing colonic neoplasm.  No enlarged lymph nodes were seen.  There is a tiny subcentimeter left lobe liver lesion which is too small to characterize.  A CT of the chest on 3/13/2019 did not show evidence of metastatic disease in the chest.      Colonoscopy on 3/11/2019 showed fungating, infiltrative, highly-friable and ulcerated large mass at the hepatic flexure/proximal transverse colon, unable to traverse this lesions as this appears to be near-completely obstructive in nature (though allows liquid stool/effluent to pass under visualization). The mass was partially circumferential. Biopsies was consistent with invasive moderately differentiated adenocarcinoma compatible with colorectal primary and Mismatch repair enzymes were intact by immunohistochemistry.     On 3/22/2019 she had laparoscopic extended right hemicolectomy with me.    Final pathology was consistent with a 3.7 cm hepatic flexure poorly differentiated adenocarcinoma with focal micropapillary growth extending through the muscularis propria and involving serosa (pT4).  There was lymphovascular invasion but no perineural invasion seen.  All margins were negative.  4 out of 38 lymph nodes were positive for metastatic carcinoma including 2 lymph nodes with micrometastasis.  Final staging was [id number].    She follows with Dr. Capps of oncology and started adjuvant FOLFOX on 19    She is  scheduled to see a genetic counselor in October given young presentation of colon cancer and history of skin melanoma     Interval history: Remedios has had some diarrhea since surgery, about 2-3 bowel movements a day.  This got much worse with chemotherapy and she was having explosive diarrhea.  She has since developed severe pain with bowel movements and bright red blood with bowel movements.  The pain is very severe with bowel movements but last most of the day.  Pain is worse if her bowel movements are at all formed.  She is not on any bowel medications currently and stopped the Imodium when the pain started.     PLEASE SEE NOTE BELOW FOR PHYSICAL EXAMINATION, REVIEW OF SYSTEMS, AND OTHER HISTORY.     Assessment/Plan: 38 year old female with anal fissures.  On exam she has posterior midline and anterior midline anal fissures. I discussed that this is likely the source of her pain and bleeding. Recommended starting on a daily fiber supplement to bulk up stools some.  Will treat with topical diltiazem. Discussed that it may take several weeks for symptoms to improve and several months to heal. If no improvement is noted after 4 weeks, return to clinic and discuss possible Botox injections.  Advised the use of Tylenol, ibuprofen, and warm tub baths for any pain. Patient's questions were answered to her stated satisfaction and she is in agreement with this plan.     PLEASE SEE NOTE BELOW FOR PHYSICAL EXAMINATION, REVIEW OF SYSTEMS, AND OTHER HISTORY.     Total face to face time was 10 minutes, >50% counseling.       Linda Quesada MD, MD  Colon and Rectal Surgery Attending  Department of Surgery  [facility]    -------------------------------------------------------------------------------------------------------------------      Medical history:  Past Medical History:   Diagnosis Date     Cervical high risk HPV (human papillomavirus) test positive 04/23/2019    see problem list      Chickenpox      Diarrhea      Group B streptococcus urinary tract infection complicating pregnancy 4/20/2011    Plan PCN in labor      History of postpartum depression, currently pregnant 10/21/2010     Malignant melanoma (H)      Postpartum depression 12/17/2008     Tailbone injury     fx     Urinary tract infections        Surgical history:  Past Surgical History:   Procedure Laterality Date     C ORAL SURGERY PROCEDURE       COLONOSCOPY N/A 3/11/2019    Procedure: COMBINED COLONOSCOPY, SINGLE OR MULTIPLE BIOPSY/POLYPECTOMY BY BIOPSY;  Surgeon: Loi Maldonado MD;  Location: [facility]     INSERT PORT VASCULAR ACCESS Right 4/22/2019    Procedure: Central venous Chest Port Placement - right;  Surgeon: Didier Frazier PA-C;  Location: [facility] OR     IR CHEST PORT PLACEMENT > 5 YRS OF AGE  4/22/2019     LAPAROSCOPIC ASSISTED COLECTOMY Right 3/22/2019    Procedure: Laparoscopic Extended Right Hemicolectomy and appendectomy;  Surgeon: Linda Quesada MD;  Location: [facility]       Problem list:  Patient Active Problem List    Diagnosis Date Noted     Iron deficiency anemia due to chronic blood loss 05/10/2019     Priority: Medium     Iron deficiency 05/01/2019     Priority: Medium     Cervical high risk HPV (human papillomavirus) test positive 04/23/2019     Priority: Medium     4/23/19 NIL Pap, + HR HPV (Neg 16/18). Plan cotest in 1 year.        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     Priority: Medium     Colonic mass 03/12/2019     Priority: Medium     Partial small bowel obstruction (H) 03/10/2019     Priority: Medium     Folliculitis 02/06/2013     Priority: Medium     Lentigo 02/06/2013     Priority: Medium     Congenital nevus 02/06/2013     Priority: Medium     Angioma 02/06/2013     Priority: Medium     Multiple nevi 02/06/2013     Priority: Medium     Dermal nevus 02/06/2013     Priority: Medium     CARDIOVASCULAR SCREENING; LDL GOAL  LESS THAN 160 01/21/2013     Priority: Medium     Pelvic pain 01/18/2013     Priority: Medium     Generalized anxiety disorder 06/29/2011     Priority: Medium     Diagnosis updated by automated process. Provider to review and confirm.         Medications:  Current Outpatient Medications   Medication Sig Dispense Refill     diltiazem 2% in PLO cream, FV COMPOUNDED, 2% GEL Apply small amount to anal opening three times daily for 8 weeks. 60 g 0     gabapentin (NEURONTIN) 300 MG capsule Take 1-2 capsules (300-600 mg) by mouth nightly as needed (neuropathic pain) 30 capsule 3     granisetron (KYTRIL) 1 MG tablet Take 1 tablet (1 mg) by mouth every 12 hours as needed for nausea 60 tablet 3     lidocaine-prilocaine (EMLA) 2.5-2.5 % external cream Apply topically as needed for moderate pain 30 g 0     LORazepam (ATIVAN) 0.5 MG tablet Take 1 tablet (0.5 mg) by mouth every 4 hours as needed (Anxiety, Nausea/Vomiting or Sleep) 30 tablet 2     LORazepam (ATIVAN) 0.5 MG tablet Take 1 tablet (0.5 mg) by mouth nightly as needed for sleep 10 tablet 0     prochlorperazine (COMPAZINE) 10 MG tablet Take 1 tablet (10 mg) by mouth every 6 hours as needed (Nausea/Vomiting) 60 tablet 3     sertraline (ZOLOFT) 50 MG tablet Take 1 tablet (50 mg) by mouth daily 90 tablet 1     tiZANidine (ZANAFLEX) 2 MG tablet Take during chemo and the night of chemo for infusion related muscle spasms 1 tablet 0     tretinoin (RETIN-A) 0.05 % external cream Apply topically At Bedtime 45 g 3       Allergies:  Allergies   Allergen Reactions     Nka [No Known Allergies]        Family history:  Family History   Problem Relation Age of Onset     Arthritis Mother      Neurologic Disorder Mother         had a seizure      Alcohol/Drug Mother      Arthritis Maternal Grandmother      Breast Cancer Maternal Grandmother      Melanoma Maternal Grandmother      Heart Disease Paternal Grandmother      Cancer Father         skin       Social history:  Social  History     Socioeconomic History     Marital status:      Spouse name: Not on file     Number of children: 3     Years of education: Not on file     Highest education level: Not on file   Occupational History     Employer: [employer]   Social Needs     Financial resource strain: Not on file     Food insecurity:     Worry: Not on file     Inability: Not on file     Transportation needs:     Medical: Not on file     Non-medical: Not on file   Tobacco Use     Smoking status: Never Smoker     Smokeless tobacco: Never Used   Substance and Sexual Activity     Alcohol use: Yes     Frequency: Monthly or less     Drinks per session: 1 or 2     Binge frequency: Less than monthly     Drug use: No     Sexual activity: Yes     Partners: Male     Birth control/protection: Pill   Lifestyle     Physical activity:     Days per week: Not on file     Minutes per session: Not on file     Stress: Not on file   Relationships     Social connections:     Talks on phone: Not on file     Gets together: Not on file     Attends Confucianism service: Not on file     Active member of club or organization: Not on file     Attends meetings of clubs or organizations: Not on file     Relationship status: Not on file     Intimate partner violence:     Fear of current or ex partner: Not on file     Emotionally abused: Not on file     Physically abused: Not on file     Forced sexual activity: Not on file   Other Topics Concern     Parent/sibling w/ CABG, MI or angioplasty before 65F 55M? Yes     Comment: pt had MI @ age 58   Social History Narrative     Not on file         Nursing Notes:   Maggi Diaz, EMT  9/11/2019  6:09 PM  Signed  Chief Complaint   Patient presents with     Rectal Problem     Rectal bleeding.       Vitals:    09/11/19 1756   BP: 138/64   BP Location: Left arm   Patient Position: Sitting   Cuff Size: Adult Regular   Pulse: 65   Weight: 170 lb 11.2 oz       Body mass index is 27.57  kg/m .      RUTHY Gomes                        Physical Examination:  /64 (BP Location: Left arm, Patient Position: Sitting, Cuff Size: Adult Regular)   Pulse 65   Wt 170 lb 11.2 oz   LMP 08/17/2019   BMI 27.57 kg/m    General: alert, oriented, in no acute distress, sitting comfortably  HEENT: mucous membranes moist  Perianal external examination:  Perianal skin: Intact with no excoriation or lichenification.  Lesions: No evidence of an external lesion, nodularity, or induration in the perianal region.  Eversion of buttocks: There was evidence of an anal fissure. Details: anterior and posterior midline anal fissures.  Skin tags or external hemorrhoids: None.     Digital rectal examination: Was deferred.     Anoscopy: Was deferred.

## 2019-09-29 LAB
BACTERIA SPEC CULT: NORMAL
Lab: NORMAL
SPECIMEN SOURCE: NORMAL

## 2019-09-30 ENCOUNTER — ONCOLOGY VISIT (OUTPATIENT)
Dept: ONCOLOGY | Facility: CLINIC | Age: 39
End: 2019-09-30
Attending: PHYSICIAN ASSISTANT
Payer: COMMERCIAL

## 2019-09-30 ENCOUNTER — HOME INFUSION (PRE-WILLOW HOME INFUSION) (OUTPATIENT)
Dept: PHARMACY | Facility: CLINIC | Age: 39
End: 2019-09-30

## 2019-09-30 ENCOUNTER — INFUSION THERAPY VISIT (OUTPATIENT)
Dept: ONCOLOGY | Facility: CLINIC | Age: 39
End: 2019-09-30
Attending: INTERNAL MEDICINE
Payer: COMMERCIAL

## 2019-09-30 VITALS
RESPIRATION RATE: 16 BRPM | BODY MASS INDEX: 27.71 KG/M2 | TEMPERATURE: 97.9 F | OXYGEN SATURATION: 98 % | DIASTOLIC BLOOD PRESSURE: 75 MMHG | WEIGHT: 172.4 LBS | HEART RATE: 97 BPM | HEIGHT: 66 IN | SYSTOLIC BLOOD PRESSURE: 121 MMHG

## 2019-09-30 DIAGNOSIS — C18.3 MALIGNANT NEOPLASM OF HEPATIC FLEXURE (H): Primary | ICD-10-CM

## 2019-09-30 DIAGNOSIS — F32.89 OTHER DEPRESSION: ICD-10-CM

## 2019-09-30 LAB
ALBUMIN SERPL-MCNC: 3.5 G/DL (ref 3.4–5)
ALP SERPL-CCNC: 97 U/L (ref 40–150)
ALT SERPL W P-5'-P-CCNC: 56 U/L (ref 0–50)
ANION GAP SERPL CALCULATED.3IONS-SCNC: 4 MMOL/L (ref 3–14)
AST SERPL W P-5'-P-CCNC: 35 U/L (ref 0–45)
BASOPHILS # BLD AUTO: 0 10E9/L (ref 0–0.2)
BASOPHILS NFR BLD AUTO: 0.7 %
BILIRUB SERPL-MCNC: 0.3 MG/DL (ref 0.2–1.3)
BUN SERPL-MCNC: 6 MG/DL (ref 7–30)
CALCIUM SERPL-MCNC: 8.4 MG/DL (ref 8.5–10.1)
CHLORIDE SERPL-SCNC: 108 MMOL/L (ref 94–109)
CO2 SERPL-SCNC: 26 MMOL/L (ref 20–32)
CREAT SERPL-MCNC: 0.54 MG/DL (ref 0.52–1.04)
DIFFERENTIAL METHOD BLD: ABNORMAL
EOSINOPHIL # BLD AUTO: 0 10E9/L (ref 0–0.7)
EOSINOPHIL NFR BLD AUTO: 0.9 %
ERYTHROCYTE [DISTWIDTH] IN BLOOD BY AUTOMATED COUNT: 13.9 % (ref 10–15)
GFR SERPL CREATININE-BSD FRML MDRD: >90 ML/MIN/{1.73_M2}
GLUCOSE SERPL-MCNC: 122 MG/DL (ref 70–99)
HCT VFR BLD AUTO: 35.9 % (ref 35–47)
HGB BLD-MCNC: 12 G/DL (ref 11.7–15.7)
IMM GRANULOCYTES # BLD: 0 10E9/L (ref 0–0.4)
IMM GRANULOCYTES NFR BLD: 0.4 %
LYMPHOCYTES # BLD AUTO: 1.3 10E9/L (ref 0.8–5.3)
LYMPHOCYTES NFR BLD AUTO: 29.2 %
MCH RBC QN AUTO: 33.1 PG (ref 26.5–33)
MCHC RBC AUTO-ENTMCNC: 33.4 G/DL (ref 31.5–36.5)
MCV RBC AUTO: 99 FL (ref 78–100)
MONOCYTES # BLD AUTO: 0.7 10E9/L (ref 0–1.3)
MONOCYTES NFR BLD AUTO: 14.3 %
NEUTROPHILS # BLD AUTO: 2.5 10E9/L (ref 1.6–8.3)
NEUTROPHILS NFR BLD AUTO: 54.5 %
NRBC # BLD AUTO: 0 10*3/UL
NRBC BLD AUTO-RTO: 0 /100
PLATELET # BLD AUTO: 159 10E9/L (ref 150–450)
POTASSIUM SERPL-SCNC: 3.9 MMOL/L (ref 3.4–5.3)
PROT SERPL-MCNC: 6.9 G/DL (ref 6.8–8.8)
RBC # BLD AUTO: 3.62 10E12/L (ref 3.8–5.2)
SODIUM SERPL-SCNC: 139 MMOL/L (ref 133–144)
WBC # BLD AUTO: 4.6 10E9/L (ref 4–11)

## 2019-09-30 PROCEDURE — 96413 CHEMO IV INFUSION 1 HR: CPT

## 2019-09-30 PROCEDURE — 99215 OFFICE O/P EST HI 40 MIN: CPT | Mod: ZP | Performed by: PHYSICIAN ASSISTANT

## 2019-09-30 PROCEDURE — 96368 THER/DIAG CONCURRENT INF: CPT

## 2019-09-30 PROCEDURE — 25000128 H RX IP 250 OP 636: Mod: ZF | Performed by: PHYSICIAN ASSISTANT

## 2019-09-30 PROCEDURE — 25800030 ZZH RX IP 258 OP 636: Mod: ZF | Performed by: PHYSICIAN ASSISTANT

## 2019-09-30 PROCEDURE — 96367 TX/PROPH/DG ADDL SEQ IV INF: CPT

## 2019-09-30 PROCEDURE — 96375 TX/PRO/DX INJ NEW DRUG ADDON: CPT

## 2019-09-30 PROCEDURE — 96361 HYDRATE IV INFUSION ADD-ON: CPT

## 2019-09-30 PROCEDURE — 96415 CHEMO IV INFUSION ADDL HR: CPT

## 2019-09-30 PROCEDURE — 96411 CHEMO IV PUSH ADDL DRUG: CPT

## 2019-09-30 PROCEDURE — G0498 CHEMO EXTEND IV INFUS W/PUMP: HCPCS

## 2019-09-30 PROCEDURE — G0463 HOSPITAL OUTPT CLINIC VISIT: HCPCS | Mod: ZF

## 2019-09-30 PROCEDURE — 80053 COMPREHEN METABOLIC PANEL: CPT | Performed by: PHYSICIAN ASSISTANT

## 2019-09-30 PROCEDURE — 85025 COMPLETE CBC W/AUTO DIFF WBC: CPT | Performed by: PHYSICIAN ASSISTANT

## 2019-09-30 RX ORDER — MEPERIDINE HYDROCHLORIDE 25 MG/ML
25 INJECTION INTRAMUSCULAR; INTRAVENOUS; SUBCUTANEOUS EVERY 30 MIN PRN
Status: CANCELLED | OUTPATIENT
Start: 2019-09-30

## 2019-09-30 RX ORDER — PALONOSETRON 0.05 MG/ML
0.25 INJECTION, SOLUTION INTRAVENOUS ONCE
Status: COMPLETED | OUTPATIENT
Start: 2019-09-30 | End: 2019-09-30

## 2019-09-30 RX ORDER — DIPHENHYDRAMINE HYDROCHLORIDE 50 MG/ML
50 INJECTION INTRAMUSCULAR; INTRAVENOUS
Status: CANCELLED
Start: 2019-09-30

## 2019-09-30 RX ORDER — ALBUTEROL SULFATE 90 UG/1
1-2 AEROSOL, METERED RESPIRATORY (INHALATION)
Status: CANCELLED
Start: 2019-09-30

## 2019-09-30 RX ORDER — SODIUM CHLORIDE 9 MG/ML
1000 INJECTION, SOLUTION INTRAVENOUS CONTINUOUS PRN
Status: CANCELLED
Start: 2019-09-30

## 2019-09-30 RX ORDER — ALBUTEROL SULFATE 0.83 MG/ML
2.5 SOLUTION RESPIRATORY (INHALATION)
Status: CANCELLED | OUTPATIENT
Start: 2019-09-30

## 2019-09-30 RX ORDER — FLUOROURACIL 50 MG/ML
400 INJECTION, SOLUTION INTRAVENOUS ONCE
Status: CANCELLED | OUTPATIENT
Start: 2019-09-30

## 2019-09-30 RX ORDER — HEPARIN SODIUM (PORCINE) LOCK FLUSH IV SOLN 100 UNIT/ML 100 UNIT/ML
5 SOLUTION INTRAVENOUS ONCE
Status: COMPLETED | OUTPATIENT
Start: 2019-09-30 | End: 2019-09-30

## 2019-09-30 RX ORDER — PALONOSETRON 0.05 MG/ML
0.25 INJECTION, SOLUTION INTRAVENOUS ONCE
Status: CANCELLED
Start: 2019-09-30

## 2019-09-30 RX ORDER — FLUOROURACIL 50 MG/ML
400 INJECTION, SOLUTION INTRAVENOUS ONCE
Status: COMPLETED | OUTPATIENT
Start: 2019-09-30 | End: 2019-09-30

## 2019-09-30 RX ORDER — EPINEPHRINE 0.3 MG/.3ML
0.3 INJECTION SUBCUTANEOUS EVERY 5 MIN PRN
Status: CANCELLED | OUTPATIENT
Start: 2019-09-30

## 2019-09-30 RX ORDER — METHYLPREDNISOLONE SODIUM SUCCINATE 125 MG/2ML
125 INJECTION, POWDER, LYOPHILIZED, FOR SOLUTION INTRAMUSCULAR; INTRAVENOUS
Status: CANCELLED
Start: 2019-09-30

## 2019-09-30 RX ORDER — LORAZEPAM 2 MG/ML
0.5 INJECTION INTRAMUSCULAR EVERY 4 HOURS PRN
Status: CANCELLED
Start: 2019-09-30

## 2019-09-30 RX ORDER — EPINEPHRINE 1 MG/ML
0.3 INJECTION, SOLUTION INTRAMUSCULAR; SUBCUTANEOUS EVERY 5 MIN PRN
Status: CANCELLED | OUTPATIENT
Start: 2019-09-30

## 2019-09-30 RX ADMIN — HEPARIN 5 ML: 100 SYRINGE at 09:37

## 2019-09-30 RX ADMIN — PALONOSETRON HYDROCHLORIDE 0.25 MG: 0.25 INJECTION, SOLUTION INTRAVENOUS at 12:09

## 2019-09-30 RX ADMIN — OXALIPLATIN 129 MG: 5 INJECTION, SOLUTION, CONCENTRATE INTRAVENOUS at 13:37

## 2019-09-30 RX ADMIN — FLUOROURACIL 755 MG: 50 INJECTION, SOLUTION INTRAVENOUS at 15:39

## 2019-09-30 RX ADMIN — DEXTROSE MONOHYDRATE 250 ML: 50 INJECTION, SOLUTION INTRAVENOUS at 12:02

## 2019-09-30 RX ADMIN — SODIUM CHLORIDE 1000 ML: 9 INJECTION, SOLUTION INTRAVENOUS at 12:32

## 2019-09-30 RX ADMIN — LEUCOVORIN CALCIUM 650 MG: 200 INJECTION, POWDER, LYOPHILIZED, FOR SOLUTION INTRAMUSCULAR; INTRAVENOUS at 13:35

## 2019-09-30 RX ADMIN — DEXAMETHASONE SODIUM PHOSPHATE: 10 INJECTION, SOLUTION INTRAMUSCULAR; INTRAVENOUS at 12:02

## 2019-09-30 ASSESSMENT — MIFFLIN-ST. JEOR: SCORE: 1473.5

## 2019-09-30 ASSESSMENT — PAIN SCALES - GENERAL: PAINLEVEL: NO PAIN (0)

## 2019-09-30 NOTE — PROGRESS NOTES
Infusion Nursing Note:  Remedios Watts presents today for Cycle 11 Day 1 Oxaliplatin, Leucorovin, Fluorouracil bolus and pump connect.    Patient seen by provider today: Yes: LIDIA Hernandez   present during visit today: Not Applicable.    Note: Patient presents to infusion today following her provider appointment. Patient denies any questions or concerns at this time.     Intravenous Access:  Implanted Port.    Treatment Conditions:  Lab Results   Component Value Date    HGB 12.0 09/30/2019     Lab Results   Component Value Date    WBC 4.6 09/30/2019      Lab Results   Component Value Date    ANEU 2.5 09/30/2019     Lab Results   Component Value Date     09/30/2019      Lab Results   Component Value Date     09/30/2019                   Lab Results   Component Value Date    POTASSIUM 3.9 09/30/2019           No results found for: MAG         Lab Results   Component Value Date    CR 0.54 09/30/2019                   Lab Results   Component Value Date    MANDY 8.4 09/30/2019                Lab Results   Component Value Date    BILITOTAL 0.3 09/30/2019           Lab Results   Component Value Date    ALBUMIN 3.5 09/30/2019                    Lab Results   Component Value Date    ALT 56 09/30/2019           Lab Results   Component Value Date    AST 35 09/30/2019       Results reviewed, labs MET treatment parameters, ok to proceed with treatment.      Post Infusion Assessment:  Patient tolerated infusion without incident.  Blood return noted pre and post infusion.  Blood return noted during Fluorouracil administration every 2 cc.  Site patent and intact, free from redness, edema or discomfort.  No evidence of extravasations.     Prior to discharge: Port is secured in place with tegaderm and flushed with 10cc NS with positive blood return noted.  Continuous home infusion Dosi-Fuser pump connected.    All connectors secured in place and clamps taped open.  Clamps, connections, and tape double  "checked by Braxton Hewitt RN.  Pump started, \"running\" noted on display (CADD): Not Applicable.  Patient instructed to call our clinic or [facility] with any questions or concerns at home.  Patient verbalized understanding.    Patient set up for pump disconnect at home with [facility] on Wednesday 10/2/19 at 0930. Writer confirmed disconnect time with COBY Sykes at [facility]. Patient aware of disconnect time.         Discharge Plan:   Patient declined prescription refills.  Discharge instructions reviewed with: Patient and .  Patient and/or family verbalized understanding of discharge instructions and all questions answered.  AVS to patient via [facility].  Patient will return 10/17/19 for next appointment.   Patient discharged in stable condition accompanied by: .  Departure Mode: Ambulatory.    Kristin Garcia, RN, RN                        "

## 2019-09-30 NOTE — PATIENT INSTRUCTIONS
Contact Numbers    [facility] Main Line/Triage and after hours / weekends / holidays: [phone]      Please call the triage or after hours line if you experience a temperature greater than or equal to 100.5, shaking chills, have uncontrolled nausea, vomiting and/or diarrhea, dizziness, shortness of breath, chest pain, bleeding, unexplained bruising, or if you have any other new/concerning symptoms, questions or concerns.      If you are having any concerning symptoms or wish to speak to a provider before your next infusion visit, please call your care coordinator or triage to notify them so we can adequately serve you.     If you need a refill on a narcotic prescription or other medication, please call before your infusion appointment.

## 2019-09-30 NOTE — PROGRESS NOTES
"Oncology/Hematology Visit Note  Sep 30, 2019    Reason for Visit: Follow up of stage III colon cancer    History of Present Illness: Remedios Watts is a 38 year old female with PMH melanoma to left arm and back s/p excision with stage III colon cancer. Her history is as follows, copied forward from prior notes:    \"Ms. Watts is a 38 year old female who was admitted to the hospital on 3/10/2019 for bowel obstruction due to hepatic flexure colon mass. She had a CT scan done on 3/10/2019 which showed apple core lesion near the hepatic flexure of the colon concerning for an obstructing colonic neoplasm.  No enlarged lymph nodes were seen. There is a tiny subcentimeter left lobe liver lesion which is too small to characterize.  A CT of the chest on 3/13/2019 did not show evidence of metastatic disease in the chest.       Colonoscopy on 3/11/2019 showed fungating, infiltrative, highly-friable and ulcerated large mass at the hepatic flexure/proximal transverse colon, unable to traverse this lesions as this appears to be near-completely obstructive in nature (though allows liquid stool/effluent to pass under visualization). The mass was partially circumferential. Biopsies was consistent with invasive moderately differentiated adenocarcinoma compatible with colorectal primary and Mismatch repair enzymes were intact by immunohistochemistry.      On 3/22/2019 she had laparoscopic extended right hemicolectomy performed by Dr. Quesada. Final pathology was consistent with a 3.7 cm hepatic flexure poorly differentiated adenocarcinoma with focal micropapillary growth extending through the muscularis propria and involving serosa (pT4). There was lymphovascular invasion but no perineural invasion seen. All margins were negative. 4 out of 38 lymph nodes were positive for metastatic carcinoma including 2 lymph nodes with micrometastasis. Final staging was [id number].    She met with Dr. Capps 4/4/19 who recommended adjuvant FOLFOX. " Port placed 4/22/19.       She started adjuvant FOLFOX on 4/29/19. She did require dose reduction of Oxaliplatin by 20% for neuropathy with cycle 4. She returns today for ongoing oncology follow-up prior to cycle 11 FOLFOX.    Interval History:  Remedios is here with her  today. She was in the ED on Friday with a rash on her back, cervical adenopathy and 2-3 days of fatigue.  She had a HSV outbreak last week as well on her tongue, but those lesions are gone.  She continues to have nausea, cold sensitivity, muscle cramping, and fatigue.     At her last visit with Melinda, she had noticed left breast tenderness over the last 4 days despite no injury.  Melinda had ordered a US, however Remedios after the visit got her menses and the pain immediately went away, thus she didn't move forward with the US.   She continues to have alternating stools between diarrhea and semisolid.  She has started to apply the diltiazem cream and has not yet noticed any change to her anal fissures.  She continues to use Eucerin cream for her hands and feet.  She reports her mood is okay and has been taking the sertraline.  She denies other concerns.    Current Outpatient Medications   Medication Sig Dispense Refill     diltiazem 2% in PLO cream, FV COMPOUNDED, 2% GEL Apply small amount to anal opening three times daily for 8 weeks. 60 g 0     gabapentin (NEURONTIN) 300 MG capsule Take 1-2 capsules (300-600 mg) by mouth nightly as needed (neuropathic pain) 30 capsule 3     granisetron (KYTRIL) 1 MG tablet Take 1 tablet (1 mg) by mouth every 12 hours as needed for nausea 60 tablet 3     lidocaine-prilocaine (EMLA) 2.5-2.5 % external cream Apply topically as needed for moderate pain 30 g 0     LORazepam (ATIVAN) 0.5 MG tablet Take 1 tablet (0.5 mg) by mouth every 4 hours as needed (Anxiety, Nausea/Vomiting or Sleep) 30 tablet 2     LORazepam (ATIVAN) 0.5 MG tablet Take 1 tablet (0.5 mg) by mouth nightly as needed for sleep 10 tablet 0      prochlorperazine (COMPAZINE) 10 MG tablet Take 1 tablet (10 mg) by mouth every 6 hours as needed (Nausea/Vomiting) 60 tablet 3     sertraline (ZOLOFT) 50 MG tablet Take 1 tablet (50 mg) by mouth daily 90 tablet 1     tiZANidine (ZANAFLEX) 2 MG tablet Take during chemo and the night of chemo for infusion related muscle spasms 1 tablet 0     tretinoin (RETIN-A) 0.05 % external cream Apply topically At Bedtime 45 g 3     Physical Examination:  /75 (BP Location: Right arm, Patient Position: Sitting, Cuff Size: Adult Regular)   Pulse 97   Temp 97.9  F (36.6  C) (Oral)   Resp 16   Wt 78.2 kg (172 lb 6.4 oz)   SpO2 98%   BMI 27.83 kg/m    Wt Readings from Last 10 Encounters:   09/30/19 78.2 kg (172 lb 6.4 oz)   09/27/19 72.6 kg (160 lb)   09/17/19 77.7 kg (171 lb 6.4 oz)   09/11/19 77.4 kg (170 lb 11.2 oz)   09/03/19 77.2 kg (170 lb 1.6 oz)   08/19/19 78 kg (171 lb 14.4 oz)   07/29/19 79 kg (174 lb 1.6 oz)   07/15/19 78.4 kg (172 lb 14.4 oz)   07/02/19 77.1 kg (170 lb)   06/10/19 77.3 kg (170 lb 6.4 oz)   Constitutional: Well-appearing female in no acute distress.  Eyes: EOMI, PERRL. No scleral icterus.  ENT: Oral mucosa is moist without lesions or thrush.   Lymphatic: Neck is supple without cervical or supraclavicular lymphadenopathy.   Cardiovascular: Regular rate and rhythm. No peripheral edema.  Respiratory: Clear to auscultation bilaterally. No wheezes or crackles.  Gastrointestinal: Bowel sounds present. Abdomen soft, non-tender. No palpable hepatosplenomegaly or masses.   Neurologic: Cranial nerves II through XII are grossly intact.  Skin: No rashes, petechiae, or bruising noted on exposed skin.    Laboratory Data:     9/27/2019 11:38 9/30/2019 09:41   WBC 5.1 4.6   Hemoglobin 13.2 12.0   Hematocrit 39.4 35.9   Platelet Count 217 159   RBC Count 3.99 3.62 (L)   MCV 99 99        9/17/2019 09:39 9/27/2019 11:38 9/30/2019 09:41   Sodium 138 139 139   Potassium 3.8 4.1 3.9   Chloride 110 (H) 106 108   Carbon  Dioxide 23 28 26   Urea Nitrogen 5 (L) 5 (L) 6 (L)   Creatinine 0.54 0.57 0.54   GFR Estimate >90 >90 >90   GFR Estimate If Black >90 >90 >90   Calcium 8.6 8.6 8.4 (L)   Anion Gap 5 5 4   Albumin 3.9 3.9 3.5   Protein Total 7.3 7.4 6.9   Bilirubin Total 0.3 0.3 0.3   Alkaline Phosphatase 94 103 97   ALT 30 69 (H) 56 (H)   AST 26 54 (H) 35   Glucose 104 (H) 99 122 (H)     Assessment and Plan:  1. Onc  Stage IIIC poorly differentiated [id number] adenocarcinoma of the hepatic flexure -s/p laparoscopic right hemicolectomy on 3/22/19. All margins negative. 4/38 LNs positive. MSI-Intact.  Baseline CEA 9.2 on 3/11/2019. She started adjuvant FOLFOX on 4/29/2019.     She has had difficulties with muscle spasm, nausea, cold sensitivity, and diarrhea. Oxaliplatin dose was reduced by 20% with cycle 4 for neurotoxicity. Continued to struggle this cycle but symptoms not worse so will continue with current dose and do ongoing supportive care as below. She will continue with cycle 11 FOLFOX today. Will continue to see her every 2 weeks with chemo. Overall plan for 6 months followed by CT CAP. Will need colonoscopy March 2020.  Sees Dr Capps next cycle    2. Neuro  Acute neurotoxicity manifested as muscle spasms and cold sensitivity with pain starting with infusion x 3 days. The Zanaflex during infusion and then at bedtime helps the cramping significantly- she'll continue with this.    Cold sensitivity worse first week, then improves, but not yet resolved. Does have neuropathic pain at night. Stable. She will continue on gabapentin at 600 mg at bedtime. Will continue to monitor- no worsening and did let up this last week that she had off.     3. GI  Nausea stable with IV Emend and Aloxi along with Dex 8mg with infusion, then PRN Ativan/Compazine and Kytril starting day 3. No changes made today.    Diarrhea continues to persist since surgery. Not currently taking any medication for this. She has developed anal fissures, for which she  started on topical diltiazem on 9/11/19.     Lip irritation, now resolved but flares with treatment. Continue Aquaphor PRN. Had a HSV outbreak, resolved.     Liver lesion on prior imaging indeterminate.  She and I and her  reviewed all of her actual images today.  The liver lesion was very small and stable and likely a cyst.  Her LFTs today were improved, we discussed these were likely elevated 2/2 to the oxaliplatin.   concerned that he felt we were suppose to get liver imaging 3 months from March.  Discussed getting a liver US today to ensure no change in the indeterminate liver lesion, given LFT change recently.     4. Heme/Gyn  Iron deficiency anemia 2/2 heaving menses. Endometrial biopsy 4/23/19 was benign. S/p IV Infed 5/22/19. Hgb has improved (despite rectal bleeding and menses).  Iron studies back to normal on 8/19/19. Last LMP was about 2 months ago.  Will recheck iron labs next visit.     Heavy menses. Previously, recommended f/u with gynecology to discuss getting a Mirena IUD. She would prefer to hold off on getting IUD placed until after chemo. As above, no menses in the last 2 months.     Had left breast tenderness that resolved with menses last month.     5. Genetics  Will be seeing genetics in October for young presentation of colon cancer and history of skin melanoma. Also sees derm for routine melanoma follow-up.    6. Derm  Continue Eucerin cream to her palms for hand foot syndrome.     7. Psych  Patient has resumed sertraline 50 mg daily in early August 2019 with improvement in her mood.    8. HPV history   concerned about this, Remedios didn't remember this from April, reviewed natural history of HPV, etc.  She'll see gyn in April 2020.       Angela Schilling PA-C  [facility]  [address] [place] [address] [phone]

## 2019-09-30 NOTE — NURSING NOTE
"Chief Complaint   Patient presents with     Port Draw     labs drawn via port by rn. vs taken      Port accessed by RN with 20 G 3/4\" power needle, labs drawn from port in lab. Flushed with saline and heparin. VS taken. Pt checked into next appointment.   Lizbet Roberts, RN     "

## 2019-09-30 NOTE — NURSING NOTE
"Oncology Rooming Note    September 30, 2019 9:53 AM   Remedios Watts is a 38 year old female who presents for:    Chief Complaint   Patient presents with     Port Draw     labs drawn via port by rn. vs taken      Oncology Clinic Visit     Return visit related to Colon Cancer     Initial Vitals: /75 (BP Location: Right arm, Patient Position: Sitting, Cuff Size: Adult Regular)   Pulse 97   Temp 97.9  F (36.6  C) (Oral)   Resp 16   Ht 1.676 m (5' 5.98\")   Wt 78.2 kg (172 lb 6.4 oz)   SpO2 98%   BMI 27.84 kg/m   Estimated body mass index is 27.84 kg/m  as calculated from the following:    Height as of this encounter: 1.676 m (5' 5.98\").    Weight as of this encounter: 78.2 kg (172 lb 6.4 oz). Body surface area is 1.91 meters squared.  No Pain (0) Comment: Data Unavailable   No LMP recorded. (Menstrual status: Irregular Periods).  Allergies reviewed: Yes  Medications reviewed: Yes    Medications: MEDICATION REFILLS NEEDED TODAY. Provider was notified.  Pharmacy name entered into [facility]:    [facility] - - [place], [place] - [id number] [facility] - [place], [place] - [address]    Clinical concerns: Refill needed today. Provider was notified.      Anisa Jacobs LPN            "

## 2019-10-01 NOTE — PROGRESS NOTES
This is a recent snapshot of the patient's [facility] medical record.  For current drug dose and complete information and questions, call [phone] or In Basket pool, [facility] ([id number])  CSN Number:  [id number]

## 2019-10-02 ENCOUNTER — ONCOLOGY VISIT (OUTPATIENT)
Dept: ONCOLOGY | Facility: CLINIC | Age: 39
End: 2019-10-02
Attending: GENETIC COUNSELOR, MS
Payer: COMMERCIAL

## 2019-10-02 ENCOUNTER — HOSPITAL ENCOUNTER (OUTPATIENT)
Dept: LAB | Facility: CLINIC | Age: 39
Discharge: HOME | End: 2019-10-02
Attending: GENETIC COUNSELOR, MS | Admitting: COLON & RECTAL SURGERY
Payer: COMMERCIAL

## 2019-10-02 DIAGNOSIS — Z80.8 FAMILY HISTORY OF MELANOMA: ICD-10-CM

## 2019-10-02 DIAGNOSIS — C43.9 MELANOMA OF SKIN (H): ICD-10-CM

## 2019-10-02 DIAGNOSIS — C18.3 MALIGNANT NEOPLASM OF HEPATIC FLEXURE (H): Primary | ICD-10-CM

## 2019-10-02 DIAGNOSIS — Z80.42 FAMILY HISTORY OF PROSTATE CANCER: ICD-10-CM

## 2019-10-02 DIAGNOSIS — C18.3 MALIGNANT NEOPLASM OF HEPATIC FLEXURE (H): ICD-10-CM

## 2019-10-02 DIAGNOSIS — Z80.3 FAMILY HISTORY OF MALIGNANT NEOPLASM OF BREAST: ICD-10-CM

## 2019-10-02 PROCEDURE — 36415 COLL VENOUS BLD VENIPUNCTURE: CPT | Performed by: COLON & RECTAL SURGERY

## 2019-10-02 PROCEDURE — 96040 ZZH GENETIC COUNSELING, EACH 30 MINUTES: CPT | Performed by: GENETIC COUNSELOR, MS

## 2019-10-02 NOTE — PATIENT INSTRUCTIONS
Assessing Cancer Risk  Only about 5-10% of cancers are thought to be due to an inherited cancer susceptibility gene.    These families often have:  ? Several people with the same or related types of cancer  ? Cancers diagnosed at a young age (before age 50)  ? Individuals with more than one primary cancer  ? Multiple generations of the family affected with cancer    Comprehensive Colon Cancer Panel  We each inherit two copies of every gene in our bodies: one from our mother, and one from our father.  Each gene has a specific job to do.  When a gene has a mistake or  mutation  in it, it does not work like it should.      This handout will review common hereditary colon cancer syndromes, and other genes related to an increased risk for colon cancer.  The genes that will be discussed in this handout are: APC, BMPR1A, CDH1, CHEK2, EPCAM, GREM1, MLH1, MSH2, MSH6, MUTYH, PMS2, POLD1, POLE, PTEN, SMAD4, STK11, and TP53.  These genes are clinically actionable, meaning there are published guidelines for cancer screening and management for individuals who are found to carry mutations in these genes. Inheriting a mutation does not mean a person will develop cancer, but it does significantly increase his or her risk above the general population risk.      Familial Adenomatous Polyposis (FAP)  FAP is a hereditary cancer syndrome caused by mutations in the APC gene. The condition is known to cause hundreds to thousands of adenomatous polyps in the colon, creating a  carpet  of polyps. Some individuals have what is called attenuated FAP (AFAP), a milder form of FAP with fewer polyps and typically later onset. Individuals with an APC gene mutation are at an increased risk for colon, thyroid, and duodenal cancers, as well as several other types of cancer1.  Other features of this condition may include: osteomas, dental anomalies, benign skin lesions, CHRPE ( freckle  on the inside of the eye), and desmoid tumors.      Lifetime  Cancer Risks     Cancer Type General Population FAP   Colon  5% near 100%   Thyroid (papillary) 1% 1-12%   Duodenal <1% 5%   Liver  <1% 1-2% before age 5   Pancreas <1% 1%   Stomach <1% 1%       Juvenile Polyposis Syndrome (JPS)  JPS is characterized by hamartomatous polyps, called juvenile polyps, in the gastrointestinal tract.  Juvenile  refers to the type of polyps seen in this hereditary cancer condition, not the age of onset. Currently, mutations in two genes are known to cause JPS: BMPR1A and SMAD4. Of individuals clinically diagnosed with JPS, only 40% have an identifiable mutation in one of these genes, suggesting there are other genes that cause JPS that have not been discovered yet. Individuals with JPS are at an increased risk for colon cancer and stomach cancer 2,3,4. Pancreatic and small bowel cancers have also been reported in JPS, but the actual risks are unknown.         Lifetime Cancer Risks    Cancer Type General Population JPS   Colon 5% 40-50%   Gastric/Duodenal <1% 10-21%     Some individuals with SMAD4 mutations have a condition called JPS/HHT (Juvenile Polyposis/Hereditary Hemorrhagic Telangiectasia) where in addition to JPS, individuals may have nose bleeds and clotting issues.     Hereditary Diffuse Gastric Cancer (HDGC)  Currently, mutations in one gene are known to cause Hereditary Diffuse Gastric Cancer: CDH1.  Individuals with HDGC are at increased risk for diffuse gastric cancer and lobular breast cancer. Of people diagnosed with HDGC, 30-50% have a mutation in the CDH1 gene.  This suggests there are likely mutations in other genes that may cause HDGC that have not been identified yet. Individuals with HDGC may also be at increased risk for colon cancer.      Lifetime Cancer Risks    Cancer Type General Population HDGC   Diffuse Gastric <1% 67-83%   Breast 12% 39-52%     Saenz syndrome  Mutations in five different genes are known to cause Saenz syndrome: MLH1, MSH2, MSH6, PMS2, and  EPCAM. Individuals with Saenz syndrome have an increased risk for colon, uterine, ovarian, small bowel, stomach, urinary tract, and brain cancer, as well as several other types of cancer. The exact lifetime cancer risks are dependent upon the gene in which the mutation was identified.      Lifetime Cancer Risks    Cancer Type General Population Saenz syndrome   Colon 5% 12-52%   Uterine 2-3% Up to 57%   Stomach <1% Up to 16%   Ovarian 2% Up to 38%   Urinary tract <1% 1-7%   Hepatobiliary tract <1% 1-4%   Small bowel <1% Up to 11%   Brain/CNS <1% Not well established   Pancreas <1% Up to 6%       MUTYH-Associated Polyposis (MAP)  MAP is a hereditary cancer syndrome caused by mutations in the MUTYH gene. Unlike the other hereditary cancer genes discussed in this handout, two mutations in the MUTYH gene cause MAP and increase cancer risk. Those affected with MAP typically have between  adenomatous polyps. This syndrome also increases the risk for colon and duodenal cancer. Current research suggests that other cancers may be associated with MUTYH mutations, as well. The table below includes the risk that someone with two MUTYH gene mutations would develop cancer in their lifetime; of note, there is also an increased colon cancer risk for individuals who carry only one MUTYH gene mutation5,6,7.       Lifetime Cancer Risks    Cancer Type General Population MAP   Colon 5% %   Duodenal  <1% 5%       Cowden syndrome  Cowden syndrome is a hereditary condition that increases the risk for breast, thyroid, endometrial, colon, and kidney cancer.  A single mutation in the PTEN gene causes Cowden syndrome and increases cancer risk.  The table below shows the chance that someone with a PTEN mutation would develop cancer in their lifetime8,9.  Other benign features seen in some individuals with Cowden syndrome include benign skin lesions (facial papules, keratoses, lipomas), learning disabilities, autism, thyroid nodules,  hamartomatous colon polyps, and larger head size.      Lifetime Cancer Risks   Cancer Type General Population Cowden Syndrome   Breast 12% 25-50%*   Thyroid 1% 35%   Renal 1-2% 35%   Endometrial 2-3% 28%   Colon 5% 9%   Melanoma 2-3% >5%     *One recent study found breast cancer risk to be increased to 85%    Peutz-Jeghers syndrome (PJS)  PJS is a hereditary cancer syndrome caused by mutations in the STK11 gene. This condition can be distinguished from other hereditary syndromes by the presence of hamartomatous polyps in the gastrointestinal tract and freckles present in unusual places such as the hands, feet, neck, and lips. Individuals with Peutz-Jeghers syndrome have an increased risk for colon, breast, pancreatic, and other cancers3.  Men are at risk for testicular tumors which can affect hormones in the body. Women are at risk for sex cord tumors of the ovaries and a rare aggressive type of cervical cancer.     Lifetime Cancer Risks    Cancer Type General Population PJS   Breast 12% 45-50%   Colon 5% 39%   Stomach <1% 29%   Pancreas 1.5% 11-36%   Small Intestine <1% 13%     Ovarian  2%  18%   Lung 6-7% 15-17%     Additional Genes Associated with Increased Colon Cancer Risk  CHEK2  CHEK2 is a moderate-risk breast cancer gene.  Women who have a mutation in CHEK2 have around a 2-fold increased risk for breast cancer compared to the general population, and this risk may be higher depending upon family history.12,13,14.  Mutations in CHEK2 have also been shown to increase the risk of a number of other cancers, including colon and prostate, however these cancer risks are currently not well understood.     GREM1  GREM1 is a moderate-risk colon polyposis gene. Duplications of this gene are more commonly found in individuals with Ashkenazi V[employer]oo [id number]. Mutations in GREM1 are associated with colon polyps and therefore an increased risk of colon cancer; however the estimated cancer risk is not well [id number].      POLD1 and POLE  POLD1 and POLE are moderate-risk colon cancer genes. Carriers of a mutation in one of these genes increases the lifetime risk of colorectal [id number],18,19,20. Mutations in these genes may also be associated with increased risk for other cancers including: endometrial cancer, duodenal adenomas and carcinomas, and brain tumors.    TP53  Li Fraumeni syndrome (LFS) is a hereditary cancer predisposition syndrome. LFS is caused by a mutation in the TP53 gene. A single mutation in one of the copies of TP53 increases the risk for multiple cancers. Individuals with LFS are at an increased risk for developing cancer at a young age. The general lifetime risk for development of cancer is 50% by age 30 and 90% by age 60.      Core Cancers: Sarcomas, Breast, Brain, Lung, Leukemias/Lymphomas, Adrenocortical carcinomas  Other Cancers: Gastrointestinal, Thyroid, Skin, Genitourinary    Genetic Testing  Genetic testing involves a simple blood test and will look at the genetic information in genes associated with an increased risk of colon cancer. The tests look for any harmful mutations that are associated with increased cancer risk.  If possible, it is recommended that the person(s) who has had cancer be tested before other family members.  That person will give us the most useful information about whether or not a specific gene mutation is associated with the cancer in the family.     Results  There are three possible results from genetic testing:  ? Positive--a harmful mutation was identified  ? Negative--no mutation was identified  ? Variant of unknown significance--a variation in one of the genes was identified, but it is unclear how this impacts cancer risk in the family  Advantages and Disadvantages  There are advantages and disadvantages to genetic testing of these genes.    Advantages  ? May clarify your cancer risk  ? Can help you make medical decisions  ? May explain the cancers in your family  ? May  give useful information to your family members (if you share your results)    Disadvantages  ? Possible negative emotional impact of learning about inherited cancer risk  ? Uncertainty in interpreting a negative test result in some situations  ? Possible genetic discrimination concerns (see below)    Inheritance   Most mutations in the genes outlined above are inherited in an autosomal dominant pattern.  This means that if a parent has a mutation, each of his or her children will have a 50% chance of inheriting that same mutation.  Therefore, each child--male or female--would have a 50% chance of being at increased risk for developing cancer.                                            Image obtained from [employer] Reference, 2013     In the case of MUTYH-Associated Polyposis (MAP) this hereditary cancer syndrome is inherited in an autosomal recessive pattern. This means that each parent of an individual with MAP is a carrier of MAP, meaning that they have only one mutation in MUTYH. They still have one functioning copy of their gene.  Carriers are at a slightly higher risk for colon cancer than the general population. If each parent is a carrier for MAP, they have a 25% of having a child who is affected with MAP, meaning the child inherited both gene mutations - one from each parent.       Image obtained from [employer] Reference, 2016    Genetic Information Nondiscrimination Act (AIDE)  AIDE is a federal law that protects individuals from health insurance or employment discrimination based on a genetic test result alone.  Although rare, there are currently no legal protections in terms of life insurance, long term care, or disability insurances.  Visit the National Human Genome Research [place] at Genome.gov/71350804 to learn more.    Reducing Cancer Risk  Each of the genes listed within this handout have nationally recognized cancer screening guidelines that would be recommended for individuals who test  positive.  In addition to increased cancer screening, surgeries may be offered or recommended to reduce cancer risk in certain cases.  Recommendations are based upon an individual s genetic test result as well as their personal and family history of cancer.    Questions to Think About Regarding Genetic Testing  ? What effect will the test result have on me and my relationship with my family members if I have an inherited gene mutation?  If I don t have a gene mutation?  ? Should I share my test results, and how will my family react to this news, which may also affect them?  ? Are my children ready to learn new information that may one day affect their own health?    Resources    PTEN World [email or web address]   No Stomach for Cancer, Inc. nostomachforcancer.[email or web address]   Stomach Cancer Relief Network scrnet.org   Collaborative Group of the Americas on Inherited Colorectal Cancer (CGA) cgaicc.com   Cancer Care cancercare.org   American Cancer Society (ACS) cancer.org   National Cancer [place] (NCI) cancer.org   Saenz Syndrome International [email or web address]       Please call us if you have any questions or concerns.     Cancer Risk Management Program [phone]-CANCER ([phone])  ? Melinda Daniel, MS, [facility]  [phone]  ? Chicho Jennings, MS  [phone]  ? Cassia Yan, MS, [facility]  [phone]  ? Marcia Hodges, MS, [facility]  [phone]  ? Pato Mccullough, MS, [facility]  [phone]  ? Ivette Delcid, MS, [facility] [phone]  ? Irene Jennings, MS, [facility] [phone]    References    1. Tabatha HANDLEY, Linnette J, Mejia G, Raghav E, Sonia J, et al. The Prevalence of thyroid cancer and benign thyroid disease in patients with familial adenomatous polyposis may be higher than previously recognized. Clin Colorectal Cancer. 2012;11:304-308.  2. Jessica L, Jamie Bruno A, Jennifer L, Onel ONEIL, Andrés K, et al. Risk of colorectal cancer in juvenile polyposis. Gut. 2007;56:965-967.  3. Vickey FG, Sanjuanita MN, Mathew CA. Colorectal cancer risk in  hamartomatous polyposis syndromes. World Journal of Gastrointestinal Surgery. 2015;27:25-32  4. Ayaka MG. Guidance on gastrointestinal surveillance for hereditary non-polyposis colorectal cancer, familial adenomatous polypolis, juvenile polyposis, and Peutz-Jeghers syndrome. Gut. 2002;51:21-27.  5. Elio AK, Mikhail DIPESH, Silvia JG et al. Risk of extracolonic cancers for people with biallelic and monoallelic mutations in MUTYH. Int J of Cancer. 2016;139:9909-4827.  6. Fransico S, Hoa S, Herbert H, Harriet K, Hwang M, et al. MUTYH-associated polyposis: 70 of 71 patients with biallelic mutations present with an attenuated or atypical phenotype. Int J of Cancer. 2006;119:807-814.  7. Rhina G, Gracie F, Jason I, Missy M, Rhina H, et al. MUTYH mutation carriers have increased breast cancer risk. Cancer. 2012;5402-6279.  8. Jesse MH, Lin J, Charlette J, Abdulaziz LA, Erich MS, Eng C. Lifetime cancer risks in individuals with germline PTEN mutations. Clin Cancer Res. 2012;18:400-7.  9. Enmanuel PIERCE. Cowden Syndrome: A Critical Review of the Clinical Literature. J Jaleesa . 2009:18:13-27.  10. National Comprehensive Cancer Network. Clinical practice guidelines in oncology, colorectal cancer screening. Available online (registration required). 2013.  11. National Cancer [place]. SEER Cancer Stat Fact Sheets.  December 2013.  12. CHEK2 Breast Cancer Case-Control Consortium. CHEK2*1100delC and susceptibility to breast cancer: A collaborative analysis involving 10,860 breast cancer cases and 9,065 controls from 10 studies. Am J Hum Jaleesa, 74 (2004), pp. 1706-5706  13. Hema T, Noemy S, Indigo K, et al. Spectrum of Mutations in BRCA1, BRCA2, CHEK2, and TP53 in Families at High Risk of Breast Cancer. JIE. 2006;295(12):2454-2364.  14. Dragan ONEIL, Arlen D, Francisco RUFFIN, et al. Risk of breast cancer in women with a CHEK2 mutation with and without a family history of breast cancer. J Clin Oncol.  2011;29:0595-8239.  15. Philippe CONNELLY, Naomie HANDLEY, Jessee J, Nirav N, Reese M et al. Defining the polyposis/colorectal cancer phenotype associated with the GREM1 duplication: counseling and management guidelines. Jaleesa .Res. 2016;98:1-5.  16. Jade HANDLEY, Herb S, Baljeet A, Melissa Castillo, et al. Hereditary mixed polyposis syndrome is caused by a 40kb upstream duplication that leads to increased and ectopic expression of the BMP antagonist GREM1. Tamica Jaleesa. 2015;44:699-703.  17. THAD Garrett. et al. Germline mutations affecting the proofreading domains of POLE and POLD1 predispose to colorectal adenomas and carcinomas. Tamica. Jaleesa. 45, 136-44 (2013).  18. STEFANY Hall. et al. POLE and POLD1 mutations in 529 priscila with familial colorectal cancer and/or polyposis: review of reported cases and recommendations for genetic testing and surveillance. Jaleesa. Med. (2015). doi:10.1038/gim.2015.75  19. SALVATORE Pate et al. New insights into POLE and POLD1 germline mutations in familial colorectal cancer and polyposis. Hum. Mol. Jaleesa. 23, 4176-12 (2014).  20. MYCHAL Ribera. et al. Frequency and phenotypic spectrum of germline mutations in POLE and seven other polymerase genes in 266 patients with colorectal adenomas and carcinomas. Int. J. Cancer 137, 320-31 (2015).           Assessing Cancer Risk  Only about 5-10% of cancers are thought to be due to an inherited cancer susceptibility gene.    These families often have:    Several people with the same or related types of cancer    Cancers diagnosed at a young age (before age 50)    Individuals with more than one primary cancer    Multiple generations of the family affected with cancer    Melanoma Genes  We each inherit two copies of every gene in our bodies: one from our mother, and one from our father.  Each gene has a specific job to do.  When a gene has a mistake or  mutation  in it, it does not work like it should.  When someone inherits a mutation in a melanoma gene, this increases their  risk to develop melanoma above that of the general population risk (about 1.6% lifetime risk).  Inheriting a gene mutation does not guarantee that a person will develop melanoma or other associated cancers, but it does significantly increase his or her risk above that of the general population     Below is a list of genes commonly associated with melanoma. A single mutation in any of these genes increases the risk for melanoma, among other cancers.     BAP1  BAP1-tumor predisposition syndrome (BAP1-TPDS) is caused by mutations in one copy of the BAP1 gene. It is associated with an increased risk for different skin findings such as atypical Spitz tumors, cutaneous (skin) melanoma, and basal cell carcinoma, as well as other cancers, such as uveal (eye) melanoma, malignant mesothelioma, and kidney cancer. The exact lifetime risks for these cancers are unknown at this time. Other suspected cancer risks include breast cancer, cholangiocarcinoma, non-small cell lung cancer, meningioma and neuroendocrine tumors.       BRCA2  Mutations in one copy of the BRCA2 gene result in Hereditary Breast and Ovarian Cancer (HBOC) syndrome. Individuals with HBOC have elevated breast, ovarian, male breast, prostate, pancreatic and melanoma risks. Risks for both cutaneous and ocular melanoma may be elevated in some families with a BRCA2 mutation, but not all.     General Population BRCA2 Mutation   Breast 12% 40-80%   Ovarian 1-2% 10-20%   Male Breast <1% 5-10%   Prostate 16% 20%   Pancreatic 2-3% Increased   Melanoma 1.6% Increased     CDK4  Individuals with a mutation in one copy of their CDK4 gene have familial cutaneous malignant melanoma. They are at an increased risk for developing atypical moles and melanoma, though the exact lifetime risk has not yet been defined.     CDKN2A  Mutations in one copy of the CDKN2A gene is associated with Familial Atypical Multiple Mole Melanoma (FAMMM) syndrome. It is characterized by multiple  melanocytic nevi and a family history of melanoma. The likelihood of developing melanoma varies by geographic location, and has been estimated to be 50-75% in the [place]. CDKN2A are also associated with an increased risk for pancreatic cancer, among others.  The likelihood of developing pancreatic cancer has been estimated to be approximately 17%; in some families, though, this risk may be higher.    MITF  Individuals with the [id number] mutation in the MITF gene have an elevated risk for melanoma. This risk has been estimated to be nearly 3 times higher than the general population risk. Other mutations in the MITF are not currently known to be associated with this increased risk.    PTEN  PTEN hamartoma tumor syndrome (PHTS) is caused by mutations in one copy of the PTEN gene. It is characterized by increased risk for breast, thyroid (especially follicular type), endometrial and other cancers as well as macrocephaly and intellectual disability. Melanoma and other cutaneous features such as trichilemmoma, facial papules and palmoplantar keratoses can be seen as well.     General Population PTEN Mutation   Breast 12% 60-80%   Thyroid 1-2% 20-40%   Endometrial 2-3% 20-30%   Colon 4.5% 10-20%   Renal 1-2% 10-30%   Melanoma 1.6% 2-6%      RB1  Retinoblastoma is caused by mutations in one copy of the RB1 gene. It is characterized by a significantly increased risk for malignant tumors in the retina, typically found in young children. Melanomas, osteosarcomas, and soft tissues sarcoma can also be seen in affected individuals, though the exact lifetime risks are unknown.     TP53  Mutations in one copy of the TP53 gene cause Li-Fraumeni syndrome (LFS). LFS is associated with the development of many different types of cancer (i.e. sarcomas, adrenocortical tumors, breast cancer) beginning in childhood or young adulthood. Increased risk for melanoma has also been reported in families with LFS.     Genetic Testing  Genetic  testing involves a simple blood test and will look at the genetic information in genes for any harmful mutations that are associated with increased melanoma and other cancer risks. If possible, it is recommended that the person(s) who has had cancer be tested before other family members.  That person will give us the most useful information about whether or not a specific gene is associated with the cancer in the family.     Results  There are three possible results genetic testing:    Positive--a harmful mutation was identified    Negative--no mutation was identified    Variant of unknown significance--a variation in one of the genes was identified, but it is unclear how this impacts cancer risk in the family    Advantages and Disadvantages  There are advantages and disadvantages to genetic testing of these genes.    Advantages    May clarify your cancer risk    Can help you make medical decisions    May explain the cancers in your family    May give useful information to your family members (if you share your results)    Disadvantages    Possible negative emotional impact of learning about inherited cancer risk    Uncertainty in interpreting a negative test result in some situations    Possible genetic discrimination concerns (see below)    Inheritance   Mutations in genes associated with melanoma are inherited in an autosomal dominant pattern.  This means that if a parent has a mutation, each of his or her children will have a 50% chance of inheriting that same mutation.  Therefore, each child--male or female--would have a 50% chance of being at increased risk for developing cancer.                                            Image obtained from [employer] Home Reference, 2013       Genetic Information Nondiscrimination Act (AIDE)  AIDE is a federal law that protects individuals from health insurance or employment discrimination based on a genetic test result alone.  Although rare, there are currently no legal  protections in terms of life insurance, long term care, or disability insurances.  Visit the National Human Genome Research [place] at Genome.gov/58267115 to learn more.    Reducing Cancer Risk  If a harmful mutation is found in a cancer risk gene, there may be certain screens or preventative surgeries that can be offered. For example, screening recommendations for individuals with a melanoma-associated gene mutation may include dermatology exams, ophthalmology exams, or other screening, depending on the gene mutation identified. This information will be discussed after genetic testing is completed.    If no mutations are found on genetic testing, screening is then recommended based on personal and/or family history of cancer.     Both men and women with a gene mutation should talk to their doctor or genetic counselor about mutation specific screening as different mutations present with different risk and screening recommendations. In addition, the age at which to start screening may be modified based on family history of young cancer.    Questions to Think About Regarding Genetic Testing    What effect will the test result have on me and my relationship with my family members if I have an inherited gene mutation?  If I don t have a gene mutation?    Should I share my test results, and how will my family react to this news, which may also affect them?    Are my children ready to learn new information that may one day affect their own health?    Resources  Melanoma Research Foundation https://www.melanoma.org/   AIM at Melanoma Foundation https://www.aimatmelanoma.org/   American Cancer Society (ACS) cancer.org   National Cancer Young America (NCI) cancer.gov     Please call us if you have any questions or concerns.   Cancer Risk Management Program [phone]-CANCER ([phone])  ? Melinda Sanchez MS, [facility]  [phone]  ? Chicho Jennings, MS  [phone]  ? Cassia Yan MS, [facility]  [phone]  ? Marcia Hodges MS,  [facility]  [phone]  ? Pato Mccullough, MS, [facility]  [phone]  ? Ivette Delcid, MS, [facility] [phone]  ? Irene Jennings, MS, [facility] [phone]    References  21. Tayo HANDLEY, Marcell P, Roma K, Pilar RUFFIN, Melissa H. Hereditary Melanoma: Update on Syndromes and Management - Genetics of familial atypical multiple mole melanoma syndrome. Journal of the American Academy of Dermatology. 2016;74(3):395-407. doi:10.1016/j.jaad.2015.08.038.  22. Garrick K, Enmanuel R, Lele CM, Wen-Pandey . Comprehensive review of BAP1 tumor predisposition syndrome with report of two new cases. Clinical genetics. 2016;89(3):285-294. doi:10.1111/cge.48283.  23. Morris, Mamie, Rocco, Jean Carlos, Osmar, [place], . . . Sreekanth. (2013). Cancer Risks for BRCA1 and BRCA2 Mutation Carriers: Results From Prospective Analysis of EMBRACE. Journal Of The National Cancer [place], 105(11), 812-822.  24. Antionette Lerma, Gerardo Huber, Joaquin Hargrove, Cherie Berry, Julissa Tristan, Dwayne Hernandez, . . . Garland Andrade. (1996). Germline mutations in the a95MHK1h binding domain of CDK4 in familial melanoma. Nature Genetics, 12(1), 97-99.  25. ROSALBA Diego, MARCELINA Silva, THAD Avitia., LEDY Mak., CECILIA Alamo., Spatz, A., . . . PATITO Briseno (2007). BRCA1, BRCA2, TP53, and CDKN2A germline mutations in patients with breast cancer and cutaneous melanoma. Familial Cancer, 6(4), 453-461.  26. MARCELINA Noel, & GAYE Cardoza (2012). Genetic alterations of PTEN in human melanoma. Cellular and Molecular Life Sciences, 69(9), 3064-8221.  27. Ania Thapa Sandy Davi, Sergio Magaña, Starr Mallory, Tsering Conway, . . . Jess Oneil. (2011). A SUMOylation-defective MITF germline mutation predisposes to melanoma and renal carcinoma. Nature, 480(9877), 948.  28. ISHAN Sims, MARCELINA Hale, PATITO Ceja, & JONATAN Chew (2008). Identification of LDCBZE80 , FTKBL6X , FGFR3, and RB1 mutations in melanoma by inhibition of  nonsense-mediated mRNA decay. Genes, Chromosomes and Cancer, 47(12), 1625-6923.  29. TIANA Ceballos, & ROSALBA Montenegro (2015). Cowden syndrome: Recognizing and managing a not?so?rare hereditary cancer syndrome. Journal of Surgical Oncology, 111(1), 125-130.  30. Prevalence of the E318K MITF germline mutation in [place] melanoma patients: Associations with histological subtypes and family cancer history. (2013). Pigment Cell & Melanoma Research, 26(2), 259-262.  31. Tayo E, Marcell P, Roma K, Pilar A, Melissa H. Hereditary Melanoma: Update on Syndromes and Management - Genetics of familial atypical multiple mole melanoma syndrome. Journal of the American Academy of Dermatology. 2016;74(3):395-407. doi:10.1016/j.jaad.2015.08.038.  32. Karin RR, Maria M ED, Subha KG, et al. Prevalence of CDKN2A mutations in pancreatic cancer patients: implications for genetic counseling.  Journal of Human Genetics. 2011;19(4):472-478. doi:10.1038/ejhg.2010.198.  33. MICHAEL Watson. Mulu Baez Florence, Goldstein, Alisa M., Rena Matthews Bishop, Julia Newton, Paillerets, Brigitte Bressac-de, . . . Julissa Tristan. (2002). Geographical Variation in the Penetrance of CDKN2A Mutations for Melanoma. Journal of the National Cancer [place], 94(12), 894-903.  34. Rigo Berry Harland, Gillanders, Aubree Chew, . . . Preston. (2006). High-risk Melanoma Susceptibility Genes and Pancreatic Cancer, Neural System Tumors, and Uveal Melanoma across [employer]. Cancer Research., 66(05), 2929-7014.

## 2019-10-02 NOTE — PROGRESS NOTES
10/2/2019    Referring Provider: MINGO Armstrong and Agata Capps MD    Presenting Information:   I met with Remedios Watts today for genetic counseling at the Cancer Risk Management Program at the [facility] to discuss her personal history of colon cancer and melanoma, as well as her family history of melanoma, breast, and prostate cancer. She is here today to review this history, cancer screening recommendations, and available genetic testing options.    Personal History:  Remedios is a 38 year old female. She was first diagnosed with a melanoma on her left arm at age 36; treatment included excision. Since that time she has had three dysplastic melanocytic nevi with mild atypia and one dysplastic nevus with moderate to severe dysplasia removed. 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 Immunohistochemistry (IHC) of the mismatch repair proteins was normal.      She had her first menstrual period at age [age], her first child at age 24, and is premenopausal. Remedios has her ovaries, fallopian tubes and uterus in place, and she has had no ovarian cancer screening to date. She reports that she has never used hormone replacement therapy.      She had a mammogram in April 2019 after identifying a right breast lump that was normal. Her most recent colonoscopy in March 2019 identified this cancer. Apart from regular dermatologic exams, she does not regularly do any other cancer screening at this time. Remedios reported no tobacco use and occasional alcohol use.    Family History: (Please see scanned pedigree for detailed family history information)    Remedios's maternal grandmother was diagnosed with breast cancer at age 70, melanoma on her face in her 80's, and passed away at age 91. She may have also had a heart tumor, but details  are unknown.    Remedios's father is 70 and was diagnosed with melanoma on his back at age 60 and prostate cancer at age 68. He has also had approximately 15 colon polyps of unknown type removed in his lifetime.    One paternal uncle may have had a kidney cancer before passing away in his 60's.    Her maternal ethnicity is Scandinavian. Her paternal ethnicity is /. There is no known Ashkenazi Latter day ancestry on either side of her family. There is no reported consanguinity.    Discussion:    Remedios's personal and family history of multiple cancers is suggestive of a hereditary cancer syndrome.    We reviewed the features of sporadic, familial, and hereditary cancers. In looking at Remedios's family history, it is possible that a cancer susceptibility gene is present as she has had two primary cancers under age 39 and two close relatives diagnosed with potentially related cancers (breast, melanoma, prostate).    We discussed the natural history and genetics of several hereditary cancer syndromes, including Saenz syndrome. A detailed handout regarding these syndromes and the information we discussed was provided to Remedios at the end of our appointment today and can be found in the after visit summary. Topics included: inheritance pattern, cancer risks, cancer screening recommendations, and also risks, benefits and limitations of testing.  We reviewed that the most common cause of hereditary colon cancer is Saenz syndrome, which is caused by mutations in the mismatch repair genes MLH1, MSH2, MSH6, PMS2 and the gene EPCAM. Each mismatch repair gene makes a protein. IHC testing of a tumor allows us to screen a patient for Saenz syndrome and involves looking at the tumor to determine which of the proteins are present and which (if any) are absent. If one or more of the mismatch repair proteins is absent in the tumor, it can indicate an underlying inherited mutation in the respective gene.  IHC testing of  "Remedios s colon tumor was normal. All four mismatch repair proteins were present. Given this normal testing, it is very unlikely that Remedios marie colon cancer was due to a mutation in one of the mismatch repair genes. That being said, IHC testing is considered a \"screen\" and not a \"diagnostic test\". This means a small percentage of individuals with Saenz syndrome will have normal IHC testing.    As such, based on Remedios's personal and family history, she still meets current National Comprehensive Cancer Network (NCCN) criteria for genetic testing of the Saenz syndrome genes.    We discussed that there are additional genes that could cause increased risk for the cancers in her family. For example, mutations in the BRCA1 and BRCA2 genes are associated with increased risk for both breast cancer and melanoma. As many of these genes present with overlapping features in a family and accurate cancer risk cannot always be established based upon the pedigree analysis alone, it would be reasonable for Remedios to consider panel genetic testing to analyze multiple genes at once.    Remedios's expressed interest in gathering as much information as possible from genetic testing. As such, we discussed that genetic testing is available for 40 genes associated with hereditary cancer: CustomNext-Cancer (APC, JORGE, BAP1, BARD1, BRCA1, BRCA2, BRIP1, BMPR1A, CDH1, CDK4, CDKN2A, CHEK2, DICER1, EPCAM, GREM1, HOXB13, MITF, MLH1, MRE11A, MSH2, MSH6, MUTYH, NBN, NF1, PALB2, PMS2, POLD1, POLE, PTEN, RAD50, RAD51C, RAD51D, RB1, SMAD4, SMARCA4, STK11, and TP53). This custom panel is a combination of the CancerNext panel, MelanomaNext panel, and the AXIN2, MSH3, NTHL1 genes.    We discussed that many of the genes in the CancerNext panel are associated with specific hereditary cancer syndromes and published management guidelines: Hereditary Breast and Ovarian Cancer syndrome (BRCA1, BRCA2), Saenz syndrome (MLH1, MSH2, MSH6, PMS2, EPCAM), Familial " Adenomatous Polyposis (APC), Hereditary Diffuse Gastric Cancer (CDH1), Familial Atypical Multiple Mole Melanoma syndrome (CDK4, CDKN2A), Juvenile Polyposis syndrome (BMPR1A, SMAD4), Cowden syndrome (PTEN), Li Fraumeni syndrome (TP53), Peutz-Jeghers syndrome (STK11), MUTYH Associated Polyposis (MUTYH), Retinoblastoma (RB1), and Neurofibromatosis type 1 (NF1).     The JORGE, AXIN2, BRIP1, CHEK2, GREM1, MSH3, NBN, NTHL1, PALB2, POLD1, POLE, RAD51C, and RAD51D genes are associated with increased cancer risk and have published management guidelines for certain cancers.      The remaining genes (BAP1, BARD1, DICER1, HOXB13, MITF, MRE11A, RAD50, and SMARCA4) are associated with increased cancer risk and may allow us to make medical recommendations when mutations are identified.      Remedios was provided with a detailed brochure from [employer] explaining the testing.    Consent was obtained and genetic testing for CustomNext-Cancer was sent to [facility]. Turn around time: 3-4 weeks.    Medical Management: For Remedios, we reviewed that the information from genetic testing may determine:    additional cancer screening for which Remedios may qualify (i.e. mammogram and breast MRI, more frequent colonoscopies, more frequent dermatologic exams, etc.),    options for risk reducing surgeries Remedios could consider (i.e. bilateral mastectomy, surgery to remove her ovaries and/or uterus, etc.),      and targeted chemotherapies for Remedios's active cancer, or if she were to develop certain cancers in the future (i.e. immunotherapy for individuals with Saenz syndrome, PARP inhibitors, etc.).     These recommendations and possible targeted chemotherapies will be discussed in detail once genetic testing is completed.     Plan:  1) Today Remedios elected to proceed with genetic testing using a 40 gene CustomNext-Cancer panel offered by [facility].  2) This information should be available in 3-4 weeks.  3) Remedios  will be called to discuss the results.    Face to face time: 50 minutes    Ivette Delcid MS, [facility]  Licensed Genetic Counselor  Office: [phone]  Pager: [phone]

## 2019-10-02 NOTE — LETTER
Cancer Risk Management  Program Locations    [facility]  [facility]  Mailing Address  Cancer Risk Management Program  [facility]  [address]  [place] [address]    New patient appointments  [phone]  October 13, 2019    [address]edios Watts  [address] [address]      Dear Remedios,    It was a pleasure meeting with you at the [facility] on October 2, 2019. Here is a copy of the progress note from your recent genetic counseling visit to the Cancer Risk Management Program.  If you have any additional questions, please feel free to call.    10/2/2019    Referring Provider: MINGO Armstrong and Agata Capps MD    Presenting Information:   I met with Remedios Mac today for genetic counseling at the Cancer Risk Management Program at the [facility] to discuss her personal history of colon cancer and melanoma, as well as her family history of melanoma, breast, and prostate cancer. She is here today to review this history, cancer screening recommendations, and available genetic testing options.    Personal History:  Remedios is a 38 year old female. She was first diagnosed with a melanoma on her left arm at age 36; treatment included excision. Since that time she has had three dysplastic melanocytic nevi with mild atypia and one dysplastic nevus with moderate to severe dysplasia removed. 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 Immunohistochemistry (IHC) of the mismatch repair proteins was normal.      She had her first menstrual period at age [age], her first child at age 24, and is premenopausal.  Remedios has her ovaries, fallopian tubes and uterus in place, and she has had no ovarian cancer screening to date. She reports that she has never used hormone replacement therapy.      She had a mammogram in April 2019 after identifying a right breast lump that was normal. Her most recent colonoscopy in March 2019 identified this cancer. Apart from regular dermatologic exams, she does not regularly do any other cancer screening at this time. Remedios reported no tobacco use and occasional alcohol use.    Family History: (Please see scanned pedigree for detailed family history information)    Remedios's maternal grandmother was diagnosed with breast cancer at age 70, melanoma on her face in her 80's, and passed away at age 91. She may have also had a heart tumor, but details are unknown.    Remedios's father is 70 and was diagnosed with melanoma on his back at age 60 and prostate cancer at age 68. He has also had approximately 15 colon polyps of unknown type removed in his lifetime.    One paternal uncle may have had a kidney cancer before passing away in his 60's.    Her maternal ethnicity is Scandinavian. Her paternal ethnicity is /. There is no known Ashkenazi Anabaptist ancestry on either side of her family. There is no reported consanguinity.    Discussion:    Remedios's personal and family history of multiple cancers is suggestive of a hereditary cancer syndrome.    We reviewed the features of sporadic, familial, and hereditary cancers. In looking at Remedios's family history, it is possible that a cancer susceptibility gene is present as she has had two primary cancers under age 39 and two close relatives diagnosed with potentially related cancers (breast, melanoma, prostate).    We discussed the natural history and genetics of several hereditary cancer syndromes, including Saenz syndrome. A detailed handout regarding these syndromes and the information we discussed was provided to Remedios at the end  "of our appointment today and can be found in the after visit summary. Topics included: inheritance pattern, cancer risks, cancer screening recommendations, and also risks, benefits and limitations of testing.  We reviewed that the most common cause of hereditary colon cancer is Saenz syndrome, which is caused by mutations in the mismatch repair genes MLH1, MSH2, MSH6, PMS2 and the gene EPCAM. Each mismatch repair gene makes a protein. IHC testing of a tumor allows us to screen a patient for Saenz syndrome and involves looking at the tumor to determine which of the proteins are present and which (if any) are absent. If one or more of the mismatch repair proteins is absent in the tumor, it can indicate an underlying inherited mutation in the respective gene.  IHC testing of Remedios marie colon tumor was normal. All four mismatch repair proteins were present. Given this normal testing, it is very unlikely that Remedios marie colon cancer was due to a mutation in one of the mismatch repair genes. That being said, IHC testing is considered a \"screen\" and not a \"diagnostic test\". This means a small percentage of individuals with Saenz syndrome will have normal IHC testing.    As such, based on Remedios's personal and family history, she still meets current National Comprehensive Cancer Network (NCCN) criteria for genetic testing of the Saenz syndrome genes.    We discussed that there are additional genes that could cause increased risk for the cancers in her family. For example, mutations in the BRCA1 and BRCA2 genes are associated with increased risk for both breast cancer and melanoma. As many of these genes present with overlapping features in a family and accurate cancer risk cannot always be established based upon the pedigree analysis alone, it would be reasonable for Remedios to consider panel genetic testing to analyze multiple genes at once.    Remedios's expressed interest in gathering as much information as possible from " genetic testing. As such, we discussed that genetic testing is available for 40 genes associated with hereditary cancer: CustomNext-Cancer (APC, JORGE, BAP1, BARD1, BRCA1, BRCA2, BRIP1, BMPR1A, CDH1, CDK4, CDKN2A, CHEK2, DICER1, EPCAM, GREM1, HOXB13, MITF, MLH1, MRE11A, MSH2, MSH6, MUTYH, NBN, NF1, PALB2, PMS2, POLD1, POLE, PTEN, RAD50, RAD51C, RAD51D, RB1, SMAD4, SMARCA4, STK11, and TP53). This custom panel is a combination of the CancerNext panel, MelanomaNext panel, and the AXIN2, MSH3, NTHL1 genes.    We discussed that many of the genes in the CancerNext panel are associated with specific hereditary cancer syndromes and published management guidelines: Hereditary Breast and Ovarian Cancer syndrome (BRCA1, BRCA2), Saenz syndrome (MLH1, MSH2, MSH6, PMS2, EPCAM), Familial Adenomatous Polyposis (APC), Hereditary Diffuse Gastric Cancer (CDH1), Familial Atypical Multiple Mole Melanoma syndrome (CDK4, CDKN2A), Juvenile Polyposis syndrome (BMPR1A, SMAD4), Cowden syndrome (PTEN), Li Fraumeni syndrome (TP53), Peutz-Jeghers syndrome (STK11), MUTYH Associated Polyposis (MUTYH), Retinoblastoma (RB1), and Neurofibromatosis type 1 (NF1).     The JORGE, AXIN2, BRIP1, CHEK2, GREM1, MSH3, NBN, NTHL1, PALB2, POLD1, POLE, RAD51C, and RAD51D genes are associated with increased cancer risk and have published management guidelines for certain cancers.      The remaining genes (BAP1, BARD1, DICER1, HOXB13, MITF, MRE11A, RAD50, and SMARCA4) are associated with increased cancer risk and may allow us to make medical recommendations when mutations are identified.      Remedios was provided with a detailed brochure from [employer] explaining the testing.    Consent was obtained and genetic testing for CustomNext-Cancer was sent to [facility]. Turn around time: 3-4 weeks.    Medical Management: For Remedios, we reviewed that the information from genetic testing may determine:    additional cancer screening for which Remedios may  qualify (i.e. mammogram and breast MRI, more frequent colonoscopies, more frequent dermatologic exams, etc.),    options for risk reducing surgeries Remedios could consider (i.e. bilateral mastectomy, surgery to remove her ovaries and/or uterus, etc.),      and targeted chemotherapies for Remedios's active cancer, or if she were to develop certain cancers in the future (i.e. immunotherapy for individuals with Saenz syndrome, PARP inhibitors, etc.).     These recommendations and possible targeted chemotherapies will be discussed in detail once genetic testing is completed.     Plan:  1) Today Remedios elected to proceed with genetic testing using a 40 gene CustomNext-Cancer panel offered by [employer].  2) This information should be available in 3-4 weeks.  3) Remedios will be called to discuss the results.    Face to face time: 50 minutes    Ivette Delcid MS, [facility]  Licensed Genetic Counselor  Office: [phone]  Pager: [phone]

## 2019-10-03 LAB — MISCELLANEOUS TEST: NORMAL

## 2019-10-08 ENCOUNTER — HOSPITAL ENCOUNTER (EMERGENCY)
Facility: CLINIC | Age: 39
Discharge: HOME | End: 2019-10-08
Attending: NURSE PRACTITIONER | Admitting: NURSE PRACTITIONER
Payer: COMMERCIAL

## 2019-10-08 ENCOUNTER — APPOINTMENT (OUTPATIENT)
Dept: GENERAL RADIOLOGY | Facility: CLINIC | Age: 39
End: 2019-10-08
Attending: NURSE PRACTITIONER
Payer: COMMERCIAL

## 2019-10-08 VITALS
HEART RATE: 70 BPM | SYSTOLIC BLOOD PRESSURE: 117 MMHG | RESPIRATION RATE: 18 BRPM | WEIGHT: 160 LBS | BODY MASS INDEX: 25.71 KG/M2 | TEMPERATURE: 98.7 F | HEIGHT: 66 IN | OXYGEN SATURATION: 100 % | DIASTOLIC BLOOD PRESSURE: 77 MMHG

## 2019-10-08 DIAGNOSIS — S93.402A LEFT ANKLE SPRAIN: ICD-10-CM

## 2019-10-08 PROCEDURE — G0463 HOSPITAL OUTPT CLINIC VISIT: HCPCS | Performed by: NURSE PRACTITIONER

## 2019-10-08 PROCEDURE — 73610 X-RAY EXAM OF ANKLE: CPT | Mod: LT

## 2019-10-08 PROCEDURE — 99213 OFFICE O/P EST LOW 20 MIN: CPT | Mod: Z6 | Performed by: NURSE PRACTITIONER

## 2019-10-08 ASSESSMENT — MIFFLIN-ST. JEOR: SCORE: 1417.51

## 2019-10-08 NOTE — ED AVS SNAPSHOT
[facility] Emergency Department  [address] [address]  Phone:  [phone]  Fax:  [phone]                                    Remedios Watts   MRN: [id number]    Department:  [facility] Emergency Department   Date of Visit:  10/8/2019           After Visit Summary Signature Page    I have received my discharge instructions, and my questions have been answered. I have discussed any challenges I see with this plan with the nurse or doctor.    ..........................................................................................................................................  Patient/Patient Representative Signature      ..........................................................................................................................................  Patient Representative Print Name and Relationship to Patient    ..................................................               ................................................  Date                                   Time    ..........................................................................................................................................  Reviewed by Signature/Title    ...................................................              ..............................................  Date                                               Time          22EPIC Rev 08/18

## 2019-10-08 NOTE — DISCHARGE INSTRUCTIONS
Non-weight bearing with crutches for 3 days, then begin weight bearing as tolerated.  Wear ace wrap for the next week. You may remove it at rest and during the night.  Elevated and rest your ankle as much as possible for the next 2 days.    Ice 10-15 minutes at a time 3-4 times a day. (minimum of 60 minutes off).  Tylenol and/or Ibuprofen for pain.  Recheck with orthopedics [phone] if not improving in 1-2 weeks.

## 2019-10-09 ASSESSMENT — ENCOUNTER SYMPTOMS
ARTHRALGIAS: 1
NUMBNESS: 0

## 2019-10-09 NOTE — ED PROVIDER NOTES
History     Chief Complaint   Patient presents with     Fall     left ankle injury  swelling noted      HPI  Remedios Watts is a 38 year old female who presents to urgent care for evaluation of left ankle pain.  Patient states she rolled her ankle in the driveway.  Increased pain with any weightbearing.    Allergies:  Allergies   Allergen Reactions     Nka [No Known Allergies]        Problem List:    Patient Active Problem List    Diagnosis Date Noted     Iron deficiency anemia due to chronic blood loss 05/10/2019     Priority: Medium     Iron deficiency 05/01/2019     Priority: Medium     Cervical high risk HPV (human papillomavirus) test positive 04/23/2019     Priority: Medium     4/23/19 NIL Pap, + HR HPV (Neg 16/18). Plan cotest in 1 year.        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     Priority: Medium     Colonic mass 03/12/2019     Priority: Medium     Partial small bowel obstruction (H) 03/10/2019     Priority: Medium     Folliculitis 02/06/2013     Priority: Medium     Lentigo 02/06/2013     Priority: Medium     Congenital nevus 02/06/2013     Priority: Medium     Angioma 02/06/2013     Priority: Medium     Multiple nevi 02/06/2013     Priority: Medium     Dermal nevus 02/06/2013     Priority: Medium     CARDIOVASCULAR SCREENING; LDL GOAL LESS THAN 160 01/21/2013     Priority: Medium     Pelvic pain 01/18/2013     Priority: Medium     Generalized anxiety disorder 06/29/2011     Priority: Medium     Diagnosis updated by automated process. Provider to review and confirm.          Past Medical History:    Past Medical History:   Diagnosis Date     Cervical high risk HPV (human papillomavirus) test positive 04/23/2019     Chickenpox      Diarrhea      Group B streptococcus urinary tract infection complicating pregnancy 4/20/2011     History of postpartum depression, currently pregnant 10/21/2010     Malignant melanoma (H)      Postpartum  depression 12/17/2008     Tailbone injury      Urinary tract infections        Past Surgical History:    Past Surgical History:   Procedure Laterality Date     C ORAL SURGERY PROCEDURE       COLONOSCOPY N/A 3/11/2019    Procedure: COMBINED COLONOSCOPY, SINGLE OR MULTIPLE BIOPSY/POLYPECTOMY BY BIOPSY;  Surgeon: Loi Maldonado MD;  Location: [facility]     INSERT PORT VASCULAR ACCESS Right 4/22/2019    Procedure: Central venous Chest Port Placement - right;  Surgeon: Didier Frazier PA-C;  Location: [facility]     IR CHEST PORT PLACEMENT > 5 YRS OF AGE  4/22/2019     LAPAROSCOPIC ASSISTED COLECTOMY Right 3/22/2019    Procedure: Laparoscopic Extended Right Hemicolectomy and appendectomy;  Surgeon: Linda Quesada MD;  Location: [facility] OR       Family History:    Family History   Problem Relation Age of Onset     Arthritis Mother      Neurologic Disorder Mother         had a seizure      Alcohol/Drug Mother      Arthritis Maternal Grandmother      Breast Cancer Maternal Grandmother      Melanoma Maternal Grandmother      Heart Disease Paternal Grandmother      Cancer Father         skin       Social History:  Marital Status:   [2]  Social History     Tobacco Use     Smoking status: Never Smoker     Smokeless tobacco: Never Used   Substance Use Topics     Alcohol use: Yes     Frequency: Monthly or less     Drinks per session: 1 or 2     Binge frequency: Less than monthly     Drug use: No        Medications:    diltiazem 2% in PLO cream, FV COMPOUNDED, 2% GEL  gabapentin (NEURONTIN) 300 MG capsule  granisetron (KYTRIL) 1 MG tablet  lidocaine-prilocaine (EMLA) 2.5-2.5 % external cream  LORazepam (ATIVAN) 0.5 MG tablet  LORazepam (ATIVAN) 0.5 MG tablet  prochlorperazine (COMPAZINE) 10 MG tablet  sertraline (ZOLOFT) 50 MG tablet  tiZANidine (ZANAFLEX) 2 MG tablet  tretinoin (RETIN-A) 0.05 % external cream          Review of Systems   Musculoskeletal: Positive for arthralgias.   Neurological: Negative for  "numbness.       Physical Exam   BP: 117/77  Pulse: 70  Temp: 98.7  F (37.1  C)  Resp: 18  Height: 167.6 cm (5' 6\")  Weight: 72.6 kg (160 lb)  SpO2: 100 %      Physical Exam  Appearance: Alert, oriented. NAD .   Left Ankle/Foot:  There is swelling and tenderness over the lateral malleolus. No tenderness over the medial aspect of the ankle. The fifth metatarsal is not tender. The ankle joint is intact without excessive opening on stressing.   The rest of the foot, ankle and leg exam is normal.            ED Course        Procedures         Results for orders placed or performed during the hospital encounter of 10/08/19 (from the past 24 hour(s))   XR Ankle Left G/E 3 Views    Narrative    XR ANKLE LT G/E 3 VW 10/8/2019 5:49 PM     HISTORY: rolled ankle.    COMPARISON: None.      Impression    IMPRESSION: No acute osseous abnormality demonstrated.     GRABIEL CHAVEZ MD       Medications - No data to display    Assessments & Plan (with Medical Decision Making)   X-ray of the left ankle is negative for any acute osseous abnormality.  No joint instability noted on exam.  Discussed treatment of ankle sprain with patient.  Patient requested a cam walker boot and this was provided. May remove as needed.  Plan as follows:  Non-weight bearing with crutches for 3 days, then begin weight bearing as tolerated.  Wear ace wrap for the next week. You may remove it at rest and during the night.  Elevated and rest your ankle as much as possible for the next 2 days.    Ice 10-15 minutes at a time 3-4 times a day. (minimum of 60 minutes off).  Tylenol and/or Ibuprofen for pain.  Recheck with orthopedics [phone] if not improving in 1-2 weeks.      I have reviewed the nursing notes.    I have reviewed the findings, diagnosis, plan and need for follow up with the patient.      Discharge Medication List as of 10/8/2019  6:30 PM          Final diagnoses:   Left ankle sprain       10/8/2019   [facility] EMERGENCY DEPARTMENT   "   Celestine, Reena Moran, JEFERSON CNP  10/09/19 1619

## 2019-10-21 NOTE — PROGRESS NOTES
"Oncology/Hematology Visit Note  Oct 22, 2019    Reason for Visit: Follow up of stage III colon cancer    History of Present Illness: Remedios Watts is a 38 year old female with PMH melanoma to left arm and back s/p excision with stage III colon cancer. Her history is as follows, copied forward from prior notes:    \"Ms. Watts is a 38 year old female who was admitted to the hospital on 3/10/2019 for bowel obstruction due to hepatic flexure colon mass. She had a CT scan done on 3/10/2019 which showed apple core lesion near the hepatic flexure of the colon concerning for an obstructing colonic neoplasm.  No enlarged lymph nodes were seen. There is a tiny subcentimeter left lobe liver lesion which is too small to characterize.  A CT of the chest on 3/13/2019 did not show evidence of metastatic disease in the chest.       Colonoscopy on 3/11/2019 showed fungating, infiltrative, highly-friable and ulcerated large mass at the hepatic flexure/proximal transverse colon, unable to traverse this lesions as this appears to be near-completely obstructive in nature (though allows liquid stool/effluent to pass under visualization). The mass was partially circumferential. Biopsies was consistent with invasive moderately differentiated adenocarcinoma compatible with colorectal primary and Mismatch repair enzymes were intact by immunohistochemistry.      On 3/22/2019 she had laparoscopic extended right hemicolectomy performed by Dr. Quesada. Final pathology was consistent with a 3.7 cm hepatic flexure poorly differentiated adenocarcinoma with focal micropapillary growth extending through the muscularis propria and involving serosa (pT4). There was lymphovascular invasion but no perineural invasion seen. All margins were negative. 4 out of 38 lymph nodes were positive for metastatic carcinoma including 2 lymph nodes with micrometastasis. Final staging was [id number].    She met with Dr. Capps 4/4/19 who recommended adjuvant FOLFOX. " Port placed 4/22/19.       She started adjuvant FOLFOX on 4/29/19. She did require dose reduction of Oxaliplatin by 20% for neuropathy with cycle 4. She returns today for ongoing oncology follow-up prior to cycle 12 FOLFOX.    Interval History:  Remedios is here alone today. Overall she is feeling well. This past cycle of chemotherapy went similar to prior cycles. She has nausea after treatment but this is controlled on antiemetics. She has constipation days 1-2 and day of pump disconnect she has day of diarrhea. She avoids Imodium preventatively because she does not want to get backed up. She is seeing colorectal for anal fissures. These have not flared. Cold sensitivity has been about the same. She does now have constant numbness in her fingertips however she has no functional impairments or pain. No fevers/chills or infectious concerns. Has mild mucositis with chemo but does not do anything for this. ROS otherwise negative.     Current Outpatient Medications   Medication Sig Dispense Refill     diltiazem 2% in PLO cream, FV COMPOUNDED, 2% GEL Apply small amount to anal opening three times daily for 8 weeks. 60 g 0     gabapentin (NEURONTIN) 300 MG capsule Take 1-2 capsules (300-600 mg) by mouth nightly as needed (neuropathic pain) 30 capsule 3     granisetron (KYTRIL) 1 MG tablet Take 1 tablet (1 mg) by mouth every 12 hours as needed for nausea 60 tablet 3     lidocaine-prilocaine (EMLA) 2.5-2.5 % external cream Apply topically as needed for moderate pain 30 g 0     LORazepam (ATIVAN) 0.5 MG tablet Take 1 tablet (0.5 mg) by mouth every 4 hours as needed (Anxiety, Nausea/Vomiting or Sleep) 30 tablet 2     LORazepam (ATIVAN) 0.5 MG tablet Take 1 tablet (0.5 mg) by mouth nightly as needed for sleep 10 tablet 0     prochlorperazine (COMPAZINE) 10 MG tablet Take 1 tablet (10 mg) by mouth every 6 hours as needed (Nausea/Vomiting) 60 tablet 3     sertraline (ZOLOFT) 50 MG tablet Take 1 tablet (50 mg) by mouth daily 90  "tablet 1     tiZANidine (ZANAFLEX) 2 MG tablet Take during chemo and the night of chemo for infusion related muscle spasms 1 tablet 0     tretinoin (RETIN-A) 0.05 % external cream Apply topically At Bedtime 45 g 3     Physical Examination:  /74   Pulse 69   Temp 97.6  F (36.4  C) (Oral)   Resp 16   Ht 1.676 m (5' 5.98\")   Wt 80.1 kg (176 lb 8 oz)   LMP 10/22/2019 (Exact Date)   SpO2 100%   Breastfeeding? No   BMI 28.50 kg/m    Wt Readings from Last 10 Encounters:   10/08/19 72.6 kg (160 lb)   09/30/19 78.2 kg (172 lb 6.4 oz)   09/27/19 72.6 kg (160 lb)   09/17/19 77.7 kg (171 lb 6.4 oz)   09/11/19 77.4 kg (170 lb 11.2 oz)   09/03/19 77.2 kg (170 lb 1.6 oz)   08/19/19 78 kg (171 lb 14.4 oz)   07/29/19 79 kg (174 lb 1.6 oz)   07/15/19 78.4 kg (172 lb 14.4 oz)   07/02/19 77.1 kg (170 lb)   Constitutional: Well-appearing female in no acute distress.  Eyes: EOMI, PERRL. No scleral icterus.  ENT: Oral mucosa is moist without lesions or thrush.   Lymphatic: Neck is supple without cervical or supraclavicular lymphadenopathy.   Cardiovascular: Regular rate and rhythm. No peripheral edema.  Respiratory: Clear to auscultation bilaterally. No wheezes or crackles.  Gastrointestinal: Bowel sounds present. Abdomen soft, non-tender. No palpable hepatosplenomegaly or masses.   Neurologic: Cranial nerves II through XII are grossly intact.  Skin: No rashes, petechiae, or bruising noted on exposed skin.    Laboratory Data:   10/22/2019 06:56   Sodium 140   Potassium 3.9   Chloride 108   Carbon Dioxide 26   Urea Nitrogen 11   Creatinine 0.58   GFR Estimate >90   GFR Estimate If Black >90   Calcium 8.4 (L)   Anion Gap 5   Albumin 3.6   Protein Total 7.0   Bilirubin Total 0.3   Alkaline Phosphatase 101   ALT 37   AST 30   Glucose 110 (H)   WBC 4.5   Hemoglobin 12.8   Hematocrit 38.3   Platelet Count 208   RBC Count 3.87   MCV 99   MCH 33.1 (H)   MCHC 33.4   RDW 13.6   Diff Method Automated Method   % Neutrophils 51.2   % " Lymphocytes 31.9   % Monocytes 13.5   % Eosinophils 1.8   % Basophils 0.7   % Immature Granulocytes 0.9   Nucleated RBCs 0   Absolute Neutrophil 2.3   Absolute Lymphocytes 1.4   Absolute Monocytes 0.6   Absolute Eosinophils 0.1   Absolute Basophils 0.0   Abs Immature Granulocytes 0.0   Absolute Nucleated RBC 0.0       Assessment and Plan:  1. Onc  Stage IIIC poorly differentiated [id number] adenocarcinoma of the hepatic flexure -s/p laparoscopic right hemicolectomy on 3/22/19. All margins negative. 4/38 LNs positive. MSI-Intact.  Baseline CEA 9.2 on 3/11/2019. She started adjuvant FOLFOX on 4/29/2019.     Oxaliplatin dose was reduced by 20% with cycle 4 for neurotoxicity. Will proceed with final dose, cycle 12 of FOLFOX today. She will have CT CAP and follow-up with Melinda Mayo for an end of therapy visit. She will discuss surveillance in detail at that time. Will need colonoscopy March 2020.     2. Neuro  Acute neurotoxicity manifested as muscle spasms and cold sensitivity with pain starting with infusion x 3 days. She takes Zanaflex during infusion and then at bedtime. Uses gabapentin as needed at night for neuropathic pain.     Cold sensitivity is stable. 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 Dose reduction is not indicated for this. Discussed this should be reversible.     3. GI  Nausea stable with IV Emend and Aloxi along with Dex 8mg with infusion, then PRN Ativan/Compazine and Kytril starting day 3. No changes made today.    Diarrhea continues to persist since surgery. Not currently taking any medication for this. She has developed anal fissures, for which she started on topical diltiazem on 9/11/19.     Lip irritation, now resolved but flares with treatment. Continue Aquaphor PRN.     Liver lesion on prior imaging indeterminate. Has CT in a few weeks to follow-up on this.     4. Heme/Gyn  Iron deficiency anemia 2/2 heaving menses. Endometrial biopsy 4/23/19 was benign. S/p IV Infed  5/22/19. Hgb has improved (despite rectal bleeding and menses).  Iron studies back to normal on 8/19/19. Last LMP was about 2 months ago.  Hgb has improved to 12.8.     Heavy menses. Previously, recommended f/u with gynecology to discuss getting a Mirena IUD. She would prefer to hold off on getting IUD placed until after chemo. As above, no menses in the last 2 months.     5. Genetics  Saw genetic counselor earlier this month and proceeded with [employer] panel. Has follow-up visit in December to discuss results.     6. Derm  Continue Eucerin cream to her palms for hand foot syndrome.     7. Psych  Patient has resumed sertraline 50 mg daily in early August 2019 with improvement in her mood.    8. RHM  Declined flu shot today.     Renetta Darling PA-C  [facility]  [address] [place] [address] [phone]

## 2019-10-22 ENCOUNTER — INFUSION THERAPY VISIT (OUTPATIENT)
Dept: ONCOLOGY | Facility: CLINIC | Age: 39
End: 2019-10-22
Attending: PHYSICIAN ASSISTANT
Payer: COMMERCIAL

## 2019-10-22 ENCOUNTER — APPOINTMENT (OUTPATIENT)
Dept: LAB | Facility: CLINIC | Age: 39
End: 2019-10-22
Attending: PHYSICIAN ASSISTANT
Payer: COMMERCIAL

## 2019-10-22 VITALS
SYSTOLIC BLOOD PRESSURE: 115 MMHG | HEIGHT: 66 IN | BODY MASS INDEX: 28.37 KG/M2 | DIASTOLIC BLOOD PRESSURE: 74 MMHG | RESPIRATION RATE: 16 BRPM | WEIGHT: 176.5 LBS | OXYGEN SATURATION: 100 % | HEART RATE: 69 BPM | TEMPERATURE: 97.6 F

## 2019-10-22 DIAGNOSIS — C18.3 MALIGNANT NEOPLASM OF HEPATIC FLEXURE (H): Primary | ICD-10-CM

## 2019-10-22 LAB
ALBUMIN SERPL-MCNC: 3.6 G/DL (ref 3.4–5)
ALP SERPL-CCNC: 101 U/L (ref 40–150)
ALT SERPL W P-5'-P-CCNC: 37 U/L (ref 0–50)
ANION GAP SERPL CALCULATED.3IONS-SCNC: 5 MMOL/L (ref 3–14)
AST SERPL W P-5'-P-CCNC: 30 U/L (ref 0–45)
BASOPHILS # BLD AUTO: 0 10E9/L (ref 0–0.2)
BASOPHILS NFR BLD AUTO: 0.7 %
BILIRUB SERPL-MCNC: 0.3 MG/DL (ref 0.2–1.3)
BUN SERPL-MCNC: 11 MG/DL (ref 7–30)
CALCIUM SERPL-MCNC: 8.4 MG/DL (ref 8.5–10.1)
CHLORIDE SERPL-SCNC: 108 MMOL/L (ref 94–109)
CO2 SERPL-SCNC: 26 MMOL/L (ref 20–32)
CREAT SERPL-MCNC: 0.58 MG/DL (ref 0.52–1.04)
DIFFERENTIAL METHOD BLD: ABNORMAL
EOSINOPHIL # BLD AUTO: 0.1 10E9/L (ref 0–0.7)
EOSINOPHIL NFR BLD AUTO: 1.8 %
ERYTHROCYTE [DISTWIDTH] IN BLOOD BY AUTOMATED COUNT: 13.6 % (ref 10–15)
GFR SERPL CREATININE-BSD FRML MDRD: >90 ML/MIN/{1.73_M2}
GLUCOSE SERPL-MCNC: 110 MG/DL (ref 70–99)
HCT VFR BLD AUTO: 38.3 % (ref 35–47)
HGB BLD-MCNC: 12.8 G/DL (ref 11.7–15.7)
IMM GRANULOCYTES # BLD: 0 10E9/L (ref 0–0.4)
IMM GRANULOCYTES NFR BLD: 0.9 %
LYMPHOCYTES # BLD AUTO: 1.4 10E9/L (ref 0.8–5.3)
LYMPHOCYTES NFR BLD AUTO: 31.9 %
MCH RBC QN AUTO: 33.1 PG (ref 26.5–33)
MCHC RBC AUTO-ENTMCNC: 33.4 G/DL (ref 31.5–36.5)
MCV RBC AUTO: 99 FL (ref 78–100)
MONOCYTES # BLD AUTO: 0.6 10E9/L (ref 0–1.3)
MONOCYTES NFR BLD AUTO: 13.5 %
NEUTROPHILS # BLD AUTO: 2.3 10E9/L (ref 1.6–8.3)
NEUTROPHILS NFR BLD AUTO: 51.2 %
NRBC # BLD AUTO: 0 10*3/UL
NRBC BLD AUTO-RTO: 0 /100
PLATELET # BLD AUTO: 208 10E9/L (ref 150–450)
POTASSIUM SERPL-SCNC: 3.9 MMOL/L (ref 3.4–5.3)
PROT SERPL-MCNC: 7 G/DL (ref 6.8–8.8)
RBC # BLD AUTO: 3.87 10E12/L (ref 3.8–5.2)
SODIUM SERPL-SCNC: 140 MMOL/L (ref 133–144)
WBC # BLD AUTO: 4.5 10E9/L (ref 4–11)

## 2019-10-22 PROCEDURE — 96411 CHEMO IV PUSH ADDL DRUG: CPT

## 2019-10-22 PROCEDURE — G0463 HOSPITAL OUTPT CLINIC VISIT: HCPCS | Mod: ZF

## 2019-10-22 PROCEDURE — 80053 COMPREHEN METABOLIC PANEL: CPT | Performed by: PHYSICIAN ASSISTANT

## 2019-10-22 PROCEDURE — 25000128 H RX IP 250 OP 636: Mod: ZF | Performed by: PHYSICIAN ASSISTANT

## 2019-10-22 PROCEDURE — 99214 OFFICE O/P EST MOD 30 MIN: CPT | Mod: ZP | Performed by: PHYSICIAN ASSISTANT

## 2019-10-22 PROCEDURE — 96368 THER/DIAG CONCURRENT INF: CPT

## 2019-10-22 PROCEDURE — 85025 COMPLETE CBC W/AUTO DIFF WBC: CPT | Performed by: PHYSICIAN ASSISTANT

## 2019-10-22 PROCEDURE — 25800030 ZZH RX IP 258 OP 636: Mod: ZF | Performed by: PHYSICIAN ASSISTANT

## 2019-10-22 PROCEDURE — 96413 CHEMO IV INFUSION 1 HR: CPT

## 2019-10-22 PROCEDURE — 96367 TX/PROPH/DG ADDL SEQ IV INF: CPT

## 2019-10-22 PROCEDURE — 96415 CHEMO IV INFUSION ADDL HR: CPT

## 2019-10-22 PROCEDURE — 96375 TX/PRO/DX INJ NEW DRUG ADDON: CPT

## 2019-10-22 RX ORDER — MEPERIDINE HYDROCHLORIDE 25 MG/ML
25 INJECTION INTRAMUSCULAR; INTRAVENOUS; SUBCUTANEOUS EVERY 30 MIN PRN
Status: CANCELLED | OUTPATIENT
Start: 2019-10-22

## 2019-10-22 RX ORDER — METHYLPREDNISOLONE SODIUM SUCCINATE 125 MG/2ML
125 INJECTION, POWDER, LYOPHILIZED, FOR SOLUTION INTRAMUSCULAR; INTRAVENOUS
Status: CANCELLED
Start: 2019-10-22

## 2019-10-22 RX ORDER — SODIUM CHLORIDE 9 MG/ML
1000 INJECTION, SOLUTION INTRAVENOUS CONTINUOUS PRN
Status: CANCELLED
Start: 2019-10-22

## 2019-10-22 RX ORDER — PALONOSETRON 0.05 MG/ML
0.25 INJECTION, SOLUTION INTRAVENOUS ONCE
Status: CANCELLED
Start: 2019-10-22

## 2019-10-22 RX ORDER — FLUOROURACIL 50 MG/ML
400 INJECTION, SOLUTION INTRAVENOUS ONCE
Status: CANCELLED | OUTPATIENT
Start: 2019-10-22

## 2019-10-22 RX ORDER — ALBUTEROL SULFATE 0.83 MG/ML
2.5 SOLUTION RESPIRATORY (INHALATION)
Status: CANCELLED | OUTPATIENT
Start: 2019-10-22

## 2019-10-22 RX ORDER — DIPHENHYDRAMINE HYDROCHLORIDE 50 MG/ML
50 INJECTION INTRAMUSCULAR; INTRAVENOUS
Status: CANCELLED
Start: 2019-10-22

## 2019-10-22 RX ORDER — EPINEPHRINE 1 MG/ML
0.3 INJECTION, SOLUTION INTRAMUSCULAR; SUBCUTANEOUS EVERY 5 MIN PRN
Status: CANCELLED | OUTPATIENT
Start: 2019-10-22

## 2019-10-22 RX ORDER — EPINEPHRINE 0.3 MG/.3ML
0.3 INJECTION SUBCUTANEOUS EVERY 5 MIN PRN
Status: CANCELLED | OUTPATIENT
Start: 2019-10-22

## 2019-10-22 RX ORDER — ALBUTEROL SULFATE 90 UG/1
1-2 AEROSOL, METERED RESPIRATORY (INHALATION)
Status: CANCELLED
Start: 2019-10-22

## 2019-10-22 RX ORDER — FLUOROURACIL 50 MG/ML
400 INJECTION, SOLUTION INTRAVENOUS ONCE
Status: COMPLETED | OUTPATIENT
Start: 2019-10-22 | End: 2019-10-22

## 2019-10-22 RX ORDER — PALONOSETRON 0.05 MG/ML
0.25 INJECTION, SOLUTION INTRAVENOUS ONCE
Status: COMPLETED | OUTPATIENT
Start: 2019-10-22 | End: 2019-10-22

## 2019-10-22 RX ORDER — LORAZEPAM 2 MG/ML
0.5 INJECTION INTRAMUSCULAR EVERY 4 HOURS PRN
Status: CANCELLED
Start: 2019-10-22

## 2019-10-22 RX ORDER — HEPARIN SODIUM (PORCINE) LOCK FLUSH IV SOLN 100 UNIT/ML 100 UNIT/ML
5 SOLUTION INTRAVENOUS DAILY PRN
Status: DISCONTINUED | OUTPATIENT
Start: 2019-10-22 | End: 2019-10-22 | Stop reason: HOSPADM

## 2019-10-22 RX ADMIN — HEPARIN 5 ML: 100 SYRINGE at 06:51

## 2019-10-22 RX ADMIN — LEUCOVORIN CALCIUM 650 MG: 200 INJECTION, POWDER, LYOPHILIZED, FOR SOLUTION INTRAMUSCULAR; INTRAVENOUS at 09:56

## 2019-10-22 RX ADMIN — PALONOSETRON HYDROCHLORIDE 0.25 MG: 0.25 INJECTION, SOLUTION INTRAVENOUS at 09:00

## 2019-10-22 RX ADMIN — DEXAMETHASONE SODIUM PHOSPHATE: 10 INJECTION, SOLUTION INTRAMUSCULAR; INTRAVENOUS at 09:04

## 2019-10-22 RX ADMIN — OXALIPLATIN 129 MG: 5 INJECTION, SOLUTION, CONCENTRATE INTRAVENOUS at 09:57

## 2019-10-22 RX ADMIN — DEXTROSE MONOHYDRATE 250 ML: 50 INJECTION, SOLUTION INTRAVENOUS at 09:55

## 2019-10-22 RX ADMIN — FLUOROURACIL 755 MG: 50 INJECTION, SOLUTION INTRAVENOUS at 12:04

## 2019-10-22 RX ADMIN — SODIUM CHLORIDE 1000 ML: 9 INJECTION, SOLUTION INTRAVENOUS at 08:09

## 2019-10-22 ASSESSMENT — MIFFLIN-ST. JEOR: SCORE: 1492.1

## 2019-10-22 ASSESSMENT — PAIN SCALES - GENERAL: PAINLEVEL: NO PAIN (0)

## 2019-10-22 NOTE — PATIENT INSTRUCTIONS
[facility] Main Line: [phone]    Call triage nurse with chills and/or temperature greater than or equal to 100.4, uncontrolled nausea/vomiting, diarrhea, constipation, dizziness, shortness of breath, chest pain, bleeding, unexplained bruising, or any new/concerning symptoms, questions/concerns.   If you are having any concerning symptoms or wish to speak to a provider before your next infusion visit, please call your care coordinator or triage to notify them so we can adequately serve you.   Nurse Triage line:  [phone]    If after hours, weekends, or holidays, call main hospital  and ask for Oncology doctor on call @ [phone]      October 2019 Sunday Monday Tuesday Wednesday Thursday Friday Saturday             1     2    NEW  11:15 AM   (75 min.)   Ivette Delcid GC   Cancer Risk Management Program    LAB  12:30 PM   (10 min.)   [facility] Lab 3     4     5       6     7     8    US ABDOMEN LIMITED   8:15 AM   (45 min.)   [id number]   [facility] US    Admission   5:22 PM   Reena Sprague APRN CNP   [facility]   (Discharge: 10/8/2019)    XR ANKLE LEFT G/E 3 VIEWS   5:45 PM   (15 min.)   [id number]   [facility] 9     10     11     12       13     14     15     16     17     18     19       20     21     22    [facility] LAB DRAW   6:15 AM   (15 min.)    [facility] DRAW   [facility] Lab Draw    P RETURN   6:45 AM   (50 min.)   Renetta Darling PA-C   [facility] ONC INFUSION 240  10:00 AM   (240 min.)    ONCOLOGY INFUSION   [facility] 23     24     25     26       27     28     29     30     31 November 2019 Sunday Monday Tuesday Wednesday Thursday Friday Saturday                            1  Happy Birthday!     2       3     4     5     6     7     8     9       10     11     12     13     14      15     16       17     18    CT CHEST/ABDOMEN/PELVIS W  10:50 AM   (20 min.)   UCCT2   [facility] CT 19    [facility] LAB DRAW  12:00 PM   (15 min.)   [facility] LAB DRAW   [facility] Lab Draw    [facility] RETURN  12:15 PM   (50 min.)   Melinda Mayo PA-C   [facility] 20     21     22     23       24     25     26     27     28     29     30                     Lab Results:  Recent Results (from the past 12 hour(s))   CBC with platelets differential    Collection Time: 10/22/19  6:56 AM   Result Value Ref Range    WBC 4.5 4.0 - 11.0 10e9/L    RBC Count 3.87 3.8 - 5.2 10e12/L    Hemoglobin 12.8 11.7 - 15.7 g/dL    Hematocrit 38.3 35.0 - 47.0 %    MCV 99 78 - 100 fl    MCH 33.1 (H) 26.5 - 33.0 pg    MCHC 33.4 31.5 - 36.5 g/dL    RDW 13.6 10.0 - 15.0 %    Platelet Count 208 150 - 450 10e9/L    Diff Method Automated Method     % Neutrophils 51.2 %    % Lymphocytes 31.9 %    % Monocytes 13.5 %    % Eosinophils 1.8 %    % Basophils 0.7 %    % Immature Granulocytes 0.9 %    Nucleated RBCs 0 0 /100    Absolute Neutrophil 2.3 1.6 - 8.3 10e9/L    Absolute Lymphocytes 1.4 0.8 - 5.3 10e9/L    Absolute Monocytes 0.6 0.0 - 1.3 10e9/L    Absolute Eosinophils 0.1 0.0 - 0.7 10e9/L    Absolute Basophils 0.0 0.0 - 0.2 10e9/L    Abs Immature Granulocytes 0.0 0 - 0.4 10e9/L    Absolute Nucleated RBC 0.0    Comprehensive metabolic panel    Collection Time: 10/22/19  6:56 AM   Result Value Ref Range    Sodium 140 133 - 144 mmol/L    Potassium 3.9 3.4 - 5.3 mmol/L    Chloride 108 94 - 109 mmol/L    Carbon Dioxide 26 20 - 32 mmol/L    Anion Gap 5 3 - 14 mmol/L    Glucose 110 (H) 70 - 99 mg/dL    Urea Nitrogen 11 7 - 30 mg/dL    Creatinine 0.58 0.52 - 1.04 mg/dL    GFR Estimate >90 >60 mL/min/[1.73_m2]    GFR Estimate If Black >90 >60 mL/min/[1.73_m2]    Calcium 8.4 (L) 8.5 - 10.1 mg/dL    Bilirubin Total 0.3 0.2 - 1.3 mg/dL    Albumin 3.6 3.4 - 5.0 g/dL    Protein Total 7.0 6.8 - 8.8 g/dL    Alkaline  Phosphatase 101 40 - 150 U/L    ALT 37 0 - 50 U/L    AST 30 0 - 45 U/L

## 2019-10-22 NOTE — PROGRESS NOTES
Infusion Nursing Note:  Remedios Watts presents today for C12D1 Oxaliplatin/Leucovorin/Fluorouracil push and pump connect.    Patient seen by provider today: Yes: Renetta Darling PA-C   present during visit today: Not Applicable.    Note: Patient reported to clinic today after seeing provider with no new complaints or concerns. Patient reported she is very excited that this is her last treatment.     Intravenous Access:  Implanted Port.    Treatment Conditions:  Lab Results   Component Value Date    HGB 12.8 10/22/2019     Lab Results   Component Value Date    WBC 4.5 10/22/2019      Lab Results   Component Value Date    ANEU 2.3 10/22/2019     Lab Results   Component Value Date     10/22/2019      Lab Results   Component Value Date     10/22/2019                   Lab Results   Component Value Date    POTASSIUM 3.9 10/22/2019           No results found for: MAG         Lab Results   Component Value Date    CR 0.58 10/22/2019                   Lab Results   Component Value Date    MANDY 8.4 10/22/2019                Lab Results   Component Value Date    BILITOTAL 0.3 10/22/2019           Lab Results   Component Value Date    ALBUMIN 3.6 10/22/2019                    Lab Results   Component Value Date    ALT 37 10/22/2019           Lab Results   Component Value Date    AST 30 10/22/2019       Results reviewed, labs MET treatment parameters, ok to proceed with treatment.      Post Infusion Assessment:  Patient tolerated infusion without incident.  Blood return noted pre and post infusion.  Site patent and intact, free from redness, edema or discomfort.  No evidence of extravasations.       Discharge Plan:   Patient declined prescription refills.  Discharge instructions reviewed with: Patient.  Patient and/or family verbalized understanding of discharge instructions and all questions answered.  Copy of AVS reviewed with patient and/or family.  Patient will return 11/19/19 provider visit for next  "appointment.  Patient discharged in stable condition accompanied by: self.  Departure Mode: Ambulatory.  Face to Face time: 5 minutes.    Prior to discharge: Port is secured in place with tegaderm and flushed with 10cc NS with positive blood return noted.  Continuous home infusion C-Series pump connected.    All connectors secured in place and clamps taped open, checked by kate charles RN.    Pump started, \"running\" noted on display (CADD): NA.  Patient instructed to call our clinic or [facility] with any questions or concerns at home.  Patient verbalized understanding.    Patient set up for pump disconnect with [facility] on 10/24/19 @ (pt reports it is done infusing when she wakes up, scheduled for first thing in am).     Braxton Hewitt RN                      "

## 2019-10-22 NOTE — LETTER
"10/22/2019      RE: Remedios Watts  [address] [place] [address]       Oncology/Hematology Visit Note  Oct 22, 2019    Reason for Visit: Follow up of stage III colon cancer    History of Present Illness: Remedios Watts is a 38 year old female with PMH melanoma to left arm and back s/p excision with stage III colon cancer. Her history is as follows, copied forward from prior notes:    \"Ms. Watts is a 38 year old female who was admitted to the hospital on 3/10/2019 for bowel obstruction due to hepatic flexure colon mass. She had a CT scan done on 3/10/2019 which showed apple core lesion near the hepatic flexure of the colon concerning for an obstructing colonic neoplasm.  No enlarged lymph nodes were seen. There is a tiny subcentimeter left lobe liver lesion which is too small to characterize.  A CT of the chest on 3/13/2019 did not show evidence of metastatic disease in the chest.       Colonoscopy on 3/11/2019 showed fungating, infiltrative, highly-friable and ulcerated large mass at the hepatic flexure/proximal transverse colon, unable to traverse this lesions as this appears to be near-completely obstructive in nature (though allows liquid stool/effluent to pass under visualization). The mass was partially circumferential. Biopsies was consistent with invasive moderately differentiated adenocarcinoma compatible with colorectal primary and Mismatch repair enzymes were intact by immunohistochemistry.      On 3/22/2019 she had laparoscopic extended right hemicolectomy performed by Dr. Quesada. Final pathology was consistent with a 3.7 cm hepatic flexure poorly differentiated adenocarcinoma with focal micropapillary growth extending through the muscularis propria and involving serosa (pT4). There was lymphovascular invasion but no perineural invasion seen. All margins were negative. 4 out of 38 lymph nodes were positive for metastatic carcinoma including 2 lymph nodes with micrometastasis. Final " staging was [id number].    She met with Dr. Capps 4/4/19 who recommended adjuvant FOLFOX. Port placed 4/22/19.       She started adjuvant FOLFOX on 4/29/19. She did require dose reduction of Oxaliplatin by 20% for neuropathy with cycle 4. She returns today for ongoing oncology follow-up prior to cycle 12 FOLFOX.    Interval History:  Remedios is here alone today. Overall she is feeling well. This past cycle of chemotherapy went similar to prior cycles. She has nausea after treatment but this is controlled on antiemetics. She has constipation days 1-2 and day of pump disconnect she has day of diarrhea. She avoids Imodium preventatively because she does not want to get backed up. She is seeing colorectal for anal fissures. These have not flared. Cold sensitivity has been about the same. She does now have constant numbness in her fingertips however she has no functional impairments or pain. No fevers/chills or infectious concerns. Has mild mucositis with chemo but does not do anything for this. ROS otherwise negative.     Current Outpatient Medications   Medication Sig Dispense Refill     diltiazem 2% in PLO cream, FV COMPOUNDED, 2% GEL Apply small amount to anal opening three times daily for 8 weeks. 60 g 0     gabapentin (NEURONTIN) 300 MG capsule Take 1-2 capsules (300-600 mg) by mouth nightly as needed (neuropathic pain) 30 capsule 3     granisetron (KYTRIL) 1 MG tablet Take 1 tablet (1 mg) by mouth every 12 hours as needed for nausea 60 tablet 3     lidocaine-prilocaine (EMLA) 2.5-2.5 % external cream Apply topically as needed for moderate pain 30 g 0     LORazepam (ATIVAN) 0.5 MG tablet Take 1 tablet (0.5 mg) by mouth every 4 hours as needed (Anxiety, Nausea/Vomiting or Sleep) 30 tablet 2     LORazepam (ATIVAN) 0.5 MG tablet Take 1 tablet (0.5 mg) by mouth nightly as needed for sleep 10 tablet 0     prochlorperazine (COMPAZINE) 10 MG tablet Take 1 tablet (10 mg) by mouth every 6 hours as needed (Nausea/Vomiting) 60  "tablet 3     sertraline (ZOLOFT) 50 MG tablet Take 1 tablet (50 mg) by mouth daily 90 tablet 1     tiZANidine (ZANAFLEX) 2 MG tablet Take during chemo and the night of chemo for infusion related muscle spasms 1 tablet 0     tretinoin (RETIN-A) 0.05 % external cream Apply topically At Bedtime 45 g 3     Physical Examination:  /74   Pulse 69   Temp 97.6  F (36.4  C) (Oral)   Resp 16   Ht 1.676 m (5' 5.98\")   Wt 80.1 kg (176 lb 8 oz)   LMP 10/22/2019 (Exact Date)   SpO2 100%   Breastfeeding? No   BMI 28.50 kg/m     Wt Readings from Last 10 Encounters:   10/08/19 72.6 kg (160 lb)   09/30/19 78.2 kg (172 lb 6.4 oz)   09/27/19 72.6 kg (160 lb)   09/17/19 77.7 kg (171 lb 6.4 oz)   09/11/19 77.4 kg (170 lb 11.2 oz)   09/03/19 77.2 kg (170 lb 1.6 oz)   08/19/19 78 kg (171 lb 14.4 oz)   07/29/19 79 kg (174 lb 1.6 oz)   07/15/19 78.4 kg (172 lb 14.4 oz)   07/02/19 77.1 kg (170 lb)   Constitutional: Well-appearing female in no acute distress.  Eyes: EOMI, PERRL. No scleral icterus.  ENT: Oral mucosa is moist without lesions or thrush.   Lymphatic: Neck is supple without cervical or supraclavicular lymphadenopathy.   Cardiovascular: Regular rate and rhythm. No peripheral edema.  Respiratory: Clear to auscultation bilaterally. No wheezes or crackles.  Gastrointestinal: Bowel sounds present. Abdomen soft, non-tender. No palpable hepatosplenomegaly or masses.   Neurologic: Cranial nerves II through XII are grossly intact.  Skin: No rashes, petechiae, or bruising noted on exposed skin.    Laboratory Data:   10/22/2019 06:56   Sodium 140   Potassium 3.9   Chloride 108   Carbon Dioxide 26   Urea Nitrogen 11   Creatinine 0.58   GFR Estimate >90   GFR Estimate If Black >90   Calcium 8.4 (L)   Anion Gap 5   Albumin 3.6   Protein Total 7.0   Bilirubin Total 0.3   Alkaline Phosphatase 101   ALT 37   AST 30   Glucose 110 (H)   WBC 4.5   Hemoglobin 12.8   Hematocrit 38.3   Platelet Count 208   RBC Count 3.87   MCV 99   MCH " 33.1 (H)   MCHC 33.4   RDW 13.6   Diff Method Automated Method   % Neutrophils 51.2   % Lymphocytes 31.9   % Monocytes 13.5   % Eosinophils 1.8   % Basophils 0.7   % Immature Granulocytes 0.9   Nucleated RBCs 0   Absolute Neutrophil 2.3   Absolute Lymphocytes 1.4   Absolute Monocytes 0.6   Absolute Eosinophils 0.1   Absolute Basophils 0.0   Abs Immature Granulocytes 0.0   Absolute Nucleated RBC 0.0       Assessment and Plan:  1. Onc  Stage IIIC poorly differentiated [id number] adenocarcinoma of the hepatic flexure -s/p laparoscopic right hemicolectomy on 3/22/19. All margins negative. 4/38 LNs positive. MSI-Intact.  Baseline CEA 9.2 on 3/11/2019. She started adjuvant FOLFOX on 4/29/2019.     Oxaliplatin dose was reduced by 20% with cycle 4 for neurotoxicity. Will proceed with final dose, cycle 12 of FOLFOX today. She will have CT CAP and follow-up with Melinda Mayo for an end of therapy visit. She will discuss surveillance in detail at that time. Will need colonoscopy March 2020.     2. Neuro  Acute neurotoxicity manifested as muscle spasms and cold sensitivity with pain starting with infusion x 3 days. She takes Zanaflex during infusion and then at bedtime. Uses gabapentin as needed at night for neuropathic pain.     Cold sensitivity is stable. 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 Dose reduction is not indicated for this. Discussed this should be reversible.     3. GI  Nausea stable with IV Emend and Aloxi along with Dex 8mg with infusion, then PRN Ativan/Compazine and Kytril starting day 3. No changes made today.    Diarrhea continues to persist since surgery. Not currently taking any medication for this. She has developed anal fissures, for which she started on topical diltiazem on 9/11/19.     Lip irritation, now resolved but flares with treatment. Continue Aquaphor PRN.     Liver lesion on prior imaging indeterminate. Has CT in a few weeks to follow-up on this.     4. Heme/Gyn  Iron  deficiency anemia 2/2 heaving menses. Endometrial biopsy 4/23/19 was benign. S/p IV Infed 5/22/19. Hgb has improved (despite rectal bleeding and menses).  Iron studies back to normal on 8/19/19. Last LMP was about 2 months ago.  Hgb has improved to 12.8.     Heavy menses. Previously, recommended f/u with gynecology to discuss getting a Mirena IUD. She would prefer to hold off on getting IUD placed until after chemo. As above, no menses in the last 2 months.     5. Genetics  Saw genetic counselor earlier this month and proceeded with [employer]-Cancer panel. Has follow-up visit in December to discuss results.     6. Derm  Continue Eucerin cream to her palms for hand foot syndrome.     7. Psych  Patient has resumed sertraline 50 mg daily in early August 2019 with improvement in her mood.    8. RHM  Declined flu shot today.     Renetta Darling PA-C  [facility]  [address] [place], [place] [address] [phone]

## 2019-10-22 NOTE — NURSING NOTE
"Oncology Rooming Note    October 22, 2019 7:10 AM   Remedios Watts is a 38 year old female who presents for:    Chief Complaint   Patient presents with     Port Draw     Labs drawn via port by RN in lab. VS taken.      Oncology Clinic Visit     Return Visit for Malignant neoplasm of hepatic flexure      Initial Vitals: /74   Pulse 69   Temp 97.6  F (36.4  C) (Oral)   Resp 16   Ht 1.676 m (5' 5.98\")   Wt 80.1 kg (176 lb 8 oz)   LMP 10/22/2019 (Exact Date)   SpO2 100%   Breastfeeding? No   BMI 28.50 kg/m   Estimated body mass index is 28.5 kg/m  as calculated from the following:    Height as of this encounter: 1.676 m (5' 5.98\").    Weight as of this encounter: 80.1 kg (176 lb 8 oz). Body surface area is 1.93 meters squared.  No Pain (0) Comment: Data Unavailable   Patient's last menstrual period was 10/22/2019 (exact date).  Allergies reviewed: Yes  Medications reviewed: Yes    Medications: Medication refills not needed today.  Pharmacy name entered into [employer]:    [facility] - - [place], [place] - [id number] [facility] - [place], [place] - [address]    Clinical concerns: Patinet doing wqell.  No questions or concerns to escalate.   Renetta was notified.      Brina Aguilar, RN, MSN              "

## 2019-10-22 NOTE — NURSING NOTE
Chief Complaint   Patient presents with     Port Draw     Labs drawn via port by RN in lab. VS taken.      Labs drawn via Port accessed using 20g flat needle. Line flushed and Heparin locked. Vital signs taken. Checked into next appointment.       Ese Larson RN

## 2019-10-25 LAB — LAB SCANNED RESULT: NORMAL

## 2019-10-30 ENCOUNTER — MEDICAL CORRESPONDENCE (OUTPATIENT)
Dept: HEALTH INFORMATION MANAGEMENT | Facility: CLINIC | Age: 39
End: 2019-10-30

## 2019-11-06 ENCOUNTER — HEALTH MAINTENANCE LETTER (OUTPATIENT)
Age: 39
End: 2019-11-06

## 2019-11-18 ENCOUNTER — ANCILLARY PROCEDURE (OUTPATIENT)
Dept: CT IMAGING | Facility: CLINIC | Age: 39
End: 2019-11-18
Attending: PHYSICIAN ASSISTANT
Payer: COMMERCIAL

## 2019-11-18 DIAGNOSIS — C18.3 MALIGNANT NEOPLASM OF HEPATIC FLEXURE (H): ICD-10-CM

## 2019-11-18 LAB
ALBUMIN SERPL-MCNC: 3.7 G/DL (ref 3.4–5)
ALP SERPL-CCNC: 75 U/L (ref 40–150)
ALT SERPL W P-5'-P-CCNC: 21 U/L (ref 0–50)
ANION GAP SERPL CALCULATED.3IONS-SCNC: 4 MMOL/L (ref 3–14)
AST SERPL W P-5'-P-CCNC: 10 U/L (ref 0–45)
BASOPHILS # BLD AUTO: 0 10E9/L (ref 0–0.2)
BASOPHILS NFR BLD AUTO: 0.3 %
BILIRUB SERPL-MCNC: 0.3 MG/DL (ref 0.2–1.3)
BUN SERPL-MCNC: 8 MG/DL (ref 7–30)
CALCIUM SERPL-MCNC: 8.6 MG/DL (ref 8.5–10.1)
CEA SERPL-MCNC: <0.5 UG/L (ref 0–2.5)
CHLORIDE SERPL-SCNC: 105 MMOL/L (ref 94–109)
CO2 SERPL-SCNC: 27 MMOL/L (ref 20–32)
CREAT SERPL-MCNC: 0.56 MG/DL (ref 0.52–1.04)
DIFFERENTIAL METHOD BLD: NORMAL
EOSINOPHIL # BLD AUTO: 0.1 10E9/L (ref 0–0.7)
EOSINOPHIL NFR BLD AUTO: 0.9 %
ERYTHROCYTE [DISTWIDTH] IN BLOOD BY AUTOMATED COUNT: 12.5 % (ref 10–15)
GFR SERPL CREATININE-BSD FRML MDRD: >90 ML/MIN/{1.73_M2}
GLUCOSE SERPL-MCNC: 79 MG/DL (ref 70–99)
HCT VFR BLD AUTO: 37.4 % (ref 35–47)
HGB BLD-MCNC: 12.4 G/DL (ref 11.7–15.7)
IMM GRANULOCYTES # BLD: 0 10E9/L (ref 0–0.4)
IMM GRANULOCYTES NFR BLD: 0.3 %
LYMPHOCYTES # BLD AUTO: 1.5 10E9/L (ref 0.8–5.3)
LYMPHOCYTES NFR BLD AUTO: 22.7 %
MCH RBC QN AUTO: 32.2 PG (ref 26.5–33)
MCHC RBC AUTO-ENTMCNC: 33.2 G/DL (ref 31.5–36.5)
MCV RBC AUTO: 97 FL (ref 78–100)
MONOCYTES # BLD AUTO: 0.7 10E9/L (ref 0–1.3)
MONOCYTES NFR BLD AUTO: 10.6 %
NEUTROPHILS # BLD AUTO: 4.2 10E9/L (ref 1.6–8.3)
NEUTROPHILS NFR BLD AUTO: 65.2 %
NRBC # BLD AUTO: 0 10*3/UL
NRBC BLD AUTO-RTO: 0 /100
PLATELET # BLD AUTO: 193 10E9/L (ref 150–450)
POTASSIUM SERPL-SCNC: 3.9 MMOL/L (ref 3.4–5.3)
PROT SERPL-MCNC: 6.9 G/DL (ref 6.8–8.8)
RBC # BLD AUTO: 3.85 10E12/L (ref 3.8–5.2)
SODIUM SERPL-SCNC: 136 MMOL/L (ref 133–144)
WBC # BLD AUTO: 6.4 10E9/L (ref 4–11)

## 2019-11-18 PROCEDURE — 84443 ASSAY THYROID STIM HORMONE: CPT | Performed by: PHYSICIAN ASSISTANT

## 2019-11-18 PROCEDURE — 85025 COMPLETE CBC W/AUTO DIFF WBC: CPT | Performed by: PHYSICIAN ASSISTANT

## 2019-11-18 PROCEDURE — 80053 COMPREHEN METABOLIC PANEL: CPT | Performed by: PHYSICIAN ASSISTANT

## 2019-11-18 PROCEDURE — 82378 CARCINOEMBRYONIC ANTIGEN: CPT | Performed by: PHYSICIAN ASSISTANT

## 2019-11-18 PROCEDURE — 84439 ASSAY OF FREE THYROXINE: CPT | Performed by: PHYSICIAN ASSISTANT

## 2019-11-18 RX ORDER — IOPAMIDOL 755 MG/ML
108 INJECTION, SOLUTION INTRAVASCULAR ONCE
Status: COMPLETED | OUTPATIENT
Start: 2019-11-18 | End: 2019-11-18

## 2019-11-18 RX ADMIN — IOPAMIDOL 108 ML: 755 INJECTION, SOLUTION INTRAVASCULAR at 11:11

## 2019-11-18 NOTE — NURSING NOTE
Chief Complaint   Patient presents with     Lab Only     labs drawn via piv by rn.     Labs drawn via established PIV by rn in lab. Flushed with saline.    Lizbet Roberts RN

## 2019-11-19 ENCOUNTER — ONCOLOGY VISIT (OUTPATIENT)
Dept: ONCOLOGY | Facility: CLINIC | Age: 39
End: 2019-11-19
Attending: INTERNAL MEDICINE
Payer: COMMERCIAL

## 2019-11-19 VITALS
BODY MASS INDEX: 28.4 KG/M2 | SYSTOLIC BLOOD PRESSURE: 113 MMHG | OXYGEN SATURATION: 100 % | TEMPERATURE: 97.7 F | HEART RATE: 69 BPM | RESPIRATION RATE: 16 BRPM | WEIGHT: 176.7 LBS | HEIGHT: 66 IN | DIASTOLIC BLOOD PRESSURE: 79 MMHG

## 2019-11-19 DIAGNOSIS — C18.3 MALIGNANT NEOPLASM OF HEPATIC FLEXURE (H): Primary | ICD-10-CM

## 2019-11-19 DIAGNOSIS — R79.89 ELEVATED SERUM FREE T4 LEVEL: ICD-10-CM

## 2019-11-19 LAB
T4 FREE SERPL-MCNC: 1.72 NG/DL (ref 0.76–1.46)
TSH SERPL DL<=0.005 MIU/L-ACNC: 0.77 MU/L (ref 0.4–4)

## 2019-11-19 PROCEDURE — 99215 OFFICE O/P EST HI 40 MIN: CPT | Mod: ZP | Performed by: PHYSICIAN ASSISTANT

## 2019-11-19 PROCEDURE — G0463 HOSPITAL OUTPT CLINIC VISIT: HCPCS | Mod: ZF

## 2019-11-19 ASSESSMENT — MIFFLIN-ST. JEOR: SCORE: 1487.94

## 2019-11-19 ASSESSMENT — PAIN SCALES - GENERAL: PAINLEVEL: NO PAIN (0)

## 2019-11-19 NOTE — NURSING NOTE
"Oncology Rooming Note    November 19, 2019 12:32 PM   Remedios Watts is a 39 year old female who presents for:    Chief Complaint   Patient presents with     Oncology Clinic Visit     Retur -Colon Ca     Initial Vitals: /79   Pulse 69   Temp 97.7  F (36.5  C) (Oral)   Resp 16   Ht 1.676 m (5' 5.98\")   Wt 80.2 kg (176 lb 11.2 oz)   LMP 10/22/2019 (Exact Date)   SpO2 100%   BMI 28.54 kg/m   Estimated body mass index is 28.54 kg/m  as calculated from the following:    Height as of this encounter: 1.676 m (5' 5.98\").    Weight as of this encounter: 80.2 kg (176 lb 11.2 oz). Body surface area is 1.93 meters squared.  No Pain (0) Comment: Data Unavailable   Patient's last menstrual period was 10/22/2019 (exact date).  Allergies reviewed: Yes  Medications reviewed: Yes    Medications: Medication refills not needed today.  Pharmacy name entered into [facility]:    [facility] [place] - [id number] [facility] - [place], [place] - [address]    Clinical concerns: No new concerns or question.       Poly Brooks CMA              "

## 2019-11-19 NOTE — PROGRESS NOTES
"Oncology/Hematology Visit Note  Nov 19, 2019    Reason for Visit: Follow up of stage III colon cancer    History of Present Illness: Remedios Watts is a 39 year old female with PMH melanoma to left arm and back s/p excision with stage III colon cancer. Her history is as follows, copied forward from prior notes:    \"Ms. Watts is a 38 year old female who was admitted to the hospital on 3/10/2019 for bowel obstruction due to hepatic flexure colon mass. She had a CT scan done on 3/10/2019 which showed apple core lesion near the hepatic flexure of the colon concerning for an obstructing colonic neoplasm.  No enlarged lymph nodes were seen. There is a tiny subcentimeter left lobe liver lesion which is too small to characterize.  A CT of the chest on 3/13/2019 did not show evidence of metastatic disease in the chest.       Colonoscopy on 3/11/2019 showed fungating, infiltrative, highly-friable and ulcerated large mass at the hepatic flexure/proximal transverse colon, unable to traverse this lesions as this appears to be near-completely obstructive in nature (though allows liquid stool/effluent to pass under visualization). The mass was partially circumferential. Biopsies was consistent with invasive moderately differentiated adenocarcinoma compatible with colorectal primary and Mismatch repair enzymes were intact by immunohistochemistry.      On 3/22/2019 she had laparoscopic extended right hemicolectomy performed by Dr. Quesada. Final pathology was consistent with a 3.7 cm hepatic flexure poorly differentiated adenocarcinoma with focal micropapillary growth extending through the muscularis propria and involving serosa (pT4). There was lymphovascular invasion but no perineural invasion seen. All margins were negative. 4 out of 38 lymph nodes were positive for metastatic carcinoma including 2 lymph nodes with micrometastasis. Final staging was [id number].    She met with Dr. Capps 4/4/19 who recommended adjuvant FOLFOX. " Port placed 4/22/19.       She started adjuvant FOLFOX on 4/29/19. She did require dose reduction of Oxaliplatin by 20% for neuropathy with cycle 4. She completed 12 cycles of FOLFOX, last on 10/22/19. She returns today for routine follow up and to review her recent imaging.    Interval History:  Remedios is here today with her .  Patient reports ongoing numbness in her fingers and toes.  She has been doing daily cardiovascular exercise and has been trying to watch her diet.  She is concerned about her weight despite these measures.  She reports improvement in her energy.  Her bowels continue to vary with sometimes more loose and sometimes more constipated.  She denies other concerns.    Current Outpatient Medications   Medication Sig Dispense Refill     diltiazem 2% in PLO cream, FV COMPOUNDED, 2% GEL Apply small amount to anal opening three times daily for 8 weeks. (Patient not taking: Reported on 12/9/2019) 60 g 0     gabapentin (NEURONTIN) 300 MG capsule Take 1-2 capsules (300-600 mg) by mouth nightly as needed (neuropathic pain) (Patient not taking: Reported on 12/9/2019) 30 capsule 3     granisetron (KYTRIL) 1 MG tablet Take 1 tablet (1 mg) by mouth every 12 hours as needed for nausea (Patient not taking: Reported on 12/9/2019) 60 tablet 3     lidocaine-prilocaine (EMLA) 2.5-2.5 % external cream Apply topically as needed for moderate pain (Patient not taking: Reported on 12/9/2019) 30 g 0     LORazepam (ATIVAN) 0.5 MG tablet Take 1 tablet (0.5 mg) by mouth every 4 hours as needed (Anxiety, Nausea/Vomiting or Sleep) (Patient not taking: Reported on 12/9/2019) 30 tablet 2     LORazepam (ATIVAN) 0.5 MG tablet Take 1 tablet (0.5 mg) by mouth nightly as needed for sleep (Patient not taking: Reported on 12/9/2019) 10 tablet 0     prochlorperazine (COMPAZINE) 10 MG tablet Take 1 tablet (10 mg) by mouth every 6 hours as needed (Nausea/Vomiting) (Patient not taking: Reported on 12/9/2019) 60 tablet 3      "sertraline (ZOLOFT) 50 MG tablet Take 1 tablet (50 mg) by mouth daily (Patient not taking: Reported on 12/9/2019) 90 tablet 1     tiZANidine (ZANAFLEX) 2 MG tablet Take during chemo and the night of chemo for infusion related muscle spasms (Patient not taking: Reported on 12/9/2019) 1 tablet 0     tretinoin (RETIN-A) 0.05 % external cream Apply topically At Bedtime (Patient not taking: Reported on 12/9/2019) 45 g 3     Physical Examination:  /79   Pulse 69   Temp 97.7  F (36.5  C) (Oral)   Resp 16   Ht 1.676 m (5' 5.98\")   Wt 80.2 kg (176 lb 11.2 oz)   LMP 10/22/2019 (Exact Date)   SpO2 100%   BMI 28.54 kg/m    Wt Readings from Last 10 Encounters:   12/09/19 80.4 kg (177 lb 3.2 oz)   11/19/19 80.2 kg (176 lb 11.2 oz)   10/22/19 80.1 kg (176 lb 8 oz)   10/08/19 72.6 kg (160 lb)   09/30/19 78.2 kg (172 lb 6.4 oz)   09/27/19 72.6 kg (160 lb)   09/17/19 77.7 kg (171 lb 6.4 oz)   09/11/19 77.4 kg (170 lb 11.2 oz)   09/03/19 77.2 kg (170 lb 1.6 oz)   08/19/19 78 kg (171 lb 14.4 oz)   Constitutional: Well-appearing female in no acute distress.  Eyes: EOMI, PERRL. No scleral icterus.  ENT: Oral mucosa is moist without lesions or thrush.   Lymphatic: Neck is supple without cervical or supraclavicular lymphadenopathy.   Cardiovascular: Regular rate and rhythm. No peripheral edema.  Respiratory: Clear to auscultation bilaterally. No wheezes or crackles.  Gastrointestinal: Bowel sounds present. Abdomen soft, non-tender. No palpable hepatosplenomegaly or masses.   Neurologic: Cranial nerves II through XII are grossly intact.  Skin: No rashes, petechiae, or bruising noted on exposed skin.    Laboratory Data:   11/18/2019 11:36   Sodium 136   Potassium 3.9   Chloride 105   Carbon Dioxide 27   Urea Nitrogen 8   Creatinine 0.56   GFR Estimate >90   GFR Estimate If Black >90   Calcium 8.6   Anion Gap 4   Albumin 3.7   Protein Total 6.9   Bilirubin Total 0.3   Alkaline Phosphatase 75   ALT 21   AST 10   Glucose 79   WBC " 6.4   Hemoglobin 12.4   Hematocrit 37.4   Platelet Count 193   RBC Count 3.85   MCV 97   MCH 32.2   MCHC 33.2   RDW 12.5   Diff Method Automated Method   % Neutrophils 65.2   % Lymphocytes 22.7   % Monocytes 10.6   % Eosinophils 0.9   % Basophils 0.3   % Immature Granulocytes 0.3   Nucleated RBCs 0   Absolute Neutrophil 4.2   Absolute Lymphocytes 1.5   Absolute Monocytes 0.7   Absolute Eosinophils 0.1   Absolute Basophils 0.0   Abs Immature Granulocytes 0.0   Absolute Nucleated RBC 0.0   CEA <0.5     Imaging:  CT CAP on 19 shows the followin. Stable exam without evidence for new disease.  2. Stable pulmonary nodules that can be further assessed at  surveillance imaging.  3. Stable small hypodense nodule at the left liver compared to an  older study from 2007.  4. Stable postoperative change at the proximal colon. Stable adjacent  small mesenteric lymph node.    Assessment and Plan:  1. Onc  Stage IIIC poorly differentiated [id number] adenocarcinoma of the hepatic flexure -s/p laparoscopic right hemicolectomy on 3/22/19. All margins negative. /38 LNs positive. MSI-Intact.  Baseline CEA 9.2 on 3/11/2019. She started adjuvant FOLFOX on 2019.     Oxaliplatin dose was reduced by 20% with cycle 4 for neurotoxicity and completed a total of 12 cycles of FOLFOX. Her imaging shows no evidence of disease. She will follow up with me in 3 months for a survivorship visit and with Dr. Capps in 6 months with a CT CAP. She will have a colonoscopy in 2020. She will have a CEA checked every 3 months.    2. Neuro  Peripheral neuropathy. In fingers and toes. Secondary to oxaliplatin. Will monitor for now, but hopefully will gradually improve. Could consider acupuncture and/or vitamin B6.     3. GI  Alternating constipation and diarrhea. Recommend starting a daily fiber supplement or increasing fiber in her diet.     4. Heme/Gyn  Heavy menses. Previously, recommended f/u with gynecology to discuss getting a Mirena  IUD. She would prefer to hold off on getting IUD placed until after chemo. As above, no menses in the last 2 months.     5. Genetics  Saw genetic counselor and proceeded with [employer]-Cancer panel. Has follow-up visit in December to discuss results. Results show a variant of unknown significance.     6. Psych  Patient resumed sertraline 50 mg daily in early August 2019 with improvement in her mood.    7. RHM  Declined flu shot today.     8. IV access  Okay to have port removed at any time. Will place order.     9. Weight gain  -Recommend a diet high in fruits and vegetables and low in fats. Continue with regular exercise. I am not concerned about the normal TSH and mildly elevated free T4 and I don't think this explains her weight gain.     10. Lung nodules  -Stable, but not mentioned on previous imaging. I reviewed with Dr. Capps and we will reevaluate these on her imaging in 6 months. I reviewed that lung nodules are very common and that most of them are not cancer related. Patient is quite concerned as the nodules were not mentioned on her previous CT scan reports. I offered her an appointment with our lung nodule clinic, which she declined.     Melinda Mayo PA-C  [facility]  [address] [place] [address] [phone]

## 2019-11-19 NOTE — LETTER
"    RE: Remedios Watts  [address] [place] [address]       Oncology/Hematology Visit Note  Nov 19, 2019    Reason for Visit: Follow up of stage III colon cancer    History of Present Illness: Remedios Watts is a 39 year old female with PMH melanoma to left arm and back s/p excision with stage III colon cancer. Her history is as follows, copied forward from prior notes:    \"Ms. Watts is a 38 year old female who was admitted to the hospital on 3/10/2019 for bowel obstruction due to hepatic flexure colon mass. She had a CT scan done on 3/10/2019 which showed apple core lesion near the hepatic flexure of the colon concerning for an obstructing colonic neoplasm.  No enlarged lymph nodes were seen. There is a tiny subcentimeter left lobe liver lesion which is too small to characterize.  A CT of the chest on 3/13/2019 did not show evidence of metastatic disease in the chest.       Colonoscopy on 3/11/2019 showed fungating, infiltrative, highly-friable and ulcerated large mass at the hepatic flexure/proximal transverse colon, unable to traverse this lesions as this appears to be near-completely obstructive in nature (though allows liquid stool/effluent to pass under visualization). The mass was partially circumferential. Biopsies was consistent with invasive moderately differentiated adenocarcinoma compatible with colorectal primary and Mismatch repair enzymes were intact by immunohistochemistry.      On 3/22/2019 she had laparoscopic extended right hemicolectomy performed by Dr. Quesada. Final pathology was consistent with a 3.7 cm hepatic flexure poorly differentiated adenocarcinoma with focal micropapillary growth extending through the muscularis propria and involving serosa (pT4). There was lymphovascular invasion but no perineural invasion seen. All margins were negative. 4 out of 38 lymph nodes were positive for metastatic carcinoma including 2 lymph nodes with micrometastasis. Final staging was " [id number].    She met with Dr. Capps 4/4/19 who recommended adjuvant FOLFOX. Port placed 4/22/19.       She started adjuvant FOLFOX on 4/29/19. She did require dose reduction of Oxaliplatin by 20% for neuropathy with cycle 4. She completed 12 cycles of FOLFOX, last on 10/22/19. She returns today for routine follow up and to review her recent imaging.    Interval History:  Remedios is here today with her .  Patient reports ongoing numbness in her fingers and toes.  She has been doing daily cardiovascular exercise and has been trying to watch her diet.  She is concerned about her weight despite these measures.  She reports improvement in her energy.  Her bowels continue to vary with sometimes more loose and sometimes more constipated.  She denies other concerns.    Current Outpatient Medications   Medication Sig Dispense Refill     diltiazem 2% in PLO cream, FV COMPOUNDED, 2% GEL Apply small amount to anal opening three times daily for 8 weeks. (Patient not taking: Reported on 12/9/2019) 60 g 0     gabapentin (NEURONTIN) 300 MG capsule Take 1-2 capsules (300-600 mg) by mouth nightly as needed (neuropathic pain) (Patient not taking: Reported on 12/9/2019) 30 capsule 3     granisetron (KYTRIL) 1 MG tablet Take 1 tablet (1 mg) by mouth every 12 hours as needed for nausea (Patient not taking: Reported on 12/9/2019) 60 tablet 3     lidocaine-prilocaine (EMLA) 2.5-2.5 % external cream Apply topically as needed for moderate pain (Patient not taking: Reported on 12/9/2019) 30 g 0     LORazepam (ATIVAN) 0.5 MG tablet Take 1 tablet (0.5 mg) by mouth every 4 hours as needed (Anxiety, Nausea/Vomiting or Sleep) (Patient not taking: Reported on 12/9/2019) 30 tablet 2     LORazepam (ATIVAN) 0.5 MG tablet Take 1 tablet (0.5 mg) by mouth nightly as needed for sleep (Patient not taking: Reported on 12/9/2019) 10 tablet 0     prochlorperazine (COMPAZINE) 10 MG tablet Take 1 tablet (10 mg) by mouth every 6 hours as needed  "(Nausea/Vomiting) (Patient not taking: Reported on 12/9/2019) 60 tablet 3     sertraline (ZOLOFT) 50 MG tablet Take 1 tablet (50 mg) by mouth daily (Patient not taking: Reported on 12/9/2019) 90 tablet 1     tiZANidine (ZANAFLEX) 2 MG tablet Take during chemo and the night of chemo for infusion related muscle spasms (Patient not taking: Reported on 12/9/2019) 1 tablet 0     tretinoin (RETIN-A) 0.05 % external cream Apply topically At Bedtime (Patient not taking: Reported on 12/9/2019) 45 g 3     Physical Examination:  /79   Pulse 69   Temp 97.7  F (36.5  C) (Oral)   Resp 16   Ht 1.676 m (5' 5.98\")   Wt 80.2 kg (176 lb 11.2 oz)   LMP 10/22/2019 (Exact Date)   SpO2 100%   BMI 28.54 kg/m     Wt Readings from Last 10 Encounters:   12/09/19 80.4 kg (177 lb 3.2 oz)   11/19/19 80.2 kg (176 lb 11.2 oz)   10/22/19 80.1 kg (176 lb 8 oz)   10/08/19 72.6 kg (160 lb)   09/30/19 78.2 kg (172 lb 6.4 oz)   09/27/19 72.6 kg (160 lb)   09/17/19 77.7 kg (171 lb 6.4 oz)   09/11/19 77.4 kg (170 lb 11.2 oz)   09/03/19 77.2 kg (170 lb 1.6 oz)   08/19/19 78 kg (171 lb 14.4 oz)   Constitutional: Well-appearing female in no acute distress.  Eyes: EOMI, PERRL. No scleral icterus.  ENT: Oral mucosa is moist without lesions or thrush.   Lymphatic: Neck is supple without cervical or supraclavicular lymphadenopathy.   Cardiovascular: Regular rate and rhythm. No peripheral edema.  Respiratory: Clear to auscultation bilaterally. No wheezes or crackles.  Gastrointestinal: Bowel sounds present. Abdomen soft, non-tender. No palpable hepatosplenomegaly or masses.   Neurologic: Cranial nerves II through XII are grossly intact.  Skin: No rashes, petechiae, or bruising noted on exposed skin.    Laboratory Data:   11/18/2019 11:36   Sodium 136   Potassium 3.9   Chloride 105   Carbon Dioxide 27   Urea Nitrogen 8   Creatinine 0.56   GFR Estimate >90   GFR Estimate If Black >90   Calcium 8.6   Anion Gap 4   Albumin 3.7   Protein Total 6.9 "   Bilirubin Total 0.3   Alkaline Phosphatase 75   ALT 21   AST 10   Glucose 79   WBC 6.4   Hemoglobin 12.4   Hematocrit 37.4   Platelet Count 193   RBC Count 3.85   MCV 97   MCH 32.2   MCHC 33.2   RDW 12.5   Diff Method Automated Method   % Neutrophils 65.2   % Lymphocytes 22.7   % Monocytes 10.6   % Eosinophils 0.9   % Basophils 0.3   % Immature Granulocytes 0.3   Nucleated RBCs 0   Absolute Neutrophil 4.2   Absolute Lymphocytes 1.5   Absolute Monocytes 0.7   Absolute Eosinophils 0.1   Absolute Basophils 0.0   Abs Immature Granulocytes 0.0   Absolute Nucleated RBC 0.0   CEA <0.5     Imaging:  CT CAP on 19 shows the followin. Stable exam without evidence for new disease.  2. Stable pulmonary nodules that can be further assessed at  surveillance imaging.  3. Stable small hypodense nodule at the left liver compared to an  older study from 2007.  4. Stable postoperative change at the proximal colon. Stable adjacent  small mesenteric lymph node.    Assessment and Plan:  1. Onc  Stage IIIC poorly differentiated [id number] adenocarcinoma of the hepatic flexure -s/p laparoscopic right hemicolectomy on 3/22/19. All margins negative. /38 LNs positive. MSI-Intact.  Baseline CEA 9.2 on 3/11/2019. She started adjuvant FOLFOX on 2019.     Oxaliplatin dose was reduced by 20% with cycle 4 for neurotoxicity and completed a total of 12 cycles of FOLFOX. Her imaging shows no evidence of disease. She will follow up with me in 3 months for a survivorship visit and with Dr. Capps in 6 months with a CT CAP. She will have a colonoscopy in 2020. She will have a CEA checked every 3 months.    2. Neuro  Peripheral neuropathy. In fingers and toes. Secondary to oxaliplatin. Will monitor for now, but hopefully will gradually improve. Could consider acupuncture and/or vitamin B6.     3. GI  Alternating constipation and diarrhea. Recommend starting a daily fiber supplement or increasing fiber in her diet.     4.  Heme/Gyn  Heavy menses. Previously, recommended f/u with gynecology to discuss getting a Mirena IUD. She would prefer to hold off on getting IUD placed until after chemo. As above, no menses in the last 2 months.     5. Genetics  Saw genetic counselor and proceeded with [employer] panel. Has follow-up visit in December to discuss results. Results show a variant of unknown significance.     6. Psych  Patient resumed sertraline 50 mg daily in early August 2019 with improvement in her mood.    7. RHM  Declined flu shot today.     8. IV access  Okay to have port removed at any time. Will place order.     9. Weight gain  -Recommend a diet high in fruits and vegetables and low in fats. Continue with regular exercise. I am not concerned about the normal TSH and mildly elevated free T4 and I don't think this explains her weight gain.     10. Lung nodules  -Stable, but not mentioned on previous imaging. I reviewed with Dr. Capps and we will reevaluate these on her imaging in 6 months. I reviewed that lung nodules are very common and that most of them are not cancer related. Patient is quite concerned as the nodules were not mentioned on her previous CT scan reports. I offered her an appointment with our lung nodule clinic, which she declined.     Melinda Mayo PA-C  [facility]  [address] [place] [address] [phone]

## 2019-11-21 ENCOUNTER — TELEPHONE (OUTPATIENT)
Dept: ONCOLOGY | Facility: CLINIC | Age: 39
End: 2019-11-21

## 2019-11-21 ENCOUNTER — HOME INFUSION (PRE-WILLOW HOME INFUSION) (OUTPATIENT)
Dept: PHARMACY | Facility: CLINIC | Age: 39
End: 2019-11-21

## 2019-11-21 NOTE — TELEPHONE ENCOUNTER
Received call from [facility] stating that the Pt had elected to not pursue chemotherapy, and asked for orders to discharge the Pt from their service. I gave the verbal orders.

## 2019-11-25 ENCOUNTER — TELEPHONE (OUTPATIENT)
Dept: ONCOLOGY | Facility: CLINIC | Age: 39
End: 2019-11-25

## 2019-11-25 ENCOUNTER — TELEPHONE (OUTPATIENT)
Dept: FAMILY MEDICINE | Facility: CLINIC | Age: 39
End: 2019-11-25

## 2019-11-25 NOTE — TELEPHONE ENCOUNTER
Reason for Call:  Other call back    Detailed comments:  and patient want Dr. Berger to review CT scan of April and patient's most recent one and compare them.    Phone Number Patient can be reached at: Cell number on file:    Telephone Information:   Mobile [phone]       Best Time: any    Can we leave a detailed message on this number? YES    Call taken on 11/25/2019 at 3:02 PM by Oumou Rai

## 2019-11-25 NOTE — TELEPHONE ENCOUNTER
"I spoke with both Remedios and her  about some issues with the her last two CT results and what they were told.    According to them, they were told the CT from September came back with no lung nodules but her most recent one on 11/21 here noted that she had two nodules and they were \"unchanged\" from before.    This is confusing because they were told previously she did not have nodules.     When meeting with Melinda Maria Esther on 11/21, they were told by Melinda that she was sending the CT images to the lung nodule clinic and they would go over them and get back to them. They have heard nothing since then.    Both are extremely nervous because of this conflicting reports from their CTs.     Remedios and her  would appreciate a phone call immediately to clear things up.  "

## 2019-11-25 NOTE — TELEPHONE ENCOUNTER
There is no evidence for any new or progressing disease.  Previous findings that were there in the April scans are unchanged in this current CT scan.    Celine

## 2019-12-09 ENCOUNTER — AMBULATORY - HEALTHEAST (OUTPATIENT)
Dept: MATERNAL FETAL MEDICINE | Facility: HOSPITAL | Age: 39
End: 2019-12-09

## 2019-12-09 ENCOUNTER — OFFICE VISIT (OUTPATIENT)
Dept: OBGYN | Facility: CLINIC | Age: 39
End: 2019-12-09
Payer: COMMERCIAL

## 2019-12-09 ENCOUNTER — TRANSCRIBE ORDERS (OUTPATIENT)
Dept: MATERNAL FETAL MEDICINE | Facility: CLINIC | Age: 39
End: 2019-12-09

## 2019-12-09 VITALS
TEMPERATURE: 98.7 F | HEIGHT: 66 IN | WEIGHT: 177.2 LBS | DIASTOLIC BLOOD PRESSURE: 73 MMHG | BODY MASS INDEX: 28.48 KG/M2 | RESPIRATION RATE: 16 BRPM | HEART RATE: 67 BPM | SYSTOLIC BLOOD PRESSURE: 113 MMHG

## 2019-12-09 DIAGNOSIS — O26.90 PREGNANCY, ANTEPARTUM, COMPLICATIONS: ICD-10-CM

## 2019-12-09 DIAGNOSIS — O09.529 SUPERVISION OF HIGH-RISK PREGNANCY OF ELDERLY MULTIGRAVIDA: Primary | ICD-10-CM

## 2019-12-09 DIAGNOSIS — O26.90 PREGNANCY RELATED CONDITION, ANTEPARTUM: Primary | ICD-10-CM

## 2019-12-09 PROCEDURE — 87591 N.GONORRHOEAE DNA AMP PROB: CPT | Performed by: OBSTETRICS & GYNECOLOGY

## 2019-12-09 PROCEDURE — 87491 CHLMYD TRACH DNA AMP PROBE: CPT | Performed by: OBSTETRICS & GYNECOLOGY

## 2019-12-09 PROCEDURE — 76817 TRANSVAGINAL US OBSTETRIC: CPT | Performed by: OBSTETRICS & GYNECOLOGY

## 2019-12-09 PROCEDURE — 99214 OFFICE O/P EST MOD 30 MIN: CPT | Performed by: OBSTETRICS & GYNECOLOGY

## 2019-12-09 ASSESSMENT — MIFFLIN-ST. JEOR: SCORE: 1490.52

## 2019-12-09 NOTE — NURSING NOTE
"Initial /73 (BP Location: Right arm, Patient Position: Sitting, Cuff Size: Adult Regular)   Pulse 67   Temp 98.7  F (37.1  C) (Tympanic)   Resp 16   Ht 1.676 m (5' 6\")   Wt 80.4 kg (177 lb 3.2 oz)   LMP 10/22/2019 (Exact Date)   Breastfeeding No   BMI 28.60 kg/m   Estimated body mass index is 28.6 kg/m  as calculated from the following:    Height as of this encounter: 1.676 m (5' 6\").    Weight as of this encounter: 80.4 kg (177 lb 3.2 oz). .    Cinthia Cunha CMA    "

## 2019-12-10 LAB
C TRACH DNA SPEC QL NAA+PROBE: NEGATIVE
N GONORRHOEA DNA SPEC QL NAA+PROBE: NEGATIVE
SPECIMEN SOURCE: NORMAL
SPECIMEN SOURCE: NORMAL

## 2019-12-11 NOTE — PROGRESS NOTES
[facility]   OB/GYN Clinic    CC: New OB     Subjective:    Remedios is a 39 year old  at ~6w per estimated date of conception and suboptimal US today who presents for new OB visit/pregnancy confirmation. The patient states that this pregnancy is quite unexpected. Patient recently completed FOLFOX chemotherapy for treatment of stage III colon cancer. She states that she was told not to expect menses during her chemotherapy but that she did have some irregular very light bleeding. The last she can recall was sometime mid October but she is not sure of dates. Her  thinks based on infrequent intercourse (2/2 to fatigue from chemo) that conception was likely mid November. Currently the patient is planning to continue the pregnancy if viable. However, her  expresses some concern for continuing the pregnancy, wondering if this will increase the chances of recurrence of her cancer. Additionally, they are wondering what effect, if any, the proximity of chemotherapy to conception would have on the pregnancy as well as what effect her planned CT scans would have. They note she did have a CT scan several weeks ago as well.    OB History    Para Term  AB Living   6 4 4 0 1 4   SAB TAB Ectopic Multiple Live Births   1 0 0 0 4      # Outcome Date GA Lbr Sanket/2nd Weight Sex Delivery Anes PTL Lv   6 Current            5 Term 11 39w1d 07:00 3.651 kg (8 lb 0.8 oz) M Vag-Spont EPI N BETTIE      Name: KODI VÁZQUEZ      Apgar1: 9  Apgar5: 9   4 Term 10/10/08 40w0d 10:00 4.394 kg (9 lb 11 oz) M IVD EPI  BETTIE      Birth Comments: GBS+      Name: Laminlucio      Apgar1: 9  Apgar5: 9   3 Term 06  07:30 3.856 kg (8 lb 8 oz) F  EPIDURAL  BETTIE      Name: Yaa      Apgar1: 7  Apgar5: 9   2 Term 05 39w0d 12:00 3.771 kg (8 lb 5 oz) M    BETTIE      Name: ALVAREZ   1 SAB 01 3w0d    SAB   DEC      Birth Comments: left tubal/mtx injection       Past Medical History:    Diagnosis Date     Cervical high risk HPV (human papillomavirus) test positive 04/23/2019    see problem list     Chickenpox      Diarrhea      Group B streptococcus urinary tract infection complicating pregnancy 4/20/2011    Plan PCN in labor      History of postpartum depression, currently pregnant 10/21/2010     Malignant melanoma (H)      Postpartum depression 12/17/2008     Tailbone injury     fx     Urinary tract infections        Past Surgical History:   Procedure Laterality Date     C ORAL SURGERY PROCEDURE       COLONOSCOPY N/A 3/11/2019    Procedure: COMBINED COLONOSCOPY, SINGLE OR MULTIPLE BIOPSY/POLYPECTOMY BY BIOPSY;  Surgeon: Loi Maldonado MD;  Location: [facility] GI     INSERT PORT VASCULAR ACCESS Right 4/22/2019    Procedure: Central venous Chest Port Placement - right;  Surgeon: Didier Frazier PA-C;  Location: [facility] OR     IR CHEST PORT PLACEMENT > 5 YRS OF AGE  4/22/2019     LAPAROSCOPIC ASSISTED COLECTOMY Right 3/22/2019    Procedure: Laparoscopic Extended Right Hemicolectomy and appendectomy;  Surgeon: Linda Quesada MD;  Location: [facility] OR       Current Outpatient Medications   Medication Sig Dispense Refill     diltiazem 2% in PLO cream, FV COMPOUNDED, 2% GEL Apply small amount to anal opening three times daily for 8 weeks. (Patient not taking: Reported on 12/9/2019) 60 g 0     gabapentin (NEURONTIN) 300 MG capsule Take 1-2 capsules (300-600 mg) by mouth nightly as needed (neuropathic pain) (Patient not taking: Reported on 12/9/2019) 30 capsule 3     granisetron (KYTRIL) 1 MG tablet Take 1 tablet (1 mg) by mouth every 12 hours as needed for nausea (Patient not taking: Reported on 12/9/2019) 60 tablet 3     lidocaine-prilocaine (EMLA) 2.5-2.5 % external cream Apply topically as needed for moderate pain (Patient not taking: Reported on 12/9/2019) 30 g 0     LORazepam (ATIVAN) 0.5 MG tablet Take 1 tablet (0.5 mg) by mouth every 4 hours as needed (Anxiety, Nausea/Vomiting or Sleep)  "(Patient not taking: Reported on 12/9/2019) 30 tablet 2     LORazepam (ATIVAN) 0.5 MG tablet Take 1 tablet (0.5 mg) by mouth nightly as needed for sleep (Patient not taking: Reported on 12/9/2019) 10 tablet 0     prochlorperazine (COMPAZINE) 10 MG tablet Take 1 tablet (10 mg) by mouth every 6 hours as needed (Nausea/Vomiting) (Patient not taking: Reported on 12/9/2019) 60 tablet 3     sertraline (ZOLOFT) 50 MG tablet Take 1 tablet (50 mg) by mouth daily (Patient not taking: Reported on 12/9/2019) 90 tablet 1     tiZANidine (ZANAFLEX) 2 MG tablet Take during chemo and the night of chemo for infusion related muscle spasms (Patient not taking: Reported on 12/9/2019) 1 tablet 0     tretinoin (RETIN-A) 0.05 % external cream Apply topically At Bedtime (Patient not taking: Reported on 12/9/2019) 45 g 3       Family History   Problem Relation Age of Onset     Arthritis Mother      Neurologic Disorder Mother         had a seizure      Alcohol/Drug Mother      Arthritis Maternal Grandmother      Breast Cancer Maternal Grandmother      Melanoma Maternal Grandmother      Heart Disease Paternal Grandmother      Cancer Father         skin       Social History     Tobacco Use     Smoking status: Never Smoker     Smokeless tobacco: Never Used   Substance Use Topics     Alcohol use: Yes     Frequency: Monthly or less     Drinks per session: 1 or 2     Binge frequency: Less than monthly       ROS: A 10 pt ROS was completed and found to be negative unless mentioned in the HPI.     Objective:  /73 (BP Location: Right arm, Patient Position: Sitting, Cuff Size: Adult Regular)   Pulse 67   Temp 98.7  F (37.1  C) (Tympanic)   Resp 16   Ht 1.676 m (5' 6\")   Wt 80.4 kg (177 lb 3.2 oz)   LMP 10/22/2019 (Exact Date)   Breastfeeding No   BMI 28.60 kg/m      Estimated body mass index is 28.6 kg/m  as calculated from the following:    Height as of this encounter: 1.676 m (5' 6\").    Weight as of this encounter: 80.4 kg (177 " lb 3.2 oz).    Physical Exam:  Gen: Pleasant, talkative female in no apparent distress   Endocrine: Thyroid without enlargement or nodularity   Lymph: no appreciable cervical lymphadenopathy  Respiratory: Lungs clear, breathing comfortably on room air   Cardiac: Regular rate and rhythm with no murmurs, gallops or rubs. Warm and well-perfused.   GI: Abd soft and non-tender  : External genitalia is free of lesion. Urethra and bartholin glands normal.  Vaginal mucosa is moist and pink without unusual discharge.  Bimanual exam reveals mobile anteverted uterus without cervical motion tenderness.  Adenexa are mobile and non-tender bilaterally. No appreciable adnexal enlargement. Uterus is consistent with a 6 week gestation.   MSK: Grossly normal movement of all four extremities  Psych: mood and affect bright   Lower extremity: edema not present     US <14w:  Early IUP visualized, CRL not clearly measurable but what is seen measures approximately 6w0d. FHR does appear to be visualized but is not interpretable from the machine to quantify, grossly appears to be normal rate for early pregnancy.    Assessment/Plan:   39 year old  at ~6w by suboptimal US today and estimated date of conception who presents for her initial OB visit. Given suboptimal images for confirming GA did recommend that patient return in 11-14 days for repeat imaging, confirmation of viability and dating. Reviewed that given their concerns for pregnancy affecting prognosis, would recommend visit with her oncologist to discuss their concerns in detail. Suggested that I would suspect there to be little data specific to patient's such as herself but that one may theorize the depression of immune function in pregnancy may have an impact on rate of recurrence. Did note that would certainly defer to her oncologist regarding recurrence risk/prognosis questions however.    Regarding her possible chemotherapy exposure and planned CT scans, did recommend  consultation with MFM for further discussion of these questions as well as for her AMA status should the pregnancy remain viable in two weeks. In the meantime encouraged the patient and her  to continue to talk about their plans for the pregnancy as it seems he has more reservations with proceeding. Did empathize with the patient and her  that this is certainly a difficult and unexpected situation to be in and that it is very reasonable to have hesitation about continuing the pregnancy. Did give patient bleeding precautions and briefly discussed options for genetic screening including cell free DNA testing given her age of 39.     1) Plan to draw new OB lab today (T&S, CBC, HIV, RPR, HepBsAg, Hep B antibody, rubella, GC/Chlam, UC)  2) Discussed routine prenatal care, group practice model at [facility], tertiary support and frequency of visits. Options for  testing for chromosomal and birth defects were discussed. Tentatively planning cell free DNA pending pregnancy viability and plan to proceed after discussions with oncology and MFM.   3) plan level II US  4) Recommended consulting with her oncologist and MFM regarding pregnancy in setting of stage III colon cancer with recent FOLFOX chemotherapy and planned CT scans and AMA status  5) she will continue PNV, denies significant nausea  6) PAP smear: HPV +, NILM 2019, planned for repeat 2020  7) will discuss flu shot at repeat US visit, TDAP at 28w      Return to clinic in 11-14 days    I spent 25 min with the patients. Of that time, >50% was in counseling and care coordination, excluding US procedure and routine OB care.      Jackie Allen MD   12/10/2019 9:21 PM

## 2019-12-18 ENCOUNTER — VIRTUAL VISIT (OUTPATIENT)
Dept: ONCOLOGY | Facility: CLINIC | Age: 39
End: 2019-12-18
Attending: GENETIC COUNSELOR, MS
Payer: COMMERCIAL

## 2019-12-18 DIAGNOSIS — Z80.42 FAMILY HISTORY OF PROSTATE CANCER: ICD-10-CM

## 2019-12-18 DIAGNOSIS — C18.3 MALIGNANT NEOPLASM OF HEPATIC FLEXURE (H): Primary | ICD-10-CM

## 2019-12-18 DIAGNOSIS — Z80.8 FAMILY HISTORY OF MELANOMA: ICD-10-CM

## 2019-12-18 DIAGNOSIS — C43.9 MELANOMA OF SKIN (H): ICD-10-CM

## 2019-12-18 DIAGNOSIS — Z80.3 FAMILY HISTORY OF MALIGNANT NEOPLASM OF BREAST: ICD-10-CM

## 2019-12-18 NOTE — Clinical Note
"Please print and send a copy of this letter and the patient's genetic testing report to the patient.    Please enclose test report: \"Send outs misc test [[id number]] (Order [id number])\"  "

## 2019-12-18 NOTE — LETTER
Cancer Risk Management  Program [facility]  [facility]  Mailing Address  Cancer Risk Management Program  [facility]  [address]  [place] [address]    New patient appointments  [phone]  December 30, 2019    Remedios Watts  [address] [place] [address]      Dear Remedios,    It was a pleasure speaking with you over the phone recently regarding your genetic testing results. Here is a copy of the progress note summarizing our conversation. If you have any additional questions, please feel free to call.    12/18/2019    Referring Provider: MINGO Armstrong and Agata Capps MD    Presenting Information:  I spoke to Remedios by phone today to discuss her genetic testing results. Her blood was drawn on 10/2/2019. Saenz Syndrome Analyses with the CustomNext-Cancer panel was ordered from [employer]. This testing was done because of Remedios's personal history of colon cancer and melanoma, as well as her family history of breast, melanoma, and prostate cancer.    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 Given the uncertain significance of this result, medical management decisions should NOT be made based on this test result alone.    Of note, Remedios tested negative for mutations in the following genes by sequencing and deletion/duplication analysis: APC, BAP1, BARD1, BRCA1, BRCA2, BRIP1, BMPR1A, CDH1, CDK4, CDKN2A, CHEK2, DICER1, EPCAM (deletions/duplications only), GREM1 (deletions/duplications only), HOXB13, MITF  "(sequencing only), MLH1, MRE11A, MSH2, MSH6, MUTYH, NBN, NF1, PALB2, PMS2, POLD1, POLE, PTEN, RAD50, RAD51C, RAD51D, RB1, SMAD4, SMARCA4, STK11, and TP53.     We reviewed the autosomal dominant inheritance of these genes.     Remedios cannot pass on a mutation in any of these genes to her children based on this test result. Mutations in these genes do not skip generations.      A copy of the test report can be found in the Laboratory tab, dated 10/2/2019, and named \"SEND OUTS [facility] TEST\". The report is scanned in as a linked document.    Interpretation:  We discussed several different interpretations of this inconclusive test result. It is not clear if any of these variants are associated with increased cancer risk. These variants may be benign changes that do not increased cancer risk, or they may be harmful mutations that cause increased risk for certain cancers.    Harmful mutations in the JORGE gene are associated with a moderately increased risk for female breast and pancreatic cancer. Other cancer risks may also be associated with JORGE mutations (i.e. prostate), but additional research is needed regarding these potential risks.    Also, in rare situations in which both parents have a harmful mutation in the JORGE gene, their children each have a 25% risk for ataxia-telangiectasia. Ataxia-telangiectasia is a rare autosomal recessive disorder that typically presents in childhood and can present with widened blood vessels called telangiectasias, progressive neurologic symptoms, immune deficiency, and increased risk for childhood cancers. If this variant is later classified as harmful, Remedios would be considered a carrier for ataxia-telangiectasia. In that case, genetic counseling and genetic testing may be advised for her partner.    The laboratory is working to determine if any of these variants are harmful or benign, and they will contact me if any of them are reclassified. If these variants are determined to be " benign, there may be a different gene or combination of genes and environment that are associated with the cancers in this family that are not identifiable using this test. As such, Remedios is encouraged to contact me regularly to review any new genetic testing options that may be appropriate for her.    It is also important to consider that her other relatives with cancer, such as her father and/or maternal grandmother, may have a mutation in one of the genes tested and Remedios did not inherit it. If that is the case, Remedios's cancers may be sporadic and caused by random cellular changes.    Inheritance:  We reviewed the autosomal dominant inheritance of these variants in the JORGE gene.     We do not currently know if Remedios inherited both JORGE variants from one parent (in cis) or one JORGE variant from each parent (in trans).    As such, Remedios has a 50% chance to either pass the JORGE variants together to each of her children or pass each of these variants independently to each of her children. Likewise, there is a 50% chance for each of these variants to be present in each of her siblings.     Because it is unclear what, if any, risk is associated with these variants, clinical genetic testing for these two JORGE variants alone is not recommended for relatives.    Screening:  Based on this inconclusive test result, it is important for Remedios and her relatives to refer back to the family history for appropriate cancer screening.      Remedios should continue to follow all colon cancer and melanoma treatment and screening recommendations as made by her medical providers.    Based on her personal and family history, Remedios has a 11.3% lifetime risk of developing breast cancer based on the EARLINE 8 model. Therefore, Remedios does not meet current National Comprehensive Cancer Network (NCCN) guidelines for high risk breast screening, which is offered to women with a 20% lifetime risk or higher. However, it is still important  for Remedios to continue with routine breast screening under the care of her physicians.    Due to the family history of melanoma, Remedios's mother, sister, and children remain at some increased risk for developing melanoma. According to the American Cancer Society, they are encouraged to speak to their primary care providers about having regular skin exams and safe skin practices (i.e. sunscreen, self skin exams, limited sun exposure, etc.).  Per current NCCN guidelines, individuals with a first degree relative with colorectal cancer diagnosed at any age should begin colonoscopy at age 40, or 10 years before the earliest diagnosis of colorectal cancer, whichever is first. In this family, colonoscopy should start at age 29. Colonoscopy should be repeated every 5 years, or per colonoscopy findings. This would apply to Remedios's parents, sister, and children.  Other population cancer screening options, such as those recommended by the American Cancer Society and NCCN, are also appropriate for Remedios and her family. These screening recommendations may change if there are changes to Remedios's personal and/or family history of cancer. Final screening recommendations should be made by each individual's managing physician.    Additional Testing Considerations:  Although Remedios's genetic testing result is inconclusive, other relatives may still carry a harmful gene mutation associated with hereditary cancer. Remedios's father could consider genetic counseling to discuss his genetic testing options. If he or any other relative does pursue genetic testing, Remedios is encouraged to contact me so that we may review the impact of their test results on her    Summary:  We do not have an explanation for Camilos melanoma or colon cancer. While no genetic changes were identified, Remedios may still be at risk for certain cancers due to family history, environmental factors, or other genetic causes not identified by this test.   Because of that, it is important that she continue with cancer screening based on her personal and family history as discussed above.    Genetic testing is rapidly advancing, and new cancer susceptibility genes will most likely be identified in the future.  Therefore, I encouraged Remedios to contact me regularly or if there are changes in her personal or family history. This may change how we assess her cancer risk, screening, and the testing we would offer.    Plan:  1. Remedios will be mailed a copy of her test results.    2. She plans to follow-up with her medical providers, as needed.  3. She should contact me regularly and/or if her family history changes.  4. I will attempt to contact Remedios if the laboratory informs me that these JORGE variants have been reclassified. This may change screening and testing recommendations for Remedios and her relatives.    If Remedios has any further questions, I encouraged her to contact me at [phone].    Time spent over the phone: 12 minutes    Ivette Delcid MS, [facility]  Licensed Genetic Counselor  Office: [phone]  Pager: [phone]

## 2019-12-18 NOTE — PROGRESS NOTES
"12/18/2019    Referring Provider: MINGO Armstrong and Agata Capps MD    Presenting Information:  I spoke to Remedios by phone today to discuss her genetic testing results. Her blood was drawn on 10/2/2019. Saenz Syndrome Analyses with the CustomNext-Cancer panel was ordered from [employer]. This testing was done because of Remedios's personal history of colon cancer and melanoma, as well as her family history of breast, melanoma, and prostate cancer.    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 Given the uncertain significance of this result, medical management decisions should NOT be made based on this test result alone.    Of note, Remedios tested negative for mutations in the following genes by sequencing and deletion/duplication analysis: APC, BAP1, BARD1, BRCA1, BRCA2, BRIP1, BMPR1A, CDH1, CDK4, CDKN2A, CHEK2, DICER1, EPCAM (deletions/duplications only), GREM1 (deletions/duplications only), HOXB13, MITF (sequencing only), MLH1, MRE11A, MSH2, MSH6, MUTYH, NBN, NF1, PALB2, PMS2, POLD1, POLE, PTEN, RAD50, RAD51C, RAD51D, RB1, SMAD4, SMARCA4, STK11, and TP53.     We reviewed the autosomal dominant inheritance of these genes.     Remedios cannot pass on a mutation in any of these genes to her children based on this test result. Mutations in these genes do not skip generations.      A copy of the test report can be found in the Laboratory tab, dated 10/2/2019, and named \"SEND OUTS MISC TEST\". The report is scanned in as a linked document.    Interpretation:  We discussed several different interpretations of this inconclusive test result. It is not clear if any of these variants are associated with increased cancer risk. These variants may be benign " changes that do not increased cancer risk, or they may be harmful mutations that cause increased risk for certain cancers.    Harmful mutations in the JORGE gene are associated with a moderately increased risk for female breast and pancreatic cancer. Other cancer risks may also be associated with JORGE mutations (i.e. prostate), but additional research is needed regarding these potential risks.    Also, in rare situations in which both parents have a harmful mutation in the JORGE gene, their children each have a 25% risk for ataxia-telangiectasia. Ataxia-telangiectasia is a rare autosomal recessive disorder that typically presents in childhood and can present with widened blood vessels called telangiectasias, progressive neurologic symptoms, immune deficiency, and increased risk for childhood cancers. If this variant is later classified as harmful, Remedios would be considered a carrier for ataxia-telangiectasia. In that case, genetic counseling and genetic testing may be advised for her partner.    The laboratory is working to determine if any of these variants are harmful or benign, and they will contact me if any of them are reclassified. If these variants are determined to be benign, there may be a different gene or combination of genes and environment that are associated with the cancers in this family that are not identifiable using this test. As such, Remedios is encouraged to contact me regularly to review any new genetic testing options that may be appropriate for her.    It is also important to consider that her other relatives with cancer, such as her father and/or maternal grandmother, may have a mutation in one of the genes tested and Remedios did not inherit it. If that is the case, Camilos cancers may be sporadic and caused by random cellular changes.    Inheritance:  We reviewed the autosomal dominant inheritance of these variants in the JORGE gene.     We do not currently know if Remedios inherited both JORGE  variants from one parent (in cis) or one JORGE variant from each parent (in trans).    As such, Remedios has a 50% chance to either pass the JORGE variants together to each of her children or pass each of these variants independently to each of her children. Likewise, there is a 50% chance for each of these variants to be present in each of her siblings.     Because it is unclear what, if any, risk is associated with these variants, clinical genetic testing for these two JORGE variants alone is not recommended for relatives.    Screening:  Based on this inconclusive test result, it is important for Remedios and her relatives to refer back to the family history for appropriate cancer screening.      Remedios should continue to follow all colon cancer and melanoma treatment and screening recommendations as made by her medical providers.    Based on her personal and family history, Remedios has a 11.3% lifetime risk of developing breast cancer based on the EARLINE 8 model. Therefore, Remedios does not meet current National Comprehensive Cancer Network (NCCN) guidelines for high risk breast screening, which is offered to women with a 20% lifetime risk or higher. However, it is still important for Remedios to continue with routine breast screening under the care of her physicians.    Due to the family history of melanoma, Remedios's mother, sister, and children remain at some increased risk for developing melanoma. According to the American Cancer Society, they are encouraged to speak to their primary care providers about having regular skin exams and safe skin practices (i.e. sunscreen, self skin exams, limited sun exposure, etc.).  Per current NCCN guidelines, individuals with a first degree relative with colorectal cancer diagnosed at any age should begin colonoscopy at age 40, or 10 years before the earliest diagnosis of colorectal cancer, whichever is first. In this family, colonoscopy should start at age 29. Colonoscopy should be  repeated every 5 years, or per colonoscopy findings. This would apply to Remedios's parents, sister, and children.  Other population cancer screening options, such as those recommended by the American Cancer Society and NCCN, are also appropriate for Remedios and her family. These screening recommendations may change if there are changes to Remedios's personal and/or family history of cancer. Final screening recommendations should be made by each individual's managing physician.    Additional Testing Considerations:  Although Remedios's genetic testing result is inconclusive, other relatives may still carry a harmful gene mutation associated with hereditary cancer. Remedios's father could consider genetic counseling to discuss his genetic testing options. If he or any other relative does pursue genetic testing, Remedios is encouraged to contact me so that we may review the impact of their test results on her    Summary:  We do not have an explanation for Remedios's melanoma or colon cancer. While no genetic changes were identified, Remedios may still be at risk for certain cancers due to family history, environmental factors, or other genetic causes not identified by this test.  Because of that, it is important that she continue with cancer screening based on her personal and family history as discussed above.    Genetic testing is rapidly advancing, and new cancer susceptibility genes will most likely be identified in the future.  Therefore, I encouraged Remedios to contact me regularly or if there are changes in her personal or family history. This may change how we assess her cancer risk, screening, and the testing we would offer.    Plan:  1. Remedios will be mailed a copy of her test results.    2. She plans to follow-up with her medical providers, as needed.  3. She should contact me regularly and/or if her family history changes.  4. I will attempt to contact Remedios if the laboratory informs me that these JORGE variants  have been reclassified. This may change screening and testing recommendations for Remedios and her relatives.    If Remedios has any further questions, I encouraged her to contact me at [phone].    Time spent over the phone: 12 minutes    Ivette Delcid MS, [facility]  Licensed Genetic Counselor  Office: [phone]  Pager: [phone]

## 2019-12-23 ENCOUNTER — PRENATAL OFFICE VISIT (OUTPATIENT)
Dept: OBGYN | Facility: CLINIC | Age: 39
End: 2019-12-23
Payer: COMMERCIAL

## 2019-12-23 VITALS
BODY MASS INDEX: 28.45 KG/M2 | DIASTOLIC BLOOD PRESSURE: 71 MMHG | TEMPERATURE: 98.6 F | HEART RATE: 76 BPM | WEIGHT: 177 LBS | HEIGHT: 66 IN | SYSTOLIC BLOOD PRESSURE: 123 MMHG | RESPIRATION RATE: 18 BRPM

## 2019-12-23 DIAGNOSIS — O09.521 MULTIGRAVIDA OF ADVANCED MATERNAL AGE IN FIRST TRIMESTER: ICD-10-CM

## 2019-12-23 DIAGNOSIS — O09.529 SUPERVISION OF HIGH-RISK PREGNANCY OF ELDERLY MULTIGRAVIDA: Primary | ICD-10-CM

## 2019-12-23 LAB
ABO + RH BLD: NORMAL
ABO + RH BLD: NORMAL
ALBUMIN UR-MCNC: NEGATIVE MG/DL
APPEARANCE UR: CLEAR
BILIRUB UR QL STRIP: NEGATIVE
BLD GP AB SCN SERPL QL: NORMAL
BLOOD BANK CMNT PATIENT-IMP: NORMAL
COLOR UR AUTO: YELLOW
ERYTHROCYTE [DISTWIDTH] IN BLOOD BY AUTOMATED COUNT: 11.3 % (ref 10–15)
GLUCOSE UR STRIP-MCNC: NEGATIVE MG/DL
HCT VFR BLD AUTO: 39.1 % (ref 35–47)
HGB BLD-MCNC: 13.2 G/DL (ref 11.7–15.7)
HGB UR QL STRIP: NEGATIVE
KETONES UR STRIP-MCNC: NEGATIVE MG/DL
LEUKOCYTE ESTERASE UR QL STRIP: NEGATIVE
MCH RBC QN AUTO: 31.7 PG (ref 26.5–33)
MCHC RBC AUTO-ENTMCNC: 33.8 G/DL (ref 31.5–36.5)
MCV RBC AUTO: 94 FL (ref 78–100)
NITRATE UR QL: NEGATIVE
PH UR STRIP: 7 PH (ref 5–7)
PLATELET # BLD AUTO: 204 10E9/L (ref 150–450)
RBC # BLD AUTO: 4.16 10E12/L (ref 3.8–5.2)
SOURCE: NORMAL
SP GR UR STRIP: 1.02 (ref 1–1.03)
SPECIMEN EXP DATE BLD: NORMAL
UROBILINOGEN UR STRIP-ACNC: 0.2 EU/DL (ref 0.2–1)
WBC # BLD AUTO: 7 10E9/L (ref 4–11)

## 2019-12-23 PROCEDURE — 86900 BLOOD TYPING SEROLOGIC ABO: CPT | Performed by: OBSTETRICS & GYNECOLOGY

## 2019-12-23 PROCEDURE — 86762 RUBELLA ANTIBODY: CPT | Performed by: OBSTETRICS & GYNECOLOGY

## 2019-12-23 PROCEDURE — 86780 TREPONEMA PALLIDUM: CPT | Performed by: OBSTETRICS & GYNECOLOGY

## 2019-12-23 PROCEDURE — 87389 HIV-1 AG W/HIV-1&-2 AB AG IA: CPT | Performed by: OBSTETRICS & GYNECOLOGY

## 2019-12-23 PROCEDURE — 86901 BLOOD TYPING SEROLOGIC RH(D): CPT | Performed by: OBSTETRICS & GYNECOLOGY

## 2019-12-23 PROCEDURE — 81003 URINALYSIS AUTO W/O SCOPE: CPT | Performed by: OBSTETRICS & GYNECOLOGY

## 2019-12-23 PROCEDURE — 85027 COMPLETE CBC AUTOMATED: CPT | Performed by: OBSTETRICS & GYNECOLOGY

## 2019-12-23 PROCEDURE — 36415 COLL VENOUS BLD VENIPUNCTURE: CPT | Performed by: OBSTETRICS & GYNECOLOGY

## 2019-12-23 PROCEDURE — 99214 OFFICE O/P EST MOD 30 MIN: CPT | Performed by: OBSTETRICS & GYNECOLOGY

## 2019-12-23 PROCEDURE — 86850 RBC ANTIBODY SCREEN: CPT | Performed by: OBSTETRICS & GYNECOLOGY

## 2019-12-23 PROCEDURE — 87086 URINE CULTURE/COLONY COUNT: CPT | Performed by: OBSTETRICS & GYNECOLOGY

## 2019-12-23 PROCEDURE — 76817 TRANSVAGINAL US OBSTETRIC: CPT | Performed by: OBSTETRICS & GYNECOLOGY

## 2019-12-23 PROCEDURE — G0499 HEPB SCREEN HIGH RISK INDIV: HCPCS | Performed by: OBSTETRICS & GYNECOLOGY

## 2019-12-23 ASSESSMENT — MIFFLIN-ST. JEOR: SCORE: 1489.62

## 2019-12-23 NOTE — NURSING NOTE
"Initial /71 (BP Location: Left arm, Patient Position: Chair, Cuff Size: Adult Large)   Pulse 76   Temp 98.6  F (37  C) (Tympanic)   Resp 18   Ht 1.676 m (5' 6\")   Wt 80.3 kg (177 lb)   LMP 10/22/2019 (Exact Date)   BMI 28.57 kg/m   Estimated body mass index is 28.57 kg/m  as calculated from the following:    Height as of this encounter: 1.676 m (5' 6\").    Weight as of this encounter: 80.3 kg (177 lb). .      "

## 2019-12-23 NOTE — PROGRESS NOTES
[facility]   OB/GYN Clinic     CC: New OB      Subjective:     Remedios is a 39 year old  at 8w3d by LMP who presents for confirmation of pregnancy.  She was seen 2 weeks ago with likely viable IUP but a measurable heart rate.  Patient has a history of stage III colon cancer and had recently completed chemotherapy at time of conception.  She denies any bleeding or cramping since her last visit.  Planning to keep the pregnancy.  Notes that she has MFM visit scheduled in the event of viable pregnancy.  Also plans to schedule visit with her oncologist.  No nausea or vomiting.                     OB History    Para Term  AB Living   6 4 4 0 1 4   SAB TAB Ectopic Multiple Live Births      1 0 0 0 4          # Outcome Date GA Lbr Sanket/2nd Weight Sex Delivery Anes PTL Lv   6 Current                     5 Term 11 39w1d 07:00 3.651 kg (8 lb 0.8 oz) M Vag-Spont EPI N BETTIE      Name: KODI VÁZQUEZ      Apgar1: 9  Apgar5: 9   4 Term 10/10/08 40w0d 10:00 4.394 kg (9 lb 11 oz) M IVD EPI   BETTIE      Birth Comments: GBS+      Name: Hermelinda      Apgar1: 9  Apgar5: 9   3 Term 06   07:30 3.856 kg (8 lb 8 oz) F  EPIDURAL   BETTIE      Name: Yaa      Apgar1: 7  Apgar5: 9   2 Term 05 39w0d 12:00 3.771 kg (8 lb 5 oz) M      BETTIE      Name: ALVAREZ   1 SAB 01 3w0d       SAB     DEC      Birth Comments: left tubal/mtx injection         Past Medical History        Past Medical History:   Diagnosis Date     Cervical high risk HPV (human papillomavirus) test positive 2019     see problem list     Chickenpox       Diarrhea       Group B streptococcus urinary tract infection complicating pregnancy 2011     Plan PCN in labor      History of postpartum depression, currently pregnant 10/21/2010     Malignant melanoma (H)       Postpartum depression 2008     Tailbone injury       fx     Urinary tract infections              Past Surgical History         Past  Surgical History:   Procedure Laterality Date     C ORAL SURGERY PROCEDURE         COLONOSCOPY N/A 3/11/2019     Procedure: COMBINED COLONOSCOPY, SINGLE OR MULTIPLE BIOPSY/POLYPECTOMY BY BIOPSY;  Surgeon: Loi Maldonado MD;  Location: [facility]     INSERT PORT VASCULAR ACCESS Right 4/22/2019     Procedure: Central venous Chest Port Placement - right;  Surgeon: Didier Frazier PA-C;  [facility]: [facility]     IR CHEST PORT PLACEMENT > 5 YRS OF AGE   4/22/2019     LAPAROSCOPIC ASSISTED COLECTOMY Right 3/22/2019     Procedure: Laparoscopic Extended Right Hemicolectomy and appendectomy;  Surgeon: Linda Quesada MD;  Location: [facility] OR            Current Outpatient Prescriptions          Current Outpatient Medications   Medication Sig Dispense Refill     diltiazem 2% in PLO cream, FV COMPOUNDED, 2% GEL Apply small amount to anal opening three times daily for 8 weeks. (Patient not taking: Reported on 12/9/2019) 60 g 0     gabapentin (NEURONTIN) 300 MG capsule Take 1-2 capsules (300-600 mg) by mouth nightly as needed (neuropathic pain) (Patient not taking: Reported on 12/9/2019) 30 capsule 3     granisetron (KYTRIL) 1 MG tablet Take 1 tablet (1 mg) by mouth every 12 hours as needed for nausea (Patient not taking: Reported on 12/9/2019) 60 tablet 3     lidocaine-prilocaine (EMLA) 2.5-2.5 % external cream Apply topically as needed for moderate pain (Patient not taking: Reported on 12/9/2019) 30 g 0     LORazepam (ATIVAN) 0.5 MG tablet Take 1 tablet (0.5 mg) by mouth every 4 hours as needed (Anxiety, Nausea/Vomiting or Sleep) (Patient not taking: Reported on 12/9/2019) 30 tablet 2     LORazepam (ATIVAN) 0.5 MG tablet Take 1 tablet (0.5 mg) by mouth nightly as needed for sleep (Patient not taking: Reported on 12/9/2019) 10 tablet 0     prochlorperazine (COMPAZINE) 10 MG tablet Take 1 tablet (10 mg) by mouth every 6 hours as needed (Nausea/Vomiting) (Patient not taking: Reported on 12/9/2019) 60 tablet 3     sertraline  "(ZOLOFT) 50 MG tablet Take 1 tablet (50 mg) by mouth daily (Patient not taking: Reported on 12/9/2019) 90 tablet 1     tiZANidine (ZANAFLEX) 2 MG tablet Take during chemo and the night of chemo for infusion related muscle spasms (Patient not taking: Reported on 12/9/2019) 1 tablet 0     tretinoin (RETIN-A) 0.05 % external cream Apply topically At Bedtime (Patient not taking: Reported on 12/9/2019) 45 g 3            Family History         Family History   Problem Relation Age of Onset     Arthritis Mother       Neurologic Disorder Mother           had a seizure      Alcohol/Drug Mother       Arthritis Maternal Grandmother       Breast Cancer Maternal Grandmother       Melanoma Maternal Grandmother       Heart Disease Paternal Grandmother       Cancer Father           skin            Social History            Tobacco Use     Smoking status: Never Smoker     Smokeless tobacco: Never Used   Substance Use Topics     Alcohol use: Yes       Frequency: Monthly or less       Drinks per session: 1 or 2       Binge frequency: Less than monthly         ROS: A 10 pt ROS was completed and found to be negative unless mentioned in the HPI.      Objective:  /71 (BP Location: Left arm, Patient Position: Chair, Cuff Size: Adult Large)   Pulse 76   Temp 98.6  F (37  C) (Tympanic)   Resp 18   Ht 1.676 m (5' 6\")   Wt 80.3 kg (177 lb)   LMP 10/22/2019 (Exact Date)   BMI 28.57 kg/m       Physical Exam:  Gen: Pleasant, talkative female in no apparent distress   Endocrine: Thyroid without enlargement or nodularity   Lymph: no appreciable cervical lymphadenopathy  Respiratory: Lungs clear, breathing comfortably on room air   Cardiac: Regular rate and rhythm with no murmurs, gallops or rubs. Warm and well-perfused.   GI: Abd soft and non-tender  : External genitalia is free of lesion. Urethra and bartholin glands normal.  Vaginal mucosa is moist and pink without unusual discharge.  Bimanual exam reveals mobile anteverted " uterus without cervical motion tenderness.  Adenexa are mobile and non-tender bilaterally. No appreciable adnexal enlargement. Uterus is consistent with a 8 week gestation.   MSK: Grossly normal movement of all four extremities  Psych: mood and affect bright   Lower extremity: edema not present      US <14w:  IUP visualized. CRL measures 7w6d. No adnexal masses. Consistent with LMP of 10/25/19 and likely date of conception.     Assessment/Plan:   39 year old  at 8w3d by LMP c/w US today.  Discussed with patient that at this point it appears she does have viable pregnancy with good interval growth since last ultrasound and measurements consistent with LMP.  Discussed with the patient that would definitely recommend consultation with MFM given high risk pregnancy in the setting of recent chemotherapy, stage III colon cancer, AMA.  She is agreeable to this and has a consultation scheduled.  Cell free DNA ordered today.  Also plans to meet with her oncologist.     1) Plan to draw new OB lab today (T&S, CBC, HIV, RPR, HepBsAg, Hep B antibody, rubella, GC/Chlam, UC)  2) Discussed routine prenatal care, group practice model at [facility], tertiary support and frequency of visits. Options for  testing for chromosomal and birth defects were discussed. Tentatively planning cell free DNA and plan to proceed after discussions with oncology and MFM.   3) plan level II US  4) Recommended consulting with her oncologist and MFM regarding pregnancy in setting of stage III colon cancer with recent FOLFOX chemotherapy and planned CT scans and AMA status  5) she will continue PNV, denies significant nausea  6) PAP smear: HPV +, NILM 2019, planned for repeat 2020  7) considering flu shot, TDAP at 28w    Jackie Allen MD   2019 3:49 PM

## 2019-12-24 LAB
BACTERIA SPEC CULT: NORMAL
HBV SURFACE AG SERPL QL IA: NONREACTIVE
HIV 1+2 AB+HIV1 P24 AG SERPL QL IA: NONREACTIVE
Lab: NORMAL
RUBV IGG SERPL IA-ACNC: 35 IU/ML
SPECIMEN SOURCE: NORMAL
T PALLIDUM AB SER QL: NONREACTIVE

## 2019-12-26 ENCOUNTER — PATIENT OUTREACH (OUTPATIENT)
Dept: ONCOLOGY | Facility: CLINIC | Age: 39
End: 2019-12-26

## 2019-12-26 NOTE — TELEPHONE ENCOUNTER
Called patient to discuss having her seen in Oncology clinic asap to discuss her pregnancy. Per notes in Ob she is scheduled with MFM but we cannot see an appointment on the schedule, this is also an urgent need.  Requested a return call to discuss. Appointment slot on hold with Dr Capps 1/2 8:30.

## 2019-12-31 NOTE — OP NOTE
SURGEON: Linda Quesada MD     ASSISTANT: Cinthia Jennings MD, Colorectal Surgery fellow.  Jc Boyce MD surgery resident     PREOPERATIVE DIAGNOSIS: Colon cancer with presentation of abdominal pain and low-grade obstruction.     POSTOPERATIVE DIAGNOSIS: Colon cancer with presentation of abdominal pain and low-grade obstruction.     PROCEDURES: Laparoscopic extended right hemicolectomy.    INDICATIONS:  Remedios Watts is a 38 year old female who has a distant history of melanoma presenting with abdominal pain and imaging consistent with a mass at the hepatic flexure along the proximal transverse colon.  She underwent a colonoscopy in the hospital and pathology was consistent with adenocarcinoma of colorectal origin.  She presents now for definitive resection. The risks and benefits of surgery were thoroughly discussed with the patient and she agreed to proceed.    DESCRIPTION OF PROCEDURE: The patient was brought to the operating room, placed supine on the operating table with a pink pad in place. General endotracheal anesthesia was induced. White catheter was placed. The patient was placed in modified lithotomy position with straight leg positioners can carefully secured to the table. The abdomen was prepped and draped in the usual sterile fashion.     I began the procedure with a timeout and placed a periumbilical port under direct visualization with a Juárez technique (12 mm). Under direct visualization, 5 mm ports were place in the left lower quadrant, left upper quadrant, and suprapubic positions.     The mass could be seen in the proximal transverse colon with a tattoo that had been placed distal to the lesion.  There were no other visible abnormalities including no changes externally on the liver with normal-appearing stomach, small intestine, gallbladder, and pelvis.    We performed a medial to lateral dissection of the right side, opening the retroperitoneal plane by identifying the ileocolic  vessels, defining the retroperitoneal plane, pushing the duodenum away, and extending the dissection to the right upper quadrant and right lateral abdomen. The ileocolic vessels were isolated and taken with the ligature device. The lateral dissection was performed on the ascending colon and hepatic flexure in right upper quadrant through the mid transverse colon, and then the ileal attachments were divided. At this point, we identified the right branch of the middle colic and took this with the ligature device. We proceeded with exteriorization of the specimen, making a 3 cm incision and placing a wound protector.     At this point, we performed our anastomosis, dividing the mesentery and then making two enterotomies. The anastomosis was formed using a single firing of the SHA blue load 100, which was hemostatic on inspection  followed by a single firing of the TA-90 blue load. The anastomosis was reinforced with a 3-0 Vicryl crotch stitch and sutures along the transverse staple line. After this, the anastomosis appeared highly satisfactory and was under no tension and healthy in appearance.    The exteriorization site fascia was closed using figure of 8 #1 PDS suture. The umbilical port site was closed with figure of 8 0-Vicryl suture. Subcutaneous tissue was irrigated out. The skin closed with 4-0 Monocryl subcuticular suture followed by the application of Dermabond. The patient was emerged from general endotracheal anesthesia, taken postoperative anesthesia care unit in good condition. All instruments and sponge counts were correct x2 at the end the case.     SPECIMEN: Transverse and right colon, appendix and terminal ileum.     Linda Quesada MD  Colon and Rectal Surgery Attending  Department of Surgery  [facility]                no

## 2020-01-01 ENCOUNTER — PRE VISIT (OUTPATIENT)
Dept: ONCOLOGY | Facility: CLINIC | Age: 40
End: 2020-01-01

## 2020-01-01 ENCOUNTER — HOME INFUSION (PRE-WILLOW HOME INFUSION) (OUTPATIENT)
Dept: PHARMACY | Facility: CLINIC | Age: 40
End: 2020-01-01

## 2020-01-01 ENCOUNTER — HOSPITAL ENCOUNTER (OUTPATIENT)
Dept: CT IMAGING | Facility: CLINIC | Age: 40
Discharge: HOME | End: 2020-02-20
Attending: PHYSICIAN ASSISTANT | Admitting: PHYSICIAN ASSISTANT
Payer: COMMERCIAL

## 2020-01-01 ENCOUNTER — HOSPITAL ENCOUNTER (OUTPATIENT)
Facility: CLINIC | Age: 40
Setting detail: SPECIMEN
Discharge: HOME | End: 2020-04-20
Admitting: INTERNAL MEDICINE
Payer: COMMERCIAL

## 2020-01-01 ENCOUNTER — ANESTHESIA (OUTPATIENT)
Dept: SURGERY | Facility: CLINIC | Age: 40
End: 2020-01-01
Payer: COMMERCIAL

## 2020-01-01 ENCOUNTER — MEDICAL CORRESPONDENCE (OUTPATIENT)
Dept: HEALTH INFORMATION MANAGEMENT | Facility: CLINIC | Age: 40
End: 2020-01-01

## 2020-01-01 ENCOUNTER — TELEPHONE (OUTPATIENT)
Dept: MATERNAL FETAL MEDICINE | Facility: CLINIC | Age: 40
End: 2020-01-01

## 2020-01-01 ENCOUNTER — TELEPHONE (OUTPATIENT)
Dept: FAMILY MEDICINE | Facility: CLINIC | Age: 40
End: 2020-01-01

## 2020-01-01 ENCOUNTER — TRANSFERRED RECORDS (OUTPATIENT)
Dept: HEALTH INFORMATION MANAGEMENT | Facility: CLINIC | Age: 40
End: 2020-01-01

## 2020-01-01 ENCOUNTER — NURSE TRIAGE (OUTPATIENT)
Dept: NURSING | Facility: CLINIC | Age: 40
End: 2020-01-01

## 2020-01-01 ENCOUNTER — PATIENT OUTREACH (OUTPATIENT)
Dept: ONCOLOGY | Facility: CLINIC | Age: 40
End: 2020-01-01

## 2020-01-01 ENCOUNTER — HOSPITAL ENCOUNTER (OUTPATIENT)
Facility: CLINIC | Age: 40
End: 2020-01-01
Admitting: RADIOLOGY
Payer: COMMERCIAL

## 2020-01-01 ENCOUNTER — VIRTUAL VISIT (OUTPATIENT)
Dept: ONCOLOGY | Facility: CLINIC | Age: 40
End: 2020-01-01
Attending: FAMILY MEDICINE
Payer: COMMERCIAL

## 2020-01-01 ENCOUNTER — RECORDS - HEALTHEAST (OUTPATIENT)
Dept: ADMINISTRATIVE | Facility: OTHER | Age: 40
End: 2020-01-01

## 2020-01-01 ENCOUNTER — APPOINTMENT (OUTPATIENT)
Dept: ULTRASOUND IMAGING | Facility: CLINIC | Age: 40
End: 2020-01-01
Attending: SURGERY
Payer: COMMERCIAL

## 2020-01-01 ENCOUNTER — OFFICE VISIT - HEALTHEAST (OUTPATIENT)
Dept: MATERNAL FETAL MEDICINE | Facility: HOSPITAL | Age: 40
End: 2020-01-01

## 2020-01-01 ENCOUNTER — HOSPITAL ENCOUNTER (OUTPATIENT)
Dept: ULTRASOUND IMAGING | Facility: CLINIC | Age: 40
Discharge: HOME | End: 2020-04-09
Attending: FAMILY MEDICINE | Admitting: FAMILY MEDICINE
Payer: COMMERCIAL

## 2020-01-01 ENCOUNTER — TELEPHONE (OUTPATIENT)
Dept: OBGYN | Facility: CLINIC | Age: 40
End: 2020-01-01

## 2020-01-01 ENCOUNTER — HOSPITAL ENCOUNTER (OUTPATIENT)
Facility: CLINIC | Age: 40
Discharge: HOME | End: 2020-01-28
Attending: SURGERY | Admitting: SURGERY
Payer: COMMERCIAL

## 2020-01-01 ENCOUNTER — TELEPHONE (OUTPATIENT)
Dept: INTERVENTIONAL RADIOLOGY/VASCULAR | Facility: CLINIC | Age: 40
End: 2020-01-01

## 2020-01-01 ENCOUNTER — TELEPHONE (OUTPATIENT)
Dept: ONCOLOGY | Facility: CLINIC | Age: 40
End: 2020-01-01

## 2020-01-01 ENCOUNTER — PATIENT OUTREACH (OUTPATIENT)
Dept: OBGYN | Facility: CLINIC | Age: 40
End: 2020-01-01

## 2020-01-01 ENCOUNTER — VIRTUAL VISIT (OUTPATIENT)
Dept: FAMILY MEDICINE | Facility: CLINIC | Age: 40
End: 2020-01-01
Payer: COMMERCIAL

## 2020-01-01 ENCOUNTER — DOCUMENTATION ONLY (OUTPATIENT)
Dept: OTHER | Facility: CLINIC | Age: 40
End: 2020-01-01

## 2020-01-01 ENCOUNTER — OFFICE VISIT (OUTPATIENT)
Dept: MATERNAL FETAL MEDICINE | Facility: CLINIC | Age: 40
End: 2020-01-01
Attending: OBSTETRICS & GYNECOLOGY
Payer: COMMERCIAL

## 2020-01-01 ENCOUNTER — HOSPITAL ENCOUNTER (OUTPATIENT)
Dept: CT IMAGING | Facility: CLINIC | Age: 40
End: 2020-04-13
Attending: PHYSICIAN ASSISTANT
Payer: COMMERCIAL

## 2020-01-01 ENCOUNTER — PRENATAL OFFICE VISIT (OUTPATIENT)
Dept: OBGYN | Facility: CLINIC | Age: 40
End: 2020-01-01
Payer: COMMERCIAL

## 2020-01-01 ENCOUNTER — VIRTUAL VISIT (OUTPATIENT)
Dept: ONCOLOGY | Facility: CLINIC | Age: 40
End: 2020-01-01
Attending: PHYSICIAN ASSISTANT
Payer: COMMERCIAL

## 2020-01-01 ENCOUNTER — RECORDS - HEALTHEAST (OUTPATIENT)
Dept: ULTRASOUND IMAGING | Facility: HOSPITAL | Age: 40
End: 2020-01-01

## 2020-01-01 ENCOUNTER — HOSPITAL ENCOUNTER (OUTPATIENT)
Dept: MRI IMAGING | Facility: CLINIC | Age: 40
End: 2020-04-13
Attending: PHYSICIAN ASSISTANT
Payer: COMMERCIAL

## 2020-01-01 ENCOUNTER — HOSPITAL ENCOUNTER (EMERGENCY)
Facility: CLINIC | Age: 40
Discharge: HOME | End: 2020-07-26
Attending: FAMILY MEDICINE | Admitting: FAMILY MEDICINE
Payer: COMMERCIAL

## 2020-01-01 ENCOUNTER — ANESTHESIA EVENT (OUTPATIENT)
Dept: SURGERY | Facility: CLINIC | Age: 40
End: 2020-01-01
Payer: COMMERCIAL

## 2020-01-01 ENCOUNTER — APPOINTMENT (OUTPATIENT)
Dept: LAB | Facility: CLINIC | Age: 40
End: 2020-01-01
Attending: INTERNAL MEDICINE
Payer: COMMERCIAL

## 2020-01-01 ENCOUNTER — VIRTUAL VISIT (OUTPATIENT)
Dept: ONCOLOGY | Facility: CLINIC | Age: 40
End: 2020-01-01
Attending: INTERNAL MEDICINE
Payer: COMMERCIAL

## 2020-01-01 ENCOUNTER — HOSPITAL ENCOUNTER (OUTPATIENT)
Dept: ULTRASOUND IMAGING | Facility: CLINIC | Age: 40
Discharge: HOME | End: 2020-02-11
Attending: OBSTETRICS & GYNECOLOGY | Admitting: OBSTETRICS & GYNECOLOGY
Payer: COMMERCIAL

## 2020-01-01 ENCOUNTER — AMBULATORY - HEALTHEAST (OUTPATIENT)
Dept: OTHER | Facility: CLINIC | Age: 40
End: 2020-01-01

## 2020-01-01 ENCOUNTER — HOSPITAL ENCOUNTER (EMERGENCY)
Facility: CLINIC | Age: 40
Discharge: HOME | End: 2020-07-14
Attending: FAMILY MEDICINE | Admitting: FAMILY MEDICINE
Payer: COMMERCIAL

## 2020-01-01 ENCOUNTER — AMBULATORY - HEALTHEAST (OUTPATIENT)
Dept: MATERNAL FETAL MEDICINE | Facility: HOSPITAL | Age: 40
End: 2020-01-01

## 2020-01-01 ENCOUNTER — HEALTH MAINTENANCE LETTER (OUTPATIENT)
Age: 40
End: 2020-01-01

## 2020-01-01 ENCOUNTER — OFFICE VISIT (OUTPATIENT)
Dept: DERMATOLOGY | Facility: CLINIC | Age: 40
End: 2020-01-01
Payer: COMMERCIAL

## 2020-01-01 ENCOUNTER — HOSPITAL ENCOUNTER (OUTPATIENT)
Dept: MRI IMAGING | Facility: CLINIC | Age: 40
Discharge: HOME | End: 2020-02-12
Attending: PHYSICIAN ASSISTANT | Admitting: PHYSICIAN ASSISTANT
Payer: COMMERCIAL

## 2020-01-01 ENCOUNTER — HOSPITAL ENCOUNTER (OUTPATIENT)
Facility: CLINIC | Age: 40
Discharge: HOME | End: 2020-06-13
Attending: OBSTETRICS & GYNECOLOGY | Admitting: OBSTETRICS & GYNECOLOGY
Payer: COMMERCIAL

## 2020-01-01 ENCOUNTER — TELEPHONE (OUTPATIENT)
Dept: GASTROENTEROLOGY | Facility: CLINIC | Age: 40
End: 2020-01-01

## 2020-01-01 VITALS
OXYGEN SATURATION: 99 % | HEIGHT: 66 IN | HEART RATE: 64 BPM | WEIGHT: 160 LBS | DIASTOLIC BLOOD PRESSURE: 74 MMHG | BODY MASS INDEX: 25.71 KG/M2 | RESPIRATION RATE: 16 BRPM | TEMPERATURE: 98.6 F | SYSTOLIC BLOOD PRESSURE: 120 MMHG

## 2020-01-01 VITALS
WEIGHT: 160 LBS | TEMPERATURE: 98.1 F | OXYGEN SATURATION: 100 % | HEART RATE: 73 BPM | DIASTOLIC BLOOD PRESSURE: 80 MMHG | BODY MASS INDEX: 25.71 KG/M2 | SYSTOLIC BLOOD PRESSURE: 123 MMHG | RESPIRATION RATE: 20 BRPM | HEIGHT: 66 IN

## 2020-01-01 VITALS
OXYGEN SATURATION: 99 % | BODY MASS INDEX: 29.54 KG/M2 | RESPIRATION RATE: 16 BRPM | SYSTOLIC BLOOD PRESSURE: 110 MMHG | DIASTOLIC BLOOD PRESSURE: 72 MMHG | HEART RATE: 79 BPM | WEIGHT: 183 LBS | TEMPERATURE: 97.9 F

## 2020-01-01 VITALS
SYSTOLIC BLOOD PRESSURE: 106 MMHG | BODY MASS INDEX: 29.25 KG/M2 | RESPIRATION RATE: 16 BRPM | TEMPERATURE: 97.9 F | HEIGHT: 66 IN | HEART RATE: 69 BPM | OXYGEN SATURATION: 97 % | WEIGHT: 182 LBS | DIASTOLIC BLOOD PRESSURE: 68 MMHG

## 2020-01-01 VITALS
BODY MASS INDEX: 29.46 KG/M2 | SYSTOLIC BLOOD PRESSURE: 115 MMHG | HEART RATE: 74 BPM | HEIGHT: 66 IN | DIASTOLIC BLOOD PRESSURE: 71 MMHG

## 2020-01-01 VITALS
HEART RATE: 77 BPM | TEMPERATURE: 98.3 F | SYSTOLIC BLOOD PRESSURE: 105 MMHG | DIASTOLIC BLOOD PRESSURE: 65 MMHG | RESPIRATION RATE: 16 BRPM | WEIGHT: 182.5 LBS | BODY MASS INDEX: 29.46 KG/M2

## 2020-01-01 VITALS
HEART RATE: 81 BPM | BODY MASS INDEX: 29.82 KG/M2 | SYSTOLIC BLOOD PRESSURE: 110 MMHG | WEIGHT: 190 LBS | RESPIRATION RATE: 18 BRPM | TEMPERATURE: 98.3 F | DIASTOLIC BLOOD PRESSURE: 76 MMHG | HEIGHT: 67 IN

## 2020-01-01 DIAGNOSIS — C18.3 MALIGNANT NEOPLASM OF HEPATIC FLEXURE (H): ICD-10-CM

## 2020-01-01 DIAGNOSIS — O09.529 HIGH-RISK PREGNANCY, ELDERLY MULTIGRAVIDA, UNSPECIFIED TRIMESTER: ICD-10-CM

## 2020-01-01 DIAGNOSIS — D22.9 NEVUS: ICD-10-CM

## 2020-01-01 DIAGNOSIS — F41.1 GENERALIZED ANXIETY DISORDER: ICD-10-CM

## 2020-01-01 DIAGNOSIS — D23.9 DERMAL NEVUS: ICD-10-CM

## 2020-01-01 DIAGNOSIS — Z53.9 DIAGNOSIS NOT YET DEFINED: Primary | ICD-10-CM

## 2020-01-01 DIAGNOSIS — R10.11 RUQ ABDOMINAL PAIN: ICD-10-CM

## 2020-01-01 DIAGNOSIS — G89.3 CANCER RELATED PAIN: ICD-10-CM

## 2020-01-01 DIAGNOSIS — O09.529 SUPERVISION OF HIGH-RISK PREGNANCY OF ELDERLY MULTIGRAVIDA: Primary | ICD-10-CM

## 2020-01-01 DIAGNOSIS — Z85.038 HISTORY OF COLON CANCER: ICD-10-CM

## 2020-01-01 DIAGNOSIS — N13.39 OTHER HYDRONEPHROSIS: ICD-10-CM

## 2020-01-01 DIAGNOSIS — O09.529 SUPERVISION OF HIGH-RISK PREGNANCY OF ELDERLY MULTIGRAVIDA: ICD-10-CM

## 2020-01-01 DIAGNOSIS — R10.9 RIGHT FLANK PAIN: ICD-10-CM

## 2020-01-01 DIAGNOSIS — O09.521 SUPERVISION OF ELDERLY MULTIGRAVIDA IN FIRST TRIMESTER: ICD-10-CM

## 2020-01-01 DIAGNOSIS — C18.3 MALIGNANT NEOPLASM OF HEPATIC FLEXURE (H): Primary | ICD-10-CM

## 2020-01-01 DIAGNOSIS — R87.810 CERVICAL HIGH RISK HPV (HUMAN PAPILLOMAVIRUS) TEST POSITIVE: ICD-10-CM

## 2020-01-01 DIAGNOSIS — L81.4 LENTIGO: ICD-10-CM

## 2020-01-01 DIAGNOSIS — G89.3 CANCER RELATED PAIN: Primary | ICD-10-CM

## 2020-01-01 DIAGNOSIS — K76.9 LIVER LESION: ICD-10-CM

## 2020-01-01 DIAGNOSIS — R97.0 ELEVATED CEA: ICD-10-CM

## 2020-01-01 DIAGNOSIS — Z85.038 HISTORY OF COLON CANCER IN ADULTHOOD: ICD-10-CM

## 2020-01-01 DIAGNOSIS — O26.90 PREGNANCY RELATED CONDITIONS, UNSPECIFIED, UNSPECIFIED TRIMESTER: ICD-10-CM

## 2020-01-01 DIAGNOSIS — R10.11 RIGHT UPPER QUADRANT PAIN: ICD-10-CM

## 2020-01-01 DIAGNOSIS — C18.9 COLON CANCER (H): ICD-10-CM

## 2020-01-01 DIAGNOSIS — F41.1 GENERALIZED ANXIETY DISORDER: Primary | ICD-10-CM

## 2020-01-01 DIAGNOSIS — Z85.038 HISTORY OF COLON CANCER: Primary | ICD-10-CM

## 2020-01-01 DIAGNOSIS — K59.00 CONSTIPATION, UNSPECIFIED CONSTIPATION TYPE: ICD-10-CM

## 2020-01-01 DIAGNOSIS — Z85.820 HISTORY OF MELANOMA: Primary | ICD-10-CM

## 2020-01-01 DIAGNOSIS — D18.01 ANGIOMA OF SKIN: ICD-10-CM

## 2020-01-01 DIAGNOSIS — N23 KIDNEY PAIN: Primary | ICD-10-CM

## 2020-01-01 DIAGNOSIS — D23.9 DERMATOFIBROMA: ICD-10-CM

## 2020-01-01 DIAGNOSIS — R10.11 RUQ ABDOMINAL PAIN: Primary | ICD-10-CM

## 2020-01-01 DIAGNOSIS — K82.8 BILIARY DYSKINESIA: ICD-10-CM

## 2020-01-01 DIAGNOSIS — O09.522 AMA (ADVANCED MATERNAL AGE) MULTIGRAVIDA 35+, SECOND TRIMESTER: ICD-10-CM

## 2020-01-01 DIAGNOSIS — R11.0 NAUSEA: Primary | ICD-10-CM

## 2020-01-01 DIAGNOSIS — G89.18 POSTOPERATIVE PAIN: Primary | ICD-10-CM

## 2020-01-01 LAB
ALBUMIN SERPL-MCNC: 2.9 G/DL (ref 3.4–5)
ALBUMIN SERPL-MCNC: 3.2 G/DL (ref 3.4–5)
ALBUMIN SERPL-MCNC: 3.2 G/DL (ref 3.4–5)
ALBUMIN UR-MCNC: NEGATIVE MG/DL
ALBUMIN UR-MCNC: NEGATIVE MG/DL
ALP SERPL-CCNC: 289 U/L (ref 40–150)
ALP SERPL-CCNC: 75 U/L (ref 40–150)
ALP SERPL-CCNC: 84 U/L (ref 40–150)
ALT SERPL W P-5'-P-CCNC: 14 U/L (ref 0–50)
ALT SERPL W P-5'-P-CCNC: 18 U/L (ref 0–50)
ALT SERPL W P-5'-P-CCNC: 46 U/L (ref 0–50)
ALT SERPL-CCNC: 13 U/L (ref 7–45)
ANION GAP SERPL CALCULATED.3IONS-SCNC: 4 MMOL/L (ref 3–14)
ANION GAP SERPL CALCULATED.3IONS-SCNC: 6 MMOL/L (ref 3–14)
ANION GAP SERPL CALCULATED.3IONS-SCNC: 6 MMOL/L (ref 3–14)
APAP SERPL-MCNC: <2 MG/L (ref 10–20)
APPEARANCE UR: CLEAR
APPEARANCE UR: CLEAR
AST SERPL W P-5'-P-CCNC: 12 U/L (ref 0–45)
AST SERPL W P-5'-P-CCNC: 17 U/L (ref 0–45)
AST SERPL W P-5'-P-CCNC: 34 U/L (ref 0–45)
AST SERPL-CCNC: 19 U/L (ref 8–43)
AST SERPL-CCNC: 20 U/L (ref 8–43)
BASOPHILS # BLD AUTO: 0 10E9/L (ref 0–0.2)
BASOPHILS NFR BLD AUTO: 0.2 %
BASOPHILS NFR BLD AUTO: 0.2 %
BASOPHILS NFR BLD AUTO: 0.3 %
BILIRUB SERPL-MCNC: 0.2 MG/DL (ref 0.2–1.3)
BILIRUB SERPL-MCNC: 0.2 MG/DL (ref 0.2–1.3)
BILIRUB SERPL-MCNC: 0.4 MG/DL (ref 0.2–1.3)
BILIRUB UR QL STRIP: NEGATIVE
BILIRUB UR QL STRIP: NEGATIVE
BUN SERPL-MCNC: 4 MG/DL (ref 7–30)
BUN SERPL-MCNC: 6 MG/DL (ref 7–30)
BUN SERPL-MCNC: 8 MG/DL (ref 7–30)
CALCIUM SERPL-MCNC: 8.6 MG/DL (ref 8.5–10.1)
CALCIUM SERPL-MCNC: 8.9 MG/DL (ref 8.5–10.1)
CALCIUM SERPL-MCNC: 9.1 MG/DL (ref 8.5–10.1)
CEA SERPL-MCNC: 69.8 UG/L (ref 0–2.5)
CEA SERPL-MCNC: 8.8 UG/L (ref 0–2.5)
CHLORIDE SERPL-SCNC: 105 MMOL/L (ref 94–109)
CHLORIDE SERPL-SCNC: 106 MMOL/L (ref 94–109)
CHLORIDE SERPL-SCNC: 110 MMOL/L (ref 94–109)
CO2 SERPL-SCNC: 23 MMOL/L (ref 20–32)
CO2 SERPL-SCNC: 25 MMOL/L (ref 20–32)
CO2 SERPL-SCNC: 27 MMOL/L (ref 20–32)
COLOR UR AUTO: YELLOW
COLOR UR AUTO: YELLOW
COPATH REPORT: NORMAL
CREAT SERPL-MCNC: 0.43 MG/DL (ref 0.59–1.04)
CREAT SERPL-MCNC: 0.48 MG/DL (ref 0.59–1.04)
CREAT SERPL-MCNC: 0.49 MG/DL (ref 0.52–1.04)
CREAT SERPL-MCNC: 0.5 MG/DL (ref 0.52–1.04)
CREAT SERPL-MCNC: 0.55 MG/DL (ref 0.52–1.04)
CRP SERPL-MCNC: 13.6 MG/L (ref 0–8)
DIFFERENTIAL METHOD BLD: ABNORMAL
DIFFERENTIAL METHOD BLD: NORMAL
DIFFERENTIAL METHOD BLD: NORMAL
EOSINOPHIL # BLD AUTO: 0.1 10E9/L (ref 0–0.7)
EOSINOPHIL # BLD AUTO: 0.1 10E9/L (ref 0–0.7)
EOSINOPHIL # BLD AUTO: 0.4 10E9/L (ref 0–0.7)
EOSINOPHIL NFR BLD AUTO: 1.1 %
EOSINOPHIL NFR BLD AUTO: 1.6 %
EOSINOPHIL NFR BLD AUTO: 4.7 %
ERYTHROCYTE [DISTWIDTH] IN BLOOD BY AUTOMATED COUNT: 12.8 % (ref 10–15)
ERYTHROCYTE [DISTWIDTH] IN BLOOD BY AUTOMATED COUNT: 13.7 % (ref 10–15)
ERYTHROCYTE [DISTWIDTH] IN BLOOD BY AUTOMATED COUNT: 15.9 % (ref 10–15)
GFR SERPL CREATININE-BSD FRML MDRD: >90 ML/MIN/BSA
GFR SERPL CREATININE-BSD FRML MDRD: >90 ML/MIN/BSA
GFR SERPL CREATININE-BSD FRML MDRD: >90 ML/MIN/{1.73_M2}
GLUCOSE SERPL-MCNC: 79 MG/DL (ref 70–99)
GLUCOSE SERPL-MCNC: 87 MG/DL (ref 70–99)
GLUCOSE SERPL-MCNC: 90 MG/DL (ref 70–140)
GLUCOSE SERPL-MCNC: 96 MG/DL (ref 70–99)
GLUCOSE UR STRIP-MCNC: NEGATIVE MG/DL
GLUCOSE UR STRIP-MCNC: NEGATIVE MG/DL
HCT VFR BLD AUTO: 32.5 % (ref 35–47)
HCT VFR BLD AUTO: 36.2 % (ref 35–47)
HCT VFR BLD AUTO: 38.4 % (ref 35–47)
HGB BLD-MCNC: 12 G/DL (ref 11.7–15.7)
HGB BLD-MCNC: 12.9 G/DL (ref 11.7–15.7)
HGB BLD-MCNC: 9.9 G/DL (ref 11.7–15.7)
HGB UR QL STRIP: NEGATIVE
HGB UR QL STRIP: NEGATIVE
IMM GRANULOCYTES # BLD: 0 10E9/L (ref 0–0.4)
IMM GRANULOCYTES # BLD: 0.1 10E9/L (ref 0–0.4)
IMM GRANULOCYTES NFR BLD: 0.5 %
IMM GRANULOCYTES NFR BLD: 0.5 %
KETONES UR STRIP-MCNC: NEGATIVE MG/DL
KETONES UR STRIP-MCNC: NEGATIVE MG/DL
LABORATORY COMMENT REPORT: NORMAL
LACTATE BLD-SCNC: 1.5 MMOL/L (ref 0.7–2)
LEUKOCYTE ESTERASE UR QL STRIP: NEGATIVE
LEUKOCYTE ESTERASE UR QL STRIP: NEGATIVE
LIPASE SERPL-CCNC: 119 U/L (ref 73–393)
LIPASE SERPL-CCNC: 80 U/L (ref 73–393)
LYMPHOCYTES # BLD AUTO: 1.2 10E9/L (ref 0.8–5.3)
LYMPHOCYTES # BLD AUTO: 1.2 10E9/L (ref 0.8–5.3)
LYMPHOCYTES # BLD AUTO: 1.7 10E9/L (ref 0.8–5.3)
LYMPHOCYTES NFR BLD AUTO: 14.6 %
LYMPHOCYTES NFR BLD AUTO: 15.9 %
LYMPHOCYTES NFR BLD AUTO: 17.7 %
MCH RBC QN AUTO: 27.4 PG (ref 26.5–33)
MCH RBC QN AUTO: 30.6 PG (ref 26.5–33)
MCH RBC QN AUTO: 30.6 PG (ref 26.5–33)
MCHC RBC AUTO-ENTMCNC: 30.5 G/DL (ref 31.5–36.5)
MCHC RBC AUTO-ENTMCNC: 33.1 G/DL (ref 31.5–36.5)
MCHC RBC AUTO-ENTMCNC: 33.6 G/DL (ref 31.5–36.5)
MCV RBC AUTO: 90 FL (ref 78–100)
MCV RBC AUTO: 91 FL (ref 78–100)
MCV RBC AUTO: 92 FL (ref 78–100)
MONOCYTES # BLD AUTO: 0.5 10E9/L (ref 0–1.3)
MONOCYTES # BLD AUTO: 0.6 10E9/L (ref 0–1.3)
MONOCYTES # BLD AUTO: 0.7 10E9/L (ref 0–1.3)
MONOCYTES NFR BLD AUTO: 6.2 %
MONOCYTES NFR BLD AUTO: 6.5 %
MONOCYTES NFR BLD AUTO: 8.4 %
MUCOUS THREADS #/AREA URNS LPF: PRESENT /LPF
NEUTROPHILS # BLD AUTO: 5.6 10E9/L (ref 1.6–8.3)
NEUTROPHILS # BLD AUTO: 6.3 10E9/L (ref 1.6–8.3)
NEUTROPHILS # BLD AUTO: 6.6 10E9/L (ref 1.6–8.3)
NEUTROPHILS NFR BLD AUTO: 70.4 %
NEUTROPHILS NFR BLD AUTO: 75.2 %
NEUTROPHILS NFR BLD AUTO: 76 %
NITRATE UR QL: NEGATIVE
NITRATE UR QL: NEGATIVE
NRBC # BLD AUTO: 0 10*3/UL
NRBC # BLD AUTO: 0 10*3/UL
NRBC BLD AUTO-RTO: 0 /100
NRBC BLD AUTO-RTO: 0 /100
PH UR STRIP: 6 PH (ref 5–7)
PH UR STRIP: 7 PH (ref 5–7)
PLATELET # BLD AUTO: 196 10E9/L (ref 150–450)
PLATELET # BLD AUTO: 199 10E9/L (ref 150–450)
PLATELET # BLD AUTO: 270 10E9/L (ref 150–450)
POTASSIUM SERPL-SCNC: 3.4 MMOL/L (ref 3.4–5.3)
POTASSIUM SERPL-SCNC: 3.6 MMOL/L (ref 3.6–5.2)
POTASSIUM SERPL-SCNC: 3.9 MMOL/L (ref 3.4–5.3)
POTASSIUM SERPL-SCNC: 3.9 MMOL/L (ref 3.4–5.3)
PROT SERPL-MCNC: 6.8 G/DL (ref 6.8–8.8)
PROT SERPL-MCNC: 6.8 G/DL (ref 6.8–8.8)
PROT SERPL-MCNC: 6.9 G/DL (ref 6.8–8.8)
RBC # BLD AUTO: 3.61 10E12/L (ref 3.8–5.2)
RBC # BLD AUTO: 3.92 10E12/L (ref 3.8–5.2)
RBC # BLD AUTO: 4.22 10E12/L (ref 3.8–5.2)
RBC #/AREA URNS AUTO: <1 /HPF (ref 0–2)
SARS-COV-2 PCR COMMENT: NORMAL
SARS-COV-2 RNA SPEC QL NAA+PROBE: NEGATIVE
SARS-COV-2 RNA SPEC QL NAA+PROBE: NEGATIVE
SARS-COV-2 RNA SPEC QL NAA+PROBE: NORMAL
SARS-COV-2 RNA SPEC QL NAA+PROBE: NORMAL
SODIUM SERPL-SCNC: 135 MMOL/L (ref 133–144)
SODIUM SERPL-SCNC: 138 MMOL/L (ref 133–144)
SODIUM SERPL-SCNC: 139 MMOL/L (ref 133–144)
SOURCE: ABNORMAL
SOURCE: NORMAL
SP GR UR STRIP: 1.02 (ref 1–1.03)
SP GR UR STRIP: 1.02 (ref 1–1.03)
SPECIMEN SOURCE: NORMAL
SQUAMOUS #/AREA URNS AUTO: <1 /HPF (ref 0–1)
UROBILINOGEN UR STRIP-ACNC: 0.2 EU/DL (ref 0.2–1)
UROBILINOGEN UR STRIP-MCNC: 0 MG/DL (ref 0–2)
WBC # BLD AUTO: 7.4 10E9/L (ref 4–11)
WBC # BLD AUTO: 8.3 10E9/L (ref 4–11)
WBC # BLD AUTO: 9.3 10E9/L (ref 4–11)
WBC #/AREA URNS AUTO: 2 /HPF (ref 0–5)

## 2020-01-01 PROCEDURE — 40000114 ZZH STATISTIC NO CHARGE CLINIC VISIT

## 2020-01-01 PROCEDURE — 81003 URINALYSIS AUTO W/O SCOPE: CPT | Performed by: OBSTETRICS & GYNECOLOGY

## 2020-01-01 PROCEDURE — 99214 OFFICE O/P EST MOD 30 MIN: CPT | Mod: 95 | Performed by: PHYSICIAN ASSISTANT

## 2020-01-01 PROCEDURE — 85025 COMPLETE CBC W/AUTO DIFF WBC: CPT | Performed by: OBSTETRICS & GYNECOLOGY

## 2020-01-01 PROCEDURE — 36415 COLL VENOUS BLD VENIPUNCTURE: CPT | Performed by: OBSTETRICS & GYNECOLOGY

## 2020-01-01 PROCEDURE — 27210794 ZZH OR GENERAL SUPPLY STERILE: Performed by: SURGERY

## 2020-01-01 PROCEDURE — 76801 OB US < 14 WKS SINGLE FETUS: CPT

## 2020-01-01 PROCEDURE — 37000008 ZZH ANESTHESIA TECHNICAL FEE, 1ST 30 MIN: Performed by: SURGERY

## 2020-01-01 PROCEDURE — 96361 HYDRATE IV INFUSION ADD-ON: CPT | Performed by: FAMILY MEDICINE

## 2020-01-01 PROCEDURE — 74181 MRI ABDOMEN W/O CONTRAST: CPT

## 2020-01-01 PROCEDURE — 25800030 ZZH RX IP 258 OP 636: Performed by: NURSE ANESTHETIST, CERTIFIED REGISTERED

## 2020-01-01 PROCEDURE — 83690 ASSAY OF LIPASE: CPT | Performed by: PHYSICIAN ASSISTANT

## 2020-01-01 PROCEDURE — 83605 ASSAY OF LACTIC ACID: CPT | Performed by: FAMILY MEDICINE

## 2020-01-01 PROCEDURE — 99285 EMERGENCY DEPT VISIT HI MDM: CPT | Mod: 25 | Performed by: FAMILY MEDICINE

## 2020-01-01 PROCEDURE — 47562 LAPAROSCOPIC CHOLECYSTECTOMY: CPT | Mod: AS | Performed by: PHYSICIAN ASSISTANT

## 2020-01-01 PROCEDURE — 83690 ASSAY OF LIPASE: CPT | Performed by: FAMILY MEDICINE

## 2020-01-01 PROCEDURE — U0003 INFECTIOUS AGENT DETECTION BY NUCLEIC ACID (DNA OR RNA); SEVERE ACUTE RESPIRATORY SYNDROME CORONAVIRUS 2 (SARS-COV-2) (CORONAVIRUS DISEASE [COVID-19]), AMPLIFIED PROBE TECHNIQUE, MAKING USE OF HIGH THROUGHPUT TECHNOLOGIES AS DESCRIBED BY CMS-2020-01-R: HCPCS | Performed by: FAMILY MEDICINE

## 2020-01-01 PROCEDURE — 71250 CT THORAX DX C-: CPT

## 2020-01-01 PROCEDURE — 81445 SO NEO GSAP 5-50DNA/DNA&RNA: CPT | Performed by: INTERNAL MEDICINE

## 2020-01-01 PROCEDURE — 96375 TX/PRO/DX INJ NEW DRUG ADDON: CPT | Performed by: FAMILY MEDICINE

## 2020-01-01 PROCEDURE — 40000306 ZZH STATISTIC PRE PROC ASSESS II: Performed by: SURGERY

## 2020-01-01 PROCEDURE — 85025 COMPLETE CBC W/AUTO DIFF WBC: CPT | Performed by: PHYSICIAN ASSISTANT

## 2020-01-01 PROCEDURE — 27110028 ZZH OR GENERAL SUPPLY NON-STERILE: Performed by: SURGERY

## 2020-01-01 PROCEDURE — 99212 OFFICE O/P EST SF 10 MIN: CPT | Performed by: OBSTETRICS & GYNECOLOGY

## 2020-01-01 PROCEDURE — 80053 COMPREHEN METABOLIC PANEL: CPT | Performed by: FAMILY MEDICINE

## 2020-01-01 PROCEDURE — 36000056 ZZH SURGERY LEVEL 3 1ST 30 MIN: Performed by: SURGERY

## 2020-01-01 PROCEDURE — 71000012 ZZH RECOVERY PHASE 1 LEVEL 1 FIRST HR: Performed by: SURGERY

## 2020-01-01 PROCEDURE — 85025 COMPLETE CBC W/AUTO DIFF WBC: CPT | Performed by: FAMILY MEDICINE

## 2020-01-01 PROCEDURE — 96374 THER/PROPH/DIAG INJ IV PUSH: CPT | Performed by: FAMILY MEDICINE

## 2020-01-01 PROCEDURE — 25000132 ZZH RX MED GY IP 250 OP 250 PS 637: Performed by: NURSE ANESTHETIST, CERTIFIED REGISTERED

## 2020-01-01 PROCEDURE — 71000027 ZZH RECOVERY PHASE 2 EACH 15 MINS: Performed by: SURGERY

## 2020-01-01 PROCEDURE — 37000009 ZZH ANESTHESIA TECHNICAL FEE, EACH ADDTL 15 MIN: Performed by: SURGERY

## 2020-01-01 PROCEDURE — 88304 TISSUE EXAM BY PATHOLOGIST: CPT | Performed by: SURGERY

## 2020-01-01 PROCEDURE — 99442 ZZC PHYSICIAN TELEPHONE EVALUATION 11-20 MIN: CPT | Performed by: FAMILY MEDICINE

## 2020-01-01 PROCEDURE — 96372 THER/PROPH/DIAG INJ SC/IM: CPT

## 2020-01-01 PROCEDURE — G0180 MD CERTIFICATION HHA PATIENT: HCPCS | Performed by: FAMILY MEDICINE

## 2020-01-01 PROCEDURE — 82378 CARCINOEMBRYONIC ANTIGEN: CPT | Performed by: PHYSICIAN ASSISTANT

## 2020-01-01 PROCEDURE — 80053 COMPREHEN METABOLIC PANEL: CPT | Performed by: PHYSICIAN ASSISTANT

## 2020-01-01 PROCEDURE — 25000128 H RX IP 250 OP 636: Performed by: NURSE ANESTHETIST, CERTIFIED REGISTERED

## 2020-01-01 PROCEDURE — 88321 CONSLTJ&REPRT SLD PREP ELSWR: CPT | Performed by: PATHOLOGY

## 2020-01-01 PROCEDURE — G0463 HOSPITAL OUTPT CLINIC VISIT: HCPCS | Mod: 25,ZF

## 2020-01-01 PROCEDURE — C9803 HOPD COVID-19 SPEC COLLECT: HCPCS | Performed by: FAMILY MEDICINE

## 2020-01-01 PROCEDURE — U0003 INFECTIOUS AGENT DETECTION BY NUCLEIC ACID (DNA OR RNA); SEVERE ACUTE RESPIRATORY SYNDROME CORONAVIRUS 2 (SARS-COV-2) (CORONAVIRUS DISEASE [COVID-19]), AMPLIFIED PROBE TECHNIQUE, MAKING USE OF HIGH THROUGHPUT TECHNOLOGIES AS DESCRIBED BY CMS-2020-01-R: HCPCS | Performed by: EMERGENCY MEDICINE

## 2020-01-01 PROCEDURE — 99285 EMERGENCY DEPT VISIT HI MDM: CPT | Mod: Z6 | Performed by: FAMILY MEDICINE

## 2020-01-01 PROCEDURE — 99443 ZZC PHYSICIAN TELEPHONE EVALUATION 21-30 MIN: CPT | Performed by: FAMILY MEDICINE

## 2020-01-01 PROCEDURE — 76805 OB US >/= 14 WKS SNGL FETUS: CPT

## 2020-01-01 PROCEDURE — 25000566 ZZH SEVOFLURANE, EA 15 MIN: Performed by: SURGERY

## 2020-01-01 PROCEDURE — 25000125 ZZHC RX 250: Performed by: NURSE ANESTHETIST, CERTIFIED REGISTERED

## 2020-01-01 PROCEDURE — 25000125 ZZHC RX 250: Performed by: SURGERY

## 2020-01-01 PROCEDURE — 40000268 ZZH STATISTIC NO CHARGES: Performed by: FAMILY MEDICINE

## 2020-01-01 PROCEDURE — 25800030 ZZH RX IP 258 OP 636: Performed by: FAMILY MEDICINE

## 2020-01-01 PROCEDURE — 80329 ANALGESICS NON-OPIOID 1 OR 2: CPT | Performed by: FAMILY MEDICINE

## 2020-01-01 PROCEDURE — 999N001193 HC VIDEO/TELEPHONE VISIT; NO CHARGE

## 2020-01-01 PROCEDURE — 86140 C-REACTIVE PROTEIN: CPT | Performed by: FAMILY MEDICINE

## 2020-01-01 PROCEDURE — 99214 OFFICE O/P EST MOD 30 MIN: CPT | Performed by: DERMATOLOGY

## 2020-01-01 PROCEDURE — 47562 LAPAROSCOPIC CHOLECYSTECTOMY: CPT | Performed by: SURGERY

## 2020-01-01 PROCEDURE — 81001 URINALYSIS AUTO W/SCOPE: CPT | Performed by: FAMILY MEDICINE

## 2020-01-01 PROCEDURE — 99215 OFFICE O/P EST HI 40 MIN: CPT | Mod: 95 | Performed by: FAMILY MEDICINE

## 2020-01-01 PROCEDURE — 25800025 ZZH RX 258: Performed by: SURGERY

## 2020-01-01 PROCEDURE — 99207 ZZC PRENATAL VISIT: CPT | Performed by: OBSTETRICS & GYNECOLOGY

## 2020-01-01 PROCEDURE — 999N001032 HC STATISTIC REVIEW OUTSIDE SLIDES TC 88321: Performed by: FAMILY MEDICINE

## 2020-01-01 PROCEDURE — 36415 COLL VENOUS BLD VENIPUNCTURE: CPT

## 2020-01-01 PROCEDURE — 36000058 ZZH SURGERY LEVEL 3 EA 15 ADDTL MIN: Performed by: SURGERY

## 2020-01-01 PROCEDURE — 25000128 H RX IP 250 OP 636: Performed by: FAMILY MEDICINE

## 2020-01-01 PROCEDURE — 88304 TISSUE EXAM BY PATHOLOGIST: CPT | Mod: 26 | Performed by: SURGERY

## 2020-01-01 PROCEDURE — 25000132 ZZH RX MED GY IP 250 OP 250 PS 637: Performed by: SURGERY

## 2020-01-01 PROCEDURE — 25000128 H RX IP 250 OP 636: Performed by: SURGERY

## 2020-01-01 PROCEDURE — 99215 OFFICE O/P EST HI 40 MIN: CPT | Mod: ZP | Performed by: PHYSICIAN ASSISTANT

## 2020-01-01 PROCEDURE — 76705 ECHO EXAM OF ABDOMEN: CPT

## 2020-01-01 PROCEDURE — 25000128 H RX IP 250 OP 636: Performed by: OBSTETRICS & GYNECOLOGY

## 2020-01-01 RX ORDER — GABAPENTIN 300 MG/1
300 CAPSULE ORAL ONCE
Status: COMPLETED | OUTPATIENT
Start: 2020-01-01 | End: 2020-01-01

## 2020-01-01 RX ORDER — FENTANYL CITRATE 50 UG/ML
25-50 INJECTION, SOLUTION INTRAMUSCULAR; INTRAVENOUS
Status: DISCONTINUED | OUTPATIENT
Start: 2020-01-01 | End: 2020-01-01 | Stop reason: HOSPADM

## 2020-01-01 RX ORDER — FENTANYL 75 UG/1
1 PATCH TRANSDERMAL
Qty: 6 PATCH | Refills: 0 | Status: SHIPPED | OUTPATIENT
Start: 2020-01-01 | End: 2020-01-01

## 2020-01-01 RX ORDER — ONDANSETRON 2 MG/ML
4 INJECTION INTRAMUSCULAR; INTRAVENOUS ONCE
Status: DISCONTINUED | OUTPATIENT
Start: 2020-01-01 | End: 2020-01-01 | Stop reason: HOSPADM

## 2020-01-01 RX ORDER — HYDROCODONE BITARTRATE AND ACETAMINOPHEN 5; 325 MG/1; MG/1
1-2 TABLET ORAL EVERY 4 HOURS PRN
Status: DISCONTINUED | OUTPATIENT
Start: 2020-01-01 | End: 2020-01-01 | Stop reason: HOSPADM

## 2020-01-01 RX ORDER — ONDANSETRON 8 MG/1
8 TABLET, FILM COATED ORAL
COMMUNITY
Start: 2020-01-01 | End: 2020-01-01

## 2020-01-01 RX ORDER — HYDROMORPHONE HYDROCHLORIDE 2 MG/1
2-4 TABLET ORAL EVERY 4 HOURS PRN
Qty: 240 TABLET | Refills: 0 | Status: SHIPPED | OUTPATIENT
Start: 2020-01-01 | End: 2020-01-01

## 2020-01-01 RX ORDER — CEFAZOLIN SODIUM 1 G/3ML
1 INJECTION, POWDER, FOR SOLUTION INTRAMUSCULAR; INTRAVENOUS SEE ADMIN INSTRUCTIONS
Status: DISCONTINUED | OUTPATIENT
Start: 2020-01-01 | End: 2020-01-01 | Stop reason: HOSPADM

## 2020-01-01 RX ORDER — HYDROMORPHONE HYDROCHLORIDE 2 MG/1
4-6 TABLET ORAL
Qty: 300 TABLET | Refills: 0 | Status: SHIPPED | OUTPATIENT
Start: 2020-01-01 | End: 2021-01-01

## 2020-01-01 RX ORDER — FENTANYL 100 UG/1
1 PATCH TRANSDERMAL
Qty: 5 PATCH | Refills: 0 | Status: SHIPPED | OUTPATIENT
Start: 2020-01-01 | End: 2020-01-01

## 2020-01-01 RX ORDER — IBUPROFEN 200 MG
800 TABLET ORAL
Qty: 120 TABLET | Refills: 3 | Status: SHIPPED | OUTPATIENT
Start: 2020-01-01

## 2020-01-01 RX ORDER — KETOROLAC TROMETHAMINE 30 MG/ML
30 INJECTION, SOLUTION INTRAMUSCULAR; INTRAVENOUS ONCE
Status: COMPLETED | OUTPATIENT
Start: 2020-01-01 | End: 2020-01-01

## 2020-01-01 RX ORDER — PROCHLORPERAZINE MALEATE 10 MG
10 TABLET ORAL EVERY 6 HOURS PRN
Qty: 60 TABLET | Refills: 3 | Status: SHIPPED | OUTPATIENT
Start: 2020-01-01

## 2020-01-01 RX ORDER — PROPOFOL 10 MG/ML
INJECTION, EMULSION INTRAVENOUS PRN
Status: DISCONTINUED | OUTPATIENT
Start: 2020-01-01 | End: 2020-01-01

## 2020-01-01 RX ORDER — ONDANSETRON 4 MG/1
4 TABLET, ORALLY DISINTEGRATING ORAL EVERY 30 MIN PRN
Status: DISCONTINUED | OUTPATIENT
Start: 2020-01-01 | End: 2020-01-01 | Stop reason: HOSPADM

## 2020-01-01 RX ORDER — POLYETHYLENE GLYCOL 3350 17 G/17G
1 POWDER, FOR SOLUTION ORAL DAILY
Qty: 507 G | Refills: 3 | Status: SHIPPED | OUTPATIENT
Start: 2020-01-01

## 2020-01-01 RX ORDER — HEPARIN SODIUM (PORCINE) LOCK FLUSH IV SOLN 100 UNIT/ML 100 UNIT/ML
5 SOLUTION INTRAVENOUS
Status: DISCONTINUED | OUTPATIENT
Start: 2020-01-01 | End: 2020-01-01 | Stop reason: HOSPADM

## 2020-01-01 RX ORDER — SODIUM CHLORIDE, SODIUM LACTATE, POTASSIUM CHLORIDE, CALCIUM CHLORIDE 600; 310; 30; 20 MG/100ML; MG/100ML; MG/100ML; MG/100ML
INJECTION, SOLUTION INTRAVENOUS CONTINUOUS
Status: DISCONTINUED | OUTPATIENT
Start: 2020-01-01 | End: 2020-01-01 | Stop reason: HOSPADM

## 2020-01-01 RX ORDER — NALOXONE HYDROCHLORIDE 0.4 MG/ML
.1-.4 INJECTION, SOLUTION INTRAMUSCULAR; INTRAVENOUS; SUBCUTANEOUS
Status: DISCONTINUED | OUTPATIENT
Start: 2020-01-01 | End: 2020-01-01 | Stop reason: HOSPADM

## 2020-01-01 RX ORDER — METHADONE HYDROCHLORIDE 10 MG/1
10 TABLET ORAL EVERY 8 HOURS
Qty: 30 TABLET | Refills: 0 | Status: SHIPPED | OUTPATIENT
Start: 2020-01-01 | End: 2020-01-01

## 2020-01-01 RX ORDER — DEXAMETHASONE SODIUM PHOSPHATE 4 MG/ML
INJECTION, SOLUTION INTRA-ARTICULAR; INTRALESIONAL; INTRAMUSCULAR; INTRAVENOUS; SOFT TISSUE PRN
Status: DISCONTINUED | OUTPATIENT
Start: 2020-01-01 | End: 2020-01-01

## 2020-01-01 RX ORDER — BETAMETHASONE SODIUM PHOSPHATE AND BETAMETHASONE ACETATE 3; 3 MG/ML; MG/ML
12 INJECTION, SUSPENSION INTRA-ARTICULAR; INTRALESIONAL; INTRAMUSCULAR; SOFT TISSUE ONCE
Status: COMPLETED | OUTPATIENT
Start: 2020-01-01 | End: 2020-01-01

## 2020-01-01 RX ORDER — FENTANYL 25 UG/1
1 PATCH TRANSDERMAL
COMMUNITY
Start: 2020-01-01 | End: 2020-01-01

## 2020-01-01 RX ORDER — PREDNISONE 10 MG/1
30 TABLET ORAL
COMMUNITY
Start: 2020-01-01 | End: 2020-01-01

## 2020-01-01 RX ORDER — CITALOPRAM HYDROBROMIDE 20 MG/1
20 TABLET ORAL DAILY
Qty: 30 TABLET | Refills: 3 | Status: SHIPPED | OUTPATIENT
Start: 2020-01-01 | End: 2020-01-01

## 2020-01-01 RX ORDER — SENNOSIDES A AND B 8.6 MG/1
17.2 TABLET, FILM COATED ORAL DAILY
Qty: 180 TABLET | Refills: 3 | Status: SHIPPED | OUTPATIENT
Start: 2020-01-01

## 2020-01-01 RX ORDER — SIMETHICONE 80 MG
160 TABLET,CHEWABLE ORAL EVERY 6 HOURS PRN
Qty: 120 TABLET | Refills: 3 | Status: SHIPPED | OUTPATIENT
Start: 2020-01-01

## 2020-01-01 RX ORDER — ACETAMINOPHEN 325 MG/1
975 TABLET ORAL ONCE
Status: COMPLETED | OUTPATIENT
Start: 2020-01-01 | End: 2020-01-01

## 2020-01-01 RX ORDER — ACETAMINOPHEN 500 MG
1000 TABLET ORAL
COMMUNITY
Start: 2020-01-01

## 2020-01-01 RX ORDER — LIDOCAINE 40 MG/G
CREAM TOPICAL
Status: DISCONTINUED | OUTPATIENT
Start: 2020-01-01 | End: 2020-01-01 | Stop reason: HOSPADM

## 2020-01-01 RX ORDER — LIDOCAINE HYDROCHLORIDE 10 MG/ML
INJECTION, SOLUTION INFILTRATION; PERINEURAL PRN
Status: DISCONTINUED | OUTPATIENT
Start: 2020-01-01 | End: 2020-01-01

## 2020-01-01 RX ORDER — LOPERAMIDE HCL 2 MG
CAPSULE ORAL
COMMUNITY
Start: 2020-01-01 | End: 2020-01-01

## 2020-01-01 RX ORDER — ONDANSETRON 2 MG/ML
4 INJECTION INTRAMUSCULAR; INTRAVENOUS EVERY 30 MIN PRN
Status: DISCONTINUED | OUTPATIENT
Start: 2020-01-01 | End: 2020-01-01 | Stop reason: HOSPADM

## 2020-01-01 RX ORDER — ONDANSETRON 8 MG/1
8 TABLET, FILM COATED ORAL EVERY 8 HOURS PRN
Qty: 180 TABLET | Refills: 3 | Status: SHIPPED | OUTPATIENT
Start: 2020-01-01

## 2020-01-01 RX ORDER — FENTANYL CITRATE 50 UG/ML
INJECTION, SOLUTION INTRAMUSCULAR; INTRAVENOUS PRN
Status: DISCONTINUED | OUTPATIENT
Start: 2020-01-01 | End: 2020-01-01

## 2020-01-01 RX ORDER — METHADONE HYDROCHLORIDE 5 MG/1
5 TABLET ORAL EVERY 8 HOURS
Qty: 30 TABLET | Refills: 0 | Status: SHIPPED | OUTPATIENT
Start: 2020-01-01

## 2020-01-01 RX ORDER — CEFAZOLIN SODIUM 2 G/100ML
2 INJECTION, SOLUTION INTRAVENOUS
Status: COMPLETED | OUTPATIENT
Start: 2020-01-01 | End: 2020-01-01

## 2020-01-01 RX ORDER — ONDANSETRON 2 MG/ML
INJECTION INTRAMUSCULAR; INTRAVENOUS PRN
Status: DISCONTINUED | OUTPATIENT
Start: 2020-01-01 | End: 2020-01-01

## 2020-01-01 RX ORDER — IBUPROFEN 200 MG
800 TABLET ORAL
COMMUNITY
Start: 2020-01-01 | End: 2020-01-01

## 2020-01-01 RX ORDER — HYDROMORPHONE HYDROCHLORIDE 1 MG/ML
.3-.5 INJECTION, SOLUTION INTRAMUSCULAR; INTRAVENOUS; SUBCUTANEOUS EVERY 10 MIN PRN
Status: DISCONTINUED | OUTPATIENT
Start: 2020-01-01 | End: 2020-01-01 | Stop reason: HOSPADM

## 2020-01-01 RX ORDER — OXYCODONE HYDROCHLORIDE 5 MG/1
5-10 TABLET ORAL EVERY 4 HOURS PRN
Qty: 90 TABLET | Refills: 0 | Status: SHIPPED | OUTPATIENT
Start: 2020-01-01

## 2020-01-01 RX ORDER — MORPHINE SULFATE 30 MG/1
TABLET, FILM COATED, EXTENDED RELEASE ORAL
COMMUNITY
Start: 2020-01-01 | End: 2020-01-01

## 2020-01-01 RX ORDER — HYDROCODONE BITARTRATE AND ACETAMINOPHEN 5; 325 MG/1; MG/1
1-2 TABLET ORAL EVERY 6 HOURS PRN
Qty: 20 TABLET | Refills: 0 | Status: SHIPPED | OUTPATIENT
Start: 2020-01-01 | End: 2020-01-01

## 2020-01-01 RX ORDER — HYDROMORPHONE HYDROCHLORIDE 2 MG/1
2 TABLET ORAL EVERY 6 HOURS PRN
Status: DISCONTINUED | OUTPATIENT
Start: 2020-01-01 | End: 2020-01-01 | Stop reason: HOSPADM

## 2020-01-01 RX ORDER — SENNOSIDES A AND B 8.6 MG/1
17.2 TABLET, FILM COATED ORAL
COMMUNITY
Start: 2020-01-01 | End: 2020-01-01

## 2020-01-01 RX ORDER — SIMETHICONE 80 MG
160 TABLET,CHEWABLE ORAL
COMMUNITY
Start: 2020-01-01 | End: 2020-01-01

## 2020-01-01 RX ORDER — MORPHINE SULFATE 30 MG/1
60 TABLET, FILM COATED, EXTENDED RELEASE ORAL EVERY 12 HOURS
Qty: 120 TABLET | Refills: 0 | Status: SHIPPED | OUTPATIENT
Start: 2020-01-01

## 2020-01-01 RX ORDER — BUPIVACAINE HYDROCHLORIDE AND EPINEPHRINE 5; 5 MG/ML; UG/ML
INJECTION, SOLUTION PERINEURAL PRN
Status: DISCONTINUED | OUTPATIENT
Start: 2020-01-01 | End: 2020-01-01 | Stop reason: HOSPADM

## 2020-01-01 RX ORDER — HYDROMORPHONE HYDROCHLORIDE 2 MG/1
2 TABLET ORAL EVERY 6 HOURS PRN
Qty: 10 TABLET | Refills: 0 | Status: SHIPPED | OUTPATIENT
Start: 2020-01-01 | End: 2020-01-01

## 2020-01-01 RX ORDER — HYDROMORPHONE HYDROCHLORIDE 1 MG/ML
0.5 INJECTION, SOLUTION INTRAMUSCULAR; INTRAVENOUS; SUBCUTANEOUS ONCE
Status: COMPLETED | OUTPATIENT
Start: 2020-01-01 | End: 2020-01-01

## 2020-01-01 RX ORDER — HYDROMORPHONE HYDROCHLORIDE 2 MG/1
2-4 TABLET ORAL
COMMUNITY
Start: 2020-01-01 | End: 2020-01-01

## 2020-01-01 RX ORDER — CITALOPRAM HYDROBROMIDE 20 MG/1
20 TABLET ORAL DAILY
Qty: 90 TABLET | Refills: 3 | Status: SHIPPED | OUTPATIENT
Start: 2020-01-01

## 2020-01-01 RX ORDER — ONDANSETRON 8 MG/1
8 TABLET, FILM COATED ORAL
COMMUNITY
Start: 2020-01-01 | End: 2021-04-15

## 2020-01-01 RX ADMIN — DEXAMETHASONE SODIUM PHOSPHATE 4 MG: 4 INJECTION, SOLUTION INTRA-ARTICULAR; INTRALESIONAL; INTRAMUSCULAR; INTRAVENOUS; SOFT TISSUE at 10:24

## 2020-01-01 RX ADMIN — FENTANYL CITRATE 100 MCG: 50 INJECTION, SOLUTION INTRAMUSCULAR; INTRAVENOUS at 10:24

## 2020-01-01 RX ADMIN — FENTANYL CITRATE 100 MCG: 50 INJECTION, SOLUTION INTRAMUSCULAR; INTRAVENOUS at 10:31

## 2020-01-01 RX ADMIN — HYDROCODONE BITARTRATE AND ACETAMINOPHEN 1 TABLET: 5; 325 TABLET ORAL at 12:20

## 2020-01-01 RX ADMIN — Medication 5 ML: at 00:50

## 2020-01-01 RX ADMIN — ACETAMINOPHEN 975 MG: 325 TABLET, FILM COATED ORAL at 10:03

## 2020-01-01 RX ADMIN — SODIUM CHLORIDE, POTASSIUM CHLORIDE, SODIUM LACTATE AND CALCIUM CHLORIDE 1000 ML: 600; 310; 30; 20 INJECTION, SOLUTION INTRAVENOUS at 21:40

## 2020-01-01 RX ADMIN — FENTANYL CITRATE 100 MCG: 50 INJECTION, SOLUTION INTRAMUSCULAR; INTRAVENOUS at 10:58

## 2020-01-01 RX ADMIN — KETOROLAC TROMETHAMINE 30 MG: 30 INJECTION, SOLUTION INTRAMUSCULAR at 21:53

## 2020-01-01 RX ADMIN — LIDOCAINE HYDROCHLORIDE 40 MG: 10 INJECTION, SOLUTION INFILTRATION; PERINEURAL at 10:24

## 2020-01-01 RX ADMIN — FENTANYL CITRATE 100 MCG: 50 INJECTION, SOLUTION INTRAMUSCULAR; INTRAVENOUS at 11:15

## 2020-01-01 RX ADMIN — PROPOFOL 200 MG: 10 INJECTION, EMULSION INTRAVENOUS at 10:24

## 2020-01-01 RX ADMIN — SODIUM CHLORIDE, POTASSIUM CHLORIDE, SODIUM LACTATE AND CALCIUM CHLORIDE: 600; 310; 30; 20 INJECTION, SOLUTION INTRAVENOUS at 23:34

## 2020-01-01 RX ADMIN — GABAPENTIN 300 MG: 300 CAPSULE ORAL at 10:03

## 2020-01-01 RX ADMIN — SUGAMMADEX 200 MG: 100 INJECTION, SOLUTION INTRAVENOUS at 10:54

## 2020-01-01 RX ADMIN — PROMETHAZINE HYDROCHLORIDE 12.5 MG: 25 INJECTION INTRAMUSCULAR; INTRAVENOUS at 22:45

## 2020-01-01 RX ADMIN — ONDANSETRON 4 MG: 2 INJECTION INTRAMUSCULAR; INTRAVENOUS at 10:24

## 2020-01-01 RX ADMIN — FENTANYL CITRATE 50 MCG: 50 INJECTION, SOLUTION INTRAMUSCULAR; INTRAVENOUS at 10:40

## 2020-01-01 RX ADMIN — HYDROMORPHONE HYDROCHLORIDE 0.5 MG: 1 INJECTION, SOLUTION INTRAMUSCULAR; INTRAVENOUS; SUBCUTANEOUS at 21:51

## 2020-01-01 RX ADMIN — BETAMETHASONE SODIUM PHOSPHATE AND BETAMETHASONE ACETATE 12 MG: 3; 3 INJECTION, SUSPENSION INTRA-ARTICULAR; INTRALESIONAL; INTRAMUSCULAR at 16:18

## 2020-01-01 RX ADMIN — LIDOCAINE HYDROCHLORIDE 1 ML: 10 INJECTION, SOLUTION EPIDURAL; INFILTRATION; INTRACAUDAL; PERINEURAL at 10:01

## 2020-01-01 RX ADMIN — HYDROMORPHONE HYDROCHLORIDE 0.5 MG: 1 INJECTION, SOLUTION INTRAMUSCULAR; INTRAVENOUS; SUBCUTANEOUS at 13:37

## 2020-01-01 RX ADMIN — ROCURONIUM BROMIDE 40 MG: 10 INJECTION INTRAVENOUS at 10:24

## 2020-01-01 RX ADMIN — SODIUM CHLORIDE, POTASSIUM CHLORIDE, SODIUM LACTATE AND CALCIUM CHLORIDE: 600; 310; 30; 20 INJECTION, SOLUTION INTRAVENOUS at 10:01

## 2020-01-01 RX ADMIN — CEFAZOLIN SODIUM 2 G: 2 INJECTION, SOLUTION INTRAVENOUS at 10:20

## 2020-01-01 ASSESSMENT — ANXIETY QUESTIONNAIRES
GAD7 TOTAL SCORE: 18
6. BECOMING EASILY ANNOYED OR IRRITABLE: NEARLY EVERY DAY
7. FEELING AFRAID AS IF SOMETHING AWFUL MIGHT HAPPEN: MORE THAN HALF THE DAYS
3. WORRYING TOO MUCH ABOUT DIFFERENT THINGS: NEARLY EVERY DAY
IF YOU CHECKED OFF ANY PROBLEMS ON THIS QUESTIONNAIRE, HOW DIFFICULT HAVE THESE PROBLEMS MADE IT FOR YOU TO DO YOUR WORK, TAKE CARE OF THINGS AT HOME, OR GET ALONG WITH OTHER PEOPLE: VERY DIFFICULT
GAD7 TOTAL SCORE: 18
1. FEELING NERVOUS, ANXIOUS, OR ON EDGE: NEARLY EVERY DAY
5. BEING SO RESTLESS THAT IT IS HARD TO SIT STILL: SEVERAL DAYS
2. NOT BEING ABLE TO STOP OR CONTROL WORRYING: NEARLY EVERY DAY

## 2020-01-01 ASSESSMENT — MIFFLIN-ST. JEOR
SCORE: 1512.3
SCORE: 1412.51
SCORE: 1564.46
SCORE: 1412.51

## 2020-01-01 ASSESSMENT — PATIENT HEALTH QUESTIONNAIRE - PHQ9
5. POOR APPETITE OR OVEREATING: NEARLY EVERY DAY
SUM OF ALL RESPONSES TO PHQ QUESTIONS 1-9: 10

## 2020-01-01 ASSESSMENT — PAIN SCALES - GENERAL: PAINLEVEL: NO PAIN (0)

## 2020-01-03 ENCOUNTER — TRANSFERRED RECORDS (OUTPATIENT)
Dept: HEALTH INFORMATION MANAGEMENT | Facility: CLINIC | Age: 40
End: 2020-01-03

## 2020-01-03 ENCOUNTER — ALLIED HEALTH/NURSE VISIT (OUTPATIENT)
Dept: OBGYN | Facility: CLINIC | Age: 40
End: 2020-01-03
Payer: COMMERCIAL

## 2020-01-03 DIAGNOSIS — O09.529 SUPERVISION OF HIGH-RISK PREGNANCY OF ELDERLY MULTIGRAVIDA: ICD-10-CM

## 2020-01-03 DIAGNOSIS — O09.521 MULTIGRAVIDA OF ADVANCED MATERNAL AGE IN FIRST TRIMESTER: ICD-10-CM

## 2020-01-03 PROCEDURE — 36415 COLL VENOUS BLD VENIPUNCTURE: CPT | Performed by: OBSTETRICS & GYNECOLOGY

## 2020-01-03 PROCEDURE — 99207 ZZC NO CHARGE NURSE ONLY: CPT

## 2020-01-03 PROCEDURE — 99000 SPECIMEN HANDLING OFFICE-LAB: CPT | Performed by: OBSTETRICS & GYNECOLOGY

## 2020-01-03 PROCEDURE — 40000791 ZZHCL STATISTIC VERIFI PRENATAL TRISOMY 21,18,13: Mod: 90 | Performed by: OBSTETRICS & GYNECOLOGY

## 2020-01-07 ENCOUNTER — APPOINTMENT (OUTPATIENT)
Dept: ULTRASOUND IMAGING | Facility: CLINIC | Age: 40
End: 2020-01-07
Attending: STUDENT IN AN ORGANIZED HEALTH CARE EDUCATION/TRAINING PROGRAM
Payer: COMMERCIAL

## 2020-01-07 ENCOUNTER — HOSPITAL ENCOUNTER (EMERGENCY)
Facility: CLINIC | Age: 40
Discharge: HOME | End: 2020-01-07
Attending: STUDENT IN AN ORGANIZED HEALTH CARE EDUCATION/TRAINING PROGRAM | Admitting: STUDENT IN AN ORGANIZED HEALTH CARE EDUCATION/TRAINING PROGRAM
Payer: COMMERCIAL

## 2020-01-07 VITALS
TEMPERATURE: 98.2 F | HEART RATE: 79 BPM | BODY MASS INDEX: 27.48 KG/M2 | RESPIRATION RATE: 16 BRPM | WEIGHT: 171 LBS | SYSTOLIC BLOOD PRESSURE: 135 MMHG | OXYGEN SATURATION: 99 % | DIASTOLIC BLOOD PRESSURE: 83 MMHG | HEIGHT: 66 IN

## 2020-01-07 DIAGNOSIS — R10.11 POSTPRANDIAL ABDOMINAL PAIN IN RIGHT UPPER QUADRANT: ICD-10-CM

## 2020-01-07 LAB
ALBUMIN SERPL-MCNC: 3.7 G/DL (ref 3.4–5)
ALBUMIN UR-MCNC: NEGATIVE MG/DL
ALP SERPL-CCNC: 73 U/L (ref 40–150)
ALT SERPL W P-5'-P-CCNC: 20 U/L (ref 0–50)
ANION GAP SERPL CALCULATED.3IONS-SCNC: 4 MMOL/L (ref 3–14)
APPEARANCE UR: CLEAR
AST SERPL W P-5'-P-CCNC: 14 U/L (ref 0–45)
BASOPHILS # BLD AUTO: 0 10E9/L (ref 0–0.2)
BASOPHILS NFR BLD AUTO: 0.3 %
BILIRUB SERPL-MCNC: 0.3 MG/DL (ref 0.2–1.3)
BILIRUB UR QL STRIP: NEGATIVE
BUN SERPL-MCNC: 9 MG/DL (ref 7–30)
CALCIUM SERPL-MCNC: 9.1 MG/DL (ref 8.5–10.1)
CHLORIDE SERPL-SCNC: 109 MMOL/L (ref 94–109)
CO2 SERPL-SCNC: 26 MMOL/L (ref 20–32)
COLOR UR AUTO: YELLOW
CREAT SERPL-MCNC: 0.55 MG/DL (ref 0.52–1.04)
DIFFERENTIAL METHOD BLD: NORMAL
EOSINOPHIL # BLD AUTO: 0.1 10E9/L (ref 0–0.7)
EOSINOPHIL NFR BLD AUTO: 0.9 %
ERYTHROCYTE [DISTWIDTH] IN BLOOD BY AUTOMATED COUNT: 11.5 % (ref 10–15)
GFR SERPL CREATININE-BSD FRML MDRD: >90 ML/MIN/{1.73_M2}
GLUCOSE SERPL-MCNC: 71 MG/DL (ref 70–99)
GLUCOSE UR STRIP-MCNC: NEGATIVE MG/DL
HCT VFR BLD AUTO: 40.1 % (ref 35–47)
HGB BLD-MCNC: 13.3 G/DL (ref 11.7–15.7)
HGB UR QL STRIP: NEGATIVE
IMM GRANULOCYTES # BLD: 0 10E9/L (ref 0–0.4)
IMM GRANULOCYTES NFR BLD: 0.5 %
KETONES UR STRIP-MCNC: NEGATIVE MG/DL
LEUKOCYTE ESTERASE UR QL STRIP: NEGATIVE
LIPASE SERPL-CCNC: 147 U/L (ref 73–393)
LYMPHOCYTES # BLD AUTO: 1.3 10E9/L (ref 0.8–5.3)
LYMPHOCYTES NFR BLD AUTO: 19.4 %
MCH RBC QN AUTO: 30.7 PG (ref 26.5–33)
MCHC RBC AUTO-ENTMCNC: 33.2 G/DL (ref 31.5–36.5)
MCV RBC AUTO: 93 FL (ref 78–100)
MONOCYTES # BLD AUTO: 0.5 10E9/L (ref 0–1.3)
MONOCYTES NFR BLD AUTO: 7.6 %
NEUTROPHILS # BLD AUTO: 4.7 10E9/L (ref 1.6–8.3)
NEUTROPHILS NFR BLD AUTO: 71.3 %
NITRATE UR QL: NEGATIVE
NRBC # BLD AUTO: 0 10*3/UL
NRBC BLD AUTO-RTO: 0 /100
PH UR STRIP: 5 PH (ref 5–7)
PLATELET # BLD AUTO: 222 10E9/L (ref 150–450)
POTASSIUM SERPL-SCNC: 3.9 MMOL/L (ref 3.4–5.3)
PROT SERPL-MCNC: 7.6 G/DL (ref 6.8–8.8)
RBC # BLD AUTO: 4.33 10E12/L (ref 3.8–5.2)
SODIUM SERPL-SCNC: 139 MMOL/L (ref 133–144)
SOURCE: NORMAL
SP GR UR STRIP: 1.02 (ref 1–1.03)
UROBILINOGEN UR STRIP-MCNC: 0 MG/DL (ref 0–2)
WBC # BLD AUTO: 6.6 10E9/L (ref 4–11)

## 2020-01-07 PROCEDURE — 76705 ECHO EXAM OF ABDOMEN: CPT

## 2020-01-07 PROCEDURE — 99285 EMERGENCY DEPT VISIT HI MDM: CPT | Mod: Z6 | Performed by: STUDENT IN AN ORGANIZED HEALTH CARE EDUCATION/TRAINING PROGRAM

## 2020-01-07 PROCEDURE — 85025 COMPLETE CBC W/AUTO DIFF WBC: CPT | Performed by: STUDENT IN AN ORGANIZED HEALTH CARE EDUCATION/TRAINING PROGRAM

## 2020-01-07 PROCEDURE — 81003 URINALYSIS AUTO W/O SCOPE: CPT | Performed by: STUDENT IN AN ORGANIZED HEALTH CARE EDUCATION/TRAINING PROGRAM

## 2020-01-07 PROCEDURE — 80053 COMPREHEN METABOLIC PANEL: CPT | Performed by: STUDENT IN AN ORGANIZED HEALTH CARE EDUCATION/TRAINING PROGRAM

## 2020-01-07 PROCEDURE — 99284 EMERGENCY DEPT VISIT MOD MDM: CPT | Mod: 25 | Performed by: STUDENT IN AN ORGANIZED HEALTH CARE EDUCATION/TRAINING PROGRAM

## 2020-01-07 PROCEDURE — 83690 ASSAY OF LIPASE: CPT | Performed by: STUDENT IN AN ORGANIZED HEALTH CARE EDUCATION/TRAINING PROGRAM

## 2020-01-07 RX ORDER — FOLIC ACID, .BETA.-CAROTENE, ASCORBIC ACID, CHOLECALCIFEROL, .ALPHA.-TOCOPHEROL ACETATE, DL-, THIAMINE MONONITRATE, RIBOFLAVIN, NIACINAMIDE, PYRIDOXINE HYDROCHLORIDE, CYANOCOBALAMIN, CALCIUM PANTOTHENATE, CALCIUM CARBONATE, FERROUS FUMARATE, AND ZINC OXIDE 1; 1000; 100; 400; 30; 3; 3; 15; 20; 12; 7; 200; 29; 20 MG/1; [IU]/1; MG/1; [IU]/1; [IU]/1; MG/1; MG/1; MG/1; MG/1; UG/1; MG/1; MG/1; MG/1; MG/1
2 TABLET, CHEWABLE ORAL DAILY
COMMUNITY

## 2020-01-07 ASSESSMENT — MIFFLIN-ST. JEOR: SCORE: 1462.4

## 2020-01-07 NOTE — ED PROVIDER NOTES
History     Chief Complaint   Patient presents with     Abdominal Pain     11 weeks pregnant pain worse after eating     HPI  Remedios Watts is a 39 year old  female of estimated 12 weeks gestation who presents for evaluation of upper abdominal pain for the past 5 days.  Patient describes achy abdominal pain of the epigastrium and RUQ which is exacerbated with oral intake.  She denies being symptom-free over the past few days but very tolerable symptoms between episodes of eating.  Her history is notable for diagnosis of stage III colon adenocarcinoma of the hepatic flexure S/P right hemicolectomy on 3/22/2019 and completed chemotherapy in 2019.  She has otherwise been without fever, chills, chest pain, respiratory infectious symptoms, pelvic pain, vaginal bleeding, or genitourinary symptoms.  She has felt somewhat constipated over the past couple of weeks but continues to pass regular diameter and volume stools with use of OTC Colace.  No other significant past surgical history.    Allergies:  No Known Allergies    Problem List:    Patient Active Problem List    Diagnosis Date Noted     Iron deficiency anemia due to chronic blood loss 05/10/2019     Priority: Medium     Iron deficiency 2019     Priority: Medium     Cervical high risk HPV (human papillomavirus) test positive 2019     Priority: Medium     19 NIL Pap, + HR HPV (Neg 16/18). Plan cotest in 1 year.        Colon cancer (H) 2019     Priority: Medium     Malignant neoplasm of hepatic flexure (H) 2019     Priority: Medium     Colonic mass 2019     Priority: Medium     Partial small bowel obstruction (H) 03/10/2019     Priority: Medium     Folliculitis 2013     Priority: Medium     Lentigo 2013     Priority: Medium     Congenital nevus 2013     Priority: Medium     Angioma 2013     Priority: Medium     Multiple nevi 2013     Priority: Medium     Dermal nevus 2013      Priority: Medium     CARDIOVASCULAR SCREENING; LDL GOAL LESS THAN 160 01/21/2013     Priority: Medium     Pelvic pain 01/18/2013     Priority: Medium     Generalized anxiety disorder 06/29/2011     Priority: Medium     Diagnosis updated by automated process. Provider to review and confirm.          Past Medical History:    Past Medical History:   Diagnosis Date     Cervical high risk HPV (human papillomavirus) test positive 04/23/2019     Chickenpox      Diarrhea      Group B streptococcus urinary tract infection complicating pregnancy 4/20/2011     History of postpartum depression, currently pregnant 10/21/2010     Malignant melanoma (H)      Postpartum depression 12/17/2008     Tailbone injury      Urinary tract infections        Past Surgical History:    Past Surgical History:   Procedure Laterality Date     C ORAL SURGERY PROCEDURE       COLONOSCOPY N/A 3/11/2019    Procedure: COMBINED COLONOSCOPY, SINGLE OR MULTIPLE BIOPSY/POLYPECTOMY BY BIOPSY;  Surgeon: Loi Maldonado MD;  Location: [facility]     INSERT PORT VASCULAR ACCESS Right 4/22/2019    Procedure: Central venous Chest Port Placement - right;  Surgeon: Didier Frazier PA-C;  [facility]     IR CHEST PORT PLACEMENT > 5 YRS OF AGE  4/22/2019     LAPAROSCOPIC ASSISTED COLECTOMY Right 3/22/2019    Procedure: Laparoscopic Extended Right Hemicolectomy and appendectomy;  Surgeon: Linda Quesada MD;  [facility]       Family History:    Family History   Problem Relation Age of Onset     Arthritis Mother      Neurologic Disorder Mother         had a seizure      Alcohol/Drug Mother      Arthritis Maternal Grandmother      Breast Cancer Maternal Grandmother      Melanoma Maternal Grandmother      Heart Disease Paternal Grandmother      Cancer Father         skin       Social History:  Marital Status:   [2]  Social History     Tobacco Use     Smoking status: Never Smoker     Smokeless tobacco: Never Used   Substance Use Topics  "    Alcohol use: Yes     Frequency: Monthly or less     Drinks per session: 1 or 2     Binge frequency: Less than monthly     Drug use: No        Medications:    Prenatal Vit-Fe Fumarate-FA (PRENATAL 19) 29-1 MG CHEW          Review of Systems  Constitutional:  Negative for fever or chills.  Cardiovascular:  Negative for chest pain.  Respiratory:  Negative for cough or shortness of breath.  Gastrointestinal: Positive for postprandial epigastric and RUQ abdominal pain.  Positive for sensation of constipation but continuing to pass large stools.  Negative for nausea or vomiting.  Denies blood with bowel movements.  Genitourinary:  Negative for dysuria, hematuria, pelvic pain or vaginal bleeding.  Musculoskeletal: Negative for back or flank pain.    All others reviewed and are negative.      Physical Exam   BP: 113/81  Pulse: 74  Heart Rate: 76  Temp: 98.2  F (36.8  C)  Resp: 16  Height: 167.6 cm (5' 6\")  Weight: 77.6 kg (171 lb)  SpO2: 99 %      Physical Exam  Constitutional:  Well developed, well nourished.  Appears nontoxic and in no acute distress.   HENT:  Normocephalic and atraumatic.  Symmetric in appearance.  Eyes:  Conjunctivae are normal.  Neck:  Neck supple.  Cardiovascular:  No cyanosis.  RRR.  No audible murmurs noted.   Respiratory:  Effort normal without sign of respiratory distress.  CTAB without diminished regions.   Gastrointestinal:  Soft nondistended abdomen.  Positive for epigastric tenderness without significant guarding.  No rigidity or rebound tenderness.  Negative Sapp's sign.  Negative McBurney's point.    Musculoskeletal:  Moves extremities spontaneously.  Neurological:  Patient is alert.  Skin:  Skin is warm and dry.  Psychiatric:  Normal mood and affect.      ED Course        Procedures              Critical Care time:  none               Results for orders placed or performed during the hospital encounter of 01/07/20 (from the past 24 hour(s))   UA reflex to Microscopic   Result Value " Ref Range    Color Urine Yellow     Appearance Urine Clear     Glucose Urine Negative NEG^Negative mg/dL    Bilirubin Urine Negative NEG^Negative    Ketones Urine Negative NEG^Negative mg/dL    Specific Gravity Urine 1.017 1.003 - 1.035    Blood Urine Negative NEG^Negative    pH Urine 5.0 5.0 - 7.0 pH    Protein Albumin Urine Negative NEG^Negative mg/dL    Urobilinogen mg/dL 0.0 0.0 - 2.0 mg/dL    Nitrite Urine Negative NEG^Negative    Leukocyte Esterase Urine Negative NEG^Negative    Source Midstream Urine    CBC with platelets differential   Result Value Ref Range    WBC 6.6 4.0 - 11.0 10e9/L    RBC Count 4.33 3.8 - 5.2 10e12/L    Hemoglobin 13.3 11.7 - 15.7 g/dL    Hematocrit 40.1 35.0 - 47.0 %    MCV 93 78 - 100 fl    MCH 30.7 26.5 - 33.0 pg    MCHC 33.2 31.5 - 36.5 g/dL    RDW 11.5 10.0 - 15.0 %    Platelet Count 222 150 - 450 10e9/L    Diff Method Automated Method     % Neutrophils 71.3 %    % Lymphocytes 19.4 %    % Monocytes 7.6 %    % Eosinophils 0.9 %    % Basophils 0.3 %    % Immature Granulocytes 0.5 %    Nucleated RBCs 0 0 /100    Absolute Neutrophil 4.7 1.6 - 8.3 10e9/L    Absolute Lymphocytes 1.3 0.8 - 5.3 10e9/L    Absolute Monocytes 0.5 0.0 - 1.3 10e9/L    Absolute Eosinophils 0.1 0.0 - 0.7 10e9/L    Absolute Basophils 0.0 0.0 - 0.2 10e9/L    Abs Immature Granulocytes 0.0 0 - 0.4 10e9/L    Absolute Nucleated RBC 0.0    Comprehensive metabolic panel   Result Value Ref Range    Sodium 139 133 - 144 mmol/L    Potassium 3.9 3.4 - 5.3 mmol/L    Chloride 109 94 - 109 mmol/L    Carbon Dioxide 26 20 - 32 mmol/L    Anion Gap 4 3 - 14 mmol/L    Glucose 71 70 - 99 mg/dL    Urea Nitrogen 9 7 - 30 mg/dL    Creatinine 0.55 0.52 - 1.04 mg/dL    GFR Estimate >90 >60 mL/min/[1.73_m2]    GFR Estimate If Black >90 >60 mL/min/[1.73_m2]    Calcium 9.1 8.5 - 10.1 mg/dL    Bilirubin Total 0.3 0.2 - 1.3 mg/dL    Albumin 3.7 3.4 - 5.0 g/dL    Protein Total 7.6 6.8 - 8.8 g/dL    Alkaline Phosphatase 73 40 - 150 U/L    ALT 20  0 - 50 U/L    AST 14 0 - 45 U/L   Lipase   Result Value Ref Range    Lipase 147 73 - 393 U/L   Abdomen US, limited (RUQ only)    Narrative    ULTRASOUND ABDOMEN LIMITED January 7, 2020 at 1249 hours    HISTORY: 39-year-old patient with right upper quadrant abdominal pain.  Patient's five-day history of postprandial pain. 12 week pregnancy.    COMPARISON: March 10, 2019.    FINDINGS: The visible pancreas is unremarkable. The proximal abdominal  aorta is 1.8 cm. The visualized inferior vena cava appears normal.  Liver size is 14.6 cm. No focal liver lesion. The right kidney is 10.9  x 5.2 x 5.2 cm with AP cortical thickness of 1.1 cm. No  hydronephrosis. Echogenic focus noted at the superior pole of the  right kidney, unchanged from previous exam, nonshadowing. Common  hepatic duct is 4 mm. Gallbladder appears normal with wall thickness  of 1.7 mm. No cholelithiasis or pericholecystic fluid. Negative  sonographic Sapp's sign.      Impression    IMPRESSION:  1. Cause for patient's abdominal pain is not clearly identified.  2. Echogenic focus in the superior pole of the right kidney, measuring  up to 7 mm, unchanged from previous exam. This may be a nonshadowing  focus of nephrolithiasis or angiomyolipoma. No hydronephrosis or  complicating features.    LUIS FELIPE CHILDS MD       Medications - No data to display    Assessments & Plan (with Medical Decision Making)   Remedios Watts is a 39 year old female who presents to the department for evaluation of 5 days of upper abdominal pain, seems to focus on the right upper quadrant and exacerbated with oral intake.  She describes very minimal symptoms until she eats, within minutes she developed severe RUQ abdominal pain radiating across to the left side.  Symptoms take hours to improve.  She has taken Rolaids without relief, denies history of GERD or acid reflux.  On examination she has very little epigastric tenderness and without RUQ tenderness, afebrile and CBC without  leukocytosis.  Her lipase and hepatic enzymes are within reference range, urinalysis unimpressive.  Formal RUQ ultrasound did not reveal cholelithiasis or biliary obstruction.  Consulted patient's OB/GYN Dr. Torres regarding concern for a calculus biliary colic versus gastritis, she defers to general surgery.  Subsequently consulted on-call general surgeon Dr. Real who has recommended that patient follow-up at surgery clinic tomorrow, 1/8/2020, at 2 PM.  Patient is in agreement with this plan.          Disclaimer:  This note consists of symbols derived from keyboarding, dictation, and/or voice recognition software.  As a result, there may be errors in the script that have gone undetected.  Please consider this when interpreting information found in the chart.        I have reviewed the nursing notes.    I have reviewed the findings, diagnosis, plan and need for follow up with the patient.       Discharge Medication List as of 1/7/2020  2:57 PM          Final diagnoses:   Postprandial abdominal pain in right upper quadrant       1/7/2020   [facility] EMERGENCY DEPARTMENT     Sergio Sahni DO  01/07/20 1553

## 2020-01-07 NOTE — ED AVS SNAPSHOT
[facility] Emergency Department  [address] [address]  Phone:  [phone]  Fax:  [phone]                                    Remedios Watts   MRN: [id number]    Department:  [facility] Emergency Department   Date of Visit:  1/7/2020           After Visit Summary Signature Page    I have received my discharge instructions, and my questions have been answered. I have discussed any challenges I see with this plan with the nurse or doctor.    ..........................................................................................................................................  Patient/Patient Representative Signature      ..........................................................................................................................................  Patient Representative Print Name and Relationship to Patient    ..................................................               ................................................  Date                                   Time    ..........................................................................................................................................  Reviewed by Signature/Title    ...................................................              ..............................................  Date                                               Time          22EPIC Rev 08/18

## 2020-01-08 ENCOUNTER — OFFICE VISIT (OUTPATIENT)
Dept: SURGERY | Facility: CLINIC | Age: 40
End: 2020-01-08
Payer: COMMERCIAL

## 2020-01-08 ENCOUNTER — PATIENT OUTREACH (OUTPATIENT)
Dept: ONCOLOGY | Facility: CLINIC | Age: 40
End: 2020-01-08

## 2020-01-08 VITALS
RESPIRATION RATE: 16 BRPM | DIASTOLIC BLOOD PRESSURE: 61 MMHG | HEART RATE: 66 BPM | TEMPERATURE: 98.9 F | SYSTOLIC BLOOD PRESSURE: 110 MMHG

## 2020-01-08 DIAGNOSIS — K82.8 BILIARY DYSKINESIA: Primary | ICD-10-CM

## 2020-01-08 PROCEDURE — 99213 OFFICE O/P EST LOW 20 MIN: CPT | Performed by: SURGERY

## 2020-01-08 NOTE — LETTER
1/8/2020         RE: Remedios Watts  [address] [place] [address]        Dear Colleague,    Thank you for referring your patient, Remedios Watts, to the [facility]. Please see a copy of my visit note below.    39-year-old female was seen in the emergency room last night complaining of upper and right upper quadrant abdominal pain with radiation to the back.  Patient reports pain came on about half hour after eating some soup.  She reported nausea and vomiting.  No changes in her bowel habits.  Patient had a similar attack last month which resolved on its own.  Since her visit to the emergency room last night she is now relatively asymptomatic.  She had cereal for breakfast without an attack.  Her ultrasound showed no stones.  Patient is 4 months out from right colectomy and chemotherapy for colon cancer with edda metastasis.    Patient Active Problem List   Diagnosis     Generalized anxiety disorder     Pelvic pain     CARDIOVASCULAR SCREENING; LDL GOAL LESS THAN 160     Folliculitis     Lentigo     Congenital nevus     Angioma     Multiple nevi     Dermal nevus     Partial small bowel obstruction (H)     Colonic mass     Malignant neoplasm of hepatic flexure (H)     Colon cancer (H)     Cervical high risk HPV (human papillomavirus) test positive     Iron deficiency     Iron deficiency anemia due to chronic blood loss       Past Medical History:   Diagnosis Date     Cervical high risk HPV (human papillomavirus) test positive 04/23/2019    see problem list     Chickenpox      Diarrhea      Group B streptococcus urinary tract infection complicating pregnancy 4/20/2011    Plan PCN in labor      History of postpartum depression, currently pregnant 10/21/2010     Malignant melanoma (H)      Postpartum depression 12/17/2008     Tailbone injury     fx     Urinary tract infections        Past Surgical History:   Procedure Laterality Date     C ORAL SURGERY PROCEDURE       COLONOSCOPY N/A  3/11/2019    Procedure: COMBINED COLONOSCOPY, SINGLE OR MULTIPLE BIOPSY/POLYPECTOMY BY BIOPSY;  Surgeon: Loi Maldonado MD;  Location: [facility]     INSERT PORT VASCULAR ACCESS Right 4/22/2019    Procedure: Central venous Chest Port Placement - right;  Surgeon: Didier Frazier PA-C;  [facility]: [facility]     IR CHEST PORT PLACEMENT > 5 YRS OF AGE  4/22/2019     LAPAROSCOPIC ASSISTED COLECTOMY Right 3/22/2019    Procedure: Laparoscopic Extended Right Hemicolectomy and appendectomy;  Surgeon: Linda Quesada MD;  Location: [facility] OR       Family History   Problem Relation Age of Onset     Arthritis Mother      Neurologic Disorder Mother         had a seizure      Alcohol/Drug Mother      Arthritis Maternal Grandmother      Breast Cancer Maternal Grandmother      Melanoma Maternal Grandmother      Heart Disease Paternal Grandmother      Cancer Father         skin       Social History     Tobacco Use     Smoking status: Never Smoker     Smokeless tobacco: Never Used   Substance Use Topics     Alcohol use: Yes     Frequency: Monthly or less     Drinks per session: 1 or 2     Binge frequency: Less than monthly        History   Drug Use No       Current Outpatient Medications   Medication Sig Dispense Refill     Prenatal Vit-Fe Fumarate-FA (PRENATAL 19) 29-1 MG CHEW Take 2 chew tab by mouth daily         No Known Allergies     CBC  Recent Labs   Lab Test 01/07/20  1210   WBC 6.6   RBC 4.33   HGB 13.3   HCT 40.1   MCV 93   MCH 30.7   MCHC 33.2   RDW 11.5          BMP  Recent Labs   Lab Test 01/07/20  1210      POTASSIUM 3.9   MANDY 9.1   CHLORIDE 109   CO2 26   BUN 9   CR 0.55   GLC 71       LFTs  Recent Labs   Lab Test 01/07/20  1210   PROTTOTAL 7.6   ALBUMIN 3.7   BILITOTAL 0.3   ALKPHOS 73   AST 14   ALT 20     Results for orders placed or performed during the hospital encounter of 01/07/20   Abdomen US, limited (RUQ only)    Narrative    ULTRASOUND ABDOMEN LIMITED January 7, 2020 at 1249  hours    HISTORY: 39-year-old patient with right upper quadrant abdominal pain.  Patient's five-day history of postprandial pain. 12 week pregnancy.    COMPARISON: March 10, 2019.    FINDINGS: The visible pancreas is unremarkable. The proximal abdominal  aorta is 1.8 cm. The visualized inferior vena cava appears normal.  Liver size is 14.6 cm. No focal liver lesion. The right kidney is 10.9  x 5.2 x 5.2 cm with AP cortical thickness of 1.1 cm. No  hydronephrosis. Echogenic focus noted at the superior pole of the  right kidney, unchanged from previous exam, nonshadowing. Common  hepatic duct is 4 mm. Gallbladder appears normal with wall thickness  of 1.7 mm. No cholelithiasis or pericholecystic fluid. Negative  sonographic Sapp's sign.      Impression    IMPRESSION:  1. Cause for patient's abdominal pain is not clearly identified.  2. Echogenic focus in the superior pole of the right kidney, measuring  up to 7 mm, unchanged from previous exam. This may be a nonshadowing  focus of nephrolithiasis or angiomyolipoma. No hydronephrosis or  complicating features.    MD SEBASTIAN MELGAR  Constitutional - Denies fevers, weight loss, malaise, lethargy  Neuro - Denies tremors or seizures  Pulmon - Denies SOB, dyspnea, hemoptysis, chronic cough or use of an inhaler  CV - Denies CP, SOB, lower extremity edema, difficulty w/ stairs, has never used NTG  GI - Denies hematemesis, BRBPR, melena, chronic diarrhea or epigastric pain   - Denies hematuria, difficulty voiding, h/o STDs  Hematology - Denies blood clotting disorders, chronic anemias  Dermatology - No melanomas or skin cancers  Rheumatology - No h/o RA  Pysch - Denies depression, bipolar d/o or schizophrenia    Exam:    General - Alert and Oriented X4, NAD, well nourished  HEENT - Normocephalic, atraumatic, PERRLA, Nose midline, Throat without lesions  Neck - supple, no LAD, Thyroid normal, Carotids without bruits  Lungs - Clear to auscultation bilaterally with  good inspiratory effort, no tactile fremitus  CV - Heart RRR, no lift's, thrills, murmurs, rubs, or gallops. Carotid, radial, and femoral pulses 2+ bilaterally  Abdomen - Soft, non-tender, +BS, no hepatosplenomegaly, no palpable masses  Neuro - Full ROM, Strength 5/5 and major muscle groups, sensation intact  Extremities - No cyanosis, clubbing or edema    Assessment and plan: 39-year-old female with symptoms consistent with biliary dyskinesia.  Unfortunately patient is currently pregnant 12 weeks and her symptoms are not debilitating.  I did explain that if she has recurrent attacks surgery to remove the gallbladder is a viable option.  She would like to pursue a conservative treatment course at this time and I agree with her plan.  Should she have recurrent symptoms she can always call my clinic and I will go ahead and schedule her for a laparoscopic cholecystectomy.  I am not recommending a HIDA scan at this point due to the radioactive tracer and pregnancy.    Loi Real MD     Again, thank you for allowing me to participate in the care of your patient.        Sincerely,        Loi Real MD

## 2020-01-08 NOTE — H&P (VIEW-ONLY)
39-year-old female was seen in the emergency room last night complaining of upper and right upper quadrant abdominal pain with radiation to the back.  Patient reports pain came on about half hour after eating some soup.  She reported nausea and vomiting.  No changes in her bowel habits.  Patient had a similar attack last month which resolved on its own.  Since her visit to the emergency room last night she is now relatively asymptomatic.  She had cereal for breakfast without an attack.  Her ultrasound showed no stones.  Patient is 4 months out from right colectomy and chemotherapy for colon cancer with edda metastasis.    Patient Active Problem List   Diagnosis     Generalized anxiety disorder     Pelvic pain     CARDIOVASCULAR SCREENING; LDL GOAL LESS THAN 160     Folliculitis     Lentigo     Congenital nevus     Angioma     Multiple nevi     Dermal nevus     Partial small bowel obstruction (H)     Colonic mass     Malignant neoplasm of hepatic flexure (H)     Colon cancer (H)     Cervical high risk HPV (human papillomavirus) test positive     Iron deficiency     Iron deficiency anemia due to chronic blood loss       Past Medical History:   Diagnosis Date     Cervical high risk HPV (human papillomavirus) test positive 04/23/2019    see problem list     Chickenpox      Diarrhea      Group B streptococcus urinary tract infection complicating pregnancy 4/20/2011    Plan PCN in labor      History of postpartum depression, currently pregnant 10/21/2010     Malignant melanoma (H)      Postpartum depression 12/17/2008     Tailbone injury     fx     Urinary tract infections        Past Surgical History:   Procedure Laterality Date     C ORAL SURGERY PROCEDURE       COLONOSCOPY N/A 3/11/2019    Procedure: COMBINED COLONOSCOPY, SINGLE OR MULTIPLE BIOPSY/POLYPECTOMY BY BIOPSY;  Surgeon: Loi Maldonado MD;  Location: [facility]     INSERT PORT VASCULAR ACCESS Right 4/22/2019    Procedure: Central venous Chest Port Placement -  right;  Surgeon: Didier Frazier PA-C;  [facility]     IR CHEST PORT PLACEMENT > 5 YRS OF AGE  4/22/2019     LAPAROSCOPIC ASSISTED COLECTOMY Right 3/22/2019    Procedure: Laparoscopic Extended Right Hemicolectomy and appendectomy;  Surgeon: Linda Quesada MD;  Location: [facility]       Family History   Problem Relation Age of Onset     Arthritis Mother      Neurologic Disorder Mother         had a seizure      Alcohol/Drug Mother      Arthritis Maternal Grandmother      Breast Cancer Maternal Grandmother      Melanoma Maternal Grandmother      Heart Disease Paternal Grandmother      Cancer Father         skin       Social History     Tobacco Use     Smoking status: Never Smoker     Smokeless tobacco: Never Used   Substance Use Topics     Alcohol use: Yes     Frequency: Monthly or less     Drinks per session: 1 or 2     Binge frequency: Less than monthly        History   Drug Use No       Current Outpatient Medications   Medication Sig Dispense Refill     Prenatal Vit-Fe Fumarate-FA (PRENATAL 19) 29-1 MG CHEW Take 2 chew tab by mouth daily         No Known Allergies     CBC  Recent Labs   Lab Test 01/07/20  1210   WBC 6.6   RBC 4.33   HGB 13.3   HCT 40.1   MCV 93   MCH 30.7   MCHC 33.2   RDW 11.5          BMP  Recent Labs   Lab Test 01/07/20  1210      POTASSIUM 3.9   MANDY 9.1   CHLORIDE 109   CO2 26   BUN 9   CR 0.55   GLC 71       LFTs  Recent Labs   Lab Test 01/07/20  1210   PROTTOTAL 7.6   ALBUMIN 3.7   BILITOTAL 0.3   ALKPHOS 73   AST 14   ALT 20     Results for orders placed or performed during the hospital encounter of 01/07/20   Abdomen US, limited (RUQ only)    Narrative    ULTRASOUND ABDOMEN LIMITED January 7, 2020 at 1249 hours    HISTORY: 39-year-old patient with right upper quadrant abdominal pain.  Patient's five-day history of postprandial pain. 12 week pregnancy.    COMPARISON: March 10, 2019.    FINDINGS: The visible pancreas is unremarkable. The proximal  abdominal  aorta is 1.8 cm. The visualized inferior vena cava appears normal.  Liver size is 14.6 cm. No focal liver lesion. The right kidney is 10.9  x 5.2 x 5.2 cm with AP cortical thickness of 1.1 cm. No  hydronephrosis. Echogenic focus noted at the superior pole of the  right kidney, unchanged from previous exam, nonshadowing. Common  hepatic duct is 4 mm. Gallbladder appears normal with wall thickness  of 1.7 mm. No cholelithiasis or pericholecystic fluid. Negative  sonographic Sapp's sign.      Impression    IMPRESSION:  1. Cause for patient's abdominal pain is not clearly identified.  2. Echogenic focus in the superior pole of the right kidney, measuring  up to 7 mm, unchanged from previous exam. This may be a nonshadowing  focus of nephrolithiasis or angiomyolipoma. No hydronephrosis or  complicating features.    LUIS FELIPE CHILDS MD     ROS  Constitutional - Denies fevers, weight loss, malaise, lethargy  Neuro - Denies tremors or seizures  Pulmon - Denies SOB, dyspnea, hemoptysis, chronic cough or use of an inhaler  CV - Denies CP, SOB, lower extremity edema, difficulty w/ stairs, has never used NTG  GI - Denies hematemesis, BRBPR, melena, chronic diarrhea or epigastric pain   - Denies hematuria, difficulty voiding, h/o STDs  Hematology - Denies blood clotting disorders, chronic anemias  Dermatology - No melanomas or skin cancers  Rheumatology - No h/o RA  Pysch - Denies depression, bipolar d/o or schizophrenia    Exam:    General - Alert and Oriented X4, NAD, well nourished  HEENT - Normocephalic, atraumatic, PERRLA, Nose midline, Throat without lesions  Neck - supple, no LAD, Thyroid normal, Carotids without bruits  Lungs - Clear to auscultation bilaterally with good inspiratory effort, no tactile fremitus  CV - Heart RRR, no lift's, thrills, murmurs, rubs, or gallops. Carotid, radial, and femoral pulses 2+ bilaterally  Abdomen - Soft, non-tender, +BS, no hepatosplenomegaly, no palpable masses  Neuro -  Full ROM, Strength 5/5 and major muscle groups, sensation intact  Extremities - No cyanosis, clubbing or edema    Assessment and plan: 39-year-old female with symptoms consistent with biliary dyskinesia.  Unfortunately patient is currently pregnant 12 weeks and her symptoms are not debilitating.  I did explain that if she has recurrent attacks surgery to remove the gallbladder is a viable option.  She would like to pursue a conservative treatment course at this time and I agree with her plan.  Should she have recurrent symptoms she can always call my clinic and I will go ahead and schedule her for a laparoscopic cholecystectomy.  I am not recommending a HIDA scan at this point due to the radioactive tracer and pregnancy.    Loi Real MD

## 2020-01-08 NOTE — PATIENT INSTRUCTIONS
Per physician instructions.    If you have questions or concerns on any instructions given to you by your provider today or if you need to schedule an appointment, you can reach us at [phone].    Thank you!

## 2020-01-08 NOTE — NURSING NOTE
"Chief Complaint   Patient presents with     Abdominal Pain       Initial /61 (BP Location: Right arm, Patient Position: Chair, Cuff Size: Adult Regular)   Pulse 66   Temp 98.9  F (37.2  C) (Tympanic)   Resp 16   LMP 10/25/2019  Estimated body mass index is 27.6 kg/m  as calculated from the following:    Height as of 1/7/20: 1.676 m (5' 6\").    Weight as of 1/7/20: 77.6 kg (171 lb).  BP completed using cuff size: regular   Medications and allergies reviewed.      Rosalind VILLAFANA CMA     "

## 2020-01-08 NOTE — PROGRESS NOTES
"Received a call from Remedios to discuss the general surgery appointment that she has today. She questions if she needs that appointment or needs to be seen in Oncology.  She was seen in the ER yesterday for abdominal pain. She was referred to see general surgery today as recommended by OB/GYN.  Discussed rationale for seeing general surgery and having issues with gallstones and biliary colic can be seen in pregnant women. She agrees to keep the appointment today.    Remedios indicated she missed her appointment last week with Dr Capps due to illness, she states she probably made herself sick worrying. She was emotionally distraught during the call and asked  \"when I come will they be telling me this isn't a good idea?\"  Writer clarified that she was speaking of her pregnancy. Reassured Remedois that her care team is here to support her and her decisions.Pashadereck stressed that her care team feels for the difficult situation she is in and will present a plan on how we can best manage her disease surveillance during her pregnancy. Discussed the importance of establishing care with [facility], she is scheduled on . She understands that [facility] will be able to give her more information on the risks of her pregnancy due to her age, chemotherapy exposure and disease relapse.    Remedios discussed how hard this situation has been, \"we weren't trying to have baby but we were given this miracle\", \"I am not an  believer\". She verbalized trama from her first OB appointment, she feels the MD was encouraging her to terminate the pregnancy. This was very distressing to her and her . Offered sympathetic listening and talked through her feelings.   Writer reiterated several times that her Oncology care team will support her decisions regardless and will continue to offer her the best care possible.     Remedios has agreed to genetic testing, this is a source of stress as she did not have this testing completed for her other 4 " children. Supported her decision in seeking the testing to help make a decision that is best for her child.     She has many questions including her risk of relapse and how soon it might happen. She verbalized being worried and focusing alot about relapsing.     Discussed setting her up to speak with a therapist given all that she is going through, she would like to think about it.  Discussed importance of seeing Dr. Capps after she has met with [facility]. She agrees to plan and scheduled for 1/23 with Dr. Capps.    Answered all questions to her stated satisfaction. Encouraged her to call anytime with questions or concerns.

## 2020-01-09 ENCOUNTER — TELEPHONE (OUTPATIENT)
Dept: OBGYN | Facility: CLINIC | Age: 40
End: 2020-01-09

## 2020-01-09 NOTE — TELEPHONE ENCOUNTER
Progenity result for Chromosomes, 21,18 and 13 are negative.    Please ask pt if she wants to know gender of baby.  She may want to have gender revealed at a later time.  It is a GIRL.    Adelita Abraham  [facility] RN

## 2020-01-10 LAB — LAB SCANNED RESULT: NORMAL

## 2020-01-13 PROBLEM — C18.3 MALIGNANT NEOPLASM OF HEPATIC FLEXURE (H): Status: ACTIVE | Noted: 2019-03-11

## 2020-01-15 ENCOUNTER — TELEPHONE (OUTPATIENT)
Dept: SURGERY | Facility: CLINIC | Age: 40
End: 2020-01-15

## 2020-01-15 DIAGNOSIS — K82.8 BILIARY DYSKINESIA: Primary | ICD-10-CM

## 2020-01-15 NOTE — TELEPHONE ENCOUNTER
Called patient and set up surgery for her on 1/28/2020 at 10:30 am with arrival time at 9:30 am. Patient will  highlighted surgery instructions and soap at the , and was advised to make sure to follow the fasting guidelines, or else it could delay her surgery. Patient seemed to acknowledge and understand.    Juliana RODRIGUEZ CMA ([facility])

## 2020-01-15 NOTE — TELEPHONE ENCOUNTER
Reason for Call:  Other appointment    Detailed comments: pt calling to schedule surgery     Phone Number Patient can be reached at: Home number on file [phone] (home)    Best Time: any     Can we leave a detailed message on this number? YES    Call taken on 1/15/2020 at 9:51 AM by Francisca Deras

## 2020-01-15 NOTE — TELEPHONE ENCOUNTER
I placed the order for the laparoscopic cholecystectomy.  Go ahead and scheduled her for the 28th.    Loi Real MD

## 2020-01-17 ENCOUNTER — AMBULATORY - HEALTHEAST (OUTPATIENT)
Dept: MATERNAL FETAL MEDICINE | Facility: HOSPITAL | Age: 40
End: 2020-01-17

## 2020-01-20 NOTE — PROGRESS NOTES
This is a recent snapshot of the patient's [facility] medical record.  For current drug dose and complete information and questions, call [phone] or In Basket pool, [facility] ([id number])  CSN Number:  [id number]

## 2020-01-21 PROBLEM — C18.9 COLON CANCER (H): Status: ACTIVE | Noted: 2019-03-22

## 2020-01-21 PROBLEM — K63.89 COLONIC MASS: Status: RESOLVED | Noted: 2019-03-12 | Resolved: 2020-01-01

## 2020-01-21 PROBLEM — K56.600 PARTIAL SMALL BOWEL OBSTRUCTION (H): Status: RESOLVED | Noted: 2019-03-10 | Resolved: 2020-01-01

## 2020-01-21 PROBLEM — C18.3 MALIGNANT NEOPLASM OF HEPATIC FLEXURE (H): Status: RESOLVED | Noted: 2019-03-11 | Resolved: 2020-01-01

## 2020-01-21 PROBLEM — O09.529 HIGH-RISK PREGNANCY, ELDERLY MULTIGRAVIDA, UNSPECIFIED TRIMESTER: Status: ACTIVE | Noted: 2020-01-01

## 2020-01-21 NOTE — PROGRESS NOTES
Remedios Watts is a 39 year old year old female patient here today for hx of 0.45mm l arm.  Also has hx of chemo and was started on chemo LOV.   Chemo done.  She is now pregnant!!!.  She notes spot on right arm.  .  Patient states this has been present for a while.  Patient reports the following symptoms:  none.  Patient reports the following previous treatments none.  These treatments did not work.  Patient reports the following modifying factors none.  Associated symptoms: none.  Patient has no other skin complaints today.  Remainder of the HPI, Meds, PMH, Allergies, FH, and SH was reviewed in chart.      Past Medical History:   Diagnosis Date     Cervical high risk HPV (human papillomavirus) test positive 04/23/2019    see problem list     Chickenpox      Group B streptococcus urinary tract infection complicating pregnancy 4/20/2011    Plan PCN in labor      History of postpartum depression, currently pregnant 10/21/2010     Malignant melanoma (H)      Partial small bowel obstruction (H) 3/10/2019     Postpartum depression 12/17/2008     Tailbone injury     fx     Urinary tract infections        Past Surgical History:   Procedure Laterality Date     C ORAL SURGERY PROCEDURE       COLONOSCOPY N/A 3/11/2019    Procedure: COMBINED COLONOSCOPY, SINGLE OR MULTIPLE BIOPSY/POLYPECTOMY BY BIOPSY;  Surgeon: Loi Maldonado MD;  [facility]     INSERT PORT VASCULAR ACCESS Right 4/22/2019    Procedure: Central venous Chest Port Placement - right;  Surgeon: Didier Frazier PA-C;  [facility]     IR CHEST PORT PLACEMENT > 5 YRS OF AGE  4/22/2019     LAPAROSCOPIC ASSISTED COLECTOMY Right 3/22/2019    Procedure: Laparoscopic Extended Right Hemicolectomy and appendectomy;  Surgeon: Linda Quesada MD;  Location:  OR        Family History   Problem Relation Age of Onset     Arthritis Mother      Neurologic Disorder Mother         had a seizure      Alcohol/Drug Mother      Arthritis Maternal  Grandmother      Breast Cancer Maternal Grandmother      Melanoma Maternal Grandmother      Heart Disease Paternal Grandmother      Cancer Father         skin       Social History     Socioeconomic History     Marital status:      Spouse name: Not on file     Number of children: 3     Years of education: Not on file     Highest education level: Not on file   Occupational History     Employer: [employer]   Social Needs     Financial resource strain: Not on file     Food insecurity:     Worry: Not on file     Inability: Not on file     Transportation needs:     Medical: Not on file     Non-medical: Not on file   Tobacco Use     Smoking status: Never Smoker     Smokeless tobacco: Never Used   Substance and Sexual Activity     Alcohol use: Yes     Frequency: Monthly or less     Drinks per session: 1 or 2     Binge frequency: Less than monthly     Drug use: No     Sexual activity: Yes     Partners: Male   Lifestyle     Physical activity:     Days per week: Not on file     Minutes per session: Not on file     Stress: Not on file   Relationships     Social connections:     Talks on phone: Not on file     Gets together: Not on file     Attends Zoroastrianism service: Not on file     Active member of club or organization: Not on file     Attends meetings of clubs or organizations: Not on file     Relationship status: Not on file     Intimate partner violence:     Fear of current or ex partner: Not on file     Emotionally abused: Not on file     Physically abused: Not on file     Forced sexual activity: Not on file   Other Topics Concern     Parent/sibling w/ CABG, MI or angioplasty before 65F 55M? Yes     Comment: pt had MI @ age 58   Social History Narrative     Not on file       Outpatient Encounter Medications as of 1/21/2020   Medication Sig Dispense Refill     Prenatal Vit-Fe Fumarate-FA (PRENATAL 19) 29-1 MG CHEW Take 2 chew tab by mouth daily       No facility-administered encounter  medications on file as of 1/21/2020.              Review Of Systems  Skin: As above  Eyes: negative  Ears/Nose/Throat: negative  Respiratory: No shortness of breath, dyspnea on exertion, cough, or hemoptysis  Cardiovascular: negative  Gastrointestinal: negative  Genitourinary: negative  Musculoskeletal: negative  Neurologic: negative  Psychiatric: negative  Hematologic/Lymphatic/Immunologic: negative  Endocrine: negative      O:   NAD, WDWN, Alert & Oriented, Mood & Affect wnl, Vitals stable   Here today alone   LMP 10/25/2019    General appearance normal   Vitals stable   Alert, oriented and in no acute distress      Following lymph nodes palpated: Occipital, Cervical, Supraclavicular , axilla , inguinal no lad   R arm, R thigh and L buttocks firm papule      Stuck on papules and brown macules on trunk and ext   Red papules on trunk  Flesh colored papules on trunk   L arm well healed                               Stuck on papules and brown macules on trunk and ext   Red papules on trunk  Scaly papule on lip  Pigmented macules with regular borders and pigment networks on trunka nd ext    The remainder of the full exam was unremarkable; the following areas were examined:  conjunctiva/lids, oral mucosa, neck, peripheral vascular system, abdomen, lymph nodes, digits/nails, eccrine and apocrine glands, scalp/hair, face, neck, chest, abdomen, buttocks, back, RUE, LUE, RLE, LLE       Eyes: Conjunctivae/lids:Normal     ENT: Lips, buccal mucosa, tongue: normal    MSK:Normal    Cardiovascular: peripheral edema none    Pulm: Breathing Normal    Lymph Nodes: No Head and Neck Lymphadenopathy     Neuro/Psych: Orientation:Alert and Orientedx3 ; Mood/Affect:normal       A/P:  1. Seborrheic keratosis, lentigo, angioma, nevi , hx of melanoma , dermatofibroma  MELANOMA DISCUSSED WITH PATIENT:  I discussed the specifics of tumor, prognosis, metachronous melanoma, self exam, and genetics with the patient. I explained the need for  monthly skin exams including and taught the patient how to do this. Patient was asked about new or changing moles . I discussed with patient signs and symptoms that could arise in the setting of recurrent locoregional or metastatic disease. In addition, the need to undergo every 4 month dermatologic full skin survey and evaluation given that patients with a diagnosis of melanoma are at risk of recurrence (local and distant) and of subsequent de casper melanoma.      BENIGN LESIONS DISCUSSED WITH PATIENT:  I discussed the specifics of tumor, prognosis, and genetics of benign lesions.  I explained that treatment of these lesions would be purely cosmetic and not medically neccessary.  I discussed with patient different removal options including excision, cautery and /or laser.      Nature and genetics of benign skin lesions dicussed with patient.  Signs and Symptoms of skin cancer discussed with patient.  ABCDEs of melanoma reviewed with patient.  Patient encouraged to perform monthly skin exams.  UV precautions reviewed with patient.  Patient to follow up with Primary Care provider regarding elevated blood pressure.  Skin care regimen reviewed with patient: Eliminate harsh soaps, i.e. Dial, zest, irsih spring; Mild soaps such as Cetaphil or Dove sensitive skin, avoid hot or cold showers, aggressive use of emollients including vanicream, cetaphil or cerave discussed with patient.    Risks of non-melanoma skin cancer discussed with patient   Return to clinic 4 months

## 2020-01-21 NOTE — PROGRESS NOTES
CC: Here for routine prenatal visit @ 12w4d   HPI: No cramping or bleeding.  No complaints.     PE: /65 (BP Location: Right arm, Cuff Size: Adult Regular)   Pulse 77   Temp 98.3  F (36.8  C) (Tympanic)   Resp 16   Wt 82.8 kg (182 lb 8 oz)   LMP 10/25/2019   BMI 29.46 kg/m     See OB flowsheet    1st TM screen/consult (20): WNL.  Follow up arranged for more formal consultation with MFM @ 16 weeks     A/P  @ 12w4d normal pregnancy    1. Routine prenatal care  2. Biliary dyskinesia: has surgery scheduled for next week.  Reviewed precautions.   3. Colon cancer: has Oncology follow up this week.  MFM consult as above  4. AMA: NIPT WNL    RTC 4 weeks.      Anai Torres M.D.

## 2020-01-21 NOTE — PATIENT INSTRUCTIONS
Random questions to ask your Oncology:    1. When you are done with your pregnancy, would they recommend removing the uterus, tubes, ovaries?  If so, what kind of timing?  (presumably after you are done nursing)    If you had it all removed, would they have any reservations about Estrogen replacement?    Please ask your general surgeon to peek at your pelvis for scar tissue.

## 2020-01-21 NOTE — LETTER
1/21/2020         RE: Remedios Watts  [address] [place] [address]        Dear Colleague,    Thank you for referring your patient, Remedios Watts, to the [facility]. Please see a copy of my visit note below.    Remedios Watts is a 39 year old year old female patient here today for hx of 0.45mm l arm.  Also has hx of chemo and was started on chemo LOV.   Chemo done.  She is now pregnant!!!.  She notes spot on right arm.  .  Patient states this has been present for a while.  Patient reports the following symptoms:  none.  Patient reports the following previous treatments none.  These treatments did not work.  Patient reports the following modifying factors none.  Associated symptoms: none.  Patient has no other skin complaints today.  Remainder of the HPI, Meds, PMH, Allergies, FH, and SH was reviewed in chart.      Past Medical History:   Diagnosis Date     Cervical high risk HPV (human papillomavirus) test positive 04/23/2019    see problem list     Chickenpox      Group B streptococcus urinary tract infection complicating pregnancy 4/20/2011    Plan PCN in labor      History of postpartum depression, currently pregnant 10/21/2010     Malignant melanoma (H)      Partial small bowel obstruction (H) 3/10/2019     Postpartum depression 12/17/2008     Tailbone injury     fx     Urinary tract infections        Past Surgical History:   Procedure Laterality Date     C ORAL SURGERY PROCEDURE       COLONOSCOPY N/A 3/11/2019    Procedure: COMBINED COLONOSCOPY, SINGLE OR MULTIPLE BIOPSY/POLYPECTOMY BY BIOPSY;  Surgeon: Loi Maldonado MD;  [facility]: [facility]     INSERT PORT VASCULAR ACCESS Right 4/22/2019    Procedure: Central venous Chest Port Placement - right;  Surgeon: Didier Frazier PA-C;  [facility]     IR CHEST PORT PLACEMENT > 5 YRS OF AGE  4/22/2019     LAPAROSCOPIC ASSISTED COLECTOMY Right 3/22/2019    Procedure: Laparoscopic Extended Right Hemicolectomy and appendectomy;   Surgeon: Linda Quesada MD;  Location:  OR        Family History   Problem Relation Age of Onset     Arthritis Mother      Neurologic Disorder Mother         had a seizure      Alcohol/Drug Mother      Arthritis Maternal Grandmother      Breast Cancer Maternal Grandmother      Melanoma Maternal Grandmother      Heart Disease Paternal Grandmother      Cancer Father         skin       Social History     Socioeconomic History     Marital status:      Spouse name: Not on file     Number of children: 3     Years of education: Not on file     Highest education level: Not on file   Occupational History     Employer: [employer]   Social Needs     Financial resource strain: Not on file     Food insecurity:     Worry: Not on file     Inability: Not on file     Transportation needs:     Medical: Not on file     Non-medical: Not on file   Tobacco Use     Smoking status: Never Smoker     Smokeless tobacco: Never Used   Substance and Sexual Activity     Alcohol use: Yes     Frequency: Monthly or less     Drinks per session: 1 or 2     Binge frequency: Less than monthly     Drug use: No     Sexual activity: Yes     Partners: Male   Lifestyle     Physical activity:     Days per week: Not on file     Minutes per session: Not on file     Stress: Not on file   Relationships     Social connections:     Talks on phone: Not on file     Gets together: Not on file     Attends Sikh service: Not on file     Active member of club or organization: Not on file     Attends meetings of clubs or organizations: Not on file     Relationship status: Not on file     Intimate partner violence:     Fear of current or ex partner: Not on file     Emotionally abused: Not on file     Physically abused: Not on file     Forced sexual activity: Not on file   Other Topics Concern     Parent/sibling w/ CABG, MI or angioplasty before 65F 55M? Yes     Comment: pt had MI @ age 58   Social History Narrative      Not on file       Outpatient Encounter Medications as of 1/21/2020   Medication Sig Dispense Refill     Prenatal Vit-Fe Fumarate-FA (PRENATAL 19) 29-1 MG CHEW Take 2 chew tab by mouth daily       No facility-administered encounter medications on file as of 1/21/2020.              Review Of Systems  Skin: As above  Eyes: negative  Ears/Nose/Throat: negative  Respiratory: No shortness of breath, dyspnea on exertion, cough, or hemoptysis  Cardiovascular: negative  Gastrointestinal: negative  Genitourinary: negative  Musculoskeletal: negative  Neurologic: negative  Psychiatric: negative  Hematologic/Lymphatic/Immunologic: negative  Endocrine: negative      O:   NAD, WDWN, Alert & Oriented, Mood & Affect wnl, Vitals stable   Here today alone   LMP 10/25/2019    General appearance normal   Vitals stable   Alert, oriented and in no acute distress      Following lymph nodes palpated: Occipital, Cervical, Supraclavicular , axilla , inguinal no lad   R arm, R thigh and L buttocks firm papule      Stuck on papules and brown macules on trunk and ext   Red papules on trunk  Flesh colored papules on trunk   L arm well healed                               Stuck on papules and brown macules on trunk and ext   Red papules on trunk  Scaly papule on lip  Pigmented macules with regular borders and pigment networks on trunka nd ext    The remainder of the full exam was unremarkable; the following areas were examined:  conjunctiva/lids, oral mucosa, neck, peripheral vascular system, abdomen, lymph nodes, digits/nails, eccrine and apocrine glands, scalp/hair, face, neck, chest, abdomen, buttocks, back, RUE, LUE, RLE, LLE       Eyes: Conjunctivae/lids:Normal     ENT: Lips, buccal mucosa, tongue: normal    MSK:Normal    Cardiovascular: peripheral edema none    Pulm: Breathing Normal    Lymph Nodes: No Head and Neck Lymphadenopathy     Neuro/Psych: Orientation:Alert and Orientedx3 ; Mood/Affect:normal       A/P:  1. Seborrheic keratosis,  lentigo, angioma, nevi , hx of melanoma , dermatofibroma  MELANOMA DISCUSSED WITH PATIENT:  I discussed the specifics of tumor, prognosis, metachronous melanoma, self exam, and genetics with the patient. I explained the need for monthly skin exams including and taught the patient how to do this. Patient was asked about new or changing moles . I discussed with patient signs and symptoms that could arise in the setting of recurrent locoregional or metastatic disease. In addition, the need to undergo every 4 month dermatologic full skin survey and evaluation given that patients with a diagnosis of melanoma are at risk of recurrence (local and distant) and of subsequent de casper melanoma.      BENIGN LESIONS DISCUSSED WITH PATIENT:  I discussed the specifics of tumor, prognosis, and genetics of benign lesions.  I explained that treatment of these lesions would be purely cosmetic and not medically neccessary.  I discussed with patient different removal options including excision, cautery and /or laser.      Nature and genetics of benign skin lesions dicussed with patient.  Signs and Symptoms of skin cancer discussed with patient.  ABCDEs of melanoma reviewed with patient.  Patient encouraged to perform monthly skin exams.  UV precautions reviewed with patient.  Patient to follow up with Primary Care provider regarding elevated blood pressure.  Skin care regimen reviewed with patient: Eliminate harsh soaps, i.e. Dial, zest, irsih spring; Mild soaps such as Cetaphil or Dove sensitive skin, avoid hot or cold showers, aggressive use of emollients including vanicream, cetaphil or cerave discussed with patient.    Risks of non-melanoma skin cancer discussed with patient   Return to clinic 4 months      Again, thank you for allowing me to participate in the care of your patient.        Sincerely,        Johnny Stevens MD

## 2020-01-28 NOTE — OP NOTE
Preop diagnosis: Biliary dyskinesia    Postop diagnosis: Same    Procedure: Laparoscopic cholecystectomy and removal of Port-A-Cath    Surgeon: Farhan    Assistant surgeon: Errol Anglin PA-C (needed for expertise in camera operation retraction hemostasis wound closure and suctioning)    Anesthesia: General endotracheal CRNA    Procedure: Patient's abdomen was clipped of extraneous hair and cleaned and draped in sterile manner.  1/4% Marcaine with epinephrine was used to anesthetize all port sites.  Small supraumbilical vertical midline incision made and subcutaneous tissues dissected to fascia.  Fascia opened sharply and 12 mm blunt trocar inserted.  Carbon dioxide insufflated to 15 mmHg.  Under direct vision subxiphoid 11 mm trocar placed as were 2 right-sided 5 mm trochars.  Patient placed into reverse Trendelenburg left side down.  Gallbladder was easily located the right upper quadrant.  There were no adhesions from her previous surgeries and appeared uninflamed.  It was gently grasped and elevated.  The fat surrounding the neck of the gallbladder was dissected free until the cystic duct was isolated.  It was encircled, clipped twice proximally once distally and ligated.  2 separate branches of the cystic artery were both located clipped and ligated.  The gallbladder was then removed from liver bed using electrocautery, placed into an Endo Catch bag and brought out through the subxiphoid port.  3 L of warm normal saline solution was used to irrigate out the abdominal cavity and this was sucked free until the effluent was clear.  A small amount of bleeding in the liver bed was easily controlled with electrocautery.  A final inspection of the liver bed revealed it to be intact with no evidence of hemorrhage or leakage.  The cystic artery and duct stumps were intact clips applied.  Finally all trochars removed under direct vision and the air allowed to desufflate.  The fascial defect of the supraumbilical port was  closed using an 0 Vicryl suture in a figure-of-eight fashion and this was bolstered with a second 0 Vicryl on top of that and reinjected with Marcaine.  All wounds were irrigated with normal saline and skin closed using 4-0 Vicryl running subcuticular stitches and dressed with Dermabond.  Attention was turned to the right anterior chest.  Again this was cleaned and draped in sterile manner.  1/4% Marcaine with epinephrine was used anesthetize skin overlying the port.  The port was incised sharply using 15 blade scalpel and both the port and catheter were removed.  Bleeding was controlled with direct pressure.  The wound closed using a 4-0 Vicryl running subcuticular stitch and again dressed with Dermabond.    Estimated blood loss: 5 mL    IV fluid: 750 mL

## 2020-01-28 NOTE — ANESTHESIA PREPROCEDURE EVALUATION
Anesthesia Pre-Procedure Evaluation    Patient: Remedios Watts   MRN: [id number] : 1980          Preoperative Diagnosis: Biliary dyskinesia [K82.8]    Procedure(s):  CHOLECYSTECTOMY, LAPAROSCOPIC    Past Medical History:   Diagnosis Date     Cervical high risk HPV (human papillomavirus) test positive 2019    see problem list     Chickenpox      Group B streptococcus urinary tract infection complicating pregnancy 2011    Plan PCN in labor      History of postpartum depression, currently pregnant 10/21/2010     Malignant melanoma (H)      Partial small bowel obstruction (H) 3/10/2019     Postpartum depression 2008     Tailbone injury     fx     Urinary tract infections      Past Surgical History:   Procedure Laterality Date     C ORAL SURGERY PROCEDURE       COLONOSCOPY N/A 3/11/2019    Procedure: COMBINED COLONOSCOPY, SINGLE OR MULTIPLE BIOPSY/POLYPECTOMY BY BIOPSY;  Surgeon: Loi Maldonado MD;  Location: [facility]     INSERT PORT VASCULAR ACCESS Right 2019    Procedure: Central venous Chest Port Placement - right;  Surgeon: Didier Frazier PA-C;  [facility]     IR CHEST PORT PLACEMENT > 5 YRS OF AGE  2019     LAPAROSCOPIC ASSISTED COLECTOMY Right 3/22/2019    Procedure: Laparoscopic Extended Right Hemicolectomy and appendectomy;  Surgeon: Linda Quesada MD;  Location: [facility]       Anesthesia Evaluation     . Pt has had prior anesthetic. Type: General and MAC    No history of anesthetic complications          ROS/MED HX    ENT/Pulmonary:  - neg pulmonary ROS     Neurologic:  - neg neurologic ROS     Cardiovascular:         METS/Exercise Tolerance:     Hematologic:         Musculoskeletal:         GI/Hepatic:  - neg GI/hepatic ROS       Renal/Genitourinary:  - ROS Renal section negative       Endo:  - neg endo ROS       Psychiatric:     (+) psychiatric history depression and anxiety      Infectious Disease:  - neg infectious disease ROS       Malignancy:   (+)  "Malignancy History of Skin and GI  GI CA Remission status post, Skin CA Remission status post,         Other: Comment: 13 weeks pregnant   (+) Possibly pregnant                         Physical Exam  Normal systems: cardiovascular, pulmonary and dental    Airway   Mallampati: I  TM distance: >3 FB    Dental     Cardiovascular   Rhythm and rate: regular and normal      Pulmonary    breath sounds clear to auscultation            Lab Results   Component Value Date    WBC 6.6 01/07/2020    HGB 13.3 01/07/2020    HCT 40.1 01/07/2020     01/07/2020     01/07/2020    POTASSIUM 3.9 01/07/2020    CHLORIDE 109 01/07/2020    CO2 26 01/07/2020    BUN 9 01/07/2020    CR 0.55 01/07/2020    GLC 71 01/07/2020    MANDY 9.1 01/07/2020    ALBUMIN 3.7 01/07/2020    PROTTOTAL 7.6 01/07/2020    ALT 20 01/07/2020    AST 14 01/07/2020    ALKPHOS 73 01/07/2020    BILITOTAL 0.3 01/07/2020    LIPASE 147 01/07/2020    AMYLASE 37 03/10/2019    INR 1.02 04/22/2019    TSH 0.77 11/18/2019    T4 1.72 (H) 11/18/2019    T3 141 05/13/2019    HCG Negative 04/05/2019    HCGS Negative 01/21/2018       Preop Vitals  BP Readings from Last 3 Encounters:   01/28/20 106/68   01/21/20 115/71   01/21/20 105/65    Pulse Readings from Last 3 Encounters:   01/21/20 74   01/21/20 77   01/08/20 66      Resp Readings from Last 3 Encounters:   01/28/20 16   01/21/20 16   01/08/20 16    SpO2 Readings from Last 3 Encounters:   01/28/20 100%   01/07/20 99%   11/19/19 100%      Temp Readings from Last 1 Encounters:   01/28/20 37.3  C (99.1  F) (Oral)    Ht Readings from Last 1 Encounters:   01/28/20 1.676 m (5' 6\")      Wt Readings from Last 1 Encounters:   01/28/20 82.6 kg (182 lb)    Estimated body mass index is 29.38 kg/m  as calculated from the following:    Height as of this encounter: 1.676 m (5' 6\").    Weight as of this encounter: 82.6 kg (182 lb).       Anesthesia Plan      History & Physical Review  History and physical reviewed and following " examination; no interval change.    ASA Status:  2 .    NPO Status:  > 8 hours    Plan for General and ETT with Intravenous and Propofol induction. Maintenance will be Balanced.      Additional equipment: Videolaryngoscope Pt. And  aware of potential risks to fetus and wish to proceed.      Postoperative Care  Postoperative pain management:  IV analgesics and Oral pain medications.      Consents  Anesthetic plan, risks, benefits and alternatives discussed with:  Patient..                 Armani Bradley CRNA, APRN CRNA

## 2020-01-28 NOTE — ANESTHESIA CARE TRANSFER NOTE
Patient: Remedios Watts    Procedure(s):  CHOLECYSTECTOMY, LAPAROSCOPIC & RIGHT TIMOTHY CATH REMOVAL  Remove Catheter Vascular Access    Diagnosis: Biliary dyskinesia [K82.8]  Diagnosis Additional Information: No value filed.    Anesthesia Type:   General, ETT     Note:  Airway :Nasal Cannula  Patient transferred to:PACU  Handoff Report: Identifed the Patient, Identified the Reponsible Provider, Reviewed the pertinent medical history, Discussed the surgical course, Reviewed Intra-OP anesthesia mangement and issues during anesthesia, Set expectations for post-procedure period and Allowed opportunity for questions and acknowledgement of understanding      Vitals: (Last set prior to Anesthesia Care Transfer)    CRNA VITALS  1/28/2020 1045 - 1/28/2020 1123      1/28/2020             Pulse:  106    SpO2:  100 %                Electronically Signed By: Armani Bradley CRNA, APRN CRNA  January 28, 2020  11:23 AM

## 2020-01-28 NOTE — ANESTHESIA POSTPROCEDURE EVALUATION
Patient: Remedios Watts    Procedure(s):  CHOLECYSTECTOMY, LAPAROSCOPIC & RIGHT TIMOTHY CATH REMOVAL  Remove Catheter Vascular Access    Diagnosis:Biliary dyskinesia [K82.8]  Diagnosis Additional Information: No value filed.    Anesthesia Type:  General, ETT    Note:  Anesthesia Post Evaluation    Patient location during evaluation: Phase 2 and Bedside  Patient participation: Able to fully participate in evaluation  Level of consciousness: awake and alert  Pain management: adequate  Airway patency: patent  Cardiovascular status: acceptable  Respiratory status: acceptable  Hydration status: acceptable  PONV: none     Anesthetic complications: None          Last vitals:  Vitals:    01/28/20 1225 01/28/20 1230 01/28/20 1245   BP: 112/68 112/71 111/64   Pulse:  69    Resp: 16 16 16   Temp: 36.6  C (97.9  F)     SpO2:  100% 100%         Electronically Signed By: Armani Bradley CRNA, APRN CRNA  January 28, 2020  12:56 PM

## 2020-01-28 NOTE — DISCHARGE INSTRUCTIONS
DISCHARGE INSTRUCTIONS     1. You may resume your regular diet when you feel you are ready    2. No heavy lifting (>30lbs)  for 1 month.    3. You will have some discomfort at the incision sites. This is expected. This should improve over the next 2-3 days. Ice and pain medication will help with this pain. Use prescribed pain medication as instructed.     4. Some bruising and mild swelling is normal after surgery. The area below and around the incision(s) may be hard and elevated. This is part of the normal healing process. This will resolve slowly over the next several months.     5. Your wounds are covered with glue. The glue is water tight and so you can shower or bathe immediately following surgery.     6. Use the following medications (in addition to your normal meds) as shown:      Tylenol (acetaminophen) 500 mg every 6 hours as needed for pain.    Do not take more than 1000 mg of Tylenol every 6 hours -OR- 4g in a day    Motrin (ibuprophen) 600 mg every 6 hours as needed for pain. Take with food.     8. Notify Clinic at [phone] if:     Your discomfort is not relieved by your pain medication     You have signs of infection such as temperature above 100.4 degrees orally,  chills, or increasing daily discomfort.     Incision site is becoming more red and/or there is purulent drainage.      9. Follow up with Dr Real in 1-2 weeks.    10.  Due to regulations, I am only allowed prescribe to 3 days of postoperative narcotic pain medication.  Should you require a refill, please call or text me at [phone].    11. Most people take the rest of the week off and return to work the following Monday. You may return sooner as pain allows. During your follow-up appointment, the doctor will give you a formal letter for your work with any restrictions detailed.  All disability or other such paperwork will be addressed at that time.                        Same Day Surgery Discharge Instructions  Special Precautions  After Surgery - Adult    1. It is not unusual to feel lightheaded or faint, up to 24 hours after surgery or while taking pain medication.  If you have these symptoms; sit for a few minutes before standing and have someone assist you when getting up.  2. You should rest and relax for the next 24 hours and must have someone stay with you for at least 24 hours after your discharge.  3. DO NOT DRIVE any vehicle or operate mechanical equipment for 24 hours following the end of your surgery.  DO NOT DRIVE while taking narcotic pain medications that have been prescribed by your physician.  If you had a limb operated on, you must be able to use it fully to drive.  4. DO NOT drink alcoholic beverages for 24 hours following surgery or while taking prescription pain medication.  5. Drink clear liquids (apple juice, ginger ale, broth, 7-Up, etc.).  Progress to your regular diet as you feel able.  6. Any questions call your physician and do not make important decisions for 24 hours.       [facility]:  [phone]     Same Day Surgery [phone], Monday thru Friday 6am-9pm.    Break through Bleeding  As instructed per Surgeon or Nurse.    Post Op Infection  Be alert for signs of infection: redness, swelling, heat, drainage of pus, and/or elevated temperature.  Contact your surgeon if these occur.    Nausea   If post op nausea occurs, at first rest your stomach for a few hours by eating nothing solid and sipping only clear liquids.  Call your Surgeon if nausea does not resolve in 24 hours.

## 2020-02-07 NOTE — TELEPHONE ENCOUNTER
Called patient to verify what results can be given over phone to . Patient did give consent to communicate any results and answer any questions  may have.  called by RN. Informed that consent needed to be given first, and that RN may be able to answer questions for provider, or at least get more information for provider. Patient's  upset with writer, said blood work is for him, not her. Would not elaborate. Encounter made under 's chart.     Closing encounter    Jean CHEUNG RN   Specialty Clinics

## 2020-02-07 NOTE — TELEPHONE ENCOUNTER
Reason for Call:  Other     Detailed comments: Pt's , Johnny, calling (no CTC on file):  Has questions regarding blood testing - Please contact (says he needs to speak with the doctor not the nurse)    Phone Number Patient can be reached at: [phone]    Best Time: Any    Can we leave a detailed message on this number? YES    Call taken on 2/7/2020 at 1:59 PM by Denise Behrendt

## 2020-02-10 NOTE — PROGRESS NOTES
Maternal-Fetal Medicine Consultation    Remedios Watts  : 1980  MRN: [id number]    REFERRAL:  Remedios Watts is a 39 year old sent by Dr. Allen from  OBGYN clinic for MFM consultation.    HPI:  Remedios Watts is a 39 year old  at 15w4d by LMP consistent with 7w6d US here for MFM consultation regarding her advanced maternal age and history of both melanoma and colon cancer.    She reports that she has been doing overall well. She recently required a laparoscopic cholecystectomy for management of gallbladder dysfunction and reports feeling significantly improved since that time.     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  She has undergone a laparoscopic right hemicolectomy on 3/22/19 as well as radiochemotherapy for treatment of this cancer.  Chemotherapy exposure, included oxaliplatin and FOLFOX. She had a good response to this and her recent imaging showed no evidence of disease. She was subsequently put on adjuvant FOLFOX until 10/2019. She became unexpectedly pregnant approximately 3.5-4 weeks after completion of her chemotherapy.  Prior to our visit today, she met with her oncology team and they recommended surveillance imaging with CT of her chest and ultrasound of her abdomen/pelvis is her laboratory and tumor marker studies returned normal today. Their plan was to delay the colonoscopy until she was postpartum.    She also has a history of melanoma, which was able to be surgically resected and required no additional intervention. Due to this notable history she is seen by her dermatologist every 4 months for a skin examination.    Obstetrics History:  OB History    Para Term  AB Living   6 4 4 0 1 4   SAB TAB Ectopic Multiple Live Births   1 0 0 0 4      # Outcome Date GA Lbr Sanket/2nd Weight Sex Delivery Anes PTL Lv   6 Current            5 Term 11 39w1d 07:00 3.651 kg (8 lb 0.8 oz) M  Vag-Spont EPI N BETTIE      Name: Parish      Apgar1: 9  Apgar5: 9   4 Term 10/10/08 40w0d 10:00 4.394 kg (9 lb 11 oz) M IVD EPI  BETTIE      Birth Comments: GBS+      Name: Hermelinda      Apgar1: 9  Apgar5: 9   3 Term 06  07:30 3.856 kg (8 lb 8 oz) F  EPIDURAL  BETTIE      Name: Yaa      Apgar1: 7  Apgar5: 9   2 Term 05 39w0d 12:00 3.771 kg (8 lb 5 oz) M    BETTIE      Name: ALVAREZ   1 SAB 01 3w0d    SAB   DEC      Birth Comments: left tubal/mtx injection       Gynecologic History:  - Denies any history of abnormal pap smears  - Denies prior cervical surgery or procedures  - Denies any history of frequent UTIs, vaginal infections, or STIs    Past Medical History:  Past Medical History:   Diagnosis Date     Cervical high risk HPV (human papillomavirus) test positive 2019    see problem list     Chickenpox      Group B streptococcus urinary tract infection complicating pregnancy 2011    Plan PCN in labor      History of postpartum depression, currently pregnant 10/21/2010     Malignant melanoma (H)      Partial small bowel obstruction (H) 3/10/2019     Postpartum depression 2008     Tailbone injury     fx     Urinary tract infections        Past Surgical History:  Past Surgical History:   Procedure Laterality Date     C ORAL SURGERY PROCEDURE       COLONOSCOPY N/A 3/11/2019    Procedure: COMBINED COLONOSCOPY, SINGLE OR MULTIPLE BIOPSY/POLYPECTOMY BY BIOPSY;  Surgeon: Loi Maldonado MD;  Location: [facility]     INSERT PORT VASCULAR ACCESS Right 2019    Procedure: Central venous Chest Port Placement - right;  Surgeon: Didier Frazier PA-C;  Location: [facility]     IR CHEST PORT PLACEMENT > 5 YRS OF AGE  2019     LAPAROSCOPIC ASSISTED COLECTOMY Right 3/22/2019    Procedure: Laparoscopic Extended Right Hemicolectomy and appendectomy;  Surgeon: Linda Quesada MD;  Location: [facility]     LAPAROSCOPIC CHOLECYSTECTOMY N/A 2020    Procedure: CHOLECYSTECTOMY,  LAPAROSCOPIC & RIGHT TIMOTHY CATH REMOVAL;  Surgeon: Loi Real MD;  Location: [facility]     REMOVE CATHETER VASCULAR ACCESS Right 2020    Procedure: Remove Catheter Vascular Access;  Surgeon: Loi Real MD;  Location: [facility]       Current Medications:  Prior to Admission medications    Medication Sig Last Dose Taking? Auth Provider   HYDROcodone-acetaminophen (NORCO) 5-325 MG tablet Take 1-2 tablets by mouth every 6 hours as needed for pain   Loi Real MD   Prenatal Vit-Fe Fumarate-FA (PRENATAL 19) 29-1 MG CHEW Take 2 chew tab by mouth daily 2020 at Unknown time  Reported, Patient       Allergies:  Patient has no known allergies.    Social History:   Denies use of alcohol, drugs or smoking.    Family History:  Denies history of genetic disorders, preeclampsia, thromboembolic disease, bleeding disorders, mental retardation    ROS:  10-point ROS negative except as in HPI     PHYSICAL EXAM:  Deferred    ASSESSMENT/PLAN:  Remedios Watts is a 39 year old  at 15w4d by LMP consistent with 7w6d US here for MFM consultation regarding her advanced maternal age and history of both melanoma and colon cancer.    # Placenta Previa  - Newly identified findings of placenta previa found on today's 2/3 limited ultrasound examination.  - We reviewed the definition of placenta previa refers to the presence of placental tissue over the internal cervical os. We will continue to monitor placental location as the pregnancy progresses. Reviewed that it is possible for placental previa to resolve as the lower uterine segment continues to grow in pregnancy. We also discussed that its persistence can result in bleeding and may alter the route of delivery.    Recommendations:    Re-evaluation of placental location throughout pregnancy. Next evaluation at anatomy scan at 18-20 weeks of gestation.      # Advanced maternal age  - The risk of fetal aneuploidy increases with age. We reviewed these risks at length and we  discussed the options available for risk assessment for aneuploidy during pregnancy.  We also discussed how those strategies fit in with the available diagnostic tests (like CVS and amniocentesis).  - Patients of advanced age are also at increased risks of pregnancy complications, including miscarriage, preeclampsia, abnormalities with placentation, stillbirth, and  delivery.  These risks increase with increasing maternal age and comorbidities.   - She has already undergone genetic screening with NIPT and this has returned low risk.    Recommendations:    Low dose aspirin (usually 81 mg daily) started at 12 weeks gestation for preeclampsia prevention.    Baseline HELLP labs.  Will only require a urine to protein creatinine ratio. Other remaining labs were obtained today by oncology.    Targeted anatomical ultrasound at 18-20 weeks.      For women ? 40 years old, we recommend an ultrasound at 32 weeks for growth and  testing usually with weekly BPP (or NST with MVP) beginning at 36 weeks.    Consider induction of labor at 39-40 weeks, but no later than 41 weeks.    # History of Colon Cancer  - She has a recent history of stage IIIC poorly differentiated [id number] adenocarcinoma of the hepatic flexure. She underwent surgical resection and radiochemotherapy (Oxaliplatin, FOLFOX).   - Reviewed with patient that CEA tumor markers can still be utilized as a marker of cancer in pregnancy.  CEA levels may become more elevated than in her recent history, however, they should not be elevated outside of the normal range.  - Initial recommendations today had been to proceed with surveillance imaging (CT Chest, Abdominal/Pelvis US, and postpartum colonoscopy).    Recommendations:    Low dose aspirin (usually 81 mg daily) started at 12 weeks gestation for preeclampsia prevention.    Baseline HELLP labs.  Will only require a urine to protein creatinine ratio. Other remaining labs were obtained today by  "oncology.    After the patient left, the [facility] team was notified that her CEA level had returned and was noted to be elevated to 8.8.  After discussion with oncology team, it has been recommended that she proceed with MRI and colonoscopy for evaluation of disease.  Further recommendations pending the results of this study.    # History of melanoma  - She has a prior documentation of melanoma, which has been surgically removed from her left arm and back.  She has not required any further intervention thus far.  She receives regular screening through Dermatology.    Recommendations:    Continue close surveillance throughout pregnancy with dermatology (q4 months)    Evaluation of placenta by pathology at delivery as there have been rare occurrences of metastasis to the placenta    The patient was seen and evaluated with Dr. Hoffmann    Thank you for allowing us to participate in the care of your patient. Please do not hesitate to contact us if you have further questions regarding the management of your patient.     I saw this patient with Dr. Tahira Gutiérrez MD  Maternal Fetal Medicine Fellow  2/11/2020 1:18 PM      Please see \"Imaging\" tab under \"Chart Review\" for details of today's US at the [facility].    Physician Attestation   I, Trever Hoffmann MD, saw this patient and agree with the findings and plan of care as documented in the note.      Items personally reviewed/procedural attestation: History and plan of care recommendations.  The total time spent in face-to-face counseling was 15 minutes (>50% for medical management discussion and plan of care). A copy of this consult was sent to your office.     Trever Hoffmann MD        "

## 2020-02-10 NOTE — PROGRESS NOTES
CANCER SURVIVORSHIP VISIT - END OF TREATMENT VISIT  Feb 11, 2020    REASON FOR VISIT: survivorship visit for history of colon cancer    CANCER HISTORY AND TREATMENT:  Remedios Watts is a 39 year old female with PMH melanoma to left arm and back s/p excision with stage III colon cancer. She was admitted to the hospital on 3/10/2019 for bowel obstruction due to hepatic flexure colon mass. She had a CT scan done on 3/10/2019 which showed apple core lesion near the hepatic flexure of the colon concerning for an obstructing colonic neoplasm.  No enlarged lymph nodes were seen. There was a tiny subcentimeter left lobe liver lesion which is too small to characterize.  A CT of the chest on 3/13/2019 did not show evidence of metastatic disease in the chest.  Colonoscopy on 3/11/2019 showed fungating, infiltrative, highly-friable and ulcerated large mass at the hepatic flexure/proximal transverse colon, unable to traverse this lesions as this appears to be near-completely obstructive in nature (though allows liquid stool/effluent to pass under visualization). The mass was partially circumferential. Biopsies were consistent with invasive moderately differentiated adenocarcinoma compatible with colorectal primary and Mismatch repair enzymes were intact by immunohistochemistry.      On 3/22/2019 she had laparoscopic extended right hemicolectomy performed by Dr. Quesada. Final pathology was consistent with a 3.7 cm hepatic flexure poorly differentiated adenocarcinoma with focal micropapillary growth extending through the muscularis propria and involving serosa (pT4). There was lymphovascular invasion but no perineural invasion seen. All margins were negative. 4 out of 38 lymph nodes were positive for metastatic carcinoma including 2 lymph nodes with micrometastasis. Final staging was [id number].  She met with Dr. Capps 4/4/19 who recommended adjuvant FOLFOX.  She started adjuvant FOLFOX on 4/29/19. She did require dose  reduction of Oxaliplatin by 20% for neuropathy with cycle 4. She completed 12 cycles of FOLFOX, last on 10/22/19. She returns today for routine survivorship follow up.    INTERVAL HISTORY:   Patient reports that she had her gallbladder removed about 2 weeks ago.  She is feeling like she is starting to eat better this week.  She has had some right back pain that she describes as kidney pain since having her gallbladder removed.  She denies any urinary symptoms.  She notes the pain is most bothersome after eating and drinking.  She has been taking some Tylenol for this and using heat.  She is taking a stool softener and intermittently for constipation which began with pregnancy.  She also continues to have breast tenderness.  She denies any nausea.  She feels her energy is okay.  She has been walking about 3 miles per day.  She denies any blood in her stools.  She has ongoing numbness in her fingers in the bottoms of her feet.  She has not recently seen the eye doctor, dentist, or primary care, though is following closely with her OB team.  She did recently see her dermatologist given her history of melanoma.  She denies other concerns.    Current Outpatient Medications   Medication Sig Dispense Refill     Prenatal Vit-Fe Fumarate-FA (PRENATAL 19) 29-1 MG CHEW Take 2 chew tab by mouth daily          No Known Allergies    PHYSICAL EXAMINATION  General: The patient is a pleasant female in no acute distress.  /72 (BP Location: Left arm)   Pulse 79   Temp 97.9  F (36.6  C) (Oral)   Resp 16   Wt 83 kg (183 lb)   LMP 10/25/2019   SpO2 99%   BMI 29.54 kg/m    Wt Readings from Last 10 Encounters:   02/11/20 83 kg (183 lb)   01/28/20 82.6 kg (182 lb)   01/21/20 82.8 kg (182 lb 8 oz)   01/07/20 77.6 kg (171 lb)   12/23/19 80.3 kg (177 lb)   12/09/19 80.4 kg (177 lb 3.2 oz)   11/19/19 80.2 kg (176 lb 11.2 oz)   10/22/19 80.1 kg (176 lb 8 oz)   10/08/19 72.6 kg (160 lb)   09/30/19 78.2 kg (172 lb 6.4 oz)   HEENT:  EOMI, PERRL. Sclerae are anicteric. Oral mucosa is pink and moist with no lesions or thrush.   Lymph: Neck is supple with no lymphadenopathy in the cervical or supraclavicular areas.   Heart: Regular rate and rhythm.   Lungs: Clear to auscultation bilaterally.   Abdomen: Bowel sounds present, soft, nontender with no palpable hepatosplenomegaly. Lower abdomen is firm secondary to pregnancy.    Extremities: No lower extremity edema noted bilaterally.   Neuro: Cranial nerves II through XII are grossly intact.  Skin: No rashes, petechiae, or bruising noted on exposed skin.    LABS:   2/11/2020 09:08   Sodium 139   Potassium 3.9   Chloride 110 (H)   Carbon Dioxide 23   Urea Nitrogen 6 (L)   Creatinine 0.50 (L)   GFR Estimate >90   GFR Estimate If Black >90   Calcium 9.1   Anion Gap 6   Albumin 3.2 (L)   Protein Total 6.8   Bilirubin Total 0.2   Alkaline Phosphatase 75   ALT 18   AST 12   Glucose 87   WBC 7.4   Hemoglobin 12.9   Hematocrit 38.4   Platelet Count 196   RBC Count 4.22   MCV 91   MCH 30.6   MCHC 33.6   RDW 12.8   Diff Method Automated Method   % Neutrophils 76.0   % Lymphocytes 15.9   % Monocytes 6.2   % Eosinophils 1.1   % Basophils 0.3   % Immature Granulocytes 0.5   Nucleated RBCs 0   Absolute Neutrophil 5.6   Absolute Lymphocytes 1.2   Absolute Monocytes 0.5   Absolute Eosinophils 0.1   Absolute Basophils 0.0   Abs Immature Granulocytes 0.0   Absolute Nucleated RBC 0.0       IMPRESSION/PLAN:  Remedios Watts is a 39 year old female with a history of colon cancer, here for cancer survivorship visit following completion of cancer treatment.    Cancer history. Patient underwent surgical resection on 3/11/19, followed by 6 months of adjuvant chemotherapy with 5FU and oxaliplatin (FOLFOX), which she completed on 10/22/19. She is doing well today with no evidence of recurrence on H&P and labs.  She will continue to follow-up with the oncology team as every 3 months for the first 2-3 years, then every 6 months  for years 4-5. I will discuss with her care team the timing of a colonoscopy, which would be due the end of March, as well as the timing of surveillance imaging. I would consider a low dose non-contrast chest CT and abdominal ultrasound during pregnancy for surveillance. Her baseline CEA was elevated, so we also follow this for early signs of recurrence. CEA today is pending.     Genetic testing. 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    Peripheral neuropathy.   She did develop peripheral neuropathy which is stable. We discussed that it may slowly improve for up to 12 months post-chemo. We discussed the potential use of acupuncture for symptom relief.     Coping.  She is doing well today. She was initially quite anxious when she first found out about her pregnancy so soon after chemotherapy. She is doing much better with this now.     Cognitive changes.   She did develop cognitive changes with chemotherapy, not yet improved, though may be worse with pregnancy. She feels her short term memory is poor. Discussed doing brain exercises, like BrainHQ, Luminosity, puzzles, etc.    Risk of lymphedema: Can occur at any time. She will contact the clinic if she notices any swelling in the legs, and will be given a referral to the lymphedema specialists. Given pregnancy, if develops swelling, would first start with compression stockings, as may be pregnancy related.     Cancer screening. She should undergo routine screening for women in her age group.   Patient should discuss plans for PAP smears and mammograms with her primary care provider or OB.  Given her history of melanoma, she should continue regular follow-up with dermatology, last the end of January. She will continue to use sunscreen with a minimum SPF of 30, reapplying every 2 hours when outdoors for prolonged periods of time.     Healthy lifestyle.   She should maintain a healthy weight with a BMI between 20-25, but  would recommend this once no longer pregnant.   She should exercise at least 150 minutes weekly at moderate intensity. She is currently walking 3 miles/day.  She should see the eye doctor every 1-2 years, and dentist every 6 months for cleanings. Not currently up to date on these.   She should not use any tobacco. No current use.   She should minimize alcohol intake when not pregnant. If continuing to drink when not pregnant, should follow CDC recommendations for no more than 1 alcoholic drink/day for women. She rarely drinks when not pregnant and denies any recent alcohol use. Advised to continue to abstain during pregnancy.    Right back pain.  Suspect related to free air in abdomen s/p recent cholecystectomy. Recommend discussing with OB as well. Okay to continue with heat and Tylenol.     Melinda Mayo PA-C  [facility]  [address] [place] [id number] [phone]    Addendum: CEA returned elevated at 8.8 today. Discussed with Dr. Capps who recommends an abdominal MRI without contrast and a colonoscopy. I called and left patient a message to call me back so I may discuss this plan with her.    Patient called back and I discussed with her the above plan. She is understandably upset and was tearful on the phone.     Greater than 40 minutes was spent with this patient with greater than 20 minutes spent in counseling and coordination of care.

## 2020-02-11 NOTE — LETTER
2/11/2020      RE: Remedios Watts  [address] [place] [address]       CANCER SURVIVORSHIP VISIT - END OF TREATMENT VISIT  Feb 11, 2020    REASON FOR VISIT: survivorship visit for history of colon cancer    CANCER HISTORY AND TREATMENT:  Remedios Watts is a 39 year old female with PMH melanoma to left arm and back s/p excision with stage III colon cancer. She was admitted to the hospital on 3/10/2019 for bowel obstruction due to hepatic flexure colon mass. She had a CT scan done on 3/10/2019 which showed apple core lesion near the hepatic flexure of the colon concerning for an obstructing colonic neoplasm.  No enlarged lymph nodes were seen. There was a tiny subcentimeter left lobe liver lesion which is too small to characterize.  A CT of the chest on 3/13/2019 did not show evidence of metastatic disease in the chest.  Colonoscopy on 3/11/2019 showed fungating, infiltrative, highly-friable and ulcerated large mass at the hepatic flexure/proximal transverse colon, unable to traverse this lesions as this appears to be near-completely obstructive in nature (though allows liquid stool/effluent to pass under visualization). The mass was partially circumferential. Biopsies were consistent with invasive moderately differentiated adenocarcinoma compatible with colorectal primary and Mismatch repair enzymes were intact by immunohistochemistry.      On 3/22/2019 she had laparoscopic extended right hemicolectomy performed by Dr. Quesada. Final pathology was consistent with a 3.7 cm hepatic flexure poorly differentiated adenocarcinoma with focal micropapillary growth extending through the muscularis propria and involving serosa (pT4). There was lymphovascular invasion but no perineural invasion seen. All margins were negative. 4 out of 38 lymph nodes were positive for metastatic carcinoma including 2 lymph nodes with micrometastasis. Final staging was [id number].  She met with Dr. Capps 4/4/19 who recommended  adjuvant FOLFOX.  She started adjuvant FOLFOX on 4/29/19. She did require dose reduction of Oxaliplatin by 20% for neuropathy with cycle 4. She completed 12 cycles of FOLFOX, last on 10/22/19. She returns today for routine  survivorship follow up.    INTERVAL HISTORY:   Patient reports that she had her gallbladder removed about 2 weeks ago.  She is feeling like she is starting to eat better this week.  She has had some right back pain that she describes as kidney pain since having her gallbladder removed.  She denies any urinary symptoms.  She notes the pain is most bothersome after eating and drinking.  She has been taking some Tylenol for this and using heat.  She is taking a stool softener and intermittently for constipation which began with pregnancy.  She also continues to have breast tenderness.  She denies any nausea.  She feels her energy is okay.  She has been walking about 3 miles per day.  She denies any blood in her stools.  She has ongoing numbness in her fingers in the bottoms of her feet.  She has not recently seen the eye doctor, dentist, or primary care, though is following closely with her OB team.  She did recently see her dermatologist given her history of melanoma.  She denies other concerns.    Current Outpatient Medications   Medication Sig Dispense Refill     Prenatal Vit-Fe Fumarate-FA (PRENATAL 19) 29-1 MG CHEW Take 2 chew tab by mouth daily          No Known Allergies    PHYSICAL EXAMINATION  General: The patient is a pleasant female in no acute distress.  /72 (BP Location: Left arm)   Pulse 79   Temp 97.9  F (36.6  C) (Oral)   Resp 16   Wt 83 kg (183 lb)   LMP 10/25/2019   SpO2 99%   BMI 29.54 kg/m     Wt Readings from Last 10 Encounters:   02/11/20 83 kg (183 lb)   01/28/20 82.6 kg (182 lb)   01/21/20 82.8 kg (182 lb 8 oz)   01/07/20 77.6 kg (171 lb)   12/23/19 80.3 kg (177 lb)   12/09/19 80.4 kg (177 lb 3.2 oz)   11/19/19 80.2 kg (176 lb 11.2 oz)   10/22/19 80.1 kg (176 lb  8 oz)   10/08/19 72.6 kg (160 lb)   09/30/19 78.2 kg (172 lb 6.4 oz)   HEENT: EOMI, PERRL. Sclerae are anicteric. Oral mucosa is pink and moist with no lesions or thrush.   Lymph: Neck is supple with no lymphadenopathy in the cervical or supraclavicular areas.   Heart: Regular rate and rhythm.   Lungs: Clear to auscultation bilaterally.   Abdomen: Bowel sounds present, soft, nontender with no palpable hepatosplenomegaly. Lower abdomen is firm secondary to pregnancy.    Extremities: No lower extremity edema noted bilaterally.   Neuro: Cranial nerves II through XII are grossly intact.  Skin: No rashes, petechiae, or bruising noted on exposed skin.    LABS:   2/11/2020 09:08   Sodium 139   Potassium 3.9   Chloride 110 (H)   Carbon Dioxide 23   Urea Nitrogen 6 (L)   Creatinine 0.50 (L)   GFR Estimate >90   GFR Estimate If Black >90   Calcium 9.1   Anion Gap 6   Albumin 3.2 (L)   Protein Total 6.8   Bilirubin Total 0.2   Alkaline Phosphatase 75   ALT 18   AST 12   Glucose 87   WBC 7.4   Hemoglobin 12.9   Hematocrit 38.4   Platelet Count 196   RBC Count 4.22   MCV 91   MCH 30.6   MCHC 33.6   RDW 12.8   Diff Method Automated Method   % Neutrophils 76.0   % Lymphocytes 15.9   % Monocytes 6.2   % Eosinophils 1.1   % Basophils 0.3   % Immature Granulocytes 0.5   Nucleated RBCs 0   Absolute Neutrophil 5.6   Absolute Lymphocytes 1.2   Absolute Monocytes 0.5   Absolute Eosinophils 0.1   Absolute Basophils 0.0   Abs Immature Granulocytes 0.0   Absolute Nucleated RBC 0.0       IMPRESSION/PLAN:  Remedios Watts is a 39 year old female with a history of colon cancer, here for cancer survivorship visit following completion of cancer treatment.    Cancer history. Patient underwent surgical resection on 3/11/19, followed by 6 months of adjuvant chemotherapy with 5FU and oxaliplatin (FOLFOX), which she completed on 10/22/19. She is doing well today with no evidence of recurrence on H&P and labs.  She will continue to follow-up with the  oncology team as every 3 months for the first 2-3 years, then every 6 months for years 4-5. I will discuss with her care team the timing of a colonoscopy, which would be due the end of March, as well as the timing of surveillance imaging. I would consider a low dose non-contrast chest CT and abdominal ultrasound during pregnancy for surveillance. Her baseline CEA was elevated, so we also follow this for early signs of recurrence. CEA today is pending.     Genetic testing. 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    Peripheral neuropathy.   She did develop peripheral neuropathy which is stable. We discussed that it may slowly improve for up to 12 months post-chemo. We discussed the potential use of acupuncture for symptom relief.     Coping.  She is doing well today. She was initially quite anxious when she first found out about her pregnancy so soon after chemotherapy. She is doing much better with this now.     Cognitive changes.   She did develop cognitive changes with chemotherapy, not yet improved, though may be worse with pregnancy. She feels her short term memory is poor. Discussed doing brain exercises, like BrainHQ, Luminosity, puzzles, etc.    Risk of lymphedema: Can occur at any time. She will contact the clinic if she notices any swelling in the legs, and will be given a referral to the lymphedema specialists. Given pregnancy, if develops swelling, would first start with compression stockings, as may be pregnancy related.     Cancer screening. She should undergo routine screening for women in her age group.   Patient should discuss plans for PAP smears and mammograms with her primary care provider or OB.  Given her history of melanoma, she should continue regular follow-up with dermatology, last the end of January. She will continue to use sunscreen with a minimum SPF of 30, reapplying every 2 hours when outdoors for prolonged periods of time.     Healthy  lifestyle.   She should maintain a healthy weight with a BMI between 20-25, but would recommend this once no longer pregnant.   She should exercise at least 150 minutes weekly at moderate intensity. She is currently walking 3 miles/day.  She should see the eye doctor every 1-2 years, and dentist every 6 months for cleanings. Not currently up to date on these.   She should not use any tobacco. No current use.   She should minimize alcohol intake when not pregnant. If continuing to drink when not pregnant, should follow CDC recommendations for no more than 1 alcoholic drink/day for women. She rarely drinks when not pregnant and denies any recent alcohol use. Advised to continue to abstain during pregnancy.    Right back pain.  Suspect related to free air in abdomen s/p recent cholecystectomy. Recommend discussing with OB as well. Okay to continue with heat and Tylenol.     Melinda Mayo PA-C  [facility]  [address] [place] [id number]  [phone]    Addendum: CEA returned elevated at 8.8 today. Discussed with Dr. Capps who recommends an abdominal MRI without contrast and a colonoscopy. I called and left patient a message to call me back so I may discuss this plan with her.    Patient called back and I discussed with her the above plan. She is understandably upset and was tearful on the phone.     Greater than 40 minutes was spent with this patient with greater than 20 minutes spent in counseling and coordination of care.        Melinda Mayo PA-C

## 2020-02-11 NOTE — TELEPHONE ENCOUNTER
Patient is 15 weeks pregnant, had appointment with oncology and MFM today.  Just found out her CEA was elevated to 8.8 after today's appointment.  Appts are already being scheduled for patient.  Will have MRI tomorrow, colonoscopy Monday and follow up with oncology next Friday.     Dr. Celien CARLSON

## 2020-02-11 NOTE — TELEPHONE ENCOUNTER
Patient Name: Remedios Watts   : 1980  MRN: [id number]        : N/A    Gloria Active     Additional Information regarding appointment:  _____________________________________________________       Patient scheduled for:  Colonoscopy    Indication for procedure. Malignant neoplasm of hepatic flexure of colon (H); Elevated CEA; Pregnant (LMP 10/25/2019)    Sedation Type: C/S    Procedure Provider:  Jauregui                     Referring Provider. Maria Esther; Anai Torres; Trever Hoffmann; alisson Hernandez; Agustín Berger (PCP)    Arrival time verified: .2020    Facility location verified:   [facility]  [address] [place] [address]  [address] [place] [address]       History & Physical: N/A    Lap edwige 2020    Hx Colon CA @ hep flex et Melenoma    Does not want printed ProVation report at conclusion of Phase II  __________________________________________________       Prep Type:   [x]?Golytely  Pharmacy : (not sent)       Patient taking any blood thinners ? No       Electronic implanted medical devices ? No       GI / Hepatology History: Yes: See EPIC       Heart disease ? No       Lung disease ? No       Sleep apnea ? No       Diabetic ? No       Kidney disease ? No       Instructions given: [x]? Rec'd & Read   [x]? Reviewed           Pre procedure teaching completed: [x]? Yes - Reviewed       IV Sedation: [x]? No questions regarding Sedation as ordered        : Transportation from procedure & responsible adult to be with patient following procedure for a minimum of 6 hrs (Conscious Sedation): [x]?  - confirmed will have post-procedure companionship as required       Pt completed assessment via:  [x]? Return/Follow-up telephone call       _______________________________________________     Anisa Sim RN  [facility]

## 2020-02-11 NOTE — NURSING NOTE
Remedios here for comp [employer] consult due to colon cancer in pregnancy. Dr. Gutiérrez and Dr. Hoffmann in to see patient. Melinda Gaona RN

## 2020-02-11 NOTE — TELEPHONE ENCOUNTER
Patient Name: Remedios Watts   : 1980  MRN: [id number]       : N/A    [employer] Active    VM et MyChart with information needed to complete pre-assessment call.  Request pt contact Endoscopy Pre-assessment RN to complete upcoming procedure information.  Telephone call-back number provided.    [x] Resent via [employer] - This includes: Split-dose Golytely  instructions, Conscious Sedation policy Instructions, procedure date/time/location/provider.       Anisa Sim RN  [facility]    Additional Information regarding appointment:  _____________________________________________________      Patient scheduled for:  Colonoscopy    Indication for procedure. Malignant neoplasm of hepatic flexure of colon (H); Elevated CEA; Pregnant (LMP 10/25/2019)    Sedation Type: C/S    Procedure Provider:  Jauregui      Referring Provider. Maria Esther; Anai Torres; Trever Hoffmann; alisson Hernandez; Agustín Berger (PCP)    Arrival time verified: .2020    Facility location verified:   [facility] [place] [address]      History & Physical: N/A    Maria A edwige 2020    Hx Colon CA @ hep flex et Melenoma  __________________________________________________      Prep Type:   [x]Golytely  Pharmacy : (not sent)      Patient taking any blood thinners ? No      Electronic implanted medical devices ? No      GI / Hepatology History: Yes: See EPIC      Heart disease ? No      Lung disease ? No      Sleep apnea ? No      Diabetic ? No      Kidney disease ? No      Instructions given: [] Rec'd & Read   [] Reviewed           Pre procedure teaching completed: [x] Yes - Reviewed      IV Sedation: [x] No questions regarding Sedation as ordered       : Transportation from procedure & responsible adult to be with patient following procedure for a minimum of 6 hrs (Conscious Sedation): [] ** - confirmed will have post-procedure  companionship as required      Pt completed assessment via:  [] Return MyChart query responses   [] Return/Follow-up telephone call      _______________________________________________    Anisa Sim RN  [facility]

## 2020-02-11 NOTE — TELEPHONE ENCOUNTER
Reason for call:  Patient reporting a symptom    Symptom or request: Pt's spouse calling- No DA on file. He states that pt's tumor markers are elevated and they cannot get her in for a while to be seen, so he wants  to call them and get her in earlier.  Please call patient and advise.    After going through pt's chart - Pt was seen TODAY and the following was recommended:  Recommendations:    Low dose aspirin (usually 81 mg daily) started at 12 weeks gestation for preeclampsia prevention.    Baseline HELLP labs.  Will only require a urine to protein creatinine ratio. Other remaining labs were obtained today by oncology.    After the patient left, the [facility] team was notified that her CEA level had returned and was noted to be elevated to 8.8.  After discussion with oncology team, it has been recommended that she proceed with MRI and colonoscopy for evaluation of disease.  Further recommendations pending the results of this study.    Duration (how long have symptoms been present): ongoing    Have you been treated for this before? Yes    Additional comments:     Phone Number patient's spouse can be reached at:  Home number on file [phone]    Best Time:  any    Can we leave a detailed message on this number:  YES    Call taken on 2/11/2020 at 1:34 PM by Krystin Hopkins

## 2020-02-11 NOTE — NURSING NOTE
Chief Complaint   Patient presents with     Blood Draw     Labs drawn by venipuncture. Vitals checked. Pt checked in for next appt.      Labs drawn by venipuncture in right AC. Pt checked in for provider visit.     Cynthia Agrawal RN.

## 2020-02-11 NOTE — NURSING NOTE
"Oncology Rooming Note    February 11, 2020 9:10 AM   Remedios Watts is a 39 year old female who presents for:    Chief Complaint   Patient presents with     Blood Draw     Labs drawn by venipuncture. Vitals checked. Pt checked in for next appt.      Oncology Clinic Visit     Return; Colon Ca     Initial Vitals: /72 (BP Location: Left arm)   Pulse 79   Temp 97.9  F (36.6  C) (Oral)   Resp 16   Wt 83 kg (183 lb)   LMP 10/25/2019   SpO2 99%   BMI 29.54 kg/m   Estimated body mass index is 29.54 kg/m  as calculated from the following:    Height as of 1/28/20: 1.676 m (5' 6\").    Weight as of this encounter: 83 kg (183 lb). Body surface area is 1.97 meters squared.  No Pain (0) Comment: Data Unavailable   Patient's last menstrual period was 10/25/2019.  Allergies reviewed: Yes  Medications reviewed: Yes    Medications: Medication refills not needed today.  Pharmacy name entered into [facility]: [facility] - - [place] - [id number]    Clinical concerns: Patient states she is concerned about her right kidney.        Kristin Lundberg, JOANIE              "

## 2020-02-12 NOTE — TELEPHONE ENCOUNTER
Spoke with patient, she didn't know that her  called but she thinks he wanted the results of the CT done in November to compare with the MRI today.  Advised patient that results from November show no evidence of new disease and is reported as stable.  Also advised that Dr. Berger is out of the office until 2/17/20.  She is going to have her MRI today.  She states that she will also sign a DA for her spouse to receive her medical information.    Delphine Osborne RN

## 2020-02-12 NOTE — TELEPHONE ENCOUNTER
Reason for Call:  Request for results:    Name of test or procedure: Pt's spouse Johnny - He wants the results of the CT that pt's specialist ordered and she had done yesterday.  I DID tell him that we do not have an DA signed, so I cannot share ANYTHING from pt's medical record.  I encouraged him to have pt call the ordering provider for results.  Pt's spouse insist that Dr. Berger call him back.  Please call patient and advise - I DID tell Manny that a clinic RN will be calling the pt back only and again reiterated that we do not have a signed DA.      Date of test of procedure: 2/11/20    Location of the test or procedure: ?    OK to leave the result message on voice mail or with a family member? NO    Phone number Patient can be reached at:  Home number on file [phone] (home)    Additional comments:     Call taken on 2/12/2020 at 7:52 AM by Krystin Hopkins

## 2020-02-12 NOTE — PROGRESS NOTES
Left detailed VMM with MRI results: no signs of cancer and urology consult for hydronephrosis.  Left detailed call back info for additional questions or concerns.

## 2020-02-14 NOTE — TELEPHONE ENCOUNTER
Spoke with patient, she had MRI and colonoscopy done and both were normal.  She is wanting a chest MRI ordered.  Advised that Dr. Berger is out of the office until Monday and offered her a work in to see him then but patient states she will call her oncologist and see if they will order.  Otherwise she will call me back and schedule with Celine on Monday.    Delphine Osborne RN

## 2020-02-26 NOTE — PROGRESS NOTES
Attempted 2x to reach patient to discuss information below. Unable to leave a message, phone line continues to be busy.    Please let her know. She should see Dr. Capps with labs in mid-April.

## 2020-03-11 NOTE — TELEPHONE ENCOUNTER
M for patient with information below from Dr Capps.   Encouraged her to call with any additional questions or concerns.      From: Agata Capps MD   Sent: 3/3/2020   7:36 AM CST   To: Melinda Mayo PA-C, Mayelin Fuchs, RN   Subject: RE: Jefry pt                                       Yes I wont recommend this at this time. Also for general cancer screening, she should go through PCP as we do not do them.   Thanks   ----- Message -----   From: Melinda Mayo PA-C   Sent: 3/2/2020   2:56 PM CST   To: Agata Capps MD, Mayelin Fuchs, RN   Subject: FW: Jefry pt                                       I'm thinking we don't usually screen for breast cancer while patients are pregnant, correct?   Melinda   ----- Message -----   From: Renee Brooks   Sent: 3/2/2020   1:54 PM CST   To: Melinda Mayo PA-C   Subject: RE: Jefry pt                                       Scheduled, pt is aware.     She also asked if she can get a breast ultrasound- she wants some sort of screening, and is thinking she can't get a mammogram since she's pregnant (and it would hurt too much).  What do you think?   Renee Martinez

## 2020-03-24 NOTE — PROGRESS NOTES
"Remedios Watts is a 39 year old female who is being evaluated via a billable telephone visit.      The patient has been notified of following:     \"This telephone visit will be conducted via a call between you and your physician/provider. We have found that certain health care needs can be provided without the need for a physical exam.  This service lets us provide the care you need with a short phone conversation.  If a prescription is necessary we can send it directly to your pharmacy.  If lab work is needed we can place an order for that and you can then stop by our lab to have the test done at a later time.    If during the course of the call the physician/provider feels a telephone visit is not appropriate, you will not be charged for this service.\"     Remedios Watts complains of    Chief Complaint   Patient presents with     Depression     recheck        I have reviewed and updated the patient's Past Medical History, Social History, Family History and Medication List.    ALLERGIES  Patient has no known allergies.    Depression and Anxiety Follow-Up    How are you doing with your depression since your last visit? Worsened     How are you doing with your anxiety since your last visit?  Worsened     Are you having other symptoms that might be associated with depression or anxiety? No    Have you had a significant life event? OTHER: life     Do you have any concerns with your use of alcohol or other drugs? No    Social History     Tobacco Use     Smoking status: Never Smoker     Smokeless tobacco: Never Used   Substance Use Topics     Alcohol use: Yes     Frequency: Monthly or less     Drinks per session: 1 or 2     Binge frequency: Less than monthly     Drug use: No     PHQ 6/4/2018 6/19/2018 3/24/2020   PHQ-9 Total Score 0 3 10   Q9: Thoughts of better off dead/self-harm past 2 weeks Not at all Not at all Not at all     QING-7 SCORE 1/18/2013 6/19/2018 3/24/2020   Total Score 13 - -   Total Score - 0 18     Last " PHQ-9 3/24/2020   1.  Little interest or pleasure in doing things 1   2.  Feeling down, depressed, or hopeless 2   3.  Trouble falling or staying asleep, or sleeping too much 3   4.  Feeling tired or having little energy 3   5.  Poor appetite or overeating 0   6.  Feeling bad about yourself 0   7.  Trouble concentrating 1   8.  Moving slowly or restless 0   Q9: Thoughts of better off dead/self-harm past 2 weeks 0   PHQ-9 Total Score 10   Difficulty at work, home, or with people Very difficult     QING-7  3/24/2020   1. Feeling nervous, anxious, or on edge 3   2. Not being able to stop or control worrying 3   3. Worrying too much about different things 3   4. Trouble relaxing 3   5. Being so restless that it is hard to sit still 1   6. Becoming easily annoyed or irritable 3   7. Feeling afraid, as if something awful might happen 2   QING-7 Total Score 18   If you checked any problems, how difficult have they made it for you to do your work, take care of things at home, or get along with other people? Very difficult         Suicide Assessment Five-step Evaluation and Treatment (SAFE-T)    Additional provider notes:     Assessment/Plan:  1. Generalized anxiety disorder  She is developed significant anxiety and some component of depression because of her struggling with the potential cancer recurrence and now with the pregnancy.  She is getting acid reflux type symptoms which then cause her to wonder if this is cancer related.  She has become irritable and her spouse is noticing her mood and behavior changing.  We discussed at length treating this with Celexa.  We talked about side effects and safety profile regarding pregnancy.  She will go ahead and start this and call or return for persisting or new symptoms.  - citalopram (CELEXA) 20 MG tablet; Take 1 tablet (20 mg) by mouth daily  Dispense: 30 tablet; Refill: 3    Phone call duration:  12 minutes    Agustín Berger MD

## 2020-04-06 NOTE — TELEPHONE ENCOUNTER
Reason for Call:  Order  For ultrasound    Detailed comments: patient is calling and stating that she is 6 months pregnant and is wanting and US for her Liver. She states when she bends over, she is very sore. Not sure if its Cancer related, as she has had cancer. She is nervous about it. Please advise.    Phone Number Patient can be reached at: Home number on file [phone] (home)    Best Time: any    Can we leave a detailed message on this number? YES   Paola Olivia  [facility] [place]       Call taken on 4/6/2020 at 11:39 AM by Paola Mendoza

## 2020-04-06 NOTE — TELEPHONE ENCOUNTER
S-(situation): The patient states she continues to have right side pain for 3 weeks. Patient is worried it is her liver. The patient can pinpoint the pain and tenderness and it is where her liver is.    B-(background): The history of colon cancer. The patient is pregnant.    A-(assessment): Painful on the right side and is constant pain. She has the pain also on her the right side in the back. No fevers. The pain started about 3 weeks. The pain states she has not had any kidney issues.     R-(recommendations): Ok to order or would you like to do a OV with the patient?    Thank you    Beronica WHITT RN

## 2020-04-08 NOTE — TELEPHONE ENCOUNTER
Patient needs to be physically seen and assessed either in office or in [facility].      Since patient felt able to wait until tomorrow for appointment, Dr. Allen can see what her BP is and order labs as necessary.     Anai Torres M.D.

## 2020-04-08 NOTE — TELEPHONE ENCOUNTER
Called patient back. There is an opening tomorrow @ 11:15. LM for patient to confirm if she would be able to come in for UTI w/u, BP and assessment. Awaiting callback.     If unable to come in at that time, would recommend telephone visit.     Jean CHEUNG RN   Specialty Clinics

## 2020-04-08 NOTE — TELEPHONE ENCOUNTER
Closing encounter, patient already had called OB/GYN for same orders and there is another encounter.    Delphine Osborne RN

## 2020-04-08 NOTE — TELEPHONE ENCOUNTER
"Patient states that she is having symptoms of pre eclampsia. Having pain in the liver (with self  palpation) and right shoulder pain.  She feels like her baby is \"small\".    No visual changes, no headaches, denies epigastric pain.  Has appointment for abdominal us tomorrow and wants labs for pre-eclampsia, either UA or pre-eclampsia labs.    BP Readings from Last 3 Encounters:   02/11/20 110/72   01/28/20 106/68   01/21/20 115/71     History of colon cancer and she is feeling very anxious and paranoid.  Would like lab orders.  Advised patient that Dr. Berger is out of office.  Will route to Dr. Torres (has appointment with Dr. Torres on 3/15)    Delphine Osborne RN    "

## 2020-04-08 NOTE — TELEPHONE ENCOUNTER
Spoke with radiology scheduling - order needs to be changed to STAT so patient can get scheduled within 30 days to to COVID concern.  Order changed and signed.    Delphine Osborne RN

## 2020-04-08 NOTE — TELEPHONE ENCOUNTER
Clarified with radiology scheduler, needs to be changed to STAT so patient can schedule sooner than 30 days.  Order changed and patient notified.    Delphine Osborne RN

## 2020-04-08 NOTE — TELEPHONE ENCOUNTER
"Reason for call:  Patient reporting a symptom    Symptom or request: Pt calling back again.  She has been \"researching\" her symptoms on the Internet and thinks she could have pre-eclampsia and wants a urine test ordered that she can do when she goes in for her liver US tomorrow.  Please call patient and advise.      Duration (how long have symptoms been present): ongoing    Have you been treated for this before? Yes    Additional comments:     Phone Number patient can be reached at:  Home number on file [phone] (home)    Best Time:  any    Can we leave a detailed message on this number:  YES    Call taken on 4/8/2020 at 2:30 PM by Krystin Hopkins    "

## 2020-04-08 NOTE — TELEPHONE ENCOUNTER
Reason for Call:  Liver Ultrasound    Detailed comments: patient is calling and stating that she just tried to make her US appt, and she was told that unless the order is changed via Epic or per phone call, to state this needs to be done within 30 days, it cannot be done until much later. Please advise patient when new updated order is put in.    Phone Number Patient can be reached at: Home number on file [phone] (home)    Best Time: any    Can we leave a detailed message on this number? YES   Paola Olivia  [facility] [place]       Call taken on 4/8/2020 at 9:12 AM by Paola Mendoza

## 2020-04-08 NOTE — TELEPHONE ENCOUNTER
"Patient has also requested labs from Dr. Berger's clinic. Message was sent to Dr. Torres from his clinic. Patient called back. Scheduled with Dr. Allen for tomorrow, to assess BP, swelling, and concerns of pre-eclampsia. Patient stated \"since Dr. Torres is my usual doctor, and is in today, can you ask her about ordering labs for me to do between my appointments\".     Will discuss with Dr. Torres when she is available. Will call patient if/when labs are placed. Patient requesting urine test and LFT's. Please advise.     Jean CHEUNG RN   Specialty Clinics   "

## 2020-04-09 NOTE — TELEPHONE ENCOUNTER
Discussed briefly with  (DA on file) and appointment set up with Dr. Berger for tomorrow to discuss US results.    Delphine Osborne RN

## 2020-04-09 NOTE — NURSING NOTE
"Initial /76 (BP Location: Left arm, Patient Position: Chair, Cuff Size: Adult Large)   Pulse 81   Temp 98.3  F (36.8  C) (Tympanic)   Resp 18   Ht 1.702 m (5' 7\")   Wt 86.2 kg (190 lb)   LMP 10/25/2019   BMI 29.76 kg/m   Estimated body mass index is 29.76 kg/m  as calculated from the following:    Height as of this encounter: 1.702 m (5' 7\").    Weight as of this encounter: 86.2 kg (190 lb). .      "

## 2020-04-09 NOTE — TELEPHONE ENCOUNTER
Reason for call:    Symptom or request:     called asking to speak with RN regarding, patient scans he reports with imaging on the liver showed a spot, he is requesting that Dr Berger review previous scans.      Best Time:  any    Can we leave a detailed message on this number?  YES     Korina CHASE  Station

## 2020-04-09 NOTE — TELEPHONE ENCOUNTER
Advised  that Dr. Allen commented on labs in Epic and are viewable in [facility].  Advised labs so far are WNL.    Delphine Osborne RN

## 2020-04-09 NOTE — TELEPHONE ENCOUNTER
Pt's spouse called back again asking for lab tests results on labs that Dr. Allen ordered.  He would like to speak to Dr. Berger's RN only and declined to be transferred to OB/GYN Clinic.  Please call patient's spouse and advise.

## 2020-04-09 NOTE — TELEPHONE ENCOUNTER
Patients  Johnny calling to get lab results from OB clinic visit today with Dr. Allen. Patients chart reviewed by this RN and the consent to communicate is verified in [facility]. Per Epic chart lab results from today 4/9/2020 have not been signed off by the provider. This RN unable to give results to caller.Consulted with [facility] Nurse Leader regarding policy.  Advised caller to follow up with provider in am.     Protocol- Information  Care Advice Reviewed  Disposition-per Covid 19 smart phrase  Advised to call provider 4/10/2020  Caller states understanding of the recommended disposition  Advised to call back for further questions or concerns    Annamarie Bertrand RN  [facility]    COVID 19 Nurse Triage Plan/Patient Instructions    Please be aware that novel coronavirus (COVID-19) may be circulating in the community. If you develop symptoms such as fever, cough, or SOB or if you have concerns about the presence of another infection including coronavirus (COVID-19), please contact your health care provider or visit [email or web address].     Disposition/Instructions    Additional COVID19 information to add for patients.     Additional General Information About COVID-19    COVID-19 - General Information:  Regardless of if you have been tested or not:  Patient who have symptoms (cough, fever, or shortness of breath), need to isolate for 7 days from when symptoms started AND 72 hours after fever resolves (without fever reducing medications) AND improvement of respiratory symptoms (whichever is longer).      Isolate yourself at home (in own room/own bathroom if possible)    Do Not allow any visitors    Do Not go to work or school    Do Not go to [employer],  centers, shopping, or other public places.    Do Not shake hands.    Avoid close and intimate contact with others (hugging, kissing).    Follow CDC recommendations for household cleaning of frequently touched services.     After the initial 7 days,  continue to isolate yourself from household members as much as possible. To continue decrease the risk of community spread and exposure, you and any members of your household should limit activities in public for 14 days after starting home isolation.     You can reference the following CDC link for helpful home isolation/care tips:  https://www.cdc.gov/coronavirus/2019-ncov/downloads/10Things.pdf    COVID-19 - Symptoms:     The COVID-19 can cause a respiratory illness, such as bronchitis or pneumonia.    The most common symptoms are: cough, fever, and shortness of breath.     Other symptoms are: body aches, chills, diarrhea, fatigue, headache, runny nose, and sore throat     COVID-19 - Exposure Risk Factors:    Exposure to a person who has been diagnosed with COVID-19 .    Travel from an area with recent local transmission of COVID-19 .    The [facility] (www.cdc.gov) has the most up-to-date list of where the COVID-19 outbreak is occurring.    COVID-19 - Spreading:     The virus likely spreads through respiratory droplets produced when a person coughs or sneezes. These respiratory droplets can travel approximately 6 feet and can remain on surfaces.  Common disinfectants will kill the virus.    The CDC currently does not recommend healthy people wearing masks.    COVID-19 - Protect Yourself:     Avoid close contact with people known to have this new COVID-19 infection.    Wash hands often with soap and water or alcohol-based hand .    Avoid touching the eyes, nose or mouth.       Thank you for limiting contact with others, wearing a simple mask to cover your cough, practice good hand hygiene habits and accessing our [employer] services where possible to limit the spread of this virus.    For more information about COVID19 and options for caring for yourself at home, please visit the CDC website at https://www.cdc.gov/coronavirus/2019-ncov/about/steps-when-sick.html  For more options for care at [facility],  please visit our website at [email or web address]    For more information, please use the [place] Department of Health ([facility]) COVID-19 Hotlines (Interpreters available):     Health questions: Phone Number: [phone] or [phone] and Hours: 7 a.m. to 7 p.m.    Schools and  questions: Phone Number: [phone] or [phone] and Hours 7 a.m. to 7 p.m.                Reason for Disposition    [1] Caller requesting NON-URGENT health information AND [2] PCP's office is the best resource    Protocols used: INFORMATION ONLY CALL-A-AH

## 2020-04-10 NOTE — PROGRESS NOTES
"Subjective     Remedios Watts is a 39 year old female who is being evaluated via a billable telephone visit.      The patient has been notified of following:     \"This telephone visit will be conducted via a call between you and your physician/provider. We have found that certain health care needs can be provided without the need for a physical exam.  This service lets us provide the care you need with a short phone conversation.  If a prescription is necessary we can send it directly to your pharmacy.  If lab work is needed we can place an order for that and you can then stop by our lab to have the test done at a later time.    Telephone visits are billed at different rates depending on your insurance coverage. During this emergency period, for some insurers they may be billed the same as an in-person visit.  Please reach out to your insurance provider with any questions.    If during the course of the call the physician/provider feels a telephone visit is not appropriate, you will not be charged for this service.\"    Patient has given verbal consent for Telephone visit?  Yes    How would you like to obtain your AVS? Mail a copy    Rmeedios Watts complains of   Chief Complaint   Patient presents with     Results       ALLERGIES  Patient has no known allergies.                 Reviewed and updated as needed this visit by Provider         Review of Systems          Objective   Reported vitals:  LMP 10/25/2019      Psych: Alert and oriented times 3; coherent speech, normal   rate and volume, able to articulate logical thoughts, able   to abstract reason, no tangential thoughts, no hallucinations   or delusions  Her affect is              We had a 25-minute conversation over the phone with both Remedios and her  Jere.  They had not heard the results of the ultrasound even though they had an obstetric visit yesterday.  Notes from that visit are unavailable today.  They do have a telephone appointment with oncology " later this afternoon.  I talked to them about the ultrasound findings showing 3 hypoechoic lesions in the liver that are possibly new and include a possibility of malignancy in the differential.  They also wanted to know the CEA results and I looked those up and found that to be 69 which is markedly elevated from previous.  They had many many questions which I answered to the best of my ability but basically told them this is a very complex situation and they would need input from oncology.  I had left messages with the oncology department earlier today for call back and made them aware of the ultrasound results.  Remedios and Jere were both wondering whether presenting to [facility] would be reasonable and I explained that a second opinion is always appropriate if they felt they wanted to go that route.  I encouraged them to get the information from the oncology department this afternoon and then proceed after that.  I also explained that I would be furloughed next week because of the COVID virus issues so they may need to involve my partners if they have primary care questions.    Assessment/Plan:  1. Malignant neoplasm of hepatic flexure (H)      2. Liver lesion      3. High-risk pregnancy, elderly multigravida, unspecified trimester      4. Elevated CEA        No follow-ups on file.      Phone call duration:  25 minutes    Agustín Berger MD

## 2020-04-10 NOTE — LETTER
"4/10/2020      RE: Remedios Watts  [address] [place] [address]       Remedios Watts is a 39 year old female who is being evaluated via a billable telephone visit.      Please call patient on Mobile phone.     The patient has been notified of following:     \"This telephone visit will be conducted via a call between you and your physician/provider. We have found that certain health care needs can be provided without the need for a physical exam.  This service lets us provide the care you need with a short phone conversation.  If a prescription is necessary we can send it directly to your pharmacy.  If lab work is needed we can place an order for that and you can then stop by our lab to have the test done at a later time.    Telephone visits are billed at different rates depending on your insurance coverage. During this emergency period, for some insurers they may be billed the same as an in-person visit.  Please reach out to your insurance provider with any questions.    If during the course of the call the physician/provider feels a telephone visit is not appropriate, you will not be charged for this service.\"     Physician has received verbal consent for a Telephone Visit from the patient? Yes    Remedios Watts complains of    Chief Complaint   Patient presents with     Telephone     Colon ca       Allergies reviewed: Yes  Medications reviewed: Yes    Medications: Medication refills not needed today.  Pharmacy name entered into [email or web address]: [facility] - [place], [place] - [id number] [facility]    How would you like to obtain your AVS? MyChart    Clinical concerns: Patient does not want to talk on phone- having  doing all the talking.  explained she is very stressed out right now and has a lot of anxiety. ( Telephone visit was on speaker so Remedios could hear all questions and comments . ) Call 's phone number      Ritchie Quezada, LPN, LPN April 10, 2020  " "12:51 PM     ALLERGIES  Patient has no known allergies.      Oncology/Hematology Visit Note  Apr 10, 2020    Reason for Visit: Follow up of stage III colon cancer    History of Present Illness: Remedios Watts is a 39 year old female with PMH melanoma to left arm and back s/p excision with stage III colon cancer. Her history is as follows, copied forward from prior notes:    \"Ms. Watts is a 38 year old female who was admitted to the hospital on 3/10/2019 for bowel obstruction due to hepatic flexure colon mass. She had a CT scan done on 3/10/2019 which showed apple core lesion near the hepatic flexure of the colon concerning for an obstructing colonic neoplasm.  No enlarged lymph nodes were seen. There is a tiny subcentimeter left lobe liver lesion which is too small to characterize.  A CT of the chest on 3/13/2019 did not show evidence of metastatic disease in the chest.       Colonoscopy on 3/11/2019 showed fungating, infiltrative, highly-friable and ulcerated large mass at the hepatic flexure/proximal transverse colon, unable to traverse this lesions as this appears to be near-completely obstructive in nature (though allows liquid stool/effluent to pass under visualization). The mass was partially circumferential. Biopsies was consistent with invasive moderately differentiated adenocarcinoma compatible with colorectal primary and Mismatch repair enzymes were intact by immunohistochemistry.      On 3/22/2019 she had laparoscopic extended right hemicolectomy performed by Dr. Quesada. Final pathology was consistent with a 3.7 cm hepatic flexure poorly differentiated adenocarcinoma with focal micropapillary growth extending through the muscularis propria and involving serosa (pT4). There was lymphovascular invasion but no perineural invasion seen. All margins were negative. 4 out of 38 lymph nodes were positive for metastatic carcinoma including 2 lymph nodes with micrometastasis. Final staging was " [id number].    She met with Dr. Capps 19 who recommended adjuvant FOLFOX. Port placed 19.       She started adjuvant FOLFOX on 19. She did require dose reduction of Oxaliplatin by 20% for neuropathy with cycle 4. She completed 12 cycles of FOLFOX, last on 10/22/19. She returns today for routine follow up and to review her recent imaging.    Interval History:   Patient is feeling very overwhelmed and does not wish to speak on the phone at this time. She prefers her  speak on the phone.  She does have abdominal pain that radiates to her back that has been unchanged since her gallbladder was removed. She also has a palpable bump by her gallbladder scar.     Current Outpatient Medications   Medication Sig Dispense Refill     citalopram (CELEXA) 20 MG tablet Take 1 tablet (20 mg) by mouth daily 30 tablet 3     Prenatal Vit-Fe Fumarate-FA (PRENATAL ) 29-1 MG CHEW Take 2 chew tab by mouth daily       Laboratory Data:   2020 11:51   Sodium 135   Potassium 3.9   Chloride 106   Carbon Dioxide 25   Urea Nitrogen 8   Creatinine 0.49 (L)   GFR Estimate >90   GFR Estimate If Black >90   Calcium 8.9   Anion Gap 4   Albumin 2.9 (L)   Protein Total 6.9   Bilirubin Total 0.2   Alkaline Phosphatase 84   ALT 14   AST 17   Lipase 119   Glucose 79   WBC 8.3   Hemoglobin 12.0   Hematocrit 36.2   Platelet Count 199   RBC Count 3.92   MCV 92   MCH 30.6   MCHC 33.1   RDW 13.7   Diff Method Automated Method   % Neutrophils 75.2   % Lymphocytes 14.6   % Monocytes 8.4   % Eosinophils 1.6   % Basophils 0.2   Absolute Neutrophil 6.3   Absolute Lymphocytes 1.2   Absolute Monocytes 0.7   Absolute Eosinophils 0.1   Absolute Basophils 0.0   CEA 69.8 (H)     Imagin/9 abdominal ultrasound shows the followin. Three hypoechoic lesions in the left lobe of the liver measure up  to 2.5 cm in maximum dimension. These were not definitely seen on the  prior MRI or CT of the abdomen. Further evaluation with repeat MRI  is  recommended as metastasis are in the differential.  2. Postoperative changes status post cholecystectomy.  3. Otherwise negative limited abdominal ultrasound.    Assessment and Plan:  Onc  Stage IIIC poorly differentiated [id number] adenocarcinoma of the hepatic flexure -s/p laparoscopic right hemicolectomy on 3/22/19. All margins negative. 4/38 LNs positive. MSI-Intact.  Baseline CEA 9.2 on 3/11/2019. She started adjuvant FOLFOX on 4/29/2019 and completed 12 cycles, last on 10/22/19.   Recent ultrasound imaging shows concern for recurrent metastatic disease in the liver. This was discussed during today's visit at length and was also reviewed with Dr. Capps, who will [facility] with [facility] in light of her pregnancy. We will have her get a chest CT and abdominal MRI to further evaluate the extent of her disease. Her CEA has also risen significantly, which is concerning. We will plan to follow-up with the patient once we have the additional imaging results. Patient is considering a second opinion at [facility] and I reviewed that we have no issue with second opinions. I reviewed patient's prior imaging at length during today's visit as well, per her 's request.      Melinda Mayo PA-C  [facility]  [address], [place] [address] [phone]    Phone call duration: 24 minutes        Melinda Mayo PA-C

## 2020-04-10 NOTE — PROGRESS NOTES
"[facility]   OB/GYN Clinic    CC: Return OB     Subjective:    Remedios is a 39 year old  at 24w0d who presents for return OB visit. She reports feeling well other than new onset RUQ pain. No nausea or vomiting. No chest pain, SOB, headache, vision changes. Denies uterine cramping, vaginal bleeding or leaking, dysuria. +fetal movement.  Wonders if she has preE due to the pain. Hurts with movement. Hx of gallbladder removal. She states had US completed this morning ordered by PCP. Would like labs.       Objective:  /76 (BP Location: Left arm, Patient Position: Chair, Cuff Size: Adult Large)   Pulse 81   Temp 98.3  F (36.8  C) (Tympanic)   Resp 18   Ht 1.702 m (5' 7\")   Wt 86.2 kg (190 lb)   LMP 10/25/2019   BMI 29.76 kg/m      Estimated body mass index is 29.76 kg/m  as calculated from the following:    Height as of this encounter: 1.702 m (5' 7\").    Weight as of this encounter: 86.2 kg (190 lb).    Physical Exam:  Gen: Pleasant, talkative female in no apparent distress   Respiratory: breathing comfortably on room air   Cardiac: Warm and well-perfused.   GI: Abd soft., mild RUQ tenderness gravid  MSK: Grossly normal movement of all four extremities  Psych: mood and affect bright   Lower extremity: edema not present     Fetal dop tones: 140s bpm  Fundal height: 24    Assessment/Plan:   39 year old  at 24w0d  who presents for follow-up OB visit.   1) New OB lab; T&S, CBC, HIV, RPR, HepBsAg, Hep B antibody, rubella, GC/Chlam WNL Plan for 3rd tri lab at 28wks.   2) RUQ pain- BP WNL, will release HELLP labs currently pending from oncologist along with CEA. US ordered today concerning with new liver lesions, given her cancer hx would defer to oncologist. Do not feel her RUQ pain is pregnancy/preE related  3) AMA- s/p level II US, NIPT WNL, plan BPPs at 36w, recommended that she start ASA, plans to discuss with oncologist and then start  4) placenta previa-- resolved, discussed " with MFM who states report incorrect from most recent US, will revise but no concerns for placenta currently   5) Immunizations: flu shot, review again next visit, Tdap at 28wks    Return to clinic in 4 weeks-- recommended she follow up with oncologist/PCP regarding US results and next steps given her hx of colon cancer.     Jackie Allen MD   4/10/2020 1:08 PM

## 2020-04-10 NOTE — PROGRESS NOTES
"Remedios Watts is a 39 year old female who is being evaluated via a billable telephone visit.      Please call patient on Mobile phone.     The patient has been notified of following:     \"This telephone visit will be conducted via a call between you and your physician/provider. We have found that certain health care needs can be provided without the need for a physical exam.  This service lets us provide the care you need with a short phone conversation.  If a prescription is necessary we can send it directly to your pharmacy.  If lab work is needed we can place an order for that and you can then stop by our lab to have the test done at a later time.    Telephone visits are billed at different rates depending on your insurance coverage. During this emergency period, for some insurers they may be billed the same as an in-person visit.  Please reach out to your insurance provider with any questions.    If during the course of the call the physician/provider feels a telephone visit is not appropriate, you will not be charged for this service.\"     Physician has received verbal consent for a Telephone Visit from the patient? Yes    Remedios Watts complains of    Chief Complaint   Patient presents with     Telephone     Colon ca       Allergies reviewed: Yes  Medications reviewed: Yes    Medications: Medication refills not needed today.  Pharmacy name entered into [employer]: [facility] - [facility] [id number] [facility]    How would you like to obtain your AVS? Eltonharcatalina    Clinical concerns: Patient does not want to talk on phone- having  doing all the talking.  explained she is very stressed out right now and has a lot of anxiety. ( Telephone visit was on speaker so Remedios could hear all questions and comments . ) Call 's phone number      Ritchie Quezada LPN, LPN April 10, 2020  12:51 PM     ALLERGIES  Patient has no known allergies.      Oncology/Hematology Visit " "Note  Apr 10, 2020    Reason for Visit: Follow up of stage III colon cancer    History of Present Illness: Remedios Watts is a 39 year old female with PMH melanoma to left arm and back s/p excision with stage III colon cancer. Her history is as follows, copied forward from prior notes:    \"Ms. Watts is a 38 year old female who was admitted to the hospital on 3/10/2019 for bowel obstruction due to hepatic flexure colon mass. She had a CT scan done on 3/10/2019 which showed apple core lesion near the hepatic flexure of the colon concerning for an obstructing colonic neoplasm.  No enlarged lymph nodes were seen. There is a tiny subcentimeter left lobe liver lesion which is too small to characterize.  A CT of the chest on 3/13/2019 did not show evidence of metastatic disease in the chest.       Colonoscopy on 3/11/2019 showed fungating, infiltrative, highly-friable and ulcerated large mass at the hepatic flexure/proximal transverse colon, unable to traverse this lesions as this appears to be near-completely obstructive in nature (though allows liquid stool/effluent to pass under visualization). The mass was partially circumferential. Biopsies was consistent with invasive moderately differentiated adenocarcinoma compatible with colorectal primary and Mismatch repair enzymes were intact by immunohistochemistry.      On 3/22/2019 she had laparoscopic extended right hemicolectomy performed by Dr. Quesada. Final pathology was consistent with a 3.7 cm hepatic flexure poorly differentiated adenocarcinoma with focal micropapillary growth extending through the muscularis propria and involving serosa (pT4). There was lymphovascular invasion but no perineural invasion seen. All margins were negative. 4 out of 38 lymph nodes were positive for metastatic carcinoma including 2 lymph nodes with micrometastasis. Final staging was [id number].    She met with Dr. Capps 4/4/19 who recommended adjuvant FOLFOX. Port placed 4/22/19. "       She started adjuvant FOLFOX on 19. She did require dose reduction of Oxaliplatin by 20% for neuropathy with cycle 4. She completed 12 cycles of FOLFOX, last on 10/22/19. She returns today for routine follow up and to review her recent imaging.    Interval History:   Patient is feeling very overwhelmed and does not wish to speak on the phone at this time. She prefers her  speak on the phone.  She does have abdominal pain that radiates to her back that has been unchanged since her gallbladder was removed. She also has a palpable bump by her gallbladder scar.     Current Outpatient Medications   Medication Sig Dispense Refill     citalopram (CELEXA) 20 MG tablet Take 1 tablet (20 mg) by mouth daily 30 tablet 3     Prenatal Vit-Fe Fumarate-FA (PRENATAL 19) 29-1 MG CHEW Take 2 chew tab by mouth daily       Laboratory Data:   2020 11:51   Sodium 135   Potassium 3.9   Chloride 106   Carbon Dioxide 25   Urea Nitrogen 8   Creatinine 0.49 (L)   GFR Estimate >90   GFR Estimate If Black >90   Calcium 8.9   Anion Gap 4   Albumin 2.9 (L)   Protein Total 6.9   Bilirubin Total 0.2   Alkaline Phosphatase 84   ALT 14   AST 17   Lipase 119   Glucose 79   WBC 8.3   Hemoglobin 12.0   Hematocrit 36.2   Platelet Count 199   RBC Count 3.92   MCV 92   MCH 30.6   MCHC 33.1   RDW 13.7   Diff Method Automated Method   % Neutrophils 75.2   % Lymphocytes 14.6   % Monocytes 8.4   % Eosinophils 1.6   % Basophils 0.2   Absolute Neutrophil 6.3   Absolute Lymphocytes 1.2   Absolute Monocytes 0.7   Absolute Eosinophils 0.1   Absolute Basophils 0.0   CEA 69.8 (H)     Imagin/9 abdominal ultrasound shows the followin. Three hypoechoic lesions in the left lobe of the liver measure up  to 2.5 cm in maximum dimension. These were not definitely seen on the  prior MRI or CT of the abdomen. Further evaluation with repeat MRI is  recommended as metastasis are in the differential.  2. Postoperative changes status post  cholecystectomy.  3. Otherwise negative limited abdominal ultrasound.    Assessment and Plan:  Onc  Stage IIIC poorly differentiated [id number] adenocarcinoma of the hepatic flexure -s/p laparoscopic right hemicolectomy on 3/22/19. All margins negative. 4/38 LNs positive. MSI-Intact.  Baseline CEA 9.2 on 3/11/2019. She started adjuvant FOLFOX on 4/29/2019 and completed 12 cycles, last on 10/22/19.   Recent ultrasound imaging shows concern for recurrent metastatic disease in the liver. This was discussed during today's visit at length and was also reviewed with Dr. Capps, who will review with [facility] in light of her pregnancy. We will have her get a chest CT and abdominal MRI to further evaluate the extent of her disease. Her CEA has also risen significantly, which is concerning. We will plan to follow-up with the patient once we have the additional imaging results. Patient is considering a second opinion at [facility] and I reviewed that we have no issue with second opinions. I reviewed patient's prior imaging at length during today's visit as well, per her 's request.      Melinda Mayo PA-C  [facility]  [address], [place] [id number] [phone]    Phone call duration: 24 minutes

## 2020-04-14 NOTE — PROGRESS NOTES
LVM asking for a return call to confirm carlo knows she needs a port placed and update her on the locations that are doing them at this time.   She will need to start Folfox +/- Avastin. Plan to be decided on at appointment with Dr Capps on Friday.

## 2020-04-15 NOTE — PROGRESS NOTES
Multiple attempts to reach patient and her . Remedios's phone goes to , left message.  Home number give a busy signal for the last 24 hours.   Left [facility] for Jere on his cell phone to update them of scheduling request that has been made for Port Placement and Infusion next week. The details can be discussed by calling writer back today or during phone visit with Dr Capps tomorrow.     Left detailed call back information

## 2020-04-15 NOTE — PROGRESS NOTES
Therapy: 5fu  Insurance: [facility]       100% coverage for 5fu pump and home disconnect  In reference to referral made on 3/15/20 to check 5fu pump      Please contact Intake with any questions, [phone] or In Basket pool, [facility] ([phone]).

## 2020-04-16 NOTE — PROGRESS NOTES
CARLOS asking for a return call to discuss patients plan, treatment with [facility] vs [facility].  She is scheduled for port placement tomorrow and chemo next week. Per chart notes from [facility] she is also scheduled for port placement and chemo there next week.

## 2020-04-16 NOTE — PROGRESS NOTES
"Remedios Watts is a 39 year old female who is being evaluated via a billable telephone visit.      The patient has been notified of following:     \"This telephone visit will be conducted via a call between you and your physician/provider. We have found that certain health care needs can be provided without the need for a physical exam.  This service lets us provide the care you need with a short phone conversation.  If a prescription is necessary we can send it directly to your pharmacy.  If lab work is needed we can place an order for that and you can then stop by our lab to have the test done at a later time.    Telephone visits are billed at different rates depending on your insurance coverage. During this emergency period, for some insurers they may be billed the same as an in-person visit.  Please reach out to your insurance provider with any questions.    If during the course of the call the physician/provider feels a telephone visit is not appropriate, you will not be charged for this service.\"    Patient has given verbal consent for Telephone visit?  Yes    How would you like to obtain your AVS? [facility]    Please call  (Johnny) at [phone]    Additional provider notes: See MD note below    Phone call duration: 8 minutes    Agata Capps MD    Oncology Note:    I called Remedios but she was not available.  I was able to talk to Jere, her  and he told me that at this time Remedios is not available.  He mentioned that they sought another opinion at [facility] and they are debating whether they would like to get the care here at the [facility] versus at [facility].  I looked at records from [facility] and she had another MRI of the abdomen done which showed possibility of peritoneal implant in addition to multiple liver lesions.  There is a plan to biopsy the liver and then start FOLFIRI.   I had previously discussed with Jere earlier during the week, " that I would likely start her back on FOLFOX since her tumor seemed to be in check when she was on it and it grew after this was stopped, so technically it was working and she has not failed FOLFOX.  I had also discussed with Dr Hoffmann from [facility] about her case in detail.  I think FOLFIRI is also a decent option. I asked my if he can get the images from [facility] so that we can review it and compare it with the images which were done here.  He agrees with that.  We will try to get those images.  At the end of discussion we decided that we will wait to hear from Remedios to make follow-up appointments here because at this time, most likely she will be getting care at [facility].  I wished her all the stent best of luck.      I answered all of his questions to his satisfaction.  Jere was very appreciative of the call.     Agata Capps MD  4/16/2020

## 2020-04-17 NOTE — TELEPHONE ENCOUNTER
Patient went to [place] for port placement per Oncology. No further follow up needed    Jessie Crespo RN

## 2020-06-13 NOTE — PROGRESS NOTES
IM Betamethasone given.  Patient to follow up with provider at [place] as scheduled.  Patient discharged ambulatory with .

## 2020-06-13 NOTE — PROGRESS NOTES
Remedios Watts is a [place] patient here to [facility] at [facility] for second betamethasone injection. Order received from Dr. Collins. Patient escorted to triage room, POC reviewed.

## 2020-07-13 NOTE — ED AVS SNAPSHOT
[facility] Emergency Department  [address] [address]  Phone:  [phone]  Fax:  [phone]                                    Remedios Watts   MRN: [id number]    Department:  [facility] Emergency Department   Date of Visit:  7/13/2020           After Visit Summary Signature Page    I have received my discharge instructions, and my questions have been answered. I have discussed any challenges I see with this plan with the nurse or doctor.    ..........................................................................................................................................  Patient/Patient Representative Signature      ..........................................................................................................................................  Patient Representative Print Name and Relationship to Patient    ..................................................               ................................................  Date                                   Time    ..........................................................................................................................................  Reviewed by Signature/Title    ...................................................              ..............................................  Date                                               Time          22EPIC Rev 08/18

## 2020-07-14 NOTE — ED NOTES
Pt states she doesn't feel a CT scan is necessary at this time. States the pain feels like her previous pain related to her cancer. Dr Velázquez made aware.

## 2020-07-14 NOTE — ED PROVIDER NOTES
History     Chief Complaint   Patient presents with     Flank Pain     has colon cancer. is on chemo and having a lot of pain in the right flank. Also wants to get tested for covid before infusion on wednesday     JARRETT Watts is a 39 year old female, past medical history is significant for biliary dyskinesia, iron deficiency anemia, stage IIIc colon cancer, generalized anxiety disorder, presents to the emergency department concerns of right flank pain.  History is obtained from the patient who is accompanied by her  this evening with concerns of right flank pain with radiation into the right low back worsened over the last couple of days.  The patient states that she is receiving oral as well as weekly IV chemotherapy at the [facility].  She is due for her next infusion on Wednesday.  She was more active over the course of the weekend and horseback rode for the first time in months.  She does not think that her pain is because of horseback riding however, she states that she often gets a flareup in her flank pain due to the chemotherapy and this is been going on for months.  This is slightly different though.  She notes no fever chills or sweats.  The last 2 mornings she has had nausea and one episode of vomiting each day but has not taken any of the antiemetic that she has at home for it.  Despite the uptick in pain she is only taken Tylenol for pain 1 g every 6 hours x 3 doses total today.  Nothing else for pain.   No difficulties with bowel movements, passing gas normally.  No urinary tract symptoms such as frequency, urgency or dysuria.  No Hematuria.  The patient also notes that she wishes to be COVID tested today as she missed her appointment due to illness described above for COVID testing which she does weekly around her chemotherapy.  She was told by her [facility] team when she called today to request testing here.  Allergies:  No Known Allergies    Problem List:    Patient Active  Problem List    Diagnosis Date Noted     High-risk pregnancy, elderly multigravida, unspecified trimester 01/21/2020     Priority: Medium     AMA: NIPT WNL  Colon cancer on chemo: MFM consult         Biliary dyskinesia 01/15/2020     Priority: Medium     Added automatically from request for surgery [id number]       Iron deficiency anemia due to chronic blood loss 05/10/2019     Priority: Medium     Iron deficiency 05/01/2019     Priority: Medium     Cervical high risk HPV (human papillomavirus) test positive 04/23/2019     Priority: Medium     4/23/19 NIL Pap, + HR HPV (Neg 16/18). Plan cotest in 1 year.        Colon cancer: Stage IIIc 03/22/2019     Priority: Medium     3/2019: Colectomy followed by Chemo x 7 months (Oncology Dr. Capps's team)  Has 2 genetic mutations of unknown risk.        Lentigo 02/06/2013     Priority: Medium     Congenital nevus 02/06/2013     Priority: Medium     Angioma 02/06/2013     Priority: Medium     Multiple nevi 02/06/2013     Priority: Medium     Dermal nevus 02/06/2013     Priority: Medium     CARDIOVASCULAR SCREENING; LDL GOAL LESS THAN 160 01/21/2013     Priority: Medium     Generalized anxiety disorder 06/29/2011     Priority: Medium        Past Medical History:    Past Medical History:   Diagnosis Date     Cervical high risk HPV (human papillomavirus) test positive 04/23/2019     Chickenpox      Group B streptococcus urinary tract infection complicating pregnancy 4/20/2011     History of postpartum depression, currently pregnant 10/21/2010     Malignant melanoma (H)      Partial small bowel obstruction (H) 3/10/2019     Postpartum depression 12/17/2008     Tailbone injury      Urinary tract infections        Past Surgical History:    Past Surgical History:   Procedure Laterality Date     C ORAL SURGERY PROCEDURE       COLONOSCOPY N/A 3/11/2019    Procedure: COMBINED COLONOSCOPY, SINGLE OR MULTIPLE BIOPSY/POLYPECTOMY BY BIOPSY;  Surgeon: Loi Maldonado MD;  Location: [facility]      "INSERT PORT VASCULAR ACCESS Right 4/22/2019    Procedure: Central venous Chest Port Placement - right;  Surgeon: Didier Frazier PA-C;  [facility]     IR CHEST PORT PLACEMENT > 5 YRS OF AGE  4/22/2019     LAPAROSCOPIC ASSISTED COLECTOMY Right 3/22/2019    Procedure: Laparoscopic Extended Right Hemicolectomy and appendectomy;  Surgeon: Linda Quesada MD;  [facility] OR     LAPAROSCOPIC CHOLECYSTECTOMY N/A 1/28/2020    Procedure: CHOLECYSTECTOMY, LAPAROSCOPIC & RIGHT TIMOTHY CATH REMOVAL;  Surgeon: Loi Real MD;  Location: [facility] OR     REMOVE CATHETER VASCULAR ACCESS Right 1/28/2020    Procedure: Remove Catheter Vascular Access;  Surgeon: Loi Real MD;  Location: [facility] OR       Family History:    Family History   Problem Relation Age of Onset     Arthritis Mother      Neurologic Disorder Mother         had a seizure      Alcohol/Drug Mother      Arthritis Maternal Grandmother      Breast Cancer Maternal Grandmother      Melanoma Maternal Grandmother      Heart Disease Paternal Grandmother      Cancer Father         skin       Social History:  Marital Status:   [2]  Social History     Tobacco Use     Smoking status: Never Smoker     Smokeless tobacco: Never Used   Substance Use Topics     Alcohol use: Yes     Frequency: Monthly or less     Drinks per session: 1 or 2     Binge frequency: Less than monthly     Drug use: No        Medications:    HYDROmorphone (DILAUDID) 2 MG tablet  citalopram (CELEXA) 20 MG tablet  ondansetron (ZOFRAN) 8 MG tablet  Prenatal Vit-Fe Fumarate-FA (PRENATAL 19) 29-1 MG CHEW          Review of Systems   All other systems reviewed and are negative.      Physical Exam   BP: 122/85  Pulse: 85  Temp: 98.6  F (37  C)  Resp: 16  Height: 167.6 cm (5' 6\")  Weight: 72.6 kg (160 lb)  SpO2: 99 %      Physical Exam  Vitals signs and nursing note reviewed.   Constitutional:       Appearance: Normal appearance. She is normal weight.      Comments: Non-ill and " nontoxic in appearance.  Appears comfortable.   HENT:      Head: Normocephalic and atraumatic.      Right Ear: Tympanic membrane normal.      Left Ear: Tympanic membrane normal.      Nose: Nose normal.      Mouth/Throat:      Mouth: Mucous membranes are dry.      Pharynx: Oropharynx is clear.   Eyes:      Extraocular Movements: Extraocular movements intact.      Conjunctiva/sclera: Conjunctivae normal.      Pupils: Pupils are equal, round, and reactive to light.   Neck:      Musculoskeletal: Normal range of motion and neck supple.   Cardiovascular:      Rate and Rhythm: Normal rate and regular rhythm.      Pulses: Normal pulses.      Heart sounds: Normal heart sounds.   Pulmonary:      Effort: Pulmonary effort is normal.      Breath sounds: Normal breath sounds.   Abdominal:      General: Bowel sounds are normal.      Palpations: Abdomen is soft.      Tenderness: There is no right CVA tenderness or left CVA tenderness.   Musculoskeletal: Normal range of motion.   Skin:     General: Skin is warm.      Capillary Refill: Capillary refill takes less than 2 seconds.   Neurological:      General: No focal deficit present.      Mental Status: She is alert.   Psychiatric:         Mood and Affect: Mood normal.         Behavior: Behavior normal.         ED Course        Procedures  9:47 PM  I was informed by the patient's nurse that the patient and her  have decided to refuse the CT because they do not feel it is necessary.  She also requests something stronger for pain than Toradol even before it is given.  She also request that her COVID test be expedited.         12:18 AM  The patient has specifically refused CT imaging of the abdomen as she does not feel it was necessary.  When I returned to the room and reviewed all lab diagnostics obtained with them we note that her hemoglobin is down 2 points from previous 3 months ago.  I do understand that she has had a  section in the interim and her  states a  2 L blood loss which would certainly produce a significant drop in hemoglobin.  She did not receive a transfusion around delivery a month ago.  With respect to the refusal of the CT imaging of the abdomen I discussed my differential diagnostic considerations with them and why I was ordering the CT scan which would include but is not limited to urolithiasis, bleeding in and around the liver as it pertains to metastatic process to the liver in the setting active chemotherapy and mild to moderate trauma in the form of horseback riding over the weekend.  The patient and her  understand the potential for morbidity and mortality with missed diagnosis but at this point they feel comfortable in deferring on further imaging.  Her urinalysis appears unremarkable which is certainly encouraging however lacks a sensitivity for definitive evaluation of the possibility of urolithiasis.  This was made clear to them as well.  They have verbalized understanding.  COVID testing was obtained as per the patient request.  They requested Dilaudid by prescription for home use until they are able to be seen on Wednesday at [facility].  Prescription for this was written for 10 tablets.  She was given a take-home prescription given the time of night for 2 tablets.      Critical Care time:  none               Results for orders placed or performed during the hospital encounter of 07/13/20 (from the past 24 hour(s))   CBC with platelets differential   Result Value Ref Range    WBC 9.3 4.0 - 11.0 10e9/L    RBC Count 3.61 (L) 3.8 - 5.2 10e12/L    Hemoglobin 9.9 (L) 11.7 - 15.7 g/dL    Hematocrit 32.5 (L) 35.0 - 47.0 %    MCV 90 78 - 100 fl    MCH 27.4 26.5 - 33.0 pg    MCHC 30.5 (L) 31.5 - 36.5 g/dL    RDW 15.9 (H) 10.0 - 15.0 %    Platelet Count 270 150 - 450 10e9/L    Diff Method Automated Method     % Neutrophils 70.4 %    % Lymphocytes 17.7 %    % Monocytes 6.5 %    % Eosinophils 4.7 %    % Basophils 0.2 %    % Immature  Granulocytes 0.5 %    Nucleated RBCs 0 0 /100    Absolute Neutrophil 6.6 1.6 - 8.3 10e9/L    Absolute Lymphocytes 1.7 0.8 - 5.3 10e9/L    Absolute Monocytes 0.6 0.0 - 1.3 10e9/L    Absolute Eosinophils 0.4 0.0 - 0.7 10e9/L    Absolute Basophils 0.0 0.0 - 0.2 10e9/L    Abs Immature Granulocytes 0.1 0 - 0.4 10e9/L    Absolute Nucleated RBC 0.0    CRP inflammation   Result Value Ref Range    CRP Inflammation 13.6 (H) 0.0 - 8.0 mg/L   Comprehensive metabolic panel   Result Value Ref Range    Sodium 138 133 - 144 mmol/L    Potassium 3.4 3.4 - 5.3 mmol/L    Chloride 105 94 - 109 mmol/L    Carbon Dioxide 27 20 - 32 mmol/L    Anion Gap 6 3 - 14 mmol/L    Glucose 96 70 - 99 mg/dL    Urea Nitrogen 4 (L) 7 - 30 mg/dL    Creatinine 0.55 0.52 - 1.04 mg/dL    GFR Estimate >90 >60 mL/min/[1.73_m2]    GFR Estimate If Black >90 >60 mL/min/[1.73_m2]    Calcium 8.6 8.5 - 10.1 mg/dL    Bilirubin Total 0.4 0.2 - 1.3 mg/dL    Albumin 3.2 (L) 3.4 - 5.0 g/dL    Protein Total 6.8 6.8 - 8.8 g/dL    Alkaline Phosphatase 289 (H) 40 - 150 U/L    ALT 46 0 - 50 U/L    AST 34 0 - 45 U/L   Lipase   Result Value Ref Range    Lipase 80 73 - 393 U/L   Lactic acid whole blood   Result Value Ref Range    Lactic Acid 1.5 0.7 - 2.0 mmol/L   Acetaminophen level   Result Value Ref Range    Acetaminophen Level <2 mg/L   UA with Microscopic reflex to Culture    Specimen: Midstream Urine   Result Value Ref Range    Color Urine Yellow     Appearance Urine Clear     Glucose Urine Negative NEG^Negative mg/dL    Bilirubin Urine Negative NEG^Negative    Ketones Urine Negative NEG^Negative mg/dL    Specific Gravity Urine 1.019 1.003 - 1.035    Blood Urine Negative NEG^Negative    pH Urine 6.0 5.0 - 7.0 pH    Protein Albumin Urine Negative NEG^Negative mg/dL    Urobilinogen mg/dL 0.0 0.0 - 2.0 mg/dL    Nitrite Urine Negative NEG^Negative    Leukocyte Esterase Urine Negative NEG^Negative    Source Midstream Urine     WBC Urine 2 0 - 5 /HPF    RBC Urine <1 0 - 2 /HPF     Squamous Epithelial /HPF Urine <1 0 - 1 /HPF    Mucous Urine Present (A) NEG^Negative /LPF       Medications   lactated ringers BOLUS 1,000 mL (0 mLs Intravenous Stopped 7/13/20 2331)     Followed by   lactated ringers infusion ( Intravenous New Bag 7/13/20 2334)   ondansetron (ZOFRAN) injection 4 mg (4 mg Intravenous Not Given 7/13/20 2154)   promethazine (PHENERGAN) 12.5 mg in sodium chloride 0.9 % 50 mL intermittent infusion (12.5 mg Intravenous Given 7/13/20 2245)   ketorolac (TORADOL) injection 30 mg (30 mg Intravenous Given 7/13/20 2153)   HYDROmorphone (PF) (DILAUDID) injection 0.5 mg (0.5 mg Intravenous Given 7/13/20 2151)       Assessments & Plan (with Medical Decision Making)   39-year-old female past medical history reviewed as above who presents for evaluation of right flank pain as discussed in the HPI.  Stage IIIc colon cancer with mets to the liver receiving active chemotherapy, some trauma over the weekend with horseback riding, no fall or injury.  Please see the time stamp above for discussion regarding evaluation and the components of this that were declined by the patient and her  after morbidity and mortality and the potential for missed diagnosis was reviewed with him.  Her pain responded, not surprisingly, to Dilaudid in the emergency department.  The patient was comfortable.  Results were reviewed with her as noted time stamp above.  Disposition is to home with prescribed narcotic pain medication and follow-up plan in place for [facility] in 2 days.  Return to the emergency department if worse or changes.    Disclaimer: This note consists of symbols derived from keyboarding, dictation and/or voice recognition software. As a result, there may be errors in the script that have gone undetected. Please consider this when interpreting information found in this chart.      I have reviewed the nursing notes.    I have reviewed the findings, diagnosis, plan and need for follow up  with the patient.          New Prescriptions    HYDROMORPHONE (DILAUDID) 2 MG TABLET    Take 1 tablet (2 mg) by mouth every 6 hours as needed for pain       Final diagnoses:   Right flank pain - Etiology unclear; incomplete evaluation due to patient refusal       7/13/2020   [facility] EMERGENCY DEPARTMENT     Remy Velázquez MD  07/14/20 0024

## 2020-07-14 NOTE — DISCHARGE INSTRUCTIONS
Dilaudid as directed for pain that does not respond to ibuprofen 400 mg p.o. 3 times daily.  These limit acetaminophen to less than 2 g/day.  Push fluids, rest.  Turn if worse or changes.  Follow-up as planned with [facility] care team.

## 2020-07-27 NOTE — ED NOTES
Pt requesting covid swab sent to [facility] tonight for results as pt has chemo at [facility] tomorrow and pt has been out of town.  Pt denies any covid symptoms.

## 2020-07-27 NOTE — ED NOTES
Discussed with provider and pt is non symptomatic and only here for covid swab due to chemo at [facility] tomorrow.   Will put pt in for nurse only visit.

## 2020-08-17 NOTE — TELEPHONE ENCOUNTER
"Requested Prescriptions   Pending Prescriptions Disp Refills     citalopram (CELEXA) 20 MG tablet [Pharmacy Med Name: citalopram 20 mg tablet] 30 tablet 3     Sig: Take 1 tablet (20 mg) by mouth daily       SSRIs Protocol Failed - 8/17/2020  8:00 AM  QING-7 SCORE 1/18/2013 6/19/2018 3/24/2020   Total Score 13 - -   Total Score - 0 18     PHQ 6/4/2018 6/19/2018 3/24/2020   PHQ-9 Total Score 0 3 10   Q9: Thoughts of better off dead/self-harm past 2 weeks Not at all Not at all Not at all             Failed - No active pregnancy on record        Passed - Recent (12 mo) or future (30 days) visit within the authorizing provider's specialty     Patient has had an office visit with the authorizing provider or a provider within the authorizing providers department within the previous 12 mos or has a future within next 30 days. See \"Patient Info\" tab in inbasket, or \"Choose Columns\" in Meds & Orders section of the refill encounter.              Passed - Medication is active on med list        Passed - Patient is age 18 or older        Passed - No positive pregnancy test in last 12 months             "

## 2020-08-19 NOTE — TELEPHONE ENCOUNTER
Routing refill request to provider for review/approval because:  Excluded for pregnancy.  However, it appears pt has delivered her .    Mony Talbert RN

## 2020-11-23 NOTE — TELEPHONE ENCOUNTER
RECORDS STATUS - ALL OTHER DIAGNOSIS      RECORDS RECEIVED FROM: Epic/[facility]    DATE RECEIVED: 11/25/2020    NOTES STATUS DETAILS   OFFICE NOTE from referring provider     OFFICE NOTE from medical oncologist [facility] 9/15/2020, 8/25/2020, 7/27/2020 , 7/15/2020, 6/29/2020 more in CE  Malignant Neoplasm of Colon (HCC) (Primary Dx)      DISCHARGE SUMMARY from hospital Complete 3/10/2019 Colonic Mass (Primary Dx)    DISCHARGE REPORT from the ER     OPERATIVE REPORT Complete See Cytology Report Below and in  ([facility])     3/10/2019 Colonoscopy    MEDICATION LIST Complete  Epic/ CE   CLINICAL TRIAL TREATMENTS TO DATE     LABS     PATHOLOGY REPORTS Request- [facility] Cytology Dept:   Ph: [phone]  Fax: [phone]  Tracking Number:   [id number] [facility])   Cytology Fine Needle Aspiration   NR- (A)   ANYTHING RELATED TO DIAGNOSIS     GENONOMIC TESTING     TYPE:     IMAGING (NEED IMAGES & REPORT)     CT SCANS Complete    [facility]- [facility] CT Chest 4/13/2020, 2/20/2020    [facility] CT Chest 8/25/2020    MRI Complete- [facility] MRI Abdomen ([facility]) 4/15/2020    Requested- MRI Abdomen 8/25/2020, 6/12/2020    MAMMO     US     ULTRASOUND Complete    Complete- [facility] US Abdomen Limited 4/9/2020     [facility] US liver biopsy 4/16/2020    [facility] US OB 6/12/2020, 5/29/2020, 5/12/2020    PET Ordered  (Pending)       Action    Action Taken 11/23/2020 2:57pm   I called martin Whittaker - her phone went to .     I called [facility] Pathology Dept Ph:[phone] FAX: [phone]     11/24/2020 9:43am   I called martin Whittaker - her phone went straight to .     I called [facility]. Ph: [phone] option 4     2pm   I called [facility] to follow up on the image request.   I spoke to Renetta in the records dept and she said the images will be pushed today.     4pm   I called [facility] back again - Renetta said they should be able to send those images now.

## 2020-11-25 NOTE — LETTER
"    11/25/2020         RE: Remedios Watts  [address] [place] [address]        Dear Colleague,    Thank you for referring your patient, Remedios Watts, to the [facility]. Please see a copy of my visit note below.    Remedios Watts is a 40 year old female who is being evaluated via a billable video visit.      The patient has been notified of following:     \"This video visit will be conducted via a call between you and your physician/provider. We have found that certain health care needs can be provided without the need for an in-person physical exam.  This service lets us provide the care you need with a video conversation.  If a prescription is necessary we can send it directly to your pharmacy.  If lab work is needed we can place an order for that and you can then stop by our lab to have the test done at a later time.    Video visits are billed at different rates depending on your insurance coverage.  Please reach out to your insurance provider with any questions.    If during the course of the call the physician/provider feels a video visit is not appropriate, you will not be charged for this service.\"    Patient has given verbal consent for Video visit? Yes  How would you like to obtain your AVS? Melon Powerhart   Send to e-mail at: [email or web address]  If you need to call [phone]  Will anyone else be joining your video visit? No       Video-Visit Details    Type of service:  Video Visi      Originating Location (pt. Location): Home    Distant Location (provider location):  [facility]     Platform used for Video Visit: Brneda Tejeda [facility]        Palliative Care Outpatient Clinic Consultation Note    Patient:  Remedios Watts    Chief Complaint:   Remedios Watts 40 year old female who is presenting to the palliative medicine clinic today at the request of [place] oncology for a palliative care consultation secondary to metastatic colon cancer. "   The patient's primary care provider is:  Agustín Berger.     History of Present Illness:  Clinic visit today was performed by video connection.  Patient and  were both available.  She has been struggling with colon cancer for some time and has had several failed treatments and at the [facility] has been told there is not a reasonable treatment to proceed with at this time.  She also went down to [place] for a short time for some treatment.  Since returning home from [place] she has been in contact with [facility] for management of unrelenting pain.  They have escalated her dosing and wanted her to get set up with palliative care.  She was unable to travel down to [place] so is referred here for palliative care.  She is still wanting to consider some type of cancer treatment and is interested in being referred to [facility] oncology to discuss options.    Distressing Symptom/s:  Her only significant symptom is pain and it is quite severe.  She says at best it is 5 out of 10 only when she has taken a Dilaudid and that lasts for only 3 hours at most.  At worst it can get as bad as 10 out of 10.  Mostly she says the pain is in her liver but that then triggers total body pain.  She has been unable to get off the couch other than to try to sleep and to go to the bathroom.  She wakes at least every 3 hours through the night because of pain.    Patient's Disease Understanding: She is understanding of the grave prognosis of this cancer but she is continuing to search for further treatment options.    Coping: Well with the exception of the persistent wear on her body and her mental state from the constant pain.    Social History  Living Situation: she lives with her  and children including a 5-month-old .  Children: 5 children school age and younger  Actual/Potential Caregiver(s):   Support System: Family, friends  Occupation:   Hobbies: Horses  Substance Use/History of misuse: None  Financial Concerns:    Spiritual Background: Strong joy  Spiritual Concerns/Needs:   Social History     Tobacco Use     Smoking status: Never Smoker     Smokeless tobacco: Never Used   Substance Use Topics     Alcohol use: Yes     Frequency: Monthly or less     Drinks per session: 1 or 2     Binge frequency: Less than monthly     Drug use: No       Family History  Family History   Problem Relation Age of Onset     Arthritis Mother      Neurologic Disorder Mother         had a seizure      Alcohol/Drug Mother      Arthritis Maternal Grandmother      Breast Cancer Maternal Grandmother      Melanoma Maternal Grandmother      Heart Disease Paternal Grandmother      Cancer Father         skin     Patient's Involvement with Prior History of Serious Illness in Family:     Advance Care Planning:  Advance Directive:      Where is written copy located:   Health Care Agent Contact Information:   POLST:       No Known Allergies  Current Outpatient Medications   Medication Sig Dispense Refill     acetaminophen (TYLENOL) 500 MG tablet Take 1,000 mg by mouth       citalopram (CELEXA) 20 MG tablet Take 1 tablet (20 mg) by mouth daily 30 tablet 3     fentaNYL (DURAGESIC) 25 mcg/hr 72 hr patch Place 1 patch onto the skin every 72 hours       HYDROmorphone (DILAUDID) 2 MG tablet Take 2-4 mg by mouth       ibuprofen (ADVIL/MOTRIN) 200 MG tablet Take 800 mg by mouth 4 times daily (before meals and nightly)       morphine (MS CONTIN) 30 MG CR tablet        ondansetron (ZOFRAN) 8 MG tablet Take 8 mg by mouth       ondansetron (ZOFRAN) 8 MG tablet Take 8 mg by mouth       senna (SENOKOT) 8.6 MG tablet Take 17.2 mg by mouth       simethicone (MYLICON) 80 MG chewable tablet Take 160 mg by mouth       Prenatal Vit-Fe Fumarate-FA (PRENATAL 19) 29-1 MG CHEW Take 2 chew tab by mouth daily       Past Medical History:   Diagnosis Date     Cervical high risk HPV (human papillomavirus) test positive 04/23/2019    see problem list     Chickenpox      Group B  streptococcus urinary tract infection complicating pregnancy 4/20/2011    Plan PCN in labor      History of postpartum depression, currently pregnant 10/21/2010     Malignant melanoma (H)      Partial small bowel obstruction (H) 3/10/2019     Postpartum depression 12/17/2008     Tailbone injury     fx     Urinary tract infections      Past Surgical History:   Procedure Laterality Date     C ORAL SURGERY PROCEDURE       COLONOSCOPY N/A 3/11/2019    Procedure: COMBINED COLONOSCOPY, SINGLE OR MULTIPLE BIOPSY/POLYPECTOMY BY BIOPSY;  Surgeon: Loi Maldonado MD;  [facility]     INSERT PORT VASCULAR ACCESS Right 4/22/2019    Procedure: Central venous Chest Port Placement - right;  Surgeon: Didier Frazier PA-C;  [facility]     IR CHEST PORT PLACEMENT > 5 YRS OF AGE  4/22/2019     LAPAROSCOPIC ASSISTED COLECTOMY Right 3/22/2019    Procedure: Laparoscopic Extended Right Hemicolectomy and appendectomy;  Surgeon: Linda Quesada MD;  [facility]     LAPAROSCOPIC CHOLECYSTECTOMY N/A 1/28/2020    Procedure: CHOLECYSTECTOMY, LAPAROSCOPIC & RIGHT TIMOTHY CATH REMOVAL;  Surgeon: Loi Real MD;  Location: [facility]     REMOVE CATHETER VASCULAR ACCESS Right 1/28/2020    Procedure: Remove Catheter Vascular Access;  Surgeon: Loi Real MD;  Location: [facility]       REVIEW OF SYSTEMS:   ROS: 10 point ROS neg other than the symptoms noted above in the HPI and here:  Palliative Symptom Review (0=no symptom/no concern, 1=mild, 2=moderate, 3=severe):      Pain: 3      Fatigue: 0      Nausea: 0      Constipation: 1      Diarrhea: 0      Depressive Symptoms: 0      Anxiety: 0      Drowsiness: 0      Poor Appetite: 0      Shortness of Breath: 0      Insomnia: 0      Other: 0      Overall (0 good/no concerns, 3 very poor):  2      There were no vitals taken for this visit.  GEN: Alert, no acute distress, sitting on couch  SKIN: No rashes or abnormalities  NEURO: Appropriate conversation, no obvious  abnormality        Impressions:  Palliative Performance Score:  90  Decision Making Capacity: Intact        ASSESSMENT:    Malignant neoplasm of hepatic flexure (H)    PLAN:     We discussed her situation in detailand at this point she wants to pursue further treatments if possible.  For that reason she is referred to [facility] oncology to consider further options    We went over her analgesic use and her level of pain in great detail.  She is currently using two 25 mcg fentanyl patches and has been doing this for 4 or 5 days.  She is also taking MS Contin 30 mg twice daily and using hydromorphone 2 mg tablets 12 pills per 24 hours.  She has been on this oral dosing for several weeks.  The calculated equianalgesic dosing that she is using is equivalent to 231 mg oral morphine per 24 hours.  With pain levels at best 5 out of 10 I am recommending approximately 50% increase in her analgesic dosing so I am changing her to a 75 mcg fentanyl patch (she is told to remove the old patches and replace them with the new one) and increasing MS Contin to 60 mg every 12 hours and allowing her to continue with the same hydromorphone dosing on an as needed basis.  If this results in the same number of hydromorphone tablets used she would have a total oral morphine equivalent per 24 hours of 328 mg.  I cautioned her and her  about side effects and symptoms to be watching for.  I also explained that she may be able to get away with fewer hydromorphone tablets if her pain is managed.    Recommend recheck in 7 to 10 days in clinic, appointment to be set up for December 4    Call or send message for questions     is going to be monitoring and keeping track of her total dosing on a daily basis.    Orders Placed This Encounter     Oncology/Hematology Adult Referral     DISCONTD: fentaNYL (DURAGESIC) 25 mcg/hr 72 hr patch     DISCONTD: morphine (MS CONTIN) 30 MG CR tablet     DISCONTD: HYDROmorphone (DILAUDID) 2 MG tablet      DISCONTD: senna (SENOKOT) 8.6 MG tablet     DISCONTD: simethicone (MYLICON) 80 MG chewable tablet     DISCONTD: ondansetron (ZOFRAN) 8 MG tablet     DISCONTD: ibuprofen (ADVIL/MOTRIN) 200 MG tablet     acetaminophen (TYLENOL) 500 MG tablet     HYDROmorphone (DILAUDID) 2 MG tablet     morphine (MS CONTIN) 30 MG CR tablet     ondansetron (ZOFRAN) 8 MG tablet     senna (SENOKOT) 8.6 MG tablet     simethicone (MYLICON) 80 MG chewable tablet     ibuprofen (ADVIL/MOTRIN) 200 MG tablet     fentaNYL (DURAGESIC) 75 mcg/hr 72 hr patch       Total video visit time 65 minutes        Again, thank you for allowing me to participate in the care of your patient.        Sincerely,        Agustín Berger MD

## 2020-11-25 NOTE — TELEPHONE ENCOUNTER
Per fax received from Birdie Parham - Fentanyl is not covered by patient's Insurance Company  Dr. Berger - Please choose:  1.  Change medication that is not covered to a different medication and send new prescription to patient's pharmacy?  2.  Patient will need to pay for the non-covered medication out-of-pocket?   3.  Try for Prior Authorization with Insurance Company to get medication covered?        P.A. Phone #:        P.A.  ID#:   [id number]

## 2020-11-25 NOTE — PROGRESS NOTES
"Remedios Watts is a 40 year old female who is being evaluated via a billable video visit.      The patient has been notified of following:     \"This video visit will be conducted via a call between you and your physician/provider. We have found that certain health care needs can be provided without the need for an in-person physical exam.  This service lets us provide the care you need with a video conversation.  If a prescription is necessary we can send it directly to your pharmacy.  If lab work is needed we can place an order for that and you can then stop by our lab to have the test done at a later time.    Video visits are billed at different rates depending on your insurance coverage.  Please reach out to your insurance provider with any questions.    If during the course of the call the physician/provider feels a video visit is not appropriate, you will not be charged for this service.\"    Patient has given verbal consent for Video visit? Yes  How would you like to obtain your AVS? Gloria   Send to e-mail at: [email or web address]  If you need to call [phone]  Will anyone else be joining your video visit? No       Video-Visit Details    Type of service:  Video Visi      Originating Location (pt. Location): Home    Distant Location (provider location):  [facility]     Platform used for Video Visit: Brenda Tejeda [facility]        Palliative Care Outpatient Clinic Consultation Note    Patient:  Remedios Watts    Chief Complaint:   Remedios Watts 40 year old female who is presenting to the palliative medicine clinic today at the request of [facility]ology for a palliative care consultation secondary to metastatic colon cancer.   The patient's primary care provider is:  Agustín Berger.     History of Present Illness:  Clinic visit today was performed by video connection.  Patient and  were both available.  She has been struggling with colon cancer for some time and has had " several failed treatments and at the [facility] has been told there is not a reasonable treatment to proceed with at this time.  She also went down to [place] for a short time for some treatment.  Since returning home from [place] she has been in contact with [facility] for management of unrelenting pain.  They have escalated her dosing and wanted her to get set up with palliative care.  She was unable to travel down to [place] so is referred here for palliative care.  She is still wanting to consider some type of cancer treatment and is interested in being referred to [facility] oncology to discuss options.    Distressing Symptom/s:  Her only significant symptom is pain and it is quite severe.  She says at best it is 5 out of 10 only when she has taken a Dilaudid and that lasts for only 3 hours at most.  At worst it can get as bad as 10 out of 10.  Mostly she says the pain is in her liver but that then triggers total body pain.  She has been unable to get off the couch other than to try to sleep and to go to the bathroom.  She wakes at least every 3 hours through the night because of pain.    Patient's Disease Understanding: She is understanding of the grave prognosis of this cancer but she is continuing to search for further treatment options.    Coping: Well with the exception of the persistent wear on her body and her mental state from the constant pain.    Social History  Living Situation: she lives with her  and children including a 5-month-old .  Children: 5 children school age and younger  Actual/Potential Caregiver(s):   Support System: Family, friends  Occupation:   Hobbies: Horses  Substance Use/History of misuse: None  Financial Concerns:   Spiritual Background: Strong joy  Spiritual Concerns/Needs:   Social History     Tobacco Use     Smoking status: Never Smoker     Smokeless tobacco: Never Used   Substance Use Topics     Alcohol use: Yes     Frequency: Monthly or less     Drinks per  session: 1 or 2     Binge frequency: Less than monthly     Drug use: No       Family History  Family History   Problem Relation Age of Onset     Arthritis Mother      Neurologic Disorder Mother         had a seizure      Alcohol/Drug Mother      Arthritis Maternal Grandmother      Breast Cancer Maternal Grandmother      Melanoma Maternal Grandmother      Heart Disease Paternal Grandmother      Cancer Father         skin     Patient's Involvement with Prior History of Serious Illness in Family:     Advance Care Planning:  Advance Directive:      Where is written copy located:   Health Care Agent Contact Information:   POLST:       No Known Allergies  Current Outpatient Medications   Medication Sig Dispense Refill     acetaminophen (TYLENOL) 500 MG tablet Take 1,000 mg by mouth       citalopram (CELEXA) 20 MG tablet Take 1 tablet (20 mg) by mouth daily 30 tablet 3     fentaNYL (DURAGESIC) 25 mcg/hr 72 hr patch Place 1 patch onto the skin every 72 hours       HYDROmorphone (DILAUDID) 2 MG tablet Take 2-4 mg by mouth       ibuprofen (ADVIL/MOTRIN) 200 MG tablet Take 800 mg by mouth 4 times daily (before meals and nightly)       morphine (MS CONTIN) 30 MG CR tablet        ondansetron (ZOFRAN) 8 MG tablet Take 8 mg by mouth       ondansetron (ZOFRAN) 8 MG tablet Take 8 mg by mouth       senna (SENOKOT) 8.6 MG tablet Take 17.2 mg by mouth       simethicone (MYLICON) 80 MG chewable tablet Take 160 mg by mouth       Prenatal Vit-Fe Fumarate-FA (PRENATAL 19) 29-1 MG CHEW Take 2 chew tab by mouth daily       Past Medical History:   Diagnosis Date     Cervical high risk HPV (human papillomavirus) test positive 04/23/2019    see problem list     Chickenpox      Group B streptococcus urinary tract infection complicating pregnancy 4/20/2011    Plan PCN in labor      History of postpartum depression, currently pregnant 10/21/2010     Malignant melanoma (H)      Partial small bowel obstruction (H) 3/10/2019     Postpartum  depression 12/17/2008     Tailbone injury     fx     Urinary tract infections      Past Surgical History:   Procedure Laterality Date     C ORAL SURGERY PROCEDURE       COLONOSCOPY N/A 3/11/2019    Procedure: COMBINED COLONOSCOPY, SINGLE OR MULTIPLE BIOPSY/POLYPECTOMY BY BIOPSY;  Surgeon: Loi Maldonado MD;  [facility]     INSERT PORT VASCULAR ACCESS Right 4/22/2019    Procedure: Central venous Chest Port Placement - right;  Surgeon: Didier Frazier PA-C;  [facility]     IR CHEST PORT PLACEMENT > 5 YRS OF AGE  4/22/2019     LAPAROSCOPIC ASSISTED COLECTOMY Right 3/22/2019    Procedure: Laparoscopic Extended Right Hemicolectomy and appendectomy;  Surgeon: Linda Quesada MD;  [facility]     LAPAROSCOPIC CHOLECYSTECTOMY N/A 1/28/2020    Procedure: CHOLECYSTECTOMY, LAPAROSCOPIC & RIGHT TIMOTHY CATH REMOVAL;  Surgeon: Loi Real MD;  Location: [facility]     REMOVE CATHETER VASCULAR ACCESS Right 1/28/2020    Procedure: Remove Catheter Vascular Access;  Surgeon: Loi Real MD;  Location: [facility] OR       REVIEW OF SYSTEMS:   ROS: 10 point ROS neg other than the symptoms noted above in the HPI and here:  Palliative Symptom Review (0=no symptom/no concern, 1=mild, 2=moderate, 3=severe):      Pain: 3      Fatigue: 0      Nausea: 0      Constipation: 1      Diarrhea: 0      Depressive Symptoms: 0      Anxiety: 0      Drowsiness: 0      Poor Appetite: 0      Shortness of Breath: 0      Insomnia: 0      Other: 0      Overall (0 good/no concerns, 3 very poor):  2      There were no vitals taken for this visit.  GEN: Alert, no acute distress, sitting on couch  SKIN: No rashes or abnormalities  NEURO: Appropriate conversation, no obvious abnormality        Impressions:  Palliative Performance Score:  90  Decision Making Capacity: Intact        ASSESSMENT:    Malignant neoplasm of hepatic flexure (H)    PLAN:     We discussed her situation in detailand at this point she wants to pursue further  treatments if possible.  For that reason she is referred to [facility] oncology to consider further options    We went over her analgesic use and her level of pain in great detail.  She is currently using two 25 mcg fentanyl patches and has been doing this for 4 or 5 days.  She is also taking MS Contin 30 mg twice daily and using hydromorphone 2 mg tablets 12 pills per 24 hours.  She has been on this oral dosing for several weeks.  The calculated equianalgesic dosing that she is using is equivalent to 231 mg oral morphine per 24 hours.  With pain levels at best 5 out of 10 I am recommending approximately 50% increase in her analgesic dosing so I am changing her to a 75 mcg fentanyl patch (she is told to remove the old patches and replace them with the new one) and increasing MS Contin to 60 mg every 12 hours and allowing her to continue with the same hydromorphone dosing on an as needed basis.  If this results in the same number of hydromorphone tablets used she would have a total oral morphine equivalent per 24 hours of 328 mg.  I cautioned her and her  about side effects and symptoms to be watching for.  I also explained that she may be able to get away with fewer hydromorphone tablets if her pain is managed.    Recommend recheck in 7 to 10 days in clinic, appointment to be set up for December 4    Call or send message for questions     is going to be monitoring and keeping track of her total dosing on a daily basis.    Orders Placed This Encounter     Oncology/Hematology Adult Referral     DISCONTD: fentaNYL (DURAGESIC) 25 mcg/hr 72 hr patch     DISCONTD: morphine (MS CONTIN) 30 MG CR tablet     DISCONTD: HYDROmorphone (DILAUDID) 2 MG tablet     DISCONTD: senna (SENOKOT) 8.6 MG tablet     DISCONTD: simethicone (MYLICON) 80 MG chewable tablet     DISCONTD: ondansetron (ZOFRAN) 8 MG tablet     DISCONTD: ibuprofen (ADVIL/MOTRIN) 200 MG tablet     acetaminophen (TYLENOL) 500 MG tablet     HYDROmorphone  (DILAUDID) 2 MG tablet     morphine (MS CONTIN) 30 MG CR tablet     ondansetron (ZOFRAN) 8 MG tablet     senna (SENOKOT) 8.6 MG tablet     simethicone (MYLICON) 80 MG chewable tablet     ibuprofen (ADVIL/MOTRIN) 200 MG tablet     fentaNYL (DURAGESIC) 75 mcg/hr 72 hr patch       Total video visit time 65 minutes

## 2020-11-25 NOTE — LETTER
"    11/25/2020         RE: Remedios Watts  [address] [place] [address]        Dear Colleague,    Thank you for referring your patient, Remedios Watts, to the [facility]. Please see a copy of my visit note below.    Remedios Watts is a 40 year old female who is being evaluated via a billable video visit.      The patient has been notified of following:     \"This video visit will be conducted via a call between you and your physician/provider. We have found that certain health care needs can be provided without the need for an in-person physical exam.  This service lets us provide the care you need with a video conversation.  If a prescription is necessary we can send it directly to your pharmacy.  If lab work is needed we can place an order for that and you can then stop by our lab to have the test done at a later time.    Video visits are billed at different rates depending on your insurance coverage.  Please reach out to your insurance provider with any questions.    If during the course of the call the physician/provider feels a video visit is not appropriate, you will not be charged for this service.\"    Patient has given verbal consent for Video visit? Yes  How would you like to obtain your AVS? [place]   Send to e-mail at: [email or web address]  If you need to call [phone]  Will anyone else be joining your video visit? No       Video-Visit Details    Type of service:  Video Visi      Originating Location (pt. Location): Home    Distant Location (provider location):  [facility]     Platform used for Video Visit: [facility]        Palliative Care Outpatient Clinic Consultation Note    Patient:  Remedios Watts    Chief Complaint:   Remedios Watts 40 year old female who is presenting to the palliative medicine clinic today at the request of [facility] for a palliative care consultation secondary to metastatic colon cancer. "   The patient's primary care provider is:  Agustín Berger.     History of Present Illness:  Clinic visit today was performed by video connection.  Patient and  were both available.  She has been struggling with colon cancer for some time and has had several failed treatments and at the [facility] has been told there is not a reasonable treatment to proceed with at this time.  She also went down to [place] for a short time for some treatment.  Since returning home from [place] she has been in contact with [facility] for management of unrelenting pain.  They have escalated her dosing and wanted her to get set up with palliative care.  She was unable to travel down to [place] so is referred here for palliative care.  She is still wanting to consider some type of cancer treatment and is interested in being referred to [facility] oncology to discuss options.    Distressing Symptom/s:  Her only significant symptom is pain and it is quite severe.  She says at best it is 5 out of 10 only when she has taken a Dilaudid and that lasts for only 3 hours at most.  At worst it can get as bad as 10 out of 10.  Mostly she says the pain is in her liver but that then triggers total body pain.  She has been unable to get off the couch other than to try to sleep and to go to the bathroom.  She wakes at least every 3 hours through the night because of pain.    Patient's Disease Understanding: She is understanding of the grave prognosis of this cancer but she is continuing to search for further treatment options.    Coping: Well with the exception of the persistent wear on her body and her mental state from the constant pain.    Social History  Living Situation: she lives with her  and children including a 5-month-old .  Children: 5 children school age and younger  Actual/Potential Caregiver(s):   Support System: Family, friends  Occupation:   Hobbies: Horses  Substance Use/History of misuse: None  Financial Concerns:    Spiritual Background: Strong joy  Spiritual Concerns/Needs:   Social History     Tobacco Use     Smoking status: Never Smoker     Smokeless tobacco: Never Used   Substance Use Topics     Alcohol use: Yes     Frequency: Monthly or less     Drinks per session: 1 or 2     Binge frequency: Less than monthly     Drug use: No       Family History  Family History   Problem Relation Age of Onset     Arthritis Mother      Neurologic Disorder Mother         had a seizure      Alcohol/Drug Mother      Arthritis Maternal Grandmother      Breast Cancer Maternal Grandmother      Melanoma Maternal Grandmother      Heart Disease Paternal Grandmother      Cancer Father         skin     Patient's Involvement with Prior History of Serious Illness in Family:     Advance Care Planning:  Advance Directive:      Where is written copy located:   Health Care Agent Contact Information:   POLST:       No Known Allergies  Current Outpatient Medications   Medication Sig Dispense Refill     acetaminophen (TYLENOL) 500 MG tablet Take 1,000 mg by mouth       citalopram (CELEXA) 20 MG tablet Take 1 tablet (20 mg) by mouth daily 30 tablet 3     fentaNYL (DURAGESIC) 25 mcg/hr 72 hr patch Place 1 patch onto the skin every 72 hours       HYDROmorphone (DILAUDID) 2 MG tablet Take 2-4 mg by mouth       ibuprofen (ADVIL/MOTRIN) 200 MG tablet Take 800 mg by mouth 4 times daily (before meals and nightly)       morphine (MS CONTIN) 30 MG CR tablet        ondansetron (ZOFRAN) 8 MG tablet Take 8 mg by mouth       ondansetron (ZOFRAN) 8 MG tablet Take 8 mg by mouth       senna (SENOKOT) 8.6 MG tablet Take 17.2 mg by mouth       simethicone (MYLICON) 80 MG chewable tablet Take 160 mg by mouth       Prenatal Vit-Fe Fumarate-FA (PRENATAL 19) 29-1 MG CHEW Take 2 chew tab by mouth daily       Past Medical History:   Diagnosis Date     Cervical high risk HPV (human papillomavirus) test positive 04/23/2019    see problem list     Chickenpox      Group B  streptococcus urinary tract infection complicating pregnancy 4/20/2011    Plan PCN in labor      History of postpartum depression, currently pregnant 10/21/2010     Malignant melanoma (H)      Partial small bowel obstruction (H) 3/10/2019     Postpartum depression 12/17/2008     Tailbone injury     fx     Urinary tract infections      Past Surgical History:   Procedure Laterality Date     C ORAL SURGERY PROCEDURE       COLONOSCOPY N/A 3/11/2019    Procedure: COMBINED COLONOSCOPY, SINGLE OR MULTIPLE BIOPSY/POLYPECTOMY BY BIOPSY;  Surgeon: Loi Maldonado MD;  Location: [facility] GI     INSERT PORT VASCULAR ACCESS Right 4/22/2019    Procedure: Central venous Chest Port Placement - right;  Surgeon: Didier Frazier PA-C;  [facility]     IR CHEST PORT PLACEMENT > 5 YRS OF AGE  4/22/2019     LAPAROSCOPIC ASSISTED COLECTOMY Right 3/22/2019    Procedure: Laparoscopic Extended Right Hemicolectomy and appendectomy;  Surgeon: Linda Quesada MD;  Location: [facility] OR     LAPAROSCOPIC CHOLECYSTECTOMY N/A 1/28/2020    Procedure: CHOLECYSTECTOMY, LAPAROSCOPIC & RIGHT TIMOTHY CATH REMOVAL;  Surgeon: Loi Real MD;  Location: [facility]     REMOVE CATHETER VASCULAR ACCESS Right 1/28/2020    Procedure: Remove Catheter Vascular Access;  Surgeon: Loi Real MD;  Location: [facility] OR       REVIEW OF SYSTEMS:   ROS: 10 point ROS neg other than the symptoms noted above in the HPI and here:  Palliative Symptom Review (0=no symptom/no concern, 1=mild, 2=moderate, 3=severe):      Pain: 3      Fatigue: 0      Nausea: 0      Constipation: 1      Diarrhea: 0      Depressive Symptoms: 0      Anxiety: 0      Drowsiness: 0      Poor Appetite: 0      Shortness of Breath: 0      Insomnia: 0      Other: 0      Overall (0 good/no concerns, 3 very poor):  2      There were no vitals taken for this visit.  GEN: Alert, no acute distress, sitting on couch  SKIN: No rashes or abnormalities  NEURO: Appropriate conversation, no obvious  abnormality        Impressions:  Palliative Performance Score:  90  Decision Making Capacity: Intact        ASSESSMENT:    Malignant neoplasm of hepatic flexure (H)    PLAN:     We discussed her situation in detailand at this point she wants to pursue further treatments if possible.  For that reason she is referred to [facility] oncology to consider further options    We went over her analgesic use and her level of pain in great detail.  She is currently using two 25 mcg fentanyl patches and has been doing this for 4 or 5 days.  She is also taking MS Contin 30 mg twice daily and using hydromorphone 2 mg tablets 12 pills per 24 hours.  She has been on this oral dosing for several weeks.  The calculated equianalgesic dosing that she is using is equivalent to 231 mg oral morphine per 24 hours.  With pain levels at best 5 out of 10 I am recommending approximately 50% increase in her analgesic dosing so I am changing her to a 75 mcg fentanyl patch (she is told to remove the old patches and replace them with the new one) and increasing MS Contin to 60 mg every 12 hours and allowing her to continue with the same hydromorphone dosing on an as needed basis.  If this results in the same number of hydromorphone tablets used she would have a total oral morphine equivalent per 24 hours of 328 mg.  I cautioned her and her  about side effects and symptoms to be watching for.  I also explained that she may be able to get away with fewer hydromorphone tablets if her pain is managed.    Recommend recheck in 7 to 10 days in clinic, appointment to be set up for December 4    Call or send message for questions     is going to be monitoring and keeping track of her total dosing on a daily basis.    Orders Placed This Encounter     Oncology/Hematology Adult Referral     DISCONTD: fentaNYL (DURAGESIC) 25 mcg/hr 72 hr patch     DISCONTD: morphine (MS CONTIN) 30 MG CR tablet     DISCONTD: HYDROmorphone (DILAUDID) 2 MG tablet      DISCONTD: senna (SENOKOT) 8.6 MG tablet     DISCONTD: simethicone (MYLICON) 80 MG chewable tablet     DISCONTD: ondansetron (ZOFRAN) 8 MG tablet     DISCONTD: ibuprofen (ADVIL/MOTRIN) 200 MG tablet     acetaminophen (TYLENOL) 500 MG tablet     HYDROmorphone (DILAUDID) 2 MG tablet     morphine (MS CONTIN) 30 MG CR tablet     ondansetron (ZOFRAN) 8 MG tablet     senna (SENOKOT) 8.6 MG tablet     simethicone (MYLICON) 80 MG chewable tablet     ibuprofen (ADVIL/MOTRIN) 200 MG tablet     fentaNYL (DURAGESIC) 75 mcg/hr 72 hr patch       Total video visit time 65 minutes        Again, thank you for allowing me to participate in the care of your patient.        Sincerely,        Agustín Berger MD

## 2020-11-27 NOTE — TELEPHONE ENCOUNTER
Can you forward to her Oncologist or who is covering for Palliative care. Thanks. JEFERSON Barnard CNP  [facility]

## 2020-11-27 NOTE — TELEPHONE ENCOUNTER
Oncologist is at the [facility] it looks like and I called oncology here for covering provider for palliative care and they said it needs to go to oncologist or PCP which is Dr Berger.    Libertad Mcintosh, RN

## 2020-11-27 NOTE — TELEPHONE ENCOUNTER
She will have to continue Dilaudid and MS Contin until Dr. Berger returns 12/4/2020. JEFERSON Barnard CNP  [facility]

## 2020-11-30 NOTE — TELEPHONE ENCOUNTER
called and left message. Patient's pain still unchanged. Confined to couch due to pain.    Called back and left message with this plan:    Increase Fentanyl patch to 100 mcg/hr (Rx sent to pharmacy)    Referral to home palliative care (called homecare to arrange and sending Epic referral)    May need to consider methadone, but need home palliative nursing care to assist in management and to support dose monitoring.    Celine

## 2020-12-01 NOTE — PROGRESS NOTES
RN CARE COORDINATION NOTE    Outgoing:  Called patient to arrange for a follow up with Dr Capps. We have received a referral that she would like to resume care. She prefers the [facility] clinic however they are not taking new/transfer patients at this time.   Left voicemail message asking for a return call to discuss.     Requested that the scheduling team call Jere, pt's , to try and schedule an appointment for Thursday at 7:30      Nadya Fuchs MSN, RN, OCN  RN Care Coordinator  [facility]  [phone]

## 2020-12-02 NOTE — TELEPHONE ENCOUNTER
[facility] now requests orders and shares plan of care/discharge summaries for some patients through [employer].  Please REPLY TO THIS MESSAGE OR ROUTE BACK TO THE AUTHOR in order to give authorization for orders when needed.  This is considered a verbal order, you will still receive a faxed copy of orders for signature.  Thank you for your assistance in improving collaboration for our patients.    Patient is rating pain 7/10, Fentanyl increased to 100 mcg three days ago. Patient does not feel this has been effective. Pain was severe on Monday and patient took two 5 mg tabs of oxycodone she had from previous prescription which helped with the pain. Currently has Fentanyl 100 mcg patches, MS contin 30 mg BID and dilaudid 2 mg tabs taking nelsy 3 to 4 hours as needed. Patient wanting to try oxycodone for breakthrough and also discussed methadone with Dr hammer. Please advise on pain medication management. Also asking for compazine for nausea, has been using zofran but it gives her a head ache.Compazine has not given her a head ache in the past. Patient also reported some constipation, discussed miralax for bowel management in addition to senna. Can you please prescribe miralax? Patient uses [facility] in [place]. Please call with any questions or concerns [phone].    Thank you,  Brigette TENORIO   [facility]

## 2020-12-02 NOTE — TELEPHONE ENCOUNTER
[facility] now requests orders and shares plan of care/discharge summaries for some patients through [employer].  Please REPLY TO THIS MESSAGE OR ROUTE BACK TO THE AUTHOR in order to give authorization for orders when needed.  This is considered a verbal order, you will still receive a faxed copy of orders for signature.  Thank you for your assistance in improving collaboration for our patients.    Patient admitted to palliative care services, requesting orders for SN visits 1xwkx4 and 4 prn to educate on pain and medication management, assess bowels d/t constipation, and bowel medication management. Possible PORT maintenance if orders given and insurance will cover home PORT flushes and supplies. Patient declined all other services at this time. Please call with any questions or concerns [phone].    Thank you,  Brigette TENORIO  [facility]

## 2020-12-03 NOTE — TELEPHONE ENCOUNTER
OK    I sent the Rx to the pharmacy.  [facility] - [place], [place] - [address] [address]

## 2020-12-03 NOTE — TELEPHONE ENCOUNTER
[facility] now requests orders and shares plan of care/discharge summaries for some patients through [employer].  Please REPLY TO THIS MESSAGE OR ROUTE BACK TO THE AUTHOR in order to give authorization for orders when needed.  This is considered a verbal order, you will still receive a faxed copy of orders for signature.  Thank you for your assistance in improving collaboration for our patients.    Sorry for the third message but patient also needs a refill of her citalopram medication 20 mg tabs sent to [facility] pharmacy in [place]. Please call with any questions or concerns [phone].    Thank you,  Brigette TENORIO   [facility]

## 2020-12-04 NOTE — TELEPHONE ENCOUNTER
Telephone conversation with patient briefly and more extensively with .  I laid out a plan for adjusting medication and had him write this down.  I also talked with home care nurse and she will be visiting today and I have given her the dosing change.  The plan is as follows:    Start methadone 10 mg p.o. every 8 hours.  (This is based on a 10-1 conversion of 336 morphine milliequivalents)    4 hours after the first methadone dose this afternoon she should remove her fentanyl and not use that again.    Continue with Dilaudid every 3 hours for breakthrough pain    Prescription for Narcan given    Stop all morphine Contin    I discussed oncology trying to get a hold of them and they are anxious to speak with them so I will send a message to the oncology office with different phone number contacts.    Plan recheck carefully over the weekend through home care and then also considered dose adjustments not before 4 days or longer for the methadone.    Orders Placed This Encounter     methadone (DOLOPHINE) 10 MG tablet     naloxone (NARCAN) 4 MG/0.1ML nasal spray         Celine

## 2020-12-04 NOTE — TELEPHONE ENCOUNTER
Unable to reach patient or spouse by phone.  Left message for them to call us or homecare nurse back.     Delphine Osborne RN

## 2020-12-04 NOTE — TELEPHONE ENCOUNTER
Brigette RN with [facility] calling.  She is very frustrated and concerned.    She has called pt and 's cell x3-4 times today to go to the home TODAY and change/add meds for pt's care.  Please call Brigette and advise.

## 2020-12-04 NOTE — PROGRESS NOTES
RN CARE COORDINATION NOTE      Outgoing:  Left voicemail message for pt's , Johnny, asking for a return call to help get patient scheduled with Dr Capps.         Nadya Fuchs MSN, RN, OCN  RN Care Coordinator  [facility]  [phone]

## 2020-12-04 NOTE — PROGRESS NOTES
RN CARE COORDINATION NOTE      Outgoing:    Left voicemail message for Johnny, pt's , asking for a return call to get patient scheduled with Dr Jefry coles.      Nadya Fuchs MSN, RN, OCN  RN Care Coordinator  [facility]  [phone]

## 2020-12-07 NOTE — TELEPHONE ENCOUNTER
Reason for Call:  Form, our goal is to have forms completed with 72 hours, however, some forms may require a visit or additional information.    Type of letter, form or note:  polst/hospice orders/certification of terminal illness    Who is the form from?: Home care    Where did the form come from: form was faxed in    What clinic location was the form placed at?: [facility]    Where the form was placed: Given to physician    What number is listed as a contact on the form?: [phone]       Additional comments: placed on providers desk for review and signature    Call taken on 12/7/2020 at 2:52 PM by Anai Gómez

## 2020-12-07 NOTE — TELEPHONE ENCOUNTER
L.M. for home care RN to make sure they connected with pt.  We rec'd a POLST today via fax from  Home care, so we assume  Home care EN was able to connect with pt.

## 2020-12-07 NOTE — PROGRESS NOTES
Continued significant pain.  Prescription sent in for methadone 5 mg tablets to take every 8 hours along with the 10 mg tablets.  This is a 50% increase in methadone dosing.  She can continue with the Dilaudid for breakthrough pain.  This was discussed with palliative care home care nurse and they are aware of the dosing change and will continue to monitor with patient throughout the coming days.  Plan no further increase in methadone sooner than Thursday or Friday this week.    Celine

## 2020-12-15 NOTE — PROGRESS NOTES
This is a recent snapshot of the patient's [facility] medical record.  For current drug dose and complete information and questions, call [phone] or In [facility] pool, fv home infusion ([id number])  CSN Number:  [id number]

## 2020-12-30 NOTE — TELEPHONE ENCOUNTER
Requested Prescriptions   Pending Prescriptions Disp Refills     HYDROmorphone (DILAUDID) 2 MG tablet [Pharmacy Med Name: hydromorphone 2 mg tablet] 300 tablet 0     Sig: Take 2-3 tablets (4-6 mg) by mouth every 2 hours as needed for severe pain       There is no refill protocol information for this order

## 2020-12-31 NOTE — TELEPHONE ENCOUNTER
Routing refill request to provider for review/approval because:  Drug not on the [facility] refill protocol     Maria L Carrion RN

## 2021-01-01 ENCOUNTER — MEDICAL CORRESPONDENCE (OUTPATIENT)
Dept: HEALTH INFORMATION MANAGEMENT | Facility: CLINIC | Age: 41
End: 2021-01-01

## 2021-01-01 RX ORDER — HYDROMORPHONE HYDROCHLORIDE 2 MG/1
4-6 TABLET ORAL
Qty: 300 TABLET | Refills: 0 | Status: SHIPPED | OUTPATIENT
Start: 2021-01-01

## 2021-01-21 ENCOUNTER — MEDICAL CORRESPONDENCE (OUTPATIENT)
Dept: HEALTH INFORMATION MANAGEMENT | Facility: CLINIC | Age: 41
End: 2021-01-21

## 2021-04-23 NOTE — TELEPHONE ENCOUNTER
Patient was seen in [facility] clinic ([facility]) on 3/16.  At that time a repeat ultrasound was recommended in 8 weeks, patient no showed for this appt.  Schedulers have been unsuccessful in reaching patient to reschedule.  Referring clinic notified, removing order.       Mena Vasquez       Valtrex Counseling: I discussed with the patient the risks of valacyclovir including but not limited to kidney damage, nausea, vomiting and severe allergy.  The patient understands that if the infection seems to be worsening or is not improving, they are to call.

## 2021-05-26 ENCOUNTER — RECORDS - HEALTHEAST (OUTPATIENT)
Dept: ADMINISTRATIVE | Facility: CLINIC | Age: 41
End: 2021-05-26

## 2021-06-05 NOTE — PROGRESS NOTES
"Please see \"Imaging\" tab under Chart Review for full report.  This ultrasound was performed in the [facility], and may be located under Care Everywhere.    Mony Sousa MD  Maternal Fetal Medicine    "

## 2021-06-05 NOTE — PROGRESS NOTES
[facility]  Genetic Counseling Consult    Patient: Remedios Watts YOB: 1980   Date of Service: 2020      Remedios Watts was seen at [facility] for genetic consultation to discuss the options for screening and testing for fetal chromosome abnormalities.  The indication for genetic counseling is advanced maternal age. Remedios was unaccompanied to today's visit.        Impression/Plan:   1.  Remedios had NT ultrasound today.  Remedios had noninvasive prenatal screening (Innatal test through [facility]) earlier in this pregnancy, which was normal. We reviewed those results and their significance today.      2.  Maternal serum AFP (single marker screen) is recommended after 15 weeks to screen for open neural tube defects. A quad screen should not be performed.    3.  An 18-20 week comprehensive ultrasound is standard of care for all women 35 or older at delivery.    Pregnancy History:   /Parity:    Age at Delivery: 39 y.o.  DREW: 2020, by Last Menstrual Period  Gestational Age: 12w3d    No significant complications or exposures were reported in the current pregnancy.    Remedios s pregnancy history is significant for:  o SAB in   o 39w0d  in 2005, male  o Term  in 2006, female  o 40w0d IVD in 2008, male  o 39w1d  in May 2011, male    Medical History:   Remedios was diagnosed with stage III colon cancer in 2019 at age 38. She completed chemotherapy approximately 3.5 weeks prior to conception of the current pregnancy. Remedios is scheduled to meet with her oncologist this Thursday (20) to discuss ongoing cancer screening during pregnancy.     With regards to her young age at diagnosis with colon cancer, Remedios met with the Ivette Delcid genetic counselor in oncology. Genetic testing was performed, but no causative mutation was identified. Please see Ivette's  notes for complete details.        Family History:   A three-generation pedigree was obtained, and is scanned under the  Media  tab.     The reported family history is negative for multiple miscarriages, stillbirths, birth defects, intellectual disabilities, known genetic conditions, and consanguinity.       Carrier Screening:   The patient reports that she and the father of the pregnancy have  ancestry:     Cystic fibrosis is an autosomal recessive genetic condition that occurs with increased frequency in individuals of  ancestry and carrier screening for this condition is available.  In addition,  screening in the [facility] includes cystic fibrosis.      Expanded carrier screening for mutations in a large panel of genes associated with autosomal recessive conditions including cystic fibrosis, spinal muscular atrophy, and others, is now available.      The patient has declined the carrier screening options reviewed today.       Risk Assessment for Chromosome Conditions:   We explained that the risk for fetal chromosome abnormalities increases with maternal age. We discussed specific features of common chromosome abnormalities, including Down syndrome, trisomy 13, trisomy 18, and sex chromosome trisomies.      - At age 39 at midtrimester, the risk to have a baby with Down syndrome is 1 in 74.     - At age 39 at midtrimester, the risk to have a baby with any chromosome abnormality is 1 in 40.          Testing Options:   We discussed the following options:   Non-invasive Prenatal Testing (NIPT)    Maternal plasma cell-free DNA testing; first trimester ultrasound with nuchal translucency and nasal bone assessment is recommended, when appropriate    Screens for fetal trisomy 21, trisomy 13, trisomy 18, and sex chromosome aneuploidy    Cannot screen for open neural tube defects; maternal serum AFP after 15 weeks is recommended       Comprehensive (Level II) ultrasound: Detailed ultrasound  performed between 18-22 weeks gestation to screen for major birth defects and markers for aneuploidy.      We reviewed the benefits and limitations of this testing.  Screening tests provide a risk assessment specific to the pregnancy for certain fetal chromosome abnormalities, but cannot definitively diagnose or exclude a fetal chromosome abnormality.  Follow-up genetic counseling and consideration of diagnostic testing is recommended with any abnormal screening result.     Diagnostic tests carry inherent risks- including risk of miscarriage- that require careful consideration.  These tests can detect fetal chromosome abnormalities with greater than 99% certainty.  Results can be compromised by maternal cell contamination or mosaicism, and are limited by the resolution of cytogenetic G-banding technology.  There is no screening nor diagnostic test that can detect all forms of birth defects or mental disability.     It was a pleasure to be involved with Remedios [facility]. Face-to-face time of the meeting was 20 minutes.      Ashely Araujo MS, [facility]  Licensed Genetic Counselor   [facility]   [facility]  [email or web address]   [place] Office: [phone]   [facility] [phone]  [facility] Office: [phone]  Pager: [phone] Fax: [phone]

## 2021-06-06 NOTE — PROGRESS NOTES
"Please see \"Imaging\" tab under \"Chart Review\" for details of today's visit.    Soha Arreola        "

## 2021-06-09 NOTE — PROGRESS NOTES
Patient was seen in [facility] clinic ([place]) on 3/16.  At that time a repeat ultrasound was recommended in 8 weeks, patient no showed for this appt.  Schedulers have been unsuccessful in reaching patient to reschedule.  Referring clinic notified, removing order.       Mena Vasquez

## 2022-04-24 NOTE — LETTER
Problem: Risk for Delirium  Goal: Optimal Coping  Outcome: Ongoing, Progressing   Goal Outcome Evaluation:      Patient is disoriented to time and somewhat situation. Pleasant and cooperative      Problem: Fluid Volume Deficit  Goal: Fluid Balance  Outcome: Ongoing, Progressing   Tolerating diet. No IV access      Problem: Infection  Goal: Absence of Infection Signs and Symptoms  Outcome: Ongoing, Progressing   Repeat UA/UC performed via straight cath. Patient started on oral antibiotics for UTI. Vitals stable.                July 15, 2020        Remedios Mac  [address] [place] [address]    This letter provides a written record that you were tested for COVID-19 on 07/13/20.       Your result was negative. This means that we didn t find the virus that causes COVID-19 in your sample. A test may show negative when you do actually have the virus. This can happen when the virus is in the early stages of infection, before you feel illness symptoms.    If you have symptoms   Stay home and away from others (self-isolate) until you meet ALL of the guidelines below:    You ve had no fever--and no medicine that reduces fever--for 3 full days (72 hours). And      Your other symptoms have gotten better. For example, your cough or breathing has improved. And     At least 10 days have passed since your symptoms started.    During this time:    Stay home. Don t go to work, school or anywhere else.     Stay in your own room, including for meals. Use your own bathroom if you can.    Stay away from others in your home. No hugging, kissing or shaking hands. No visitors.    Clean  high touch  surfaces often (doorknobs, counters, handles, etc.). Use a household cleaning spray or wipes. You can find a full list on the EPA website at www.epa.gov/pesticide-registration/list-n-disinfectants-use-against-sars-cov-2.    Cover your mouth and nose with a mask, tissue or washcloth to avoid spreading germs.    Wash your hands and face often with soap and water.    Going back to work  Check with your employer for any guidelines to follow for going back to work.    Employers: This document serves as formal notice that your employee tested negative for COVID-19, as of the testing date shown above.

## 2022-09-06 NOTE — LETTER
6/10/2019      RE: Remedios Watts  [address] [place] [address]       Oncology follow up visit:  Date on this visit: Basil 10, 2019    REASON FOR VISIT: stage III colon cancer, here for assessment prior to FOLFOX C3    History Of Present Illness:    Please see my previous note for details.  I have copied and updated from prior note.  Ms. Watts is a 38 year old female who was admitted to the hospital on 3/10/2019 for bowel obstruction due to hepatic flexure colon mass.  She had a CT scan done on 3/10/2019 which showed apple core lesion near the hepatic flexure of the colon concerning for an obstructing colonic neoplasm.  No enlarged lymph nodes were seen.  There is a tiny subcentimeter left lobe liver lesion which is too small to characterize.  A CT of the chest on 3/13/2019 did not show evidence of metastatic disease in the chest.      Colonoscopy on 3/11/2019 showed fungating, infiltrative, highly-friable and ulcerated large mass at the hepatic flexure/proximal transverse colon, unable to traverse this lesions as this appears to be near-completely obstructive in nature (though allows liquid stool/effluent to pass under visualization). The mass was partially circumferential. Biopsies was consistent with invasive moderately differentiated adenocarcinoma compatible with colorectal primary and Mismatch repair enzymes were intact by immunohistochemistry.     On 3/22/2019 she had laparoscopic extended right hemicolectomy performed by Dr. Quesada.  Final pathology was consistent with a 3.7 cm hepatic flexure poorly differentiated adenocarcinoma with focal micropapillary growth extending through the muscularis propria and involving serosa (pT4).  There was lymphovascular invasion but no perineural invasion seen.  All margins were negative.  4 out of 38 lymph nodes were positive for metastatic carcinoma including 2 lymph nodes with micrometastasis.  Final staging was [id number].    She went to emergency room  on 4/2/19 for worsening right-sided abdominal pain and had CT chest abdomen and pelvis did not show any acute pathology or evidence of recurrence/metastasis. There were expected post operative changes. Labs including CBC/diff and CMP/lipase were unremarkable except for mild anemia. UA was negative. Pelvic US was also negative apart from 2 small left ovarian cysts.  She recently saw Gyn on 4/23/19 and had endometrial biopsy done which showed no diagnostic abnormalities identified, benign late secretory endometrium without atypia.    She started adjuvant FOLFOX on 4/29/19. She was seen for jaw, neck, and back pain and was given Flexeril following cycle 1.       She is here today for routine follow up prior to cycle 4 FOLFOX.     Interval History:  Remedios is here for follow up.  Patient reports that her neuropathy was particularly bad with this cycle of chemotherapy.  She reports that immediately after her infusion she had trouble with her feet and hands clenching and she was unable to walk so had to be wheeled out in a wheelchair.  She does still have cold sensitivity present now.  She reports that it was hard to use her hands and feet due to the clenching as well as numbness and tingling for about a week.  She did get sores on her lips that have gradually gotten better with Aquaphor.  She did have nausea again around days 1 through 4 with no vomiting that was managed with Compazine.  She again had diarrhea for about 4 days following her infusion.  She did take some Imodium with some relief.  She is now back to her post surgery baseline of 4-5 loose stools per day.  She did have an increase in her stooling today, but she questions if it is related to some anxiety about coming back in for her next infusion.  She did have a some stomach discomfort as well this morning.  She reports that her last menstrual period started on June 11 and was particularly heavy and lasted 7 days.  Around that time, she did also have  36.9 lightheadedness with position changes.  She feels she has been doing well getting in fluids.  She denies other concerns.    Past Medical/Surgical History:  Past Medical History:   Diagnosis Date     Cervical high risk HPV (human papillomavirus) test positive 04/23/2019    see problem list     Chickenpox      Diarrhea      Group B streptococcus urinary tract infection complicating pregnancy 4/20/2011    Plan PCN in labor      History of postpartum depression, currently pregnant 10/21/2010     Malignant melanoma (H)      Postpartum depression 12/17/2008     Tailbone injury     fx     Urinary tract infections    History of melanoma to the left arm and back treated with excision.  She follows closely with dermatology.    Past Surgical History:   Procedure Laterality Date     C ORAL SURGERY PROCEDURE       COLONOSCOPY N/A 3/11/2019    Procedure: COMBINED COLONOSCOPY, SINGLE OR MULTIPLE BIOPSY/POLYPECTOMY BY BIOPSY;  Surgeon: Loi Maldonado MD;  Location: [facility] GI     INSERT PORT VASCULAR ACCESS Right 4/22/2019    Procedure: Central venous Chest Port Placement - right;  Surgeon: Didier Frazier PA-C;  Location: [facility] OR     IR CHEST PORT PLACEMENT > 5 YRS OF AGE  4/22/2019     LAPAROSCOPIC ASSISTED COLECTOMY Right 3/22/2019    Procedure: Laparoscopic Extended Right Hemicolectomy and appendectomy;  Surgeon: Linda Quesada MD;  Location: [facility] OR     Current Medications:  Current Outpatient Medications   Medication Sig Dispense Refill     acetaminophen (TYLENOL) 325 MG tablet Take 3 tablets (975 mg) by mouth every 8 hours as needed for mild pain 100 tablet 0     cyclobenzaprine (FLEXERIL) 5 MG tablet Take 1-2 tablets (5-10 mg) by mouth 3 times daily as needed for muscle spasms 30 tablet 3     gabapentin (NEURONTIN) 300 MG capsule Take 1 pill po at bedtime for neuropathic pain 30 capsule 3     granisetron (KYTRIL) 1 MG tablet Take 1 tablet (1 mg) by mouth every 12 hours as needed for nausea 60 tablet 3      "lidocaine-prilocaine (EMLA) 2.5-2.5 % external cream Apply topically as needed for moderate pain 30 g 0     LORazepam (ATIVAN) 0.5 MG tablet Take 1 tablet (0.5 mg) by mouth every 4 hours as needed (Anxiety, Nausea/Vomiting or Sleep) 30 tablet 2     LORazepam (ATIVAN) 0.5 MG tablet Take 1 tablet (0.5 mg) by mouth nightly as needed for sleep 10 tablet 0     prochlorperazine (COMPAZINE) 10 MG tablet Take 1 tablet (10 mg) by mouth every 6 hours as needed (Nausea/Vomiting) 30 tablet 2     tretinoin (RETIN-A) 0.05 % external cream Apply topically At Bedtime 45 g 3     FLUoxetine (PROZAC) 10 MG capsule Take 1 capsule (10 mg) by mouth daily (Patient not taking: Reported on 6/10/2019) 30 capsule 1      Family History:  Family History   Problem Relation Age of Onset     Arthritis Mother      Neurologic Disorder Mother         had a seizure      Alcohol/Drug Mother      Arthritis Maternal Grandmother      Breast Cancer Maternal Grandmother      Melanoma Maternal Grandmother      Heart Disease Paternal Grandmother      Cancer Father         skin   Maternal grandmother had breast cancer in her late 50s.  She also had a skin melanoma in her 70s.  Father had a skin melanoma in his 50s.  He had prostate cancer in his 60s.  Paternal Uncle had kidney cancer in 50s.  She has 4 children and the youngest is 7 years old and all are healthy.    Social History:  She denies any smoking.  She drinks alcohol occasionally.  She lives with her family.  She is a / by profession.    Physical Exam:  General: The patient is a pleasant female in no acute distress.  /70 (BP Location: Right arm, Patient Position: Sitting, Cuff Size: Adult Regular)   Pulse 67   Temp 97.8  F (36.6  C) (Oral)   Resp 18   Ht 1.676 m (5' 5.98\")   Wt 77.3 kg (170 lb 6.4 oz)   SpO2 100%   BMI 27.52 kg/m     Wt Readings from Last 10 Encounters:   06/10/19 77.3 kg (170 lb 6.4 oz)   05/29/19 77.3 kg (170 lb 6.4 oz)   05/13/19 76.2 kg (167 lb " 14.4 oz)   05/03/19 77.1 kg (169 lb 15.6 oz)   04/29/19 77.2 kg (170 lb 3.2 oz)   04/25/19 77.4 kg (170 lb 9.6 oz)   04/23/19 77.1 kg (170 lb)   04/22/19 72.6 kg (160 lb)   04/15/19 75.8 kg (167 lb)   04/08/19 77.2 kg (170 lb 1.6 oz)   HEENT: EOMI, PERRL. Sclerae are anicteric. Oral mucosa is pink and moist with no mucositis on lips and cheeks, no thrush.   Lymph: Neck is supple with no lymphadenopathy in the cervical or supraclavicular areas.   Heart: Regular rate and rhythm.   Lungs: Clear to auscultation bilaterally.   Abdomen: Bowel sounds present, soft, nontender with no palpable hepatosplenomegaly or masses.   Extremities: No lower extremity edema noted bilaterally.   Neuro: Cranial nerves II through XII are grossly intact.  Skin: No rashes, petechiae, or bruising noted on exposed skin.    Laboratory/Imaging Studies   6/10/2019 12:19   Sodium 139   Potassium 3.9   Chloride 107   Carbon Dioxide 26   Urea Nitrogen 6 (L)   Creatinine 0.59   GFR Estimate >90   GFR Estimate If Black >90   Calcium 8.8   Anion Gap 5   Albumin 3.6   Protein Total 6.8   Bilirubin Total 0.2   Alkaline Phosphatase 96   ALT 27   AST 23   Glucose 105 (H)   WBC 5.1   Hemoglobin 10.7 (L)   Hematocrit 33.7 (L)   Platelet Count 184   RBC Count 3.94   MCV 86   MCH 27.2   MCHC 31.8   RDW 20.6 (H)   Diff Method Automated Method   % Neutrophils 59.5   % Lymphocytes 25.7   % Monocytes 12.8   % Eosinophils 1.2   % Basophils 0.4   % Immature Granulocytes 0.4   Nucleated RBCs 0   Absolute Neutrophil 3.0   Absolute Lymphocytes 1.3   Absolute Monocytes 0.7   Absolute Eosinophils 0.1   Absolute Basophils 0.0   Abs Immature Granulocytes 0.0   Absolute Nucleated RBC 0.0     ASSESSMENT/PLAN:  Stage IIIC poorly differentiated [id number] adenocarcinoma of the hepatic flexure -s/p laparoscopic right hemicolectomy on 3/22/19. All margins negative. 4/38 LNs positive. MSI-Intact.  Baseline CEA 9.2 on 3/11/2019. She started adjuvant FOLFOX on 4/29/2019. She has  had some difficulty with muscle spasm, nausea, cold sensitivity, and diarrhea. She has had progressive difficulty with neurotoxicity from oxaliplatin. I will decrease the oxaliplatin by 20%. She will continue with cycle 4 FOLFOX today. She will continue to be seen q2w to manage toxicity.  Her next infusion will be in 3 weeks due to her upcoming vacation from 6/24-6/28. We will plan to repeat a CT CAP after completing chemotherapy and then every 6 months for 2 years. She will be due for a colonoscopy in March 2020.     Acute Neurotoxicity. Cycle two had fairly rapid paresthesias and muscle cramping, worse with cycle 3. Will decrease the dose of oxaliplatin by 20% today.  She will use her muscle relaxant and uses warm blankets and drinks again today.    Peripheral neuropathy. Lasted for 1 week in her hands and feet, now resolved. She has gabapentin to take at night.     Nausea. Improved with IV Emend and Aloxi. She will continue to use Compazine after her chemotherapy. She also has Kytril available.     Diarrhea. Worse for the first 4 days after chemotherapy. Managed with Imodium. Was back to her post-surgery baseline until this morning. Question if increased stooling today related to anxiety about today's appointment, which patient acknowledges.     Liver lesion. Indeterminate. Will f/u on this on her next imaging.     Iron deficiency anemia.   History of heavy menses. Recent endometrial biopsy on 4/23/19 was benign. S/p IV Infed x 1 (5/22/19).  Will recheck iron studies in mid-August. MCV and hemoglobin are mildly trended up.     Genetic Testing.  Due to young age of presentation of colon cancer and past hx of skin melanoma, she will meet with our genetic counselor in October.    Coping: Doing okay. She had some anxiety about today's appointment. She plans to remain off of Prozac for now.    Melinda Mayo PA-C  [facility]  [address] [place] [address] [phone]

## 2025-04-17 NOTE — TELEPHONE ENCOUNTER
April 17, 2025       Vinh Mariano MD  [id number] [address] [address]  Via In Basket      Patient: Hina Gomez   YOB: 1993   Date of Visit: 4/17/2025       Dear Dr. Mariano:    Thank you for referring Hina Gomez to me for evaluation. Below are my notes for this visit with her.    If you have questions, please do not hesitate to call me. I look forward to following your patient along with you.      Sincerely,        George Forrest MD        CC: No Recipients  George Forrest MD  4/17/2025  3:15 PM  Signed  CARDIAC ELECTROPHYSIOLOGY CONSULT NOTE      PCP: Vinh Mariano MD       HISTORY OF PRESENT ILLNESS   Hina Gomez is a 32 year old female is here for follow up of  palpitation, orthostatic hypotension, dizziness, lightheadedness.  She becomes symptomatic with minimal exertion or after taking shower.  She feels hot and flushed and develops tachycardia.  Her heart rate can go up to 130s with minimal exertion.  She also has a borderline low blood pressure.  She has seen Dr. Hawkins who has started her on oral sodium chloride and metoprolol 25 mg p.o. daily.  Her symptoms interfere with her daily physical activities.  She has lost her job.  She has a past medical history of migraine for which she is on multiple medications as needed.  Her echocardiogram is normal.  In 9/2024, I started her on Midodrine and she feels better. However still complains of fatigue. Her migraine flares up frequently.  She also sees a neurologist at [place].    MEDICATIONS   Outpatient medications:  Outpatient Medications Marked as Taking for the 4/17/25 encounter (Office Visit) with George Forrest MD   Medication Sig Dispense Refill   • Vitamin D, Ergocalciferol, 1.25 mg (50,000 units) capsule Take 1 capsule by mouth every 30 days. 4 capsule 1   • baclofen (LIORESAL) 10 MG tablet Take 10 mg by mouth.     • ERENumab-aooe (AIMOVIG) 140 MG/ML injection Inject 140 mg into the skin.     •  Left message for patient to call back at [phone]    Noy CASTELAN RN     buPROPion (WELLBUTRIN) 75 MG tablet TAKE ONE TABLET BY MOUTH TWICE DAILY 180 tablet 0   • ondansetron (ZOFRAN ODT) 4 MG disintegrating tablet dissolve 1 tablet by mouth on the tongue every 8 hours as needed for nausea 90 tablet 0   • levonorgestrel-ethinyl estradiol (Setlakin) 0.15-0.03 MG per tablet Take 1 tablet by mouth daily. 30 tablet 11   • Eluxadoline (Viberzi) 100 MG Tab 60 tablets.     • naratriptan (AMERGE) 2.5 MG tablet [None received]     • midodrine (PROAMATINE) 5 MG tablet Take 1 tablet by mouth 3 times daily (before meals). 90 tablet 5   • Riboflavin 400 MG Cap      • dicyclomine (BENTYL) 10 MG capsule Take 1 capsule by mouth in the morning and 1 capsule at noon and 1 capsule in the evening. Take before meals. 90 capsule 1   • sodium chloride 1 g tablet Take 1 tablet by mouth in the morning and 1 tablet in the evening. 60 tablet 1   • Phenazopyridine HCl (AZO TABS PO) Take 1 tablet by mouth daily.     • Ferrous Sulfate (IRON PO) Take 65 mg by mouth every other day.     • ketoconazole (NIZORAL) 2 % shampoo [None received]     • metoPROLOL succinate (TOPROL-XL) 25 MG 24 hr tablet Take by mouth daily.     • pimecrolimus (ELIDEL) 1 % cream [None received]     • KLS OMPERAZOLE 20 MG tablet Take 20 mg by mouth in the morning and 20 mg in the evening.     • BIOTIN PO      • indomethacin (INDOCIN) 50 MG capsule Take 1 capsule by mouth as needed for Pain. Indications: migraines 20 capsule 0   • ALPRAZolam (XANAX) 0.5 MG tablet Take 0.5 mg by mouth nightly as needed for Sleep.     • Probiotic Product (Align) 4 MG Cap Take 4 mg by mouth in the morning and 4 mg in the evening. 60 capsule 5   • ASHWAGANDHA PO Take by mouth daily.     • Multiple Vitamins-Minerals (Hair Skin Nails) Cap Take by mouth daily.       HISTORIES     ALLERGIES:   Allergen Reactions   • Bactrim Ds HIVES and SHORTNESS OF BREATH   • Cefaclor HIVES and SHORTNESS OF BREATH   • Penicillins HIVES, SHORTNESS OF BREATH, ANAPHYLAXIS and Other (See  Comments)   • Promethazine HIVES and SHORTNESS OF BREATH     Tongue went numb, rash, shortness of breath, brain fog   • Sulfa Antibiotics HIVES, Other (See Comments) and SHORTNESS OF BREATH     Had shortness of breath with bactrim DS   • Sumatriptan SHORTNESS OF BREATH   • Buspirone DIZZINESS and NAUSEA     Dizziness, nausea, tunnel vision     Past Medical History:   Diagnosis Date   • Anxiety    • Cervical radiculopathy    • Closed fracture of distal end of left humerus 08/23/2017   • Depression    • Dyspepsia    • H pylori ulcer    • Helicobacter pylori gastritis 02/28/2023   • Hemicrania continua     migraines   • Humerus fracture     Left   • Hyperhidrosis    • IBS (irritable bowel syndrome)    • Kidney stone    • Migraines    • Overactive bladder    • Pityriasis rosea    • Right flank pain 01/25/2021     Past Surgical History:   Procedure Laterality Date   • Closed reduction humerus fracture Left 08/28/2017   • Colonoscopy w biopsy  02/13/2023   • Esophagogastroduodenoscopy transoral flex diag     • Esophagogastroduodenoscopy transoral flex w/bx single or mult  02/13/2023   • Open access colonoscopy     • Ureteroscopy Left 03/07/2023     Family History   Problem Relation Age of Onset   • Hypertension Mother    • Sleep Apnea Father    • Heart Father    • Thyroid Father    • Stroke Father    • Migraine Sister    • Osteoporosis Maternal Grandmother    • Hypertension Maternal Grandmother    • Heart disease Paternal Grandmother    • Cancer, Colon Paternal Grandmother    • Heart disease Maternal Grandfather    • Dementia/Alzheimers Maternal Grandfather    • Stroke Maternal Grandfather    • Diabetes Maternal Grandfather    • Cancer Paternal Grandfather         possibly brains   • Cancer, Breast Neg Hx    • Vaginal cancer Neg Hx    • Cancer, Ovarian Neg Hx    • Cervical cancer Neg Hx    • Cancer, Endometrial Neg Hx      Social History     Tobacco Use   • Smoking status: Never     Passive exposure: Never   • Smokeless  tobacco: Never   Vaping Use   • Vaping status: never used   Substance Use Topics   • Alcohol use: Yes     Comment: occasionally/socially   • Drug use: Yes     Types: Marijuana     Comment: infrequent       REVIEW OF SYSTEMS   Review of Systems   Constitutional: Negative for chills, fever, weight gain and weight loss.   HENT:  Negative for hearing loss.    Cardiovascular:  Negative for chest pain and claudication.   Respiratory:  Negative for cough and hemoptysis.    Hematologic/Lymphatic: Does not bruise/bleed easily.   Skin:  Negative for rash and suspicious lesions.   Musculoskeletal:  Negative for arthritis.   Gastrointestinal:  Negative for hematochezia and melena.   Genitourinary:  Negative for hematuria.   Allergic/Immunologic: Negative for environmental allergies.           PHYSICAL EXAM   Vital Signs:    Vitals:    04/17/25 1108   BP: 112/70   BP Location: [facility] [place] - Left upper extremity   Patient Position: Sitting   Pulse: 71   Weight: 61.7 kg (136 lb)   Height: 4' 11\" (1.499 m)   LMP: 11/10/2024       General:   Alert, cooperative, conversive in no acute distress.  Skin:  Warm and dry without rash.    Head:  Normocephalic-atraumatic.   Neck:  Trachea is midline. No adenopathy.  Normal thyroid without mass or tenderness.  Eyes:  Normal conjunctiva and sclera.  Cardiovascular: regular rate and rhythm, S1, S2 normal, no murmur, click, rub or gallop  Respiratory: clear to auscultation bilaterally  Gastrointestinal:  Soft and nontender.  Normal bowel sounds.  No hepatomegaly or splenomegaly.   Extremities:  extremities normal, atraumatic, no cyanosis or edema  Neuro:   Orientated x 4.  No focal deficits or lateralizing signs.  Psychiatric:   Cooperative.  Appropriate mood & affect.    LABORATORY DATA     Lab Results   Component Value Date    CHOLESTEROL 217 (H) 06/04/2024    HDL 58 06/04/2024    CALCLDL 127 06/04/2024    TRIGLYCERIDE 160 (H) 06/04/2024     Lab Results   Component Value Date    HGBA1C 5.0 06/04/2024      Lab Results   Component Value Date    TSH 1.190 06/04/2024    TSH 3.38 02/22/2024     Lab Results   Component Value Date    SODIUM 139 06/04/2024    POTASSIUM 5.0 06/04/2024    CHLORIDE 107 06/04/2024    CO2 26 06/04/2024    ANIONGAP 11 06/04/2024    GLUCOSE 90 06/04/2024    BUN 17 06/04/2024    CREATININE 0.72 06/04/2024    CALCIUM 9.8 06/04/2024     Lab Results   Component Value Date    WBC 7.7 02/22/2024    RBC 4.70 02/22/2024    HGB 14.8 02/22/2024    HCT 44.5 02/22/2024    MCV 94.7 02/22/2024    MCH 31.5 02/22/2024    MCHC 33.3 02/22/2024    RDWCV 12.2 02/22/2024    NRBCRE 0 02/09/2023     Lab Results   Component Value Date    SEG 63.2 02/22/2024    TLYMPH 26.8 02/22/2024    PMON 6.5 02/22/2024    PEOS 2.9 02/22/2024    PBASO 0.6 02/22/2024    ANEUT 4,866 02/22/2024    ALYMS 2,064 02/22/2024    FLEX 501 02/22/2024    AEOS 223 02/22/2024    ABASO 46 02/22/2024       ASSESSMENT & PLAN   1. Tachycardia  A twelve-lead EKG that was done in the office showed her in sinus rhythm.  HR in 80s. She has felt better since taking metoprolol and sodium chloride as well as we.  I asked her to continue Midodrine.  She does not have orthostatic tachycardia therefore she will not benefit from ivabradine.  I offered her to switch metoprolol to propranolol however she is happy with metoprolol.  We will continue current medications.  I will see her in follow-up as needed.  -     Electrocardiogram (ECG)        All questions answered and plan discussed with patient.

## (undated) DEVICE — GLOVE PROTEXIS POWDER FREE SMT 8.0  2D72PT80X

## (undated) DEVICE — KIT PATIENT POSITIONING PIGAZZI LATEX FREE 40580

## (undated) DEVICE — DILATOR VASCULAR 6FRX19CM 405504

## (undated) DEVICE — STPL LINEAR 90 X 3.5MM TA9035S

## (undated) DEVICE — GLOVE PROTEXIS POWDER FREE SMT 6.5  2D72PT65X

## (undated) DEVICE — ENDO TROCAR SLEEVE KII ADV FIXATION 05X100MM CFS02

## (undated) DEVICE — GOWN LG DISP 9515

## (undated) DEVICE — SU DERMABOND ADVANCED .7ML DNX12

## (undated) DEVICE — ADHESIVE SWIFTSET 0.8ML OCTYL SS6

## (undated) DEVICE — SU MONOCRYL 4-0 PS-2 27" UND Y426H

## (undated) DEVICE — ENDO TROCAR FIRST ENTRY KII FIOS ADV FIX 11X100MM CFF33

## (undated) DEVICE — LINEN GOWN XLG 5407

## (undated) DEVICE — ESU GROUND PAD ADULT W/CORD E7507

## (undated) DEVICE — SU VICRYL 3-0 SH 27" J316H

## (undated) DEVICE — SU VICRYL 0 UR-6 27" J603H

## (undated) DEVICE — PREP CHLORAPREP 26ML TINTED ORANGE  260815

## (undated) DEVICE — DRAPE IOBAN INCISE 23X17" 6650EZ

## (undated) DEVICE — Device

## (undated) DEVICE — ENDO SCOPE WARMER SEAL  C3101

## (undated) DEVICE — DRSG TEGADERM 4X4 3/4" 1626W

## (undated) DEVICE — ADH SKIN CLOSURE PREMIERPRO EXOFIN 1.0ML 3470

## (undated) DEVICE — ENDO TROCAR BLUNT TIP KII BALLOON 12X100MM C0R47

## (undated) DEVICE — ENDO POUCH UNIV RETRIEVAL SYSTEM INZII 10MM CD001

## (undated) DEVICE — ENDO TROCAR SLEEVE KII Z-THREADED 05X100MM CTS02

## (undated) DEVICE — WIPES FOLEY CARE SURESTEP PROVON DFC100

## (undated) DEVICE — CONNECTOR MALE TO MALE LL

## (undated) DEVICE — DRAPE SHEET REV FOLD 3/4 9349

## (undated) DEVICE — SOL NACL 0.9% INJ 1000ML BAG 2B1324X

## (undated) DEVICE — GLOVE PROTEXIS BLUE W/NEU-THERA 8.0  2D73EB80

## (undated) DEVICE — DRAPE POUCH INSTRUMENT 3 POCKET 1018L

## (undated) DEVICE — COVER ULTRASOUND PROBE W/GEL FLEXI-FEEL 6"X58" LF  25-FF658

## (undated) DEVICE — PACK CENTRAL LINE INSERTION SAN32CLFCG

## (undated) DEVICE — SOL WATER IRRIG 1000ML BOTTLE 2F7114

## (undated) DEVICE — PACK AB HYST II

## (undated) DEVICE — STPL CONTOUR CUT CVD GREEN CS40G

## (undated) DEVICE — GOWN XLG DISP 9545

## (undated) DEVICE — SU VICRYL 3-0 SH 8X18" UND J864D

## (undated) DEVICE — SOL NACL 0.9% IRRIG 1000ML BOTTLE 07138-09

## (undated) DEVICE — ESU HOLSTER PLASTIC DISP E2400

## (undated) DEVICE — ENDO TROCAR FIRST ENTRY KII FIOS ADV FIX 05X100MM CFF03

## (undated) DEVICE — SU VICRYL 4-0 FS-2 27" J422-H

## (undated) DEVICE — SOL WATER IRRIG 1000ML BOTTLE 07139-09

## (undated) DEVICE — ESU ENDO SCISSORS 5MM CVD 5DCS

## (undated) DEVICE — GLOVE PROTEXIS W/NEU-THERA 8.0  2D73TE80

## (undated) DEVICE — CLIP APPLIER ENDO 5MM M/L LIGAMAX EL5ML

## (undated) DEVICE — SOL NACL 0.9% IRRIG 3000ML BAG 07972-08

## (undated) DEVICE — LINEN TOWEL PACK X6 WHITE 5487

## (undated) DEVICE — DRAPE LEGGINGS CLEAR 8430

## (undated) DEVICE — SUCTION IRRIGATION STRYKFLOW II W/TIP DISP 250-070-520

## (undated) DEVICE — GLOVE PROTEXIS MICRO 5.5  2D73PM55

## (undated) DEVICE — KIT INTRODUCER FLUENT MICRO 5FRX10CM ECHO TIP KIT-038-04

## (undated) DEVICE — STPL LINEAR CUT 100MM TLC10

## (undated) DEVICE — GLOVE PROTEXIS W/NEU-THERA 7.5  2D73TE75

## (undated) DEVICE — PROTECTOR ARM ONE-STEP TRENDELENBURG 40418

## (undated) DEVICE — ANTIFOG SOLUTION W/FOAM PAD 31142527

## (undated) DEVICE — TUBING INSUFFLATION W/FILTER CPC TO LUER 620-030-301

## (undated) DEVICE — LINEN TOWEL PACK X30 5481

## (undated) DEVICE — ESU CORD MONOPOLAR 10'  E0510

## (undated) DEVICE — ENDO TROCAR FIRST ENTRY KII FIOS Z-THRD 05X100MM CTF03

## (undated) DEVICE — DECANTER VIAL 2006S

## (undated) DEVICE — GLOVE PROTEXIS BLUE W/NEU-THERA 6.0  2D73EB60

## (undated) DEVICE — STOCKING SLEEVE COMPRESSION CALF LG

## (undated) DEVICE — SU MONOCRYL 4-0 P-3 18" UND Y494G

## (undated) DEVICE — DECANTER BAG 2002S

## (undated) DEVICE — ESU LIGASURE SEALER/DIVIDER RETRACT L-HOOK 37CM LF5637

## (undated) DEVICE — LINEN TOWEL PACK X5 5464

## (undated) DEVICE — SU PDS II 1 TP-1 54" Z879G

## (undated) DEVICE — KNIFE HANDLE W/15 BLADE 371615

## (undated) DEVICE — JELLY LUBRICATING SURGILUBE 2OZ TUBE

## (undated) RX ORDER — HYDROMORPHONE HYDROCHLORIDE 1 MG/ML
INJECTION, SOLUTION INTRAMUSCULAR; INTRAVENOUS; SUBCUTANEOUS
Status: DISPENSED
Start: 2019-03-22

## (undated) RX ORDER — FENTANYL CITRATE 50 UG/ML
INJECTION, SOLUTION INTRAMUSCULAR; INTRAVENOUS
Status: DISPENSED
Start: 2019-03-22

## (undated) RX ORDER — BUPIVACAINE HYDROCHLORIDE AND EPINEPHRINE 5; 5 MG/ML; UG/ML
INJECTION, SOLUTION EPIDURAL; INTRACAUDAL; PERINEURAL
Status: DISPENSED
Start: 2020-01-01

## (undated) RX ORDER — FENTANYL CITRATE 50 UG/ML
INJECTION, SOLUTION INTRAMUSCULAR; INTRAVENOUS
Status: DISPENSED
Start: 2020-01-01

## (undated) RX ORDER — SIMETHICONE 20 MG/.3ML
EMULSION ORAL
Status: DISPENSED
Start: 2019-03-11

## (undated) RX ORDER — HYDROMORPHONE HYDROCHLORIDE 1 MG/ML
INJECTION, SOLUTION INTRAMUSCULAR; INTRAVENOUS; SUBCUTANEOUS
Status: DISPENSED
Start: 2020-01-01

## (undated) RX ORDER — ONDANSETRON 2 MG/ML
INJECTION INTRAMUSCULAR; INTRAVENOUS
Status: DISPENSED
Start: 2019-03-22

## (undated) RX ORDER — PROPOFOL 10 MG/ML
INJECTION, EMULSION INTRAVENOUS
Status: DISPENSED
Start: 2020-01-01

## (undated) RX ORDER — DEXAMETHASONE SODIUM PHOSPHATE 4 MG/ML
INJECTION, SOLUTION INTRA-ARTICULAR; INTRALESIONAL; INTRAMUSCULAR; INTRAVENOUS; SOFT TISSUE
Status: DISPENSED
Start: 2020-01-01

## (undated) RX ORDER — CEFAZOLIN SODIUM 2 G/100ML
INJECTION, SOLUTION INTRAVENOUS
Status: DISPENSED
Start: 2020-01-01

## (undated) RX ORDER — DIPHENHYDRAMINE HYDROCHLORIDE 50 MG/ML
INJECTION INTRAMUSCULAR; INTRAVENOUS
Status: DISPENSED
Start: 2019-03-11

## (undated) RX ORDER — CEFAZOLIN SODIUM 1 G/3ML
INJECTION, POWDER, FOR SOLUTION INTRAMUSCULAR; INTRAVENOUS
Status: DISPENSED
Start: 2019-04-22

## (undated) RX ORDER — FENTANYL CITRATE 50 UG/ML
INJECTION, SOLUTION INTRAMUSCULAR; INTRAVENOUS
Status: DISPENSED
Start: 2019-03-11

## (undated) RX ORDER — ACETAMINOPHEN 325 MG/1
TABLET ORAL
Status: DISPENSED
Start: 2020-01-01

## (undated) RX ORDER — ACETAMINOPHEN 325 MG/1
TABLET ORAL
Status: DISPENSED
Start: 2019-04-22

## (undated) RX ORDER — PHENYLEPHRINE HCL IN 0.9% NACL 1 MG/10 ML
SYRINGE (ML) INTRAVENOUS
Status: DISPENSED
Start: 2020-01-01

## (undated) RX ORDER — ACETAMINOPHEN 325 MG/1
TABLET ORAL
Status: DISPENSED
Start: 2019-03-22

## (undated) RX ORDER — GABAPENTIN 300 MG/1
CAPSULE ORAL
Status: DISPENSED
Start: 2020-01-01

## (undated) RX ORDER — ONDANSETRON 2 MG/ML
INJECTION INTRAMUSCULAR; INTRAVENOUS
Status: DISPENSED
Start: 2020-01-01

## (undated) RX ORDER — DEXAMETHASONE SODIUM PHOSPHATE 4 MG/ML
INJECTION, SOLUTION INTRA-ARTICULAR; INTRALESIONAL; INTRAMUSCULAR; INTRAVENOUS; SOFT TISSUE
Status: DISPENSED
Start: 2019-03-22

## (undated) RX ORDER — LIDOCAINE HYDROCHLORIDE 10 MG/ML
INJECTION, SOLUTION EPIDURAL; INFILTRATION; INTRACAUDAL; PERINEURAL
Status: DISPENSED
Start: 2019-03-11

## (undated) RX ORDER — LIDOCAINE HYDROCHLORIDE 10 MG/ML
INJECTION, SOLUTION EPIDURAL; INFILTRATION; INTRACAUDAL; PERINEURAL
Status: DISPENSED
Start: 2020-01-01

## (undated) RX ORDER — HYDROCODONE BITARTRATE AND ACETAMINOPHEN 5; 325 MG/1; MG/1
TABLET ORAL
Status: DISPENSED
Start: 2020-01-01